# Patient Record
Sex: MALE | Race: WHITE | Employment: OTHER | URBAN - METROPOLITAN AREA
[De-identification: names, ages, dates, MRNs, and addresses within clinical notes are randomized per-mention and may not be internally consistent; named-entity substitution may affect disease eponyms.]

---

## 2018-03-19 ENCOUNTER — HOSPITAL ENCOUNTER (OUTPATIENT)
Dept: WOUND CARE | Age: 83
Discharge: HOME | End: 2018-03-19
Payer: MEDICARE

## 2018-03-19 VITALS
DIASTOLIC BLOOD PRESSURE: 78 MMHG | WEIGHT: 174 LBS | HEART RATE: 76 BPM | RESPIRATION RATE: 18 BRPM | SYSTOLIC BLOOD PRESSURE: 146 MMHG | HEIGHT: 66 IN | BODY MASS INDEX: 27.97 KG/M2 | TEMPERATURE: 97.7 F

## 2018-03-19 DIAGNOSIS — L89.151 DECUBITUS ULCER OF SACRAL REGION, STAGE 1: ICD-10-CM

## 2018-03-19 DIAGNOSIS — R53.1 RIGHT SIDED WEAKNESS: ICD-10-CM

## 2018-03-19 PROCEDURE — 99204 OFFICE O/P NEW MOD 45 MIN: CPT

## 2018-03-19 ASSESSMENT — PAIN DESCRIPTION - ORIENTATION: ORIENTATION: RIGHT;LEFT

## 2018-03-19 ASSESSMENT — PAIN DESCRIPTION - ONSET: ONSET: ON-GOING

## 2018-03-19 ASSESSMENT — PAIN DESCRIPTION - LOCATION: LOCATION: BUTTOCKS

## 2018-03-19 ASSESSMENT — PAIN SCALES - GENERAL: PAINLEVEL_OUTOF10: 3

## 2018-03-19 ASSESSMENT — PAIN DESCRIPTION - DESCRIPTORS: DESCRIPTORS: DULL;SORE

## 2018-03-19 ASSESSMENT — PAIN DESCRIPTION - PAIN TYPE: TYPE: CHRONIC PAIN

## 2018-03-19 ASSESSMENT — PAIN DESCRIPTION - FREQUENCY: FREQUENCY: INTERMITTENT

## 2018-03-19 NOTE — CONSULTS
Chief Complaint: Patient seen for evaluation of sacral decubitus ulcer      HPI:  This patient unfortunately sustained a stroke, with right-sided weakness, currently wearing a brace over the right leg, some flexion contracture right upper extremities, most the time spent time in their wheelchair, developed sacral coccygeal decubitus ulcers with areas of skin breakdown, came to the wound care center for further evaluation and on examination by the nurses, the ulcers were found be healed, though patient has some area of skin irritation and rash at that site    Patient can walk short distances only around the house , denies any symptoms of rest pain    Patient does give history of diabetes      Patient denies any new focal lateralizing neurological symptoms like loss of speech, vision or loss of function of extremity, but has significant weakness of the right arm and right leg due to old stroke        Allergies   Allergen Reactions    Sulfa Antibiotics      Affects renal function and blood pressure    Sulfa Antibiotics        Current Outpatient Prescriptions   Medication Sig Dispense Refill    metFORMIN (GLUCOPHAGE) 500 MG tablet Take 500 mg by mouth daily (with breakfast)      furosemide (LASIX) 20 MG tablet Take 20 mg by mouth daily      melatonin 3 MG TABS tablet Take 3 mg by mouth nightly as needed      dimenhyDRINATE (DRAMAMINE) 50 MG tablet Take 25 mg by mouth daily as needed      fluticasone (FLONASE) 50 MCG/ACT nasal spray 2 sprays by Nasal route daily 2 sprays in each nostril daily 1 Bottle 3    GLIMEPIRIDE PO Take 4 mg by mouth daily       OXYBUTYNIN CHLORIDE ER PO Take by mouth      glucose (GLUTOSE) 40 % GEL Take 15 g by mouth as needed. 45 g 1    sodium chloride (AYR SALINE NASAL DROPS) 0.65 % (SOLN) SOLN nasal spray 1 spray by Each Nare route as needed. 1 Bottle 0    aspirin 81 MG tablet Take 81 mg by mouth daily.  metoprolol (LOPRESSOR) 50 MG tablet Take 50 mg by mouth 2 times daily.

## 2018-05-22 ENCOUNTER — HOSPITAL ENCOUNTER (EMERGENCY)
Age: 83
Discharge: HOME | End: 2018-05-22
Attending: EMERGENCY MEDICINE
Payer: MEDICARE

## 2018-05-22 VITALS
BODY MASS INDEX: 27.97 KG/M2 | WEIGHT: 174 LBS | DIASTOLIC BLOOD PRESSURE: 56 MMHG | HEART RATE: 68 BPM | OXYGEN SATURATION: 97 % | SYSTOLIC BLOOD PRESSURE: 102 MMHG | HEIGHT: 66 IN | RESPIRATION RATE: 16 BRPM | TEMPERATURE: 98.2 F

## 2018-05-22 DIAGNOSIS — E16.2 HYPOGLYCEMIA: Primary | ICD-10-CM

## 2018-05-22 DIAGNOSIS — N30.00 ACUTE CYSTITIS WITHOUT HEMATURIA: ICD-10-CM

## 2018-05-22 LAB
ANION GAP SERPL CALCULATED.3IONS-SCNC: 15 MMOL/L (ref 7–16)
BACTERIA: ABNORMAL /HPF
BILIRUBIN URINE: NEGATIVE
BLOOD, URINE: ABNORMAL
BUN BLDV-MCNC: 37 MG/DL (ref 8–23)
CALCIUM SERPL-MCNC: 8.4 MG/DL (ref 8.6–10.2)
CHLORIDE BLD-SCNC: 100 MMOL/L (ref 98–107)
CLARITY: ABNORMAL
CO2: 22 MMOL/L (ref 22–29)
COLOR: YELLOW
CREAT SERPL-MCNC: 3 MG/DL (ref 0.7–1.2)
GFR AFRICAN AMERICAN: 24
GFR NON-AFRICAN AMERICAN: 20 ML/MIN/1.73
GLUCOSE BLD-MCNC: 86 MG/DL (ref 74–109)
GLUCOSE URINE: NEGATIVE MG/DL
KETONES, URINE: NEGATIVE MG/DL
LEUKOCYTE ESTERASE, URINE: ABNORMAL
METER GLUCOSE: 88 MG/DL (ref 70–110)
METER GLUCOSE: 97 MG/DL (ref 70–110)
NITRITE, URINE: NEGATIVE
PH UA: 8.5 (ref 5–9)
POTASSIUM SERPL-SCNC: 3.9 MMOL/L (ref 3.5–5)
PROTEIN UA: 100 MG/DL
RBC UA: ABNORMAL /HPF (ref 0–2)
SODIUM BLD-SCNC: 137 MMOL/L (ref 132–146)
SPECIFIC GRAVITY UA: 1.02 (ref 1–1.03)
UROBILINOGEN, URINE: 1 E.U./DL
WBC UA: >20 /HPF (ref 0–5)

## 2018-05-22 PROCEDURE — 80048 BASIC METABOLIC PNL TOTAL CA: CPT

## 2018-05-22 PROCEDURE — 82962 GLUCOSE BLOOD TEST: CPT

## 2018-05-22 PROCEDURE — 87088 URINE BACTERIA CULTURE: CPT

## 2018-05-22 PROCEDURE — 87186 SC STD MICRODIL/AGAR DIL: CPT

## 2018-05-22 PROCEDURE — 6370000000 HC RX 637 (ALT 250 FOR IP): Performed by: EMERGENCY MEDICINE

## 2018-05-22 PROCEDURE — 96374 THER/PROPH/DIAG INJ IV PUSH: CPT

## 2018-05-22 PROCEDURE — 2580000003 HC RX 258: Performed by: EMERGENCY MEDICINE

## 2018-05-22 PROCEDURE — 81001 URINALYSIS AUTO W/SCOPE: CPT

## 2018-05-22 PROCEDURE — 99284 EMERGENCY DEPT VISIT MOD MDM: CPT

## 2018-05-22 RX ORDER — OXYBUTYNIN CHLORIDE 5 MG/1
5 TABLET ORAL EVERY EVENING
COMMUNITY
End: 2020-12-08

## 2018-05-22 RX ORDER — METFORMIN HYDROCHLORIDE 500 MG/1
500 TABLET, EXTENDED RELEASE ORAL
Status: ON HOLD | COMMUNITY
End: 2019-01-12

## 2018-05-22 RX ORDER — AMOXICILLIN AND CLAVULANATE POTASSIUM 500; 125 MG/1; MG/1
1 TABLET, FILM COATED ORAL 2 TIMES DAILY
Qty: 14 TABLET | Refills: 0 | Status: SHIPPED | OUTPATIENT
Start: 2018-05-22 | End: 2018-05-29

## 2018-05-22 RX ORDER — GLIMEPIRIDE 4 MG/1
4 TABLET ORAL
Status: ON HOLD | COMMUNITY
End: 2019-01-12

## 2018-05-22 RX ORDER — DEXTROSE MONOHYDRATE 25 G/50ML
12.5 INJECTION, SOLUTION INTRAVENOUS ONCE
Status: COMPLETED | OUTPATIENT
Start: 2018-05-22 | End: 2018-05-22

## 2018-05-22 RX ORDER — AMOXICILLIN AND CLAVULANATE POTASSIUM 500; 125 MG/1; MG/1
1 TABLET, FILM COATED ORAL ONCE
Status: COMPLETED | OUTPATIENT
Start: 2018-05-22 | End: 2018-05-22

## 2018-05-22 RX ORDER — PANTOPRAZOLE SODIUM 40 MG/1
40 TABLET, DELAYED RELEASE ORAL
Status: ON HOLD | COMMUNITY
End: 2019-01-12 | Stop reason: HOSPADM

## 2018-05-22 RX ADMIN — AMOXICILLIN AND CLAVULANATE POTASSIUM 1 TABLET: 500; 125 TABLET, FILM COATED ORAL at 14:00

## 2018-05-22 RX ADMIN — DEXTROSE MONOHYDRATE 12.5 G: 25 INJECTION, SOLUTION INTRAVENOUS at 14:38

## 2018-05-22 ASSESSMENT — ENCOUNTER SYMPTOMS
EYE PAIN: 0
COUGH: 0
ABDOMINAL PAIN: 0
NAUSEA: 0
SINUS PRESSURE: 0
SORE THROAT: 0
VOMITING: 0
SHORTNESS OF BREATH: 0
EYE REDNESS: 0
WHEEZING: 0
COLOR CHANGE: 0
DIARRHEA: 0
RHINORRHEA: 0

## 2018-05-23 ENCOUNTER — HOSPITAL ENCOUNTER (EMERGENCY)
Age: 83
Discharge: HOME | End: 2018-05-23
Attending: EMERGENCY MEDICINE
Payer: MEDICARE

## 2018-05-23 VITALS
WEIGHT: 174 LBS | HEART RATE: 68 BPM | SYSTOLIC BLOOD PRESSURE: 118 MMHG | BODY MASS INDEX: 27.97 KG/M2 | RESPIRATION RATE: 16 BRPM | DIASTOLIC BLOOD PRESSURE: 60 MMHG | OXYGEN SATURATION: 97 % | HEIGHT: 66 IN | TEMPERATURE: 98 F

## 2018-05-23 DIAGNOSIS — N18.9 CHRONIC KIDNEY DISEASE, UNSPECIFIED CKD STAGE: ICD-10-CM

## 2018-05-23 DIAGNOSIS — E16.2 HYPOGLYCEMIA: Primary | ICD-10-CM

## 2018-05-23 LAB
CHP ED QC CHECK: NORMAL
CHP ED QC CHECK: YES
GLUCOSE BLD-MCNC: 105 MG/DL
GLUCOSE BLD-MCNC: 167 MG/DL
METER GLUCOSE: 105 MG/DL (ref 70–110)
METER GLUCOSE: 167 MG/DL (ref 70–110)

## 2018-05-23 PROCEDURE — 99285 EMERGENCY DEPT VISIT HI MDM: CPT

## 2018-05-23 PROCEDURE — 2580000003 HC RX 258: Performed by: EMERGENCY MEDICINE

## 2018-05-23 PROCEDURE — 82962 GLUCOSE BLOOD TEST: CPT

## 2018-05-23 RX ORDER — 0.9 % SODIUM CHLORIDE 0.9 %
1000 INTRAVENOUS SOLUTION INTRAVENOUS ONCE
Status: COMPLETED | OUTPATIENT
Start: 2018-05-23 | End: 2018-05-23

## 2018-05-23 RX ADMIN — SODIUM CHLORIDE 1000 ML: 9 INJECTION, SOLUTION INTRAVENOUS at 06:36

## 2018-05-23 ASSESSMENT — ENCOUNTER SYMPTOMS
WHEEZING: 0
EYE REDNESS: 0
EYE PAIN: 0
NAUSEA: 0
COUGH: 0
RHINORRHEA: 0
ABDOMINAL PAIN: 0
VOMITING: 0
DIARRHEA: 0
COLOR CHANGE: 0
SORE THROAT: 0
SHORTNESS OF BREATH: 0
SINUS PRESSURE: 0

## 2018-05-24 LAB
ORGANISM: ABNORMAL
URINE CULTURE, ROUTINE: ABNORMAL
URINE CULTURE, ROUTINE: ABNORMAL

## 2019-01-12 ENCOUNTER — HOSPITAL ENCOUNTER (OUTPATIENT)
Age: 84
Setting detail: OBSERVATION
Discharge: HOME | End: 2019-01-12
Attending: EMERGENCY MEDICINE | Admitting: INTERNAL MEDICINE
Payer: MEDICARE

## 2019-01-12 ENCOUNTER — APPOINTMENT (OUTPATIENT)
Dept: GENERAL RADIOLOGY | Age: 84
End: 2019-01-12
Payer: MEDICARE

## 2019-01-12 VITALS
OXYGEN SATURATION: 95 % | SYSTOLIC BLOOD PRESSURE: 165 MMHG | HEIGHT: 65 IN | WEIGHT: 170 LBS | HEART RATE: 69 BPM | RESPIRATION RATE: 18 BRPM | DIASTOLIC BLOOD PRESSURE: 76 MMHG | BODY MASS INDEX: 28.32 KG/M2 | TEMPERATURE: 97.4 F

## 2019-01-12 DIAGNOSIS — I48.91 NEW ONSET ATRIAL FIBRILLATION (HCC): ICD-10-CM

## 2019-01-12 DIAGNOSIS — N18.30 STAGE 3 CHRONIC KIDNEY DISEASE (HCC): ICD-10-CM

## 2019-01-12 DIAGNOSIS — E16.2 HYPOGLYCEMIA: Primary | ICD-10-CM

## 2019-01-12 DIAGNOSIS — I50.9 CONGESTIVE HEART FAILURE, UNSPECIFIED HF CHRONICITY, UNSPECIFIED HEART FAILURE TYPE (HCC): ICD-10-CM

## 2019-01-12 LAB
ALBUMIN SERPL-MCNC: 3.3 G/DL (ref 3.5–5.2)
ALP BLD-CCNC: 119 U/L (ref 40–129)
ALT SERPL-CCNC: 8 U/L (ref 0–40)
ANION GAP SERPL CALCULATED.3IONS-SCNC: 9 MMOL/L (ref 7–16)
APTT: 24.9 SEC (ref 24.5–35.1)
AST SERPL-CCNC: 16 U/L (ref 0–39)
BASOPHILS ABSOLUTE: 0.02 E9/L (ref 0–0.2)
BASOPHILS RELATIVE PERCENT: 0.3 % (ref 0–2)
BILIRUB SERPL-MCNC: 0.4 MG/DL (ref 0–1.2)
BUN BLDV-MCNC: 21 MG/DL (ref 8–23)
CALCIUM SERPL-MCNC: 8.2 MG/DL (ref 8.6–10.2)
CHLORIDE BLD-SCNC: 109 MMOL/L (ref 98–107)
CHP ED QC CHECK: YES
CO2: 22 MMOL/L (ref 22–29)
CREAT SERPL-MCNC: 2.1 MG/DL (ref 0.7–1.2)
EKG ATRIAL RATE: 78 BPM
EKG Q-T INTERVAL: 374 MS
EKG QRS DURATION: 74 MS
EKG QTC CALCULATION (BAZETT): 403 MS
EKG R AXIS: 12 DEGREES
EKG T AXIS: -17 DEGREES
EKG VENTRICULAR RATE: 70 BPM
EOSINOPHILS ABSOLUTE: 0.31 E9/L (ref 0.05–0.5)
EOSINOPHILS RELATIVE PERCENT: 5.1 % (ref 0–6)
GFR AFRICAN AMERICAN: 36
GFR NON-AFRICAN AMERICAN: 30 ML/MIN/1.73
GLUCOSE BLD-MCNC: 65 MG/DL (ref 74–99)
GLUCOSE BLD-MCNC: 80 MG/DL
HCT VFR BLD CALC: 30.3 % (ref 37–54)
HEMOGLOBIN: 9.5 G/DL (ref 12.5–16.5)
IMMATURE GRANULOCYTES #: 0.01 E9/L
IMMATURE GRANULOCYTES %: 0.2 % (ref 0–5)
INR BLD: 1.8
LACTIC ACID: 1 MMOL/L (ref 0.5–2.2)
LYMPHOCYTES ABSOLUTE: 1.28 E9/L (ref 1.5–4)
LYMPHOCYTES RELATIVE PERCENT: 21.1 % (ref 20–42)
MCH RBC QN AUTO: 29.9 PG (ref 26–35)
MCHC RBC AUTO-ENTMCNC: 31.4 % (ref 32–34.5)
MCV RBC AUTO: 95.3 FL (ref 80–99.9)
METER GLUCOSE: 263 MG/DL (ref 74–99)
METER GLUCOSE: 76 MG/DL (ref 74–99)
METER GLUCOSE: 80 MG/DL (ref 74–99)
METER GLUCOSE: 96 MG/DL (ref 74–99)
MONOCYTES ABSOLUTE: 0.7 E9/L (ref 0.1–0.95)
MONOCYTES RELATIVE PERCENT: 11.5 % (ref 2–12)
NEUTROPHILS ABSOLUTE: 3.75 E9/L (ref 1.8–7.3)
NEUTROPHILS RELATIVE PERCENT: 61.8 % (ref 43–80)
PDW BLD-RTO: 15.4 FL (ref 11.5–15)
PLATELET # BLD: 155 E9/L (ref 130–450)
PMV BLD AUTO: 11 FL (ref 7–12)
POTASSIUM SERPL-SCNC: 5.1 MMOL/L (ref 3.5–5)
PRO-BNP: 4596 PG/ML (ref 0–450)
PROTHROMBIN TIME: 20.1 SEC (ref 9.3–12.4)
RBC # BLD: 3.18 E12/L (ref 3.8–5.8)
SODIUM BLD-SCNC: 140 MMOL/L (ref 132–146)
TOTAL PROTEIN: 6 G/DL (ref 6.4–8.3)
TROPONIN: 0.01 NG/ML (ref 0–0.03)
WBC # BLD: 6.1 E9/L (ref 4.5–11.5)

## 2019-01-12 PROCEDURE — 71045 X-RAY EXAM CHEST 1 VIEW: CPT

## 2019-01-12 PROCEDURE — 93005 ELECTROCARDIOGRAM TRACING: CPT | Performed by: EMERGENCY MEDICINE

## 2019-01-12 PROCEDURE — 80053 COMPREHEN METABOLIC PANEL: CPT

## 2019-01-12 PROCEDURE — G0378 HOSPITAL OBSERVATION PER HR: HCPCS

## 2019-01-12 PROCEDURE — 87040 BLOOD CULTURE FOR BACTERIA: CPT

## 2019-01-12 PROCEDURE — 83605 ASSAY OF LACTIC ACID: CPT

## 2019-01-12 PROCEDURE — 99285 EMERGENCY DEPT VISIT HI MDM: CPT

## 2019-01-12 PROCEDURE — 94664 DEMO&/EVAL PT USE INHALER: CPT

## 2019-01-12 PROCEDURE — 96374 THER/PROPH/DIAG INJ IV PUSH: CPT

## 2019-01-12 PROCEDURE — 85610 PROTHROMBIN TIME: CPT

## 2019-01-12 PROCEDURE — 6370000000 HC RX 637 (ALT 250 FOR IP): Performed by: INTERNAL MEDICINE

## 2019-01-12 PROCEDURE — 6370000000 HC RX 637 (ALT 250 FOR IP): Performed by: EMERGENCY MEDICINE

## 2019-01-12 PROCEDURE — 85730 THROMBOPLASTIN TIME PARTIAL: CPT

## 2019-01-12 PROCEDURE — 83880 ASSAY OF NATRIURETIC PEPTIDE: CPT

## 2019-01-12 PROCEDURE — 96375 TX/PRO/DX INJ NEW DRUG ADDON: CPT

## 2019-01-12 PROCEDURE — 84484 ASSAY OF TROPONIN QUANT: CPT

## 2019-01-12 PROCEDURE — 6360000002 HC RX W HCPCS: Performed by: EMERGENCY MEDICINE

## 2019-01-12 PROCEDURE — 94640 AIRWAY INHALATION TREATMENT: CPT

## 2019-01-12 PROCEDURE — 2580000003 HC RX 258: Performed by: INTERNAL MEDICINE

## 2019-01-12 PROCEDURE — 85025 COMPLETE CBC W/AUTO DIFF WBC: CPT

## 2019-01-12 PROCEDURE — 82962 GLUCOSE BLOOD TEST: CPT

## 2019-01-12 RX ORDER — METHYLPREDNISOLONE SODIUM SUCCINATE 125 MG/2ML
125 INJECTION, POWDER, LYOPHILIZED, FOR SOLUTION INTRAMUSCULAR; INTRAVENOUS ONCE
Status: COMPLETED | OUTPATIENT
Start: 2019-01-12 | End: 2019-01-12

## 2019-01-12 RX ORDER — PRAVASTATIN SODIUM 20 MG
20 TABLET ORAL
Status: DISCONTINUED | OUTPATIENT
Start: 2019-01-12 | End: 2019-01-12 | Stop reason: HOSPADM

## 2019-01-12 RX ORDER — ACETAMINOPHEN 325 MG/1
650 TABLET ORAL EVERY 4 HOURS PRN
Status: DISCONTINUED | OUTPATIENT
Start: 2019-01-12 | End: 2019-01-12 | Stop reason: HOSPADM

## 2019-01-12 RX ORDER — GLIMEPIRIDE 4 MG/1
2 TABLET ORAL
Qty: 30 TABLET | Refills: 3 | Status: SHIPPED | OUTPATIENT
Start: 2019-01-12 | End: 2020-08-14

## 2019-01-12 RX ORDER — DEXTROSE MONOHYDRATE 25 G/50ML
25 INJECTION, SOLUTION INTRAVENOUS PRN
Status: DISCONTINUED | OUTPATIENT
Start: 2019-01-12 | End: 2019-01-12

## 2019-01-12 RX ORDER — PANTOPRAZOLE SODIUM 40 MG/1
40 TABLET, DELAYED RELEASE ORAL
Status: DISCONTINUED | OUTPATIENT
Start: 2019-01-12 | End: 2019-01-12 | Stop reason: HOSPADM

## 2019-01-12 RX ORDER — FUROSEMIDE 20 MG/1
20 TABLET ORAL EVERY MORNING
Qty: 60 TABLET | Refills: 3 | Status: SHIPPED | OUTPATIENT
Start: 2019-01-12 | End: 2021-01-20

## 2019-01-12 RX ORDER — METOPROLOL TARTRATE 50 MG/1
50 TABLET, FILM COATED ORAL 2 TIMES DAILY
Status: DISCONTINUED | OUTPATIENT
Start: 2019-01-12 | End: 2019-01-12 | Stop reason: HOSPADM

## 2019-01-12 RX ORDER — SODIUM CHLORIDE 0.9 % (FLUSH) 0.9 %
10 SYRINGE (ML) INJECTION PRN
Status: DISCONTINUED | OUTPATIENT
Start: 2019-01-12 | End: 2019-01-12 | Stop reason: HOSPADM

## 2019-01-12 RX ORDER — DEXTROSE MONOHYDRATE 50 MG/ML
100 INJECTION, SOLUTION INTRAVENOUS PRN
Status: DISCONTINUED | OUTPATIENT
Start: 2019-01-12 | End: 2019-01-12

## 2019-01-12 RX ORDER — OXYBUTYNIN CHLORIDE 5 MG/1
5 TABLET ORAL EVERY EVENING
Status: DISCONTINUED | OUTPATIENT
Start: 2019-01-12 | End: 2019-01-12 | Stop reason: HOSPADM

## 2019-01-12 RX ORDER — SODIUM CHLORIDE 0.9 % (FLUSH) 0.9 %
10 SYRINGE (ML) INJECTION EVERY 12 HOURS SCHEDULED
Status: DISCONTINUED | OUTPATIENT
Start: 2019-01-12 | End: 2019-01-12 | Stop reason: HOSPADM

## 2019-01-12 RX ORDER — ASPIRIN 81 MG/1
81 TABLET ORAL EVERY MORNING
Status: DISCONTINUED | OUTPATIENT
Start: 2019-01-12 | End: 2019-01-12 | Stop reason: HOSPADM

## 2019-01-12 RX ORDER — ACETAMINOPHEN 325 MG/1
650 TABLET ORAL EVERY 4 HOURS PRN
Status: DISCONTINUED | OUTPATIENT
Start: 2019-01-12 | End: 2019-01-12 | Stop reason: SDUPTHER

## 2019-01-12 RX ORDER — METOPROLOL SUCCINATE 50 MG/1
50 TABLET, EXTENDED RELEASE ORAL 2 TIMES DAILY
Qty: 30 TABLET | Refills: 1 | Status: ON HOLD | OUTPATIENT
Start: 2019-01-12 | End: 2020-08-28 | Stop reason: HOSPADM

## 2019-01-12 RX ORDER — IPRATROPIUM BROMIDE AND ALBUTEROL SULFATE 2.5; .5 MG/3ML; MG/3ML
1 SOLUTION RESPIRATORY (INHALATION)
Status: COMPLETED | OUTPATIENT
Start: 2019-01-12 | End: 2019-01-12

## 2019-01-12 RX ORDER — FUROSEMIDE 10 MG/ML
40 INJECTION INTRAMUSCULAR; INTRAVENOUS ONCE
Status: COMPLETED | OUTPATIENT
Start: 2019-01-12 | End: 2019-01-12

## 2019-01-12 RX ORDER — AMLODIPINE BESYLATE 10 MG/1
10 TABLET ORAL
Status: DISCONTINUED | OUTPATIENT
Start: 2019-01-12 | End: 2019-01-12 | Stop reason: HOSPADM

## 2019-01-12 RX ORDER — NICOTINE POLACRILEX 4 MG
15 LOZENGE BUCCAL PRN
Status: DISCONTINUED | OUTPATIENT
Start: 2019-01-12 | End: 2019-01-12 | Stop reason: HOSPADM

## 2019-01-12 RX ORDER — DEXTROSE MONOHYDRATE 25 G/50ML
12.5 INJECTION, SOLUTION INTRAVENOUS PRN
Status: DISCONTINUED | OUTPATIENT
Start: 2019-01-12 | End: 2019-01-12

## 2019-01-12 RX ADMIN — Medication 10 ML: at 08:27

## 2019-01-12 RX ADMIN — METHYLPREDNISOLONE SODIUM SUCCINATE 125 MG: 125 INJECTION, POWDER, FOR SOLUTION INTRAMUSCULAR; INTRAVENOUS at 01:38

## 2019-01-12 RX ADMIN — ASPIRIN 81 MG: 81 TABLET, COATED ORAL at 08:26

## 2019-01-12 RX ADMIN — FUROSEMIDE 40 MG: 10 INJECTION, SOLUTION INTRAMUSCULAR; INTRAVENOUS at 03:15

## 2019-01-12 RX ADMIN — IPRATROPIUM BROMIDE AND ALBUTEROL SULFATE 1 AMPULE: .5; 3 SOLUTION RESPIRATORY (INHALATION) at 01:30

## 2019-01-12 RX ADMIN — IPRATROPIUM BROMIDE AND ALBUTEROL SULFATE 1 AMPULE: .5; 3 SOLUTION RESPIRATORY (INHALATION) at 01:35

## 2019-01-12 RX ADMIN — PANTOPRAZOLE SODIUM 40 MG: 40 TABLET, DELAYED RELEASE ORAL at 08:26

## 2019-01-12 RX ADMIN — METOPROLOL TARTRATE 50 MG: 50 TABLET ORAL at 08:26

## 2019-01-12 ASSESSMENT — PAIN SCALES - GENERAL
PAINLEVEL_OUTOF10: 0
PAINLEVEL_OUTOF10: 0

## 2019-01-17 ENCOUNTER — HOSPITAL ENCOUNTER (EMERGENCY)
Age: 84
Discharge: HOME | End: 2019-01-17
Attending: EMERGENCY MEDICINE
Payer: MEDICARE

## 2019-01-17 ENCOUNTER — TELEPHONE (OUTPATIENT)
Dept: OTHER | Facility: CLINIC | Age: 84
End: 2019-01-17

## 2019-01-17 ENCOUNTER — APPOINTMENT (OUTPATIENT)
Dept: GENERAL RADIOLOGY | Age: 84
End: 2019-01-17
Payer: MEDICARE

## 2019-01-17 VITALS
RESPIRATION RATE: 16 BRPM | HEIGHT: 65 IN | OXYGEN SATURATION: 96 % | HEART RATE: 57 BPM | BODY MASS INDEX: 28.32 KG/M2 | WEIGHT: 170 LBS | DIASTOLIC BLOOD PRESSURE: 70 MMHG | SYSTOLIC BLOOD PRESSURE: 150 MMHG | TEMPERATURE: 97.8 F

## 2019-01-17 DIAGNOSIS — J90 PLEURAL EFFUSION: Primary | ICD-10-CM

## 2019-01-17 LAB
ANION GAP SERPL CALCULATED.3IONS-SCNC: 9 MMOL/L (ref 7–16)
BASOPHILS ABSOLUTE: 0.02 E9/L (ref 0–0.2)
BASOPHILS RELATIVE PERCENT: 0.4 % (ref 0–2)
BLOOD CULTURE, ROUTINE: NORMAL
BUN BLDV-MCNC: 29 MG/DL (ref 8–23)
CALCIUM SERPL-MCNC: 8.2 MG/DL (ref 8.6–10.2)
CHLORIDE BLD-SCNC: 105 MMOL/L (ref 98–107)
CHP ED QC CHECK: YES
CO2: 22 MMOL/L (ref 22–29)
CREAT SERPL-MCNC: 2 MG/DL (ref 0.7–1.2)
CULTURE, BLOOD 2: NORMAL
EKG ATRIAL RATE: 56 BPM
EKG Q-T INTERVAL: 428 MS
EKG QRS DURATION: 90 MS
EKG QTC CALCULATION (BAZETT): 413 MS
EKG R AXIS: 17 DEGREES
EKG T AXIS: -42 DEGREES
EKG VENTRICULAR RATE: 56 BPM
EOSINOPHILS ABSOLUTE: 0.38 E9/L (ref 0.05–0.5)
EOSINOPHILS RELATIVE PERCENT: 6.8 % (ref 0–6)
GFR AFRICAN AMERICAN: 38
GFR NON-AFRICAN AMERICAN: 32 ML/MIN/1.73
GLUCOSE BLD-MCNC: 105 MG/DL
GLUCOSE BLD-MCNC: 46 MG/DL (ref 74–99)
HCT VFR BLD CALC: 31.7 % (ref 37–54)
HEMOGLOBIN: 9.9 G/DL (ref 12.5–16.5)
IMMATURE GRANULOCYTES #: 0.01 E9/L
IMMATURE GRANULOCYTES %: 0.2 % (ref 0–5)
LACTIC ACID: 0.6 MMOL/L (ref 0.5–2.2)
LYMPHOCYTES ABSOLUTE: 1.29 E9/L (ref 1.5–4)
LYMPHOCYTES RELATIVE PERCENT: 23.1 % (ref 20–42)
MCH RBC QN AUTO: 29.7 PG (ref 26–35)
MCHC RBC AUTO-ENTMCNC: 31.2 % (ref 32–34.5)
MCV RBC AUTO: 95.2 FL (ref 80–99.9)
METER GLUCOSE: 105 MG/DL (ref 74–99)
METER GLUCOSE: 63 MG/DL (ref 74–99)
MONOCYTES ABSOLUTE: 0.73 E9/L (ref 0.1–0.95)
MONOCYTES RELATIVE PERCENT: 13.1 % (ref 2–12)
NEUTROPHILS ABSOLUTE: 3.15 E9/L (ref 1.8–7.3)
NEUTROPHILS RELATIVE PERCENT: 56.4 % (ref 43–80)
PDW BLD-RTO: 15.1 FL (ref 11.5–15)
PLATELET # BLD: 157 E9/L (ref 130–450)
PMV BLD AUTO: 10.3 FL (ref 7–12)
POTASSIUM REFLEX MAGNESIUM: 4.6 MMOL/L (ref 3.5–5)
PRO-BNP: 5196 PG/ML (ref 0–450)
RBC # BLD: 3.33 E12/L (ref 3.8–5.8)
SODIUM BLD-SCNC: 136 MMOL/L (ref 132–146)
TROPONIN: 0.01 NG/ML (ref 0–0.03)
WBC # BLD: 5.6 E9/L (ref 4.5–11.5)

## 2019-01-17 PROCEDURE — 84484 ASSAY OF TROPONIN QUANT: CPT

## 2019-01-17 PROCEDURE — 85025 COMPLETE CBC W/AUTO DIFF WBC: CPT

## 2019-01-17 PROCEDURE — 83605 ASSAY OF LACTIC ACID: CPT

## 2019-01-17 PROCEDURE — 93005 ELECTROCARDIOGRAM TRACING: CPT

## 2019-01-17 PROCEDURE — 82962 GLUCOSE BLOOD TEST: CPT

## 2019-01-17 PROCEDURE — 71045 X-RAY EXAM CHEST 1 VIEW: CPT

## 2019-01-17 PROCEDURE — 99285 EMERGENCY DEPT VISIT HI MDM: CPT

## 2019-01-17 PROCEDURE — 80048 BASIC METABOLIC PNL TOTAL CA: CPT

## 2019-01-17 PROCEDURE — 83880 ASSAY OF NATRIURETIC PEPTIDE: CPT

## 2020-04-23 ENCOUNTER — TELEPHONE (OUTPATIENT)
Dept: PALLATIVE CARE | Age: 85
End: 2020-04-23

## 2020-08-14 ENCOUNTER — HOSPITAL ENCOUNTER (INPATIENT)
Age: 85
LOS: 14 days | Discharge: NURSING, REHAB OR LONG-TERM CARE | DRG: 674 | End: 2020-08-28
Attending: EMERGENCY MEDICINE | Admitting: INTERNAL MEDICINE
Payer: MEDICARE

## 2020-08-14 ENCOUNTER — APPOINTMENT (OUTPATIENT)
Dept: ULTRASOUND IMAGING | Age: 85
DRG: 674 | End: 2020-08-14
Payer: MEDICARE

## 2020-08-14 ENCOUNTER — APPOINTMENT (OUTPATIENT)
Dept: GENERAL RADIOLOGY | Age: 85
DRG: 674 | End: 2020-08-14
Payer: MEDICARE

## 2020-08-14 PROBLEM — N17.0 ACUTE KIDNEY INJURY (AKI) WITH ACUTE TUBULAR NECROSIS (ATN) (HCC): Status: ACTIVE | Noted: 2020-08-14

## 2020-08-14 PROBLEM — N17.9 ACUTE KIDNEY FAILURE (HCC): Status: ACTIVE | Noted: 2020-08-14

## 2020-08-14 LAB
ALBUMIN SERPL-MCNC: 3.4 G/DL (ref 3.5–5.2)
ALP BLD-CCNC: 102 U/L (ref 40–129)
ALT SERPL-CCNC: 15 U/L (ref 0–40)
ANION GAP SERPL CALCULATED.3IONS-SCNC: 9 MMOL/L (ref 7–16)
AST SERPL-CCNC: 13 U/L (ref 0–39)
BACTERIA: NORMAL /HPF
BASOPHILS ABSOLUTE: 0.03 E9/L (ref 0–0.2)
BASOPHILS RELATIVE PERCENT: 0.6 % (ref 0–2)
BILIRUB SERPL-MCNC: 0.2 MG/DL (ref 0–1.2)
BILIRUBIN URINE: NEGATIVE
BLOOD, URINE: ABNORMAL
BUN BLDV-MCNC: 56 MG/DL (ref 8–23)
CALCIUM SERPL-MCNC: 8.4 MG/DL (ref 8.6–10.2)
CHLORIDE BLD-SCNC: 114 MMOL/L (ref 98–107)
CHLORIDE URINE RANDOM: 92 MMOL/L
CK MB: 4.4 NG/ML (ref 0–7.7)
CLARITY: CLEAR
CO2: 15 MMOL/L (ref 22–29)
COLOR: YELLOW
CREAT SERPL-MCNC: 4.2 MG/DL (ref 0.7–1.2)
CREATININE URINE: 58 MG/DL (ref 40–278)
EKG ATRIAL RATE: 277 BPM
EKG Q-T INTERVAL: 408 MS
EKG QRS DURATION: 80 MS
EKG QTC CALCULATION (BAZETT): 408 MS
EKG R AXIS: 14 DEGREES
EKG T AXIS: 3 DEGREES
EKG VENTRICULAR RATE: 60 BPM
EOSINOPHILS ABSOLUTE: 0.42 E9/L (ref 0.05–0.5)
EOSINOPHILS RELATIVE PERCENT: 8.2 % (ref 0–6)
GFR AFRICAN AMERICAN: 16
GFR NON-AFRICAN AMERICAN: 13 ML/MIN/1.73
GLUCOSE BLD-MCNC: 107 MG/DL (ref 74–99)
GLUCOSE URINE: NEGATIVE MG/DL
HCT VFR BLD CALC: 29.1 % (ref 37–54)
HEMOGLOBIN: 8.9 G/DL (ref 12.5–16.5)
IMMATURE GRANULOCYTES #: 0.02 E9/L
IMMATURE GRANULOCYTES %: 0.4 % (ref 0–5)
KETONES, URINE: NEGATIVE MG/DL
LACTIC ACID: 0.7 MMOL/L (ref 0.5–2.2)
LEUKOCYTE ESTERASE, URINE: NEGATIVE
LYMPHOCYTES ABSOLUTE: 1.23 E9/L (ref 1.5–4)
LYMPHOCYTES RELATIVE PERCENT: 23.9 % (ref 20–42)
MCH RBC QN AUTO: 30.9 PG (ref 26–35)
MCHC RBC AUTO-ENTMCNC: 30.6 % (ref 32–34.5)
MCV RBC AUTO: 101 FL (ref 80–99.9)
METER GLUCOSE: 232 MG/DL (ref 74–99)
MONOCYTES ABSOLUTE: 0.57 E9/L (ref 0.1–0.95)
MONOCYTES RELATIVE PERCENT: 11.1 % (ref 2–12)
NEUTROPHILS ABSOLUTE: 2.88 E9/L (ref 1.8–7.3)
NEUTROPHILS RELATIVE PERCENT: 55.8 % (ref 43–80)
NITRITE, URINE: NEGATIVE
PDW BLD-RTO: 14.7 FL (ref 11.5–15)
PH UA: 6 (ref 5–9)
PLATELET # BLD: 147 E9/L (ref 130–450)
PMV BLD AUTO: 10.9 FL (ref 7–12)
POTASSIUM REFLEX MAGNESIUM: 5 MMOL/L (ref 3.5–5)
PRO-BNP: 5984 PG/ML (ref 0–450)
PROTEIN PROTEIN: 227 MG/DL (ref 0–12)
PROTEIN UA: 100 MG/DL
RBC # BLD: 2.88 E12/L (ref 3.8–5.8)
RBC UA: NORMAL /HPF (ref 0–2)
SODIUM BLD-SCNC: 138 MMOL/L (ref 132–146)
SODIUM URINE: 103 MMOL/L
SPECIFIC GRAVITY UA: 1.02 (ref 1–1.03)
TOTAL CK: 74 U/L (ref 20–200)
TOTAL PROTEIN: 5.9 G/DL (ref 6.4–8.3)
TROPONIN: 0.05 NG/ML (ref 0–0.03)
UROBILINOGEN, URINE: 0.2 E.U./DL
WBC # BLD: 5.2 E9/L (ref 4.5–11.5)
WBC UA: NORMAL /HPF (ref 0–5)

## 2020-08-14 PROCEDURE — 84484 ASSAY OF TROPONIN QUANT: CPT

## 2020-08-14 PROCEDURE — 87205 SMEAR GRAM STAIN: CPT

## 2020-08-14 PROCEDURE — 85025 COMPLETE CBC W/AUTO DIFF WBC: CPT

## 2020-08-14 PROCEDURE — 76770 US EXAM ABDO BACK WALL COMP: CPT

## 2020-08-14 PROCEDURE — 99282 EMERGENCY DEPT VISIT SF MDM: CPT

## 2020-08-14 PROCEDURE — 83935 ASSAY OF URINE OSMOLALITY: CPT

## 2020-08-14 PROCEDURE — 82550 ASSAY OF CK (CPK): CPT

## 2020-08-14 PROCEDURE — 93005 ELECTROCARDIOGRAM TRACING: CPT | Performed by: EMERGENCY MEDICINE

## 2020-08-14 PROCEDURE — 82962 GLUCOSE BLOOD TEST: CPT

## 2020-08-14 PROCEDURE — 71045 X-RAY EXAM CHEST 1 VIEW: CPT

## 2020-08-14 PROCEDURE — 81001 URINALYSIS AUTO W/SCOPE: CPT

## 2020-08-14 PROCEDURE — 93971 EXTREMITY STUDY: CPT

## 2020-08-14 PROCEDURE — 99283 EMERGENCY DEPT VISIT LOW MDM: CPT

## 2020-08-14 PROCEDURE — 82436 ASSAY OF URINE CHLORIDE: CPT

## 2020-08-14 PROCEDURE — 87088 URINE BACTERIA CULTURE: CPT

## 2020-08-14 PROCEDURE — 82570 ASSAY OF URINE CREATININE: CPT

## 2020-08-14 PROCEDURE — 82553 CREATINE MB FRACTION: CPT

## 2020-08-14 PROCEDURE — 6370000000 HC RX 637 (ALT 250 FOR IP): Performed by: INTERNAL MEDICINE

## 2020-08-14 PROCEDURE — 2580000003 HC RX 258: Performed by: INTERNAL MEDICINE

## 2020-08-14 PROCEDURE — 83880 ASSAY OF NATRIURETIC PEPTIDE: CPT

## 2020-08-14 PROCEDURE — 6360000002 HC RX W HCPCS: Performed by: INTERNAL MEDICINE

## 2020-08-14 PROCEDURE — 83605 ASSAY OF LACTIC ACID: CPT

## 2020-08-14 PROCEDURE — 2500000003 HC RX 250 WO HCPCS: Performed by: INTERNAL MEDICINE

## 2020-08-14 PROCEDURE — 80053 COMPREHEN METABOLIC PANEL: CPT

## 2020-08-14 PROCEDURE — 82044 UR ALBUMIN SEMIQUANTITATIVE: CPT

## 2020-08-14 PROCEDURE — 2060000000 HC ICU INTERMEDIATE R&B

## 2020-08-14 PROCEDURE — 84156 ASSAY OF PROTEIN URINE: CPT

## 2020-08-14 PROCEDURE — 84300 ASSAY OF URINE SODIUM: CPT

## 2020-08-14 PROCEDURE — 93010 ELECTROCARDIOGRAM REPORT: CPT | Performed by: INTERNAL MEDICINE

## 2020-08-14 RX ORDER — SODIUM CHLORIDE 0.9 % (FLUSH) 0.9 %
10 SYRINGE (ML) INJECTION PRN
Status: DISCONTINUED | OUTPATIENT
Start: 2020-08-14 | End: 2020-08-28 | Stop reason: HOSPADM

## 2020-08-14 RX ORDER — CARVEDILOL 12.5 MG/1
12.5 TABLET ORAL 2 TIMES DAILY
COMMUNITY
End: 2021-01-20

## 2020-08-14 RX ORDER — FAMOTIDINE 40 MG/1
40 TABLET, FILM COATED ORAL PRN
COMMUNITY

## 2020-08-14 RX ORDER — ONDANSETRON 2 MG/ML
4 INJECTION INTRAMUSCULAR; INTRAVENOUS EVERY 6 HOURS PRN
Status: DISCONTINUED | OUTPATIENT
Start: 2020-08-14 | End: 2020-08-28 | Stop reason: HOSPADM

## 2020-08-14 RX ORDER — PRAVASTATIN SODIUM 20 MG
20 TABLET ORAL
Status: DISCONTINUED | OUTPATIENT
Start: 2020-08-15 | End: 2020-08-28 | Stop reason: HOSPADM

## 2020-08-14 RX ORDER — IRBESARTAN 300 MG/1
300 TABLET ORAL NIGHTLY
Status: ON HOLD | COMMUNITY
End: 2020-08-28 | Stop reason: HOSPADM

## 2020-08-14 RX ORDER — FERROUS SULFATE 325(65) MG
325 TABLET ORAL 2 TIMES DAILY
COMMUNITY
End: 2021-07-06 | Stop reason: ALTCHOICE

## 2020-08-14 RX ORDER — PROMETHAZINE HYDROCHLORIDE 25 MG/1
12.5 TABLET ORAL EVERY 6 HOURS PRN
Status: DISCONTINUED | OUTPATIENT
Start: 2020-08-14 | End: 2020-08-28 | Stop reason: HOSPADM

## 2020-08-14 RX ORDER — LORAZEPAM 1 MG/1
1 TABLET ORAL PRN
COMMUNITY
End: 2021-01-20

## 2020-08-14 RX ORDER — SODIUM CHLORIDE 9 MG/ML
INJECTION, SOLUTION INTRAVENOUS CONTINUOUS
Status: DISCONTINUED | OUTPATIENT
Start: 2020-08-14 | End: 2020-08-14

## 2020-08-14 RX ORDER — HEPARIN SODIUM 10000 [USP'U]/ML
5000 INJECTION, SOLUTION INTRAVENOUS; SUBCUTANEOUS EVERY 8 HOURS SCHEDULED
Status: DISCONTINUED | OUTPATIENT
Start: 2020-08-14 | End: 2020-08-28 | Stop reason: HOSPADM

## 2020-08-14 RX ORDER — VIT C/E/CUPERIC/ZINC/LUTEIN 226-90-0.8
CAPSULE ORAL
COMMUNITY
End: 2021-07-06 | Stop reason: ALTCHOICE

## 2020-08-14 RX ORDER — ACETAMINOPHEN 650 MG/1
650 SUPPOSITORY RECTAL EVERY 6 HOURS PRN
Status: DISCONTINUED | OUTPATIENT
Start: 2020-08-14 | End: 2020-08-28 | Stop reason: HOSPADM

## 2020-08-14 RX ORDER — NICOTINE POLACRILEX 4 MG
15 LOZENGE BUCCAL PRN
Status: DISCONTINUED | OUTPATIENT
Start: 2020-08-14 | End: 2020-08-28 | Stop reason: HOSPADM

## 2020-08-14 RX ORDER — DEXTROSE MONOHYDRATE 25 G/50ML
12.5 INJECTION, SOLUTION INTRAVENOUS PRN
Status: DISCONTINUED | OUTPATIENT
Start: 2020-08-14 | End: 2020-08-28 | Stop reason: HOSPADM

## 2020-08-14 RX ORDER — LISINOPRIL 5 MG/1
5 TABLET ORAL DAILY
Status: ON HOLD | COMMUNITY
End: 2020-08-28 | Stop reason: HOSPADM

## 2020-08-14 RX ORDER — DEXTROSE MONOHYDRATE 50 MG/ML
100 INJECTION, SOLUTION INTRAVENOUS PRN
Status: DISCONTINUED | OUTPATIENT
Start: 2020-08-14 | End: 2020-08-28 | Stop reason: HOSPADM

## 2020-08-14 RX ORDER — METOPROLOL SUCCINATE 50 MG/1
50 TABLET, EXTENDED RELEASE ORAL 2 TIMES DAILY
Status: DISCONTINUED | OUTPATIENT
Start: 2020-08-14 | End: 2020-08-28 | Stop reason: HOSPADM

## 2020-08-14 RX ORDER — ACETAMINOPHEN 325 MG/1
650 TABLET ORAL EVERY 6 HOURS PRN
Status: DISCONTINUED | OUTPATIENT
Start: 2020-08-14 | End: 2020-08-28 | Stop reason: HOSPADM

## 2020-08-14 RX ORDER — ASPIRIN 81 MG/1
81 TABLET ORAL EVERY MORNING
Status: DISCONTINUED | OUTPATIENT
Start: 2020-08-15 | End: 2020-08-28 | Stop reason: HOSPADM

## 2020-08-14 RX ORDER — SODIUM CHLORIDE 0.9 % (FLUSH) 0.9 %
10 SYRINGE (ML) INJECTION EVERY 12 HOURS SCHEDULED
Status: DISCONTINUED | OUTPATIENT
Start: 2020-08-14 | End: 2020-08-28 | Stop reason: HOSPADM

## 2020-08-14 RX ADMIN — SODIUM BICARBONATE: 84 INJECTION, SOLUTION INTRAVENOUS at 22:47

## 2020-08-14 RX ADMIN — SODIUM CHLORIDE, PRESERVATIVE FREE 10 ML: 5 INJECTION INTRAVENOUS at 22:45

## 2020-08-14 RX ADMIN — HEPARIN SODIUM 5000 UNITS: 10000 INJECTION INTRAVENOUS; SUBCUTANEOUS at 22:39

## 2020-08-14 RX ADMIN — INSULIN LISPRO 2 UNITS: 100 INJECTION, SOLUTION INTRAVENOUS; SUBCUTANEOUS at 22:41

## 2020-08-14 RX ADMIN — METOPROLOL SUCCINATE 50 MG: 50 TABLET, EXTENDED RELEASE ORAL at 22:39

## 2020-08-14 ASSESSMENT — PAIN DESCRIPTION - DESCRIPTORS: DESCRIPTORS: ACHING

## 2020-08-14 ASSESSMENT — PAIN DESCRIPTION - ONSET: ONSET: GRADUAL

## 2020-08-14 ASSESSMENT — PAIN DESCRIPTION - PAIN TYPE: TYPE: ACUTE PAIN

## 2020-08-14 ASSESSMENT — PAIN DESCRIPTION - LOCATION: LOCATION: GROIN

## 2020-08-14 ASSESSMENT — PAIN DESCRIPTION - ORIENTATION: ORIENTATION: LEFT

## 2020-08-14 ASSESSMENT — PAIN SCALES - GENERAL: PAINLEVEL_OUTOF10: 2

## 2020-08-14 ASSESSMENT — PAIN DESCRIPTION - FREQUENCY: FREQUENCY: CONTINUOUS

## 2020-08-14 ASSESSMENT — PAIN - FUNCTIONAL ASSESSMENT: PAIN_FUNCTIONAL_ASSESSMENT: ACTIVITIES ARE NOT PREVENTED

## 2020-08-14 NOTE — ED PROVIDER NOTES
HPI:  8/14/20,   Time: 4:34 PM EDT         Abbie Bell is a 80 y.o. male presenting to the ED for abnormal labs including elevated creatinine up to 4.2 and also a hemoglobin of 8.8 and normally he is around 13, beginning labs were drawn 2 days ago ago. Patient admits to increased shortness of breath especially with exertion but no recent cough or congestion or fever or chills but is also more fatigued. Denies chest pain or palpitations and no vomiting or diarrhea no black or bloody stool    ROS:   Pertinent positives and negatives are stated within HPI, all other systems reviewed and are negative.  --------------------------------------------- PAST HISTORY ---------------------------------------------  Past Medical History:  has a past medical history of Arthritis, CAD (coronary artery disease), Chronic kidney disease, COPD (chronic obstructive pulmonary disease) ([facility] [address] Decubitus ulcer of sacral region, stage 1, Diabetes mellitus ([facility] [address] Diabetic peripheral neuropathy ([facility] [address] History of CVA (cerebrovascular accident), Hypertension, Other disorders of kidney and ureter, Pain in lower limb, PVD (peripheral vascular disease) with claudication ([address] Right sided weakness, TIA (transient ischemic attack), and Unspecified cerebral artery occlusion with cerebral infarction. Past Surgical History:  has a past surgical history that includes Abdominal hernia repair; Cataract removal; Dental surgery; Inguinal hernia repair; Cholecystectomy (11/2011); Inner ear surgery; and eye surgery. Social History:  reports that he quit smoking about 53 years ago. His smoking use included cigarettes. He has a 10.00 pack-year smoking history. He has never used smokeless tobacco. He reports that he does not drink alcohol or use drugs. Family History: family history is not on file. The patients home medications have been reviewed.     Allergies: Sulfa antibiotics and Sulfa antibiotics    -------------------------------------------------- RESULTS -------------------------------------------------  All laboratory and radiology results have been personally reviewed by myself   LABS:  Results for orders placed or performed during the hospital encounter of 08/14/20   CBC Auto Differential   Result Value Ref Range    WBC 5.2 4.5 - 11.5 E9/L    RBC 2.88 (L) 3.80 - 5.80 E12/L    Hemoglobin 8.9 (L) 12.5 - 16.5 g/dL    Hematocrit 29.1 (L) 37.0 - 54.0 %    .0 (H) 80.0 - 99.9 fL    MCH 30.9 26.0 - 35.0 pg    MCHC 30.6 (L) 32.0 - 34.5 %    RDW 14.7 11.5 - 15.0 fL    Platelets 273 035 - 116 E9/L    MPV 10.9 7.0 - 12.0 fL    Neutrophils % 55.8 43.0 - 80.0 %    Immature Granulocytes % 0.4 0.0 - 5.0 %    Lymphocytes % 23.9 20.0 - 42.0 %    Monocytes % 11.1 2.0 - 12.0 %    Eosinophils % 8.2 (H) 0.0 - 6.0 %    Basophils % 0.6 0.0 - 2.0 %    Neutrophils Absolute 2.88 1.80 - 7.30 E9/L    Immature Granulocytes # 0.02 E9/L    Lymphocytes Absolute 1.23 (L) 1.50 - 4.00 E9/L    Monocytes Absolute 0.57 0.10 - 0.95 E9/L    Eosinophils Absolute 0.42 0.05 - 0.50 E9/L    Basophils Absolute 0.03 0.00 - 0.20 E9/L   Comprehensive Metabolic Panel w/ Reflex to MG   Result Value Ref Range    Sodium 138 132 - 146 mmol/L    Potassium reflex Magnesium 5.0 3.5 - 5.0 mmol/L    Chloride 114 (H) 98 - 107 mmol/L    CO2 15 (L) 22 - 29 mmol/L    Anion Gap 9 7 - 16 mmol/L    Glucose 107 (H) 74 - 99 mg/dL    BUN 56 (H) 8 - 23 mg/dL    CREATININE 4.2 (H) 0.7 - 1.2 mg/dL    GFR Non-African American 13 >=60 mL/min/1.73    GFR African American 16     Calcium 8.4 (L) 8.6 - 10.2 mg/dL    Total Protein 5.9 (L) 6.4 - 8.3 g/dL    Alb 3.4 (L) 3.5 - 5.2 g/dL    Total Bilirubin 0.2 0.0 - 1.2 mg/dL    Alkaline Phosphatase 102 40 - 129 U/L    ALT 15 0 - 40 U/L    AST 13 0 - 39 U/L   Troponin   Result Value Ref Range    Troponin 0.05 (H) 0.00 - 0.03 ng/mL   Urinalysis, reflex to microscopic   Result Value Ref Range    Color, UA Yellow Straw/Yellow    Clarity, UA Clear Clear    Glucose, Ur Negative Negative mg/dL    Bilirubin Urine Negative Negative    Ketones, Urine Negative Negative mg/dL    Specific Gravity, UA 1.020 1.005 - 1.030    Blood, Urine TRACE-INTACT Negative    pH, UA 6.0 5.0 - 9.0    Protein,  (A) Negative mg/dL    Urobilinogen, Urine 0.2 <2.0 E.U./dL    Nitrite, Urine Negative Negative    Leukocyte Esterase, Urine Negative Negative   Lactic Acid, Plasma   Result Value Ref Range    Lactic Acid 0.7 0.5 - 2.2 mmol/L   CK   Result Value Ref Range    Total CK 74 20 - 200 U/L   CK MB   Result Value Ref Range    CK-MB 4.4 0.0 - 7.7 ng/mL   Brain Natriuretic Peptide   Result Value Ref Range    Pro-BNP 5,984 (H) 0 - 450 pg/mL   Microscopic Urinalysis   Result Value Ref Range    WBC, UA NONE 0 - 5 /HPF    RBC, UA 1-3 0 - 2 /HPF    Bacteria, UA NONE SEEN None Seen /HPF   EKG 12 Lead   Result Value Ref Range    Ventricular Rate 60 BPM    Atrial Rate 277 BPM    QRS Duration 80 ms    Q-T Interval 408 ms    QTc Calculation (Bazett) 408 ms    R Axis 14 degrees    T Axis 3 degrees       RADIOLOGY:  Interpreted by Radiologist.  US DUP LOWER EXTREMITY RIGHT SULMA   Final Result   No evidence of right lower extremity deep venous thrombosis. No   demonstrable Doppler flow signal within the right common or   superficial femoral arteries. XR CHEST PORTABLE   Final Result   Cardiomegaly   Findings compatible with atherosclerotic disease of the aorta. US RETROPERITONEAL LIMITED    (Results Pending)       ------------------------- NURSING NOTES AND VITALS REVIEWED ---------------------------   The nursing notes within the ED encounter and vital signs as below have been reviewed.    BP (!) 157/80   Pulse 59   Temp 97.1 °F (36.2 °C)   Resp 16   Ht 5' 6\" (1.676 m)   Wt 186 lb (84.4 kg)   SpO2 99%   BMI 30.02 kg/m²   Oxygen Saturation Interpretation: Normal      ---------------------------------------------------PHYSICAL EXAM--------------------------------------      Constitutional/General: Alert and oriented x3, well appearing, non toxic in NAD, patient has mild conversational dyspnea  Head: NC/AT  Eyes: PERRL, EOMI  Mouth: Oropharynx clear, handling secretions, no trismus  Neck: Supple, full ROM, no meningeal signs  Pulmonary: Lungs clear to auscultation bilaterally, no wheezes, rales, or rhonchi. Not in respiratory distress  Cardiovascular:  Regular rate and rhythm, no murmurs, gallops, or rubs. 2+ distal pulses  Abdomen: Soft, non tender, non distended,   Extremities: Moves all extremities x 4. Warm and well perfused, bilateral pretibial and pedal edema right greater than left  Skin: warm and dry without rash  Neurologic: GCS 15, no focal deficits  Psych: Normal Affect      ------------------------------ ED COURSE/MEDICAL DECISION MAKING----------------------  Medications - No data to display      Medical Decision Making:    New onset anemia but Hemoccult negative rule out renal induced anemia or bone marrow etiology    Counseling: The emergency provider has spoken with the patient and discussed todays results, in addition to providing specific details for the plan of care and counseling regarding the diagnosis and prognosis. Questions are answered at this time and they are agreeable with the plan.      --------------------------------- IMPRESSION AND DISPOSITION ---------------------------------    IMPRESSION  1. Acute kidney injury ([facility] [address] New Problem   2.  Anemia, unspecified type Stable       DISPOSITION  Disposition: Admit to telemetry to Dr. Whitley Mendoza  Patient condition is stable                  Jose Young MD  08/14/20 [id number]

## 2020-08-15 LAB
ALBUMIN SERPL-MCNC: 2.9 G/DL (ref 3.5–5.2)
ALP BLD-CCNC: 94 U/L (ref 40–129)
ALT SERPL-CCNC: 15 U/L (ref 0–40)
ANION GAP SERPL CALCULATED.3IONS-SCNC: 13 MMOL/L (ref 7–16)
ANION GAP SERPL CALCULATED.3IONS-SCNC: 9 MMOL/L (ref 7–16)
AST SERPL-CCNC: 10 U/L (ref 0–39)
BASOPHILS ABSOLUTE: 0.04 E9/L (ref 0–0.2)
BASOPHILS RELATIVE PERCENT: 0.9 % (ref 0–2)
BILIRUB SERPL-MCNC: 0.2 MG/DL (ref 0–1.2)
BUN BLDV-MCNC: 56 MG/DL (ref 8–23)
BUN BLDV-MCNC: 58 MG/DL (ref 8–23)
CALCIUM SERPL-MCNC: 8.1 MG/DL (ref 8.6–10.2)
CALCIUM SERPL-MCNC: 8.2 MG/DL (ref 8.6–10.2)
CHLORIDE BLD-SCNC: 111 MMOL/L (ref 98–107)
CHLORIDE BLD-SCNC: 114 MMOL/L (ref 98–107)
CO2: 15 MMOL/L (ref 22–29)
CO2: 17 MMOL/L (ref 22–29)
CREAT SERPL-MCNC: 4.2 MG/DL (ref 0.7–1.2)
CREAT SERPL-MCNC: 4.2 MG/DL (ref 0.7–1.2)
CREATININE URINE: 60 MG/DL (ref 40–278)
EOSINOPHILS ABSOLUTE: 0.38 E9/L (ref 0.05–0.5)
EOSINOPHILS RELATIVE PERCENT: 8.4 % (ref 0–6)
GFR AFRICAN AMERICAN: 16
GFR AFRICAN AMERICAN: 16
GFR NON-AFRICAN AMERICAN: 13 ML/MIN/1.73
GFR NON-AFRICAN AMERICAN: 13 ML/MIN/1.73
GLUCOSE BLD-MCNC: 105 MG/DL (ref 74–99)
GLUCOSE BLD-MCNC: 127 MG/DL (ref 74–99)
HCT VFR BLD CALC: 26.4 % (ref 37–54)
HEMOGLOBIN: 8.2 G/DL (ref 12.5–16.5)
IMMATURE GRANULOCYTES #: 0.01 E9/L
IMMATURE GRANULOCYTES %: 0.2 % (ref 0–5)
LYMPHOCYTES ABSOLUTE: 1.63 E9/L (ref 1.5–4)
LYMPHOCYTES RELATIVE PERCENT: 36 % (ref 20–42)
MCH RBC QN AUTO: 31.7 PG (ref 26–35)
MCHC RBC AUTO-ENTMCNC: 31.1 % (ref 32–34.5)
MCV RBC AUTO: 101.9 FL (ref 80–99.9)
METER GLUCOSE: 100 MG/DL (ref 74–99)
METER GLUCOSE: 133 MG/DL (ref 74–99)
METER GLUCOSE: 141 MG/DL (ref 74–99)
METER GLUCOSE: 87 MG/DL (ref 74–99)
MICROALBUMIN UR-MCNC: 1509.9 MG/L
MICROALBUMIN/CREAT UR-RTO: 2516.5 (ref 0–30)
MONOCYTES ABSOLUTE: 0.56 E9/L (ref 0.1–0.95)
MONOCYTES RELATIVE PERCENT: 12.4 % (ref 2–12)
NEUTROPHILS ABSOLUTE: 1.91 E9/L (ref 1.8–7.3)
NEUTROPHILS RELATIVE PERCENT: 42.1 % (ref 43–80)
OSMOLALITY URINE: 402 MOSM/KG (ref 300–900)
PDW BLD-RTO: 14.6 FL (ref 11.5–15)
PLATELET # BLD: 130 E9/L (ref 130–450)
PMV BLD AUTO: 10.5 FL (ref 7–12)
POTASSIUM REFLEX MAGNESIUM: 4.6 MMOL/L (ref 3.5–5)
POTASSIUM SERPL-SCNC: 5.1 MMOL/L (ref 3.5–5)
RBC # BLD: 2.59 E12/L (ref 3.8–5.8)
SODIUM BLD-SCNC: 139 MMOL/L (ref 132–146)
SODIUM BLD-SCNC: 140 MMOL/L (ref 132–146)
TOTAL PROTEIN: 5.3 G/DL (ref 6.4–8.3)
WBC # BLD: 4.5 E9/L (ref 4.5–11.5)

## 2020-08-15 PROCEDURE — 6370000000 HC RX 637 (ALT 250 FOR IP): Performed by: INTERNAL MEDICINE

## 2020-08-15 PROCEDURE — 36415 COLL VENOUS BLD VENIPUNCTURE: CPT

## 2020-08-15 PROCEDURE — 80053 COMPREHEN METABOLIC PANEL: CPT

## 2020-08-15 PROCEDURE — 2580000003 HC RX 258: Performed by: INTERNAL MEDICINE

## 2020-08-15 PROCEDURE — 82962 GLUCOSE BLOOD TEST: CPT

## 2020-08-15 PROCEDURE — 85025 COMPLETE CBC W/AUTO DIFF WBC: CPT

## 2020-08-15 PROCEDURE — 2060000000 HC ICU INTERMEDIATE R&B

## 2020-08-15 PROCEDURE — 6360000002 HC RX W HCPCS: Performed by: INTERNAL MEDICINE

## 2020-08-15 PROCEDURE — 80048 BASIC METABOLIC PNL TOTAL CA: CPT

## 2020-08-15 RX ORDER — GUAIFENESIN 400 MG/1
400 TABLET ORAL PRN
Status: ON HOLD | COMMUNITY
End: 2020-08-28 | Stop reason: HOSPADM

## 2020-08-15 RX ADMIN — ASPIRIN 81 MG: 81 TABLET, COATED ORAL at 08:45

## 2020-08-15 RX ADMIN — INSULIN LISPRO 1 UNITS: 100 INJECTION, SOLUTION INTRAVENOUS; SUBCUTANEOUS at 20:23

## 2020-08-15 RX ADMIN — METOPROLOL SUCCINATE 50 MG: 50 TABLET, EXTENDED RELEASE ORAL at 20:23

## 2020-08-15 RX ADMIN — SODIUM CHLORIDE, PRESERVATIVE FREE 10 ML: 5 INJECTION INTRAVENOUS at 08:45

## 2020-08-15 RX ADMIN — PRAVASTATIN SODIUM 20 MG: 20 TABLET ORAL at 11:22

## 2020-08-15 RX ADMIN — HEPARIN SODIUM 5000 UNITS: 10000 INJECTION INTRAVENOUS; SUBCUTANEOUS at 13:42

## 2020-08-15 RX ADMIN — METOPROLOL SUCCINATE 50 MG: 50 TABLET, EXTENDED RELEASE ORAL at 08:45

## 2020-08-15 RX ADMIN — HEPARIN SODIUM 5000 UNITS: 10000 INJECTION INTRAVENOUS; SUBCUTANEOUS at 20:23

## 2020-08-15 RX ADMIN — HEPARIN SODIUM 5000 UNITS: 10000 INJECTION INTRAVENOUS; SUBCUTANEOUS at 05:59

## 2020-08-15 ASSESSMENT — PAIN SCALES - GENERAL
PAINLEVEL_OUTOF10: 0

## 2020-08-15 NOTE — PROGRESS NOTES
Brent serve out to Dr. Ace Manzano. Notified of patient developing auditory wheezes, no respiratory distress. Ordered to hold IV fluids. Will continue to monitor.       Electronically signed by Araceli Polo RN on 8/15/2020 at 6:25 AM

## 2020-08-15 NOTE — H&P
CHIEF COMPLAINT:  Worsening renal fxn. HISTORY OF PRESENT ILLNESS:      The patient is a 80 y.o. male patient of Dr. Alejandro Cummins(inherited remotely from Dr. Tita Ojeda) who presents with the above. He sees Dr. Juan Aburto on an approx q 3 mo basis and recent creat of 4.2 elevated from last creat of appprox 2.0. He denies recent illness/dehydrating events/NSAIDs. Does take ARB Irbesartan. He lives at home alne with aids popping in 3 times per day to prepare meals, aid in bathing etc.      Hx significant for CVA leaving right hemiparesis(and some swallowing difficulty causing transmitted upper airway sounds). He had the misfortune of a fall after the CVA basically eliminating right uuper extremity use secondary to humerus/shoulder trauma.     Past Medical History:    Past Medical History:   Diagnosis Date    Arthritis     CAD (coronary artery disease)     Chronic kidney disease     COPD (chronic obstructive pulmonary disease) ([address]     Decubitus ulcer of sacral region, stage 1 3/19/2018    Diabetes mellitus ([address]     Diabetic peripheral neuropathy ([address] 11/9/2012    History of CVA (cerebrovascular accident) 6/9/2014    Hypertension     Other disorders of kidney and ureter     prostate infections in past    Pain in lower limb 6/9/2014    PVD (peripheral vascular disease) with claudication ([address] 6/9/2014    Right sided weakness 3/19/2018    TIA (transient ischemic attack)     possible several years ago    Unspecified cerebral artery occlusion with cerebral infarction        Past Surgical History:    Past Surgical History:   Procedure Laterality Date    ABDOMINAL HERNIA REPAIR      CATARACT REMOVAL      right    CHOLECYSTECTOMY  11/2011    DENTAL SURGERY      EYE SURGERY      INGUINAL HERNIA REPAIR      INNER EAR SURGERY      removal of sac in ear       Medications Prior to Admission:    Medications Prior to Admission: carvedilol (COREG) 12.5 MG tablet, Take 12.5 mg by mouth 2 times daily Multiple Vitamins-Minerals (PRESERVISION/LUTEIN) CAPS,   SITagliptin (JANUVIA) 100 MG tablet, Take 100 mg by mouth daily  lisinopril (PRINIVIL;ZESTRIL) 5 MG tablet, Take 5 mg by mouth daily  irbesartan (AVAPRO) 300 MG tablet, Take 300 mg by mouth nightly   famotidine (PEPCID) 40 MG tablet, Take 40 mg by mouth as needed  ferrous sulfate (IRON 325) 325 (65 Fe) MG tablet, Take 325 mg by mouth nightly  vitamin D (CHOLECALCIFEROL) 25 MCG (1000 UT) TABS tablet, Take 1,000 Units by mouth daily  furosemide (LASIX) 20 MG tablet, Take 1 tablet by mouth every morning prn  oxybutynin (DITROPAN) 5 MG tablet, Take 5 mg by mouth every evening  aspirin 81 MG tablet, Take 81 mg by mouth every morning   pravastatin (PRAVACHOL) 20 MG tablet, Take 20 mg by mouth Daily with lunch   guaiFENesin 400 MG tablet, Take 400 mg by mouth as needed for Cough  LORazepam (ATIVAN) 1 MG tablet, Take 1 mg by mouth as needed. metoprolol succinate (TOPROL XL) 50 MG extended release tablet, Take 1 tablet by mouth 2 times daily    Allergies:    Sulfa antibiotics and Sulfa antibiotics    Social History:    reports that he quit smoking about 53 years ago. His smoking use included cigarettes. He has a 10.00 pack-year smoking history. He has never used smokeless tobacco. He reports that he does not drink alcohol or use drugs. Family History:   family history is not on file. REVIEW OF SYSTEMS:  As above in the HPI, otherwise negative    PHYSICAL EXAM:    Vitals:  BP (!) 121/59   Pulse 62   Temp 97.6 °F (36.4 °C) (Oral)   Resp 18   Ht 5' 6\" (1.676 m)   Wt 186 lb 3.2 oz (84.5 kg)   SpO2 99%   BMI 30.05 kg/m²     General:  Awake, alert, oriented X 3. Well developed, well nourished, well groomed. No apparent distress. HEENT:  Normocephalic, atraumatic. Pupils equal, round, reactive to light. No scleral icterus. No conjunctival injection. Normal lips, teeth, and gums. No nasal discharge.   Neck:  Supple  Heart:  RRR, no murmurs, gallops,

## 2020-08-15 NOTE — FLOWSHEET NOTE
I visited the patient to discuss advance care planning. Patient does have advance directives and I recommended family provide copy for hospital records. Patient is of the [address] and there is nothing in regard to their beliefs and values that need to be brought to the attention of the medical staff.

## 2020-08-16 LAB
ALBUMIN SERPL-MCNC: 3.1 G/DL (ref 3.5–5.2)
ALP BLD-CCNC: 95 U/L (ref 40–129)
ALT SERPL-CCNC: 14 U/L (ref 0–40)
ANION GAP SERPL CALCULATED.3IONS-SCNC: 8 MMOL/L (ref 7–16)
AST SERPL-CCNC: 11 U/L (ref 0–39)
BILIRUB SERPL-MCNC: 0.3 MG/DL (ref 0–1.2)
BUN BLDV-MCNC: 57 MG/DL (ref 8–23)
CALCIUM SERPL-MCNC: 8 MG/DL (ref 8.6–10.2)
CHLORIDE BLD-SCNC: 116 MMOL/L (ref 98–107)
CO2: 17 MMOL/L (ref 22–29)
CREAT SERPL-MCNC: 4.1 MG/DL (ref 0.7–1.2)
EOSINOPHIL, URINE: 0 % (ref 0–1)
FERRITIN: 136 NG/ML
GFR AFRICAN AMERICAN: 17
GFR NON-AFRICAN AMERICAN: 14 ML/MIN/1.73
GLUCOSE BLD-MCNC: 93 MG/DL (ref 74–99)
IRON SATURATION: 50 % (ref 20–55)
IRON: 88 MCG/DL (ref 59–158)
METER GLUCOSE: 104 MG/DL (ref 74–99)
METER GLUCOSE: 124 MG/DL (ref 74–99)
METER GLUCOSE: 144 MG/DL (ref 74–99)
METER GLUCOSE: 180 MG/DL (ref 74–99)
PARATHYROID HORMONE INTACT: 170 PG/ML (ref 15–65)
PHOSPHORUS: 4.4 MG/DL (ref 2.5–4.5)
POTASSIUM SERPL-SCNC: 4.9 MMOL/L (ref 3.5–5)
SODIUM BLD-SCNC: 141 MMOL/L (ref 132–146)
TOTAL IRON BINDING CAPACITY: 177 MCG/DL (ref 250–450)
TOTAL PROTEIN: 5.5 G/DL (ref 6.4–8.3)
URINE CULTURE, ROUTINE: NORMAL

## 2020-08-16 PROCEDURE — 82728 ASSAY OF FERRITIN: CPT

## 2020-08-16 PROCEDURE — 2060000000 HC ICU INTERMEDIATE R&B

## 2020-08-16 PROCEDURE — 82962 GLUCOSE BLOOD TEST: CPT

## 2020-08-16 PROCEDURE — 36415 COLL VENOUS BLD VENIPUNCTURE: CPT

## 2020-08-16 PROCEDURE — 83550 IRON BINDING TEST: CPT

## 2020-08-16 PROCEDURE — 6360000002 HC RX W HCPCS: Performed by: INTERNAL MEDICINE

## 2020-08-16 PROCEDURE — 2500000003 HC RX 250 WO HCPCS: Performed by: INTERNAL MEDICINE

## 2020-08-16 PROCEDURE — 84100 ASSAY OF PHOSPHORUS: CPT

## 2020-08-16 PROCEDURE — 6370000000 HC RX 637 (ALT 250 FOR IP): Performed by: INTERNAL MEDICINE

## 2020-08-16 PROCEDURE — 2580000003 HC RX 258: Performed by: INTERNAL MEDICINE

## 2020-08-16 PROCEDURE — 80053 COMPREHEN METABOLIC PANEL: CPT

## 2020-08-16 PROCEDURE — 83540 ASSAY OF IRON: CPT

## 2020-08-16 PROCEDURE — 83970 ASSAY OF PARATHORMONE: CPT

## 2020-08-16 RX ORDER — HYDRALAZINE HYDROCHLORIDE 10 MG/1
10 TABLET, FILM COATED ORAL EVERY 8 HOURS SCHEDULED
Status: DISCONTINUED | OUTPATIENT
Start: 2020-08-16 | End: 2020-08-25

## 2020-08-16 RX ORDER — ANALGESIC BALM 1.74; 4.06 G/29G; G/29G
OINTMENT TOPICAL PRN
Status: DISCONTINUED | OUTPATIENT
Start: 2020-08-16 | End: 2020-08-28 | Stop reason: HOSPADM

## 2020-08-16 RX ADMIN — PRAVASTATIN SODIUM 20 MG: 20 TABLET ORAL at 10:42

## 2020-08-16 RX ADMIN — MENTHOL AND METHYL SALICYLATE: 7.6; 29 OINTMENT TOPICAL at 17:37

## 2020-08-16 RX ADMIN — METOPROLOL SUCCINATE 50 MG: 50 TABLET, EXTENDED RELEASE ORAL at 20:24

## 2020-08-16 RX ADMIN — HEPARIN SODIUM 5000 UNITS: 10000 INJECTION INTRAVENOUS; SUBCUTANEOUS at 06:31

## 2020-08-16 RX ADMIN — METOPROLOL SUCCINATE 50 MG: 50 TABLET, EXTENDED RELEASE ORAL at 08:18

## 2020-08-16 RX ADMIN — HEPARIN SODIUM 5000 UNITS: 10000 INJECTION INTRAVENOUS; SUBCUTANEOUS at 14:56

## 2020-08-16 RX ADMIN — ASPIRIN 81 MG: 81 TABLET, COATED ORAL at 08:18

## 2020-08-16 RX ADMIN — SODIUM BICARBONATE: 84 INJECTION, SOLUTION INTRAVENOUS at 14:56

## 2020-08-16 RX ADMIN — SODIUM BICARBONATE: 84 INJECTION, SOLUTION INTRAVENOUS at 01:15

## 2020-08-16 RX ADMIN — HEPARIN SODIUM 5000 UNITS: 10000 INJECTION INTRAVENOUS; SUBCUTANEOUS at 20:24

## 2020-08-16 RX ADMIN — INSULIN LISPRO 2 UNITS: 100 INJECTION, SOLUTION INTRAVENOUS; SUBCUTANEOUS at 10:42

## 2020-08-16 RX ADMIN — HYDRALAZINE HYDROCHLORIDE 10 MG: 10 TABLET, FILM COATED ORAL at 15:13

## 2020-08-16 RX ADMIN — INSULIN LISPRO 1 UNITS: 100 INJECTION, SOLUTION INTRAVENOUS; SUBCUTANEOUS at 20:37

## 2020-08-16 RX ADMIN — HYDRALAZINE HYDROCHLORIDE 10 MG: 10 TABLET, FILM COATED ORAL at 20:24

## 2020-08-16 ASSESSMENT — PAIN SCALES - GENERAL
PAINLEVEL_OUTOF10: 0

## 2020-08-16 NOTE — PROGRESS NOTES
Pt seen this am. I was called earlier that he was short of breath. Ordered lasix 40 mg IV X1. Sx have since resolved. He states his am breathing tx was late. Will give him 20 mg lasix IV. OK with ID for d/c. Will proceed.

## 2020-08-16 NOTE — CONSULTS
[facility] MD Amelie Banks MD Rodena Deck, MD Sanjuanita Dodge, DO, [address] [place] [address] Germania HOYT .  Consultation  Patient's Name: Lyubov Allred  9:02 PM  8/15/2020    Nephrologist: Estrellita Doyle MD    Reason for Consult:  Acute kidney injury   Requesting Physician:  Justyna Garay MD    Chief Complaint:  Abnormal blood work     History Obtained From:  Patient , records , staff     History of Present Ilness:      81 y/o M known to me from clinic . Has hx of CKDIV baseline cr around 2 . His most recent BMP showed a cr of 4.2 and hco3 of 16 . He was called by office and advised to present to hospital   When seeing him today he was having lunch on RA . He is alert oriented he has upper airway wheezez related to his trachea his chest is relatively clear . He reports no n/v/d/cp/son/pnd Marlinda Cruise     Past Medical History:   Diagnosis Date    Arthritis     CAD (coronary artery disease)     Chronic kidney disease     COPD (chronic obstructive pulmonary disease) ([address]     Decubitus ulcer of sacral region, stage 1 3/19/2018    Diabetes mellitus ([address]     Diabetic peripheral neuropathy ([address] 11/9/2012    History of CVA (cerebrovascular accident) 6/9/2014    Hypertension     Other disorders of kidney and ureter     prostate infections in past    Pain in lower limb 6/9/2014    PVD (peripheral vascular disease) with claudication ([address] 6/9/2014    Right sided weakness 3/19/2018    TIA (transient ischemic attack)     possible several years ago    Unspecified cerebral artery occlusion with cerebral infarction        Past Surgical History:   Procedure Laterality Date    ABDOMINAL HERNIA REPAIR      CATARACT REMOVAL      right    CHOLECYSTECTOMY  11/2011    DENTAL SURGERY      EYE SURGERY      INGUINAL HERNIA REPAIR      INNER EAR SURGERY      removal of sac in ear       History reviewed. No pertinent family history. reports that he quit smoking about 53 years ago. His smoking use included cigarettes. He has a 10.00 pack-year smoking history. He has never used smokeless tobacco. He reports that he does not drink alcohol or use drugs. Allergies:  Sulfa antibiotics and Sulfa antibiotics    Current Medications:    aspirin EC tablet 81 mg, QAM  metoprolol succinate (TOPROL XL) extended release tablet 50 mg, BID  pravastatin (PRAVACHOL) tablet 20 mg, Lunch  sodium chloride flush 0.9 % injection 10 mL, 2 times per day  sodium chloride flush 0.9 % injection 10 mL, PRN  acetaminophen (TYLENOL) tablet 650 mg, Q6H PRN    Or  acetaminophen (TYLENOL) suppository 650 mg, Q6H PRN  promethazine (PHENERGAN) tablet 12.5 mg, Q6H PRN    Or  ondansetron (ZOFRAN) injection 4 mg, Q6H PRN  heparin (porcine) injection 5,000 Units, 3 times per day  insulin lispro (HUMALOG) injection vial 0-12 Units, TID WC  insulin lispro (HUMALOG) injection vial 0-6 Units, Nightly  glucose (GLUTOSE) 40 % oral gel 15 g, PRN  dextrose 50 % IV solution, PRN  glucagon (rDNA) injection 1 mg, PRN  dextrose 5 % solution, PRN  sodium bicarbonate 75 mEq in sodium chloride 0.45 % 1,000 mL infusion, Continuous        Review of Systems:   Constitutional: no fevers , no chills , feels ok   Eyes: no eye pain , no itching , no drainage  Ears, nose, mouth, throat, and face: no ear ,nose pain , hearing is ok ,no nasal drainage   Respiratory: no sob ,no cough ,no wheezing . Cardiovascular: no chest pain , no palpitation ,no sob . Gastrointestinal: no nausea, vomiting , constipation , no abdominal pain . Genitourinary:no urinary retention , no burning , dysuria . No polyuria   Hematologic/lymphatic: no bleeding , no cougulation issues . Musculoskeletal:no joint pain , no swelling . Neurological: no headaches ,no weakness , no numbness . Endocrine: no thirst , no weight issues .      Physical exam:   Vital signs BP (!) 170/72   Pulse 57   Temp 97.5 °F (36.4 °C) (Oral)   Resp 16   Ht 5' 6\" (1.676 m)   Wt 186 lb 3.2 oz (84.5 kg)   SpO2 99%   BMI 30.05 kg/m²   Gen : NAD , appropriate for stated age . Head : at , nc   Neck : supple , no thyromegaly noted . Eyes : EOMI , PERRLA   CV : RRR , No M/R/G . Lungs: CTAB , no wheezing , good flow heard b/l   Abd : soft , NT , BS + , No Organomegaly appreciated . Skin : soft, dry . Neuro : CN  II-XII grossly intact , no focal neurologic deficit . Psych : cooperative .      Data:   Labs:  CBC with Differential:    Lab Results   Component Value Date    WBC 4.5 08/15/2020    RBC 2.59 08/15/2020    HGB 8.2 08/15/2020    HCT 26.4 08/15/2020     08/15/2020    .9 08/15/2020    MCH 31.7 08/15/2020    MCHC 31.1 08/15/2020    RDW 14.6 08/15/2020    SEGSPCT 46 01/04/2014    BANDSPCT 2 05/02/2016    LYMPHOPCT 36.0 08/15/2020    MONOPCT 12.4 08/15/2020    BASOPCT 0.9 08/15/2020    MONOSABS 0.56 08/15/2020    LYMPHSABS 1.63 08/15/2020    EOSABS 0.38 08/15/2020    BASOSABS 0.04 08/15/2020     CMP:    Lab Results   Component Value Date     08/15/2020    K 5.1 08/15/2020    K 4.6 08/15/2020     08/15/2020    CO2 17 08/15/2020    BUN 56 08/15/2020    CREATININE 4.2 08/15/2020    GFRAA 16 08/15/2020    LABGLOM 13 08/15/2020    GLUCOSE 127 08/15/2020    GLUCOSE 163 02/22/2012    PROT 5.3 08/15/2020    LABALBU 2.9 08/15/2020    LABALBU 3.8 02/20/2012    CALCIUM 8.2 08/15/2020    BILITOT 0.2 08/15/2020    ALKPHOS 94 08/15/2020    AST 10 08/15/2020    ALT 15 08/15/2020     Ionized Calcium:  No results found for: IONCA  Magnesium:    Lab Results   Component Value Date    MG 1.8 06/23/2014     Phosphorus:    Lab Results   Component Value Date    PHOS 4.5 11/06/2012     U/A:    Lab Results   Component Value Date    COLORU Yellow 08/14/2020    PHUR 6.0 08/14/2020    WBCUA NONE 08/14/2020    WBCUA NONE 02/20/2012    RBCUA 1-3 08/14/2020    RBCUA 1-3 05/04/2013    BACTERIA NONE SEEN 08/14/2020    CLARITYU Clear 08/14/2020    SPECGRAV 1.020 08/14/2020    LEUKOCYTESUR Negative 08/14/2020    UROBILINOGEN 0.2 08/14/2020    BILIRUBINUR Negative 08/14/2020    BILIRUBINUR NEGATIVE 02/20/2012    BLOODU TRACE-INTACT 08/14/2020    GLUCOSEU Negative 08/14/2020    GLUCOSEU 500 02/20/2012     Microalbumen/Creatinine ratio:  No components found for: RUCREAT  Iron Saturation:  No components found for: PERCENTFE  TIBC:  No results found for: TIBC  FERRITIN:  No results found for: FERRITIN     Imaging:  US RETROPERITONEAL COMPLETE   Final Result      No evidence of hydronephrosis. Bilateral heterogeneous echotexture of the renal cortices. These   findings are nonspecific but may be seen in medical renal disease,   prior injury, or conditions such as pyelonephritis. Recommend clinical   correlation. US DUP LOWER EXTREMITY RIGHT SULMA   Final Result   No evidence of right lower extremity deep venous thrombosis. No   demonstrable Doppler flow signal within the right common or   superficial femoral arteries. XR CHEST PORTABLE   Final Result   Cardiomegaly   Findings compatible with atherosclerotic disease of the aorta.                          Assessment    -Acute kidney injury due to hemodynamic element as he was on 2 RAAS blocker   -chronic kidney disesae stage IV baseline cr around 2 with around 2 g proteinuria   -Hyperkalemia due to #8,#1  -Metabolic acidosis   -anaemia of chronic disease   -Mineral bone disease     Plan:  Continue bicarbonate based fluids for another day   Hold both lisinopril and irbesartan   Check iron studies replete with iv ferrlicit while in house   Check phosphour , pth         Thank you Dr. Felicia Mcgill MD for allowing us to participate in care of [address] [place] [address]           Electronically signed by German Bonilla MD on 8/15/2020 at 9:02 PM

## 2020-08-16 NOTE — PROGRESS NOTES
Diabetes mellitus ([facility] [address]    PVD (peripheral vascular disease) with claudication (HCC)    History of CVA (cerebrovascular accident)    Pain in lower limb    Cerebral infarction ([address]    CKD 3    Diabetes mellitus-2    Acute respiratory failure (HCC)    Decubitus ulcer of sacral region, stage 1    Right sided weakness    Hypoglycemia    Acute kidney injury ([facility]) with acute tubular necrosis (ATN) (HCC)    Acute kidney failure ([facility] [address]       Plan:  Present tx. Hydralazine added re BP.             Milo Barnett  9:53 AM  8/16/2020

## 2020-08-17 LAB
ANION GAP SERPL CALCULATED.3IONS-SCNC: 12 MMOL/L (ref 7–16)
BUN BLDV-MCNC: 56 MG/DL (ref 8–23)
CALCIUM SERPL-MCNC: 7.8 MG/DL (ref 8.6–10.2)
CHLORIDE BLD-SCNC: 109 MMOL/L (ref 98–107)
CO2: 19 MMOL/L (ref 22–29)
CREAT SERPL-MCNC: 3.9 MG/DL (ref 0.7–1.2)
GFR AFRICAN AMERICAN: 18
GFR NON-AFRICAN AMERICAN: 15 ML/MIN/1.73
GLUCOSE BLD-MCNC: 108 MG/DL (ref 74–99)
METER GLUCOSE: 139 MG/DL (ref 74–99)
METER GLUCOSE: 159 MG/DL (ref 74–99)
METER GLUCOSE: 182 MG/DL (ref 74–99)
POTASSIUM SERPL-SCNC: 5 MMOL/L (ref 3.5–5)
SODIUM BLD-SCNC: 140 MMOL/L (ref 132–146)

## 2020-08-17 PROCEDURE — 80048 BASIC METABOLIC PNL TOTAL CA: CPT

## 2020-08-17 PROCEDURE — 82962 GLUCOSE BLOOD TEST: CPT

## 2020-08-17 PROCEDURE — 2500000003 HC RX 250 WO HCPCS: Performed by: INTERNAL MEDICINE

## 2020-08-17 PROCEDURE — 97530 THERAPEUTIC ACTIVITIES: CPT

## 2020-08-17 PROCEDURE — 97161 PT EVAL LOW COMPLEX 20 MIN: CPT

## 2020-08-17 PROCEDURE — 6370000000 HC RX 637 (ALT 250 FOR IP): Performed by: INTERNAL MEDICINE

## 2020-08-17 PROCEDURE — 36415 COLL VENOUS BLD VENIPUNCTURE: CPT

## 2020-08-17 PROCEDURE — 2060000000 HC ICU INTERMEDIATE R&B

## 2020-08-17 PROCEDURE — U0003 INFECTIOUS AGENT DETECTION BY NUCLEIC ACID (DNA OR RNA); SEVERE ACUTE RESPIRATORY SYNDROME CORONAVIRUS 2 (SARS-COV-2) (CORONAVIRUS DISEASE [COVID-19]), AMPLIFIED PROBE TECHNIQUE, MAKING USE OF HIGH THROUGHPUT TECHNOLOGIES AS DESCRIBED BY CMS-2020-01-R: HCPCS

## 2020-08-17 PROCEDURE — 2580000003 HC RX 258: Performed by: INTERNAL MEDICINE

## 2020-08-17 PROCEDURE — 6360000002 HC RX W HCPCS: Performed by: INTERNAL MEDICINE

## 2020-08-17 PROCEDURE — 97165 OT EVAL LOW COMPLEX 30 MIN: CPT

## 2020-08-17 RX ADMIN — HYDRALAZINE HYDROCHLORIDE 10 MG: 10 TABLET, FILM COATED ORAL at 21:40

## 2020-08-17 RX ADMIN — HEPARIN SODIUM 5000 UNITS: 10000 INJECTION INTRAVENOUS; SUBCUTANEOUS at 06:26

## 2020-08-17 RX ADMIN — HYDRALAZINE HYDROCHLORIDE 10 MG: 10 TABLET, FILM COATED ORAL at 06:26

## 2020-08-17 RX ADMIN — SODIUM BICARBONATE: 84 INJECTION, SOLUTION INTRAVENOUS at 18:35

## 2020-08-17 RX ADMIN — INSULIN LISPRO 2 UNITS: 100 INJECTION, SOLUTION INTRAVENOUS; SUBCUTANEOUS at 15:42

## 2020-08-17 RX ADMIN — METOPROLOL SUCCINATE 50 MG: 50 TABLET, EXTENDED RELEASE ORAL at 08:08

## 2020-08-17 RX ADMIN — INSULIN LISPRO 1 UNITS: 100 INJECTION, SOLUTION INTRAVENOUS; SUBCUTANEOUS at 21:39

## 2020-08-17 RX ADMIN — HYDRALAZINE HYDROCHLORIDE 10 MG: 10 TABLET, FILM COATED ORAL at 14:20

## 2020-08-17 RX ADMIN — HEPARIN SODIUM 5000 UNITS: 10000 INJECTION INTRAVENOUS; SUBCUTANEOUS at 21:39

## 2020-08-17 RX ADMIN — PRAVASTATIN SODIUM 20 MG: 20 TABLET ORAL at 10:54

## 2020-08-17 RX ADMIN — SODIUM BICARBONATE: 84 INJECTION, SOLUTION INTRAVENOUS at 06:25

## 2020-08-17 RX ADMIN — HEPARIN SODIUM 5000 UNITS: 10000 INJECTION INTRAVENOUS; SUBCUTANEOUS at 14:20

## 2020-08-17 RX ADMIN — METOPROLOL SUCCINATE 50 MG: 50 TABLET, EXTENDED RELEASE ORAL at 21:39

## 2020-08-17 RX ADMIN — ASPIRIN 81 MG: 81 TABLET, COATED ORAL at 08:09

## 2020-08-17 ASSESSMENT — PAIN SCALES - GENERAL
PAINLEVEL_OUTOF10: 0

## 2020-08-17 NOTE — PROGRESS NOTES
[facility] MD Isaac Baron, MD Jeaneth Mercer, MD Chris Slater, MD Bennett Barrera, KARAN Lux, DAISY  Progress Note    8/17/2020    SUBJECTIVE:   8/17: Denies complaint. Feeling better. (-) sob/grace/cp/palp Appetite ok. No swelling. ROS unremarkable. 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  *         DIET:    DIET CARB CONTROL;     MEDS (scheduled):    hydrALAZINE  10 mg Oral 3 times per day    aspirin  81 mg Oral QAM    metoprolol succinate  50 mg Oral BID    pravastatin  20 mg Oral Lunch    sodium chloride flush  10 mL Intravenous 2 times per day    heparin (porcine)  5,000 Units Subcutaneous 3 times per day    insulin lispro  0-12 Units Subcutaneous TID WC    insulin lispro  0-6 Units Subcutaneous Nightly       MEDS (infusions):   dextrose      sodium bicarbonate infusion 80 mL/hr at 08/17/20 5230       MEDS (prn):  analgesic ointment, sodium chloride flush, acetaminophen **OR** acetaminophen, promethazine **OR** ondansetron, glucose, dextrose, glucagon (rDNA), dextrose    PHYSICAL EXAM:     Patient Vitals for the past 24 hrs:   BP Temp Temp src Pulse Resp SpO2 Weight   08/17/20 1415 (!) 149/63 97.5 °F (36.4 °C) Oral 65 16 100 % --   08/17/20 0730 (!) 128/55 97.4 °F (36.3 °C) Oral 64 16 96 % --   08/17/20 0046 -- -- -- -- -- -- 206 lb 12.8 oz (93.8 kg)   08/16/20 2315 (!) 151/68 97.5 °F (36.4 °C) Oral 57 16 96 % --   08/16/20 2015 (!) 140/60 97.8 °F (36.6 °C) Oral 62 16 97 % --   08/16/20 1513 (!) 161/72 97 °F (36.1 °C) Temporal 68 18 94 % --   @      Intake/Output Summary (Last 24 hours) at 8/17/2020 1458  Last data

## 2020-08-17 NOTE — PROGRESS NOTES
Wound / ostomy dept consulted for this Pt admitted with ZAHRAA. History includes: CAD, CKD, COPD, DM, CVA with R hemiparesis. The Pt has a small dry wound on his L ankle. It is open to air. His buttocks is denuded. He has been using Aloe Vesta to the area. Recommend Aquaphor topically and SOS precautions. Heels are intact. He has a low air loss mattress.

## 2020-08-17 NOTE — PROGRESS NOTES
[facility] MD Asael Salazar MD Chick Akin, MD Berlinda Coulter DO, [address] MD .  Consultation  Patient's Name: Danielle Barnard  8:09 PM  8/16/2020      Sub : sitting in chair feels ok on RA euvolemic    History of Present Ilness:      81 y/o M known to me from clinic . Has hx of CKDIV baseline cr around 2 . His most recent BMP showed a cr of 4.2 and hco3 of 16 . He was called by office and advised to present to hospital   When seeing him today he was having lunch on RA . He is alert oriented he has upper airway wheezez related to his trachea his chest is relatively clear . He reports no n/v/d/cp/son/pnd Danna Miller     Past Medical History:   Diagnosis Date    Arthritis     CAD (coronary artery disease)     Chronic kidney disease     COPD (chronic obstructive pulmonary disease) ([address]     Decubitus ulcer of sacral region, stage 1 3/19/2018    Diabetes mellitus ([address]     Diabetic peripheral neuropathy ([address] 11/9/2012    History of CVA (cerebrovascular accident) 6/9/2014    Hypertension     Other disorders of kidney and ureter     prostate infections in past    Pain in lower limb 6/9/2014    PVD (peripheral vascular disease) with claudication ([address] 6/9/2014    Right sided weakness 3/19/2018    TIA (transient ischemic attack)     possible several years ago    Unspecified cerebral artery occlusion with cerebral infarction        Past Surgical History:   Procedure Laterality Date    ABDOMINAL HERNIA REPAIR      CATARACT REMOVAL      right    CHOLECYSTECTOMY  11/2011    DENTAL SURGERY      EYE SURGERY      INGUINAL HERNIA REPAIR      INNER EAR SURGERY      removal of sac in ear       History reviewed. No pertinent family history. reports that he quit smoking about 53 years ago. His smoking use included cigarettes. He has a 10.00 pack-year smoking history.  He has never used smokeless tobacco. He reports that he does not drink alcohol or use drugs. Allergies:  Sulfa antibiotics and Sulfa antibiotics    Current Medications:    hydrALAZINE (APRESOLINE) tablet 10 mg, 3 times per day  analgesic ointment ointment, PRN  aspirin EC tablet 81 mg, QAM  metoprolol succinate (TOPROL XL) extended release tablet 50 mg, BID  pravastatin (PRAVACHOL) tablet 20 mg, Lunch  sodium chloride flush 0.9 % injection 10 mL, 2 times per day  sodium chloride flush 0.9 % injection 10 mL, PRN  acetaminophen (TYLENOL) tablet 650 mg, Q6H PRN    Or  acetaminophen (TYLENOL) suppository 650 mg, Q6H PRN  promethazine (PHENERGAN) tablet 12.5 mg, Q6H PRN    Or  ondansetron (ZOFRAN) injection 4 mg, Q6H PRN  heparin (porcine) injection 5,000 Units, 3 times per day  insulin lispro (HUMALOG) injection vial 0-12 Units, TID WC  insulin lispro (HUMALOG) injection vial 0-6 Units, Nightly  glucose (GLUTOSE) 40 % oral gel 15 g, PRN  dextrose 50 % IV solution, PRN  glucagon (rDNA) injection 1 mg, PRN  dextrose 5 % solution, PRN  sodium bicarbonate 75 mEq in sodium chloride 0.45 % 1,000 mL infusion, Continuous        Review of Systems:   Constitutional: no fevers , no chills , feels ok   Eyes: no eye pain , no itching , no drainage  Ears, nose, mouth, throat, and face: no ear ,nose pain , hearing is ok ,no nasal drainage   Respiratory: no sob ,no cough ,no wheezing . Cardiovascular: no chest pain , no palpitation ,no sob . Gastrointestinal: no nausea, vomiting , constipation , no abdominal pain . Genitourinary:no urinary retention , no burning , dysuria . No polyuria   Hematologic/lymphatic: no bleeding , no cougulation issues . Musculoskeletal:no joint pain , no swelling . Neurological: no headaches ,no weakness , no numbness . Endocrine: no thirst , no weight issues .      Physical exam:   Vital signs BP (!) 161/72   Pulse 68   Temp 97 °F (36.1 °C) (Temporal)   Resp 18   Ht 5' 6\" (1.676 m)   Wt 179 lb 14.4 oz (81.6 kg)   SpO2 94%   BMI 29.04 kg/m²   Gen : NAD , appropriate for BILIRUBINUR NEGATIVE 02/20/2012    BLOODU TRACE-INTACT 08/14/2020    GLUCOSEU Negative 08/14/2020    GLUCOSEU 500 02/20/2012     Microalbumen/Creatinine ratio:  No components found for: RUCREAT  Iron Saturation:  No components found for: PERCENTFE  TIBC:    Lab Results   Component Value Date    TIBC 177 08/16/2020     FERRITIN:    Lab Results   Component Value Date    FERRITIN 136 08/16/2020        Imaging:  US RETROPERITONEAL COMPLETE   Final Result      No evidence of hydronephrosis. Bilateral heterogeneous echotexture of the renal cortices. These   findings are nonspecific but may be seen in medical renal disease,   prior injury, or conditions such as pyelonephritis. Recommend clinical   correlation. US DUP LOWER EXTREMITY RIGHT SULMA   Final Result   No evidence of right lower extremity deep venous thrombosis. No   demonstrable Doppler flow signal within the right common or   superficial femoral arteries. XR CHEST PORTABLE   Final Result   Cardiomegaly   Findings compatible with atherosclerotic disease of the aorta. Assessment    -Acute kidney injury due to hemodynamic element as he was on 2 RAAS blocker   -chronic kidney disesae stage IV baseline cr around 2 with around 2 g proteinuria   -Hyperkalemia due to #2,#3  -Metabolic acidosis   -anaemia of chronic disease : tsat 50   -Mineral bone disease : phosph 4.4 no need for binders     Plan:  Continue bicarbonate based fluids till am   Hold both lisinopril and irbesartan   I will check office records to see his creatinine trend and whether this represent progression of CKD vs ZAHRAA .                    Electronically signed by Carolyn Lawson MD on 8/16/2020 at 8:09 PM

## 2020-08-17 NOTE — CARE COORDINATION
Met with patient at bedside, introduced myself and explained my role as RN case manager. Pt stated he came to the ER \"because my kidney function was off. \"    Discussed plan of care (renal consult, labs/testing, medications, PT/OT, discharge planning, etc.). Pt verbalized understanding. Pt stated that he lives at home independently. He sated that he has aides through [address] [place] [address] that come to his home Monday- Friday (MWTh for 2 hours and Tues and Friday for 3 hours). He also stated that he has hired help 7 days a week. He stated that he has hired a very nice lady named Basil Manual who comes every day and gets him dinner and helps him get ready for bed. He noted that Basil Manual comes every morning and evening on Saturdays and Sundays. Pt stated that he uses a wheelchair at home and has a transfer chair. He stated that he can use a quad cane, but only to ambulate short distances. Discussed his therapy eval- [facility] 13/24. Discussed importance of a safe discharge plan- he verbalized understanding. He stated that he has been to [facility] and [facility] in the past. Pt undecided if he wants to transfer to [facility] upon discharge., [facility] choice list given to patient - await decision.        Informed patient that case management and social work will continue to follow to assist with the transition of care

## 2020-08-17 NOTE — PROGRESS NOTES
Occupational Therapy  OCCUPATIONAL THERAPY INITIAL EVALUATION      Date:2020  Patient Name: Lyubov Allred  MRN: [id number]  : 1931  Room: [address] [place] [address]    Referring Provider: Justyna Garay MD   Evaluating OT: Shady Gayle. Laurie Whitman - .[phone]    AM-PAC Daily Activity Raw Score:       Recommended Adaptive Equipment: Continue to assess. Diagnosis: Acute kidney injury (ZAHRAA) with acute tubular necrosis (ATN) (HCC) [N17.0], Acute kidney failure (HCC) [N17.9]     Pertinent Medical History: COPD, CKD, CAD, DM, CVA, HTN, TIA, arthritis, neuropathy      Precautions: falls, R-sided weakness, chair alarm, hearing impairment    Home Living: Patient lives alone in a one-floor home. Bathroom Setup: tub shower (with extended tub bench, handheld shower head, and grab bars available) - patient showers twice per week and sponge bathes the other days  Equipment Owned: quad cane, extended tub bench, wheelchair    Prior Level of Function (PLOF): Per patient, he needed assistance with most ADLs and IADLs, but was independent with functional transfers/mobility (with use of quad cane for short distances only - wheelchair used mostly) prior to this hospitalization. Patient noted that he has assistance from aides twice daily; one aide assists with morning ADLs (including a shower or sponge bath) and breakfast (2-3 hours) and another with evening ADLs and dinner. Patient reported that family members provide assistance with preparation of lunch and other IADLs, as needed, during the day. Pain Level: No complaints of pain verbalized/demonstrated. Cognition: Patient alert and oriented x3. WFL command follow demonstrated. Patient pleasant, cooperative, and motivated to return to his home environment and PLOF.    Memory: WFL grossly   Sequencing: WFL grossly   Problem Solving: WFL grossly   Judgement/Safety: WFL grossly    Functional Assessment:   Initial Eval Status  Date: 2020 Treatment Status  Date:  Short Term Goals  Treatment Frequency/Duration: 2-5x/week for 3-7 days PRN   Feeding SBA  Setup   Grooming Mod A  Min A  (seated)   UB Dressing Mod A  Min A   LB Dressing Max A  Mod A - with use of AE, as needed/appropriate   Bathing Max A  Mod A - with use of AE/DME, as needed/appropriate   Toileting Max A  Min A   Bed Mobility  Not assessed. Functional Transfers Sit-to-Stand: Mod A   from bedside chair  Min A   Functional Mobility Not assessed. SBA - with use of device, as needed/appropriate   Balance Sitting: Good-  (at edge of chair)  Standing: Fair-  Fair+ dynamic standing balance during completion of ADLs and other functional tasks. Activity Tolerance Fair  Patient will demonstrate Good understanding and consistent implementation of energy conservation techniques and work simplification techniques into ADL routines. Visual/  Perceptual WFL grossly     N/A   B UE Strength R shoulder: 2-/5 grossly  Distal R UE: 3+/5  L UE: 4-/5 grossly  Patient will demonstrate 4+/5 L UE strength in order to maximize independence with ADLs and functional transfers. Long-Term Goal: Patient will increase functional independence to PLOF in order to allow patient to live in least restrictive environment. ROM: Additional Information:    R UE  Limited active shoulder ROM; distal AROM WFL grossly    L UE AROM WFL    Hand Dominance: Right (prior to CVA) - however, patient uses Left hand primarily used now    Hearing: Impaired - wears hearing aids  Sensation: No complaints of numbness/tingling in B UEs. Tone: WFL  Edema: No    Comments: RN approved patient's participation in [address] [place] [address] activities. Upon arrival, patient seated in bedside chair. At end of session, patient seated in bedside chair with call light and phone within reach, nursing present, chair alarm activated, and all lines and tubes intact.  Patient would benefit from continued skilled OT to increase safety and independence with completion of ADL/IADL tasks for functional independence and quality of life. Patient education provided regardin) safe transfer techniques, 2) importance of having assistance with OOB activities to prevent falls. Patient indicated understanding. Eval Complexity: Low    Assessment of Current Deficits:   Functional Mobility [x]  ADLs [x] Strength [x]  Cognition []  Functional Transfers  [x] IADLs [x] Safety Awareness [x]  Endurance [x]  Fine Motor Coordination [] Balance [x] Vision/Perception [] Sensation []   Gross Motor Coordination [] ROM [] Delirium []                  Motor Control []    Plan of Care:   OT treatment to be provided 2-5x/week for 3-7 days to address the following, as needed, during hospitalization:  ADL Retraining [x]   Equipment Needs [x]   Neuromuscular Re-Education [x] Energy Conservation Techniques [x]  Functional Transfer Training [x] Patient and/or Family Education [x]  Functional Mobility Training [x] Environmental Modifications [x]  Cognitive Re-Training []   Compensatory Techniques for ADLs [x]  Splinting Needs []   Positioning to Improve Overall Function [x]   Therapeutic Activity [x]  Therapeutic Exercise  [x]  Visual/Perceptual: []    Delirium Prevention/Treatment  []  Other:  []    Rehab Potential: Good for established goals. Patient / Family Goal: Patient noted that he anticipates returning home. Patient and/or family were instructed on functional diagnosis, prognosis/goals, and OT plan of care. Demonstrated Good understanding. Low complexity evaluation + 0 timed treatment minutes  Time Out: 1054    Evaluation time includes thorough review of current medical information, gathering information on past medical history/social history and prior level of function, completion of standardized testing/informal observation of tasks, assessment of data, and development of POC/Goals. [employer] Means/L  License Number: [id number]

## 2020-08-17 NOTE — PROGRESS NOTES
Subjective: The patient is awake and alert. No problems overnight. Denies chest pain, angina, and dyspnea. Denies abdominal pain. Tolerating diet. No nausea or vomiting. Objective:  Improved creatinine. BP control in progress. BP (!) 151/68   Pulse 57   Temp 97.5 °F (36.4 °C) (Oral)   Resp 16   Ht 5' 6\" (1.676 m)   Wt 206 lb 12.8 oz (93.8 kg)   SpO2 96%   BMI 33.38 kg/m²     Heart:  RRR, no murmurs, gallops, or rubs.   Lungs:  CTA bilaterally, no wheeze, rales or rhonchi  Abd: bowel sounds present, nontender, nondistended, no masses  Extrem:  No clubbing, cyanosis, or edema    CBC:   Lab Results   Component Value Date    WBC 4.5 08/15/2020    RBC 2.59 08/15/2020    HGB 8.2 08/15/2020    HCT 26.4 08/15/2020    .9 08/15/2020    MCH 31.7 08/15/2020    MCHC 31.1 08/15/2020    RDW 14.6 08/15/2020     08/15/2020    MPV 10.5 08/15/2020     BMP:    Lab Results   Component Value Date     08/17/2020    K 5.0 08/17/2020    K 4.6 08/15/2020     08/17/2020    CO2 19 08/17/2020    BUN 56 08/17/2020    LABALBU 3.1 08/16/2020    LABALBU 3.8 02/20/2012    CREATININE 3.9 08/17/2020    CALCIUM 7.8 08/17/2020    GFRAA 18 08/17/2020    LABGLOM 15 08/17/2020    GLUCOSE 108 08/17/2020    GLUCOSE 163 02/22/2012        Assessment:    Patient Active Problem List   Diagnosis    Partial small bowel obstruction ([address]    IDDM-2    CKD-3    CAD    HTN    COPD    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 (arteriosclerotic heart disease)    Diabetes mellitus ([address]    Diabetes mellitus ([address]    Diabetic peripheral neuropathy ([address]    Osteoarthritis    Gait abnormality    Physical deconditioning    Lumbar spondylitis (HCC)    Anemia    Fx humer, med condyl-closed    ASHD (arteriosclerotic heart disease)    Cerebral infarction ([address]    Renal failure    TIA (transient ischemic attack)    ASHD (arteriosclerotic heart disease)    [facility]    PVD (peripheral vascular disease) with claudication ([address]    History of CVA (cerebrovascular accident)    Pain in lower limb    Cerebral infarction ([address]    CKD 3    Diabetes mellitus-2    Acute respiratory failure (HCC)    Decubitus ulcer of sacral region, stage 1    Right sided weakness    Hypoglycemia    Acute kidney injury (ZAHRAA) with acute tubular necrosis (ATN) ([facility]    Acute kidney failure ([address]       Plan:  Bicarb hydration.   Continue up, etc.          Bhanu Valencia  7:05 AM  8/17/2020

## 2020-08-17 NOTE — PROGRESS NOTES
hemicane ( no QC available)   with  Min assist   50 feet with QC /AAD  with  SBA        Stair negotiation: ascended and descended NT    4  steps with  1 rail with  Min assist    LE ROM  AAROM WFL , decreased throughout R side , L LE WFL      LE strength  R 2- to 3-/ 5   L LE WFL      AM- PAC RAW score  13/ 24            Pt is alert and Oriented x  3      Balance:  CGA/min assist once standing, but strong posterior push with stand, high fall risk . 1 LOB with short gait distance requiring min/mod assist to correct. Endurance: decreased   Bed/Chair alarm: Yes      ASSESSMENT  Pt displays functional ability as noted in the objective portion of this evaluation. Treatment/Education:     Mobility as above. Donned R AFO prior to mobility. Pt with difficulty weight shifting and advancing R LE. Pt gaited from bed to [facility], BS to chair     Pt educated on fall risk,  Hand placement with transfers , safe and proper technique with all mobility        Patient response to education:   Pt verbalized understanding Pt demonstrated skill Pt requires further education in this area   x  x       Comments:  Pt left  In chair after session, with call light in reach. Rehab potential is Good for reaching above PT goals. Pts/ family goals   1. Increase strength     Patient and or family understand(s) diagnosis, prognosis, and plan of care. - yes     PLAN  PT care will be provided in accordance with the objectives noted above. Whenever appropriate, clear delegation orders will be provided for nursing staff. Exercises and functional mobility practice will be used as well as appropriate assistive devices or modalities to obtain goals. Patient and family education will also be administered as needed. Frequency of treatments will be 2-5x/week x 7 days.     Time in:  0913  Time out:  0940       Evaluation Time includes thorough review of current medical information, gathering information on past medical history/social history and prior level of function, completion of standardized testing/informal observation of tasks, assessment of data and education on plan of care and goals.     CPT codes:  [x] Low Complexity PT evaluation 49996  [] Moderate Complexity PT evaluation 97920  [] High Complexity PT evaluation 56485  [] PT Re-evaluation 93650  [] Gait training 56132  minutes  [x] Therapeutic activities 05639 10 minutes  [] Therapeutic exercises 31532  minutes  [] Neuromuscular reeducation 45738  minutes       Yue 18 number:  PT 8721

## 2020-08-18 LAB
ANION GAP SERPL CALCULATED.3IONS-SCNC: 10 MMOL/L (ref 7–16)
BUN BLDV-MCNC: 51 MG/DL (ref 8–23)
CALCIUM SERPL-MCNC: 6.9 MG/DL (ref 8.6–10.2)
CHLORIDE BLD-SCNC: 105 MMOL/L (ref 98–107)
CO2: 24 MMOL/L (ref 22–29)
CREAT SERPL-MCNC: 3.5 MG/DL (ref 0.7–1.2)
GFR AFRICAN AMERICAN: 20
GFR NON-AFRICAN AMERICAN: 17 ML/MIN/1.73
GLUCOSE BLD-MCNC: 83 MG/DL (ref 74–99)
HCT VFR BLD CALC: 23.2 % (ref 37–54)
HEMOGLOBIN: 7.2 G/DL (ref 12.5–16.5)
MAGNESIUM: 1.5 MG/DL (ref 1.6–2.6)
MCH RBC QN AUTO: 31 PG (ref 26–35)
MCHC RBC AUTO-ENTMCNC: 31 % (ref 32–34.5)
MCV RBC AUTO: 100 FL (ref 80–99.9)
METER GLUCOSE: 149 MG/DL (ref 74–99)
METER GLUCOSE: 174 MG/DL (ref 74–99)
METER GLUCOSE: 89 MG/DL (ref 74–99)
METER GLUCOSE: 96 MG/DL (ref 74–99)
PDW BLD-RTO: 14.7 FL (ref 11.5–15)
PHOSPHORUS: 3.6 MG/DL (ref 2.5–4.5)
PLATELET # BLD: 107 E9/L (ref 130–450)
PMV BLD AUTO: 10 FL (ref 7–12)
POTASSIUM SERPL-SCNC: 4.5 MMOL/L (ref 3.5–5)
RBC # BLD: 2.32 E12/L (ref 3.8–5.8)
SARS-COV-2: NOT DETECTED
SODIUM BLD-SCNC: 139 MMOL/L (ref 132–146)
SOURCE: NORMAL
WBC # BLD: 4.5 E9/L (ref 4.5–11.5)

## 2020-08-18 PROCEDURE — 6360000002 HC RX W HCPCS: Performed by: INTERNAL MEDICINE

## 2020-08-18 PROCEDURE — 80048 BASIC METABOLIC PNL TOTAL CA: CPT

## 2020-08-18 PROCEDURE — 84100 ASSAY OF PHOSPHORUS: CPT

## 2020-08-18 PROCEDURE — 82962 GLUCOSE BLOOD TEST: CPT

## 2020-08-18 PROCEDURE — 2500000003 HC RX 250 WO HCPCS: Performed by: INTERNAL MEDICINE

## 2020-08-18 PROCEDURE — 6370000000 HC RX 637 (ALT 250 FOR IP): Performed by: INTERNAL MEDICINE

## 2020-08-18 PROCEDURE — 2060000000 HC ICU INTERMEDIATE R&B

## 2020-08-18 PROCEDURE — 36415 COLL VENOUS BLD VENIPUNCTURE: CPT

## 2020-08-18 PROCEDURE — 97530 THERAPEUTIC ACTIVITIES: CPT

## 2020-08-18 PROCEDURE — 2580000003 HC RX 258: Performed by: INTERNAL MEDICINE

## 2020-08-18 PROCEDURE — 83735 ASSAY OF MAGNESIUM: CPT

## 2020-08-18 PROCEDURE — 85027 COMPLETE CBC AUTOMATED: CPT

## 2020-08-18 RX ORDER — MAGNESIUM SULFATE IN WATER 40 MG/ML
2 INJECTION, SOLUTION INTRAVENOUS ONCE
Status: COMPLETED | OUTPATIENT
Start: 2020-08-18 | End: 2020-08-18

## 2020-08-18 RX ORDER — FERROUS SULFATE 325(65) MG
325 TABLET ORAL 2 TIMES DAILY WITH MEALS
Status: DISCONTINUED | OUTPATIENT
Start: 2020-08-18 | End: 2020-08-28 | Stop reason: HOSPADM

## 2020-08-18 RX ADMIN — INSULIN LISPRO 2 UNITS: 100 INJECTION, SOLUTION INTRAVENOUS; SUBCUTANEOUS at 15:57

## 2020-08-18 RX ADMIN — ASPIRIN 81 MG: 81 TABLET, COATED ORAL at 08:00

## 2020-08-18 RX ADMIN — FERROUS SULFATE TAB 325 MG (65 MG ELEMENTAL FE) 325 MG: 325 (65 FE) TAB at 08:03

## 2020-08-18 RX ADMIN — MAGNESIUM OXIDE TAB 400 MG (241.3 MG ELEMENTAL MG) 400 MG: 400 (241.3 MG) TAB at 10:02

## 2020-08-18 RX ADMIN — FERROUS SULFATE TAB 325 MG (65 MG ELEMENTAL FE) 325 MG: 325 (65 FE) TAB at 16:06

## 2020-08-18 RX ADMIN — HEPARIN SODIUM 5000 UNITS: 10000 INJECTION INTRAVENOUS; SUBCUTANEOUS at 06:32

## 2020-08-18 RX ADMIN — METOPROLOL SUCCINATE 50 MG: 50 TABLET, EXTENDED RELEASE ORAL at 21:01

## 2020-08-18 RX ADMIN — SODIUM CHLORIDE, PRESERVATIVE FREE 10 ML: 5 INJECTION INTRAVENOUS at 11:12

## 2020-08-18 RX ADMIN — HYDRALAZINE HYDROCHLORIDE 10 MG: 10 TABLET, FILM COATED ORAL at 13:58

## 2020-08-18 RX ADMIN — HYDRALAZINE HYDROCHLORIDE 10 MG: 10 TABLET, FILM COATED ORAL at 06:32

## 2020-08-18 RX ADMIN — SODIUM BICARBONATE: 84 INJECTION, SOLUTION INTRAVENOUS at 06:47

## 2020-08-18 RX ADMIN — MAGNESIUM SULFATE 2 G: 2 INJECTION INTRAVENOUS at 10:02

## 2020-08-18 RX ADMIN — METOPROLOL SUCCINATE 50 MG: 50 TABLET, EXTENDED RELEASE ORAL at 08:01

## 2020-08-18 RX ADMIN — HYDRALAZINE HYDROCHLORIDE 10 MG: 10 TABLET, FILM COATED ORAL at 21:01

## 2020-08-18 RX ADMIN — PRAVASTATIN SODIUM 20 MG: 20 TABLET ORAL at 11:09

## 2020-08-18 ASSESSMENT — PAIN SCALES - GENERAL
PAINLEVEL_OUTOF10: 0

## 2020-08-18 NOTE — CARE COORDINATION
Social Work/Discharge Planning:  Received notification to call patient harry Kowalski regarding snf choice. Called Dara Kowalski (ph: [phone]) and she states patient was contemplating returning home, but she informed him that he needs rehab. Dara Kowalski confirmed her first snf choice is [facility] and [address] [place] [address]. Informed Dara Kowalski that referral will be made to snf and will provide her and patient with an update. Referral made to liaison Rebceca Moffett at [facility] and facility will review patient information. Will continue to follow. Electronically signed by SCOTT Guardado on 8/18/2020 at 10:05 AM     Addendum:  Rebecca Moffett from [facility] can accept patient and no pre-cert needed. Notified patient harry Kowalski and she will be in at 1:30pm to notify patient with this worker of facility acceptance. HENS completed, electronic N-17 in Epic and Ambulette form in soft chart. Will continue to follow. Electronically signed by SCOTT Guardado on 8/18/2020 at 11:29 AM    Addendum:  Met with patient and his niece Dara Kowalski and informed patient of [facility] acceptance. Patient agreeable to discharging to [facility] at discharge. Will continue to follow.   Electronically signed by SCOTT Guardado on 8/18/2020 at 3:00 PM

## 2020-08-18 NOTE — CONSULTS
Comprehensive Nutrition Assessment    Type and Reason for Visit:  Initial, Consult    Nutrition Recommendations/Plan: Continue current diet, Add Ensure HP BID    Nutrition Assessment:  Pt w/ increased nutrient needs for wound healing of noted wound to ankle and denuded buttocks. Will add ONS and monitor. Malnutrition Assessment:  Malnutrition Status:  Insufficient data    Context:  Acute Illness     Findings of the 6 clinical characteristics of malnutrition:  Energy Intake:  No significant decrease in energy intake  Weight Loss:  No significant weight loss     Body Fat Loss:  Unable to assess     Muscle Mass Loss:  Unable to assess    Fluid Accumulation:  No significant fluid accumulation     Strength:  Not Performed    Estimated Daily Nutrient Needs:  Energy (kcal):  5827-6274; Weight Used for Energy Requirements:  Admission     Protein (g):  85-95(1.3-1.5); Weight Used for Protein Requirements:  Ideal        Fluid (ml/day):  9612-3830; Weight Used for Fluid Requirements:  Admission      Nutrition Related Findings:  pt alert, active BS, soft abd, +1-2 pitting edema, +I/Os      Wounds:  Open Wounds(wound to ankle LOIS, denuded buttocks per wound RN)       Current Nutrition Therapies:    DIET CARB CONTROL; Anthropometric Measures:  · Height: 5' 6\" (167.6 cm)  · Current Body Weight: 186 lb (84.4 kg)   · Admission Body Weight: 208 lb (94.3 kg)(8/18 bed scale, wt elevated since admit w/ +fluids)    · Usual Body Weight: (UTO no EMR wt hx on file)     · Ideal Body Weight: 142 lbs; % Ideal Body Weight 131 %   · BMI: 30  · BMI Categories: Obese Class 1 (BMI 30.0-34. 9)       Nutrition Diagnosis:   · Increased nutrient needs related to increase demand for energy/nutrients as evidenced by wounds    Nutrition Interventions:   Food and/or Nutrient Delivery:  Continue Current Diet, Start Oral Nutrition Supplement(Ensure HP BID)  Nutrition Education/Counseling:  No recommendation at this time   Coordination of Nutrition Care:  Continued Inpatient Monitoring    Goals:  pt to consume >75% meals/ONS       Nutrition Monitoring and Evaluation:   Food/Nutrient Intake Outcomes:  Food and Nutrient Intake, Supplement Intake  Physical Signs/Symptoms Outcomes:  Biochemical Data, Fluid Status or Edema, Nutrition Focused Physical Findings, Skin, Weight     Discharge Planning:     Too soon to determine     Electronically signed by Sonny Marie MS, RD, LD on 8/18/20 at 10:48 AM EDT    Contact: [phone]

## 2020-08-18 NOTE — PROGRESS NOTES
[facility] MD Elva Mead, MD Valentine Asif, MD Zena Leal, MD Zaire Lucia, CNP   Anu Lux, DAISY  Progress Note    8/18/2020    SUBJECTIVE:   8/17: Denies complaint. Feeling better. (-) sob/grace/cp/palp Appetite ok. No swelling. ROS unremarkable. 8/18: Resting comfortably, reading the news on his iPad. No dyspnea at rest on room air. Peripheral swelling about the same as yesterday. No chest pain or palpitations. 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 *         DIET:    DIET CARB CONTROL;   Dietary Nutrition Supplements: Low Calorie High Protein Supplement     MEDS (scheduled):    ferrous sulfate  325 mg Oral BID     [START ON 8/19/2020] epoetin mayra-epbx  4,000 Units Subcutaneous Once per day on Mon Wed Fri    magnesium oxide  400 mg Oral Daily    hydrALAZINE  10 mg Oral 3 times per day    aspirin  81 mg Oral QAM    metoprolol succinate  50 mg Oral BID    pravastatin  20 mg Oral Lunch    sodium chloride flush  10 mL Intravenous 2 times per day    heparin (porcine)  5,000 Units Subcutaneous 3 times per day    insulin lispro  0-12 Units Subcutaneous TID     insulin lispro  0-6 Units Subcutaneous Nightly       MEDS (infusions):   dextrose      sodium bicarbonate infusion 80 mL/hr at 08/18/20 0647       MEDS (prn):  analgesic ointment, sodium chloride flush, acetaminophen **OR** acetaminophen, promethazine **OR** ondansetron, glucose, dextrose, glucagon (rDNA), dextrose    PHYSICAL EXAM:     Patient Vitals for the past 24 hrs:   BP Temp Temp src Pulse Resp SpO2 Height Weight   08/18/20 1042 -- -- -- -- -- -- 5' 6\" (1.676 m) --   08/18/20 0800 -- -- -- 72 -- -- -- --   08/18/20 0750 (!) 137/59 97.7 °F (36.5 °C) Oral 57 15 -- -- --   08/18/20 0632 135/71 -- -- -- -- -- -- 208 lb 4.8 oz (94.5 kg)   08/18/20 0030 130/68 98 °F (36.7 °C) Oral 62 16 98 % -- --   08/17/20 2130 (!) 128/58 98.1 °F (36.7 °C) Oral 60 16 99 % -- --   08/17/20 1415 (!) 149/63 97.5 °F (36.4 °C) Oral 65 16 100 % -- --   @      Intake/Output Summary (Last 24 hours) at 8/18/2020 1307  Last data filed at 8/18/2020 1124  Gross per 24 hour   Intake 1065 ml   Output 600 ml   Net 465 ml         Wt Readings from Last 3 Encounters:   08/18/20 208 lb 4.8 oz (94.5 kg)   01/17/19 170 lb (77.1 kg)   01/12/19 170 lb (77.1 kg)       Constitutional:  in no acute distress  HEENT: NC/AT, EOMI, sclera and conjunctiva are clear and anicteric, mucus membranes moist  Neck: Trachea midline, no JVD  Cardiovascular: S1, S2 regular rhythm, no murmur,or rub  Respiratory: Coarse breath sounds, mild basilar crackles which improved somewhat with deep inspiration, no wheeze  Gastrointestinal:  Soft, nontender, nondistended, NABS  Ext: Scant distal lower extremity edema, feet warm  Skin: dry, no rash  Neuro: awake, alert, interactive      DATA:    Recent Labs     08/18/20  0455   WBC 4.5   HGB 7.2*   HCT 23.2*   .0*   *     Recent Labs     08/16/20  0404 08/17/20  0344 08/18/20  0455    140 139   K 4.9 5.0 4.5   * 109* 105   CO2 17* 19* 24   MG  --   --  1.5*   PHOS 4.4  --  3.6   BUN 57* 56* 51*   CREATININE 4.1* 3.9* 3.5*   ALT 14  --   --    AST 11  --   --    BILITOT 0.3  --   --    ALKPHOS 95  --   --        No results found for: LABPROT    ASSESSMENT / RECOMMENDATIONS:       1-Acute kidney injury, hemodynamically-mediated, possibly attributable to 2 RAAS blocker     2-chronic kidney disesae stage IV baseline cr around 2 with around 2 g proteinuria     3-Hyperkalemia due to #1,#2.   Resolved    4-Metabolic acidosis, non-anion gap, secondary acute kidney injury/CKD    5-anemia of chronic disease (CKD) : tsat 48     6-Mineral bone disease : phosph 4.4 no need for binders      Slight improvement in BUN and cr  Bicarbonate improved  clinically improving      --> Continue IV fluid   --> Supplement magnesium  --> Continue to hold both lisinopril and irbesartan   --> Follow labs, UO      Electronically signed by Doris Woody MD on 8/18/2020

## 2020-08-18 NOTE — PROGRESS NOTES
needed/appropriate   Bathing Max A   Mod A - with use of AE/DME, as needed/appropriate   Toileting Max A   Min A   Bed Mobility  Not assessed. Pt sitting in the chair.       Functional Transfers Sit-to-Stand: Mod A   from bedside chair Mod A sit to stand. Cues for safety technique  Min A   Functional Mobility Not assessed. Mod A using lawrence cane. Limited safety awareness.   SBA - with use of device, as needed/appropriate   Balance Sitting: Good-  (at edge of chair)  Standing: Fair- Min  A static stand balance during ADL.   Fair+ dynamic standing balance during completion of ADLs and other functional tasks. Activity Tolerance Fair  poor+  Patient will demonstrate Good understanding and consistent implementation of energy conservation techniques and work simplification techniques into ADL routines. B UE Strength R shoulder: 2-/5 grossly  Distal R UE: 3+/5  L UE: 4-/5 grossly   Patient will demonstrate 4+/5 L UE strength in order to maximize independence with ADLs and functional transfers. Comments:  Limited tolerance for activity. Cues for safety awareness and technique throughout session. Pt remained in chair at end of the session with alarm activated. Education/treatment:  ADL retraining with facilitation of movement to increase self care. Therapeutic activity to address balance, strength, and endurance for ADL and transfers. Pt education of transfer safety, cane safety, and daily orientation. · Pt has made limited  progress towards set goals.    · Continue with current plan of care        Treatment Charges: Mins Units    ADL/Home Mgt 10054 5     Thera Activities 49964 10 1    Ther Ex 42320      Manual Therapy 02347      Neuro Re-ed 97024      Orthotic manage/training  40096      Non Billable Time      Total Timed Treatment 15 200 [facility] LOIS/L 23680

## 2020-08-18 NOTE — PROGRESS NOTES
TIA (transient ischemic attack)    ASHD (arteriosclerotic heart disease)    Diabetes mellitus ([facility]    PVD (peripheral vascular disease) with claudication (HCC)    History of CVA (cerebrovascular accident)    Pain in lower limb    Cerebral infarction ([facility]    CKD 3    Diabetes mellitus-2    Acute respiratory failure (HCC)    Decubitus ulcer of sacral region, stage 1    Right sided weakness    Hypoglycemia    Acute kidney injury (ZAHRAA) with acute tubular necrosis (ATN) ([facility])    Acute kidney failure ([facility]       Plan:  As above. ROBERT anisha.           Pia Leventhal  7:05 AM  8/18/2020

## 2020-08-18 NOTE — PLAN OF CARE
Problem: Pain:  Goal: Pain level will decrease  Description: Pain level will decrease  Outcome: Met This Shift  Goal: Control of acute pain  Description: Control of acute pain  Outcome: Met This Shift  Goal: Control of chronic pain  Description: Control of chronic pain  Outcome: Met This Shift     Problem: Falls - Risk of:  Goal: Will remain free from falls  Description: Will remain free from falls  Outcome: Met This Shift  Goal: Absence of physical injury  Description: Absence of physical injury  Outcome: Met This Shift     Problem: Skin Integrity:  Goal: Will show no infection signs and symptoms  Description: Will show no infection signs and symptoms  Outcome: Met This Shift  Goal: Absence of new skin breakdown  Description: Absence of new skin breakdown  Outcome: Met This Shift     Problem: Serum Glucose Level - Abnormal:  Goal: Ability to maintain appropriate glucose levels will improve  Description: Ability to maintain appropriate glucose levels will improve  Outcome: Met This Shift     Problem: Sensory Perception - Impaired:  Goal: Ability to maintain a stable neurologic state will improve  Description: Ability to maintain a stable neurologic state will improve  Outcome: Met This Shift     Problem: ABCDS Injury Assessment  Goal: Absence of physical injury  Outcome: Met This Shift     Problem: Increased nutrient needs (NI-5.1)  Goal: Food and/or Nutrient Delivery  Description: Individualized approach for food/nutrient provision.   8/18/2020 1047 by Tyesha Price, MS, RD, LD  Outcome: Met This Shift

## 2020-08-18 NOTE — PROGRESS NOTES
Physical Therapy  Facility/Department: [address] INTERMEDIATE 1  Daily Treatment Note  NAME: Ilan Gonzalez  : 1931  MRN: [id number]    Date of Service: 2020  Patient Diagnosis(es): The primary encounter diagnosis was Acute kidney injury [facility]). A diagnosis of Anemia, unspecified type was also pertinent to this visit. has a past medical history of Arthritis, CAD (coronary artery disease), Chronic kidney disease, COPD (chronic obstructive pulmonary disease) ([address] Decubitus ulcer of sacral region, stage 1, Diabetes mellitus ([address] Diabetic peripheral neuropathy ([address] History of CVA (cerebrovascular accident), Hypertension, Other disorders of kidney and ureter, Pain in lower limb, PVD (peripheral vascular disease) with claudication ([facility] Right sided weakness, TIA (transient ischemic attack), and Unspecified cerebral artery occlusion with cerebral infarction. has a past surgical history that includes Abdominal hernia repair; Cataract removal; Dental surgery; Inguinal hernia repair; Cholecystectomy (2011); Inner ear surgery; and eye surgery. Evaluating Therapist: Kelley Gray, PT      Referring Provider:  Dr. Lotus Parmar #: [id number]   DIAGNOSIS:  ZAHRAA   Additional Pertinent History: R hemiparesis due to CVA   PRECAUTIONS:  Falls      Social:  Pt lives alone  in a  1  floor plan 2+1  steps to enter. Stair glide to basement. Has HHA daily   Prior to admission pt walked with  QC  For short distances on;y. W/c for longer. Pt reports able to self propel        Initial Evaluation  Date: 2020 Treatment  20    Short Term/ Long Term   Goals   Was pt agreeable to Eval/treatment? Yes  yes      Does pt have pain?  denies No c/o pain      Bed Mobility  Rolling:  SBA to L   Supine to sit:  Min assist   Sit to supine: NT   Scooting:  Min assist in sit    NT SBA    Transfers Sit to stand:   Mod assist   Stand to sit: min assist   Stand pivot: mod assist   sit <> stand mod assist  SBA    Ambulation 6 feet x 2  with  hemicane ( no QC available)   with  Min assist   10 feet x2 with hemicane and R AFO mod assist 50 feet with QC /AAD  with  SBA          Stair negotiation: ascended and descended NT    NT  4  steps with  1 rail with  Min assist    LE ROM  AAROM WFL , decreased throughout R side , L LE WFL        LE strength  R 2- to 3-/ 5   L LE WFL        AM- PAC RAW score  13/ 24 13/24           Patient education  Pt was educated on transfer technique    Patient response to education:   Pt verbalized understanding Pt demonstrated skill Pt requires further education in this area   yes With assist yes     Additional Comments: Pt unsteady with ambulation. Difficulty with turns. Short of breath with mobility    Pt was left in chair with call light left by patient. Chair/bed alarm: yes      Pt is making  progress toward established Physical Therapy goals. Continue with physical therapy current plan of care.     Madelaine PT [id number]      CPT codes:  [] Low Complexity PT evaluation 63604  [] Moderate Complexity PT evaluation 66384  [] High Complexity PT evaluation 39261  [] PT Re-evaluation 19028  [] Gait training 30707  minutes  [] Manual therapy 12806    minutes  [x] Therapeutic activities 37926    minutes  [] Therapeutic exercises 88867     minutes  [] Neuromuscular reeducation 00216     minutes

## 2020-08-18 NOTE — DISCHARGE INSTR - COC
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    ASHD (arteriosclerotic heart disease) I25.10    Diabetes mellitus ([facility] E11.9    Diabetes mellitus ([facility] E11.9    Diabetic peripheral neuropathy (HCC) E11.42    Osteoarthritis M19.90    Gait abnormality R26.9    Physical deconditioning R53.81    Lumbar spondylitis (HCC) M46.96    Anemia D64.9    Fx humer, med condyl-closed S42.463A    ASHD (arteriosclerotic heart disease) I25.10    Cerebral infarction (HCC) I63.9    Renal failure N19    TIA (transient ischemic attack) G45.9    ASHD (arteriosclerotic heart disease) I25.10    Diabetes mellitus (HCC) E11.9    PVD (peripheral vascular disease) with claudication ([facility]) I73.9    History of CVA (cerebrovascular accident) Z86.73    Pain in lower limb M79.606    Cerebral infarction (HCC) I63.9    CKD 3 N18.3    Diabetes mellitus-2 E11.9    Acute respiratory failure (HCC) J96.00    Decubitus ulcer of sacral region, stage 1 L89.151    Right sided weakness R53.1    Hypoglycemia E16.2    Acute kidney injury (ZAHRAA) with acute tubular necrosis (ATN) (HCC) N17.0    Acute kidney failure (HCC) N17.9       Isolation/Infection:   Isolation          No Isolation        Patient Infection Status     Infection Onset Added Last Indicated Last Indicated By Review Planned Expiration Resolved Resolved By    COVID-19 Rule Out 08/17/20 08/17/20 08/17/20 Covid-19 Ambulatory (Ordered) 08/24/20 08/31/20            Nurse Assessment:  Last Vital Signs: BP (!) 137/59   Pulse 72   Temp 97.7 °F (36.5 °C) (Oral)   Resp 15   Ht 5' 6\" (1.676 m)   Wt 208 lb 4.8 oz (94.5 kg)   SpO2 98%   BMI 33.62 kg/m²     Last documented pain score (0-10 scale): Pain Level: 0  Last Weight:   Wt Readings from Last 1 Encounters:   08/18/20 208 lb 4.8 oz (94.5 kg)     Mental Status:  oriented and alert    IV Access:      Nursing Mobility/ADLs:  Walking   Assisted  Transfer Assisted  Bathing  Assisted  Dressing  Assisted  Toileting  Assisted  Feeding  Independent   Med Admin  Assisted  Med Delivery   whole    Wound Care Documentation and Therapy:  Wound 01/12/19 Coccyx Mid non-blanchable redness (Active)   Number of days: 584       Wound 08/17/20 Leg Left (Active)   Wound Length (cm) 0.2 cm 08/17/20 1155   Wound Width (cm) 0.2 cm 08/17/20 1155   Wound Surface Area (cm^2) 0.04 cm^2 08/17/20 1155   Wound Assessment Dry;Brown 08/17/20 1155   Number of days: 0        Elimination:  Continence:   · Bowel: Yes  · Bladder: Yes  Urinary Catheter: None   Colostomy/Ileostomy/Ileal Conduit: No       Date of Last BM: 8/27/2020    Intake/Output Summary (Last 24 hours) at 8/18/2020 1006  Last data filed at 8/18/2020 0554  Gross per 24 hour   Intake 1175 ml   Output 350 ml   Net 825 ml     I/O last 3 completed shifts: In: 1295 [P.O.:600; I.V.:695]  Out: 450 [Urine:450]    Safety Concerns: At Risk for Falls    Impairments/Disabilities:      None    Nutrition Therapy:  Current Nutrition Therapy:   - Oral Diet:  Renal    Routes of Feeding: Oral  Liquids: Thin Liquids  Daily Fluid Restriction: no  Last Modified Barium Swallow with Video (Video Swallowing Test): not done    Treatments at the Time of Hospital Discharge:   Respiratory Treatments:   Oxygen Therapy:  None  Ventilator:    - No ventilator support    Rehab Therapies: Physical Therapy and Occupational Therapy  Weight Bearing Status/Restrictions: No weight bearing restirctions  Other Medical Equipment (for information only, NOT a DME order):     Other Treatments:     Patient's personal belongings (please select all that are sent with patient):      RN SIGNATURE:  Electronically signed by Randal Spear RN on 8/28/2020 at 1:06 PM      CASE MANAGEMENT/SOCIAL WORK SECTION    Inpatient Status Date: 8/14/2020    Readmission Risk Assessment Score:  Readmission Risk              Risk of Unplanned Readmission:        23           Discharging to Facility/ Agency   · Name: [facility]  · Address: [facility]. 7th floor, [facility]  · Phone: [phone]  · Fax:     Dialysis Facility (if applicable)   · Name:  · Address:  · Dialysis Schedule:  · Phone:  · Fax:    / signature: Electronically signed by SCOTT Jacobson on 8/18/20 at 10:07 AM EDT    PHYSICIAN SECTION    Prognosis: Good    Condition at Discharge: Stable    Rehab Potential (if transferring to Rehab): Good    Recommended Labs or Other Treatments After Discharge: BMP Q3rd Day      Physician Certification: I certify the above information and transfer of Herson Henley  is necessary for the continuing treatment of the diagnosis listed and that he requires LTAC for less 30 days.      Update Admission H&P: No change in H&P    PHYSICIAN SIGNATURE:  Electronically signed by Christen Solis MD on 8/20/20 at 12:19 PM EDT

## 2020-08-19 LAB
ANION GAP SERPL CALCULATED.3IONS-SCNC: 11 MMOL/L (ref 7–16)
BUN BLDV-MCNC: 56 MG/DL (ref 8–23)
CALCIUM SERPL-MCNC: 7.6 MG/DL (ref 8.6–10.2)
CHLORIDE BLD-SCNC: 106 MMOL/L (ref 98–107)
CHLORIDE URINE RANDOM: 30 MMOL/L
CO2: 22 MMOL/L (ref 22–29)
CREAT SERPL-MCNC: 3.6 MG/DL (ref 0.7–1.2)
CREATININE URINE: 122 MG/DL (ref 40–278)
GFR AFRICAN AMERICAN: 19
GFR NON-AFRICAN AMERICAN: 16 ML/MIN/1.73
GLUCOSE BLD-MCNC: 119 MG/DL (ref 74–99)
HCT VFR BLD CALC: 26.4 % (ref 37–54)
HEMOGLOBIN: 8.2 G/DL (ref 12.5–16.5)
MAGNESIUM: 1.8 MG/DL (ref 1.6–2.6)
MCH RBC QN AUTO: 31.2 PG (ref 26–35)
MCHC RBC AUTO-ENTMCNC: 31.1 % (ref 32–34.5)
MCV RBC AUTO: 100.4 FL (ref 80–99.9)
METER GLUCOSE: 108 MG/DL (ref 74–99)
METER GLUCOSE: 179 MG/DL (ref 74–99)
METER GLUCOSE: 179 MG/DL (ref 74–99)
PDW BLD-RTO: 14.7 FL (ref 11.5–15)
PHOSPHORUS: 3.9 MG/DL (ref 2.5–4.5)
PLATELET # BLD: 134 E9/L (ref 130–450)
PMV BLD AUTO: 11 FL (ref 7–12)
POTASSIUM SERPL-SCNC: 5 MMOL/L (ref 3.5–5)
RBC # BLD: 2.63 E12/L (ref 3.8–5.8)
SODIUM BLD-SCNC: 139 MMOL/L (ref 132–146)
SODIUM URINE: 53 MMOL/L
WBC # BLD: 4.7 E9/L (ref 4.5–11.5)

## 2020-08-19 PROCEDURE — 6360000002 HC RX W HCPCS: Performed by: INTERNAL MEDICINE

## 2020-08-19 PROCEDURE — 6370000000 HC RX 637 (ALT 250 FOR IP): Performed by: INTERNAL MEDICINE

## 2020-08-19 PROCEDURE — 2580000003 HC RX 258: Performed by: INTERNAL MEDICINE

## 2020-08-19 PROCEDURE — 80048 BASIC METABOLIC PNL TOTAL CA: CPT

## 2020-08-19 PROCEDURE — 82962 GLUCOSE BLOOD TEST: CPT

## 2020-08-19 PROCEDURE — 97530 THERAPEUTIC ACTIVITIES: CPT

## 2020-08-19 PROCEDURE — 83735 ASSAY OF MAGNESIUM: CPT

## 2020-08-19 PROCEDURE — 97535 SELF CARE MNGMENT TRAINING: CPT

## 2020-08-19 PROCEDURE — 36415 COLL VENOUS BLD VENIPUNCTURE: CPT

## 2020-08-19 PROCEDURE — 84100 ASSAY OF PHOSPHORUS: CPT

## 2020-08-19 PROCEDURE — 2500000003 HC RX 250 WO HCPCS: Performed by: INTERNAL MEDICINE

## 2020-08-19 PROCEDURE — 2060000000 HC ICU INTERMEDIATE R&B

## 2020-08-19 PROCEDURE — 84300 ASSAY OF URINE SODIUM: CPT

## 2020-08-19 PROCEDURE — 82570 ASSAY OF URINE CREATININE: CPT

## 2020-08-19 PROCEDURE — 85027 COMPLETE CBC AUTOMATED: CPT

## 2020-08-19 PROCEDURE — 82436 ASSAY OF URINE CHLORIDE: CPT

## 2020-08-19 RX ADMIN — PRAVASTATIN SODIUM 20 MG: 20 TABLET ORAL at 11:15

## 2020-08-19 RX ADMIN — INSULIN LISPRO 2 UNITS: 100 INJECTION, SOLUTION INTRAVENOUS; SUBCUTANEOUS at 16:19

## 2020-08-19 RX ADMIN — METOPROLOL SUCCINATE 50 MG: 50 TABLET, EXTENDED RELEASE ORAL at 21:09

## 2020-08-19 RX ADMIN — HEPARIN SODIUM 5000 UNITS: 10000 INJECTION INTRAVENOUS; SUBCUTANEOUS at 13:55

## 2020-08-19 RX ADMIN — EPOETIN ALFA-EPBX 4000 UNITS: 4000 INJECTION, SOLUTION INTRAVENOUS; SUBCUTANEOUS at 08:53

## 2020-08-19 RX ADMIN — FERROUS SULFATE TAB 325 MG (65 MG ELEMENTAL FE) 325 MG: 325 (65 FE) TAB at 08:52

## 2020-08-19 RX ADMIN — HEPARIN SODIUM 5000 UNITS: 10000 INJECTION INTRAVENOUS; SUBCUTANEOUS at 06:11

## 2020-08-19 RX ADMIN — MAGNESIUM OXIDE TAB 400 MG (241.3 MG ELEMENTAL MG) 400 MG: 400 (241.3 MG) TAB at 08:52

## 2020-08-19 RX ADMIN — SODIUM BICARBONATE: 84 INJECTION, SOLUTION INTRAVENOUS at 13:30

## 2020-08-19 RX ADMIN — FERROUS SULFATE TAB 325 MG (65 MG ELEMENTAL FE) 325 MG: 325 (65 FE) TAB at 16:19

## 2020-08-19 RX ADMIN — METOPROLOL SUCCINATE 50 MG: 50 TABLET, EXTENDED RELEASE ORAL at 08:52

## 2020-08-19 RX ADMIN — HYDRALAZINE HYDROCHLORIDE 10 MG: 10 TABLET, FILM COATED ORAL at 06:11

## 2020-08-19 RX ADMIN — HEPARIN SODIUM 5000 UNITS: 10000 INJECTION INTRAVENOUS; SUBCUTANEOUS at 21:09

## 2020-08-19 RX ADMIN — HYDRALAZINE HYDROCHLORIDE 10 MG: 10 TABLET, FILM COATED ORAL at 21:09

## 2020-08-19 RX ADMIN — SODIUM CHLORIDE, PRESERVATIVE FREE 10 ML: 5 INJECTION INTRAVENOUS at 22:35

## 2020-08-19 RX ADMIN — INSULIN LISPRO 1 UNITS: 100 INJECTION, SOLUTION INTRAVENOUS; SUBCUTANEOUS at 21:09

## 2020-08-19 RX ADMIN — HYDRALAZINE HYDROCHLORIDE 10 MG: 10 TABLET, FILM COATED ORAL at 13:55

## 2020-08-19 RX ADMIN — ASPIRIN 81 MG: 81 TABLET, COATED ORAL at 08:52

## 2020-08-19 ASSESSMENT — PAIN SCALES - GENERAL
PAINLEVEL_OUTOF10: 0

## 2020-08-19 NOTE — PROGRESS NOTES
AE/DME, as needed/appropriate   Toileting Max A Max A  Min A   Bed Mobility  Not assessed.       Functional Transfers Sit-to-Stand: Mod A   from bedside chair STS: Mod A   From arm chair Min A   Functional Mobility Not assessed. Mod A using lawrence cane in room SBA - with use of device, as needed/appropriate   Balance Sitting: Good-  (at edge of chair)  Standing: Fair- Sitting: Sup  Standing: Min- Mod A  Fair+ dynamic standing balance during completion of ADLs and other functional tasks. Activity Tolerance Fair  Poor+ Patient will demonstrate Good understanding and consistent implementation of energy conservation techniques and work simplification techniques into ADL routines.             B UE Strength R shoulder: 2-/5 grossly  Distal R UE: 3+/5  L UE: 4-/5 grossly Functional use of L UE during tasks  Patient will demonstrate 4+/5 L UE strength in order to maximize independence with ADLs and functional transfers.          Comments: Upon arrival pt was supine sitting in chair. At end of session pt was transferred back to chair with B LE elevated, alarm on, all lines and tubes intact and call light within reach. Treatment: Initially upon standing pt displayed posterior lean requiring cues to correct posture. Seated rest break required during functional mobility. Education: Safe transfer training and techniques to improve posture to maximize independence with ADLs. · Pt has made good progress towards set goals.    · Continue with current plan of care      Treatment Charges: Mins Units   Ther Ex  36551     Manual Therapy 64130     Thera Activities 70539 10 1   ADL/Home Mgt 07095 13 1   Neuro Re-ed 09890     Group Therapy      Orthotic manage/training  59502     Non-Billable Time     Total Timed Treatment 23 2       3057 V[address] QUEZADA/L [id number]

## 2020-08-19 NOTE — CARE COORDINATION
08/19/20 1421   IMM Letter   IMM Letter given to Patient/Family/Significant other/Guardian/POA/by: Rosalinda Dillon RN   IMM Letter date given: 08/19/20   IMM Letter time given: 3510   In anticipation of discharge, copy of original Important message from Medicare delivered to patient and reviewed. Ellen Hedrick.  Mushtaq, MSN, RN  [facility] Case Management  [phone]

## 2020-08-19 NOTE — CARE COORDINATION
Social Work/Discharge Planning:  Patient accepted at [facility] and no pre-cert needed. Cobrin Naqvi from [facility] facility can accept patient today if ready. Notified charge nurse. COVID negative 8/17. Will continue to follow.   Electronically signed by SCOTT Bustillos on 8/19/2020 at 11:41 AM

## 2020-08-19 NOTE — PROGRESS NOTES
Subjective: The patient is awake and alert. No problems overnight. Denies chest pain, angina, and dyspnea. Denies abdominal pain. Tolerating diet. No nausea or vomiting. Objective:  Creat at 3.6   BP controlled. Rate controlled. DM control satisfactory. BP (!) 154/71   Pulse 67   Temp 97.7 °F (36.5 °C) (Oral)   Resp 16   Ht 5' 6\" (1.676 m)   Wt 209 lb 1.6 oz (94.8 kg)   SpO2 99%   BMI 33.75 kg/m²     Heart:  iRRR, no murmurs, gallops, or rubs.   Lungs:  CTA bilaterally, no wheeze, rales or rhonchi  Abd: bowel sounds present, nontender, nondistended, no masses  Extrem:  No clubbing, cyanosis, or edema    CBC:   Lab Results   Component Value Date    WBC 4.7 08/19/2020    RBC 2.63 08/19/2020    HGB 8.2 08/19/2020    HCT 26.4 08/19/2020    .4 08/19/2020    MCH 31.2 08/19/2020    MCHC 31.1 08/19/2020    RDW 14.7 08/19/2020     08/19/2020    MPV 11.0 08/19/2020     BMP:    Lab Results   Component Value Date     08/19/2020    K 5.0 08/19/2020    K 4.6 08/15/2020     08/19/2020    CO2 22 08/19/2020    BUN 56 08/19/2020    LABALBU 3.1 08/16/2020    LABALBU 3.8 02/20/2012    CREATININE 3.6 08/19/2020    CALCIUM 7.6 08/19/2020    GFRAA 19 08/19/2020    LABGLOM 16 08/19/2020    GLUCOSE 119 08/19/2020    GLUCOSE 163 02/22/2012        Assessment:    Patient Active Problem List   Diagnosis    Partial small bowel obstruction ([address]    IDDM-2    CKD-3    CAD    HTN    COPD    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 (arteriosclerotic heart disease)    Diabetes mellitus ([address]    Diabetes mellitus ([address]    [facility] peripheral neuropathy [facility]    Osteoarthritis    Gait abnormality    Physical deconditioning    Lumbar spondylitis (HCC)    Anemia    Fx humer, med condyl-closed    ASHD (arteriosclerotic heart disease)    Cerebral infarction ([facility]    Renal failure    TIA (transient ischemic attack)    ASHD (arteriosclerotic heart disease)    Diabetes mellitus ([facility]    PVD (peripheral vascular disease) with claudication (HCC)    History of CVA (cerebrovascular accident)    Pain in lower limb    [facility]    CKD 3    Diabetes mellitus-2    Acute respiratory failure (HCC)    Decubitus ulcer of sacral region, stage 1    Right sided weakness    Hypoglycemia    Acute kidney injury (ZAHRAA) with acute tubular necrosis (ATN) (HCC)    Acute kidney failure ([facility]       Plan:  Present tx.   Hydrate/bicarb          Diogo Lane  7:06 AM  8/19/2020

## 2020-08-19 NOTE — PROGRESS NOTES
[facility] MD Cristal Canchola MD Renee Oka, MD York Cress, MD Chauncy Flatten, KARAN Lux, DAISY  Progress Note    8/19/2020    SUBJECTIVE:   8/17: Denies complaint. Feeling better. (-) sob/grace/cp/palp Appetite ok. No swelling. ROS unremarkable. 8/18: Resting comfortably, reading the news on his iPad. No dyspnea at rest on room air. Peripheral swelling about the same as yesterday. No chest pain or palpitations. 8/19:  Seen this afternoon. Pedal and distal LE swelling. Ongoing fatigure, about the same. 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 *         DIET:    DIET CARB CONTROL;   Dietary Nutrition Supplements: Low Calorie High Protein Supplement     MEDS (scheduled):    ferrous sulfate  325 mg Oral BID WC    epoetin mayra-epbx  4,000 Units Subcutaneous Once per day on Mon Wed Fri    magnesium oxide  400 mg Oral Daily    hydrALAZINE  10 mg Oral 3 times per day    aspirin  81 mg Oral QAM    metoprolol succinate  50 mg Oral BID    pravastatin  20 mg Oral Lunch    sodium chloride flush  10 mL Intravenous 2 times per day    heparin (porcine)  5,000 Units Subcutaneous 3 times per day    insulin lispro  0-12 Units Subcutaneous TID WC    insulin lispro  0-6 Units Subcutaneous Nightly       MEDS (infusions):   dextrose         MEDS (prn):  analgesic ointment, sodium chloride flush, acetaminophen **OR** acetaminophen, promethazine **OR** ondansetron, glucose, dextrose, glucagon (rDNA), dextrose    PHYSICAL EXAM:     Patient Vitals for the past 24 hrs:   BP Temp Temp src Pulse Resp SpO2 Weight   08/19/20 1435 (!) 150/65 97.7 °F (36.5 °C) -- 68 18 -- --   08/19/20 1345 135/62 97.6 °F (36.4 °C) -- 67 18 -- --   08/19/20 0845 139/66 98.5 °F (36.9 °C) Oral 66 16 96 % --   08/19/20 0611 (!) 154/71 -- -- -- -- -- --   08/19/20 0545 -- -- -- -- -- -- 209 lb 1.6 oz (94.8 kg)   08/19/20 0030 132/64 97.7 °F (36.5 °C) Oral 67 16 99 % --   08/18/20 2100 138/61 97.6 °F (36.4 °C) Oral 80 16 97 % --   @      Intake/Output Summary (Last 24 hours) at 8/19/2020 1747  Last data filed at 8/19/2020 1617  Gross per 24 hour   Intake 1200 ml   Output 550 ml   Net 650 ml         Wt Readings from Last 3 Encounters:   08/19/20 209 lb 1.6 oz (94.8 kg)   01/17/19 170 lb (77.1 kg)   01/12/19 170 lb (77.1 kg)       Constitutional:  in no acute distress  HEENT: NC/AT, EOMI, sclera and conjunctiva are clear and anicteric, mucus membranes moist  Neck: Trachea midline,  Cardiovascular: S1, S2 regular rhythm, no murmur,or rub  Respiratory: Coarse breath sounds, mild basilar crackles which improved somewhat with deep inspiration, no wheeze  Gastrointestinal:  Soft, nontender, nondistended, NABS  Ext: 1+ distal lower extremity edema -- worse, feet warm  Skin: dry, no rash  Neuro: awake, alert, interactive      DATA:    Recent Labs     08/18/20  0455 08/19/20  0440   WBC 4.5 4.7   HGB 7.2* 8.2*   HCT 23.2* 26.4*   .0* 100.4*   * 134     Recent Labs     08/17/20  0344 08/18/20  0455 08/19/20  0440    139 139   K 5.0 4.5 5.0   * 105 106   CO2 19* 24 22   MG  --  1.5* 1.8   PHOS  --  3.6 3.9   BUN 56* 51* 56*   CREATININE 3.9* 3.5* 3.6*       No results found for: LABPROT    ASSESSMENT / RECOMMENDATIONS:       1-Acute kidney injury, hemodynamically-mediated, possibly attributable to 2 RAAS blocker     2-chronic kidney disesae stage IV baseline cr around 2 with around 2 g proteinuria     3-Hyperkalemia due to #1,#2.   Resolved    4-Metabolic acidosis, non-anion gap, secondary acute kidney injury/CKD    5-anemia of chronic disease (CKD) : tsat 50     6-Mineral bone disease : phosph 4.4 no need for binders        Slight improvement in BUN and cr, stagnated today  Pedal and dependent edema  clinically improving      --> stop IV fluid   --> consider resuming diuretic tx, but would not do so today  --> Continue to hold both lisinopril and irbesartan   --> Follow labs, UO      Electronically signed by Catarina Bradley MD on 8/19/2020

## 2020-08-19 NOTE — PROGRESS NOTES
Physical Therapy  Facility/Department: [address] INTERMEDIATE 1  Daily Treatment Note  NAME: Calli Huber  : 1931  MRN: [id number]    Date of Service: 2020  Patient Diagnosis(es): The primary encounter diagnosis was Acute kidney injury [facility]). A diagnosis of Anemia, unspecified type was also pertinent to this visit. has a past medical history of Arthritis, CAD (coronary artery disease), Chronic kidney disease, COPD (chronic obstructive pulmonary disease) ([address] Decubitus ulcer of sacral region, stage 1, Diabetes mellitus ([address] Diabetic peripheral neuropathy ([address] History of CVA (cerebrovascular accident), Hypertension, Other disorders of kidney and ureter, Pain in lower limb, PVD (peripheral vascular disease) with claudication ([facility] Right sided weakness, TIA (transient ischemic attack), and Unspecified cerebral artery occlusion with cerebral infarction. has a past surgical history that includes Abdominal hernia repair; Cataract removal; Dental surgery; Inguinal hernia repair; Cholecystectomy (2011); Inner ear surgery; and eye surgery. Evaluating Therapist: Yariel Mixon, PT      Referring Provider:  Dr. Fuad Quinones     Room #: 433   DIAGNOSIS:  ZAHRAA   Additional Pertinent History: R hemiparesis due to CVA   [address]     Social:  Pt lives alone  in a  1  floor plan 2+1  steps to enter. Stair glide to basement. Has HHA daily   Prior to admission pt walked with  QC  For short distances on;y. W/c for longer. Pt reports able to self propel        Initial Evaluation  Date: 2020 Treatment  20    Short Term/ Long Term   Goals   Was pt agreeable to Eval/treatment?  Yes  yes      Does pt have pain?  denies No c/o pain      Bed Mobility  Rolling:  SBA to L   Supine to sit:  Min assist   Sit to supine: NT   Scooting:  Min assist in sit    NT SBA    Transfers Sit to stand:  Mod assist   Stand to sit: min assist   Stand pivot: mod assist   sit <> stand mod assist  SBA    Ambulation     6 feet x 2  with  hemicane ( no QC available)   with  Min assist   10 feet x2 with hemicane and R AFO min to mod assist 50 feet with QC /AAD  with  SBA          Stair negotiation: ascended and descended NT    NT  4  steps with  1 rail with  Min assist    LE ROM  AAROM WFL , decreased throughout R side , L LE WFL        LE strength  R 2- to 3-/ 5   L LE WFL        AM- PAC RAW score  13/ 24 13/24          Patient education  Pt was educated on only wearing AFO during ambulation due to R LE edema    Patient response to education:   Pt verbalized understanding Pt demonstrated skill Pt requires further education in this area   yes         Additional Comment/ treatment: functional mobility as above. Sit <> stand transfer x3 with posterior lean upon standing. Unsteady with ambulation with cues for LE sequence. Increase difficulty with turns. Pt fatigues quickly with mobility. AFO removed at end of session due to R LE edema. Pt was left in chair with call light left by patient. Chair/bed alarm: yes    Time in: 800  Time out: 823    Total treatment time 23 minutes    Pt is making good progress toward established Physical Therapy goals. Continue with physical therapy current plan of care.     Madelaine PT [id number]      CPT codes:  [] Low Complexity PT evaluation 64001  [] Moderate Complexity PT evaluation 49361  [] High Complexity PT evaluation 84157  [] PT Re-evaluation 44173  [] Gait training 44852  minutes  [] Manual therapy 58762    minutes  [x] Therapeutic activities 09349  23  minutes  [] Therapeutic exercises 10963     minutes  [] Neuromuscular reeducation 55870     minutes

## 2020-08-19 NOTE — PATIENT CARE CONFERENCE
[facility] Quality Flow/Interdisciplinary Rounds Progress Note        Quality Flow Rounds held on August 19, 2020    Disciplines Attending:  Bedside Nurse,  and Nursing Unit Leadership    Anay Joe was admitted on 8/14/2020  3:50 PM    Anticipated Discharge Date:  Expected Discharge Date: 08/16/20(estimate)    Disposition:    Kristian Score:  Kristian Scale Score: 14    Readmission Score:         Discussed patient goal for the day, patient clinical progression, and barriers to discharge.   The following Goal(s) of the Day/Commitment(s) have been identified:  Continue to monitor labs, check discharge plan with Renal      Claudetta Rouge  August 19, 2020

## 2020-08-20 LAB
ANION GAP SERPL CALCULATED.3IONS-SCNC: 10 MMOL/L (ref 7–16)
ANION GAP SERPL CALCULATED.3IONS-SCNC: 11 MMOL/L (ref 7–16)
BUN BLDV-MCNC: 61 MG/DL (ref 8–23)
BUN BLDV-MCNC: 62 MG/DL (ref 8–23)
CALCIUM SERPL-MCNC: 7.8 MG/DL (ref 8.6–10.2)
CALCIUM SERPL-MCNC: 7.9 MG/DL (ref 8.6–10.2)
CHLORIDE BLD-SCNC: 106 MMOL/L (ref 98–107)
CHLORIDE BLD-SCNC: 107 MMOL/L (ref 98–107)
CO2: 20 MMOL/L (ref 22–29)
CO2: 23 MMOL/L (ref 22–29)
CREAT SERPL-MCNC: 3.6 MG/DL (ref 0.7–1.2)
CREAT SERPL-MCNC: 3.7 MG/DL (ref 0.7–1.2)
GFR AFRICAN AMERICAN: 19
GFR AFRICAN AMERICAN: 19
GFR NON-AFRICAN AMERICAN: 16 ML/MIN/1.73
GFR NON-AFRICAN AMERICAN: 16 ML/MIN/1.73
GLUCOSE BLD-MCNC: 122 MG/DL (ref 74–99)
GLUCOSE BLD-MCNC: 123 MG/DL (ref 74–99)
HEMOCCULT STL QL: NEGATIVE
HEMOCCULT STL QL: NORMAL
HEMOCCULT STL QL: NORMAL
MAGNESIUM: 1.8 MG/DL (ref 1.6–2.6)
MAGNESIUM: 1.9 MG/DL (ref 1.6–2.6)
METER GLUCOSE: 113 MG/DL (ref 74–99)
METER GLUCOSE: 115 MG/DL (ref 74–99)
METER GLUCOSE: 148 MG/DL (ref 74–99)
METER GLUCOSE: 182 MG/DL (ref 74–99)
POTASSIUM SERPL-SCNC: 4.9 MMOL/L (ref 3.5–5)
POTASSIUM SERPL-SCNC: 6.6 MMOL/L (ref 3.5–5)
SODIUM BLD-SCNC: 138 MMOL/L (ref 132–146)
SODIUM BLD-SCNC: 139 MMOL/L (ref 132–146)

## 2020-08-20 PROCEDURE — 82962 GLUCOSE BLOOD TEST: CPT

## 2020-08-20 PROCEDURE — 6370000000 HC RX 637 (ALT 250 FOR IP): Performed by: INTERNAL MEDICINE

## 2020-08-20 PROCEDURE — 6360000002 HC RX W HCPCS: Performed by: INTERNAL MEDICINE

## 2020-08-20 PROCEDURE — 2060000000 HC ICU INTERMEDIATE R&B

## 2020-08-20 PROCEDURE — 83735 ASSAY OF MAGNESIUM: CPT

## 2020-08-20 PROCEDURE — 97530 THERAPEUTIC ACTIVITIES: CPT

## 2020-08-20 PROCEDURE — 2580000003 HC RX 258: Performed by: INTERNAL MEDICINE

## 2020-08-20 PROCEDURE — 36415 COLL VENOUS BLD VENIPUNCTURE: CPT

## 2020-08-20 PROCEDURE — 80048 BASIC METABOLIC PNL TOTAL CA: CPT

## 2020-08-20 RX ORDER — FUROSEMIDE 10 MG/ML
40 INJECTION INTRAMUSCULAR; INTRAVENOUS ONCE
Status: COMPLETED | OUTPATIENT
Start: 2020-08-20 | End: 2020-08-20

## 2020-08-20 RX ADMIN — MAGNESIUM OXIDE TAB 400 MG (241.3 MG ELEMENTAL MG) 400 MG: 400 (241.3 MG) TAB at 08:28

## 2020-08-20 RX ADMIN — HEPARIN SODIUM 5000 UNITS: 10000 INJECTION INTRAVENOUS; SUBCUTANEOUS at 06:21

## 2020-08-20 RX ADMIN — SODIUM CHLORIDE, PRESERVATIVE FREE 10 ML: 5 INJECTION INTRAVENOUS at 08:28

## 2020-08-20 RX ADMIN — INSULIN LISPRO 1 UNITS: 100 INJECTION, SOLUTION INTRAVENOUS; SUBCUTANEOUS at 19:57

## 2020-08-20 RX ADMIN — ASPIRIN 81 MG: 81 TABLET, COATED ORAL at 08:28

## 2020-08-20 RX ADMIN — HYDRALAZINE HYDROCHLORIDE 10 MG: 10 TABLET, FILM COATED ORAL at 16:11

## 2020-08-20 RX ADMIN — INSULIN LISPRO 2 UNITS: 100 INJECTION, SOLUTION INTRAVENOUS; SUBCUTANEOUS at 10:47

## 2020-08-20 RX ADMIN — HYDRALAZINE HYDROCHLORIDE 10 MG: 10 TABLET, FILM COATED ORAL at 06:21

## 2020-08-20 RX ADMIN — FERROUS SULFATE TAB 325 MG (65 MG ELEMENTAL FE) 325 MG: 325 (65 FE) TAB at 08:28

## 2020-08-20 RX ADMIN — METOPROLOL SUCCINATE 50 MG: 50 TABLET, EXTENDED RELEASE ORAL at 19:57

## 2020-08-20 RX ADMIN — PRAVASTATIN SODIUM 20 MG: 20 TABLET ORAL at 10:51

## 2020-08-20 RX ADMIN — SODIUM CHLORIDE, PRESERVATIVE FREE 10 ML: 5 INJECTION INTRAVENOUS at 19:57

## 2020-08-20 RX ADMIN — FERROUS SULFATE TAB 325 MG (65 MG ELEMENTAL FE) 325 MG: 325 (65 FE) TAB at 16:11

## 2020-08-20 RX ADMIN — TORSEMIDE 50 MG: 20 TABLET ORAL at 19:57

## 2020-08-20 RX ADMIN — HYDRALAZINE HYDROCHLORIDE 10 MG: 10 TABLET, FILM COATED ORAL at 22:59

## 2020-08-20 RX ADMIN — HEPARIN SODIUM 5000 UNITS: 10000 INJECTION INTRAVENOUS; SUBCUTANEOUS at 22:59

## 2020-08-20 RX ADMIN — FUROSEMIDE 40 MG: 10 INJECTION, SOLUTION INTRAMUSCULAR; INTRAVENOUS at 16:11

## 2020-08-20 RX ADMIN — METOPROLOL SUCCINATE 50 MG: 50 TABLET, EXTENDED RELEASE ORAL at 08:28

## 2020-08-20 RX ADMIN — HEPARIN SODIUM 5000 UNITS: 10000 INJECTION INTRAVENOUS; SUBCUTANEOUS at 16:11

## 2020-08-20 ASSESSMENT — PAIN SCALES - GENERAL
PAINLEVEL_OUTOF10: 0

## 2020-08-20 NOTE — CARE COORDINATION
Chart reviewed and discussed with nursing. Negative COVID-19 08/17/2020  2:16 PM. Awaiting renal plan. Discharge plan remains for patient to transition to [facility] once medically stable. No precert needed.

## 2020-08-20 NOTE — PROGRESS NOTES
[facility] MD Kathleen Cavazos, MD Bernarda Kurtz MD Lanell Bran, CNP   Anu Lux, DAISY  Progress Note    8/20/2020    SUBJECTIVE:   8/17: Denies complaint. Feeling better. (-) sob/grace/cp/palp Appetite ok. No swelling. ROS unremarkable. 8/18: Resting comfortably, reading the news on his iPad. No dyspnea at rest on room air. Peripheral swelling about the same as yesterday. No chest pain or palpitations. 8/19:  Seen this afternoon. Pedal and distal LE swelling. Ongoing fatigure, about the same. (-) sob at rest  8/20: Swelling worse today. Good appetite. No dyspnea at rest on room air. 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 *         DIET:    DIET CARB CONTROL;   Dietary Nutrition Supplements: Low Calorie High Protein Supplement     MEDS (scheduled):    furosemide  40 mg Intravenous Once    torsemide  50 mg Oral Once    [START ON 8/21/2020] torsemide  50 mg Oral Daily    ferrous sulfate  325 mg Oral BID     epoetin mayra-epbx  4,000 Units Subcutaneous Once per day on Mon Wed Fri    magnesium oxide  400 mg Oral Daily    hydrALAZINE  10 mg Oral 3 times per day    aspirin  81 mg Oral QAM    metoprolol succinate  50 mg Oral BID    pravastatin  20 mg Oral Lunch    sodium chloride flush  10 mL Intravenous 2 times per day    heparin (porcine)  5,000 Units Subcutaneous 3 times per day    insulin lispro  0-12 Units Subcutaneous TID     insulin lispro  0-6 Units Subcutaneous Nightly       MEDS (infusions):   dextrose         MEDS (prn):  analgesic ointment, sodium chloride flush, acetaminophen **OR** acetaminophen, promethazine **OR** ondansetron, glucose, dextrose, glucagon (rDNA), dextrose    PHYSICAL EXAM:     Patient Vitals for the past 24 hrs:   BP Temp Temp src Pulse Resp SpO2 Weight   08/20/20 0945 139/71 98.9 °F (37.2 °C) Oral 71 18 94 % --   08/20/20 0615 (!) 147/69 -- -- 60 16 -- --   08/20/20 0138 -- -- -- -- -- -- 225 lb (102.1 kg)   08/20/20 0008 (!) 147/66 98.6 °F (37 °C) Oral 65 18 93 % --   08/19/20 2107 (!) 158/70 97.4 °F (36.3 °C) Oral 68 22 93 % --   08/19/20 1435 (!) 150/65 97.7 °F (36.5 °C) -- 68 18 -- --   08/19/20 1345 135/62 97.6 °F (36.4 °C) -- 67 18 -- --   @      Intake/Output Summary (Last 24 hours) at 8/20/2020 1330  Last data filed at 8/20/2020 1244  Gross per 24 hour   Intake 360 ml   Output 500 ml   Net -140 ml         Wt Readings from Last 3 Encounters:   08/20/20 225 lb (102.1 kg)   01/17/19 170 lb (77.1 kg)   01/12/19 170 lb (77.1 kg)       Constitutional:  in no acute distress  HEENT: NC/AT, EOMI, sclera and conjunctiva are clear and anicteric, mucus membranes moist  Neck: Trachea midline,  Cardiovascular: S1, S2 regular rhythm, no murmur,or rub  Respiratory: Coarse breath sounds,  basilar crackles somewhat worse than yesterday,  no wheeze  Gastrointestinal:  Soft, nontender, nondistended, NABS  Ext: 2+ distal lower extremity edema -- worse, feet warm  Skin: dry, no rash  Neuro: awake, alert, interactive      DATA:    Recent Labs     08/18/20  0455 08/19/20  0440   WBC 4.5 4.7   HGB 7.2* 8.2*   HCT 23.2* 26.4*   .0* 100.4*   * 134     Recent Labs     08/18/20  0455 08/19/20  0440 08/20/20  0355 08/20/20  0505    139 138 139   K 4.5 5.0 6.6* 4.9    106 107 106   CO2 24 22 20* 23   MG 1.5* 1.8 1.9 1.8   PHOS 3.6 3.9  --   --    BUN 51* 56* 62* 61*   CREATININE 3.5* 3.6* 3.6* 3.7*       No results found for: LABPROT    ASSESSMENT / RECOMMENDATIONS:       1-Acute kidney injury, hemodynamically-mediated, possibly attributable to 2 RAAS blocker 2-chronic kidney disesae stage IV baseline cr around 2 with around 2 g proteinuria     3-Hyperkalemia due to #1,#2.   Resolved    4-Metabolic acidosis, non-anion gap, secondary acute kidney injury/CKD    5-anemia of chronic disease (CKD) : tsat 50     6-Mineral bone disease : phosph 4.4 no need for binders        Azotemia plateau  Pedal and dependent edema worsening  clinically improving despite worsening edema      --> Lasix 40 mg IV x1  --> Torsemide 50 mg p.o. x1 at 8 PM this evening  --> Start torsemide 50 mg daily tomorrow morning  --> Keep feet up when not using them  --> FERMIN hose once edema improved  --> Continue to hold both lisinopril and irbesartan   --> Follow labs, UO      Electronically signed by Kristine Morgan MD on 8/20/2020

## 2020-08-20 NOTE — PROGRESS NOTES
Occupational Therapy  OT BEDSIDE TREATMENT NOTE      Date:2020  Patient Name: Miko Hoyos  MRN: [id number]  : 1931  Room: [address] [place] [address]     Referring Rosalio Villalpando MD   Evaluating OT: Dameon Doe. [place], OTR/L - [id number]     AM-PAC Daily Activity Raw Score:       Recommended Adaptive Equipment: Continue to assess.      Diagnosis: Acute kidney injury (ZAHRAA) with acute tubular necrosis (ATN) (HCC) [N17.0], Acute kidney failure (HCC) [N17.9]      Pertinent Medical History: COPD, CKD, CAD, DM, CVA, HTN, TIA, arthritis, neuropathy      Precautions: falls, R-sided weakness, R AFO     Home Living: Patient lives alone in a one-floor home. Bathroom Setup: tub shower (with extended tub bench, handheld shower head, and grab bars available) - patient showers twice per week and sponge bathes the other days  Equipment Owned: quad cane, extended tub bench, wheelchair     Prior Level of Function (PLOF): Per patient, he needed assistance with most ADLs and IADLs, but was independent with functional transfers/mobility (with use of quad cane for short distances only - wheelchair used mostly) prior to this hospitalization. Patient noted that he has assistance from aides twice daily; one aide assists with morning ADLs (including a shower or sponge bath) and breakfast (2-3 hours) and another with evening ADLs and dinner.  Patient reported that family members provide assistance with preparation of lunch and other IADLs, as needed, during the day.     Pain Level: No c/o pain  Cognition: A&O: 4/4 with ability to follow multi- step commands.     Functional Assessment:    Initial Eval Status  Date: 2020 Treatment Status 20    Short Term Goals  Treatment Frequency/Duration: 2-5x/week for 3-7 days PRN   Feeding SBA   Setup   Grooming Mod A   Min A  (seated)   UB Dressing Mod A Mod A to don gown as a robe.   Min A   LB Dressing Max A  Max A to don R AFO and R shoe Mod A - with use of AE, as needed/appropriate   Bathing Max A   Mod A - with use of AE/DME, as needed/appropriate   Toileting Max A Max A  Standing during hygiene  Min A   Bed Mobility  Not assessed.        Functional Transfers Sit-to-Stand: Mod A   from bedside chair STS: Mod A progressing to Min A  From arm chair 2xs Min A   Functional Mobility Not assessed. Mod A using lawrence cane in room SBA - with use of device, as needed/appropriate   Balance Sitting: Good-  (at edge of chair)  Standing: Fair- Sitting: Independent   Standing: Min- Mod A  Fair+ dynamic standing balance during completion of ADLs and other functional tasks. Activity Tolerance Fair  Poor+ Patient will demonstrate Good understanding and consistent implementation of energy conservation techniques and work simplification techniques into ADL routines.             B UE Strength R shoulder: 2-/5 grossly  Distal R UE: 3+/5  L UE: 4-/5 grossly Good use of L UE during tasks Patient will demonstrate 4+/5 L UE strength in order to maximize independence with ADLs and functional transfers. Comments: Upon arrival pt was sitting in arm chair. At end of session pt was transferred to chair with alarm on, all lines and tubes intact and call light within reach. Treatment: Assistance provided initially for R UE placement on lawrence cane. Strong posterior lean exhibited requiring assistance to weight shift to correct posture. STS transfers improved with repetition. Education: Safety training, techniques to correct posture while standing, hand placement on lawrence cane all to maximize independence with ADLs    · Pt has made good progress towards set goals.    · Continue with current plan of care      Treatment Charges: Mins Units   Ther Ex  [id number]     Manual Therapy 36413     Thera Activities 91982 10 1   ADL/Home Mgt [id number] 6 0   Neuro Re-ed 25434     Group Therapy      Orthotic manage/training  02829     Non-Billable Time     Total Timed Treatment 16 1       Eliana QUEZADA/BLAINE [id number]

## 2020-08-20 NOTE — PLAN OF CARE
Problem: Falls - Risk of:  Goal: Will remain free from falls  Description: Will remain free from falls  Outcome: Met This Shift     Problem: Skin Integrity:  Goal: Will show no infection signs and symptoms  Description: Will show no infection signs and symptoms  Outcome: Met This Shift     Problem: Sensory Perception - Impaired:  Goal: Ability to maintain a stable neurologic state will improve  Description: Ability to maintain a stable neurologic state will improve  Outcome: Met This Shift

## 2020-08-20 NOTE — PROGRESS NOTES
Physical Therapy  Facility/Department: [address] INTERMEDIATE 1  Treatment Note  NAME: Taylor Pickering  : 1931  MRN: [id number]    Date of Service: 2020  Patient Diagnosis(es): The primary encounter diagnosis was Acute kidney injury [facility]). A diagnosis of Anemia, unspecified type was also pertinent to this visit. has a past medical history of Arthritis, CAD (coronary artery disease), Chronic kidney disease, COPD (chronic obstructive pulmonary disease) ([address] Decubitus ulcer of sacral region, stage 1, Diabetes mellitus ([address] Diabetic peripheral neuropathy ([address] History of CVA (cerebrovascular accident), Hypertension, Other disorders of kidney and ureter, Pain in lower limb, PVD (peripheral vascular disease) with claudication ([facility] Right sided weakness, TIA (transient ischemic attack), and Unspecified cerebral artery occlusion with cerebral infarction. has a past surgical history that includes Abdominal hernia repair; Cataract removal; Dental surgery; Inguinal hernia repair; Cholecystectomy (2011); Inner ear surgery; and eye surgery. Evaluating Therapist: Petra Maravilla, PT      Referring Provider:  Dr. Marjan Contreras     Room #: 433   DIAGNOSIS:  ZAHRAA   Additional Pertinent History: R hemiparesis due to CVA   [address]     Social:  Pt lives alone  in a  1  floor plan 2+1  steps to enter. Stair glide to basement. Has HHA daily   Prior to admission pt walked with  QC  For short distances on;y. W/c for longer. Pt reports able to self propel        Initial Evaluation  Date: 2020 Treatment  20    Short Term/ Long Term   Goals   Was pt agreeable to Eval/treatment?  Yes  yes      Does pt have pain?  denies No c/o pain      Bed Mobility  Rolling:  SBA to L   Supine to sit:  Min assist   Sit to supine: NT   Scooting:  Min assist in sit    NT SBA    Transfers Sit to stand:  Mod assist   Stand to sit: min assist   Stand pivot: mod assist   sit <> stand mod assist  SBA    Ambulation     6 feet

## 2020-08-20 NOTE — PROGRESS NOTES
Subjective: The patient is awake and alert. No problems overnight. Denies chest pain, angina, and dyspnea. Denies abdominal pain. Tolerating diet. No nausea or vomiting. Objective:  BP satisfactory. Creat has \"leveled out\" at approx 3.6. IV fluids off.  +4846    BP (!) 147/69   Pulse 60   Temp 98.6 °F (37 °C) (Oral)   Resp 16   Ht 5' 6\" (1.676 m)   Wt 225 lb (102.1 kg)   SpO2 93%   BMI 36.32 kg/m²     Heart:  RRR, no murmurs, gallops, or rubs.   Lungs:  CTA bilaterally, no wheeze, rales or rhonchi  Abd: bowel sounds present, nontender, nondistended, no masses  Extrem:  No clubbing, cyanosis, minimal edema    CBC:   Lab Results   Component Value Date    WBC 4.7 08/19/2020    RBC 2.63 08/19/2020    HGB 8.2 08/19/2020    HCT 26.4 08/19/2020    .4 08/19/2020    MCH 31.2 08/19/2020    MCHC 31.1 08/19/2020    RDW 14.7 08/19/2020     08/19/2020    MPV 11.0 08/19/2020     BMP:    Lab Results   Component Value Date     08/20/2020    K 4.9 08/20/2020    K 4.6 08/15/2020     08/20/2020    CO2 23 08/20/2020    BUN 61 08/20/2020    LABALBU 3.1 08/16/2020    LABALBU 3.8 02/20/2012    CREATININE 3.7 08/20/2020    CALCIUM 7.8 08/20/2020    GFRAA 19 08/20/2020    LABGLOM 16 08/20/2020    GLUCOSE 122 08/20/2020    GLUCOSE 163 02/22/2012        Assessment:    Patient Active Problem List   Diagnosis    Partial small bowel obstruction ([address]    IDDM-2    CKD-3    CAD    HTN    COPD    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 (arteriosclerotic heart disease)    Diabetes mellitus ([address]    [facility] mellitus [facility]    Diabetic peripheral neuropathy ([facility]    Osteoarthritis    Gait abnormality    Physical deconditioning    Lumbar spondylitis (HCC)    Anemia    Fx humer, med condyl-closed    ASHD (arteriosclerotic heart disease)    Cerebral infarction ([facility]    Renal failure    TIA (transient ischemic attack)    ASHD (arteriosclerotic heart disease)    Diabetes mellitus ([facility]    PVD

## 2020-08-21 LAB
ANION GAP SERPL CALCULATED.3IONS-SCNC: 12 MMOL/L (ref 7–16)
BUN BLDV-MCNC: 68 MG/DL (ref 8–23)
CALCIUM SERPL-MCNC: 8.2 MG/DL (ref 8.6–10.2)
CHLORIDE BLD-SCNC: 106 MMOL/L (ref 98–107)
CO2: 22 MMOL/L (ref 22–29)
CREAT SERPL-MCNC: 3.9 MG/DL (ref 0.7–1.2)
GFR AFRICAN AMERICAN: 18
GFR NON-AFRICAN AMERICAN: 15 ML/MIN/1.73
GLUCOSE BLD-MCNC: 116 MG/DL (ref 74–99)
MAGNESIUM: 1.9 MG/DL (ref 1.6–2.6)
METER GLUCOSE: 126 MG/DL (ref 74–99)
METER GLUCOSE: 133 MG/DL (ref 74–99)
METER GLUCOSE: 134 MG/DL (ref 74–99)
METER GLUCOSE: 161 MG/DL (ref 74–99)
PHOSPHORUS: 4.2 MG/DL (ref 2.5–4.5)
POTASSIUM SERPL-SCNC: 5.1 MMOL/L (ref 3.5–5)
SODIUM BLD-SCNC: 140 MMOL/L (ref 132–146)

## 2020-08-21 PROCEDURE — 6360000002 HC RX W HCPCS: Performed by: INTERNAL MEDICINE

## 2020-08-21 PROCEDURE — 6370000000 HC RX 637 (ALT 250 FOR IP): Performed by: INTERNAL MEDICINE

## 2020-08-21 PROCEDURE — 82962 GLUCOSE BLOOD TEST: CPT

## 2020-08-21 PROCEDURE — 83735 ASSAY OF MAGNESIUM: CPT

## 2020-08-21 PROCEDURE — 84100 ASSAY OF PHOSPHORUS: CPT

## 2020-08-21 PROCEDURE — 97110 THERAPEUTIC EXERCISES: CPT

## 2020-08-21 PROCEDURE — 2060000000 HC ICU INTERMEDIATE R&B

## 2020-08-21 PROCEDURE — 36415 COLL VENOUS BLD VENIPUNCTURE: CPT

## 2020-08-21 PROCEDURE — 80048 BASIC METABOLIC PNL TOTAL CA: CPT

## 2020-08-21 PROCEDURE — 2580000003 HC RX 258: Performed by: INTERNAL MEDICINE

## 2020-08-21 RX ADMIN — SODIUM CHLORIDE, PRESERVATIVE FREE 10 ML: 5 INJECTION INTRAVENOUS at 09:15

## 2020-08-21 RX ADMIN — SODIUM CHLORIDE, PRESERVATIVE FREE 10 ML: 5 INJECTION INTRAVENOUS at 21:25

## 2020-08-21 RX ADMIN — METOPROLOL SUCCINATE 50 MG: 50 TABLET, EXTENDED RELEASE ORAL at 21:24

## 2020-08-21 RX ADMIN — PRAVASTATIN SODIUM 20 MG: 20 TABLET ORAL at 12:03

## 2020-08-21 RX ADMIN — FERROUS SULFATE TAB 325 MG (65 MG ELEMENTAL FE) 325 MG: 325 (65 FE) TAB at 17:43

## 2020-08-21 RX ADMIN — MAGNESIUM OXIDE TAB 400 MG (241.3 MG ELEMENTAL MG) 400 MG: 400 (241.3 MG) TAB at 09:15

## 2020-08-21 RX ADMIN — HEPARIN SODIUM 5000 UNITS: 10000 INJECTION INTRAVENOUS; SUBCUTANEOUS at 06:09

## 2020-08-21 RX ADMIN — ASPIRIN 81 MG: 81 TABLET, COATED ORAL at 09:15

## 2020-08-21 RX ADMIN — HEPARIN SODIUM 5000 UNITS: 10000 INJECTION INTRAVENOUS; SUBCUTANEOUS at 21:24

## 2020-08-21 RX ADMIN — INSULIN LISPRO 1 UNITS: 100 INJECTION, SOLUTION INTRAVENOUS; SUBCUTANEOUS at 21:25

## 2020-08-21 RX ADMIN — FERROUS SULFATE TAB 325 MG (65 MG ELEMENTAL FE) 325 MG: 325 (65 FE) TAB at 09:15

## 2020-08-21 RX ADMIN — METOPROLOL SUCCINATE 50 MG: 50 TABLET, EXTENDED RELEASE ORAL at 09:15

## 2020-08-21 RX ADMIN — TORSEMIDE 50 MG: 20 TABLET ORAL at 17:43

## 2020-08-21 RX ADMIN — EPOETIN ALFA-EPBX 4000 UNITS: 4000 INJECTION, SOLUTION INTRAVENOUS; SUBCUTANEOUS at 09:15

## 2020-08-21 RX ADMIN — TORSEMIDE 50 MG: 20 TABLET ORAL at 06:09

## 2020-08-21 RX ADMIN — HYDRALAZINE HYDROCHLORIDE 10 MG: 10 TABLET, FILM COATED ORAL at 14:14

## 2020-08-21 RX ADMIN — HYDRALAZINE HYDROCHLORIDE 10 MG: 10 TABLET, FILM COATED ORAL at 06:09

## 2020-08-21 RX ADMIN — HEPARIN SODIUM 5000 UNITS: 10000 INJECTION INTRAVENOUS; SUBCUTANEOUS at 14:14

## 2020-08-21 RX ADMIN — HYDRALAZINE HYDROCHLORIDE 10 MG: 10 TABLET, FILM COATED ORAL at 21:24

## 2020-08-21 ASSESSMENT — PAIN SCALES - GENERAL
PAINLEVEL_OUTOF10: 0
PAINLEVEL_OUTOF10: 0

## 2020-08-21 NOTE — PROGRESS NOTES
[facility] MD Adrianne Canchola MD Renee Oka, MD York Cress, MD Chauncy Flatten, KARAN Lux, DAISY  Progress Note    8/21/2020    SUBJECTIVE:   8/17: Denies complaint. Feeling better. (-) sob/grace/cp/palp Appetite ok. No swelling. ROS unremarkable. 8/18: Resting comfortably, reading the news on his iPad. No dyspnea at rest on room air. Peripheral swelling about the same as yesterday. No chest pain or palpitations. 8/19:  Seen this afternoon. Pedal and distal LE swelling. Ongoing fatigure, about the same. (-) sob at rest  8/20: Swelling worse today. Good appetite. No dyspnea at rest on room air.  8/21: Swelling a little bit better. Wheezing is worse today. 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 *         DIET:    DIET CARB CONTROL;   Dietary Nutrition Supplements: Low Calorie High Protein Supplement     MEDS (scheduled):    torsemide  50 mg Oral Daily    ferrous sulfate  325 mg Oral BID WC    epoetin mayra-epbx  4,000 Units Subcutaneous Once per day on Mon Wed Fri    magnesium oxide  400 mg Oral Daily    hydrALAZINE  10 mg Oral 3 times per day    aspirin  81 mg Oral QAM    metoprolol succinate  50 mg Oral BID    pravastatin  20 mg Oral Lunch    sodium chloride flush  10 mL Intravenous 2 times per day    heparin (porcine)  5,000 Units Subcutaneous 3 times per day    insulin lispro  0-12 Units Subcutaneous TID WC    insulin lispro  0-6 Units Subcutaneous Nightly       MEDS (infusions):   dextrose         MEDS (prn):  analgesic ointment, sodium chloride flush, acetaminophen **OR** acetaminophen, promethazine **OR** ondansetron, glucose, dextrose, glucagon (rDNA), dextrose    PHYSICAL EXAM:     Patient Vitals for the past 24 hrs:   BP Temp Temp src Pulse Resp SpO2 Weight   08/21/20 0824 (!) 140/67 98 °F (36.7 °C) Oral 67 18 95 % --   08/21/20 0600 (!) 150/66 -- -- 65 -- -- --   08/21/20 0008 -- -- -- -- -- -- 222 lb 11.2 oz (101 kg)   08/20/20 2259 (!) 143/78 97.7 °F (36.5 °C) Oral 65 18 95 % --   08/20/20 1945 (!) 163/73 97.8 °F (36.6 °C) Oral 65 18 94 % --   @      Intake/Output Summary (Last 24 hours) at 8/21/2020 1246  Last data filed at 8/21/2020 0955  Gross per 24 hour   Intake 120 ml   Output 1550 ml   Net -1430 ml         Wt Readings from Last 3 Encounters:   08/21/20 222 lb 11.2 oz (101 kg)   01/17/19 170 lb (77.1 kg)   01/12/19 170 lb (77.1 kg)       Constitutional:  in no acute distress  HEENT: NC/AT, EOMI, sclera and conjunctiva are clear and anicteric, mucus membranes moist  Neck: Trachea midline,  Cardiovascular: S1, S2 regular rhythm, no murmur,or rub  Respiratory: Coarse breath sounds,  basilar crackles somewhat worse than yesterday,  no wheeze  Gastrointestinal:  Soft, nontender, nondistended, NABS  Ext: 2+ distal lower extremity edema --little better than yesterday, feet warm  Skin: dry, no rash  Neuro: awake, alert, interactive      DATA:    Recent Labs     08/19/20  0440   WBC 4.7   HGB 8.2*   HCT 26.4*   .4*        Recent Labs     08/19/20  0440 08/20/20  0355 08/20/20  0505 08/21/20  0315    138 139 140   K 5.0 6.6* 4.9 5.1*    107 106 106   CO2 22 20* 23 22   MG 1.8 1.9 1.8 1.9   PHOS 3.9  --   --  4.2   BUN 56* 62* 61* 68*   CREATININE 3.6* 3.6* 3.7* 3.9*       No results found for: LABPROT    ASSESSMENT / RECOMMENDATIONS:       1-Acute kidney injury, hemodynamically-mediated, possibly attributable to 2 RAAS blocker     2-chronic kidney disesae stage IV baseline cr around 2 with around 2 g proteinuria     3-Hyperkalemia due to #1,#2.   Resolved    4-Metabolic acidosis, non-anion gap, secondary acute kidney injury/CKD    5-anemia of chronic disease (CKD) : tsat 50     6-Mineral bone disease : phosph 4.4 no need for binders        Azotemia plateaud, though bumped a little since yesterday after resuming diuretic therapy  Pedal and dependent edema was worsening, though seems little better today  Persistent crackles, and now wheezing      --> Continue torsemide 50 mg daily  in themorning  --> We will give an additional dose of torsemide this evening  --> Keep feet up when not using them  --> FERMIN hose once edema improved  --> Continue to hold both lisinopril and irbesartan   --> Follow labs, UO  --> With increase in creatinine since yesterday, would be prudent to keep another day      Electronically signed by Risa Rachel MD on 8/21/2020

## 2020-08-21 NOTE — CARE COORDINATION
Social Work/Discharge Planning:  Called liaison Yaw Silva from [facility] facility can accept patient today or over the weekend, if ready for discharge. Patient does not need a pre-cert. Will continue to follow.   Electronically signed by SCOTT Hernandez on 8/21/2020 at 8:58 AM

## 2020-08-21 NOTE — PATIENT CARE CONFERENCE
[facility] Quality Flow/Interdisciplinary Rounds Progress Note        Quality Flow Rounds held on August 21, 2020    Disciplines Attending:  Bedside Nurse, ,  and Nursing Unit Leadership    Cheryl Sharma was admitted on 8/14/2020  3:50 PM    Anticipated Discharge Date:  Expected Discharge Date: 08/20/20    Disposition:    Kristian Score:  Kristian Scale Score: 15    Readmission Score:         Discussed patient goal for the day, patient clinical progression, and barriers to discharge. The following Goal(s) of the Day/Commitment(s) have been identified:  Discharge planning to [facility] and cont to monitor.        Nasim Tapia  August 21, 2020

## 2020-08-21 NOTE — PROGRESS NOTES
Subjective: The patient is awake and alert. No problems overnight. Denies chest pain, angina, and dyspnea. Denies abdominal pain. Tolerating diet. No nausea or vomiting. Objective:  Creat increased  (diuresed yesterday)    BP (!) 150/66   Pulse 65   Temp 97.7 °F (36.5 °C) (Oral)   Resp 18   Ht 5' 6\" (1.676 m)   Wt 222 lb 11.2 oz (101 kg)   SpO2 95%   BMI 35.94 kg/m²     Heart:  iRRR, no murmurs, gallops, or rubs.   Lungs:  CTA bilaterally, no wheeze, rales or rhonchi  Abd: bowel sounds present, nontender, nondistended, no masses  Extrem:  No clubbing, cyanosis, 2/4 pedal edema    CBC:   Lab Results   Component Value Date    WBC 4.7 08/19/2020    RBC 2.63 08/19/2020    HGB 8.2 08/19/2020    HCT 26.4 08/19/2020    .4 08/19/2020    MCH 31.2 08/19/2020    MCHC 31.1 08/19/2020    RDW 14.7 08/19/2020     08/19/2020    MPV 11.0 08/19/2020     CMP:    Lab Results   Component Value Date     08/21/2020    K 5.1 08/21/2020    K 4.6 08/15/2020     08/21/2020    CO2 22 08/21/2020    BUN 68 08/21/2020    CREATININE 3.9 08/21/2020    GFRAA 18 08/21/2020    LABGLOM 15 08/21/2020    GLUCOSE 116 08/21/2020    GLUCOSE 163 02/22/2012    PROT 5.5 08/16/2020    LABALBU 3.1 08/16/2020    LABALBU 3.8 02/20/2012    CALCIUM 8.2 08/21/2020    BILITOT 0.3 08/16/2020    ALKPHOS 95 08/16/2020    AST 11 08/16/2020    ALT 14 08/16/2020     BMP:    Lab Results   Component Value Date     08/21/2020    K 5.1 08/21/2020    K 4.6 08/15/2020     08/21/2020    CO2 22 08/21/2020    BUN 68 08/21/2020    LABALBU 3.1 08/16/2020    LABALBU 3.8 02/20/2012    CREATININE 3.9 08/21/2020    CALCIUM 8.2 08/21/2020    GFRAA 18 08/21/2020    LABGLOM 15 08/21/2020    GLUCOSE 116 08/21/2020    GLUCOSE 163 02/22/2012        Assessment:    Patient Active Problem List   Diagnosis    [facility] obstruction [facility]    IDDM-2    CKD-3    CAD    HTN    COPD    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

## 2020-08-21 NOTE — PROGRESS NOTES
Occupational Therapy  OT BEDSIDE TREATMENT NOTE      Date:2020  Patient Name: Annie Sanchez  MRN: [id number]  : 1931  Room: [address] [place] [address]     Referring Dmitri Summers MD   Evaluating OT: Shine Maciel, OTR/L - [id number]     AM-PAC Daily Activity Raw Score:       Recommended Adaptive Equipment: Continue to assess.      Diagnosis: Acute kidney injury (ZAHRAA) with acute tubular necrosis (ATN) (HCC) [N17.0], Acute kidney failure (HCC) [N17.9]      Pertinent Medical History: COPD, CKD, CAD, DM, CVA, HTN, TIA, arthritis, neuropathy      Precautions: falls, R-sided weakness, R AFO     Home Living: Patient lives alone in a one-floor home. Bathroom Setup: tub shower (with extended tub bench, handheld shower head, and grab bars available) - patient showers twice per week and sponge bathes the other days  Equipment Owned: quad cane, extended tub bench, wheelchair     Prior Level of Function (PLOF): Per patient, he needed assistance with most ADLs and IADLs, but was independent with functional transfers/mobility (with use of quad cane for short distances only - wheelchair used mostly) prior to this hospitalization. Patient noted that he has assistance from aides twice daily; one aide assists with morning ADLs (including a shower or sponge bath) and breakfast (2-3 hours) and another with evening ADLs and dinner.  Patient reported that family members provide assistance with preparation of lunch and other IADLs, as needed, during the day.     Pain Level: No c/o pain  Cognition: A&O: 4/4 with ability to follow     Functional Assessment:    Initial Eval Status  Date: 2020 Treatment Status 20    Short Term Goals  Treatment Frequency/Duration: 2-5x/week for 3-7 days PRN   Feeding SBA   Setup   Grooming Mod A   Min A  (seated)   UB Dressing Mod A  Min A   LB Dressing Max A Max A to doff AFO and doff/ don R shoe Mod A - with use of AE, as needed/appropriate   Bathing Max A  Mod A - with use of AE/DME, as needed/appropriate   Toileting Max A  Min A   Bed Mobility  Not assessed.        Functional Transfers Sit-to-Stand: Mod A   from bedside chair STS: Mod Ax2  From chair  Min A   Functional Mobility Not assessed. NT SBA - with use of device, as needed/appropriate   Balance Sitting: Good-  (at edge of chair)  Standing: Fair- Sitting: Independent   Standing: Mod- Max A  Fair+ dynamic standing balance during completion of ADLs and other functional tasks. Activity Tolerance Fair  Poor+ Patient will demonstrate Good understanding and consistent implementation of energy conservation techniques and work simplification techniques into ADL routines.             B UE Strength R shoulder: 2-/5 grossly  Distal R UE: 3+/5  L UE: 4-/5 grossly Instructed pt on AAROM exercises Patient will demonstrate 4+/5 L UE strength in order to maximize independence with ADLs and functional transfers. Comments: Upon arrival pt was sitting in arm chair. At end of session pt was transferred back to chair with alarm on, all lines and tubes intact and call light within reach. Treatment: Instructed pt on 1x8 reps of AAROM B UE exercises consisting of shoulder flexion, shoulder horizontal abduction, scapular retraction and elbow flexion/ extension requiring mod vc and demos for correct for Exercises were performed to improve strength during ADLs. Pt also performed dynamic standing balance activities using lawrence cane. Pt may have required increased level of assistance due to fatigue level. Pt demoed narrow DAVIDA while standing requiring vc to widen stance. Education: Safe transfer training and techniques to improve standing balance to maximize independence with ADLs. · Pt has made good progress towards set goals.    · Continue with current plan of care      Treatment Charges: Mins Units   Ther Ex  [id number]   Manual Therapy Isaak Larsen [phone]   ADL/Home Mgt [id number]     Neuro Re-ed [id number]     Group Therapy Orthotic manage/training  [id number]     Non-Billable Time     Total Timed Treatment 17 1       [place] QUEZADA/L [id number]

## 2020-08-22 LAB
ANION GAP SERPL CALCULATED.3IONS-SCNC: 13 MMOL/L (ref 7–16)
BUN BLDV-MCNC: 77 MG/DL (ref 8–23)
CALCIUM SERPL-MCNC: 8.5 MG/DL (ref 8.6–10.2)
CHLORIDE BLD-SCNC: 101 MMOL/L (ref 98–107)
CHLORIDE URINE RANDOM: 105 MMOL/L
CO2: 23 MMOL/L (ref 22–29)
CREAT SERPL-MCNC: 4.2 MG/DL (ref 0.7–1.2)
CREATININE URINE: 25 MG/DL (ref 40–278)
GFR AFRICAN AMERICAN: 16
GFR NON-AFRICAN AMERICAN: 13 ML/MIN/1.73
GLUCOSE BLD-MCNC: 101 MG/DL (ref 74–99)
HCT VFR BLD CALC: 28.2 % (ref 37–54)
HEMOGLOBIN: 8.7 G/DL (ref 12.5–16.5)
MAGNESIUM: 1.9 MG/DL (ref 1.6–2.6)
MCH RBC QN AUTO: 31.2 PG (ref 26–35)
MCHC RBC AUTO-ENTMCNC: 30.9 % (ref 32–34.5)
MCV RBC AUTO: 101.1 FL (ref 80–99.9)
METER GLUCOSE: 116 MG/DL (ref 74–99)
METER GLUCOSE: 125 MG/DL (ref 74–99)
METER GLUCOSE: 130 MG/DL (ref 74–99)
METER GLUCOSE: 177 MG/DL (ref 74–99)
PDW BLD-RTO: 14.3 FL (ref 11.5–15)
PLATELET # BLD: 149 E9/L (ref 130–450)
PMV BLD AUTO: 11.1 FL (ref 7–12)
POTASSIUM SERPL-SCNC: 4.9 MMOL/L (ref 3.5–5)
PROTEIN PROTEIN: 189 MG/DL (ref 0–12)
PROTEIN/CREAT RATIO: 7.6
PROTEIN/CREAT RATIO: 7.6 (ref 0–0.2)
RBC # BLD: 2.79 E12/L (ref 3.8–5.8)
SODIUM BLD-SCNC: 137 MMOL/L (ref 132–146)
SODIUM URINE: 114 MMOL/L
WBC # BLD: 5.4 E9/L (ref 4.5–11.5)

## 2020-08-22 PROCEDURE — 80048 BASIC METABOLIC PNL TOTAL CA: CPT

## 2020-08-22 PROCEDURE — 6370000000 HC RX 637 (ALT 250 FOR IP): Performed by: INTERNAL MEDICINE

## 2020-08-22 PROCEDURE — 82570 ASSAY OF URINE CREATININE: CPT

## 2020-08-22 PROCEDURE — 82962 GLUCOSE BLOOD TEST: CPT

## 2020-08-22 PROCEDURE — 6360000002 HC RX W HCPCS: Performed by: INTERNAL MEDICINE

## 2020-08-22 PROCEDURE — 84156 ASSAY OF PROTEIN URINE: CPT

## 2020-08-22 PROCEDURE — 83735 ASSAY OF MAGNESIUM: CPT

## 2020-08-22 PROCEDURE — 87205 SMEAR GRAM STAIN: CPT

## 2020-08-22 PROCEDURE — 85027 COMPLETE CBC AUTOMATED: CPT

## 2020-08-22 PROCEDURE — 36415 COLL VENOUS BLD VENIPUNCTURE: CPT

## 2020-08-22 PROCEDURE — 82436 ASSAY OF URINE CHLORIDE: CPT

## 2020-08-22 PROCEDURE — 2580000003 HC RX 258: Performed by: INTERNAL MEDICINE

## 2020-08-22 PROCEDURE — 2060000000 HC ICU INTERMEDIATE R&B

## 2020-08-22 PROCEDURE — 84300 ASSAY OF URINE SODIUM: CPT

## 2020-08-22 RX ADMIN — MAGNESIUM OXIDE TAB 400 MG (241.3 MG ELEMENTAL MG) 400 MG: 400 (241.3 MG) TAB at 08:41

## 2020-08-22 RX ADMIN — SODIUM CHLORIDE, PRESERVATIVE FREE 10 ML: 5 INJECTION INTRAVENOUS at 08:42

## 2020-08-22 RX ADMIN — INSULIN LISPRO 2 UNITS: 100 INJECTION, SOLUTION INTRAVENOUS; SUBCUTANEOUS at 16:11

## 2020-08-22 RX ADMIN — SODIUM CHLORIDE, PRESERVATIVE FREE 10 ML: 5 INJECTION INTRAVENOUS at 20:25

## 2020-08-22 RX ADMIN — HEPARIN SODIUM 5000 UNITS: 10000 INJECTION INTRAVENOUS; SUBCUTANEOUS at 06:35

## 2020-08-22 RX ADMIN — METOPROLOL SUCCINATE 50 MG: 50 TABLET, EXTENDED RELEASE ORAL at 08:41

## 2020-08-22 RX ADMIN — HEPARIN SODIUM 5000 UNITS: 10000 INJECTION INTRAVENOUS; SUBCUTANEOUS at 14:29

## 2020-08-22 RX ADMIN — HEPARIN SODIUM 5000 UNITS: 10000 INJECTION INTRAVENOUS; SUBCUTANEOUS at 20:24

## 2020-08-22 RX ADMIN — ASPIRIN 81 MG: 81 TABLET, COATED ORAL at 08:41

## 2020-08-22 RX ADMIN — HYDRALAZINE HYDROCHLORIDE 10 MG: 10 TABLET, FILM COATED ORAL at 06:35

## 2020-08-22 RX ADMIN — HYDRALAZINE HYDROCHLORIDE 10 MG: 10 TABLET, FILM COATED ORAL at 14:28

## 2020-08-22 RX ADMIN — HYDRALAZINE HYDROCHLORIDE 10 MG: 10 TABLET, FILM COATED ORAL at 20:24

## 2020-08-22 RX ADMIN — PRAVASTATIN SODIUM 20 MG: 20 TABLET ORAL at 11:40

## 2020-08-22 RX ADMIN — METOPROLOL SUCCINATE 50 MG: 50 TABLET, EXTENDED RELEASE ORAL at 20:24

## 2020-08-22 RX ADMIN — FERROUS SULFATE TAB 325 MG (65 MG ELEMENTAL FE) 325 MG: 325 (65 FE) TAB at 08:41

## 2020-08-22 RX ADMIN — TORSEMIDE 50 MG: 20 TABLET ORAL at 06:34

## 2020-08-22 RX ADMIN — FERROUS SULFATE TAB 325 MG (65 MG ELEMENTAL FE) 325 MG: 325 (65 FE) TAB at 16:11

## 2020-08-22 ASSESSMENT — PAIN SCALES - GENERAL
PAINLEVEL_OUTOF10: 0

## 2020-08-22 NOTE — PLAN OF CARE
Problem: Pain:  Goal: Pain level will decrease  Description: Pain level will decrease  Outcome: Met This Shift     Problem: Pain:  Goal: Control of acute pain  Description: Control of acute pain  Outcome: Met This Shift     Problem: Pain:  Goal: Control of chronic pain  Description: Control of chronic pain  Outcome: Met This Shift     Problem: Falls - Risk of:  Goal: Will remain free from falls  Description: Will remain free from falls  8/22/2020 0129 by Amira Stewart RN  Outcome: Met This Shift     Problem: Falls - Risk of:  Goal: Absence of physical injury  Description: Absence of physical injury  8/22/2020 0129 by Amira Stewart RN  Outcome: Met This Shift     Problem: Skin Integrity:  Goal: Will show no infection signs and symptoms  Description: Will show no infection signs and symptoms  8/22/2020 0129 by Amira Stewart RN  Outcome: Met This Shift     Problem: Skin Integrity:  Goal: Absence of new skin breakdown  Description: Absence of new skin breakdown  8/22/2020 0129 by Amira Stewart RN  Outcome: Met This Shift     Problem: Serum Glucose Level - Abnormal:  Goal: Ability to maintain appropriate glucose levels will improve  Description: Ability to maintain appropriate glucose levels will improve  Outcome: Met This Shift     Problem: Sensory Perception - Impaired:  Goal: Ability to maintain a stable neurologic state will improve  Description: Ability to maintain a stable neurologic state will improve  Outcome: Met This Shift     Problem: ABCDS Injury Assessment  Goal: Absence of physical injury  Outcome: Met This Shift

## 2020-08-22 NOTE — PROGRESS NOTES
[facility] MD Yarely Polo MD Newell Seton, MD Beatrice Scala, MD Cherylle Cheese, CNP   Anu Lux, DAISY  Progress Note    8/22/2020    SUBJECTIVE:   8/17: Denies complaint. Feeling better. (-) sob/grace/cp/palp Appetite ok. No swelling. ROS unremarkable. 8/18: Resting comfortably, reading the news on his iPad. No dyspnea at rest on room air. Peripheral swelling about the same as yesterday. No chest pain or palpitations. 8/19:  Seen this afternoon. Pedal and distal LE swelling. Ongoing fatigure, about the same. (-) sob at rest  8/20: Swelling worse today. Good appetite. No dyspnea at rest on room air.  8/21: Swelling a little bit better. Wheezing is worse today. Did get up and ambulate in his room with assistance but for the most part remains chair-bound   8/22: No new complaint. Still essentially bedbound. Swelling a little bit better than yesterday. Audible wheezing. 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 *         DIET:    DIET CARB CONTROL;   Dietary Nutrition Supplements: Low Calorie High Protein Supplement     MEDS (scheduled):    ferrous sulfate  325 mg Oral BID WC    epoetin mayra-epbx  4,000 Units Subcutaneous Once per day on Mon Wed Fri    magnesium oxide  400 mg Oral Daily    hydrALAZINE  10 mg Oral 3 times per day    aspirin  81 mg Oral QAM    metoprolol succinate  50 mg Oral BID    pravastatin  20 mg Oral Lunch    sodium chloride flush  10 mL Intravenous 2 times per day    heparin (porcine)  5,000 Units Subcutaneous 3 times per day    insulin lispro  0-12 Units Subcutaneous TID WC    insulin lispro  0-6 Units Subcutaneous Nightly       MEDS (infusions):   dextrose         MEDS (prn):  analgesic ointment, sodium chloride flush, acetaminophen **OR** acetaminophen, promethazine **OR** ondansetron, glucose, dextrose, glucagon (rDNA), dextrose    PHYSICAL EXAM:     Patient Vitals for the past 24 hrs:   BP Temp Temp src Pulse Resp SpO2 Weight   08/22/20 1558 -- -- -- -- -- 97 % --   08/22/20 1550 (!) 144/65 98.5 °F (36.9 °C) Oral 67 18 -- --   08/22/20 1428 (!) 162/84 -- -- -- -- -- --   08/22/20 0830 (!) 139/55 97.4 °F (36.3 °C) Oral 69 18 95 % --   08/22/20 0630 (!) 145/69 98 °F (36.7 °C) Oral 66 17 96 % --   08/22/20 0100 (!) 142/71 97.8 °F (36.6 °C) Oral 65 18 95 % 219 lb 3.2 oz (99.4 kg)   08/21/20 2115 (!) 168/73 98.4 °F (36.9 °C) Oral 64 18 94 % --   @      Intake/Output Summary (Last 24 hours) at 8/22/2020 1926  Last data filed at 8/22/2020 1749  Gross per 24 hour   Intake 540 ml   Output 1710 ml   Net -1170 ml         Wt Readings from Last 3 Encounters:   08/22/20 219 lb 3.2 oz (99.4 kg)   01/17/19 170 lb (77.1 kg)   01/12/19 170 lb (77.1 kg)       Constitutional:  in no acute distress  HEENT: NC/AT, EOMI, sclera and conjunctiva are clear and anicteric, mucus membranes moist  Neck: Trachea midline,  Cardiovascular: S1, S2 regular rhythm, no murmur,or rub  Respiratory: Coarse breath sounds,  basilar crackles somewhat worse than yesterday, bilateral upper airway wheezes, none in lower fields.   Gastrointestinal:  Soft, nontender, nondistended, NABS  Ext: 2+ distal lower extremity edema --a little better than yesterday, feet warm  Skin: dry, no rash  Neuro: awake, alert, interactive      DATA:    Recent Labs     08/22/20  0303   WBC 5.4   HGB 8.7*   HCT 28.2*   .1*        Recent Labs     08/20/20  0505 08/21/20  0315 08/22/20  0303    140 137   K 4.9 5.1* 4.9    106 101   CO2 23 22 23   MG 1.8 1.9 1.9   PHOS  --  4.2  --    BUN 61* 68* 77*   CREATININE 3.7* 3. 9* 4.2*       No results found for: LABPROT    ASSESSMENT / RECOMMENDATIONS:       1-Acute kidney injury, hemodynamically-mediated, possibly attributable to 2 RAAS blocker. 2-chronic kidney disesae stage IV baseline cr around 2 with around 2 g proteinuria     3-Hyperkalemia due to #1,#2. Resolved    4-Metabolic acidosis, non-anion gap, secondary acute kidney injury/CKD    5-anemia of chronic disease (CKD) : tsat 50     6-Mineral bone disease : phosph 4.4 no need for binders        -creatinine improved initially with IV fluid resuscitation, though has since climbed back up to a level at which she presented after he started diuretic therapy.     -I suspect the injury he sustained because acute tubular necrosis, and he may be seeing a new baseline Cr for the time being.  -Edema little bit better  -Persistent crackles, and now wheezing      --> Stop diuretic therapy  --> Keep feet up when not using them  --> FERMIN hose once edema improved  --> Continue to hold both lisinopril and irbesartan   --> Follow labs, UO  --> With another increase in creatinine since yesterday, would be prudent to keep another day.   --> Send urine studies      Electronically signed by Tonia Raines MD on 8/22/2020

## 2020-08-22 NOTE — PLAN OF CARE
Problem: Pain:  Goal: Pain level will decrease  Description: Pain level will decrease  8/22/2020 1010 by Hi Wallace RN  Outcome: Met This Shift

## 2020-08-22 NOTE — PROGRESS NOTES
Internal Medicine  Progress Note  Adrian Yancey MD     Subjective:     Patient is alert. Patient reports OK. Tolerating diet well no other c/o. Scheduled Meds:   torsemide  50 mg Oral Daily    ferrous sulfate  325 mg Oral BID WC    epoetin mayra-epbx  4,000 Units Subcutaneous Once per day on Mon Wed Fri    magnesium oxide  400 mg Oral Daily    hydrALAZINE  10 mg Oral 3 times per day    aspirin  81 mg Oral QAM    metoprolol succinate  50 mg Oral BID    pravastatin  20 mg Oral Lunch    sodium chloride flush  10 mL Intravenous 2 times per day    heparin (porcine)  5,000 Units Subcutaneous 3 times per day    insulin lispro  0-12 Units Subcutaneous TID     insulin lispro  0-6 Units Subcutaneous Nightly     Continuous Infusions:   dextrose       PRN Meds:analgesic ointment, sodium chloride flush, acetaminophen **OR** acetaminophen, promethazine **OR** ondansetron, glucose, dextrose, glucagon (rDNA), dextrose    Objective:      Physical Exam:  Vitals:   BP (!) 145/69   Pulse 66   Temp 98 °F (36.7 °C) (Oral)   Resp 17   Ht 5' 6\" (1.676 m)   Wt 219 lb 3.2 oz (99.4 kg)   SpO2 96%   BMI 35.38 kg/m²   I/O's    Intake/Output Summary (Last 24 hours) at 8/22/2020 0720  Last data filed at 8/22/2020 0435  Gross per 24 hour   Intake 420 ml   Output 1750 ml   Net -1330 ml     Exam:  General appearance: alert, appears stated age and cooperative  Head: Normocephalic, without obvious abnormality, atraumatic  Eyes: conjunctivae/corneas clear. PERRL, EOM's intact. Fundi benign. Throat: lips, mucosa, and tongue normal; teeth and gums normal  Neck: no adenopathy, no carotid bruit, no JVD, supple, symmetrical, trachea midline and thyroid not enlarged, symmetric, no tenderness/mass/nodules  Back: symmetric, no curvature. ROM normal. No CVA tenderness.   Lungs: clear to auscultation bilaterally  Chest wall: no tenderness  Heart: regular rate and rhythm  Abdomen: soft, non-tender; bowel sounds normal; no masses,  no CBC    Collection Time: 08/22/20  3:03 AM   Result Value Ref Range    WBC 5.4 4.5 - 11.5 E9/L    RBC 2.79 (L) 3.80 - 5.80 E12/L    Hemoglobin 8.7 (L) 12.5 - 16.5 g/dL    Hematocrit 28.2 (L) 37.0 - 54.0 %    .1 (H) 80.0 - 99.9 fL    MCH 31.2 26.0 - 35.0 pg    MCHC 30.9 (L) 32.0 - 34.5 %    RDW 14.3 11.5 - 15.0 fL    Platelets 915 299 - 887 E9/L    MPV 11.1 7.0 - 12.0 fL   POCT Glucose    Collection Time: 08/22/20  6:31 AM   Result Value Ref Range    Meter Glucose 116 (H) 74 - 99 mg/dL     Assessment:     Active Problems: 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 *      Plan:   Creat up even a little more. ..   Gui Rae  8/22/2020  7:20 AM

## 2020-08-23 LAB
ANION GAP SERPL CALCULATED.3IONS-SCNC: 9 MMOL/L (ref 7–16)
BUN BLDV-MCNC: 81 MG/DL (ref 8–23)
CALCIUM SERPL-MCNC: 8.4 MG/DL (ref 8.6–10.2)
CHLORIDE BLD-SCNC: 105 MMOL/L (ref 98–107)
CO2: 25 MMOL/L (ref 22–29)
CREAT SERPL-MCNC: 4.4 MG/DL (ref 0.7–1.2)
EOSINOPHIL, URINE: 0 % (ref 0–1)
GFR AFRICAN AMERICAN: 15
GFR NON-AFRICAN AMERICAN: 13 ML/MIN/1.73
GLUCOSE BLD-MCNC: 126 MG/DL (ref 74–99)
METER GLUCOSE: 133 MG/DL (ref 74–99)
METER GLUCOSE: 201 MG/DL (ref 74–99)
METER GLUCOSE: 202 MG/DL (ref 74–99)
METER GLUCOSE: 95 MG/DL (ref 74–99)
POTASSIUM SERPL-SCNC: 5.1 MMOL/L (ref 3.5–5)
SODIUM BLD-SCNC: 139 MMOL/L (ref 132–146)

## 2020-08-23 PROCEDURE — 80048 BASIC METABOLIC PNL TOTAL CA: CPT

## 2020-08-23 PROCEDURE — 2580000003 HC RX 258: Performed by: INTERNAL MEDICINE

## 2020-08-23 PROCEDURE — 82962 GLUCOSE BLOOD TEST: CPT

## 2020-08-23 PROCEDURE — 36415 COLL VENOUS BLD VENIPUNCTURE: CPT

## 2020-08-23 PROCEDURE — 6370000000 HC RX 637 (ALT 250 FOR IP): Performed by: INTERNAL MEDICINE

## 2020-08-23 PROCEDURE — 6360000002 HC RX W HCPCS: Performed by: INTERNAL MEDICINE

## 2020-08-23 PROCEDURE — 2060000000 HC ICU INTERMEDIATE R&B

## 2020-08-23 RX ORDER — SODIUM CHLORIDE 9 MG/ML
INJECTION, SOLUTION INTRAVENOUS CONTINUOUS
Status: DISCONTINUED | OUTPATIENT
Start: 2020-08-23 | End: 2020-08-27

## 2020-08-23 RX ADMIN — METOPROLOL SUCCINATE 50 MG: 50 TABLET, EXTENDED RELEASE ORAL at 08:39

## 2020-08-23 RX ADMIN — MAGNESIUM OXIDE TAB 400 MG (241.3 MG ELEMENTAL MG) 400 MG: 400 (241.3 MG) TAB at 08:39

## 2020-08-23 RX ADMIN — HYDRALAZINE HYDROCHLORIDE 10 MG: 10 TABLET, FILM COATED ORAL at 20:18

## 2020-08-23 RX ADMIN — SODIUM CHLORIDE: 9 INJECTION, SOLUTION INTRAVENOUS at 16:41

## 2020-08-23 RX ADMIN — ASPIRIN 81 MG: 81 TABLET, COATED ORAL at 08:39

## 2020-08-23 RX ADMIN — INSULIN LISPRO 2 UNITS: 100 INJECTION, SOLUTION INTRAVENOUS; SUBCUTANEOUS at 21:30

## 2020-08-23 RX ADMIN — HEPARIN SODIUM 5000 UNITS: 10000 INJECTION INTRAVENOUS; SUBCUTANEOUS at 06:04

## 2020-08-23 RX ADMIN — HEPARIN SODIUM 5000 UNITS: 10000 INJECTION INTRAVENOUS; SUBCUTANEOUS at 13:43

## 2020-08-23 RX ADMIN — INSULIN LISPRO 4 UNITS: 100 INJECTION, SOLUTION INTRAVENOUS; SUBCUTANEOUS at 11:06

## 2020-08-23 RX ADMIN — METOPROLOL SUCCINATE 50 MG: 50 TABLET, EXTENDED RELEASE ORAL at 20:18

## 2020-08-23 RX ADMIN — FERROUS SULFATE TAB 325 MG (65 MG ELEMENTAL FE) 325 MG: 325 (65 FE) TAB at 16:02

## 2020-08-23 RX ADMIN — HYDRALAZINE HYDROCHLORIDE 10 MG: 10 TABLET, FILM COATED ORAL at 13:43

## 2020-08-23 RX ADMIN — SODIUM CHLORIDE, PRESERVATIVE FREE 10 ML: 5 INJECTION INTRAVENOUS at 08:39

## 2020-08-23 RX ADMIN — HEPARIN SODIUM 5000 UNITS: 10000 INJECTION INTRAVENOUS; SUBCUTANEOUS at 20:18

## 2020-08-23 RX ADMIN — HYDRALAZINE HYDROCHLORIDE 10 MG: 10 TABLET, FILM COATED ORAL at 06:04

## 2020-08-23 RX ADMIN — FERROUS SULFATE TAB 325 MG (65 MG ELEMENTAL FE) 325 MG: 325 (65 FE) TAB at 08:39

## 2020-08-23 RX ADMIN — PRAVASTATIN SODIUM 20 MG: 20 TABLET ORAL at 11:07

## 2020-08-23 ASSESSMENT — PAIN SCALES - GENERAL
PAINLEVEL_OUTOF10: 0

## 2020-08-23 NOTE — PROGRESS NOTES
[facility] MD Sania Winston MD Genaro Meager, MD Brynn Chapman, MD Marily Ouch, KARAN Lux, DAISY  Progress Note    8/23/2020    SUBJECTIVE:   8/17: Denies complaint. Feeling better. (-) sob/grace/cp/palp Appetite ok. No swelling. ROS unremarkable. 8/18: Resting comfortably, reading the news on his iPad. No dyspnea at rest on room air. Peripheral swelling about the same as yesterday. No chest pain or palpitations. 8/19:  Seen this afternoon. Pedal and distal LE swelling. Ongoing fatigure, about the same. (-) sob at rest  8/20: Swelling worse today. Good appetite. No dyspnea at rest on room air.  8/21: Swelling a little bit better. Wheezing is worse today. Did get up and ambulate in his room with assistance but for the most part remains chair-bound   8/22: No new complaint. Still essentially bedbound. Swelling a little bit better than yesterday. Audible wheezing. Good appetite good intake  8/23: Status quo, though remains in good spirits. Denies dyspnea at rest.  Wheezing a little better today. Appetite remains good. Ongoing fatigue, generalized weakness. Swelling a little better than yesterday. 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 *         DIET:    DIET CARB CONTROL;   Dietary Nutrition Supplements: Low Calorie High Protein Supplement     MEDS (scheduled):    ferrous sulfate  325 mg Oral BID WC    epoetin mayra-epbx  4,000 Units Subcutaneous Once per day on Mon Wed Fri    magnesium oxide  400 mg Oral Daily    hydrALAZINE  10 mg Oral 3 times per day    aspirin  81 mg Oral QAM    metoprolol succinate  50 Labs     08/21/20  0315 08/22/20  0303 08/23/20  0420    137 139   K 5.1* 4.9 5.1*    101 105   CO2 22 23 25   MG 1.9 1.9  --    PHOS 4.2  --   --    BUN 68* 77* 81*   CREATININE 3.9* 4.2* 4.4*       Lab Results   Component Value Date    LABPROT 7.6 (H) 08/22/2020    LABPROT 7.6 08/22/2020       ASSESSMENT / RECOMMENDATIONS:       1-Acute kidney injury, hemodynamically-mediated, possibly attributable to 2 RAAS blocker. 2-chronic kidney disesae stage IV baseline cr around 2 with around 2 g proteinuria     3-Hyperkalemia due to #1,#2. Resolved    4-Metabolic acidosis, non-anion gap, secondary acute kidney injury/CKD    5-anemia of chronic disease (CKD) : tsat 50     6-Mineral bone disease : phosph 4.4 no need for binders        -creatinine improved initially with IV fluid resuscitation, though has since climbed back up to a level at which he presented after he started diuretic therapy.     -I suspect the injury he sustained was acute tubular necrosis, and he may be seeing a new baseline Cr for the time being.  -Edema little bit better  -Persistent crackles, and now wheezing      --> NS at conservative rate  --> Continue to hold diuretic therapy  --> Keep feet up when not using them  --> FERMIN hose once edema improved  --> Continue to hold both lisinopril and irbesartan   --> Follow labs, UO    Long discussion with Mr. Darya Remy and his niece re: if renal dysfunction progresses, may be looking at renal failure, and the need for him to make a decision between initiating dialysis, and dying. He has a sister who undergoes hemodialysis 3 days/week. Discussed dialysis therapy itself, answer questions. It may be that current renal function will improve over time, but at this time no way to know that with certainty. He will consider what we discussed today, and I will revisit the issue with him tomorrow morning.       Electronically signed by Adolph Hron MD on 8/23/2020

## 2020-08-23 NOTE — PROGRESS NOTES
Internal Medicine  Progress Note  Star Martinez MD     Subjective:     Patient is asleep. Tolerating diet well no other c/o. Scheduled Meds:   ferrous sulfate  325 mg Oral BID WC    epoetin mayra-epbx  4,000 Units Subcutaneous Once per day on Mon Wed Fri    magnesium oxide  400 mg Oral Daily    hydrALAZINE  10 mg Oral 3 times per day    aspirin  81 mg Oral QAM    metoprolol succinate  50 mg Oral BID    pravastatin  20 mg Oral Lunch    sodium chloride flush  10 mL Intravenous 2 times per day    heparin (porcine)  5,000 Units Subcutaneous 3 times per day    insulin lispro  0-12 Units Subcutaneous TID     insulin lispro  0-6 Units Subcutaneous Nightly     Continuous Infusions:   dextrose       PRN Meds:analgesic ointment, sodium chloride flush, acetaminophen **OR** acetaminophen, promethazine **OR** ondansetron, glucose, dextrose, glucagon (rDNA), dextrose    Objective:      Physical Exam:  Vitals:   BP (!) 153/74   Pulse 65   Temp 97.9 °F (36.6 °C) (Oral)   Resp 16   Ht 5' 6\" (1.676 m)   Wt 213 lb (96.6 kg)   SpO2 95%   BMI 34.38 kg/m²   I/O's    Intake/Output Summary (Last 24 hours) at 8/23/2020 0598  Last data filed at 8/23/2020 0210  Gross per 24 hour   Intake 590 ml   Output 1160 ml   Net -570 ml     Exam:  General appearance: appears stated age and no distress  Head: Normocephalic, without obvious abnormality, atraumatic  Eyes: conjunctivae/corneas clear. PERRL, EOM's intact. Fundi benign.   Throat: lips, mucosa, and tongue normal; teeth and gums normal  Neck: no adenopathy, no carotid bruit, no JVD and supple, symmetrical, trachea midline  Back: negative  Lungs: clear to auscultation bilaterally  Chest wall: no tenderness  Heart: regular rate and rhythm  Abdomen: soft, non-tender; bowel sounds normal; no masses,  no organomegaly  Extremities: L sided hemiplegia  Pulses: 2+ and symmetric  Skin: Skin color, texture, turgor normal. No rashes or lesions  Lymph nodes: Cervical, supraclavicular, and axillary nodes normal.  Neurologic: Grossly normal      Imaging     Chest  Xray:  No results found for this or any previous visit. Results for orders placed during the hospital encounter of 20   XR CHEST PORTABLE    Narrative Patient MRN: [id number]  : 1931  Age:  80 years  Gender: Male  Order Date: 2020 4:50 PM  Exam: XR CHEST PORTABLE  Number of Images: 1 view  Indication:   Shortness of breath   Shortness of breath  Comparison: 2019    Findings: The heart is enlarged. The lung fields demonstrate no significant pulmonary vascular  congestion or edema. The aorta is tortuous ectatic and calcified. .      Impression Cardiomegaly  Findings compatible with atherosclerotic disease of the aorta.                  Additional Imaging:  npne    Lab Review   Recent Results (from the past 24 hour(s))   POCT Glucose    Collection Time: 20  6:31 AM   Result Value Ref Range    Meter Glucose 116 (H) 74 - 99 mg/dL   POCT Glucose    Collection Time: 20 10:40 AM   Result Value Ref Range    Meter Glucose 125 (H) 74 - 99 mg/dL   POCT Glucose    Collection Time: 20  3:56 PM   Result Value Ref Range    Meter Glucose 177 (H) 74 - 99 mg/dL   POCT Glucose    Collection Time: 20  8:24 PM   Result Value Ref Range    Meter Glucose 130 (H) 74 - 99 mg/dL   Protein / creatinine ratio, urine    Collection Time: 20  8:45 PM   Result Value Ref Range    Protein, Ur 189 (H) 0 - 12 mg/dL    Creatinine, Ur 25 (L) 40 - 278 mg/dL    Protein/Creat Ratio 7.6 (H) 0.0 - 0.2    Protein/Creat Ratio 7.6    Sodium, urine, random    Collection Time: 20  8:45 PM   Result Value Ref Range    Sodium, Ur 114 Not Established mmol/L   Chloride, Random Urine    Collection Time: 20  8:45 PM   Result Value Ref Range    Chloride 105 Not Established mmol/L   Basic metabolic panel    Collection Time: 20  4:20 AM   Result Value Ref Range    Sodium 139 132 - 146 mmol/L    Potassium 5.1

## 2020-08-24 LAB
ALBUMIN SERPL-MCNC: 2.8 G/DL (ref 3.5–5.2)
ALP BLD-CCNC: 99 U/L (ref 40–129)
ALT SERPL-CCNC: 25 U/L (ref 0–40)
ANION GAP SERPL CALCULATED.3IONS-SCNC: 12 MMOL/L (ref 7–16)
AST SERPL-CCNC: 19 U/L (ref 0–39)
BILIRUB SERPL-MCNC: 0.4 MG/DL (ref 0–1.2)
BUN BLDV-MCNC: 81 MG/DL (ref 8–23)
CALCIUM SERPL-MCNC: 8.2 MG/DL (ref 8.6–10.2)
CHLORIDE BLD-SCNC: 100 MMOL/L (ref 98–107)
CO2: 24 MMOL/L (ref 22–29)
CREAT SERPL-MCNC: 4.3 MG/DL (ref 0.7–1.2)
FERRITIN: 98 NG/ML
GFR AFRICAN AMERICAN: 16
GFR NON-AFRICAN AMERICAN: 13 ML/MIN/1.73
GLUCOSE BLD-MCNC: 105 MG/DL (ref 74–99)
HCT VFR BLD CALC: 27.1 % (ref 37–54)
HEMOGLOBIN: 8.5 G/DL (ref 12.5–16.5)
IRON SATURATION: 29 % (ref 20–55)
IRON: 52 MCG/DL (ref 59–158)
MAGNESIUM: 1.8 MG/DL (ref 1.6–2.6)
MCH RBC QN AUTO: 31.5 PG (ref 26–35)
MCHC RBC AUTO-ENTMCNC: 31.4 % (ref 32–34.5)
MCV RBC AUTO: 100.4 FL (ref 80–99.9)
METER GLUCOSE: 116 MG/DL (ref 74–99)
METER GLUCOSE: 150 MG/DL (ref 74–99)
METER GLUCOSE: 152 MG/DL (ref 74–99)
METER GLUCOSE: 176 MG/DL (ref 74–99)
PDW BLD-RTO: 14.4 FL (ref 11.5–15)
PHOSPHORUS: 4.3 MG/DL (ref 2.5–4.5)
PLATELET # BLD: 151 E9/L (ref 130–450)
PMV BLD AUTO: 10.3 FL (ref 7–12)
POTASSIUM SERPL-SCNC: 5.2 MMOL/L (ref 3.5–5)
RBC # BLD: 2.7 E12/L (ref 3.8–5.8)
SODIUM BLD-SCNC: 136 MMOL/L (ref 132–146)
TOTAL IRON BINDING CAPACITY: 177 MCG/DL (ref 250–450)
TOTAL PROTEIN: 5.5 G/DL (ref 6.4–8.3)
WBC # BLD: 4.5 E9/L (ref 4.5–11.5)

## 2020-08-24 PROCEDURE — 6370000000 HC RX 637 (ALT 250 FOR IP): Performed by: INTERNAL MEDICINE

## 2020-08-24 PROCEDURE — 85027 COMPLETE CBC AUTOMATED: CPT

## 2020-08-24 PROCEDURE — 97535 SELF CARE MNGMENT TRAINING: CPT

## 2020-08-24 PROCEDURE — 97530 THERAPEUTIC ACTIVITIES: CPT

## 2020-08-24 PROCEDURE — 80053 COMPREHEN METABOLIC PANEL: CPT

## 2020-08-24 PROCEDURE — 2060000000 HC ICU INTERMEDIATE R&B

## 2020-08-24 PROCEDURE — U0003 INFECTIOUS AGENT DETECTION BY NUCLEIC ACID (DNA OR RNA); SEVERE ACUTE RESPIRATORY SYNDROME CORONAVIRUS 2 (SARS-COV-2) (CORONAVIRUS DISEASE [COVID-19]), AMPLIFIED PROBE TECHNIQUE, MAKING USE OF HIGH THROUGHPUT TECHNOLOGIES AS DESCRIBED BY CMS-2020-01-R: HCPCS

## 2020-08-24 PROCEDURE — 2580000003 HC RX 258: Performed by: INTERNAL MEDICINE

## 2020-08-24 PROCEDURE — 36415 COLL VENOUS BLD VENIPUNCTURE: CPT

## 2020-08-24 PROCEDURE — 6360000002 HC RX W HCPCS: Performed by: INTERNAL MEDICINE

## 2020-08-24 PROCEDURE — 82962 GLUCOSE BLOOD TEST: CPT

## 2020-08-24 PROCEDURE — 83540 ASSAY OF IRON: CPT

## 2020-08-24 PROCEDURE — 82728 ASSAY OF FERRITIN: CPT

## 2020-08-24 PROCEDURE — 83550 IRON BINDING TEST: CPT

## 2020-08-24 PROCEDURE — 83735 ASSAY OF MAGNESIUM: CPT

## 2020-08-24 PROCEDURE — 84100 ASSAY OF PHOSPHORUS: CPT

## 2020-08-24 RX ORDER — POLYVINYL ALCOHOL 14 MG/ML
1 SOLUTION/ DROPS OPHTHALMIC PRN
Status: DISCONTINUED | OUTPATIENT
Start: 2020-08-24 | End: 2020-08-25 | Stop reason: RX

## 2020-08-24 RX ADMIN — HEPARIN SODIUM 5000 UNITS: 10000 INJECTION INTRAVENOUS; SUBCUTANEOUS at 14:37

## 2020-08-24 RX ADMIN — MAGNESIUM OXIDE TAB 400 MG (241.3 MG ELEMENTAL MG) 400 MG: 400 (241.3 MG) TAB at 08:58

## 2020-08-24 RX ADMIN — FERROUS SULFATE TAB 325 MG (65 MG ELEMENTAL FE) 325 MG: 325 (65 FE) TAB at 08:58

## 2020-08-24 RX ADMIN — PRAVASTATIN SODIUM 20 MG: 20 TABLET ORAL at 11:19

## 2020-08-24 RX ADMIN — INSULIN LISPRO 1 UNITS: 100 INJECTION, SOLUTION INTRAVENOUS; SUBCUTANEOUS at 21:00

## 2020-08-24 RX ADMIN — HEPARIN SODIUM 5000 UNITS: 10000 INJECTION INTRAVENOUS; SUBCUTANEOUS at 05:53

## 2020-08-24 RX ADMIN — INSULIN LISPRO 2 UNITS: 100 INJECTION, SOLUTION INTRAVENOUS; SUBCUTANEOUS at 11:19

## 2020-08-24 RX ADMIN — SODIUM CHLORIDE: 9 INJECTION, SOLUTION INTRAVENOUS at 12:36

## 2020-08-24 RX ADMIN — HYDRALAZINE HYDROCHLORIDE 10 MG: 10 TABLET, FILM COATED ORAL at 14:37

## 2020-08-24 RX ADMIN — INSULIN LISPRO 2 UNITS: 100 INJECTION, SOLUTION INTRAVENOUS; SUBCUTANEOUS at 17:09

## 2020-08-24 RX ADMIN — HYDRALAZINE HYDROCHLORIDE 10 MG: 10 TABLET, FILM COATED ORAL at 21:00

## 2020-08-24 RX ADMIN — HYDRALAZINE HYDROCHLORIDE 10 MG: 10 TABLET, FILM COATED ORAL at 05:53

## 2020-08-24 RX ADMIN — METOPROLOL SUCCINATE 50 MG: 50 TABLET, EXTENDED RELEASE ORAL at 08:58

## 2020-08-24 RX ADMIN — METOPROLOL SUCCINATE 50 MG: 50 TABLET, EXTENDED RELEASE ORAL at 21:00

## 2020-08-24 RX ADMIN — MENTHOL AND METHYL SALICYLATE: 7.6; 29 OINTMENT TOPICAL at 04:29

## 2020-08-24 RX ADMIN — FERROUS SULFATE TAB 325 MG (65 MG ELEMENTAL FE) 325 MG: 325 (65 FE) TAB at 17:09

## 2020-08-24 RX ADMIN — ASPIRIN 81 MG: 81 TABLET, COATED ORAL at 08:58

## 2020-08-24 RX ADMIN — EPOETIN ALFA-EPBX 4000 UNITS: 4000 INJECTION, SOLUTION INTRAVENOUS; SUBCUTANEOUS at 08:58

## 2020-08-24 RX ADMIN — HEPARIN SODIUM 5000 UNITS: 10000 INJECTION INTRAVENOUS; SUBCUTANEOUS at 21:00

## 2020-08-24 ASSESSMENT — PAIN SCALES - GENERAL
PAINLEVEL_OUTOF10: 0
PAINLEVEL_OUTOF10: 0

## 2020-08-24 NOTE — PROGRESS NOTES
[facility] MD Adebayo Woods, MD Christiano Ordonez, MD Mike Mujica, CNP   Anu Lux, DAISY  Progress Note    8/24/2020    SUBJECTIVE:   8/17: Denies complaint. Feeling better. (-) sob/grace/cp/palp Appetite ok. No swelling. ROS unremarkable. 8/18: Resting comfortably, reading the news on his iPad. No dyspnea at rest on room air. Peripheral swelling about the same as yesterday. No chest pain or palpitations. 8/19:  Seen this afternoon. Pedal and distal LE swelling. Ongoing fatigure, about the same. (-) sob at rest  8/20: Swelling worse today. Good appetite. No dyspnea at rest on room air.  8/21: Swelling a little bit better. Wheezing is worse today. Did get up and ambulate in his room with assistance but for the most part remains chair-bound   8/22: No new complaint. Still essentially bedbound. Swelling a little bit better than yesterday. Audible wheezing. Good appetite good intake  8/23: Status quo, though remains in good spirits. Denies dyspnea at rest.  Wheezing a little better today. Appetite remains good. Ongoing fatigue, generalized weakness. Swelling a little better than yesterday. 8/24: Feeling better. Appetite good. Not drinking as much Ensure. Swelling a little better. Otherwise status quo. No new complaint. 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 *         DIET:    DIET CARB CONTROL;   Dietary Nutrition Supplements: Low Calorie High Protein Supplement     MEDS (scheduled):    ferrous sulfate  325 mg Oral BID WC    epoetin mayra-epbx  4,000 Units Subcutaneous Once per day on Mon Wed Fri    magnesium oxide  400 mg Oral Daily    hydrALAZINE  10 mg Oral 3 times per day    aspirin  81 mg Oral QAM    metoprolol succinate  50 mg Oral BID    pravastatin  20 mg Oral Lunch    sodium chloride flush  10 mL Intravenous 2 times per day    heparin (porcine)  5,000 Units Subcutaneous 3 times per day    insulin lispro  0-12 Units Subcutaneous TID WC    insulin lispro  0-6 Units Subcutaneous Nightly       MEDS (infusions):   sodium chloride 65 mL/hr at 08/24/20 1236    dextrose         MEDS (prn):  polyvinyl alcohol, analgesic ointment, sodium chloride flush, acetaminophen **OR** acetaminophen, promethazine **OR** ondansetron, glucose, dextrose, glucagon (rDNA), dextrose    PHYSICAL EXAM:     Patient Vitals for the past 24 hrs:   BP Temp Temp src Pulse Resp SpO2 Weight   08/24/20 1537 (!) 160/81 97.5 °F (36.4 °C) Oral 64 18 -- --   08/24/20 0855 (!) 140/66 97.9 °F (36.6 °C) Oral 69 20 96 % --   08/24/20 0103 -- -- -- -- -- -- 210 lb 11.2 oz (95.6 kg)   08/23/20 2315 (!) 152/69 97.6 °F (36.4 °C) Oral 62 16 95 % --   08/23/20 2015 (!) 170/77 97.7 °F (36.5 °C) Oral 67 16 98 % --   @      Intake/Output Summary (Last 24 hours) at 8/24/2020 1551  Last data filed at 8/24/2020 1421  Gross per 24 hour   Intake 1361 ml   Output 1050 ml   Net 311 ml         Wt Readings from Last 3 Encounters:   08/24/20 210 lb 11.2 oz (95.6 kg)   01/17/19 170 lb (77.1 kg)   01/12/19 170 lb (77.1 kg)       Constitutional:  in no acute distress  HEENT: NC/AT, EOMI, sclera and conjunctiva are clear and anicteric, mucus membranes moist  Neck: Trachea midline,  Cardiovascular: S1, S2 regular rhythm, no murmur,or rub  Respiratory: Coarse breath sounds,  basilar crackles somewhat worse than yesterday, bilateral upper airway wheezes, none in lower fields.   Gastrointestinal:  Soft, nontender, nondistended, NABS  Ext: 1-2+ distal lower extremity edema --a little better than yesterday, feet warm  Skin: dry, no rash  Neuro: awake, alert, interactive      DATA:    Recent Labs     08/22/20  0303 08/24/20  0430   WBC 5.4 4.5   HGB 8.7* 8.5*   HCT 28.2* 27.1*   .1* 100.4*    151     Recent Labs     08/22/20  0303 08/23/20  0420 08/24/20  0430    139 136   K 4.9 5.1* 5.2*    105 100   CO2 23 25 24   MG 1.9  --  1.8   PHOS  --   --  4.3   BUN 77* 81* 81*   CREATININE 4.2* 4.4* 4.3*   ALT  --   --  25   AST  --   --  19   BILITOT  --   --  0.4   ALKPHOS  --   --  99       Lab Results   Component Value Date    LABPROT 7.6 (H) 08/22/2020    LABPROT 7.6 08/22/2020       ASSESSMENT / RECOMMENDATIONS:       1-Acute kidney injury, hemodynamically-mediated, possibly attributable to 2 RAAS blocker. 2-chronic kidney disesae stage IV baseline cr around 2 with around 2 g proteinuria     3-Hyperkalemia due to #1,#2. Resolved    4-Metabolic acidosis, non-anion gap, secondary acute kidney injury/CKD    5-anemia of chronic disease (CKD) : tsat 50     6-Mineral bone disease : phosph 4.4 no need for binders        -creatinine improved initially with IV fluid resuscitation, though has since climbed back up to a level at which he presented after he started diuretic therapy.     -I suspect the injury he sustained was acute tubular necrosis, and he may be seeing a new baseline Cr for the time being.  -Edema little bit better  -Persistent crackles, and now wheezing      --> Continue NS at conservative rate  --> Continue to hold diuretic therapy  --> Keep feet up when not using them  --> FERMIN hose once edema improved  --> Continue to hold both lisinopril and irbesartan   --> Follow labs, UO    Long discussion with Mr. Harjeet Mott and his niece yesterday re: if renal dysfunction progresses, may be looking at renal failure, and the need for him to make a decision between initiating dialysis, and dying. He has a sister who undergoes hemodialysis 3 days/week. Discussed dialysis therapy itself, answer questions.   It may be that current renal function will improve over time, but at this time no way to know that with certainty.   No need to start dialysis at this point, though we will see how things go in the next day or so      Electronically signed by Nathaniel Turner MD on 8/24/2020

## 2020-08-24 NOTE — PROGRESS NOTES
Occupational Therapy  OT BEDSIDE TREATMENT NOTE      Date:2020  Patient Name: Herson Henley  MRN: [id number]  : 1931  Room: [address] [place] [address]     Referring Provider: Bronwyn Yee MD   Evaluating OT: Figueroa Carlos. Zeferinomarlonia, OTR/L - [id number]     AM-PAC Daily Activity Raw Score: 13      Recommended Adaptive Equipment: Continue to assess.      Diagnosis: Acute kidney injury (ZAHRAA) with acute tubular necrosis (ATN) (HCC) [N17.0], Acute kidney failure (HCC) [N17.9]      Pertinent Medical History: COPD, CKD, CAD, DM, CVA, HTN, TIA, arthritis, neuropathy      Precautions: falls, R-sided weakness     Home Living: Patient lives alone in a one-floor home. Bathroom Setup: tub shower (with extended tub bench, handheld shower head, and grab bars available) - patient showers twice per week and sponge bathes the other days  Equipment Owned: quad cane, extended tub bench, wheelchair     Prior Level of Function (PLOF): Per patient, he needed assistance with most ADLs and IADLs, but was independent with functional transfers/mobility (with use of quad cane for short distances only - wheelchair used mostly) prior to this hospitalization. Patient noted that he has assistance from aides twice daily; one aide assists with morning ADLs (including a shower or sponge bath) and breakfast (2-3 hours) and another with evening ADLs and dinner. Patient reported that family members provide assistance with preparation of lunch and other IADLs, as needed, during the day.     Pain Level: pt did not complain of pain   Cognition: Patient alert and grossly oriented.               Judgement/Safety: limited      Functional Assessment:    Initial Eval Status  Date: 2020 Treatment Status    Short Term Goals  Treatment Frequency/Duration: 2-5x/week for 3-7 days PRN   Feeding SBA   Setup   Grooming Mod A   Min A  (seated)   UB Dressing Mod A Min A to don gown as a robe.   Min A   LB Dressing Max A  max A to don AFO and shoe Mod A - with use of AE, as needed/appropriate   Bathing Max A   Mod A - with use of AE/DME, as needed/appropriate   Toileting Max A  max A  Toilet hygiene after sitting on BSC. Min A   Bed Mobility  Not assessed.        Functional Transfers Sit-to-Stand: Mod A   from bedside chair Mod A sit to stand. Cues for safety technique  Min A   Functional Mobility Not assessed. Min A using lawrence cane.    SBA - with use of device, as needed/appropriate   Balance Sitting: Good-  (at edge of chair)  Standing: Fair- Min  A static stand balance during ADL.   Fair+ dynamic standing balance during completion of ADLs and other functional tasks. Activity Tolerance Fair Fair - Patient will demonstrate Good understanding and consistent implementation of energy conservation techniques and work simplification techniques into ADL routines. B UE Strength R shoulder: 2-/5 grossly  Distal R UE: 3+/5  L UE: 4-/5 grossly   Patient will demonstrate 4+/5 L UE strength in order to maximize independence with ADLs and functional transfers. Comments:  Pt pleasant and cooperative. Pt remained in chair at end of the session with alarm activated. Education/treatment:  ADL retraining with facilitation of movement to increase self care. Therapeutic activity to address balance, strength, and endurance for ADL and transfers. Pt education of transfer safety, cane safety, and daily orientation. · Pt has made limited  progress towards set goals.    · Continue with current plan of care        Treatment Charges: Mins Units    ADL/Home Mgt [id number]    Thera Activities [id number]     Ther Ex 13945      Manual Therapy 63040      Neuro Re-ed 34444      Orthotic manage/training  28005      Non Billable Time      Total Timed Treatment 17 200 [facility]/ [id number]

## 2020-08-24 NOTE — PATIENT CARE CONFERENCE
[facility] Quality Flow/Interdisciplinary Rounds Progress Note        Quality Flow Rounds held on August 24, 2020    Disciplines Attending:  Bedside Nurse, ,  and Nursing Unit Leadership  Cheryl Sharma was admitted on 8/14/2020  3:50 PM    Anticipated Discharge Date:  Expected Discharge Date: 08/20/20    Disposition:    Kristian Score:  Kristian Scale Score: 15    Readmission Score:         Discussed patient goal for the day, patient clinical progression, and barriers to discharge.   The following Goal(s) of the Day/Commitment(s) have been identified:  Continue to monitor labs,       Lala Found  August 24, 2020

## 2020-08-24 NOTE — PROGRESS NOTES
Physical Therapy  Facility/Department: [address] INTERMEDIATE 1  Daily Treatment Note  NAME: Ilan Gonzalez  : 1931  MRN: [id number]    Date of Service: 2020    Patient Diagnosis(es): The primary encounter diagnosis was Acute kidney injury [facility]). A diagnosis of Anemia, unspecified type was also pertinent to this visit. has a past medical history of Arthritis, CAD (coronary artery disease), Chronic kidney disease, COPD (chronic obstructive pulmonary disease) ([address] Decubitus ulcer of sacral region, stage 1, Diabetes mellitus ([address] Diabetic peripheral neuropathy ([address] History of CVA (cerebrovascular accident), Hypertension, Other disorders of kidney and ureter, Pain in lower limb, PVD (peripheral vascular disease) with claudication ([facility] Right sided weakness, TIA (transient ischemic attack), and Unspecified cerebral artery occlusion with cerebral infarction. has a past surgical history that includes Abdominal hernia repair; Cataract removal; Dental surgery; Inguinal hernia repair; Cholecystectomy (2011); Inner ear surgery; and eye surgery. Evaluating Therapist: Kelley Gray, PT      Referring Provider:  Dr. Verónica Hook     Room #: 433   DIAGNOSIS:  ZAHRAA   Additional Pertinent History: R hemiparesis due to CVA   [address]     Social:  Pt lives alone  in a  1  floor plan 2+1  steps to enter. Stair glide to basement. Has HHA daily   Prior to admission pt walked with  QC  For short distances on;y. W/c for longer. Pt reports able to self propel        Initial Evaluation  Date: 2020 Treatment  20    Short Term/ Long Term   Goals   Was pt agreeable to Eval/treatment?  Yes  yes      Does pt have pain?  denies No c/o pain      Bed Mobility  Rolling:  SBA to L   Supine to sit:  Min assist   Sit to supine: NT   Scooting:  Min assist in sit    NT SBA    Transfers Sit to stand:  Mod assist   Stand to sit: min assist   Stand pivot: mod assist   sit <> stand mod assist  SBA    Ambulation

## 2020-08-24 NOTE — CARE COORDINATION
Social Work/Discharge Planning:  Met with patient and confirmed plan remains to go to [facility] at discharge. Patient does not require a pre-cert. Called liaison Karla Lazar from [facility] and confirmed patient will need another COVID test.  Patient COVID negative was on 8/17. Notified RN and send out COVID to be completed. Will continue to follow.   Electronically signed by SCOTT Molina on 8/24/2020 at 10:12 AM

## 2020-08-24 NOTE — PROGRESS NOTES
Internal Medicine  Progress Note  Sandra Chan MD     Subjective:     Patient is alert. Patient reports OK. Tolerating diet well no other c/o. Scheduled Meds:   ferrous sulfate  325 mg Oral BID WC    epoetin mayra-epbx  4,000 Units Subcutaneous Once per day on Mon Wed Fri    magnesium oxide  400 mg Oral Daily    hydrALAZINE  10 mg Oral 3 times per day    aspirin  81 mg Oral QAM    metoprolol succinate  50 mg Oral BID    pravastatin  20 mg Oral Lunch    sodium chloride flush  10 mL Intravenous 2 times per day    heparin (porcine)  5,000 Units Subcutaneous 3 times per day    insulin lispro  0-12 Units Subcutaneous TID     insulin lispro  0-6 Units Subcutaneous Nightly     Continuous Infusions:   sodium chloride 65 mL/hr at 08/23/20 1641    dextrose       PRN Meds:analgesic ointment, sodium chloride flush, acetaminophen **OR** acetaminophen, promethazine **OR** ondansetron, glucose, dextrose, glucagon (rDNA), dextrose    Objective:      Physical Exam:  Vitals:   BP (!) 152/69   Pulse 62   Temp 97.6 °F (36.4 °C) (Oral)   Resp 16   Ht 5' 6\" (1.676 m)   Wt 210 lb 11.2 oz (95.6 kg)   SpO2 95%   BMI 34.01 kg/m²   I/O's    Intake/Output Summary (Last 24 hours) at 8/24/2020 0617  Last data filed at 8/24/2020 0550  Gross per 24 hour   Intake 941 ml   Output 1050 ml   Net -109 ml     Exam:  General appearance: alert, appears stated age and cooperative  Head: Normocephalic, without obvious abnormality, atraumatic  Eyes: conjunctivae/corneas clear. PERRL, EOM's intact. Fundi benign.   Throat: lips, mucosa, and tongue normal; teeth and gums normal  Neck: no adenopathy, no carotid bruit, no JVD and supple, symmetrical, trachea midline  Back: negative  Lungs: clear to auscultation bilaterally  Chest wall: no tenderness  Heart: regular rate and rhythm, S1, S2 normal, no murmur, click, rub or gallop  Abdomen: soft, non-tender; bowel sounds normal; no masses,  no organomegaly  Extremities: extremities normal, atraumatic, no cyanosis or edema  Pulses: 2+ and symmetric  Skin: Skin color, texture, turgor normal. No rashes or lesions  Lymph nodes: Cervical, supraclavicular, and axillary nodes normal.  Neurologic: L sided hemiplegia      Imaging     Chest  Xray:  No results found for this or any previous visit. Results for orders placed during the hospital encounter of 20   XR CHEST PORTABLE    Narrative Patient MRN: [id number]  : 1931  Age:  80 years  Gender: Male  Order Date: 2020 4:50 PM  Exam: XR CHEST PORTABLE  Number of Images: 1 view  Indication:   Shortness of breath   Shortness of breath  Comparison: 2019    Findings: The heart is enlarged. The lung fields demonstrate no significant pulmonary vascular  congestion or edema. The aorta is tortuous ectatic and calcified. .      Impression Cardiomegaly  Findings compatible with atherosclerotic disease of the aorta.                  Additional Imaging:  none    Lab Review   Recent Results (from the past 24 hour(s))   POCT Glucose    Collection Time: 20 11:05 AM   Result Value Ref Range    Meter Glucose 202 (H) 74 - 99 mg/dL   POCT Glucose    Collection Time: 20  4:01 PM   Result Value Ref Range    Meter Glucose 95 74 - 99 mg/dL   POCT Glucose    Collection Time: 20  8:15 PM   Result Value Ref Range    Meter Glucose 201 (H) 74 - 99 mg/dL   Comprehensive Metabolic Panel    Collection Time: 20  4:30 AM   Result Value Ref Range    Sodium 136 132 - 146 mmol/L    Potassium 5.2 (H) 3.5 - 5.0 mmol/L    Chloride 100 98 - 107 mmol/L    CO2 24 22 - 29 mmol/L    Anion Gap 12 7 - 16 mmol/L    Glucose 105 (H) 74 - 99 mg/dL    BUN 81 (H) 8 - 23 mg/dL    CREATININE 4.3 (H) 0.7 - 1.2 mg/dL    GFR Non-African American 13 >=60 mL/min/1.73    GFR African American 16     Calcium 8.2 (L) 8.6 - 10.2 mg/dL    Total Protein 5.5 (L) 6.4 - 8.3 g/dL    Alb 2.8 (L) 3.5 - 5.2 g/dL    Total Bilirubin 0.4 0.0 - 1.2 mg/dL    Alkaline Phosphatase 99 40 - 129 U/L    ALT 25 0 - 40 U/L    AST 19 0 - 39 U/L   Phosphorus    Collection Time: 08/24/20  4:30 AM   Result Value Ref Range    Phosphorus 4.3 2.5 - 4.5 mg/dL   Magnesium    Collection Time: 08/24/20  4:30 AM   Result Value Ref Range    Magnesium 1.8 1.6 - 2.6 mg/dL   CBC    Collection Time: 08/24/20  4:30 AM   Result Value Ref Range    WBC 4.5 4.5 - 11.5 E9/L    RBC 2.70 (L) 3.80 - 5.80 E12/L    Hemoglobin 8.5 (L) 12.5 - 16.5 g/dL    Hematocrit 27.1 (L) 37.0 - 54.0 %    .4 (H) 80.0 - 99.9 fL    MCH 31.5 26.0 - 35.0 pg    MCHC 31.4 (L) 32.0 - 34.5 %    RDW 14.4 11.5 - 15.0 fL    Platelets 475 537 - 868 E9/L    MPV 10.3 7.0 - 12.0 fL   POCT Glucose    Collection Time: 08/24/20  5:55 AM   Result Value Ref Range    Meter Glucose 116 (H) 74 - 99 mg/dL     Assessment:     Active Problems: 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  *      Plan:   Looks pretty good for creatinine of 4.3  That said will relay his decision to Dr Rock Harjeet Hedrick  8/24/2020  6:17 AM

## 2020-08-25 LAB
ANION GAP SERPL CALCULATED.3IONS-SCNC: 10 MMOL/L (ref 7–16)
ANION GAP SERPL CALCULATED.3IONS-SCNC: 8 MMOL/L (ref 7–16)
ANION GAP SERPL CALCULATED.3IONS-SCNC: 9 MMOL/L (ref 7–16)
BUN BLDV-MCNC: 71 MG/DL (ref 8–23)
BUN BLDV-MCNC: 75 MG/DL (ref 8–23)
BUN BLDV-MCNC: 76 MG/DL (ref 8–23)
CALCIUM SERPL-MCNC: 8.2 MG/DL (ref 8.6–10.2)
CALCIUM SERPL-MCNC: 8.4 MG/DL (ref 8.6–10.2)
CALCIUM SERPL-MCNC: 8.6 MG/DL (ref 8.6–10.2)
CHLORIDE BLD-SCNC: 105 MMOL/L (ref 98–107)
CHLORIDE BLD-SCNC: 105 MMOL/L (ref 98–107)
CHLORIDE BLD-SCNC: 106 MMOL/L (ref 98–107)
CO2: 22 MMOL/L (ref 22–29)
CO2: 22 MMOL/L (ref 22–29)
CO2: 24 MMOL/L (ref 22–29)
CREAT SERPL-MCNC: 4.1 MG/DL (ref 0.7–1.2)
GFR AFRICAN AMERICAN: 17
GFR NON-AFRICAN AMERICAN: 14 ML/MIN/1.73
GLUCOSE BLD-MCNC: 109 MG/DL (ref 74–99)
GLUCOSE BLD-MCNC: 141 MG/DL (ref 74–99)
GLUCOSE BLD-MCNC: 167 MG/DL (ref 74–99)
METER GLUCOSE: 109 MG/DL (ref 74–99)
METER GLUCOSE: 123 MG/DL (ref 74–99)
METER GLUCOSE: 125 MG/DL (ref 74–99)
METER GLUCOSE: 125 MG/DL (ref 74–99)
METER GLUCOSE: 177 MG/DL (ref 74–99)
POTASSIUM SERPL-SCNC: 4.8 MMOL/L (ref 3.5–5)
POTASSIUM SERPL-SCNC: 5.5 MMOL/L (ref 3.5–5)
POTASSIUM SERPL-SCNC: 5.8 MMOL/L (ref 3.5–5)
SARS-COV-2: NOT DETECTED
SODIUM BLD-SCNC: 135 MMOL/L (ref 132–146)
SODIUM BLD-SCNC: 137 MMOL/L (ref 132–146)
SODIUM BLD-SCNC: 139 MMOL/L (ref 132–146)
SOURCE: NORMAL

## 2020-08-25 PROCEDURE — 82962 GLUCOSE BLOOD TEST: CPT

## 2020-08-25 PROCEDURE — 2060000000 HC ICU INTERMEDIATE R&B

## 2020-08-25 PROCEDURE — 2580000003 HC RX 258: Performed by: INTERNAL MEDICINE

## 2020-08-25 PROCEDURE — 80048 BASIC METABOLIC PNL TOTAL CA: CPT

## 2020-08-25 PROCEDURE — 36415 COLL VENOUS BLD VENIPUNCTURE: CPT

## 2020-08-25 PROCEDURE — 6370000000 HC RX 637 (ALT 250 FOR IP): Performed by: INTERNAL MEDICINE

## 2020-08-25 PROCEDURE — 6360000002 HC RX W HCPCS: Performed by: INTERNAL MEDICINE

## 2020-08-25 PROCEDURE — 2500000003 HC RX 250 WO HCPCS: Performed by: INTERNAL MEDICINE

## 2020-08-25 RX ORDER — HYDRALAZINE HYDROCHLORIDE 25 MG/1
25 TABLET, FILM COATED ORAL EVERY 8 HOURS SCHEDULED
Status: DISCONTINUED | OUTPATIENT
Start: 2020-08-25 | End: 2020-08-28 | Stop reason: HOSPADM

## 2020-08-25 RX ORDER — SODIUM POLYSTYRENE SULFONATE 15 G/60ML
15 SUSPENSION ORAL; RECTAL ONCE
Status: COMPLETED | OUTPATIENT
Start: 2020-08-25 | End: 2020-08-25

## 2020-08-25 RX ORDER — DEXTROSE MONOHYDRATE 25 G/50ML
25 INJECTION, SOLUTION INTRAVENOUS ONCE
Status: COMPLETED | OUTPATIENT
Start: 2020-08-25 | End: 2020-08-25

## 2020-08-25 RX ADMIN — SODIUM BICARBONATE 50 MEQ: 84 INJECTION INTRAVENOUS at 17:53

## 2020-08-25 RX ADMIN — HEPARIN SODIUM 5000 UNITS: 10000 INJECTION INTRAVENOUS; SUBCUTANEOUS at 06:17

## 2020-08-25 RX ADMIN — SODIUM POLYSTYRENE SULFONATE 15 G: 15 SUSPENSION ORAL; RECTAL at 17:53

## 2020-08-25 RX ADMIN — HYDRALAZINE HYDROCHLORIDE 10 MG: 10 TABLET, FILM COATED ORAL at 06:17

## 2020-08-25 RX ADMIN — PATIROMER 8.4 G: 8.4 POWDER, FOR SUSPENSION ORAL at 13:34

## 2020-08-25 RX ADMIN — FERROUS SULFATE TAB 325 MG (65 MG ELEMENTAL FE) 325 MG: 325 (65 FE) TAB at 17:25

## 2020-08-25 RX ADMIN — METOPROLOL SUCCINATE 50 MG: 50 TABLET, EXTENDED RELEASE ORAL at 21:32

## 2020-08-25 RX ADMIN — SODIUM CHLORIDE, PRESERVATIVE FREE 10 ML: 5 INJECTION INTRAVENOUS at 21:33

## 2020-08-25 RX ADMIN — ASPIRIN 81 MG: 81 TABLET, COATED ORAL at 09:37

## 2020-08-25 RX ADMIN — METOPROLOL SUCCINATE 50 MG: 50 TABLET, EXTENDED RELEASE ORAL at 09:37

## 2020-08-25 RX ADMIN — SODIUM CHLORIDE: 9 INJECTION, SOLUTION INTRAVENOUS at 09:37

## 2020-08-25 RX ADMIN — CALCIUM GLUCONATE 1 G: 98 INJECTION, SOLUTION INTRAVENOUS at 17:53

## 2020-08-25 RX ADMIN — HYDRALAZINE HYDROCHLORIDE 25 MG: 25 TABLET, FILM COATED ORAL at 14:12

## 2020-08-25 RX ADMIN — MAGNESIUM OXIDE TAB 400 MG (241.3 MG ELEMENTAL MG) 400 MG: 400 (241.3 MG) TAB at 09:37

## 2020-08-25 RX ADMIN — INSULIN HUMAN 6 UNITS: 100 INJECTION, SOLUTION PARENTERAL at 17:53

## 2020-08-25 RX ADMIN — HEPARIN SODIUM 5000 UNITS: 10000 INJECTION INTRAVENOUS; SUBCUTANEOUS at 14:12

## 2020-08-25 RX ADMIN — HEPARIN SODIUM 5000 UNITS: 10000 INJECTION INTRAVENOUS; SUBCUTANEOUS at 21:32

## 2020-08-25 RX ADMIN — FERROUS SULFATE TAB 325 MG (65 MG ELEMENTAL FE) 325 MG: 325 (65 FE) TAB at 09:37

## 2020-08-25 RX ADMIN — HYDRALAZINE HYDROCHLORIDE 25 MG: 25 TABLET, FILM COATED ORAL at 21:32

## 2020-08-25 RX ADMIN — PRAVASTATIN SODIUM 20 MG: 20 TABLET ORAL at 12:01

## 2020-08-25 RX ADMIN — DEXTROSE MONOHYDRATE 25 G: 25 INJECTION, SOLUTION INTRAVENOUS at 17:52

## 2020-08-25 ASSESSMENT — PAIN SCALES - GENERAL: PAINLEVEL_OUTOF10: 0

## 2020-08-25 NOTE — PROGRESS NOTES
Internal Medicine  Progress Note  Dimitris Ludwig MD     Subjective:     Patient is alert. Patient reports OK \"I am filling up with fluid\". Tolerating diet well no other c/o. Scheduled Meds:   ferrous sulfate  325 mg Oral BID     epoetin mayra-epbx  4,000 Units Subcutaneous Once per day on Mon Wed Fri    magnesium oxide  400 mg Oral Daily    hydrALAZINE  10 mg Oral 3 times per day    aspirin  81 mg Oral QAM    metoprolol succinate  50 mg Oral BID    pravastatin  20 mg Oral Lunch    sodium chloride flush  10 mL Intravenous 2 times per day    heparin (porcine)  5,000 Units Subcutaneous 3 times per day    insulin lispro  0-12 Units Subcutaneous TID     insulin lispro  0-6 Units Subcutaneous Nightly     Continuous Infusions:   sodium chloride 65 mL/hr at 08/24/20 1236    dextrose       PRN Meds:polyvinyl alcohol, analgesic ointment, sodium chloride flush, acetaminophen **OR** acetaminophen, promethazine **OR** ondansetron, glucose, dextrose, glucagon (rDNA), dextrose    Objective:      Physical Exam:  Vitals:   BP (!) 155/78   Pulse 70   Temp 98 °F (36.7 °C) (Axillary)   Resp 20   Ht 5' 6\" (1.676 m)   Wt 215 lb 11.2 oz (97.8 kg)   SpO2 96%   BMI 34.81 kg/m²   I/O's    Intake/Output Summary (Last 24 hours) at 8/25/2020 8012  Last data filed at 8/25/2020 2035  Gross per 24 hour   Intake 2353 ml   Output 550 ml   Net 1803 ml     Exam:  General appearance: alert, appears stated age and cooperative  Head: Normocephalic, without obvious abnormality, atraumatic  Eyes: conjunctivae/corneas clear. PERRL, EOM's intact. Fundi benign.   Throat: lips, mucosa, and tongue normal; teeth and gums normal  Neck: no adenopathy, no carotid bruit, no JVD, supple, symmetrical, trachea midline and thyroid not enlarged, symmetric, no tenderness/mass/nodules  Back: negative  Lungs: diminished breath sounds bilaterally  Chest wall: no tenderness  Heart: regular rate and rhythm  Abdomen: soft, non-tender; bowel sounds problems.  *      Plan:   Creat not back - need long term decision on renal rx by pt  Jose Marin  8/25/2020  6:34 AM

## 2020-08-25 NOTE — PATIENT CARE CONFERENCE
P Quality Flow/Interdisciplinary Rounds Progress Note        Quality Flow Rounds held on August 25, 2020    Disciplines Attending:  Bedside Nurse, ,  and Nursing Unit Leadership    Sarajane Bence was admitted on 8/14/2020  3:50 PM    Anticipated Discharge Date:  Expected Discharge Date: 08/20/20    Disposition:    Kristian Score:  Kristian Scale Score: 15    Readmission Score:         Discussed patient goal for the day, patient clinical progression, and barriers to discharge.   The following Goal(s) of the Day/Commitment(s) have been identified:    Discharge planning      Art Schaeffer  August 25, 2020

## 2020-08-25 NOTE — PROGRESS NOTES
8/25/2020  1:29 PM      Comprehensive Nutrition Assessment    Type and Reason for Visit:  Reassess    Nutrition Recommendations/Plan: Recommend add Low K+ to current diet    Nutrition Assessment:  Pt and his brother plan to discuss options re:CKD/poss HD (?) with nephrologist. Pt is eating fairly well on current diet. Note hyperkalemia-recommend add K+ restriction to current CHO controlled diet. Will continue to monitor for pt's status changes    Malnutrition Assessment:  Malnutrition Status:  Insufficient data    Context:  Acute Illness     Findings of the 6 clinical characteristics of malnutrition:  Energy Intake:  No significant decrease in energy intake  Weight Loss:  No significant weight loss     Body Fat Loss:  Unable to assess     Muscle Mass Loss:  Unable to assess    Fluid Accumulation:  No significant fluid accumulation     Strength:  Not Performed    Estimated Daily Nutrient Needs:  Energy (kcal):  ; Weight Used for Energy Requirements:  Admission     Protein (g):  80-90 (1.2-1.4 g/kg - while not on HD); Weight Used for Protein Requirements:  Ideal        Fluid (ml/day):  Per Renal; Weight Used for Fluid Requirements:  Admission      Nutrition Related Findings:  A&Ox4, +2-+3 edema, good appetite, I/O +3L      Wounds:  Open Wounds(wound to ankle LOIS, denuded buttocks per wound RN)       Current Nutrition Therapies:    DIET CARB CONTROL;   Dietary Nutrition Supplements: Low Calorie High Protein Supplement    Anthropometric Measures:  · Height: 5' 6\" (167.6 cm)  · Current Body Weight: 215 lb (97.5 kg)(8/25)   · Admission Body Weight: 186 lb (84.4 kg)(8/14)    · Usual Body Weight: (UTO no EMR wt hx on file)     · Ideal Body Weight: 142 lbs; % Ideal Body Weight 131 %   · BMI: 34.7  · BMI Categories: Obese Class 1 (BMI 30.0-34.9)(30 at admit wt)       Nutrition Diagnosis:   · Increased nutrient needs related to increase demand for energy/nutrients as evidenced by wounds      Nutrition Interventions: Food and/or Nutrient Delivery:  Continue Current Diet, Continue Oral Nutrition Supplement  Nutrition Education/Counseling:  No recommendation at this time   Coordination of Nutrition Care:  Continued Inpatient Monitoring    Goals:  Pt to consume >75% at meals and ONS       Nutrition Monitoring and Evaluation:   Behavioral-Environmental Outcomes:      Food/Nutrient Intake Outcomes:  Food and Nutrient Intake, Supplement Intake  Physical Signs/Symptoms Outcomes:  Biochemical Data, Fluid Status or Edema, Nutrition Focused Physical Findings, Skin, Weight     Discharge Planning:    No discharge needs at this time(Discharge planning to [facility] will be moderated/provided by facility)     Electronically signed by Akilah Scruggs RD, CNSC, LD on 8/25/20 at 1:29 PM EDT    Contact: [phone]

## 2020-08-25 NOTE — CARE COORDINATION
CASE MANAGEMENT. ... Chart reviewed. Met with patient at the bedside. He is sitting up in chair on room air eating lunch. Mr Clarisse Espinosa is frustrated that he continues to \"fill up with fluid\". We discussed his thoughts on HD. States that he and his brother are meeting with Dr Soraida Shah today discuss his options. He remains agreeable to [address] at discharge. COVID send out from yesterday is pending. Will cont to follow along with ss.

## 2020-08-25 NOTE — PLAN OF CARE
Problem: Pain:  Goal: Pain level will decrease  Description: Pain level will decrease  Outcome: Met This Shift     Problem: Falls - Risk of:  Goal: Will remain free from falls  Description: Will remain free from falls  Outcome: Met This Shift     Problem: Falls - Risk of:  Goal: Absence of physical injury  Description: Absence of physical injury  Outcome: Met This Shift     Problem: Skin Integrity:  Goal: Will show no infection signs and symptoms  Description: Will show no infection signs and symptoms  Outcome: Met This Shift     Problem: Skin Integrity:  Goal: Absence of new skin breakdown  Description: Absence of new skin breakdown  Outcome: Met This Shift

## 2020-08-25 NOTE — PROGRESS NOTES
with Meds     MEDS (scheduled):    hydrALAZINE  25 mg Oral 3 times per day    patiromer sorbitex calcium  8.4 g Oral Daily    ceFAZolin  1 g Intravenous See Admin Instructions    ferrous sulfate  325 mg Oral BID     epoetin mayra-epbx  4,000 Units Subcutaneous Once per day on Mon Wed Fri    magnesium oxide  400 mg Oral Daily    aspirin  81 mg Oral QAM    metoprolol succinate  50 mg Oral BID    pravastatin  20 mg Oral Lunch    sodium chloride flush  10 mL Intravenous 2 times per day    heparin (porcine)  5,000 Units Subcutaneous 3 times per day    insulin lispro  0-12 Units Subcutaneous TID     insulin lispro  0-6 Units Subcutaneous Nightly       MEDS (infusions):   sodium chloride 65 mL/hr at 08/25/20 0937    dextrose         MEDS (prn):  analgesic ointment, sodium chloride flush, acetaminophen **OR** acetaminophen, promethazine **OR** ondansetron, glucose, dextrose, glucagon (rDNA), dextrose    PHYSICAL EXAM:     Patient Vitals for the past 24 hrs:   BP Temp Temp src Pulse Resp SpO2 Weight   08/25/20 1357 (!) 155/72 97.8 °F (36.6 °C) Oral 62 20 -- --   08/25/20 0745 (!) 157/69 97.6 °F (36.4 °C) Oral 67 20 95 % --   08/25/20 0617 (!) 155/78 -- -- 70 -- -- --   08/25/20 0014 -- -- -- -- -- -- 215 lb 11.2 oz (97.8 kg)   08/25/20 0010 (!) 141/60 98 °F (36.7 °C) Axillary 68 20 -- --   08/24/20 2100 (!) 150/73 -- -- 64 -- 96 % --   @      Intake/Output Summary (Last 24 hours) at 8/25/2020 1848  Last data filed at 8/25/2020 1757  Gross per 24 hour   Intake 2113 ml   Output 800 ml   Net 1313 ml         Wt Readings from Last 3 Encounters:   08/25/20 215 lb 11.2 oz (97.8 kg)   01/17/19 170 lb (77.1 kg)   01/12/19 170 lb (77.1 kg)       Constitutional:  in no acute distress  HEENT: NC/AT, EOMI, sclera and conjunctiva are clear and anicteric, mucus membranes moist  Neck: Trachea midline,  Cardiovascular: S1, S2 regular rhythm, no murmur,or rub  Respiratory: Coarse breath sounds,  basilar crackles somewhat better than yesterday, bilateral upper airway wheezes --improved, none in lower fields. Gastrointestinal:  Soft, nontender, nondistended, NABS  Ext: 1-2+ distal lower extremity edema --a little better than yesterday, feet warm  Skin: dry, no rash  Neuro: awake, alert, interactive      DATA:    Recent Labs     08/24/20  0430   WBC 4.5   HGB 8.5*   HCT 27.1*   .4*        Recent Labs     08/24/20  0430 08/25/20  1005 08/25/20  1634    137 135   K 5.2* 5.5* 5.8*    106 105   CO2 24 22 22   MG 1.8  --   --    PHOS 4.3  --   --    BUN 81* 76* 75*   CREATININE 4.3* 4.1* 4.1*   ALT 25  --   --    AST 19  --   --    BILITOT 0.4  --   --    ALKPHOS 99  --   --        Lab Results   Component Value Date    LABPROT 7.6 (H) 08/22/2020    LABPROT 7.6 08/22/2020       ASSESSMENT / RECOMMENDATIONS:       1-Acute kidney injury, hemodynamically-mediated, possibly attributable to 2 RAAS blocker. 2-chronic kidney disesae stage IV baseline cr around 2 with around 2 g proteinuria     3-Hyperkalemia due to #1,#2. Resolved    4-Metabolic acidosis, non-anion gap, secondary acute kidney injury/CKD    5-anemia of chronic disease (CKD) : tsat 50     6-Mineral bone disease : phosph 4.4 no need for binders        -creatinine improved initially with IV fluid resuscitation, though has since climbed back up to a level at which he presented after he started diuretic therapy.     -I suspect the injury he sustained was acute tubular necrosis, and he may be seeing a new baseline Cr for the time being.  -Edema little bit better  -Azotemia stable, now hyperkalemic      --> Continue NS at conservative rate  --> Continue to hold diuretic therapy  --> Keep feet up when not using them  --> FERMIN hose once edema improved  --> Continue to hold both lisinopril and irbesartan   --> Follow labs, UO    Discussed at length again today with Mr. Johnny Ramos and this time his brother. Mr. Johnny Ramos wants to give dialysis a try.   His sister undergoes dialysis, and apparently has been tolerating it well recently. Recommend at least a several week trial, if still necessary, and then decide whether he wishes to proceed with dialysis indefinitely.     --> Consult vascular surgery for tunneled dialysis catheter (discussed with Dr. Alanna Alvares)  --> IHD to follow      Electronically signed by Michael Canseco MD on 8/25/2020

## 2020-08-26 LAB
ANION GAP SERPL CALCULATED.3IONS-SCNC: 9 MMOL/L (ref 7–16)
BUN BLDV-MCNC: 74 MG/DL (ref 8–23)
CALCIUM SERPL-MCNC: 8.1 MG/DL (ref 8.6–10.2)
CHLORIDE BLD-SCNC: 105 MMOL/L (ref 98–107)
CO2: 23 MMOL/L (ref 22–29)
CREAT SERPL-MCNC: 3.9 MG/DL (ref 0.7–1.2)
GFR AFRICAN AMERICAN: 18
GFR NON-AFRICAN AMERICAN: 15 ML/MIN/1.73
GLUCOSE BLD-MCNC: 100 MG/DL (ref 74–99)
METER GLUCOSE: 114 MG/DL (ref 74–99)
METER GLUCOSE: 115 MG/DL (ref 74–99)
METER GLUCOSE: 120 MG/DL (ref 74–99)
METER GLUCOSE: 146 MG/DL (ref 74–99)
POTASSIUM SERPL-SCNC: 5.2 MMOL/L (ref 3.5–5)
SODIUM BLD-SCNC: 137 MMOL/L (ref 132–146)

## 2020-08-26 PROCEDURE — 97530 THERAPEUTIC ACTIVITIES: CPT

## 2020-08-26 PROCEDURE — 36415 COLL VENOUS BLD VENIPUNCTURE: CPT

## 2020-08-26 PROCEDURE — 6370000000 HC RX 637 (ALT 250 FOR IP): Performed by: INTERNAL MEDICINE

## 2020-08-26 PROCEDURE — 82962 GLUCOSE BLOOD TEST: CPT

## 2020-08-26 PROCEDURE — 97110 THERAPEUTIC EXERCISES: CPT

## 2020-08-26 PROCEDURE — 2060000000 HC ICU INTERMEDIATE R&B

## 2020-08-26 PROCEDURE — 2580000003 HC RX 258: Performed by: INTERNAL MEDICINE

## 2020-08-26 PROCEDURE — 6360000002 HC RX W HCPCS: Performed by: INTERNAL MEDICINE

## 2020-08-26 PROCEDURE — 80048 BASIC METABOLIC PNL TOTAL CA: CPT

## 2020-08-26 PROCEDURE — 97535 SELF CARE MNGMENT TRAINING: CPT

## 2020-08-26 RX ADMIN — FERROUS SULFATE TAB 325 MG (65 MG ELEMENTAL FE) 325 MG: 325 (65 FE) TAB at 15:55

## 2020-08-26 RX ADMIN — HYDRALAZINE HYDROCHLORIDE 25 MG: 25 TABLET, FILM COATED ORAL at 05:30

## 2020-08-26 RX ADMIN — METOPROLOL SUCCINATE 50 MG: 50 TABLET, EXTENDED RELEASE ORAL at 08:52

## 2020-08-26 RX ADMIN — INSULIN LISPRO 2 UNITS: 100 INJECTION, SOLUTION INTRAVENOUS; SUBCUTANEOUS at 15:56

## 2020-08-26 RX ADMIN — HYDRALAZINE HYDROCHLORIDE 25 MG: 25 TABLET, FILM COATED ORAL at 21:33

## 2020-08-26 RX ADMIN — HEPARIN SODIUM 5000 UNITS: 10000 INJECTION INTRAVENOUS; SUBCUTANEOUS at 05:30

## 2020-08-26 RX ADMIN — ASPIRIN 81 MG: 81 TABLET, COATED ORAL at 08:52

## 2020-08-26 RX ADMIN — HYDRALAZINE HYDROCHLORIDE 25 MG: 25 TABLET, FILM COATED ORAL at 14:25

## 2020-08-26 RX ADMIN — PRAVASTATIN SODIUM 20 MG: 20 TABLET ORAL at 11:27

## 2020-08-26 RX ADMIN — EPOETIN ALFA-EPBX 4000 UNITS: 4000 INJECTION, SOLUTION INTRAVENOUS; SUBCUTANEOUS at 08:53

## 2020-08-26 RX ADMIN — SODIUM CHLORIDE: 9 INJECTION, SOLUTION INTRAVENOUS at 08:51

## 2020-08-26 RX ADMIN — MAGNESIUM OXIDE TAB 400 MG (241.3 MG ELEMENTAL MG) 400 MG: 400 (241.3 MG) TAB at 08:52

## 2020-08-26 RX ADMIN — METOPROLOL SUCCINATE 50 MG: 50 TABLET, EXTENDED RELEASE ORAL at 20:53

## 2020-08-26 RX ADMIN — FERROUS SULFATE TAB 325 MG (65 MG ELEMENTAL FE) 325 MG: 325 (65 FE) TAB at 08:52

## 2020-08-26 RX ADMIN — HEPARIN SODIUM 5000 UNITS: 10000 INJECTION INTRAVENOUS; SUBCUTANEOUS at 14:26

## 2020-08-26 RX ADMIN — PATIROMER 8.4 G: 8.4 POWDER, FOR SUSPENSION ORAL at 08:52

## 2020-08-26 ASSESSMENT — PAIN SCALES - GENERAL
PAINLEVEL_OUTOF10: 0

## 2020-08-26 NOTE — PROGRESS NOTES
Physical Therapy  Facility/Department: [address] INTERMEDIATE 1  Daily Treatment Note  NAME: Miko Hoyos  : 1931  MRN: [id number]    Date of Service: 2020    Patient Diagnosis(es): The primary encounter diagnosis was Acute kidney injury [facility]). A diagnosis of Anemia, unspecified type was also pertinent to this visit. has a past medical history of Arthritis, CAD (coronary artery disease), Chronic kidney disease, COPD (chronic obstructive pulmonary disease) ([address] Decubitus ulcer of sacral region, stage 1, Diabetes mellitus ([address] Diabetic peripheral neuropathy ([address] History of CVA (cerebrovascular accident), Hypertension, Other disorders of kidney and ureter, Pain in lower limb, PVD (peripheral vascular disease) with claudication ([facility] Right sided weakness, TIA (transient ischemic attack), and Unspecified cerebral artery occlusion with cerebral infarction. has a past surgical history that includes Abdominal hernia repair; Cataract removal; Dental surgery; Inguinal hernia repair; Cholecystectomy (2011); Inner ear surgery; and eye surgery. Evaluating Therapist: Hesham Mcconnell, PT      Referring Provider:  Dr. Alyssa Mann     Room #: 433   DIAGNOSIS:  ZAHRAA   Additional Pertinent History: R hemiparesis due to CVA   [address]     Social:  Pt lives alone  in a  1  floor plan 2+1  steps to enter. Stair glide to basement. Has HHA daily   Prior to admission pt walked with  QC  For short distances on;y. W/c for longer. Pt reports able to self propel        Initial Evaluation  Date: 2020 Treatment  20    Short Term/ Long Term   Goals   Was pt agreeable to Eval/treatment?  Yes  yes      Does pt have pain?  denies No c/o pain      Bed Mobility  Rolling:  SBA to L   Supine to sit:  Min assist   Sit to supine: NT   Scooting:  Min assist in sit    NT SBA    Transfers Sit to stand:  Mod assist   Stand to sit: min assist   Stand pivot: mod assist   sit <> stand mod assist  Pivot transfer Mod A without A/D  SBA    Ambulation     6 feet x 2  with  hemicane ( no QC available)   with  Min assist  20 feet x 1 using hemicane L UE with Mod A for balance 50 feet with QC /AAD  with  SBA          Stair negotiation: ascended and descended NT    NT  4  steps with  1 rail with  Min assist    LE ROM  AAROM WFL , decreased throughout R side , L LE WFL        LE strength  R 2- to 3-/ 5   L LE WFL        AM- PAC RAW score  13/ 24 13/24            Pt is alert and able to follow instruction  Balance: poor dynamic using [employer] for support    Pt performed therapeutic exercise of the following: seated B ankle pumps AROM L LE and AAROM R LE; B heel slide motions , LAQ's AAROM R LE, AROM L LE, B hip ABd/ADd with minimal manual resistance x 20    Patient education  Pt was educated on exercise, transfers and gait    Patient response to education:   Pt verbalized understanding Pt demonstrated skill Pt requires further education in this area   yes With instruction for gait yes     ASSESSMENT:   Comments: Nurse ok with rx. Pt found in a bedside chair, exercise performed, R AFO then donned. Gait performed, araceli slow, inconsistent and laboring, Pt unsteady throughout, required constant hands on support for balance and safety. Pt fatigued after activity. R AFO removed after rx  Treatment: Pt practiced and was instructed in the following treatment: exercise promoting circulation and strengthening, gait promoting hemcane placement    Pt was left in a bedside chair as found with call light in reach, LEs elevated    Chair/bed alarm: chair alarm active    Time in 1025   Time out 1058   Total Treatment Time 33 minutes   CPT codes:     Therapeutic activities 65337 15 minutes   Therapeutic exercises [id number] 18 minutes       Pt is making fair progress toward established Physical Therapy goals as per functional mobility performed and exercise participation. Continue with physical therapy current plan of care.     [employer] PTA   License

## 2020-08-26 NOTE — PLAN OF CARE
Problem: Pain:  Goal: Pain level will decrease  Description: Pain level will decrease  Outcome: Met This Shift     Problem: Falls - Risk of:  Goal: Will remain free from falls  Description: Will remain free from falls  Outcome: Met This Shift     Problem: Falls - Risk of:  Goal: Absence of physical injury  Description: Absence of physical injury  Outcome: Met This Shift     Problem: Skin Integrity:  Goal: Will show no infection signs and symptoms  Description: Will show no infection signs and symptoms  Outcome: Met This Shift

## 2020-08-26 NOTE — PROGRESS NOTES
wall: no tenderness  Heart: regular rate and rhythm, S1, S2 normal, no murmur, click, rub or gallop  Abdomen: soft, non-tender; bowel sounds normal; no masses,  no organomegaly  Extremities: extremities normal, atraumatic, no cyanosis or edema  Pulses: 2+ and symmetric  Skin: Skin color, texture, turgor normal. No rashes or lesions  Lymph nodes: Cervical, supraclavicular, and axillary nodes normal.  Neurologic: L hemiplegia      Imaging     Chest  Xray:  No results found for this or any previous visit. Results for orders placed during the hospital encounter of 20   XR CHEST PORTABLE    Narrative Patient MRN: [id number]  : 1931  Age:  80 years  Gender: Male  Order Date: 2020 4:50 PM  Exam: XR CHEST PORTABLE  Number of Images: 1 view  Indication:   Shortness of breath   Shortness of breath  Comparison: 2019    Findings: The heart is enlarged. The lung fields demonstrate no significant pulmonary vascular  congestion or edema. The aorta is tortuous ectatic and calcified. .      Impression Cardiomegaly  Findings compatible with atherosclerotic disease of the aorta.                  Additional Imaging:  none    Lab Review   Recent Results (from the past 24 hour(s))   Basic metabolic panel    Collection Time: 20 10:05 AM   Result Value Ref Range    Sodium 137 132 - 146 mmol/L    Potassium 5.5 (H) 3.5 - 5.0 mmol/L    Chloride 106 98 - 107 mmol/L    CO2 22 22 - 29 mmol/L    Anion Gap 9 7 - 16 mmol/L    Glucose 141 (H) 74 - 99 mg/dL    BUN 76 (H) 8 - 23 mg/dL    CREATININE 4.1 (H) 0.7 - 1.2 mg/dL    GFR Non-African American 14 >=60 mL/min/1.73    GFR African American 17     Calcium 8.2 (L) 8.6 - 10.2 mg/dL   POCT Glucose    Collection Time: 20 10:59 AM   Result Value Ref Range    Meter Glucose 125 (H) 74 - 99 mg/dL   POCT Glucose    Collection Time: 20  3:54 PM   Result Value Ref Range    Meter Glucose 125 (H) 74 - 99 mg/dL   Basic Metabolic Panel    Collection Time: 20  4:34 PM   Result Value Ref Range    Sodium 135 132 - 146 mmol/L    Potassium 5.8 (H) 3.5 - 5.0 mmol/L    Chloride 105 98 - 107 mmol/L    CO2 22 22 - 29 mmol/L    Anion Gap 8 7 - 16 mmol/L    Glucose 167 (H) 74 - 99 mg/dL    BUN 75 (H) 8 - 23 mg/dL    CREATININE 4.1 (H) 0.7 - 1.2 mg/dL    GFR Non-African American 14 >=60 mL/min/1.73    GFR African American 17     Calcium 8.6 8.6 - 10.2 mg/dL   POCT Glucose    Collection Time: 08/25/20  7:03 PM   Result Value Ref Range    Meter Glucose 177 (H) 74 - 99 mg/dL   Basic metabolic panel    Collection Time: 08/25/20  7:37 PM   Result Value Ref Range    Sodium 139 132 - 146 mmol/L    Potassium 4.8 3.5 - 5.0 mmol/L    Chloride 105 98 - 107 mmol/L    CO2 24 22 - 29 mmol/L    Anion Gap 10 7 - 16 mmol/L    Glucose 109 (H) 74 - 99 mg/dL    BUN 71 (H) 8 - 23 mg/dL    CREATININE 4.1 (H) 0.7 - 1.2 mg/dL    GFR Non-African American 14 >=60 mL/min/1.73    GFR African American 17     Calcium 8.4 (L) 8.6 - 10.2 mg/dL   POCT Glucose    Collection Time: 08/25/20 10:03 PM   Result Value Ref Range    Meter Glucose 123 (H) 74 - 99 mg/dL   Basic metabolic panel    Collection Time: 08/26/20  3:34 AM   Result Value Ref Range    Sodium 137 132 - 146 mmol/L    Potassium 5.2 (H) 3.5 - 5.0 mmol/L    Chloride 105 98 - 107 mmol/L    CO2 23 22 - 29 mmol/L    Anion Gap 9 7 - 16 mmol/L    Glucose 100 (H) 74 - 99 mg/dL    BUN 74 (H) 8 - 23 mg/dL    CREATININE 3.9 (H) 0.7 - 1.2 mg/dL    GFR Non-African American 15 >=60 mL/min/1.73    GFR African American 18     Calcium 8.1 (L) 8.6 - 10.2 mg/dL   POCT Glucose    Collection Time: 08/26/20  5:29 AM   Result Value Ref Range    Meter Glucose 114 (H) 74 - 99 mg/dL     Assessment:     Active Problems:     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

## 2020-08-26 NOTE — CARE COORDINATION
Social Work/Discharge Planning:  Called velasquez Palomo at [facility] and confirmed facility still has a bed available for patient. Met with patient and his brother Sameera Perry in regards to discharge planning. Patient states he will have surgery tomorrow morning for tunneled catheter. Discussed option of LTAC with new dialysis and he prefers [facility]. Patient requested for this worker to update his niece [employer]. Called patient niece [employer] (ph: [phone]) and provided her with update regarding conversation with patient. [employer] states it is to be determined how long patient will require dialysis. [employer] is agreeable to [facility] to [facility]. Referral made to velasquez Jenkins at [facility] and he will review patient information. COVID negative 8/24. Will continue to follow.   Electronically signed by SCOTT Ybarra on 8/26/2020 at 3:55 PM

## 2020-08-26 NOTE — PROGRESS NOTES
Occupational Therapy  OT BEDSIDE TREATMENT NOTE      Date:2020  Patient Name: Iraida Murillo  MRN: [id number]  : 1931  Room: [address] [place] [address]     Referring Provider: Pedro Pablo Dexter MD   Evaluating OT: Edis Thomason. Janell, OTR/L - [id number]     AM-PAC Daily Activity Raw Score: 14      Recommended Adaptive Equipment: Continue to assess.      Diagnosis: Acute kidney injury (ZAHRAA) with acute tubular necrosis (ATN) (HCC) [N17.0], Acute kidney failure (HCC) [N17.9]      Pertinent Medical History: COPD, CKD, CAD, DM, CVA, HTN, TIA, arthritis, neuropathy      Precautions: falls, R-sided weakness     Home Living: Patient lives alone in a one-floor home. Bathroom Setup: tub shower (with extended tub bench, handheld shower head, and grab bars available) - patient showers twice per week and sponge bathes the other days  Equipment Owned: quad cane, extended tub bench, wheelchair     Prior Level of Function (PLOF): Per patient, he needed assistance with most ADLs and IADLs, but was independent with functional transfers/mobility (with use of quad cane for short distances only - wheelchair used mostly) prior to this hospitalization. Patient noted that he has assistance from aides twice daily; one aide assists with morning ADLs (including a shower or sponge bath) and breakfast (2-3 hours) and another with evening ADLs and dinner. Patient reported that family members provide assistance with preparation of lunch and other IADLs, as needed, during the day.     Pain Level: pt did not complain of pain   Cognition: Patient alert and grossly oriented.               Judgement/Safety: limited      Functional Assessment:    Initial Eval Status  Date: 2020 Treatment Status    Short Term Goals  Treatment Frequency/Duration: 2-5x/week for 3-7 days PRN   Feeding SBA   Setup   Grooming Mod A   Min A  (seated)   UB Dressing Mod A Min A to don gown as a robe.   Min A   LB Dressing Max A  max A to don  shoes Mod A - with use of AE, as needed/appropriate   Bathing Max A   Mod A - with use of AE/DME, as needed/appropriate   Toileting Max A  max A  Toilet hygiene after sitting on BSC. Min A   Bed Mobility  Not assessed. Min  A supine to sit using rail for support.       Functional Transfers Sit-to-Stand: Mod A   from bedside chair Mod A sit to stand from bed and BSC.   Min A   Functional Mobility Not assessed. Mod A pivot bed>BSC   SBA - with use of device, as needed/appropriate   Balance Sitting: Good-  (at edge of chair)  Standing: Fair- Min  A static stand balance during ADL.   Fair+ dynamic standing balance during completion of ADLs and other functional tasks. Activity Tolerance Fair Fair - Patient will demonstrate Good understanding and consistent implementation of energy conservation techniques and work simplification techniques into ADL routines. B UE Strength R shoulder: 2-/5 grossly  Distal R UE: 3+/5  L UE: 4-/5 grossly   Patient will demonstrate 4+/5 L UE strength in order to maximize independence with ADLs and functional transfers. Comments:  Pt pleasant and cooperative. Pt remained in chair at end of the session with alarm activated. Education/treatment:  ADL retraining with facilitation of movement to increase self care. Therapeutic activity to address balance, strength, and endurance for ADL and transfers. Pt education of transfer safety, cane safety, and daily orientation. · Pt has made limited  progress towards set goals.    · Continue with current plan of care        Treatment Charges: Mins Units    ADL/Home Mgt 74856 17 1    Thera Activities 07202 11 1    Ther Ex 47129      Manual Therapy 69955      Neuro Re-ed 78662      Orthotic manage/training  85064      Non Billable Time      Total Timed Treatment 28  [address] [facility]/ [address]

## 2020-08-26 NOTE — PROGRESS NOTES
Wound / ostomy follow-up for skin assessment. Buttocks is now pink. No open areas. Pt likes to sit up in chair. He is able to stand periodically with assistance. Zinc barrier applied to buttocks. Pt is on a low air loss mattress.

## 2020-08-27 ENCOUNTER — ANESTHESIA (OUTPATIENT)
Dept: OPERATING ROOM | Age: 85
DRG: 674 | End: 2020-08-27
Payer: MEDICARE

## 2020-08-27 ENCOUNTER — APPOINTMENT (OUTPATIENT)
Dept: GENERAL RADIOLOGY | Age: 85
DRG: 674 | End: 2020-08-27
Payer: MEDICARE

## 2020-08-27 ENCOUNTER — ANESTHESIA EVENT (OUTPATIENT)
Dept: OPERATING ROOM | Age: 85
DRG: 674 | End: 2020-08-27
Payer: MEDICARE

## 2020-08-27 VITALS — DIASTOLIC BLOOD PRESSURE: 73 MMHG | OXYGEN SATURATION: 99 % | SYSTOLIC BLOOD PRESSURE: 145 MMHG

## 2020-08-27 LAB
ANION GAP SERPL CALCULATED.3IONS-SCNC: 9 MMOL/L (ref 7–16)
ANION GAP SERPL CALCULATED.3IONS-SCNC: 9 MMOL/L (ref 7–16)
BUN BLDV-MCNC: 71 MG/DL (ref 8–23)
BUN BLDV-MCNC: 71 MG/DL (ref 8–23)
CALCIUM SERPL-MCNC: 8.3 MG/DL (ref 8.6–10.2)
CALCIUM SERPL-MCNC: 8.5 MG/DL (ref 8.6–10.2)
CHLORIDE BLD-SCNC: 108 MMOL/L (ref 98–107)
CHLORIDE BLD-SCNC: 108 MMOL/L (ref 98–107)
CO2: 21 MMOL/L (ref 22–29)
CO2: 22 MMOL/L (ref 22–29)
CREAT SERPL-MCNC: 3.8 MG/DL (ref 0.7–1.2)
CREAT SERPL-MCNC: 3.9 MG/DL (ref 0.7–1.2)
GFR AFRICAN AMERICAN: 18
GFR AFRICAN AMERICAN: 18
GFR NON-AFRICAN AMERICAN: 15 ML/MIN/1.73
GFR NON-AFRICAN AMERICAN: 15 ML/MIN/1.73
GLUCOSE BLD-MCNC: 103 MG/DL (ref 74–99)
GLUCOSE BLD-MCNC: 105 MG/DL (ref 74–99)
MAGNESIUM: 1.7 MG/DL (ref 1.6–2.6)
METER GLUCOSE: 103 MG/DL (ref 74–99)
METER GLUCOSE: 114 MG/DL (ref 74–99)
METER GLUCOSE: 129 MG/DL (ref 74–99)
METER GLUCOSE: 172 MG/DL (ref 74–99)
PHOSPHORUS: 4.3 MG/DL (ref 2.5–4.5)
POTASSIUM SERPL-SCNC: 4.9 MMOL/L (ref 3.5–5)
POTASSIUM SERPL-SCNC: 5.5 MMOL/L (ref 3.5–5)
SODIUM BLD-SCNC: 138 MMOL/L (ref 132–146)
SODIUM BLD-SCNC: 139 MMOL/L (ref 132–146)

## 2020-08-27 PROCEDURE — 2060000000 HC ICU INTERMEDIATE R&B

## 2020-08-27 PROCEDURE — 2580000003 HC RX 258: Performed by: INTERNAL MEDICINE

## 2020-08-27 PROCEDURE — 6370000000 HC RX 637 (ALT 250 FOR IP): Performed by: SURGERY

## 2020-08-27 PROCEDURE — 6360000002 HC RX W HCPCS: Performed by: SURGERY

## 2020-08-27 PROCEDURE — 83735 ASSAY OF MAGNESIUM: CPT

## 2020-08-27 PROCEDURE — 6370000000 HC RX 637 (ALT 250 FOR IP): Performed by: INTERNAL MEDICINE

## 2020-08-27 PROCEDURE — 86706 HEP B SURFACE ANTIBODY: CPT

## 2020-08-27 PROCEDURE — 5A1D70Z PERFORMANCE OF URINARY FILTRATION, INTERMITTENT, LESS THAN 6 HOURS PER DAY: ICD-10-PCS | Performed by: INTERNAL MEDICINE

## 2020-08-27 PROCEDURE — 3600000003 HC SURGERY LEVEL 3 BASE: Performed by: SURGERY

## 2020-08-27 PROCEDURE — 7100000010 HC PHASE II RECOVERY - FIRST 15 MIN: Performed by: SURGERY

## 2020-08-27 PROCEDURE — 90935 HEMODIALYSIS ONE EVALUATION: CPT | Performed by: INTERNAL MEDICINE

## 2020-08-27 PROCEDURE — 2500000003 HC RX 250 WO HCPCS: Performed by: SURGERY

## 2020-08-27 PROCEDURE — 36415 COLL VENOUS BLD VENIPUNCTURE: CPT

## 2020-08-27 PROCEDURE — 82962 GLUCOSE BLOOD TEST: CPT

## 2020-08-27 PROCEDURE — 2709999900 HC NON-CHARGEABLE SUPPLY: Performed by: SURGERY

## 2020-08-27 PROCEDURE — 99223 1ST HOSP IP/OBS HIGH 75: CPT | Performed by: SURGERY

## 2020-08-27 PROCEDURE — 2500000003 HC RX 250 WO HCPCS: Performed by: INTERNAL MEDICINE

## 2020-08-27 PROCEDURE — 71045 X-RAY EXAM CHEST 1 VIEW: CPT

## 2020-08-27 PROCEDURE — 84100 ASSAY OF PHOSPHORUS: CPT

## 2020-08-27 PROCEDURE — 2580000003 HC RX 258: Performed by: NURSE ANESTHETIST, CERTIFIED REGISTERED

## 2020-08-27 PROCEDURE — 2700000000 HC OXYGEN THERAPY PER DAY

## 2020-08-27 PROCEDURE — 6360000002 HC RX W HCPCS: Performed by: INTERNAL MEDICINE

## 2020-08-27 PROCEDURE — 80048 BASIC METABOLIC PNL TOTAL CA: CPT

## 2020-08-27 PROCEDURE — 3600000013 HC SURGERY LEVEL 3 ADDTL 15MIN: Performed by: SURGERY

## 2020-08-27 PROCEDURE — 3700000000 HC ANESTHESIA ATTENDED CARE: Performed by: SURGERY

## 2020-08-27 PROCEDURE — 3700000001 HC ADD 15 MINUTES (ANESTHESIA): Performed by: SURGERY

## 2020-08-27 PROCEDURE — 36558 INSERT TUNNELED CV CATH: CPT | Performed by: SURGERY

## 2020-08-27 PROCEDURE — C1881 DIALYSIS ACCESS SYSTEM: HCPCS | Performed by: SURGERY

## 2020-08-27 PROCEDURE — 6360000002 HC RX W HCPCS: Performed by: NURSE ANESTHETIST, CERTIFIED REGISTERED

## 2020-08-27 PROCEDURE — 90935 HEMODIALYSIS ONE EVALUATION: CPT

## 2020-08-27 PROCEDURE — 80074 ACUTE HEPATITIS PANEL: CPT

## 2020-08-27 PROCEDURE — 3209999900 FLUORO FOR SURGICAL PROCEDURES

## 2020-08-27 PROCEDURE — 0JH63XZ INSERTION OF TUNNELED VASCULAR ACCESS DEVICE INTO CHEST SUBCUTANEOUS TISSUE AND FASCIA, PERCUTANEOUS APPROACH: ICD-10-PCS | Performed by: SURGERY

## 2020-08-27 PROCEDURE — 77001 FLUOROGUIDE FOR VEIN DEVICE: CPT | Performed by: SURGERY

## 2020-08-27 PROCEDURE — 7100000011 HC PHASE II RECOVERY - ADDTL 15 MIN: Performed by: SURGERY

## 2020-08-27 PROCEDURE — 2580000003 HC RX 258: Performed by: SURGERY

## 2020-08-27 PROCEDURE — 02HV33Z INSERTION OF INFUSION DEVICE INTO SUPERIOR VENA CAVA, PERCUTANEOUS APPROACH: ICD-10-PCS | Performed by: SURGERY

## 2020-08-27 RX ORDER — HEPARIN SODIUM 1000 [USP'U]/ML
INJECTION, SOLUTION INTRAVENOUS; SUBCUTANEOUS PRN
Status: DISCONTINUED | OUTPATIENT
Start: 2020-08-27 | End: 2020-08-27 | Stop reason: ALTCHOICE

## 2020-08-27 RX ORDER — HEPARIN SODIUM (PORCINE) LOCK FLUSH IV SOLN 100 UNIT/ML 100 UNIT/ML
SOLUTION INTRAVENOUS PRN
Status: DISCONTINUED | OUTPATIENT
Start: 2020-08-27 | End: 2020-08-27 | Stop reason: ALTCHOICE

## 2020-08-27 RX ORDER — SODIUM CHLORIDE 9 MG/ML
INJECTION, SOLUTION INTRAVENOUS CONTINUOUS PRN
Status: DISCONTINUED | OUTPATIENT
Start: 2020-08-27 | End: 2020-08-27 | Stop reason: SDUPTHER

## 2020-08-27 RX ORDER — DEXTROSE MONOHYDRATE 25 G/50ML
25 INJECTION, SOLUTION INTRAVENOUS ONCE
Status: COMPLETED | OUTPATIENT
Start: 2020-08-27 | End: 2020-08-27

## 2020-08-27 RX ORDER — CEFAZOLIN SODIUM 1 G/3ML
INJECTION, POWDER, FOR SOLUTION INTRAMUSCULAR; INTRAVENOUS PRN
Status: DISCONTINUED | OUTPATIENT
Start: 2020-08-27 | End: 2020-08-27 | Stop reason: SDUPTHER

## 2020-08-27 RX ORDER — PROPOFOL 10 MG/ML
INJECTION, EMULSION INTRAVENOUS CONTINUOUS PRN
Status: DISCONTINUED | OUTPATIENT
Start: 2020-08-27 | End: 2020-08-27 | Stop reason: SDUPTHER

## 2020-08-27 RX ORDER — CALCIUM GLUCONATE 94 MG/ML
1 INJECTION, SOLUTION INTRAVENOUS ONCE
Status: COMPLETED | OUTPATIENT
Start: 2020-08-27 | End: 2020-08-27

## 2020-08-27 RX ADMIN — HYDRALAZINE HYDROCHLORIDE 25 MG: 25 TABLET, FILM COATED ORAL at 05:50

## 2020-08-27 RX ADMIN — SODIUM CHLORIDE, PRESERVATIVE FREE 10 ML: 5 INJECTION INTRAVENOUS at 20:52

## 2020-08-27 RX ADMIN — DEXTROSE MONOHYDRATE 25 G: 25 INJECTION, SOLUTION INTRAVENOUS at 05:50

## 2020-08-27 RX ADMIN — PROPOFOL 100 MCG/KG/MIN: 10 INJECTION, EMULSION INTRAVENOUS at 09:12

## 2020-08-27 RX ADMIN — SODIUM CHLORIDE: 9 INJECTION, SOLUTION INTRAVENOUS at 02:26

## 2020-08-27 RX ADMIN — HYDRALAZINE HYDROCHLORIDE 25 MG: 25 TABLET, FILM COATED ORAL at 21:48

## 2020-08-27 RX ADMIN — PATIROMER 8.4 G: 8.4 POWDER, FOR SUSPENSION ORAL at 16:15

## 2020-08-27 RX ADMIN — CALCIUM GLUCONATE 1 G: 98 INJECTION, SOLUTION INTRAVENOUS at 06:10

## 2020-08-27 RX ADMIN — INSULIN HUMAN 6 UNITS: 100 INJECTION, SOLUTION PARENTERAL at 05:50

## 2020-08-27 RX ADMIN — SODIUM CHLORIDE: 9 INJECTION, SOLUTION INTRAVENOUS at 09:16

## 2020-08-27 RX ADMIN — HYDRALAZINE HYDROCHLORIDE 25 MG: 25 TABLET, FILM COATED ORAL at 16:14

## 2020-08-27 RX ADMIN — SODIUM BICARBONATE 50 MEQ: 84 INJECTION, SOLUTION INTRAVENOUS at 06:10

## 2020-08-27 RX ADMIN — FERROUS SULFATE TAB 325 MG (65 MG ELEMENTAL FE) 325 MG: 325 (65 FE) TAB at 16:14

## 2020-08-27 RX ADMIN — HEPARIN SODIUM 5000 UNITS: 10000 INJECTION INTRAVENOUS; SUBCUTANEOUS at 21:48

## 2020-08-27 RX ADMIN — CEFAZOLIN 2000 MG: 1 INJECTION, POWDER, FOR SOLUTION INTRAMUSCULAR; INTRAVENOUS at 08:59

## 2020-08-27 RX ADMIN — MENTHOL AND METHYL SALICYLATE: 7.6; 29 OINTMENT TOPICAL at 16:15

## 2020-08-27 RX ADMIN — INSULIN LISPRO 1 UNITS: 100 INJECTION, SOLUTION INTRAVENOUS; SUBCUTANEOUS at 20:46

## 2020-08-27 RX ADMIN — METOPROLOL SUCCINATE 50 MG: 50 TABLET, EXTENDED RELEASE ORAL at 20:47

## 2020-08-27 ASSESSMENT — PULMONARY FUNCTION TESTS
PIF_VALUE: 0
PIF_VALUE: 2
PIF_VALUE: 0
PIF_VALUE: 1
PIF_VALUE: 0
PIF_VALUE: 1

## 2020-08-27 ASSESSMENT — PAIN SCALES - GENERAL
PAINLEVEL_OUTOF10: 0

## 2020-08-27 NOTE — CARE COORDINATION
Social Work/Discharge Planning:  Patient off unit for tunneled dialysis catheter. Gregory from [facility] can accept patient and no pre-cert needed. Notified patient harry Bowman Cortes (ph: [phone]) and charge nurse. Will continue to follow.   Electronically signed by SCOTT Oneill on 8/27/2020 at 9:26 AM

## 2020-08-27 NOTE — CONSULTS
Vascular Surgery Inpatient Consultation Note      Reason for Consultation: Hemodialysis access. HISTORY OF PRESENT ILLNESS:                The patient is a 80 y.o. male who is admitted to the hospital for treatment of chronic renal failure. He had outpatient labs that showed an elevated creatinine and was instructed to go to the hospital for admission and initiation of dialysis. He is extremely hard of hearing. He denies previous dialysis. Vascular surgery is consulted for evaluation and treatment. IMPRESSION: Chronic renal failure now stage V.    RECOMMENDATIONS: Insertion tunneled dialysis catheter. I reviewed the procedure with the patient. I discussed the risks, benefits, and alternatives of the procedure. The patient understands and consents. All questions were answered. This patient is very frail and it is unlikely that he will do well with hemodialysis long-term. An AV fistula would never mature in him and it is unlikely that he would be able to support an AV graft. I have no plans at this point to ever insert peripheral access. This patient will be catheter dependent.       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    ASHD (arteriosclerotic heart disease) I25.10    Diabetes mellitus ([address] E11.9    Diabetes mellitus ([address] E11.9    Diabetic peripheral neuropathy ([facility]) E11.42    Osteoarthritis M19.90    Gait abnormality R26.9    Physical deconditioning R53.81    Lumbar spondylitis ([facility]) M46.96    Anemia D64.9    Fx humer, med condyl-closed S42.463A    ASHD (arteriosclerotic heart disease) I25.10    Cerebral infarction ([facility]) I63.9    Renal failure N19    TIA (transient ischemic attack) G45.9    ASHD (arteriosclerotic heart disease) I25.10    Diabetes mellitus ([facility]) E11.9    PVD (peripheral vascular disease) with claudication ([address] I73.9    History of CVA (cerebrovascular accident) Z86.73    Pain in lower limb M79.606    Cerebral infarction ([address] I63.9    CKD 3 N18.3    Diabetes mellitus-2 E11.9    Acute respiratory failure (HCC) J96.00    Decubitus ulcer of sacral region, stage 1 L89.151    Right sided weakness R53.1    Hypoglycemia E16.2    Acute kidney injury (ZAHRAA) with acute tubular necrosis (ATN) ([facility]) N17.0    Acute kidney failure (HCC) N17.9       Past Medical History:   Diagnosis Date    Arthritis     CAD (coronary artery disease)     Chronic kidney disease     COPD (chronic obstructive pulmonary disease) ([facility])     Decubitus ulcer of sacral region, stage 1 3/19/2018    Diabetes mellitus ([address]     Diabetic peripheral neuropathy [facility] 11/9/2012    History of CVA (cerebrovascular accident) 6/9/2014    Hypertension     Other disorders of kidney and ureter     prostate infections in past    Pain in lower limb 6/9/2014    PVD (peripheral vascular disease) with claudication ([address] 6/9/2014    Right sided weakness 3/19/2018    TIA (transient ischemic attack)     possible several years ago    Unspecified cerebral artery occlusion with cerebral infarction         Past Surgical History:   Procedure Laterality Date    ABDOMINAL HERNIA REPAIR      CATARACT REMOVAL      right    CHOLECYSTECTOMY  11/2011    DENTAL SURGERY      EYE SURGERY      INGUINAL HERNIA REPAIR      INNER EAR SURGERY      removal of sac in ear       Current Medications:    sodium chloride 40 mL/hr at 08/27/20 0226    dextrose        analgesic ointment, sodium chloride flush, acetaminophen **OR** acetaminophen, promethazine **OR** ondansetron, glucose, dextrose, glucagon (rDNA), dextrose    hydrALAZINE  25 mg Oral 3 times per day    patiromer sorbitex calcium  8.4 g Oral Daily    ceFAZolin  1 g Intravenous See Admin Instructions    ferrous sulfate  325 mg Oral BID WC    epoetin mayra-epbx  4,000 Units Subcutaneous Once per day on     magnesium oxide  400 mg Oral Daily    aspirin  81 mg Oral QAM    metoprolol succinate  50 mg Oral BID    pravastatin  20 mg Oral Lunch    sodium chloride flush  10 mL Intravenous 2 times per day    heparin (porcine)  5,000 Units Subcutaneous 3 times per day    insulin lispro  0-12 Units Subcutaneous TID WC    insulin lispro  0-6 Units Subcutaneous Nightly        Allergies:  Sulfa antibiotics and Sulfa antibiotics    Social History     Socioeconomic History    Marital status: Single     Spouse name: Not on file    Number of children: Not on file    Years of education: Not on file    Highest education level: Not on file   Occupational History    Not on file   Social Needs    Financial resource strain: Not on file    Food insecurity     Worry: Not on file     Inability: Not on file    Transportation needs     Medical: Not on file     Non-medical: Not on file   Tobacco Use    Smoking status: Former Smoker     Packs/day: 0.50     Years: 20.00     Pack years: 10.00     Types: Cigarettes     Last attempt to quit: 1967     Years since quittin.6    Smokeless tobacco: Never Used   Substance and Sexual Activity    Alcohol use: No     Alcohol/week: 1.0 standard drinks     Types: 1 Glasses of wine per week     Comment: occas    Drug use: No    Sexual activity: Never   Lifestyle    Physical activity     Days per week: Not on file     Minutes per session: Not on file    Stress: Not on file   Relationships    Social connections     Talks on phone: Not on file     Gets together: Not on file     Attends Zoroastrianism service: Not on file     Active member of club or organization: Not on file     Attends meetings of clubs or organizations: Not on file     Relationship status: Not on file    Intimate partner violence     Fear of current or ex partner: Not on file     Emotionally abused: Not on file     Physically abused: Not on file     Forced sexual activity: Not on file   Other Topics Concern    Not on file   Social History Narrative    ** Merged History Encounter **             History reviewed. No pertinent family history.     REVIEW OF SYSTEMS:      Eyes:      Blurred vision:  No []/Yes [x]               Diplopia:   No [x]/Yes []               Vision loss:       No [x]/Yes []   Ears, nose, throat:             Hearing loss:    No []/Yes [x]      Vertigo:   No [x]/Yes []                       Swallowing problem:  No [x]/Yes []               Nose bleeds:   No [x]/Yes []      Voice hoarseness:  No [x]/Yes []  Respiratory:             Cough:   No [x]/Yes []      Pleuritic chest pain:  No [x]/Yes []                        Dyspnea:   No [x]/Yes []      Wheezing:   No []/Yes [x]  Cardiovascular:             Angina:   No [x]/Yes []      Palpitations:   No [x]/Yes []          Claudication:    No [x]/Yes []      Leg swelling:   No [x]/Yes []  Gastrointestinal:             Nausea or vomiting:  No [x]/Yes []               Abdominal pain:  No [x]/Yes []                     Intestinal bleeding: No [x]/Yes []  Musculoskeletal:             Leg pain:   No [x]/Yes []      Back pain:   No [x]/Yes []                    Weakness:   No []/Yes [x]  Neurologic:             Numbness:   No [x]/Yes []      Paralysis:   No [x]/Yes []                       Headaches:   No [x]/Yes []  Hematologic, lymphatic:   Anemia:   No [x]/Yes []              Bleeding or bruising:  No [x]/Yes []              Fevers or chills: No [x]/Yes []  Endocrine:             Temp intolerance:   No [x]/Yes []                       Polydipsia, polyuria:  No [x]/Yes []  Skin:              Rash:    No [x]/Yes []      Ulcers:   No [x]/Yes []              Abnorm pigment: No [x]/Yes []  :              Frequency/urgency:  No [x]/Yes []      Hematuria:    No [x]/Yes []                      Incontinence:    No [x]/Yes []    PHYSICAL EXAM:  Vitals:    08/27/20 0822   BP: (!) 161/73   Pulse: 71   Resp: 20   Temp:    SpO2:      General Appearance: alert and oriented to person, place and time, in no acute distress, frail  Neurologic: no cranial nerve deficit, speech normal  Head: normocephalic and atraumatic  Eyes: extraocular eye movements intact, conjunctivae normal  ENT: external ear and ear canal normal bilaterally, nose without deformity  Pulmonary/Chest: normal air movement, no respiratory distress, wheezing  Cardiovascular: normal rate, regular rhythm  Abdomen: non-distended, no masses  Musculoskeletal: no joint deformity or tenderness  Extremities: no leg edema bilaterally  Skin: warm and dry, no rash or erythema    PULSE EXAM      Right      Left   Brachial     Radial     Femoral     Popliteal     Dorsalis Pedis     Posterior Tibial     (3=normal, 2=diminished, 1=barely palpable, 4=widened)      LABS:    Lab Results   Component Value Date    WBC 4.5 08/24/2020    HGB 8.5 (L) 08/24/2020    HCT 27.1 (L) 08/24/2020     08/24/2020    PROTIME 20.1 (H) 01/12/2019    INR 1.8 01/12/2019    K 5.5 (H) 08/27/2020    BUN 71 (H) 08/27/2020    CREATININE 3.8 (H) 08/27/2020       RADIOLOGY:

## 2020-08-27 NOTE — ANESTHESIA PRE PROCEDURE
Department of Anesthesiology  Preprocedure Note       Name:  Tennille Rolon   Age:  80 y.o.  :  1931                                          MRN:  [id number]         Date:  2020      Surgeon: Mark Lowe):  Ira Troy MD    Procedure: Procedure(s):  TESIO CATHETER INSERTION    Medications prior to admission:   Prior to Admission medications    Medication Sig Start Date End Date Taking? Authorizing Provider   carvedilol (COREG) 12.5 MG tablet Take 12.5 mg by mouth 2 times daily    Yes Historical Provider, MD   Multiple Vitamins-Minerals (PRESERVISION/LUTEIN) CAPS    Yes Historical Provider, MD   SITagliptin (JANUVIA) 100 MG tablet Take 100 mg by mouth daily   Yes Historical Provider, MD   lisinopril (PRINIVIL;ZESTRIL) 5 MG tablet Take 5 mg by mouth daily   Yes Historical Provider, MD   irbesartan (AVAPRO) 300 MG tablet Take 300 mg by mouth nightly    Yes Historical Provider, MD   famotidine (PEPCID) 40 MG tablet Take 40 mg by mouth as needed   Yes Historical Provider, MD   ferrous sulfate (IRON 325) 325 (65 Fe) MG tablet Take 325 mg by mouth nightly   Yes Historical Provider, MD   vitamin D (CHOLECALCIFEROL) 25 MCG (1000 UT) TABS tablet Take 1,000 Units by mouth daily   Yes Historical Provider, MD   furosemide (LASIX) 20 MG tablet Take 1 tablet by mouth every morning prn 19  Yes Gui Rae MD   oxybutynin (DITROPAN) 5 MG tablet Take 5 mg by mouth every evening   Yes Historical Provider, MD   aspirin 81 MG tablet Take 81 mg by mouth every morning    Yes Historical Provider, MD   pravastatin (PRAVACHOL) 20 MG tablet Take 20 mg by mouth Daily with lunch    Yes Historical Provider, MD   guaiFENesin 400 MG tablet Take 400 mg by mouth as needed for Cough    Historical Provider, MD   LORazepam (ATIVAN) 1 MG tablet Take 1 mg by mouth as needed.     Historical Provider, MD   metoprolol succinate (TOPROL XL) 50 MG extended release tablet Take 1 tablet by mouth 2 times daily 19   Gui Rae 216 lb 6 oz (98.1 kg)     Height:                                                  BP Readings from Last 3 Encounters:   08/27/20 (!) 161/73   01/17/19 (!) 150/70   01/12/19 (!) 165/76       NPO Status: Time of last liquid consumption: 1600                        Time of last solid consumption: 1600                        Date of last liquid consumption: 08/26/20                        Date of last solid food consumption: 08/26/20    BMI:   Wt Readings from Last 3 Encounters:   08/27/20 216 lb 6 oz (98.1 kg)   01/17/19 170 lb (77.1 kg)   01/12/19 170 lb (77.1 kg)     Body mass index is 34.92 kg/m². CBC:   Lab Results   Component Value Date    WBC 4.5 08/24/2020    RBC 2.70 08/24/2020    HGB 8.5 08/24/2020    HCT 27.1 08/24/2020    .4 08/24/2020    RDW 14.4 08/24/2020     08/24/2020       CMP:   Lab Results   Component Value Date     08/27/2020    K 5.5 08/27/2020    K 4.6 08/15/2020     08/27/2020    CO2 21 08/27/2020    BUN 71 08/27/2020    CREATININE 3.8 08/27/2020    GFRAA 18 08/27/2020    LABGLOM 15 08/27/2020    GLUCOSE 105 08/27/2020    GLUCOSE 163 02/22/2012    PROT 5.5 08/24/2020    CALCIUM 8.3 08/27/2020    BILITOT 0.4 08/24/2020    ALKPHOS 99 08/24/2020    AST 19 08/24/2020    ALT 25 08/24/2020       POC Tests: No results for input(s): POCGLU, POCNA, POCK, POCCL, POCBUN, POCHEMO, POCHCT in the last 72 hours.     Coags:   Lab Results   Component Value Date    PROTIME 20.1 01/12/2019    PROTIME 15.1 01/29/2012    INR 1.8 01/12/2019    APTT 24.9 01/12/2019       HCG (If Applicable): No results found for: PREGTESTUR, PREGSERUM, HCG, HCGQUANT     ABGs: No results found for: PHART, PO2ART, REY0WOZ, HMZ2ZJB, BEART, V0ASZPAI     Type & Screen (If Applicable):  No results found for: LABABO, LABRH    Drug/Infectious Status (If Applicable):  No results found for: HIV, HEPCAB    COVID-19 Screening (If Applicable):   Lab Results   Component Value Date    COVID19 Not Detected 08/24/2020

## 2020-08-27 NOTE — ANESTHESIA POSTPROCEDURE EVALUATION
Department of Anesthesiology  Postprocedure Note    Patient: Cleone Mcburney  MRN: [id number]  YOB: 1931  Date of evaluation: 8/27/2020  Time:  12:00 PM     Procedure Summary     Date:  08/27/20 Room / Location:  [facility] [address] / [address] [place] [address]    Anesthesia Start:  1728 Anesthesia Stop:  0456    Procedure:  TESIO CATHETER INSERTION (Right Neck) Diagnosis:  (CHRONIC RENAL FAILURE)    Surgeon:  Renny Blanchard MD Responsible Provider:  Elina Varma DO    Anesthesia Type:  MAC ASA Status:  4          Anesthesia Type: MAC    Joel Phase I: Joel Score: 10    Joel Phase II: Joel Score: 10    Last vitals: Reviewed and per EMR flowsheets.        Anesthesia Post Evaluation    Patient location during evaluation: PACU  Patient participation: complete - patient participated  Level of consciousness: awake and alert  Airway patency: patent  Nausea & Vomiting: no nausea and no vomiting  Complications: no  Cardiovascular status: hemodynamically stable  Respiratory status: acceptable  Hydration status: euvolemic

## 2020-08-27 NOTE — PROGRESS NOTES
[facility] MD Elva Mead, MD Valentine Asif, MD Zena Leal, MD Zaire Lucia, CNP   Anu Lux, DAISY  Progress Note    8/26/2020    SUBJECTIVE:   8/17: Denies complaint. Feeling better. (-) sob/grace/cp/palp Appetite ok. No swelling. ROS unremarkable. 8/18: Resting comfortably, reading the news on his iPad. No dyspnea at rest on room air. Peripheral swelling about the same as yesterday. No chest pain or palpitations. 8/19:  Seen this afternoon. Pedal and distal LE swelling. Ongoing fatigure, about the same. (-) sob at rest  8/20: Swelling worse today. Good appetite. No dyspnea at rest on room air.  8/21: Swelling a little bit better. Wheezing is worse today. Did get up and ambulate in his room with assistance but for the most part remains chair-bound   8/22: No new complaint. Still essentially bedbound. Swelling a little bit better than yesterday. Audible wheezing. Good appetite good intake  8/23: Status quo, though remains in good spirits. Denies dyspnea at rest.  Wheezing a little better today. Appetite remains good. Ongoing fatigue, generalized weakness. Swelling a little better than yesterday. 8/24: Feeling better. Appetite good. Not drinking as much Ensure. Swelling a little better. Otherwise status quo. No new complaint. 8/25: GRACE. Appetite okay. Swelling about the same. ROS otherwise unremarkable. 8/26: \"Feeling with fluid. \"  Swelling little worse than yesterday. Mildly dyspneic. No cough, no wheeze. No chest pain. 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  *         DIET:    DIET CARB moist  Neck: Trachea midline,  Cardiovascular: S1, S2 regular rhythm, no murmur,or rub  Respiratory: Coarse breath sounds,  basilar crackles somewhat better than yesterday, bilateral upper airway wheezes --improved, none in lower fields. Gastrointestinal:  Soft, nontender, nondistended, NABS  Ext: 1-2+ distal lower extremity edema --a little worse than yesterday, feet warm  Skin: dry, no rash  Neuro: awake, alert, interactive      DATA:    Recent Labs     08/24/20  0430   WBC 4.5   HGB 8.5*   HCT 27.1*   .4*        Recent Labs     08/24/20  0430  08/25/20  1634 08/25/20  1937 08/26/20  0334      < > 135 139 137   K 5.2*   < > 5.8* 4.8 5.2*      < > 105 105 105   CO2 24   < > 22 24 23   MG 1.8  --   --   --   --    PHOS 4.3  --   --   --   --    BUN 81*   < > 75* 71* 74*   CREATININE 4.3*   < > 4.1* 4.1* 3.9*   ALT 25  --   --   --   --    AST 19  --   --   --   --    BILITOT 0.4  --   --   --   --    ALKPHOS 99  --   --   --   --     < > = values in this interval not displayed. Lab Results   Component Value Date    LABPROT 7.6 (H) 08/22/2020    LABPROT 7.6 08/22/2020       ASSESSMENT / RECOMMENDATIONS:       1-Acute kidney injury, hemodynamically-mediated, possibly attributable to 2 RAAS blocker. 2-chronic kidney disesae stage IV baseline cr around 2 with around 2 g proteinuria     3-Hyperkalemia due to #1,#2.   Resolved    4-Metabolic acidosis, non-anion gap, secondary acute kidney injury/CKD    5-anemia of chronic disease (CKD) : tsat 50     6-Mineral bone disease : phosph 4.4 no need for binders        -creatinine improved initially with IV fluid resuscitation, though has since climbed back up to a level at which he presented after he started diuretic therapy.     -I suspect the injury he sustained was acute tubular necrosis, and he may be seeing a new baseline Cr for the time being.  -Edema little bit better  -Azotemia stable, now hyperkalemic      --> Decrease IV normal saline rate as he is n.p.o. overnight  --> Continue to hold diuretic therapy  --> Keep feet up when not using them  --> FERMIN hose once edema improved  --> Continue to hold both lisinopril and irbesartan   --> Follow labs, UO    Discussed at length 8/25 with Mr. Nery Valle and his brother. Mr. Nery Valle wants to give dialysis a try. His sister undergoes dialysis, and apparently has been tolerating it well recently. Recommend at least a several week trial, if still necessary, and then decide whether he wishes to proceed with dialysis indefinitely.     --> tunneled dialysis catheter tomorrow morning  --> IHD to follow  --> Follow labs, BP, UO      Electronically signed by Mayra Doyle MD on 8/26/2020

## 2020-08-27 NOTE — PROGRESS NOTES
[facility] MD Sammi Villanueva, MD Dana Shay, MD Nhung Griffiths, CNP   Anu Lux, ANP  Progress Note    8/27/2020    SUBJECTIVE:   8/17: Denies complaint. Feeling better. (-) sob/grace/cp/palp Appetite ok. No swelling. ROS unremarkable. 8/18: Resting comfortably, reading the news on his iPad. No dyspnea at rest on room air. Peripheral swelling about the same as yesterday. No chest pain or palpitations. 8/19:  Seen this afternoon. Pedal and distal LE swelling. Ongoing fatigure, about the same. (-) sob at rest  8/20: Swelling worse today. Good appetite. No dyspnea at rest on room air.  8/21: Swelling a little bit better. Wheezing is worse today. Did get up and ambulate in his room with assistance but for the most part remains chair-bound   8/22: No new complaint. Still essentially bedbound. Swelling a little bit better than yesterday. Audible wheezing. Good appetite good intake  8/23: Status quo, though remains in good spirits. Denies dyspnea at rest.  Wheezing a little better today. Appetite remains good. Ongoing fatigue, generalized weakness. Swelling a little better than yesterday. 8/24: Feeling better. Appetite good. Not drinking as much Ensure. Swelling a little better. Otherwise status quo. No new complaint. 8/25: GRACE. Appetite okay. Swelling about the same. ROS otherwise unremarkable. 8/26: \"Feeling with fluid. \"  Swelling little worse than yesterday. Mildly dyspneic. No cough, no wheeze. No chest pain. 8/27: Status post tunneled dialysis catheter placement this morning without complication. Well-tolerated. Still mildly dyspneic. Swelling persists. Ate lunch, reasonable appetite. 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  *         DIET:    Dietary Nutrition Supplements: Low Calorie High Protein Supplement  DIET RENAL;     MEDS (scheduled):    hydrALAZINE  25 mg Oral 3 times per day    patiromer sorbitex calcium  8.4 g Oral Daily    ceFAZolin  1 g Intravenous See Admin Instructions    ferrous sulfate  325 mg Oral BID WC    epoetin mayra-epbx  4,000 Units Subcutaneous Once per day on Mon Wed Fri    magnesium oxide  400 mg Oral Daily    aspirin  81 mg Oral QAM    metoprolol succinate  50 mg Oral BID    pravastatin  20 mg Oral Lunch    sodium chloride flush  10 mL Intravenous 2 times per day    heparin (porcine)  5,000 Units Subcutaneous 3 times per day    insulin lispro  0-12 Units Subcutaneous TID WC    insulin lispro  0-6 Units Subcutaneous Nightly       MEDS (infusions):   sodium chloride 40 mL/hr at 08/27/20 0226    dextrose         MEDS (prn):  analgesic ointment, sodium chloride flush, acetaminophen **OR** acetaminophen, promethazine **OR** ondansetron, glucose, dextrose, glucagon (rDNA), dextrose    PHYSICAL EXAM:     Patient Vitals for the past 24 hrs:   BP Temp Temp src Pulse Resp SpO2 Weight   08/27/20 1049 (!) 170/90 97.5 °F (36.4 °C) Axillary 72 22 96 % --   08/27/20 1020 (!) 174/80 97.8 °F (36.6 °C) Temporal 70 18 -- --   08/27/20 1005 (!) 179/82 -- -- 71 20 100 % --   08/27/20 0954 (!) 169/84 -- -- 70 22 100 % --   08/27/20 0822 (!) 161/73 -- -- 71 20 -- --   08/27/20 0547 (!) 150/74 -- -- 63 -- -- --   08/27/20 0036 -- -- -- -- -- -- 216 lb 6 oz (98.1 kg)   08/27/20 0017 (!) 163/70 97.7 °F (36.5 °C) Oral 61 20 95 % --   08/26/20 1926 (!) 167/80 97.9 °F (36.6 °C) Oral 65 16 94 % --   08/26/20 1415 (!) 158/60 97.8 °F (36.6 °C) Oral 72 20 -- --   @      Intake/Output Summary (Last 24 hours) at 8/27/2020 1316  Last data filed at 8/27/2020 1148  Gross per 24 hour   Intake 2937 ml   Output 630 ml   Net 2307 ml         Wt Readings from Last 3 Encounters:   08/27/20 216 lb 6 oz (98.1 kg)   01/17/19 170 lb (77.1 kg)   01/12/19 170 lb (77.1 kg)       Constitutional:  in no acute distress  HEENT: NC/AT, EOMI, sclera and conjunctiva are clear and anicteric, mucus membranes moist  Neck: Trachea midline,  Cardiovascular: S1, S2 regular rhythm, no murmur,or rub  Respiratory: Coarse breath sounds,  basilar crackles somewhat better than yesterday, bilateral upper airway wheezes --improved, none in lower fields. Gastrointestinal:  Soft, nontender, nondistended, NABS  Ext: 1-2+ distal lower extremity edema --a little worse than yesterday, feet warm  Skin: dry, no rash  Neuro: awake, alert, interactive      DATA:    No results for input(s): WBC, HGB, HCT, MCV, PLT in the last 72 hours. Recent Labs     08/26/20  0334 08/27/20  0333 08/27/20  0812    138 139   K 5.2* 5.5* 4.9    108* 108*   CO2 23 21* 22   MG  --  1.7  --    PHOS  --  4.3  --    BUN 74* 71* 71*   CREATININE 3.9* 3.8* 3.9*       Lab Results   Component Value Date    LABPROT 7.6 (H) 08/22/2020    LABPROT 7.6 08/22/2020       ASSESSMENT / RECOMMENDATIONS:       1-Acute kidney injury, hemodynamically-mediated, possibly attributable to 2 RAAS blocker. 2-chronic kidney disesae stage IV baseline cr around 2 with around 2 g proteinuria     3-Hyperkalemia due to #1,#2.   Resolved    4-Metabolic acidosis, non-anion gap, secondary acute kidney injury/CKD    5-anemia of chronic disease (CKD) : tsat 50     6-Mineral bone disease : phosph 4.4 no need for binders        -creatinine improved initially with IV fluid resuscitation, though has since climbed back up to a level at which he presented after he started diuretic therapy.     -I suspect the injury he sustained was acute tubular necrosis, and he may be seeing a new baseline Cr for the time being.  -Edema little bit worse  -Azotemia stable, now

## 2020-08-27 NOTE — OP NOTE
Yolanda Roca  9/20/1931      DATE OF PROCEDURE: 8/27/2020     SURGEON: Jonathan Chau M.D.     ASSISTANT:      PREOPERATIVE DIAGNOSIS: Acute on chronic renal failure. POSTOPERATIVE DIAGNOSIS: Same    OPERATION: Insertion of right internal jugular vein tunneled hemodialysis catheter (68826), fluoroscopy (07803-08)     ANESTHESIA:      ESTIMATED BLOOD LOSS: Minimal.     COMPLICATIONS: None    DESCRIPTION OF PROCEDURE: The patient was identified and the procedure was confirmed. The neck and chest were prepped and draped in the usual sterile fashion. Next, 1% lidocaine mixed with 0.25% Marcaine was used for local anesthesia. The right internal jugular vein was percutaneously entered and a guidewire was advanced into the superior vena cava under fluoroscopic guidance. A small incision was made around the wire. The catheter was pulled through a subcutaneous tunnel from an inferior chest stab incision to the neck incision. The dilators were passed over the wire followed by the introducer. The catheter was passed through the introducer and the tip was positioned in the superior vena cava right atrial junction under fluoroscopic guidance. Both lumens were noted to withdrawal and flush blood easily and were flushed with heparinized saline solution. They catheter was secured to the skin with nylon sutures and the neck incision was approximated with an interrupted Vicryl suture. Sterile dressings were applied to the incisions in the operating room. Needle, sponge, and instrument counts were reported as correct times two. The patient tolerated the procedure and was transferred to the recovery area in satisfactory condition.

## 2020-08-27 NOTE — PROGRESS NOTES
Internal Medicine  Progress Note  Mena Barnes MD     Subjective:     Patient is alert. Patient does ask if dialysis will improve his quality of life. Tolerating diet well no other c/o. Scheduled Meds:   hydrALAZINE  25 mg Oral 3 times per day    patiromer sorbitex calcium  8.4 g Oral Daily    ceFAZolin  1 g Intravenous See Admin Instructions    ferrous sulfate  325 mg Oral BID WC    epoetin mayra-epbx  4,000 Units Subcutaneous Once per day on Mon Wed Fri    magnesium oxide  400 mg Oral Daily    aspirin  81 mg Oral QAM    metoprolol succinate  50 mg Oral BID    pravastatin  20 mg Oral Lunch    sodium chloride flush  10 mL Intravenous 2 times per day    heparin (porcine)  5,000 Units Subcutaneous 3 times per day    insulin lispro  0-12 Units Subcutaneous TID WC    insulin lispro  0-6 Units Subcutaneous Nightly     Continuous Infusions:   sodium chloride 40 mL/hr at 08/27/20 0226    dextrose       PRN Meds:analgesic ointment, sodium chloride flush, acetaminophen **OR** acetaminophen, promethazine **OR** ondansetron, glucose, dextrose, glucagon (rDNA), dextrose    Objective:      Physical Exam:  Vitals:   BP (!) 150/74   Pulse 63   Temp 97.7 °F (36.5 °C) (Oral)   Resp 20   Ht 5' 6\" (1.676 m)   Wt 216 lb 6 oz (98.1 kg)   SpO2 95%   BMI 34.92 kg/m²   I/O's    Intake/Output Summary (Last 24 hours) at 8/27/2020 5948  Last data filed at 8/27/2020 0116  Gross per 24 hour   Intake 2587 ml   Output 625 ml   Net 1962 ml     Exam:  General appearance: alert, appears stated age and cooperative  Head: Normocephalic, without obvious abnormality, atraumatic  Eyes: conjunctivae/corneas clear. PERRL, EOM's intact. Fundi benign. Throat: lips, mucosa, and tongue normal; teeth and gums normal  Neck: no adenopathy, no carotid bruit, no JVD, supple, symmetrical, trachea midline and thyroid not enlarged, symmetric, no tenderness/mass/nodules  Back: symmetric, no curvature.  ROM normal. No CVA tenderness. Lungs: clear to auscultation bilaterally  Chest wall: no tenderness  Heart: regular rate and rhythm, S1, S2 normal, no murmur, click, rub or gallop  Abdomen: soft, non-tender; bowel sounds normal; no masses,  no organomegaly  Extremities: extremities normal, atraumatic, no cyanosis or edema  Pulses: 2+ and symmetric  Skin: Skin color, texture, turgor normal. No rashes or lesions  Lymph nodes: Cervical, supraclavicular, and axillary nodes normal.  Neurologic: Grossly normal      Imaging     Chest  Xray:  No results found for this or any previous visit. Results for orders placed during the hospital encounter of 20   XR CHEST PORTABLE    Narrative Patient MRN: [id number]  : 1931  Age:  80 years  Gender: Male  Order Date: 2020 4:50 PM  Exam: XR CHEST PORTABLE  Number of Images: 1 view  Indication:   Shortness of breath   Shortness of breath  Comparison: 2019    Findings: The heart is enlarged. The lung fields demonstrate no significant pulmonary vascular  congestion or edema. The aorta is tortuous ectatic and calcified. .      Impression Cardiomegaly  Findings compatible with atherosclerotic disease of the aorta.                  Additional Imaging:  none    Lab Review   Recent Results (from the past 24 hour(s))   POCT Glucose    Collection Time: 20 11:07 AM   Result Value Ref Range    Meter Glucose 120 (H) 74 - 99 mg/dL   POCT Glucose    Collection Time: 20  3:52 PM   Result Value Ref Range    Meter Glucose 146 (H) 74 - 99 mg/dL   POCT Glucose    Collection Time: 20  8:52 PM   Result Value Ref Range    Meter Glucose 115 (H) 74 - 99 mg/dL   Basic metabolic panel    Collection Time: 20  3:33 AM   Result Value Ref Range    Sodium 138 132 - 146 mmol/L    Potassium 5.5 (H) 3.5 - 5.0 mmol/L    Chloride 108 (H) 98 - 107 mmol/L    CO2 21 (L) 22 - 29 mmol/L    Anion Gap 9 7 - 16 mmol/L    Glucose 105 (H) 74 - 99 mg/dL    BUN 71 (H) 8 - 23 mg/dL    CREATININE 3.8 (H)

## 2020-08-27 NOTE — PLAN OF CARE
Problem: Pain:  Goal: Pain level will decrease  Description: Pain level will decrease  8/27/2020 0300 by Ezio Tolbert RN  Outcome: Met This Shift  8/26/2020 1520 by Daryl Harvey RN  Outcome: Met This Shift  Goal: Control of acute pain  Description: Control of acute pain  Outcome: Met This Shift  Goal: Control of chronic pain  Description: Control of chronic pain  Outcome: Met This Shift     Problem: Falls - Risk of:  Goal: Will remain free from falls  Description: Will remain free from falls  8/27/2020 0300 by Ezio Tolbert RN  Outcome: Met This Shift  8/26/2020 1520 by Daryl Harvey RN  Outcome: Met This Shift  Goal: Absence of physical injury  Description: Absence of physical injury  8/27/2020 0300 by Ezio Tolbert RN  Outcome: Met This Shift  8/26/2020 1520 by Daryl Harvey RN  Outcome: Met This Shift     Problem: Skin Integrity:  Goal: Absence of new skin breakdown  Description: Absence of new skin breakdown  Outcome: Met This Shift     Problem: Serum Glucose Level - Abnormal:  Goal: Ability to maintain appropriate glucose levels will improve  Description: Ability to maintain appropriate glucose levels will improve  Outcome: Met This Shift     Problem: Sensory Perception - Impaired:  Goal: Ability to maintain a stable neurologic state will improve  Description: Ability to maintain a stable neurologic state will improve  Outcome: Met This Shift

## 2020-08-27 NOTE — PROGRESS NOTES
Nursing Transfer Note    Data:  Summary of patients progress: general anesthesia recovery    Reason for transfer: PACU discharge criteria met, transferred to next level of care. Action:  Explained reason for transfer to Patient/Family  Report given to: rn, using RN Handoff Navigator.   Mode of transportation: Cart    Response:  RN Recommendations:continuation of care and pain control

## 2020-08-27 NOTE — FLOWSHEET NOTE
08/27/20 1515   Vital Signs   BP (!) 154/83   Temp 97.4 °F (36.3 °C)   Pulse 76   Resp 18   Weight 192 lb 14.4 oz (87.5 kg)   Weight Method Bed scale   Percent Weight Change -1.13   Pain Assessment   Pain Assessment 0-10   Post-Hemodialysis Assessment   Post-Treatment Procedures Blood returned;Catheter capped, clamped with Citrate x 2 ports   Machine Disinfection Process Acid/Vinegar Clean;Machine Absence of Bleach Machine;Bleach; Exterior Machine Disinfection   Rinseback Volume (ml) 300 ml   Total Liters Processed (l/min) 29.4 l/min   Duration of Treatment (minutes) 120 minutes   Hemodialysis Output (ml) 1300 ml   NET Removed (ml) 1000 ml   Bilateral Breath Sounds Diminished   Edema Generalized;Right upper extremity; Left upper extremity;Right lower extremity   Edema Generalized +1;Non-pitting   RUE Edema +1;Non-pitting   LUE Edema +1   RLE Edema +1;Non-pitting   LLE Edema +1;Non-pitting

## 2020-08-28 VITALS
HEART RATE: 75 BPM | DIASTOLIC BLOOD PRESSURE: 58 MMHG | WEIGHT: 190.04 LBS | BODY MASS INDEX: 30.54 KG/M2 | RESPIRATION RATE: 18 BRPM | OXYGEN SATURATION: 97 % | SYSTOLIC BLOOD PRESSURE: 134 MMHG | TEMPERATURE: 97.9 F | HEIGHT: 66 IN

## 2020-08-28 LAB
ANION GAP SERPL CALCULATED.3IONS-SCNC: 9 MMOL/L (ref 7–16)
BUN BLDV-MCNC: 45 MG/DL (ref 8–23)
CALCIUM SERPL-MCNC: 8.4 MG/DL (ref 8.6–10.2)
CHLORIDE BLD-SCNC: 108 MMOL/L (ref 98–107)
CO2: 24 MMOL/L (ref 22–29)
CREAT SERPL-MCNC: 3.1 MG/DL (ref 0.7–1.2)
GFR AFRICAN AMERICAN: 23
GFR NON-AFRICAN AMERICAN: 19 ML/MIN/1.73
GLUCOSE BLD-MCNC: 116 MG/DL (ref 74–99)
HAV IGM SER IA-ACNC: NORMAL
HBV SURFACE AB TITR SER: REACTIVE {TITER}
HCT VFR BLD CALC: 27.5 % (ref 37–54)
HEMOGLOBIN: 8.4 G/DL (ref 12.5–16.5)
HEPATITIS B CORE IGM ANTIBODY: NORMAL
HEPATITIS B SURFACE ANTIGEN INTERPRETATION: NORMAL
HEPATITIS C ANTIBODY INTERPRETATION: NORMAL
MAGNESIUM: 1.8 MG/DL (ref 1.6–2.6)
MCH RBC QN AUTO: 31.2 PG (ref 26–35)
MCHC RBC AUTO-ENTMCNC: 30.5 % (ref 32–34.5)
MCV RBC AUTO: 102.2 FL (ref 80–99.9)
METER GLUCOSE: 109 MG/DL (ref 74–99)
METER GLUCOSE: 128 MG/DL (ref 74–99)
PDW BLD-RTO: 14.7 FL (ref 11.5–15)
PHOSPHORUS: 3.6 MG/DL (ref 2.5–4.5)
PLATELET # BLD: 158 E9/L (ref 130–450)
PMV BLD AUTO: 10.5 FL (ref 7–12)
POTASSIUM SERPL-SCNC: 4.6 MMOL/L (ref 3.5–5)
RBC # BLD: 2.69 E12/L (ref 3.8–5.8)
SODIUM BLD-SCNC: 141 MMOL/L (ref 132–146)
WBC # BLD: 4.5 E9/L (ref 4.5–11.5)

## 2020-08-28 PROCEDURE — 6370000000 HC RX 637 (ALT 250 FOR IP): Performed by: SURGERY

## 2020-08-28 PROCEDURE — 36415 COLL VENOUS BLD VENIPUNCTURE: CPT

## 2020-08-28 PROCEDURE — 2580000003 HC RX 258: Performed by: SURGERY

## 2020-08-28 PROCEDURE — 85027 COMPLETE CBC AUTOMATED: CPT

## 2020-08-28 PROCEDURE — 6360000002 HC RX W HCPCS: Performed by: SURGERY

## 2020-08-28 PROCEDURE — 83735 ASSAY OF MAGNESIUM: CPT

## 2020-08-28 PROCEDURE — 82962 GLUCOSE BLOOD TEST: CPT

## 2020-08-28 PROCEDURE — 80048 BASIC METABOLIC PNL TOTAL CA: CPT

## 2020-08-28 PROCEDURE — 84100 ASSAY OF PHOSPHORUS: CPT

## 2020-08-28 PROCEDURE — 90935 HEMODIALYSIS ONE EVALUATION: CPT | Performed by: INTERNAL MEDICINE

## 2020-08-28 RX ORDER — HEPARIN SODIUM 1000 [USP'U]/ML
4300 INJECTION, SOLUTION INTRAVENOUS; SUBCUTANEOUS ONCE
Status: DISCONTINUED | OUTPATIENT
Start: 2020-08-28 | End: 2020-08-28 | Stop reason: HOSPADM

## 2020-08-28 RX ORDER — ANALGESIC BALM 1.74; 4.06 G/29G; G/29G
1 OINTMENT TOPICAL PRN
Qty: 100 G | Refills: 0 | Status: SHIPPED | OUTPATIENT
Start: 2020-08-28 | End: 2021-01-20

## 2020-08-28 RX ORDER — HYDRALAZINE HYDROCHLORIDE 25 MG/1
25 TABLET, FILM COATED ORAL EVERY 8 HOURS SCHEDULED
Qty: 90 TABLET | Refills: 3 | Status: SHIPPED | OUTPATIENT
Start: 2020-08-28 | End: 2021-04-05 | Stop reason: ALTCHOICE

## 2020-08-28 RX ORDER — ONDANSETRON 2 MG/ML
4 INJECTION INTRAMUSCULAR; INTRAVENOUS EVERY 6 HOURS PRN
Qty: 84 ML | Refills: 0 | Status: SHIPPED | OUTPATIENT
Start: 2020-08-28 | End: 2020-12-08 | Stop reason: ALTCHOICE

## 2020-08-28 RX ADMIN — EPOETIN ALFA-EPBX 4000 UNITS: 4000 INJECTION, SOLUTION INTRAVENOUS; SUBCUTANEOUS at 10:39

## 2020-08-28 RX ADMIN — SODIUM CHLORIDE, PRESERVATIVE FREE 10 ML: 5 INJECTION INTRAVENOUS at 10:38

## 2020-08-28 RX ADMIN — HYDRALAZINE HYDROCHLORIDE 25 MG: 25 TABLET, FILM COATED ORAL at 05:57

## 2020-08-28 RX ADMIN — ACETAMINOPHEN 650 MG: 325 TABLET ORAL at 10:38

## 2020-08-28 RX ADMIN — METOPROLOL SUCCINATE 50 MG: 50 TABLET, EXTENDED RELEASE ORAL at 10:38

## 2020-08-28 RX ADMIN — FERROUS SULFATE TAB 325 MG (65 MG ELEMENTAL FE) 325 MG: 325 (65 FE) TAB at 10:38

## 2020-08-28 RX ADMIN — HYDRALAZINE HYDROCHLORIDE 25 MG: 25 TABLET, FILM COATED ORAL at 13:22

## 2020-08-28 RX ADMIN — ASPIRIN 81 MG: 81 TABLET, COATED ORAL at 10:38

## 2020-08-28 RX ADMIN — HEPARIN SODIUM 5000 UNITS: 10000 INJECTION INTRAVENOUS; SUBCUTANEOUS at 13:22

## 2020-08-28 RX ADMIN — PRAVASTATIN SODIUM 20 MG: 20 TABLET ORAL at 10:39

## 2020-08-28 RX ADMIN — HEPARIN SODIUM 5000 UNITS: 10000 INJECTION INTRAVENOUS; SUBCUTANEOUS at 05:57

## 2020-08-28 RX ADMIN — MAGNESIUM OXIDE TAB 400 MG (241.3 MG ELEMENTAL MG) 400 MG: 400 (241.3 MG) TAB at 10:38

## 2020-08-28 ASSESSMENT — PAIN SCALES - GENERAL
PAINLEVEL_OUTOF10: 0
PAINLEVEL_OUTOF10: 3
PAINLEVEL_OUTOF10: 0

## 2020-08-28 NOTE — CARE COORDINATION
CASE MANAGEMENT. .. Chart reviewed. Patient received 2nd HD this am. Spoke with Dr Laurie Nichole. He is agreeable to [facility] today. Erin Bundy with [facility] can accept patient in room 729. Ok to accept at 2pm. Nursing aware of the above and will obtain discharge order. Mr Dorna Goldmann on board with plans. I updated his niece, Mai Lara on the above per his request. [phone]. Will cont to follow.

## 2020-08-28 NOTE — FLOWSHEET NOTE
08/28/20 1018   Vital Signs   BP (!) 164/80   Temp 97.9 °F (36.6 °C)   Pulse 75   Resp 16   Weight 190 lb 0.6 oz (86.2 kg)   Weight Method Bed scale   Percent Weight Change -1.15   Pain Assessment   Pain Assessment 0-10   Pain Level 0   Patient's Stated Pain Goal No pain   Response to Pain Intervention Patient Satisfied   Post-Hemodialysis Assessment   Post-Treatment Procedures Blood returned;Catheter capped, clamped and heparinized x 2 ports   Machine Disinfection Process Exterior Machine Disinfection   Rinseback Volume (ml) 300 ml   Total Liters Processed (l/min) 40.1 l/min   Dialyzer Clearance Lightly streaked   Duration of Treatment (minutes) 150 minutes   Hemodialysis Intake (ml) 300 ml   Hemodialysis Output (ml) 1300 ml   NET Removed (ml) 1000 ml   Tolerated Treatment Good   Patient Response to Treatment dialysis completed, pt blood returned, cath care given, both ports of cath flushed c 10 cc ns, heparin instilled to fill volume, caps applied, report called to DANNY Link   Bilateral Breath Sounds Diminished   RUE Edema +2;Non-pitting   RLE Edema +3;Pitting   LLE Edema +2;Pitting   Physician Notified?  No

## 2020-08-28 NOTE — PROGRESS NOTES
[facility] MD Nathaniel Ellington, MD Pastor Sharmin Mcclure, MD Simon Webb, KARAN Lux, DAISY  Progress Note    8/28/2020    SUBJECTIVE:   8/17: Denies complaint. Feeling better. (-) sob/grace/cp/palp Appetite ok. No swelling. ROS unremarkable. 8/18: Resting comfortably, reading the news on his iPad. No dyspnea at rest on room air. Peripheral swelling about the same as yesterday. No chest pain or palpitations. 8/19:  Seen this afternoon. Pedal and distal LE swelling. Ongoing fatigure, about the same. (-) sob at rest  8/20: Swelling worse today. Good appetite. No dyspnea at rest on room air.  8/21: Swelling a little bit better. Wheezing is worse today. Did get up and ambulate in his room with assistance but for the most part remains chair-bound   8/22: No new complaint. Still essentially bedbound. Swelling a little bit better than yesterday. Audible wheezing. Good appetite good intake  8/23: Status quo, though remains in good spirits. Denies dyspnea at rest.  Wheezing a little better today. Appetite remains good. Ongoing fatigue, generalized weakness. Swelling a little better than yesterday. 8/24: Feeling better. Appetite good. Not drinking as much Ensure. Swelling a little better. Otherwise status quo. No new complaint. 8/25: GRACE. Appetite okay. Swelling about the same. ROS otherwise unremarkable. 8/26: \"Feeling with fluid. \"  Swelling little worse than yesterday. Mildly dyspneic. No cough, no wheeze. No chest pain. 8/27: Status post tunneled dialysis catheter placement this morning without complication. Well-tolerated. Still mildly dyspneic. Swelling persists. Ate lunch, reasonable appetite. Denies other new complaint   8/28: Seen in dialysis. Tolerating therapy. BP stable. BFR as prescribed. Neck at the site of the [facility] is a little sore. Swelling improved. No dyspnea at rest on nasal cannula.     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  *         DIET:    Dietary Nutrition Supplements: Low Calorie High Protein Supplement  DIET RENAL;     MEDS (scheduled):    heparin (porcine)  4,300 Units Intracatheter Once    hydrALAZINE  25 mg Oral 3 times per day    ceFAZolin  1 g Intravenous See Admin Instructions    ferrous sulfate  325 mg Oral BID WC    epoetin mayra-epbx  4,000 Units Subcutaneous Once per day on Mon Wed Fri    magnesium oxide  400 mg Oral Daily    aspirin  81 mg Oral QAM    metoprolol succinate  50 mg Oral BID    pravastatin  20 mg Oral Lunch    sodium chloride flush  10 mL Intravenous 2 times per day    heparin (porcine)  5,000 Units Subcutaneous 3 times per day    insulin lispro  0-12 Units Subcutaneous TID WC    insulin lispro  0-6 Units Subcutaneous Nightly       MEDS (infusions):   dextrose         MEDS (prn):  analgesic ointment, sodium chloride flush, acetaminophen **OR** acetaminophen, promethazine **OR** ondansetron, glucose, dextrose, glucagon (rDNA), dextrose    PHYSICAL EXAM:     Patient Vitals for the past 24 hrs:   BP Temp Temp src Pulse Resp SpO2 Weight   08/28/20 1037 (!) 161/82 97.9 °F (36.6 °C) Oral 79 18 97 % --   08/28/20 1018 (!) 164/80 97.9 °F (36.6 °C) -- 75 16 -- 190 lb 0.6 oz (86.2 kg)   08/28/20 1000 (!) 149/79 -- -- 72 -- -- --   08/28/20 0945 125/71 -- -- 75 -- -- --   08/28/20 0933 (!) 154/88 -- -- 74 -- -- --   08/28/20 0916 (!) 146/89 -- -- 74 -- -- --   08/28/20 0900 (!) 143/76 -- -- 71 -- -- --   08/28/20 0845 127/70 -- -- 72 -- -- --   08/28/20 0834 137/67 -- -- 71 -- -- --   08/28/20 0815 112/67 -- -- 62 -- -- --   08/28/20 0800 122/70 -- -- 69 -- -- --   08/28/20 0747 122/64 -- -- 73 -- -- --   08/28/20 0730 118/67 97.9 °F (36.6 °C) -- 76 16 -- 192 lb 3.9 oz (87.2 kg)   08/28/20 0554 124/60 -- -- -- -- -- --   08/28/20 0142 -- -- -- -- -- -- 193 lb (87.5 kg)   08/28/20 0043 (!) 143/75 98.4 °F (36.9 °C) Oral 71 16 96 % --   08/27/20 2001 (!) 141/68 97.7 °F (36.5 °C) Oral 72 20 95 % --   08/27/20 1607 (!) 148/76 97.7 °F (36.5 °C) Oral 75 18 95 % --   08/27/20 1515 (!) 154/83 97.4 °F (36.3 °C) -- 76 18 -- 192 lb 14.4 oz (87.5 kg)   08/27/20 1500 (!) 150/89 -- -- 74 -- -- --   08/27/20 1430 (!) 150/85 -- -- 73 -- -- --   08/27/20 1400 (!) 152/80 -- -- 66 -- -- --   08/27/20 1330 (!) 155/84 -- -- 64 -- -- --   08/27/20 1315 (!) 149/85 -- -- 69 -- -- --   08/27/20 1310 (!) 163/92 96.7 °F (35.9 °C) -- 74 20 -- 195 lb 1.7 oz (88.5 kg)   @      Intake/Output Summary (Last 24 hours) at 8/28/2020 1101  Last data filed at 8/28/2020 1018  Gross per 24 hour   Intake 1020 ml   Output 2800 ml   Net -1780 ml         Wt Readings from Last 3 Encounters:   08/28/20 190 lb 0.6 oz (86.2 kg)   01/17/19 170 lb (77.1 kg)   01/12/19 170 lb (77.1 kg)       Constitutional:  in no acute distress  HEENT: NC/AT, EOMI, sclera and conjunctiva are clear and anicteric, mucus membranes moist  Neck: Trachea midline,  Cardiovascular: S1, S2 regular rhythm, no murmur,or rub  Respiratory: Coarse breath sounds,  basilar crackles somewhat better than yesterday, bilateral upper airway wheezes --improved, none in lower fields.   Gastrointestinal:  Soft, nontender, nondistended, NABS  Ext: 1-2+ distal lower extremity edema --a little worse than yesterday, feet warm  Skin: dry, no rash  Neuro: awake, alert, interactive      DATA:    Recent Labs     08/28/20  0451   WBC 4.5   HGB 8.4*   HCT 27.5*   .2*        Recent Labs     08/27/20  0333 08/27/20  0812 08/28/20  0451    139 141   K 5.5* 4.9 4.6   * 108* 108*   CO2 21* 22 24   MG 1.7  --  1.8   PHOS 4.3  --  3.6   BUN 71* 71* 45*   CREATININE 3.8* 3.9* 3.1*       Lab Results   Component Value Date    LABPROT 7.6 (H) 08/22/2020    LABPROT 7.6 08/22/2020       ASSESSMENT / RECOMMENDATIONS:       1-Acute kidney injury, hemodynamically-mediated, possibly attributable to 2 RAAS blocker. 2-chronic kidney disesae stage IV baseline cr around 2 with around 2 g proteinuria     3-Hyperkalemia due to #1,#2. Resolved    4-Metabolic acidosis, non-anion gap, secondary acute kidney injury/CKD    5-anemia of chronic disease (CKD) : tsat 50     6-Mineral bone disease : phosph 4.4 no need for binders        -creatinine improved initially with IV fluid resuscitation, though has since climbed back up to a level at which he presented after he started diuretic therapy.     -I suspect the injury he sustained was acute tubular necrosis, and he may be seeing a new baseline Cr for the time being. Discussed at length 8/25 with Mr. Ban Milian and his brother. Mr. Ban Milian wants to give dialysis a try. His sister undergoes dialysis, and apparently has been tolerating it well recently. Recommend at least a several week trial, if still necessary, and then decide whether he wishes to proceed with dialysis indefinitely.     --> Resume diuretic therapy  --> Start low-dose ACE  --> Keep feet up when not using them  --> FERMIN hose once edema improved  --> Third consecutive daily dialysis treatment tomorrow --increase to 3 hours, increase BFR, plan 2 L UF  --> Set up outpatient hemodialysis  --> Follow labs, UO, continue to look for signs of renal recovery      Electronically signed by Nathaniel Turner MD on 8/28/2020

## 2020-08-28 NOTE — PROGRESS NOTES
Patient going to select. All scripts profiled. Called the floor and verified the patient was going to select.

## 2020-08-28 NOTE — PROGRESS NOTES
no tenderness  Heart: regular rate and rhythm, S1, S2 normal, no murmur, click, rub or gallop  Abdomen: soft, non-tender; bowel sounds normal; no masses,  no organomegaly  Extremities: extremities normal, atraumatic, no cyanosis or edema  Pulses: 2+ and symmetric  Skin: Skin color, texture, turgor normal. No rashes or lesions  Lymph nodes: Cervical, supraclavicular, and axillary nodes normal.  Neurologic: hemiplegia      Imaging     Chest  Xray:  No results found for this or any previous visit. Results for orders placed during the hospital encounter of 20   XR CHEST PORTABLE    Narrative Patient MRN: [id number]  : 1931  Age:  80 years  Gender: Male  Order Date: 2020 10:14 AM  Exam: XR CHEST PORTABLE  Number of Images: 1 view  Indication:   post op catheter insertion   post op catheter insertion  Comparison: 2020    Findings:  Since prior exam there is been placement of a right-sided. Hemodialysis catheter with the tip terminating within the cavoatrial  junction. There is no evidence of pneumothorax. The heart is enlarged. Small bilateral pleural effusions with associated atelectasis. The aorta is calcified, tortuous and ectatic. Impression Interval placement of right-sided hemodialysis catheter without  evidence of pneumothorax. Stable cardiomegaly. Small bilateral pleural effusions are noted. This study was dictated by Kenyatta Babin PA-C and Gladys sIaacs MD  reviewed and concurred with the findings.        Additional Imaging:  none    Lab Review   Recent Results (from the past 24 hour(s))   POCT Glucose    Collection Time: 20  8:06 AM   Result Value Ref Range    Meter Glucose 103 (H) 74 - 99 mg/dL   Basic metabolic panel    Collection Time: 20  8:12 AM   Result Value Ref Range    Sodium 139 132 - 146 mmol/L    Potassium 4.9 3.5 - 5.0 mmol/L    Chloride 108 (H) 98 - 107 mmol/L    CO2 22 22 - 29 mmol/L    Anion Gap 9 7 - 16 mmol/L    Glucose 103 (H) 74 - 99 mg/dL tubular necrosis (ATN) (HCC)    Acute kidney failure ([facility]  Resolved Problems:    * No resolved hospital problems.  *      Plan:   Dialysis initiation per Renal  Peola Dec  8/28/2020  6:30 AM

## 2020-08-28 NOTE — PATIENT CARE CONFERENCE
[facility] Quality Flow/Interdisciplinary Rounds Progress Note        Quality Flow Rounds held on August 28, 2020    Disciplines Attending:  Bedside Nurse, ,  and Nursing Unit Leadership    Franklin Henry was admitted on 8/14/2020  3:50 PM    Anticipated Discharge Date:  Expected Discharge Date: 08/20/20    Disposition:    Kristian Score:  Kristian Scale Score: 15    Readmission Score:         Discussed patient goal for the day, patient clinical progression, and barriers to discharge.   The following Goal(s) of the Day/Commitment(s) have been identified:  Hd today, continue to monitor labs      Gaurav Jimenez  August 28, 2020

## 2020-08-28 NOTE — PLAN OF CARE
Problem: Pain:  Goal: Pain level will decrease  Description: Pain level will decrease  Outcome: Met This Shift  Goal: Control of acute pain  Description: Control of acute pain  Outcome: Met This Shift  Goal: Control of chronic pain  Description: Control of chronic pain  Outcome: Met This Shift     Problem: Falls - Risk of:  Goal: Will remain free from falls  Description: Will remain free from falls  Outcome: Met This Shift  Goal: Absence of physical injury  Description: Absence of physical injury  Outcome: Met This Shift     Problem: Skin Integrity:  Goal: Will show no infection signs and symptoms  Description: Will show no infection signs and symptoms  Outcome: Met This Shift  Goal: Absence of new skin breakdown  Description: Absence of new skin breakdown  Outcome: Met This Shift     Problem: Serum Glucose Level - Abnormal:  Goal: Ability to maintain appropriate glucose levels will improve  Description: Ability to maintain appropriate glucose levels will improve  Outcome: Met This Shift     Problem: Sensory Perception - Impaired:  Goal: Ability to maintain a stable neurologic state will improve  Description: Ability to maintain a stable neurologic state will improve  Outcome: Met This Shift

## 2020-08-28 NOTE — PROGRESS NOTES
Occupational Therapy  Patient treatment attempted this AM.  Patient at dialysis and will attempt at a later time. Onetha Summer LOIS/L [id number]  Returned in the PM.  Pt back in the room. Pleasantly declined therapy due to fatigue from dialysis. Requesting to rest in bed at this time.   Onetha Summer LOIS/L [id number]

## 2020-10-04 NOTE — DISCHARGE SUMMARY
Patient ID:  Linda Monk  [id number]  80 y.o.  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 (arteriosclerotic heart disease)    Diabetes mellitus ([address]    Diabetes mellitus ([address]    Diabetic peripheral neuropathy (HCC)    Osteoarthritis    Gait abnormality    Physical deconditioning    Lumbar spondylitis (HCC)    Anemia    Fx humer, med condyl-closed    ASHD (arteriosclerotic heart disease)    Cerebral infarction ([address]    Renal failure    TIA (transient ischemic attack)    ASHD (arteriosclerotic heart disease)    Diabetes mellitus ([address]    PVD (peripheral vascular disease) with claudication (HCC)    History of CVA (cerebrovascular accident)    Pain in lower limb    Cerebral infarction ([address]    CKD 3    Diabetes mellitus-2    Acute respiratory failure (HCC)    Decubitus ulcer of sacral region, stage 1    Right sided weakness    Hypoglycemia    Acute kidney injury (ZAHRAA) with acute tubular necrosis (ATN) (HCC)    Acute kidney failure (HCC)       Consults: cardiology, nephrology and vascular surgery    Procedures:   Vascular access    Hospital Course:  Pt admitted with declining renal function as well as fluid overload.  HD was started and was discharge dto LTAC    Discharge Exam:  See progress note from today    Disposition: long term care facility    Condition at Discharge:  fair    Patient Instructions:   Discharge Medication List as of 8/28/2020  1:46 PM      START taking these medications    Details   ondansetron (ZOFRAN) 4 MG/2ML injection Infuse 2 mLs intravenously every 6 hours as needed for Nausea or Vomiting, Disp-84 mL,R-0Normal      hydrALAZINE (APRESOLINE) 25 MG tablet Take 1 tablet by mouth every 8 hours, Disp-90 tablet,R-3Normal      Menthol-Methyl Salicylate (ANALGESIC OINTMENT) OINT ointment Apply 1 each topically as needed (pain), Disp-100 g,R-0Normal         CONTINUE these medications which have NOT CHANGED    Details   carvedilol (COREG) 12.5 MG tablet Take 12.5 mg by mouth 2 times daily Historical Med      Multiple Vitamins-Minerals (PRESERVISION/LUTEIN) CAPS Historical Med      SITagliptin (JANUVIA) 100 MG tablet Take 100 mg by mouth dailyHistorical Med      famotidine (PEPCID) 40 MG tablet Take 40 mg by mouth as neededHistorical Med      LORazepam (ATIVAN) 1 MG tablet Take 1 mg by mouth as needed. Historical Med      ferrous sulfate (IRON 325) 325 (65 Fe) MG tablet Take 325 mg by mouth nightlyHistorical Med      vitamin D (CHOLECALCIFEROL) 25 MCG (1000 UT) TABS tablet Take 1,000 Units by mouth dailyHistorical Med      furosemide (LASIX) 20 MG tablet Take 1 tablet by mouth every morning prn, Disp-60 tablet, R-3Normal      oxybutynin (DITROPAN) 5 MG tablet Take 5 mg by mouth every eveningHistorical Med      aspirin 81 MG tablet Take 81 mg by mouth every morning Historical Med      pravastatin (PRAVACHOL) 20 MG tablet Take 20 mg by mouth Daily with lunch Historical Med         STOP taking these medications       guaiFENesin 400 MG tablet Comments:   Reason for Stopping:         lisinopril (PRINIVIL;ZESTRIL) 5 MG tablet Comments:   Reason for Stopping:         irbesartan (AVAPRO) 300 MG tablet Comments:   Reason for Stopping:         glimepiride (AMARYL) 4 MG tablet Comments:   Reason for Stopping:         metoprolol succinate (TOPROL XL) 50 MG extended release tablet Comments:   Reason for Stopping:         amlodipine (NORVASC) 10 MG tablet Comments:   Reason for Stopping:             Activity: activity as tolerated  Diet: renal diet    Follow-up with

## 2020-11-29 ENCOUNTER — APPOINTMENT (OUTPATIENT)
Dept: GENERAL RADIOLOGY | Age: 85
End: 2020-11-29
Payer: MEDICARE

## 2020-11-29 ENCOUNTER — HOSPITAL ENCOUNTER (EMERGENCY)
Age: 85
Discharge: HOME | End: 2020-11-29
Attending: EMERGENCY MEDICINE
Payer: MEDICARE

## 2020-11-29 VITALS
HEIGHT: 66 IN | HEART RATE: 82 BPM | OXYGEN SATURATION: 98 % | DIASTOLIC BLOOD PRESSURE: 90 MMHG | TEMPERATURE: 98 F | WEIGHT: 190 LBS | BODY MASS INDEX: 30.53 KG/M2 | RESPIRATION RATE: 18 BRPM | SYSTOLIC BLOOD PRESSURE: 143 MMHG

## 2020-11-29 LAB
ALBUMIN SERPL-MCNC: 3.8 G/DL (ref 3.5–5.2)
ALP BLD-CCNC: 124 U/L (ref 40–129)
ALT SERPL-CCNC: 28 U/L (ref 0–40)
ANION GAP SERPL CALCULATED.3IONS-SCNC: 13 MMOL/L (ref 7–16)
APTT: <20 SEC (ref 24.5–35.1)
AST SERPL-CCNC: 23 U/L (ref 0–39)
BASOPHILS ABSOLUTE: 0.05 E9/L (ref 0–0.2)
BASOPHILS RELATIVE PERCENT: 0.8 % (ref 0–2)
BILIRUB SERPL-MCNC: 0.3 MG/DL (ref 0–1.2)
BUN BLDV-MCNC: 42 MG/DL (ref 8–23)
CALCIUM SERPL-MCNC: 9.1 MG/DL (ref 8.6–10.2)
CHLORIDE BLD-SCNC: 96 MMOL/L (ref 98–107)
CO2: 25 MMOL/L (ref 22–29)
CREAT SERPL-MCNC: 4 MG/DL (ref 0.7–1.2)
EOSINOPHILS ABSOLUTE: 0.31 E9/L (ref 0.05–0.5)
EOSINOPHILS RELATIVE PERCENT: 4.8 % (ref 0–6)
GFR AFRICAN AMERICAN: 17
GFR NON-AFRICAN AMERICAN: 14 ML/MIN/1.73
GLUCOSE BLD-MCNC: 102 MG/DL (ref 74–99)
HCT VFR BLD CALC: 34.1 % (ref 37–54)
HEMOGLOBIN: 11 G/DL (ref 12.5–16.5)
IMMATURE GRANULOCYTES #: 0.01 E9/L
IMMATURE GRANULOCYTES %: 0.2 % (ref 0–5)
INR BLD: 1.4
LYMPHOCYTES ABSOLUTE: 1.36 E9/L (ref 1.5–4)
LYMPHOCYTES RELATIVE PERCENT: 21.1 % (ref 20–42)
MCH RBC QN AUTO: 30.3 PG (ref 26–35)
MCHC RBC AUTO-ENTMCNC: 32.3 % (ref 32–34.5)
MCV RBC AUTO: 93.9 FL (ref 80–99.9)
MONOCYTES ABSOLUTE: 0.89 E9/L (ref 0.1–0.95)
MONOCYTES RELATIVE PERCENT: 13.8 % (ref 2–12)
NEUTROPHILS ABSOLUTE: 3.84 E9/L (ref 1.8–7.3)
NEUTROPHILS RELATIVE PERCENT: 59.3 % (ref 43–80)
PDW BLD-RTO: 15.4 FL (ref 11.5–15)
PLATELET # BLD: 163 E9/L (ref 130–450)
PMV BLD AUTO: 11.2 FL (ref 7–12)
POTASSIUM REFLEX MAGNESIUM: 4.3 MMOL/L (ref 3.5–5)
PRO-BNP: 7689 PG/ML (ref 0–450)
PROTHROMBIN TIME: 15.4 SEC (ref 9.3–12.4)
RBC # BLD: 3.63 E12/L (ref 3.8–5.8)
SODIUM BLD-SCNC: 134 MMOL/L (ref 132–146)
TOTAL PROTEIN: 6.3 G/DL (ref 6.4–8.3)
TROPONIN: 0.04 NG/ML (ref 0–0.03)
TROPONIN: 0.05 NG/ML (ref 0–0.03)
WBC # BLD: 6.5 E9/L (ref 4.5–11.5)

## 2020-11-29 PROCEDURE — 99284 EMERGENCY DEPT VISIT MOD MDM: CPT

## 2020-11-29 PROCEDURE — 93005 ELECTROCARDIOGRAM TRACING: CPT | Performed by: EMERGENCY MEDICINE

## 2020-11-29 PROCEDURE — 71045 X-RAY EXAM CHEST 1 VIEW: CPT

## 2020-11-29 PROCEDURE — 85610 PROTHROMBIN TIME: CPT

## 2020-11-29 PROCEDURE — 85025 COMPLETE CBC W/AUTO DIFF WBC: CPT

## 2020-11-29 PROCEDURE — 80053 COMPREHEN METABOLIC PANEL: CPT

## 2020-11-29 PROCEDURE — 85730 THROMBOPLASTIN TIME PARTIAL: CPT

## 2020-11-29 PROCEDURE — 84484 ASSAY OF TROPONIN QUANT: CPT

## 2020-11-29 PROCEDURE — 83880 ASSAY OF NATRIURETIC PEPTIDE: CPT

## 2020-11-29 PROCEDURE — 6370000000 HC RX 637 (ALT 250 FOR IP): Performed by: EMERGENCY MEDICINE

## 2020-11-29 RX ORDER — OMEPRAZOLE 20 MG/1
20 CAPSULE, DELAYED RELEASE ORAL
Qty: 30 CAPSULE | Refills: 3 | Status: SHIPPED | OUTPATIENT
Start: 2020-11-29 | End: 2021-01-20

## 2020-11-29 RX ORDER — ASPIRIN 81 MG/1
324 TABLET, CHEWABLE ORAL ONCE
Status: DISCONTINUED | OUTPATIENT
Start: 2020-11-29 | End: 2020-11-29 | Stop reason: HOSPADM

## 2020-11-29 RX ADMIN — NITROGLYCERIN 0.5 INCH: 20 OINTMENT TOPICAL at 15:29

## 2020-11-29 RX ADMIN — LIDOCAINE HYDROCHLORIDE: 20 SOLUTION ORAL; TOPICAL at 15:28

## 2020-11-30 LAB
EKG ATRIAL RATE: 98 BPM
EKG Q-T INTERVAL: 354 MS
EKG QRS DURATION: 78 MS
EKG QTC CALCULATION (BAZETT): 472 MS
EKG R AXIS: -2 DEGREES
EKG T AXIS: -8 DEGREES
EKG VENTRICULAR RATE: 107 BPM

## 2020-11-30 PROCEDURE — 93010 ELECTROCARDIOGRAM REPORT: CPT | Performed by: INTERNAL MEDICINE

## 2020-11-30 NOTE — ED NOTES
Spoke to pt niece and updated her on his care notified her he is discharged.   She reported someone would be here to pick him up     Lisa Frederick, DANNY  11/29/20 [id number]

## 2020-12-07 ENCOUNTER — TELEPHONE (OUTPATIENT)
Dept: VASCULAR SURGERY | Age: 85
End: 2020-12-07

## 2020-12-08 ENCOUNTER — OFFICE VISIT (OUTPATIENT)
Dept: VASCULAR SURGERY | Age: 85
End: 2020-12-08
Payer: MEDICARE

## 2020-12-08 PROCEDURE — G8417 CALC BMI ABV UP PARAM F/U: HCPCS | Performed by: NURSE PRACTITIONER

## 2020-12-08 PROCEDURE — 1123F ACP DISCUSS/DSCN MKR DOCD: CPT | Performed by: NURSE PRACTITIONER

## 2020-12-08 PROCEDURE — G8484 FLU IMMUNIZE NO ADMIN: HCPCS | Performed by: NURSE PRACTITIONER

## 2020-12-08 PROCEDURE — 4040F PNEUMOC VAC/ADMIN/RCVD: CPT | Performed by: NURSE PRACTITIONER

## 2020-12-08 PROCEDURE — G8427 DOCREV CUR MEDS BY ELIG CLIN: HCPCS | Performed by: NURSE PRACTITIONER

## 2020-12-08 PROCEDURE — 99213 OFFICE O/P EST LOW 20 MIN: CPT | Performed by: NURSE PRACTITIONER

## 2020-12-08 PROCEDURE — 1036F TOBACCO NON-USER: CPT | Performed by: NURSE PRACTITIONER

## 2020-12-08 NOTE — PROGRESS NOTES
Vascular Surgery Outpatient Progress Note      Chief Complaint   Patient presents with    Post-Op Check     s/p insertion HD catheter, needs AVF, patient is left handed, dialysis days M-W-F. HISTORY OF PRESENT ILLNESS:                The patient is a 80 y.o. male who returns for follow-up evaluation of dialysis access. The patient is currently dialyzing via right sided tunneled catheter. The patient is left handed due to a stroke he had years ago affecting his right arm. He denies history of right sided pacemaker or related device. He had vein mapping done prior to today's visit. Past Medical History:        Diagnosis Date    Arthritis     CAD (coronary artery disease)     Chronic kidney disease     COPD (chronic obstructive pulmonary disease) ([address]     Decubitus ulcer of sacral region, stage 1 3/19/2018    Diabetes mellitus ([address]     Diabetic peripheral neuropathy ([address] 11/9/2012    History of CVA (cerebrovascular accident) 6/9/2014    Hypertension     Other disorders of kidney and ureter     prostate infections in past    Pain in lower limb 6/9/2014    PVD (peripheral vascular disease) with claudication ([address] 6/9/2014    Right sided weakness 3/19/2018    TIA (transient ischemic attack)     possible several years ago    Unspecified cerebral artery occlusion with cerebral infarction      Past Surgical History:        Procedure Laterality Date    ABDOMINAL HERNIA REPAIR      CATARACT REMOVAL      right    CHOLECYSTECTOMY  11/2011    DENTAL SURGERY      EYE SURGERY      [facility] DIALYSIS CATHETER Right 8/27/2020    TESIO CATHETER INSERTION performed by Rocael Abel MD at [facility]      removal of sac in ear     Current Medications:   Prior to Admission medications    Medication Sig Start Date End Date Taking?  Authorizing Provider   omeprazole (PRILOSEC) 20 MG delayed release capsule Take 1 capsule by mouth daily (with breakfast) 11/29/20 Alcohol use: No     Alcohol/week: 1.0 standard drinks     Types: 1 Glasses of wine per week     Comment: occas    Drug use: No    Sexual activity: Never   Lifestyle    Physical activity     Days per week: Not on file     Minutes per session: Not on file    Stress: Not on file   Relationships    Social connections     Talks on phone: Not on file     Gets together: Not on file     Attends [employer] service: Not on file     Active member of club or organization: Not on file     Attends meetings of clubs or organizations: Not on file     Relationship status: Not on file    Intimate partner violence     Fear of current or ex partner: Not on file     Emotionally abused: Not on file     Physically abused: Not on file     Forced sexual activity: Not on file   Other Topics Concern    Not on file   Social History Narrative    ** Merged History Encounter **             History reviewed. No pertinent family history.     REVIEW OF SYSTEMS (New symptoms):    Eyes:      Blurred vision:  No [x]/Yes []               Diplopia:   No [x]/Yes []               Vision loss:       No [x]/Yes []   Ears, nose, throat:             Hearing loss:    No [x]/Yes []      Vertigo:   No [x]/Yes []                       Swallowing problem:  No [x]/Yes []               Nose bleeds:   No [x]/Yes []      Voice hoarseness:  No [x]/Yes []  Respiratory:             Cough:   No [x]/Yes []      Pleuritic chest pain:  No [x]/Yes []                        Dyspnea:   No [x]/Yes []      Wheezing:   No [x]/Yes []  Cardiovascular:             Angina:   No [x]/Yes []      Palpitations:   No [x]/Yes []          Claudication:    No [x]/Yes []      Leg swelling:   No [x]/Yes []  Gastrointestinal:             Nausea or vomiting:  No [x]/Yes []               Abdominal pain:  No [x]/Yes []                     Intestinal bleeding: No [x]/Yes []  Musculoskeletal:             Leg pain:   No [x]/Yes []      Back pain:   No [x]/Yes []                    Weakness:   No [x]/Yes []  Neurologic:             Numbness:   No [x]/Yes []      Paralysis:   No [x]/Yes []                       Headaches:   No [x]/Yes []  Hematologic, lymphatic:   Anemia:   No [x]/Yes []              Bleeding or bruising:  No [x]/Yes []              Fevers or chills: No [x]/Yes []  Endocrine:             Temp intolerance:   No [x]/Yes []                       Polydipsia, polyuria:  No [x]/Yes []  Skin:              Rash:    No [x]/Yes []      Ulcers:   No [x]/Yes []              Abnorm pigment: No [x]/Yes []  :              Frequency/urgency:  No [x]/Yes []      Hematuria:    No [x]/Yes []                      Incontinence:    No [x]/Yes []    PHYSICAL EXAM:  There were no vitals filed for this visit. General Appearance: alert and oriented to person, place and time, in no acute distress, well developed and well- nourished  Neurologic: no cranial nerve deficit, speech normal  Head: normocephalic and atraumatic  Eyes: extraocular eye movements intact, conjunctivae normal  ENT: external ear and ear canal normal bilaterally, nose without deformity, no carotid bruits  Pulmonary/Chest: normal air movement, no respiratory distress  Cardiovascular: normal rate, regular rhythm, no murmur  Abdomen: non-distended, no masses  Musculoskeletal: no joint deformity or tenderness  Extremities: no leg edema bilaterally  Skin: warm and dry, no rash or erythema    PULSE EXAM      Right      Left   Brachial 2    Radial 1    Femoral     Popliteal     Dorsalis Pedis     Posterior Tibial     (3=normal, 2=diminished, 1=barely palpable, 4=widened)    RADIOLOGY: [facility] today     Problem List Items Addressed This Visit     None      Visit Diagnoses     Encounter regarding vascular access for dialysis for ESRD ([facility] [address]    -  Primary          I reviewed with the patient that based on vein mapping and physical exam, the best option for dialysis access creation is for right arm AVG.  This would be either a forearm loop graft vs upper arm straight graft. The patient has limited range of motion of his right arm, which may make positioning difficult for forearm loop AVG. The final decision will be made intraop. The procedure including risks, benefits, and alternative options were discussed. The patient understands and wishes to proceed. Pt seen and plan reviewed with Dr. Kurtis Plunkett. Adriel Sheets, CNP      No follow-ups on file.

## 2021-01-15 ENCOUNTER — HOSPITAL ENCOUNTER (OUTPATIENT)
Age: 86
Discharge: HOME | End: 2021-01-17
Payer: MEDICARE

## 2021-01-15 PROCEDURE — U0003 INFECTIOUS AGENT DETECTION BY NUCLEIC ACID (DNA OR RNA); SEVERE ACUTE RESPIRATORY SYNDROME CORONAVIRUS 2 (SARS-COV-2) (CORONAVIRUS DISEASE [COVID-19]), AMPLIFIED PROBE TECHNIQUE, MAKING USE OF HIGH THROUGHPUT TECHNOLOGIES AS DESCRIBED BY CMS-2020-01-R: HCPCS

## 2021-01-16 DIAGNOSIS — Z01.818 PREOP TESTING: ICD-10-CM

## 2021-01-17 LAB
SARS-COV-2: NOT DETECTED
SOURCE: NORMAL

## 2021-01-20 RX ORDER — BUMETANIDE 1 MG/1
2 TABLET ORAL DAILY
Status: ON HOLD | COMMUNITY
End: 2021-07-12 | Stop reason: HOSPADM

## 2021-01-20 RX ORDER — ATORVASTATIN CALCIUM 10 MG/1
10 TABLET, FILM COATED ORAL NIGHTLY
COMMUNITY
End: 2021-07-06 | Stop reason: ALTCHOICE

## 2021-01-20 RX ORDER — ZINC GLUCONATE 50 MG
50 TABLET ORAL DAILY
COMMUNITY

## 2021-01-20 RX ORDER — ASCORBIC ACID 500 MG
1000 TABLET ORAL DAILY
COMMUNITY

## 2021-01-20 RX ORDER — MAGNESIUM OXIDE 400 MG/1
400 TABLET ORAL DAILY
COMMUNITY
End: 2021-07-06 | Stop reason: ALTCHOICE

## 2021-01-20 RX ORDER — INSULIN LISPRO 200 [IU]/ML
INJECTION, SOLUTION SUBCUTANEOUS 4 TIMES DAILY PRN
Status: ON HOLD | COMMUNITY
End: 2021-12-04 | Stop reason: HOSPADM

## 2021-01-20 RX ORDER — LISINOPRIL 2.5 MG/1
2.5 TABLET ORAL DAILY
Status: ON HOLD | COMMUNITY
End: 2021-07-12 | Stop reason: HOSPADM

## 2021-01-20 NOTE — PROGRESS NOTES
Have you been tested for COVID  Yes           Have you been told you were positive for COVID No  Have you had any known exposure to someone that is positive for COVID No  Do you have a cough                   No              Do you have shortness of breath No                 Do you have a sore throat            No                Are you having chills                    No                Are you having muscle aches. No                    Please come to the hospital wearing a mask and have your significant other wear a mask as well. Both of you should check your temperature before leaving to come here,  if it is 100 or higher please call [phone] for instruction.

## 2021-01-20 NOTE — PROGRESS NOTES
Have you been tested for COVID  Yes           Have you been told you were positive for COVID Yes 12/30/2020  Have you had any known exposure to someone that is positive for COVID No  Do you have a cough                   No              Do you have shortness of breath No                 Do you have a sore throat            No                Are you having chills                    No                Are you having muscle aches. No                    Please come to the hospital wearing a mask and have your significant other wear a mask as well. Both of you should check your temperature before leaving to come here,  if it is 100 or higher please call [phone] for instruction.

## 2021-01-20 NOTE — PROGRESS NOTES
[facility] PRE-ADMISSION TESTING INSTRUCTIONS    The Preadmission Testing patient is instructed accordingly using the following criteria (check applicable):    ARRIVAL INSTRUCTIONS:  [x] Parking the day of Surgery is located in the Main Entrance lot. Upon entering the door, make an immediate right to the surgery reception desk    [x] Bring photo ID and insurance card    [] Bring in a copy of Living will or Durable Power of  papers. [x] Please be sure to arrange for responsible adult to provide transportation to and from the hospital    [x] Please arrange for responsible adult to be with you for the 24 hour period post procedure due to having anesthesia      GENERAL INSTRUCTIONS:    [x] Nothing by mouth after midnight, including gum, candy, mints or water    [x] You may brush your teeth, but do not swallow any water    [x] Take medications as instructed with 1-2 oz of water    [x] Stop herbal supplements and vitamins 5 days prior to procedure    [x] Follow preop dosing of blood thinners per physician instructions    [x] Take 1/2 dose of evening insulin, but no insulin after midnight    [] No oral diabetic medications after midnight    [x] If diabetic and have low blood sugar or feel symptomatic, take 1-2oz apple juice only    [] Bring inhalers day of surgery    [] Bring C-PAP/ Bi-Pap day of surgery    [] Bring urine specimen day of surgery    [x] Shower or bath with soap, lather and rinse well, AM of Surgery, no lotion, powders or creams to surgical site    [] Follow bowel prep as instructed per surgeon    [x] No tobacco products within 24 hours of surgery     [x] No alcohol or illegal drug use within 24 hours of surgery.     [x] Jewelry, body piercing's, eyeglasses, contact lenses and dentures are not permitted into surgery (bring cases)      [x] Please do not wear any nail polish, make up or hair products on the day of surgery    [x] You can expect a call the business day prior to procedure to notify you if your arrival time changes    [x] If you receive a survey after surgery we would greatly appreciate your comments    [] Parent/guardian of a minor must accompany their child and remain on the premises  the entire time they are under our care     [] Pediatric patients may bring favorite toy, blanket or comfort item with them    [] A caregiver or family member must remain with the patient during their stay if they are mentally handicapped, have dementia, disoriented or unable to use a call light or would be a safety concern if left unattended    [x] Please notify surgeon if you develop any illness between now and time of surgery (cold, cough, sore throat, fever, nausea, vomiting) or any signs of infections  including skin, wounds, and dental.    [x]  The Outpatient Pharmacy is available to fill your prescription here on your day of surgery, ask your preop nurse for details    [] Other instructions    EDUCATIONAL MATERIALS PROVIDED:    [] PAT Preoperative Education Packet/Booklet     [] Medication List    [] Transfusion bracelet applied with instructions    [] Shower with soap, lather and rinse well, and use CHG wipes provided the evening before surgery as instructed    [] Incentive spirometer with instructions

## 2021-01-21 ENCOUNTER — ANESTHESIA EVENT (OUTPATIENT)
Dept: OPERATING ROOM | Age: 86
End: 2021-01-21
Payer: MEDICARE

## 2021-01-21 ENCOUNTER — ANESTHESIA (OUTPATIENT)
Dept: OPERATING ROOM | Age: 86
End: 2021-01-21
Payer: MEDICARE

## 2021-01-21 ENCOUNTER — HOSPITAL ENCOUNTER (OUTPATIENT)
Age: 86
Setting detail: OUTPATIENT SURGERY
Discharge: HOME | End: 2021-01-21
Attending: SURGERY | Admitting: SURGERY
Payer: MEDICARE

## 2021-01-21 VITALS
HEIGHT: 68 IN | SYSTOLIC BLOOD PRESSURE: 137 MMHG | OXYGEN SATURATION: 95 % | TEMPERATURE: 97.5 F | RESPIRATION RATE: 16 BRPM | HEART RATE: 89 BPM | DIASTOLIC BLOOD PRESSURE: 69 MMHG | BODY MASS INDEX: 28.04 KG/M2 | WEIGHT: 185 LBS

## 2021-01-21 VITALS — SYSTOLIC BLOOD PRESSURE: 117 MMHG | OXYGEN SATURATION: 96 % | DIASTOLIC BLOOD PRESSURE: 59 MMHG

## 2021-01-21 DIAGNOSIS — Z01.818 PREOP TESTING: Primary | ICD-10-CM

## 2021-01-21 DIAGNOSIS — N18.6 ENCOUNTER REGARDING VASCULAR ACCESS FOR DIALYSIS FOR END-STAGE RENAL DISEASE (HCC): Chronic | ICD-10-CM

## 2021-01-21 DIAGNOSIS — Z99.2 ENCOUNTER REGARDING VASCULAR ACCESS FOR DIALYSIS FOR END-STAGE RENAL DISEASE (HCC): Chronic | ICD-10-CM

## 2021-01-21 LAB
ANION GAP SERPL CALCULATED.3IONS-SCNC: 6 MMOL/L (ref 7–16)
BUN BLDV-MCNC: 15 MG/DL (ref 8–23)
CALCIUM SERPL-MCNC: 9 MG/DL (ref 8.6–10.2)
CHLORIDE BLD-SCNC: 97 MMOL/L (ref 98–107)
CO2: 31 MMOL/L (ref 22–29)
CREAT SERPL-MCNC: 2.5 MG/DL (ref 0.7–1.2)
GFR AFRICAN AMERICAN: 30
GFR NON-AFRICAN AMERICAN: 24 ML/MIN/1.73
GLUCOSE BLD-MCNC: 131 MG/DL (ref 74–99)
HCT VFR BLD CALC: 32 % (ref 37–54)
HEMOGLOBIN: 10 G/DL (ref 12.5–16.5)
MCH RBC QN AUTO: 31.4 PG (ref 26–35)
MCHC RBC AUTO-ENTMCNC: 31.3 % (ref 32–34.5)
MCV RBC AUTO: 100.6 FL (ref 80–99.9)
METER GLUCOSE: 115 MG/DL (ref 74–99)
METER GLUCOSE: 134 MG/DL (ref 74–99)
PDW BLD-RTO: 14.8 FL (ref 11.5–15)
PLATELET # BLD: 200 E9/L (ref 130–450)
PMV BLD AUTO: 10.1 FL (ref 7–12)
POTASSIUM REFLEX MAGNESIUM: 3.8 MMOL/L (ref 3.5–5)
RBC # BLD: 3.18 E12/L (ref 3.8–5.8)
SODIUM BLD-SCNC: 134 MMOL/L (ref 132–146)
WBC # BLD: 7.2 E9/L (ref 4.5–11.5)

## 2021-01-21 PROCEDURE — C1768 GRAFT, VASCULAR: HCPCS | Performed by: SURGERY

## 2021-01-21 PROCEDURE — 36415 COLL VENOUS BLD VENIPUNCTURE: CPT

## 2021-01-21 PROCEDURE — 7100000000 HC PACU RECOVERY - FIRST 15 MIN: Performed by: SURGERY

## 2021-01-21 PROCEDURE — 6360000002 HC RX W HCPCS: Performed by: ANESTHESIOLOGY

## 2021-01-21 PROCEDURE — 7100000010 HC PHASE II RECOVERY - FIRST 15 MIN: Performed by: SURGERY

## 2021-01-21 PROCEDURE — 80048 BASIC METABOLIC PNL TOTAL CA: CPT

## 2021-01-21 PROCEDURE — 3700000001 HC ADD 15 MINUTES (ANESTHESIA): Performed by: SURGERY

## 2021-01-21 PROCEDURE — 6360000002 HC RX W HCPCS

## 2021-01-21 PROCEDURE — 82962 GLUCOSE BLOOD TEST: CPT

## 2021-01-21 PROCEDURE — 3600000012 HC SURGERY LEVEL 2 ADDTL 15MIN: Performed by: SURGERY

## 2021-01-21 PROCEDURE — 2500000003 HC RX 250 WO HCPCS: Performed by: SURGERY

## 2021-01-21 PROCEDURE — 6360000002 HC RX W HCPCS: Performed by: SURGERY

## 2021-01-21 PROCEDURE — 3600000002 HC SURGERY LEVEL 2 BASE: Performed by: SURGERY

## 2021-01-21 PROCEDURE — 36830 ARTERY-VEIN NONAUTOGRAFT: CPT | Performed by: SURGERY

## 2021-01-21 PROCEDURE — 2709999900 HC NON-CHARGEABLE SUPPLY: Performed by: SURGERY

## 2021-01-21 PROCEDURE — 7100000011 HC PHASE II RECOVERY - ADDTL 15 MIN: Performed by: SURGERY

## 2021-01-21 PROCEDURE — 3700000000 HC ANESTHESIA ATTENDED CARE: Performed by: SURGERY

## 2021-01-21 PROCEDURE — 7100000001 HC PACU RECOVERY - ADDTL 15 MIN: Performed by: SURGERY

## 2021-01-21 PROCEDURE — 85027 COMPLETE CBC AUTOMATED: CPT

## 2021-01-21 PROCEDURE — 2580000003 HC RX 258: Performed by: SURGERY

## 2021-01-21 DEVICE — GRAFT VASC L40CM DIA6MM BOV CAR ART CLLGN FOR FUNC HAD ACCS: Type: IMPLANTABLE DEVICE | Site: ARM | Status: FUNCTIONAL

## 2021-01-21 RX ORDER — SODIUM CHLORIDE 0.9 % (FLUSH) 0.9 %
10 SYRINGE (ML) INJECTION EVERY 12 HOURS SCHEDULED
Status: DISCONTINUED | OUTPATIENT
Start: 2021-01-21 | End: 2021-01-21 | Stop reason: HOSPADM

## 2021-01-21 RX ORDER — PROPOFOL 10 MG/ML
INJECTION, EMULSION INTRAVENOUS CONTINUOUS PRN
Status: DISCONTINUED | OUTPATIENT
Start: 2021-01-21 | End: 2021-01-21 | Stop reason: SDUPTHER

## 2021-01-21 RX ORDER — MEPERIDINE HYDROCHLORIDE 25 MG/ML
12.5 INJECTION INTRAMUSCULAR; INTRAVENOUS; SUBCUTANEOUS EVERY 5 MIN PRN
Status: DISCONTINUED | OUTPATIENT
Start: 2021-01-21 | End: 2021-01-21 | Stop reason: HOSPADM

## 2021-01-21 RX ORDER — ROPIVACAINE HYDROCHLORIDE 5 MG/ML
INJECTION, SOLUTION EPIDURAL; INFILTRATION; PERINEURAL
Status: COMPLETED | OUTPATIENT
Start: 2021-01-21 | End: 2021-01-21

## 2021-01-21 RX ORDER — HYDROCODONE BITARTRATE AND ACETAMINOPHEN 5; 325 MG/1; MG/1
2 TABLET ORAL EVERY 4 HOURS PRN
Status: DISCONTINUED | OUTPATIENT
Start: 2021-01-21 | End: 2021-01-21 | Stop reason: HOSPADM

## 2021-01-21 RX ORDER — HYDROCODONE BITARTRATE AND ACETAMINOPHEN 5; 325 MG/1; MG/1
1 TABLET ORAL EVERY 6 HOURS PRN
Qty: 10 TABLET | Refills: 0 | Status: SHIPPED | OUTPATIENT
Start: 2021-01-21 | End: 2021-01-24

## 2021-01-21 RX ORDER — ACETAMINOPHEN 325 MG/1
650 TABLET ORAL EVERY 4 HOURS PRN
Status: DISCONTINUED | OUTPATIENT
Start: 2021-01-21 | End: 2021-01-21 | Stop reason: HOSPADM

## 2021-01-21 RX ORDER — PROMETHAZINE HYDROCHLORIDE 25 MG/ML
6.25 INJECTION, SOLUTION INTRAMUSCULAR; INTRAVENOUS
Status: DISCONTINUED | OUTPATIENT
Start: 2021-01-21 | End: 2021-01-21 | Stop reason: HOSPADM

## 2021-01-21 RX ORDER — FENTANYL CITRATE 50 UG/ML
50 INJECTION, SOLUTION INTRAMUSCULAR; INTRAVENOUS PRN
Status: DISCONTINUED | OUTPATIENT
Start: 2021-01-21 | End: 2021-01-21 | Stop reason: HOSPADM

## 2021-01-21 RX ORDER — DIPHENHYDRAMINE HYDROCHLORIDE 50 MG/ML
12.5 INJECTION INTRAMUSCULAR; INTRAVENOUS
Status: DISCONTINUED | OUTPATIENT
Start: 2021-01-21 | End: 2021-01-21 | Stop reason: HOSPADM

## 2021-01-21 RX ORDER — SODIUM CHLORIDE 0.9 % (FLUSH) 0.9 %
10 SYRINGE (ML) INJECTION PRN
Status: DISCONTINUED | OUTPATIENT
Start: 2021-01-21 | End: 2021-01-21 | Stop reason: HOSPADM

## 2021-01-21 RX ORDER — SODIUM CHLORIDE 9 MG/ML
INJECTION, SOLUTION INTRAVENOUS CONTINUOUS
Status: DISCONTINUED | OUTPATIENT
Start: 2021-01-21 | End: 2021-01-21 | Stop reason: HOSPADM

## 2021-01-21 RX ORDER — FENTANYL CITRATE 50 UG/ML
50 INJECTION, SOLUTION INTRAMUSCULAR; INTRAVENOUS EVERY 5 MIN PRN
Status: DISCONTINUED | OUTPATIENT
Start: 2021-01-21 | End: 2021-01-21 | Stop reason: HOSPADM

## 2021-01-21 RX ORDER — FENTANYL CITRATE 50 UG/ML
25 INJECTION, SOLUTION INTRAMUSCULAR; INTRAVENOUS EVERY 5 MIN PRN
Status: DISCONTINUED | OUTPATIENT
Start: 2021-01-21 | End: 2021-01-21 | Stop reason: HOSPADM

## 2021-01-21 RX ORDER — HEPARIN SODIUM 1000 [USP'U]/ML
INJECTION, SOLUTION INTRAVENOUS; SUBCUTANEOUS PRN
Status: DISCONTINUED | OUTPATIENT
Start: 2021-01-21 | End: 2021-01-21 | Stop reason: SDUPTHER

## 2021-01-21 RX ORDER — ONDANSETRON 2 MG/ML
4 INJECTION INTRAMUSCULAR; INTRAVENOUS EVERY 8 HOURS PRN
Status: DISCONTINUED | OUTPATIENT
Start: 2021-01-21 | End: 2021-01-21 | Stop reason: HOSPADM

## 2021-01-21 RX ORDER — OXYCODONE HYDROCHLORIDE AND ACETAMINOPHEN 5; 325 MG/1; MG/1
1 TABLET ORAL
Status: DISCONTINUED | OUTPATIENT
Start: 2021-01-21 | End: 2021-01-21 | Stop reason: HOSPADM

## 2021-01-21 RX ORDER — ROPIVACAINE HYDROCHLORIDE 5 MG/ML
30 INJECTION, SOLUTION EPIDURAL; INFILTRATION; PERINEURAL ONCE
Status: COMPLETED | OUTPATIENT
Start: 2021-01-21 | End: 2021-01-21

## 2021-01-21 RX ORDER — HYDROCODONE BITARTRATE AND ACETAMINOPHEN 5; 325 MG/1; MG/1
1 TABLET ORAL EVERY 4 HOURS PRN
Status: DISCONTINUED | OUTPATIENT
Start: 2021-01-21 | End: 2021-01-21 | Stop reason: HOSPADM

## 2021-01-21 RX ORDER — MIDAZOLAM HYDROCHLORIDE 2 MG/2ML
1 INJECTION, SOLUTION INTRAMUSCULAR; INTRAVENOUS PRN
Status: DISCONTINUED | OUTPATIENT
Start: 2021-01-21 | End: 2021-01-21 | Stop reason: HOSPADM

## 2021-01-21 RX ADMIN — PROPOFOL 50 MCG/KG/MIN: 10 INJECTION, EMULSION INTRAVENOUS at 09:05

## 2021-01-21 RX ADMIN — FENTANYL CITRATE 25 MCG: 50 INJECTION, SOLUTION INTRAMUSCULAR; INTRAVENOUS at 08:28

## 2021-01-21 RX ADMIN — MIDAZOLAM HYDROCHLORIDE 0.5 MG: 1 INJECTION, SOLUTION INTRAMUSCULAR; INTRAVENOUS at 08:28

## 2021-01-21 RX ADMIN — ROPIVACAINE HYDROCHLORIDE 29 ML: 5 INJECTION, SOLUTION EPIDURAL; INFILTRATION; PERINEURAL at 08:37

## 2021-01-21 RX ADMIN — SODIUM CHLORIDE: 9 INJECTION, SOLUTION INTRAVENOUS at 08:59

## 2021-01-21 RX ADMIN — ROPIVACAINE HYDROCHLORIDE 30 ML: 5 INJECTION, SOLUTION EPIDURAL; INFILTRATION; PERINEURAL at 08:37

## 2021-01-21 RX ADMIN — Medication 2 G: at 09:07

## 2021-01-21 RX ADMIN — HEPARIN SODIUM 5000 UNITS: 1000 INJECTION, SOLUTION INTRAVENOUS; SUBCUTANEOUS at 09:43

## 2021-01-21 ASSESSMENT — PULMONARY FUNCTION TESTS
PIF_VALUE: 1
PIF_VALUE: 0
PIF_VALUE: 1
PIF_VALUE: 0
PIF_VALUE: 1
PIF_VALUE: 0
PIF_VALUE: 1

## 2021-01-21 ASSESSMENT — PAIN DESCRIPTION - ORIENTATION: ORIENTATION: RIGHT

## 2021-01-21 ASSESSMENT — PAIN DESCRIPTION - LOCATION: LOCATION: ARM

## 2021-01-21 NOTE — ANESTHESIA PRE PROCEDURE
Department of Anesthesiology  Preprocedure Note       Name:  Cecile Morley   Age:  80 y.o.  :  1931                                          MRN:  [id number]         Date:  2021      Surgeon: Wilfredo Astorga):  [place] MD    Procedure: Procedure(s):  INSERTION AV GRAFT RIGHT ARM        Medications prior to admission:   Prior to Admission medications    Medication Sig Start Date End Date Taking?  Authorizing Provider   bumetanide (BUMEX) 1 MG tablet Take 2 mg by mouth daily   Yes Historical Provider, MD   lisinopril (PRINIVIL;ZESTRIL) 2.5 MG tablet Take 2.5 mg by mouth daily   Yes Historical Provider, MD   magnesium oxide (MAG-OX) 400 MG tablet Take 400 mg by mouth daily   Yes Historical Provider, MD   ascorbic acid (VITAMIN C) 500 MG tablet Take 1,000 mg by mouth daily   Yes Historical Provider, MD   zinc gluconate 50 MG tablet Take 50 mg by mouth daily   Yes Historical Provider, MD   Multiple Vitamins-Minerals (PRESERVISION AREDS 2 PO) Take by mouth   Yes Historical Provider, MD   atorvastatin (LIPITOR) 10 MG tablet Take 10 mg by mouth nightly   Yes Historical Provider, MD   insulin lispro (HUMALOG KWIKPEN) 200 UNIT/ML SOPN pen Inject into the skin 3 times daily (with meals) SLIDING SCALE   Yes Historical Provider, MD   hydrALAZINE (APRESOLINE) 25 MG tablet Take 1 tablet by mouth every 8 hours 20  Yes Margaret Mayers MD   Multiple Vitamins-Minerals (PRESERVISION/LUTEIN) CAPS    Yes Historical Provider, MD   famotidine (PEPCID) 40 MG tablet Take 40 mg by mouth as needed   Yes Historical Provider, MD   ferrous sulfate (IRON 325) 325 (65 Fe) MG tablet Take 325 mg by mouth nightly   Yes Historical Provider, MD   vitamin D (CHOLECALCIFEROL) 25 MCG (1000 UT) TABS tablet Take 1,000 Units by mouth daily   Yes Historical Provider, MD   aspirin 81 MG tablet Take 81 mg by mouth every morning    Yes Historical Provider, MD       Current medications:    Current Facility-Administered Medications   Medication Dose Route Frequency Provider Last Rate Last Admin    0.9 % sodium chloride infusion   Intravenous Continuous Eliu Weller MD        sodium chloride flush 0.9 % injection 10 mL  10 mL Intravenous 2 times per day Eliu Weller MD        sodium chloride flush 0.9 % injection 10 mL  10 mL Intravenous PRN Eliu Weller MD        ceFAZolin (ANCEF) 2000 mg in sterile water 20 mL IV syringe  2 g Intravenous On Call to [address] [place] [address] [address], MD        midazolam PF (VERSED) injection 1 mg  1 mg Intravenous PRN Ro Mclean MD        fentaNYL (SUBLIMAZE) injection 50 mcg  50 mcg Intravenous PRN Josefina Yeager MD        ropivacaine (NAROPIN) 0.5% injection 30 mL  30 mL Infiltration Once Josefina Yeager MD           Allergies:     Allergies   Allergen Reactions    Sulfa Antibiotics      Affects renal function and blood pressure    Sulfa Antibiotics        Problem List:    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    Diabetes mellitus ([address] E11.9    Diabetes mellitus ([address] E11.9    Diabetic peripheral neuropathy ([facility]) E11.42    Osteoarthritis M19.90    Gait abnormality R26.9    Physical deconditioning R53.81    Lumbar spondylitis ([facility]) M46.96    Anemia D64.9    Fx humer, med condyl-closed S42.463A    Cerebral infarction ([address] I63.9    Renal failure N19    TIA (transient ischemic attack) G45.9    ASHD (arteriosclerotic heart disease) I25.10    Diabetes mellitus ([facility]) E11.9    PVD (peripheral vascular disease) with claudication ([facility]) I73.9    History of CVA (cerebrovascular accident) Z86.73    Pain in lower limb M79.606    Cerebral infarction ([facility]) I63.9    CKD 3 N18.30    Diabetes mellitus-2 E11.9    Acute respiratory failure ([facility]) J96.00    Decubitus ulcer of sacral region, stage 1 L89.151    Right sided weakness R53.1    Hypoglycemia E16.2    Acute kidney injury ([facility]) with acute tubular necrosis (ATN) (HCC) N17.0    Acute kidney failure (HCC) N17.9       Past Medical History:        Diagnosis Date    Arthritis     CAD (coronary artery disease)     CHF (congestive heart failure) (HCC)     Chronic kidney disease     COVID-19     Diabetes mellitus [facility]     Diabetic peripheral neuropathy ([facility] 2012    History of CVA (cerebrovascular accident) 2014    Hypertension     Other disorders of kidney and ureter     prostate infections in past    PVD (peripheral vascular disease) with claudication ([facility] 2014    Right sided weakness 3/19/2018    TIA (transient ischemic attack)     possible several years ago    Unspecified cerebral artery occlusion with cerebral infarction     [facility] RIGHT LEG AFFECTED       Past Surgical History:        Procedure Laterality Date    ABDOMINAL HERNIA REPAIR      CATARACT REMOVAL      right    CHOLECYSTECTOMY  2011    DENTAL SURGERY      EYE SURGERY      [facility] DIALYSIS CATHETER Right 2020    TESIO CATHETER INSERTION performed by Eliu Weller MD at [facility]      removal of sac in ear       Social History:    Social History     Tobacco Use    Smoking status: Former Smoker     Packs/day: 0.50     Years: 20.00     Pack years: 10.00     Types: Cigarettes     Quit date: 1967     Years since quittin.0    Smokeless tobacco: Never Used   Substance Use Topics    Alcohol use: Not Currently     Alcohol/week: 1.0 standard drinks     Types: 1 Glasses of wine per week                                Counseling given: Not Answered      Vital Signs (Current):   Vitals:    21 1005   Weight: 185 lb (83.9 kg)   Height: 5' 8\" (1.727 m)                                              BP Readings from Last 3 Encounters:   20 (!) 143/90   20 (!) 149/86   20 (!) 134/58       NPO Status: Time of last liquid consumption:  Time of last solid consumption: 1730                        Date of last liquid consumption: 01/20/21                        Date of last solid food consumption: 01/20/21    BMI:   Wt Readings from Last 3 Encounters:   01/20/21 185 lb (83.9 kg)   11/29/20 190 lb (86.2 kg)   08/28/20 190 lb 0.6 oz (86.2 kg)     Body mass index is 28.13 kg/m². CBC:   Lab Results   Component Value Date    WBC 6.5 11/29/2020    RBC 3.63 11/29/2020    HGB 11.0 11/29/2020    HCT 34.1 11/29/2020    MCV 93.9 11/29/2020    RDW 15.4 11/29/2020     11/29/2020       CMP:   Lab Results   Component Value Date     11/29/2020    K 4.3 11/29/2020    CL 96 11/29/2020    CO2 25 11/29/2020    BUN 42 11/29/2020    CREATININE 4.0 11/29/2020    GFRAA 17 11/29/2020    LABGLOM 14 11/29/2020    GLUCOSE 102 11/29/2020    GLUCOSE 163 02/22/2012    PROT 6.3 11/29/2020    CALCIUM 9.1 11/29/2020    BILITOT 0.3 11/29/2020    ALKPHOS 124 11/29/2020    AST 23 11/29/2020    ALT 28 11/29/2020       POC Tests: No results for input(s): POCGLU, POCNA, POCK, POCCL, POCBUN, POCHEMO, POCHCT in the last 72 hours.     Coags:   Lab Results   Component Value Date    PROTIME 15.4 11/29/2020    PROTIME 15.1 01/29/2012    INR 1.4 11/29/2020    APTT <20.0 11/29/2020       HCG (If Applicable): No results found for: PREGTESTUR, PREGSERUM, HCG, HCGQUANT     ABGs: No results found for: PHART, PO2ART, WNL0WBB, MKW5VLK, BEART, O0MEYGZY     Type & Screen (If Applicable):  No results found for: LABABO, LABRH    Drug/Infectious Status (If Applicable):  No results found for: HIV, HEPCAB    COVID-19 Screening (If Applicable):   Lab Results   Component Value Date    COVID19 Not Detected 01/15/2021         Anesthesia Evaluation  Patient summary reviewed no history of anesthetic complications:   Airway: Mallampati: III  TM distance: <3 FB     Mouth opening: > = 3 FB Dental:    (+) upper dentures and lower dentures      Pulmonary: breath sounds clear to auscultation  (+) COPD:                            ROS comment: Former smoker    Cardiovascular:  Exercise tolerance: Uses wheelchair for ambulation  (+) hypertension:, CAD:, CHF:, hyperlipidemia        Rhythm: regular  Rate: normal                    Neuro/Psych:   (+) CVA (right hemiparesis; Oct 2012): residual symptoms, neuromuscular disease:, TIA, depression/anxiety             GI/Hepatic/Renal:   (+) GERD:, renal disease (HD on M/W/F for the past 3-4 months): ESRD and dialysis,           Endo/Other:    (+) DiabetesType II DM, , blood dyscrasia: anemia, arthritis:., .                  ROS comment: Bilateral hearing aids    Prior right shoulder injury (neck flexed anteriorly and laterally the right) Abdominal:           Vascular:           ROS comment: S/p Tesio in Aug 2020. Anesthesia Plan      regional and MAC     ASA 4     (Plan is for brachial plexus block with IV sedation as requested by surgeon. Backup GA if needed. Patient and brother understand and agree to plan.)  Induction: intravenous. Anesthetic plan and risks discussed with patient and sibling. Plan discussed with CRNA.           Sudarshan Jackson MD   1/21/2021

## 2021-01-21 NOTE — OP NOTE
Operative Note      Patient: Aisha Holden  YOB: 1931  MRN: [id number]    Date of Procedure: 1/21/2021    Pre-Op Diagnosis: ESRD on dialysis    Post-Op Diagnosis: Same       Procedure(s):  INSERTION AV GRAFT RIGHT ARM    ++PNB++    Surgeon(s):  Roxy Bamberger, MD    Assistant:   First Assistant: Richmond Henriquez    Anesthesia: Regional    Estimated Blood Loss (mL): Minimal    Complications: None    Specimens:   * No specimens in log *    Implants:  Implant Name Type Inv. Item Serial No.  Lot No. LRB No. Used Action   GRAFT VASC L40CM DIA6MM BOV CAR ART CLLGN FOR FUNC HAD ACCS  GRAFT VASC L40CM DIA6MM BOV CAR ART CLLGN FOR FUNC HAD ACCS  [employer] [id number] Right 1 Implanted         Drains: * No LDAs found *    Findings:     Detailed Description of Procedure: The patient was identified and the procedure was confirmed. The right arm was prepped and draped in the usual sterile fashion. Lidocaine 1% mixed with 0.25% Marcaine was used for local anesthesia. A transverse skin incision was made in the proximal forearm and carried down through the subcutaneous tissue. The brachial artery and cephalic - median cubital vein confluence were identified and freed from the surrounding tissues. A second skin incision was made distally near the wrist and continued through the subcutaneous tissue. The bovine carotid graft was then marked for orientation and pulled through a subcutaneous tunnel using the Yasmin-Wick tunneler forming a loop. The orientation of the graft was that the arterial portion of the loop was medial.  The patient was heparinized, and the artery was clamped proximally and distally, and the longitudinal arteriotomy measuring 5 mm was made. The graft was cut to the appropriate length and spatulated, and anastomosed to the side of the artery using a running 6-0 Prolene suture. After completing the anastomosis, the clamps were released.   The vein was clamped proximally and distally, and the longitudinal venotomy measuring 10 mm was made. The graft was cut to the appropriate length and spatulated, and anastomosed to the side of the vein using a running 6-0 Prolene suture. Prior to completing the anastomosis, the graft and vessels were flushed. The anastamoses was completed and the clamps were released. Good flow was appreciated through the fistula, and a radial Doppler signal was appreciated at the wrist. Hemostasis was obtained, and the incisions were irrigated with saline solution. The incisions were approximated with multiple layers of Vicryl suture, and skin adhesive was applied to the incisions in the operating room. Needle, sponge and instrument counts were reported as correct x2. The patient tolerated the procedure and was transferred to the recovery area in satisfactory condition.          Electronically signed by Valentino Machado MD on 1/21/2021 at 10:45 AM

## 2021-01-21 NOTE — ANESTHESIA PROCEDURE NOTES
Peripheral Block    Patient location during procedure: pre-op  Start time: 1/21/2021 8:34 AM  End time: 1/21/2021 8:37 AM  Staffing  Performed: anesthesiologist   Anesthesiologist: Jackie Villeda MD  Preanesthetic Checklist  Completed: patient identified, IV checked, site marked, risks and benefits discussed, surgical consent, monitors and equipment checked, pre-op evaluation, timeout performed, anesthesia consent given, oxygen available and patient being monitored  Peripheral Block  Patient position: supine  Prep: ChloraPrep  Patient monitoring: cardiac monitor, continuous pulse ox, frequent blood pressure checks and IV access  Block type: Brachial plexus  Laterality: right  Injection technique: single-shot  Guidance: ultrasound guided  Local infiltration: ropivacaine  Infiltration strength: 0.5 %  Dose: 1 mL  Supraclavicular  Provider prep: sterile gloves and mask  Local infiltration: ropivacaine  Needle  Needle type: combined needle/nerve stimulator   Needle gauge: 21 G  Needle length: 5 cm  Needle localization: ultrasound guidance  Assessment  Injection assessment: negative aspiration for heme, no paresthesia on injection and local visualized surrounding nerve on ultrasound  Paresthesia pain: none  Slow fractionated injection: yes  Hemodynamics: stable  Additional Notes  Despite prior right shoulder injury and right neck flexion chronically, ultrasound was used to visualize brachial plexus clearly. Total of 0.5 mg IV midazolam and 25mcg IV fentanyl given for nerve block. No complications. Patient tolerated procedure well.   Medications Administered  Ropivacaine (NAROPIN) injection 0.5%, 29 mL  Reason for block: primary anesthetic and at surgeon's request

## 2021-01-21 NOTE — PROGRESS NOTES
Right supraclavicular nerve block completed per Dr Julian Hanson with good toleration. Brother called to bedside. Call light within reach.

## 2021-01-21 NOTE — H&P
Vascular Surgery History & Physical Exam      Chief Complaint: CRF    HISTORY OF PRESENT ILLNESS:                The patient is a 80 y.o. male who presents to the hospital for elective creation of a arteriovenous fistula. The patient denies any problems since the last office visit. IMPRESSION:   Active Hospital Problems    Diagnosis    Encounter regarding vascular access for dialysis for end-stage renal disease ([facility] [N18.6, Z99.2]       PLAN:  Insertion of a right arm arteriovenous graft. I reviewed the procedure with the patient. I discussed the risks, benefits, and alternatives of the procedure. The patient understands and consents. All questions were answered.     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    Diabetes mellitus ([facility] E11.9    Diabetes mellitus ([facility] E11.9    Diabetic peripheral neuropathy ([facility]) E11.42    Osteoarthritis M19.90    Gait abnormality R26.9    Physical deconditioning R53.81    Lumbar spondylitis ([facility]) M46.96    Anemia D64.9    Fx humer, med condyl-closed S42.463A    Cerebral infarction ([facility] I63.9    Renal failure N19    TIA (transient ischemic attack) G45.9    ASHD (arteriosclerotic heart disease) I25.10    Diabetes mellitus ([facility]) E11.9    PVD (peripheral vascular disease) with claudication ([facility]) I73.9    History of CVA (cerebrovascular accident) Z86.73    Pain in lower limb M79.606    Cerebral infarction (HCC) I63.9    CKD 3 N18.30    Diabetes mellitus-2 E11.9    Acute respiratory failure ([facility]) J96.00    Decubitus ulcer of sacral region, stage 1 L89.151    Right sided weakness R53.1    Hypoglycemia E16.2    Acute kidney injury (ZAHRAA) with acute tubular necrosis (ATN) ([facility]) N17.0    Acute kidney failure (HCC) N17.9    Encounter regarding vascular access for dialysis for end-stage renal disease ([facility] N18.6, Z99.2       Past Medical History:   Diagnosis Date    Arthritis     CAD (coronary artery disease)     CHF (congestive heart failure) (HCC)     Chronic kidney disease     COVID-19     Diabetes mellitus ([facility]     Diabetic peripheral neuropathy ([facility] 11/9/2012    History of CVA (cerebrovascular accident) 6/9/2014    Hypertension     Other disorders of kidney and ureter     prostate infections in past    PVD (peripheral vascular disease) with claudication ([facility] 6/9/2014    Right sided weakness 3/19/2018    TIA (transient ischemic attack)     possible several years ago    Unspecified cerebral artery occlusion with cerebral infarction 2013    [facility] RIGHT LEG AFFECTED        Past Surgical History:   Procedure Laterality Date    ABDOMINAL HERNIA REPAIR      CATARACT REMOVAL      right    CHOLECYSTECTOMY  11/2011    DENTAL SURGERY      EYE SURGERY      [facility]. DIALYSIS CATHETER Right 8/27/2020    TESIO CATHETER INSERTION performed by Neptali Arrington MD at [facility]      removal of sac in ear       Current Medications:     Current Facility-Administered Medications:     0.9 % sodium chloride infusion, , Intravenous, Continuous, Neptali Arrington MD    sodium chloride flush 0.9 % injection 10 mL, 10 mL, Intravenous, 2 times per day, Neptali Arrington MD    sodium chloride flush 0.9 % injection 10 mL, 10 mL, Intravenous, PRN, Neptali Arrington MD    ceFAZolin (ANCEF) 2000 mg in sterile water 20 mL IV syringe, 2 g, Intravenous, On Call to OR, Neptali Arrington MD    midazolam PF (VERSED) injection 1 mg, 1 mg, Intravenous, PRN, Anna Hoyos MD    fentaNYL (SUBLIMAZE) injection 50 mcg, 50 mcg, Intravenous, PRN, Anna Hoyos MD    ropivacaine (NAROPIN) 0.5% injection 30 mL, 30 mL, Infiltration, Once, Anna Hoyos MD    meperidine (DEMEROL) injection 12.5 mg, 12.5 mg, Intravenous, Q5 Min PRN, Anna Hoyos MD    fentaNYL (SUBLIMAZE) injection 25 mcg, 25 mcg, Intravenous, Q5 Min PRN, Ro Jaimes MD    fentaNYL (SUBLIMAZE) injection 50 mcg, 50 mcg, Intravenous, Q5 Min PRN, Ro Jaimes MD    HYDROmorphone (DILAUDID) injection 0.25 mg, 0.25 mg, Intravenous, Q5 Min PRN, Ro Jaimes MD    HYDROmorphone (DILAUDID) injection 0.5 mg, 0.5 mg, Intravenous, Q5 Min PRN, Ro Jaimes MD    oxyCODONE-acetaminophen (PERCOCET) 5-325 MG per tablet 1 tablet, 1 tablet, Oral, Once PRN, Ro Jaimes MD    promethazine (PHENERGAN) injection 6.25 mg, 6.25 mg, Intravenous, Q15 Min PRN, Anthony Rodriguez MD    diphenhydrAMINE (BENADRYL) injection 12.5 mg, 12.5 mg, Intravenous, Once PRN, Anthony Rodriguez MD    Allergies:  Sulfa antibiotics and Sulfa antibiotics    Social History     Socioeconomic History    Marital status: Single     Spouse name: Not on file    Number of children: Not on file    Years of education: Not on file    Highest education level: Not on file   Occupational History    Not on file   Social Needs    Financial resource strain: Not on file    Food insecurity     Worry: Not on file     Inability: Not on file    Transportation needs     Medical: Not on file     Non-medical: Not on file   Tobacco Use    Smoking status: Former Smoker     Packs/day: 0.50     Years: 20.00     Pack years: 10.00     Types: Cigarettes     Quit date: 5     Years since quittin.0    Smokeless tobacco: Never Used   Substance and Sexual Activity    Alcohol use: Not Currently     Alcohol/week: 1.0 standard drinks     Types: 1 Glasses of wine per week    Drug use: No    Sexual activity: Not on file   Lifestyle    Physical activity     Days per week: Not on file     Minutes per session: Not on file    Stress: Not on file   Relationships    Social connections     Talks on phone: Not on file     Gets together: Not on file     Attends Taoist service: Not on file     Active member of club or organization: Not on file     Attends meetings of clubs or organizations: Not on file     Relationship status: Not on file    Intimate partner violence     Fear of current or ex partner: Not on file     Emotionally abused: Not on file     Physically abused: Not on file     Forced sexual activity: Not on file   Other Topics Concern    Not on file   Social History Narrative    ** Merged History Encounter **             History reviewed. No pertinent family history. REVIEW OF SYSTEMS:  The chart was reviewed.     PHYSICAL EXAM:    Vitals:    01/21/21 0740   BP: 131/60   Pulse: 87   Resp: 18   Temp: 98 °F (36.7 °C)   SpO2: 97%     CONSTITUTIONAL:  awake, alert, cooperative, no apparent distress  LUNGS:  no increased work of breathing, good air exchange and clear to auscultation  CARDIOVASCULAR:  regular rate and rhythm and radial pulses 2+ right  ABDOMEN:  soft, non-distended and non-tender    LABS:    Lab Results   Component Value Date    WBC 7.2 01/21/2021    HGB 10.0 (L) 01/21/2021    HCT 32.0 (L) 01/21/2021     01/21/2021    PROTIME 15.4 (H) 11/29/2020    INR 1.4 11/29/2020    APTT <20.0 (L) 11/29/2020    K 3.8 01/21/2021    BUN 15 01/21/2021    CREATININE 2.5 (H) 01/21/2021       RADIOLOGY:

## 2021-01-21 NOTE — PROGRESS NOTES
Attached to [address] O2 applied at 2L per N/C. Timeout performed for right supraclavicular nerve block with Dr Surendra De León.

## 2021-01-22 NOTE — ANESTHESIA POSTPROCEDURE EVALUATION
Department of Anesthesiology  Postprocedure Note    Patient: Sandra Johnson  MRN: [id number]  YOB: 1931  Date of evaluation: 1/21/2021  Time:  8:07 PM     Procedure Summary     Date: 01/21/21 Room / Location: [place] [address] / [address] [place] [address]    Anesthesia Start: 3680 Anesthesia Stop: 1101    Procedure: INSERTION AV GRAFT RIGHT ARM (Right ) Diagnosis: (CHRONIC RENAL FAILURE)    Surgeons: Jose Martines MD Responsible Provider: Minerva Erickson MD    Anesthesia Type: regional, MAC ASA Status: 4          Anesthesia Type: regional, MAC    Joel Phase I: Joel Score: 10    Joel Phase II: Joel Score: 10    Last vitals: Reviewed and per EMR flowsheets.        Anesthesia Post Evaluation    Patient location during evaluation: PACU  Patient participation: complete - patient participated  Level of consciousness: awake  Airway patency: patent  Nausea & Vomiting: no vomiting and no nausea  Complications: no  Cardiovascular status: hemodynamically stable  Respiratory status: acceptable  Hydration status: stable

## 2021-01-23 ENCOUNTER — TELEPHONE (OUTPATIENT)
Dept: VASCULAR SURGERY | Age: 86
End: 2021-01-23

## 2021-01-25 ENCOUNTER — TELEPHONE (OUTPATIENT)
Dept: VASCULAR SURGERY | Age: 86
End: 2021-01-25

## 2021-01-25 ENCOUNTER — HOSPITAL ENCOUNTER (EMERGENCY)
Age: 86
Discharge: HOME | End: 2021-01-25
Attending: EMERGENCY MEDICINE
Payer: MEDICARE

## 2021-01-25 VITALS
HEIGHT: 66 IN | BODY MASS INDEX: 29.73 KG/M2 | HEART RATE: 88 BPM | RESPIRATION RATE: 16 BRPM | SYSTOLIC BLOOD PRESSURE: 128 MMHG | TEMPERATURE: 97.3 F | WEIGHT: 185 LBS | DIASTOLIC BLOOD PRESSURE: 79 MMHG | OXYGEN SATURATION: 97 %

## 2021-01-25 DIAGNOSIS — R60.9 POSTOPERATIVE EDEMA: Primary | ICD-10-CM

## 2021-01-25 DIAGNOSIS — N39.0 URINARY TRACT INFECTION WITHOUT HEMATURIA, SITE UNSPECIFIED: ICD-10-CM

## 2021-01-25 LAB
ALBUMIN SERPL-MCNC: 3.5 G/DL (ref 3.5–5.2)
ALP BLD-CCNC: 136 U/L (ref 40–129)
ALT SERPL-CCNC: 12 U/L (ref 0–40)
ANION GAP SERPL CALCULATED.3IONS-SCNC: 11 MMOL/L (ref 7–16)
AST SERPL-CCNC: 28 U/L (ref 0–39)
BACTERIA: ABNORMAL /HPF
BASOPHILS ABSOLUTE: 0.03 E9/L (ref 0–0.2)
BASOPHILS RELATIVE PERCENT: 0.5 % (ref 0–2)
BILIRUB SERPL-MCNC: 0.4 MG/DL (ref 0–1.2)
BILIRUBIN URINE: ABNORMAL
BLOOD, URINE: NEGATIVE
BUN BLDV-MCNC: 39 MG/DL (ref 8–23)
CALCIUM SERPL-MCNC: 9.2 MG/DL (ref 8.6–10.2)
CHLORIDE BLD-SCNC: 98 MMOL/L (ref 98–107)
CLARITY: CLEAR
CO2: 27 MMOL/L (ref 22–29)
COLOR: ABNORMAL
CREAT SERPL-MCNC: 4.3 MG/DL (ref 0.7–1.2)
EOSINOPHILS ABSOLUTE: 0.26 E9/L (ref 0.05–0.5)
EOSINOPHILS RELATIVE PERCENT: 4.3 % (ref 0–6)
GFR AFRICAN AMERICAN: 16
GFR NON-AFRICAN AMERICAN: 13 ML/MIN/1.73
GLUCOSE BLD-MCNC: 181 MG/DL (ref 74–99)
GLUCOSE URINE: NEGATIVE MG/DL
HCT VFR BLD CALC: 33.9 % (ref 37–54)
HEMOGLOBIN: 10.7 G/DL (ref 12.5–16.5)
IMMATURE GRANULOCYTES #: 0.03 E9/L
IMMATURE GRANULOCYTES %: 0.5 % (ref 0–5)
KETONES, URINE: NEGATIVE MG/DL
LEUKOCYTE ESTERASE, URINE: ABNORMAL
LYMPHOCYTES ABSOLUTE: 1.19 E9/L (ref 1.5–4)
LYMPHOCYTES RELATIVE PERCENT: 19.6 % (ref 20–42)
MCH RBC QN AUTO: 31.3 PG (ref 26–35)
MCHC RBC AUTO-ENTMCNC: 31.6 % (ref 32–34.5)
MCV RBC AUTO: 99.1 FL (ref 80–99.9)
MONOCYTES ABSOLUTE: 0.7 E9/L (ref 0.1–0.95)
MONOCYTES RELATIVE PERCENT: 11.5 % (ref 2–12)
NEUTROPHILS ABSOLUTE: 3.86 E9/L (ref 1.8–7.3)
NEUTROPHILS RELATIVE PERCENT: 63.6 % (ref 43–80)
NITRITE, URINE: NEGATIVE
PDW BLD-RTO: 14.6 FL (ref 11.5–15)
PH UA: 7.5 (ref 5–9)
PLATELET # BLD: 223 E9/L (ref 130–450)
PMV BLD AUTO: 10.4 FL (ref 7–12)
POTASSIUM SERPL-SCNC: 4.3 MMOL/L (ref 3.5–5)
PROTEIN UA: 30 MG/DL
RBC # BLD: 3.42 E12/L (ref 3.8–5.8)
RBC UA: ABNORMAL /HPF (ref 0–2)
SODIUM BLD-SCNC: 136 MMOL/L (ref 132–146)
SPECIFIC GRAVITY UA: >=1.03 (ref 1–1.03)
TOTAL PROTEIN: 6.4 G/DL (ref 6.4–8.3)
UROBILINOGEN, URINE: >=8 E.U./DL
WBC # BLD: 6.1 E9/L (ref 4.5–11.5)
WBC UA: ABNORMAL /HPF (ref 0–5)

## 2021-01-25 PROCEDURE — 99283 EMERGENCY DEPT VISIT LOW MDM: CPT

## 2021-01-25 PROCEDURE — 6370000000 HC RX 637 (ALT 250 FOR IP): Performed by: EMERGENCY MEDICINE

## 2021-01-25 PROCEDURE — 81001 URINALYSIS AUTO W/SCOPE: CPT

## 2021-01-25 PROCEDURE — 85025 COMPLETE CBC W/AUTO DIFF WBC: CPT

## 2021-01-25 PROCEDURE — 80053 COMPREHEN METABOLIC PANEL: CPT

## 2021-01-25 RX ORDER — CEFDINIR 300 MG/1
300 CAPSULE ORAL DAILY
Qty: 10 CAPSULE | Refills: 0 | Status: SHIPPED | OUTPATIENT
Start: 2021-01-25 | End: 2021-02-04

## 2021-01-25 RX ORDER — CEFDINIR 300 MG/1
300 CAPSULE ORAL ONCE
Status: COMPLETED | OUTPATIENT
Start: 2021-01-25 | End: 2021-01-25

## 2021-01-25 RX ADMIN — CEFDINIR 300 MG: 300 CAPSULE ORAL at 14:45

## 2021-01-25 ASSESSMENT — ENCOUNTER SYMPTOMS
ABDOMINAL PAIN: 0
DIARRHEA: 0
VOMITING: 0
CHEST TIGHTNESS: 0
WHEEZING: 0
SHORTNESS OF BREATH: 0
NAUSEA: 0

## 2021-01-25 NOTE — ED PROVIDER NOTES
(Right, 8/27/2020); eye surgery; and shunt revision (Right, 1/21/2021). Social History:  reports that he quit smoking about 54 years ago. His smoking use included cigarettes. He has a 10.00 pack-year smoking history. He has never used smokeless tobacco. He reports previous alcohol use of about 1.0 standard drinks of alcohol per week. He reports that he does not use drugs. Family History: family history is not on file. The patients home medications have been reviewed. Allergies: Sulfa antibiotics and Sulfa antibiotics        ---------------------------------------------------PHYSICAL EXAM--------------------------------------    Physical Exam  Constitutional:       Appearance: Normal appearance. HENT:      Head: Normocephalic and atraumatic. Mouth/Throat:      Mouth: Mucous membranes are moist.   Eyes:      Extraocular Movements: Extraocular movements intact. Pupils: Pupils are equal, round, and reactive to light. Cardiovascular:      Rate and Rhythm: Normal rate and regular rhythm. Pulses: Normal pulses. Pulmonary:      Effort: Pulmonary effort is normal.      Breath sounds: Normal breath sounds. No wheezing. Abdominal:      General: Abdomen is flat. Bowel sounds are normal.      Palpations: Abdomen is soft. Musculoskeletal:         General: Swelling and tenderness present. Comments: Moderate swelling, 1+ pitting edema about the right elbow  Ecchymosis proximal forearm  Radial and ulnar pulses 2+, normal cap refill  Right arm fixed flexion (chronic), compartments soft and compressible   Skin:     General: Skin is warm and dry. Capillary Refill: Capillary refill takes less than 2 seconds. Neurological:      General: No focal deficit present. Mental Status: He is alert and oriented to person, place, and time.           -------------------------------------------------- RESULTS -------------------------------------------------  I have personally reviewed all laboratory and imaging results for this patient. Results are listed below.      LABS:  Results for orders placed or performed during the hospital encounter of 01/25/21   Urinalysis   Result Value Ref Range    Color, UA ORANGE (A) Straw/Yellow    Clarity, UA Clear Clear    Glucose, Ur Negative Negative mg/dL    Bilirubin Urine MODERATE (A) Negative    Ketones, Urine Negative Negative mg/dL    Specific Gravity, UA >=1.030 1.005 - 1.030    Blood, Urine Negative Negative    pH, UA 7.5 5.0 - 9.0    Protein, UA 30 (A) Negative mg/dL    Urobilinogen, Urine >=8.0 (A) <2.0 E.U./dL    Nitrite, Urine Negative Negative    Leukocyte Esterase, Urine LARGE (A) Negative   CBC auto differential   Result Value Ref Range    WBC 6.1 4.5 - 11.5 E9/L    RBC 3.42 (L) 3.80 - 5.80 E12/L    Hemoglobin 10.7 (L) 12.5 - 16.5 g/dL    Hematocrit 33.9 (L) 37.0 - 54.0 %    MCV 99.1 80.0 - 99.9 fL    MCH 31.3 26.0 - 35.0 pg    MCHC 31.6 (L) 32.0 - 34.5 %    RDW 14.6 11.5 - 15.0 fL    Platelets 566 407 - 340 E9/L    MPV 10.4 7.0 - 12.0 fL    Neutrophils % 63.6 43.0 - 80.0 %    Immature Granulocytes % 0.5 0.0 - 5.0 %    Lymphocytes % 19.6 (L) 20.0 - 42.0 %    Monocytes % 11.5 2.0 - 12.0 %    Eosinophils % 4.3 0.0 - 6.0 %    Basophils % 0.5 0.0 - 2.0 %    Neutrophils Absolute 3.86 1.80 - 7.30 E9/L    Immature Granulocytes # 0.03 E9/L    Lymphocytes Absolute 1.19 (L) 1.50 - 4.00 E9/L    Monocytes Absolute 0.70 0.10 - 0.95 E9/L    Eosinophils Absolute 0.26 0.05 - 0.50 E9/L    Basophils Absolute 0.03 0.00 - 0.20 E9/L   Comprehensive metabolic panel   Result Value Ref Range    Sodium 136 132 - 146 mmol/L    Potassium 4.3 3.5 - 5.0 mmol/L    Chloride 98 98 - 107 mmol/L    CO2 27 22 - 29 mmol/L    Anion Gap 11 7 - 16 mmol/L    Glucose 181 (H) 74 - 99 mg/dL    BUN 39 (H) 8 - 23 mg/dL    CREATININE 4.3 (H) 0.7 - 1.2 mg/dL    GFR Non-African American 13 >=60 mL/min/1.73    GFR African American 16     Calcium 9.2 8.6 - 10.2 mg/dL    Total Protein 6.4 6.4 - 8.3 g/dL    Alb 3.5 3.5 - 5.2 g/dL    Total Bilirubin 0.4 0.0 - 1.2 mg/dL    Alkaline Phosphatase 136 (H) 40 - 129 U/L    ALT 12 0 - 40 U/L    AST 28 0 - 39 U/L   Microscopic Urinalysis   Result Value Ref Range    WBC, UA 10-20 (A) 0 - 5 /HPF    RBC, UA NONE 0 - 2 /HPF    Bacteria, UA NONE SEEN None Seen /HPF       RADIOLOGY:  Interpreted by Radiologist.  No orders to display         ------------------------- NURSING NOTES AND VITALS REVIEWED ---------------------------   The nursing notes within the ED encounter and vital signs as below have been reviewed by myself. /79   Pulse 88   Temp 97.3 °F (36.3 °C)   Resp 16   Ht 5' 6\" (1.676 m)   Wt 185 lb (83.9 kg)   SpO2 97%   BMI 29.86 kg/m²   Oxygen Saturation Interpretation: Normal    The patients available past medical records and past encounters were reviewed. ------------------------------ ED COURSE/MEDICAL DECISION MAKING----------------------  Medications   cefdinir (OMNICEF) capsule 300 mg (300 mg Oral Given 1/25/21 1445)         ED COURSE:       Medical Decision Making: Will consult vascular surgery, basic lab work drawn to rule out infection. Patient has palpable pulses (radial and ulnar) and his sensation is unchanged from baseline. GEN surg consulted related patient, recommend compression dressing to decrease swelling and discharge from surgical standpoint. This patient's ED course included: a personal history and physicial examination and re-evaluation prior to disposition    This patient has remained hemodynamically stable during their ED course. Re-Evaluations:             Re-evaluation. Patients symptoms are improving    Re-examination  1/25/21   11:51 AM EST          Vital Signs:   Vitals:    01/25/21 1130 01/25/21 1448   BP: 139/82 128/79   Pulse: 99 88   Resp: 20 16   Temp: 97.3 °F (36.3 °C)    SpO2: 93% 97%   Weight: 185 lb (83.9 kg)    Height: 5' 6\" (1.676 m)      Card/Pulm:  Rhythm: normal rate.   Heart Sounds: no murmurs, gallops, or rubs. clear to auscultation, no wheezes or rales and unlabored breathing. Capillary Refill: normal.  Radial Pulse:  present 2+. Skin:  Dry. Counseling: The emergency provider has spoken with the patient and family member patient and brother and discussed todays results, in addition to providing specific details for the plan of care and counseling regarding the diagnosis and prognosis. Questions are answered at this time and they are agreeable with the plan.       --------------------------------- IMPRESSION AND DISPOSITION ---------------------------------    IMPRESSION  1. Postoperative edema    2. Urinary tract infection without hematuria, site unspecified        DISPOSITION  Disposition: Discharge to home  Patient condition is good    NOTE: This report was transcribed using voice recognition software.  Every effort was made to ensure accuracy; however, inadvertent computerized transcription errors may be present       Mary Lopez DO  Resident  01/25/21 2119

## 2021-01-25 NOTE — H&P
Vascular Surgery History & Physical      HPI :    Vangie Richards is a 80 y.o. male with PMHx of CVA with RUE paresthesias and ESRD on dialysis with right forearm AV loop graft placement 1/21 at [facility] who presents for evaluation of swelling in his RUE. He states that he had a Tubigrip dressing on his right arm up until yesterday. When he took it off, he noticed some bruising and increased swelling which prompted his visit. He denies any arm pain with active or passive movement, drainage from his incisions, fever, or chills. He has not had any problems other than the swelling.       ROS : Negative if blank [], Positive if [x]  General Vascular   [] Fevers [] Claudication (Blocks)   [] Chills [] Rest Pain   [] Weight Loss [] Tissue Loss   [] Chest Pain [] Clotting Disorder   [] SOB at rest [] Leg Swelling   [] SOB with exertion [] DVT/PE      [] Nausea    [] Vomitting [] Stroke/TIA   [] Abdominal Pain [] Focal weakness   [] Melena [] Slurred Speech   [] Hematochezia [] Vision Changes   [] Hematuria    [] Dysuria [] Hx of Central Catheters   [] Wears Glasses/Contacts  [x] Dialysis   [] Blindness     [] Hand Dominant   [] Difficulty swallowing        Past Medical History:   Diagnosis Date    Arthritis     CAD (coronary artery disease)     [facility]est[facility]) [facility]     Chronic kidney disease     COVID-19     Diabetes mellitus ([facility]     Diabetic peripheral neuropathy ([facility] 11/9/2012    History of CVA (cerebrovascular accident) 6/9/2014    Hypertension     Other disorders of kidney and ureter     prostate infections in past    PVD (peripheral vascular disease) with claudication ([facility] 6/9/2014    Right sided weakness 3/19/2018    TIA (transient ischemic attack)     possible several years ago    Unspecified cerebral artery occlusion with cerebral infarction 2013    [facility] RIGHT LEG AFFECTED        Past Surgical History:   Procedure Laterality Date    ABDOMINAL HERNIA REPAIR      CATARACT REMOVAL Other Topics Concern    Not on file   Social History Narrative    ** Merged History Encounter **             History reviewed. No pertinent family history. PHYSICAL EXAM:    /82   Pulse 99   Temp 97.3 °F (36.3 °C)   Resp 20   Ht 5' 6\" (1.676 m)   Wt 185 lb (83.9 kg)   SpO2 93%   BMI 29.86 kg/m²   CONSTITUTIONAL:  awake, alert, cooperative, no apparent distress, and appears stated age  NEURO:  Normal  EYES:  lids and lashes normal, sclera clear and conjunctiva normal  ENT:  normocepalic, without obvious abnormality, external ears without lesions  NECK:  supple, symmetrical, trachea midline,no jugular venous distension  LUNGS:  no increased work of breathing on room air  CARDIOVASCULAR:  RR and normotensive  ABDOMEN:  soft, non-distended, non-tender, Aorta is not palpable    EXTREMITIES:    R UE Swelling present from forearm to middle of upper extremity Incisions present from previous surgery, c/d/i       4/5 Strength    L UE Swelling absent Incisions absent       5/5 Strength    R LE Edema absent     Incisions absent    Varicose veins absent    Wounds absent    normalcaprefill   5/5 Strength   Neuropathy is absent    L LE Edema absent     Incisions absent    Varicose veins absent    Wounds absent    normalcaprefill   5/5 Strength   Neuropathy is absent    R brachial 2 L brachial 2   R radial 2 L radial 2   R femoral  L femoral    R popliteal  L popliteal    R posterior tibial  L posterior tibial    R dorsalis pedis  L dorsalis pedis        LABS:    Lab Results   Component Value Date    WBC 7.2 01/21/2021    HGB 10.0 (L) 01/21/2021    HCT 32.0 (L) 01/21/2021     01/21/2021    PROTIME 15.4 (H) 11/29/2020    INR 1.4 11/29/2020    K 3.8 01/21/2021    BUN 15 01/21/2021    CREATININE 2.5 (H) 01/21/2021       RADIOLOGY:  No results found.       Assesment/Plan  80 y.o. male with RUE swelling s/p R loop AVG creation 1/21    - Swelling likely secondary to recent surgery and graft placement  - Pain is controlled and compartments are soft  - Apply Tubigrip onto right upper extremity to help decrease swelling  - Pt has dialysis scheduled for today and will likely help  - Okay to DC home from vascular POV  - F/U with Dr. Hyde Peers on February 9th as scheduled  - Discussed with Dr. Birdia Buerger, MD  1/25/21  12:37 PM EST

## 2021-01-25 NOTE — PROGRESS NOTES
Patient has postoperative swelling. He has some tenderness. Some ecchymosis. There is no signs of infection. He has a nice thrill in the access. And 1+ radial pulse. Motor and sensation are intact. He has chronic arthritis and some areas of the formation of his hands this is longstanding this is not new. This is corroborated by his family.   At this point from our standpoint he can be discharged and follow-up with Dr. Lance Kirkland as scheduled

## 2021-01-25 NOTE — TELEPHONE ENCOUNTER
Pt's niece called to report increased (R) arm swelling, above and below the elbow, s/p insertion (R) AVG on 1/21/21; instructed by PCP to report to [facility] ([facility] ER.

## 2021-01-26 NOTE — TELEPHONE ENCOUNTER
1/23/21    Pt called    He is having significant swelling of forearm where loop graft was placed. His pain is well controlled. He is not having drainage. He is not having hand opain. He is also having ecchymosis that has developed. I explained these are not unusual changes. Recommended elevation of extremity. I also explained if he developed any significant changes to contact us, increased swelling, pain, drainage. Will plan on keeping planned fu apptmt with Alexanderatore unless something changes    All ?  Answered    Shahla Barrera

## 2021-01-27 ENCOUNTER — TELEPHONE (OUTPATIENT)
Dept: VASCULAR SURGERY | Age: 86
End: 2021-01-27

## 2021-01-27 DIAGNOSIS — G89.18 ACUTE POSTOPERATIVE PAIN OF EXTREMITY: Primary | ICD-10-CM

## 2021-01-27 RX ORDER — HYDROCODONE BITARTRATE AND ACETAMINOPHEN 5; 325 MG/1; MG/1
1 TABLET ORAL EVERY 6 HOURS PRN
Qty: 28 TABLET | Refills: 0 | Status: SHIPPED | OUTPATIENT
Start: 2021-01-27 | End: 2021-02-03

## 2021-01-27 NOTE — TELEPHONE ENCOUNTER
Pt's niece, Genesis Maravilla, called stating continued arm pain and swelling s/p (R) AVG insertion 1/21/21. Genesis Maravilla states that as his previous stroke affected his right side, it is difficult for pt to keep arm elevated, even on pillows. She will call with fever or chills otherwise, will keep scheduled post-op visit with Dr. Elgin Mcghee.

## 2021-02-08 ENCOUNTER — TELEPHONE (OUTPATIENT)
Dept: VASCULAR SURGERY | Age: 86
End: 2021-02-08

## 2021-02-09 ENCOUNTER — OFFICE VISIT (OUTPATIENT)
Dept: VASCULAR SURGERY | Age: 86
End: 2021-02-09

## 2021-02-09 DIAGNOSIS — G89.18 POST-OP PAIN: Primary | ICD-10-CM

## 2021-02-09 DIAGNOSIS — Z99.2 ENCOUNTER REGARDING VASCULAR ACCESS FOR DIALYSIS FOR ESRD (HCC): ICD-10-CM

## 2021-02-09 DIAGNOSIS — N18.6 ENCOUNTER REGARDING VASCULAR ACCESS FOR DIALYSIS FOR ESRD (HCC): ICD-10-CM

## 2021-02-09 PROCEDURE — 99024 POSTOP FOLLOW-UP VISIT: CPT | Performed by: PHYSICIAN ASSISTANT

## 2021-02-09 RX ORDER — HYDROCODONE BITARTRATE AND ACETAMINOPHEN 5; 325 MG/1; MG/1
1 TABLET ORAL EVERY 6 HOURS PRN
Qty: 12 TABLET | Refills: 0 | Status: SHIPPED | OUTPATIENT
Start: 2021-02-09 | End: 2021-02-12

## 2021-03-01 ENCOUNTER — TELEPHONE (OUTPATIENT)
Dept: VASCULAR SURGERY | Age: 86
End: 2021-03-01

## 2021-03-02 ENCOUNTER — TELEPHONE (OUTPATIENT)
Dept: VASCULAR SURGERY | Age: 86
End: 2021-03-02

## 2021-03-02 ENCOUNTER — OFFICE VISIT (OUTPATIENT)
Dept: VASCULAR SURGERY | Age: 86
End: 2021-03-02

## 2021-03-02 DIAGNOSIS — N18.6 ENCOUNTER REGARDING VASCULAR ACCESS FOR DIALYSIS FOR END-STAGE RENAL DISEASE (HCC): Primary | ICD-10-CM

## 2021-03-02 DIAGNOSIS — Z99.2 ENCOUNTER REGARDING VASCULAR ACCESS FOR DIALYSIS FOR END-STAGE RENAL DISEASE (HCC): Primary | ICD-10-CM

## 2021-03-02 PROCEDURE — 99024 POSTOP FOLLOW-UP VISIT: CPT | Performed by: NURSE PRACTITIONER

## 2021-03-02 RX ORDER — METOPROLOL SUCCINATE 50 MG/1
50 TABLET, EXTENDED RELEASE ORAL EVERY EVENING
COMMUNITY
Start: 2020-11-23 | End: 2021-04-05 | Stop reason: ALTCHOICE

## 2021-03-02 NOTE — PROGRESS NOTES
3/2/2021    Higinio Roca  9/20/1931    Chief Complaint   Patient presents with    Post-Op Check     s/p insertion (R) AVG       Patient returns for post operative evaluation status post creation of a right forearm loop AVG. The patient still complains of right arm swelling, but it is slowly improving. He has a hard time elevating his right arm due to weakness. His right arm pain is much improved. Procedure Laterality Date    ABDOMINAL HERNIA REPAIR      CATARACT REMOVAL      right    CHOLECYSTECTOMY  11/2011    DENTAL SURGERY      EYE SURGERY      [facility] DIALYSIS CATHETER Right 8/27/2020    TESIO CATHETER INSERTION performed by Tez Rodrigues MD at [facility]      removal of sac in ear    SHUNT REVISION Right 1/21/2021    INSERTION AV GRAFT RIGHT ARM performed by Tez Rodrigues MD at [facility] OR       Physical Exam:  The incision(s) are healing without evidence of infection. Heart rhythm is regular. Swelling present from in forearm over AVG. Upper arm is soft and no swelling of the right hand. AVG with + thrill. Right      Left   Brachial 2    Radial 2    Femoral     Popliteal     Dorsalis Pedis     Posterior Tibial     (3=normal, 2=diminished, 1=barely palpable, 4=widened)    Assessment:  Post-operative AV access. Problem List Items Addressed This Visit     Encounter regarding vascular access for dialysis for end-stage renal disease ([address] - Primary (Chronic)        I reviewed with the patient that his right arm AVG is patent and the swelling has resolved enough to the point where he can use his access for dialysis starting next week. We will notify his dialysis facility and his tunneled catheter will be removed once his arm access is functioning well. I reviewed with the patient that normal activities can be resumed as tolerated. Pt seen and plan reviewed with Dr. Diogo Mabry.      Ritu Joe, CNP

## 2021-03-29 ENCOUNTER — TELEPHONE (OUTPATIENT)
Dept: VASCULAR SURGERY | Age: 86
End: 2021-03-29

## 2021-03-29 NOTE — PROGRESS NOTES
Patient agreed to COVID test on 4/2/21 at the  [facility]  located at  [address] [place] [address]. between the hours of 6 am- 2:30 pm, instructed to bring ID. Patient instructed to self isolate until day of surgery.

## 2021-03-29 NOTE — TELEPHONE ENCOUNTER
Spoke with patient's niece, she is unable to get transportation for this week for patient. Rescheduled surgery to Thursday, 4-8-21.

## 2021-04-02 ENCOUNTER — HOSPITAL ENCOUNTER (OUTPATIENT)
Age: 86
Discharge: HOME | End: 2021-04-04
Payer: MEDICARE

## 2021-04-02 DIAGNOSIS — U07.1 COVID-19: ICD-10-CM

## 2021-04-02 PROCEDURE — U0005 INFEC AGEN DETEC AMPLI PROBE: HCPCS

## 2021-04-02 PROCEDURE — U0003 INFECTIOUS AGENT DETECTION BY NUCLEIC ACID (DNA OR RNA); SEVERE ACUTE RESPIRATORY SYNDROME CORONAVIRUS 2 (SARS-COV-2) (CORONAVIRUS DISEASE [COVID-19]), AMPLIFIED PROBE TECHNIQUE, MAKING USE OF HIGH THROUGHPUT TECHNOLOGIES AS DESCRIBED BY CMS-2020-01-R: HCPCS

## 2021-04-03 LAB
SARS-COV-2: NOT DETECTED
SOURCE: NORMAL

## 2021-04-05 RX ORDER — METOPROLOL SUCCINATE 25 MG/1
25 TABLET, EXTENDED RELEASE ORAL DAILY
Status: ON HOLD | COMMUNITY
End: 2021-07-12 | Stop reason: HOSPADM

## 2021-04-05 RX ORDER — HYDRALAZINE HYDROCHLORIDE 25 MG/1
25 TABLET, FILM COATED ORAL 2 TIMES DAILY
Status: ON HOLD | COMMUNITY
End: 2021-07-12 | Stop reason: HOSPADM

## 2021-04-05 NOTE — PROGRESS NOTES
Roger 36 PRE-ADMISSION TESTING GENERAL INSTRUCTIONS- [facility]-phone number:[phone]    GENERAL INSTRUCTIONS  [x] Antibacterial Soap shower Night before and/or AM of Surgery  [] Triston wipe instruction sheet and wipes given. [x] Nothing by mouth after midnight, including gum, candy, mints, or water. [x] You may brush your teeth, gargle, but do NOT swallow water. []Hibiclens shower  the night before and the morning of surgery. Do not use             Hibiclens on your face or head. [x]No smoking, chewing tobacco, illegal drugs, or alcohol within 24 hours of your surgery. [x] Jewelry, valuables or body piercing's should not be brought to the hospital. All body and/or tongue piercing's must be removed prior to arriving to hospital.  ALL hair pins must be removed. [x] Do not wear makeup, lotions, powders, deodorant. Nail polish as directed by the nurse. [x] Arrange transportation with a responsible adult  to and from the hospital. If you do not have a responsible adult  to transport you, you will need to make arrangements with a medical transportation company (i.e. [employer]. A Uber/taxi/bus is not appropriate unless you are accompanied by a responsible adult ). Arrange for someone to be with you for the remainder of the day and for 24 hours after your procedure due to having had anesthesia. Who will be your  for transportation?_______brother _________   Who will be staying with you for 24 hrs after your procedure?_________brother_________  [x] Bring insurance card and photo ID.  [] Transfusion Bracelet: Please bring with you to hospital, day of surgery  [] Bring urine specimen day of surgery. Any small container is acceptable. [] Use inhalers the morning of surgery and bring with you to hospital.  [] Bring copy of living will or healthcare power of  papers to be placed in your electronic record.   [] CPAP/BI-PAP: Please bring your machine if you are to spend the night in the hospital.     PARKING INSTRUCTIONS:   [x] Arrival Time:___0730__________  · [x] Parking lot '\"I\"  is located on [facility] (the corner of [facility] and [facility]). To enter, press the button and the gate will lift. A free token will be provided to exit the lot. One car per patient is allowed to park in this lot. All other cars are to park on [address] [place] [address] either in the parking garage or the handicap lot. [] To reach the [facility] lobby from [address] [place] [address], upon entering the hospital, take elevator B to the 3rd floor. EDUCATION INSTRUCTIONS:      [] Knee or hip replacement booklet & exercise pamphlets given. [] Daniele 77 placed in chart. [] Pre-admission Testing educational folder given  [] Incentive Spirometry,coughing & deep breathing exercises reviewed. []Medication information sheet(s)   []Fluoroscopy-Xray used in surgery reviewed with patient. Educational pamphlet placed in chart. []Pain: Post-op pain is normal and to be expected. You will be asked to rate your pain from 0-10(a zero is not acceptable-education is needed). Your post-op pain goal is:  [] Ask your nurse for your pain medication. [] Joint camp offered. [] Joint replacement booklets given. [] Other:___________________________    MEDICATION INSTRUCTIONS:   [x]Bring a complete list of your medications, please write the last time you took the medicine, give this list to the nurse. [x] Take the following medications the morning of surgery with 1-2 ounces of water:   [x] Stop herbal supplements and vitamins 5 days before your surgery. [x] DO NOT take any diabetic medicine the morning of surgery. Follow instructions for insulin the day before surgery. [x] If you are diabetic and your blood sugar is low or you feel symptomatic, you may drink 1-2 ounces of apple juice or take a glucose tablet.   The morning of your procedure, you may call the pre-op area if you have concerns about your blood sugar [phone]. [x] Follow physician instructions regarding any blood thinners you may be taking. WHAT TO EXPECT:  [x] The day of surgery you will be greeted and checked in by the [employer].  In addition, you will be registered in the [facility] by a Patient Access Representative. Please bring your photo ID and insurance card. A nurse will greet you in accordance to the time you are needed in the pre-op area to prepare you for surgery. Please do not be discouraged if you are not greeted in the order you arrive as there are many variables that are involved in patient preparation. Your patience is greatly appreciated as you wait for your nurse. Please bring in items such as: books, magazines, newspapers, electronics, or any other items  to occupy your time in the waiting area. [x]  Delays may occur with surgery and staff will make a sincere effort to keep you informed of delays. If any delays occur with your procedure, we apologize ahead of time for your inconvenience as we recognize the value of your time.

## 2021-04-07 ENCOUNTER — ANESTHESIA EVENT (OUTPATIENT)
Dept: OPERATING ROOM | Age: 86
End: 2021-04-07
Payer: MEDICARE

## 2021-04-08 ENCOUNTER — ANESTHESIA (OUTPATIENT)
Dept: OPERATING ROOM | Age: 86
End: 2021-04-08
Payer: MEDICARE

## 2021-04-08 ENCOUNTER — HOSPITAL ENCOUNTER (OUTPATIENT)
Age: 86
Setting detail: OUTPATIENT SURGERY
Discharge: HOME | End: 2021-04-08
Attending: SURGERY | Admitting: SURGERY
Payer: MEDICARE

## 2021-04-08 VITALS
SYSTOLIC BLOOD PRESSURE: 140 MMHG | BODY MASS INDEX: 27.28 KG/M2 | TEMPERATURE: 97 F | DIASTOLIC BLOOD PRESSURE: 67 MMHG | HEIGHT: 68 IN | OXYGEN SATURATION: 98 % | RESPIRATION RATE: 14 BRPM | WEIGHT: 180 LBS | HEART RATE: 62 BPM

## 2021-04-08 VITALS — SYSTOLIC BLOOD PRESSURE: 137 MMHG | DIASTOLIC BLOOD PRESSURE: 65 MMHG | OXYGEN SATURATION: 100 %

## 2021-04-08 DIAGNOSIS — Z99.2 ENCOUNTER REGARDING VASCULAR ACCESS FOR DIALYSIS FOR END-STAGE RENAL DISEASE (HCC): Chronic | ICD-10-CM

## 2021-04-08 DIAGNOSIS — N18.6 ENCOUNTER REGARDING VASCULAR ACCESS FOR DIALYSIS FOR END-STAGE RENAL DISEASE (HCC): Chronic | ICD-10-CM

## 2021-04-08 DIAGNOSIS — Z01.818 PRE-OP TESTING: ICD-10-CM

## 2021-04-08 DIAGNOSIS — Z99.2 ESRD (END STAGE RENAL DISEASE) ON DIALYSIS (HCC): Chronic | ICD-10-CM

## 2021-04-08 DIAGNOSIS — U07.1 COVID-19: Primary | ICD-10-CM

## 2021-04-08 DIAGNOSIS — N18.6 ESRD (END STAGE RENAL DISEASE) ON DIALYSIS (HCC): Chronic | ICD-10-CM

## 2021-04-08 LAB
ANION GAP SERPL CALCULATED.3IONS-SCNC: 15 MMOL/L (ref 7–16)
BUN BLDV-MCNC: 21 MG/DL (ref 8–23)
CALCIUM SERPL-MCNC: 8.8 MG/DL (ref 8.6–10.2)
CHLORIDE BLD-SCNC: 97 MMOL/L (ref 98–107)
CO2: 28 MMOL/L (ref 22–29)
CREAT SERPL-MCNC: 3 MG/DL (ref 0.7–1.2)
GFR AFRICAN AMERICAN: 24
GFR NON-AFRICAN AMERICAN: 20 ML/MIN/1.73
GLUCOSE BLD-MCNC: 135 MG/DL (ref 74–99)
HCT VFR BLD CALC: 34 % (ref 37–54)
HEMOGLOBIN: 10.9 G/DL (ref 12.5–16.5)
MCH RBC QN AUTO: 32 PG (ref 26–35)
MCHC RBC AUTO-ENTMCNC: 32.1 % (ref 32–34.5)
MCV RBC AUTO: 99.7 FL (ref 80–99.9)
METER GLUCOSE: 144 MG/DL (ref 74–99)
PDW BLD-RTO: 13.2 FL (ref 11.5–15)
PLATELET # BLD: 146 E9/L (ref 130–450)
PMV BLD AUTO: 11.3 FL (ref 7–12)
POTASSIUM REFLEX MAGNESIUM: 4.6 MMOL/L (ref 3.5–5)
RBC # BLD: 3.41 E12/L (ref 3.8–5.8)
SODIUM BLD-SCNC: 140 MMOL/L (ref 132–146)
WBC # BLD: 4.1 E9/L (ref 4.5–11.5)

## 2021-04-08 PROCEDURE — 3600000012 HC SURGERY LEVEL 2 ADDTL 15MIN: Performed by: SURGERY

## 2021-04-08 PROCEDURE — 80048 BASIC METABOLIC PNL TOTAL CA: CPT

## 2021-04-08 PROCEDURE — 2580000003 HC RX 258: Performed by: SURGERY

## 2021-04-08 PROCEDURE — 36831 OPEN THROMBECT AV FISTULA: CPT | Performed by: SURGERY

## 2021-04-08 PROCEDURE — 7100000011 HC PHASE II RECOVERY - ADDTL 15 MIN: Performed by: SURGERY

## 2021-04-08 PROCEDURE — C1757 CATH, THROMBECTOMY/EMBOLECT: HCPCS | Performed by: SURGERY

## 2021-04-08 PROCEDURE — 6360000002 HC RX W HCPCS: Performed by: ANESTHESIOLOGY

## 2021-04-08 PROCEDURE — 64415 NJX AA&/STRD BRCH PLXS IMG: CPT | Performed by: ANESTHESIOLOGY

## 2021-04-08 PROCEDURE — 3700000000 HC ANESTHESIA ATTENDED CARE: Performed by: SURGERY

## 2021-04-08 PROCEDURE — 85027 COMPLETE CBC AUTOMATED: CPT

## 2021-04-08 PROCEDURE — 3600000002 HC SURGERY LEVEL 2 BASE: Performed by: SURGERY

## 2021-04-08 PROCEDURE — 94664 DEMO&/EVAL PT USE INHALER: CPT

## 2021-04-08 PROCEDURE — 3700000001 HC ADD 15 MINUTES (ANESTHESIA): Performed by: SURGERY

## 2021-04-08 PROCEDURE — 6360000002 HC RX W HCPCS: Performed by: NURSE ANESTHETIST, CERTIFIED REGISTERED

## 2021-04-08 PROCEDURE — 36415 COLL VENOUS BLD VENIPUNCTURE: CPT

## 2021-04-08 PROCEDURE — 7100000010 HC PHASE II RECOVERY - FIRST 15 MIN: Performed by: SURGERY

## 2021-04-08 PROCEDURE — 6360000002 HC RX W HCPCS: Performed by: SURGERY

## 2021-04-08 PROCEDURE — 6370000000 HC RX 637 (ALT 250 FOR IP): Performed by: ANESTHESIOLOGY

## 2021-04-08 PROCEDURE — 2500000003 HC RX 250 WO HCPCS: Performed by: SURGERY

## 2021-04-08 PROCEDURE — 2709999900 HC NON-CHARGEABLE SUPPLY: Performed by: SURGERY

## 2021-04-08 PROCEDURE — 82962 GLUCOSE BLOOD TEST: CPT

## 2021-04-08 RX ORDER — FENTANYL CITRATE 50 UG/ML
100 INJECTION, SOLUTION INTRAMUSCULAR; INTRAVENOUS ONCE
Status: COMPLETED | OUTPATIENT
Start: 2021-04-08 | End: 2021-04-08

## 2021-04-08 RX ORDER — LABETALOL HYDROCHLORIDE 5 MG/ML
5 INJECTION, SOLUTION INTRAVENOUS EVERY 10 MIN PRN
Status: DISCONTINUED | OUTPATIENT
Start: 2021-04-08 | End: 2021-04-08 | Stop reason: HOSPADM

## 2021-04-08 RX ORDER — PROMETHAZINE HYDROCHLORIDE 25 MG/ML
25 INJECTION, SOLUTION INTRAMUSCULAR; INTRAVENOUS PRN
Status: DISCONTINUED | OUTPATIENT
Start: 2021-04-08 | End: 2021-04-08 | Stop reason: HOSPADM

## 2021-04-08 RX ORDER — SODIUM CHLORIDE 0.9 % (FLUSH) 0.9 %
10 SYRINGE (ML) INJECTION PRN
Status: DISCONTINUED | OUTPATIENT
Start: 2021-04-08 | End: 2021-04-08 | Stop reason: HOSPADM

## 2021-04-08 RX ORDER — ROPIVACAINE HYDROCHLORIDE 5 MG/ML
30 INJECTION, SOLUTION EPIDURAL; INFILTRATION; PERINEURAL
Status: COMPLETED | OUTPATIENT
Start: 2021-04-08 | End: 2021-04-08

## 2021-04-08 RX ORDER — MEPERIDINE HYDROCHLORIDE 25 MG/ML
12.5 INJECTION INTRAMUSCULAR; INTRAVENOUS; SUBCUTANEOUS EVERY 5 MIN PRN
Status: DISCONTINUED | OUTPATIENT
Start: 2021-04-08 | End: 2021-04-08 | Stop reason: HOSPADM

## 2021-04-08 RX ORDER — SODIUM CHLORIDE 0.9 % (FLUSH) 0.9 %
10 SYRINGE (ML) INJECTION EVERY 12 HOURS SCHEDULED
Status: DISCONTINUED | OUTPATIENT
Start: 2021-04-08 | End: 2021-04-08 | Stop reason: HOSPADM

## 2021-04-08 RX ORDER — MIDAZOLAM HYDROCHLORIDE 2 MG/2ML
1 INJECTION, SOLUTION INTRAMUSCULAR; INTRAVENOUS EVERY 5 MIN PRN
Status: DISCONTINUED | OUTPATIENT
Start: 2021-04-08 | End: 2021-04-08 | Stop reason: HOSPADM

## 2021-04-08 RX ORDER — OXYCODONE HYDROCHLORIDE AND ACETAMINOPHEN 5; 325 MG/1; MG/1
1 TABLET ORAL EVERY 6 HOURS PRN
Qty: 15 TABLET | Refills: 0 | Status: SHIPPED | OUTPATIENT
Start: 2021-04-08 | End: 2021-04-15

## 2021-04-08 RX ORDER — SODIUM CHLORIDE 9 MG/ML
INJECTION, SOLUTION INTRAVENOUS CONTINUOUS
Status: DISCONTINUED | OUTPATIENT
Start: 2021-04-08 | End: 2021-04-08 | Stop reason: HOSPADM

## 2021-04-08 RX ORDER — ROPIVACAINE HYDROCHLORIDE 5 MG/ML
INJECTION, SOLUTION EPIDURAL; INFILTRATION; PERINEURAL
Status: COMPLETED | OUTPATIENT
Start: 2021-04-08 | End: 2021-04-08

## 2021-04-08 RX ORDER — PROPOFOL 10 MG/ML
INJECTION, EMULSION INTRAVENOUS CONTINUOUS PRN
Status: DISCONTINUED | OUTPATIENT
Start: 2021-04-08 | End: 2021-04-08 | Stop reason: SDUPTHER

## 2021-04-08 RX ORDER — IPRATROPIUM BROMIDE AND ALBUTEROL SULFATE 2.5; .5 MG/3ML; MG/3ML
1 SOLUTION RESPIRATORY (INHALATION)
Status: DISCONTINUED | OUTPATIENT
Start: 2021-04-08 | End: 2021-04-08 | Stop reason: HOSPADM

## 2021-04-08 RX ORDER — HEPARIN SODIUM 1000 [USP'U]/ML
INJECTION, SOLUTION INTRAVENOUS; SUBCUTANEOUS PRN
Status: DISCONTINUED | OUTPATIENT
Start: 2021-04-08 | End: 2021-04-08 | Stop reason: SDUPTHER

## 2021-04-08 RX ADMIN — ROPIVACAINE HYDROCHLORIDE 30 ML: 5 INJECTION EPIDURAL; INFILTRATION; PERINEURAL at 09:04

## 2021-04-08 RX ADMIN — FENTANYL CITRATE 50 MCG: 50 INJECTION, SOLUTION INTRAMUSCULAR; INTRAVENOUS at 08:51

## 2021-04-08 RX ADMIN — Medication 2000 MG: at 09:30

## 2021-04-08 RX ADMIN — HEPARIN SODIUM 5000 UNITS: 1000 INJECTION INTRAVENOUS; SUBCUTANEOUS at 09:51

## 2021-04-08 RX ADMIN — ROPIVACAINE HYDROCHLORIDE 30 ML: 5 INJECTION, SOLUTION EPIDURAL; INFILTRATION; PERINEURAL at 09:02

## 2021-04-08 RX ADMIN — SODIUM CHLORIDE: 9 INJECTION, SOLUTION INTRAVENOUS at 09:22

## 2021-04-08 RX ADMIN — PROPOFOL 40 MCG/KG/MIN: 10 INJECTION, EMULSION INTRAVENOUS at 09:29

## 2021-04-08 RX ADMIN — IPRATROPIUM BROMIDE AND ALBUTEROL SULFATE 1 AMPULE: .5; 3 SOLUTION RESPIRATORY (INHALATION) at 11:54

## 2021-04-08 RX ADMIN — MIDAZOLAM 1 MG: 1 INJECTION INTRAMUSCULAR; INTRAVENOUS at 08:50

## 2021-04-08 ASSESSMENT — PAIN SCALES - GENERAL
PAINLEVEL_OUTOF10: 0

## 2021-04-08 ASSESSMENT — PULMONARY FUNCTION TESTS
PIF_VALUE: 0
PIF_VALUE: 1

## 2021-04-08 ASSESSMENT — LIFESTYLE VARIABLES: SMOKING_STATUS: 0

## 2021-04-08 NOTE — ANESTHESIA PROCEDURE NOTES
Peripheral Block    Patient location during procedure: pre-op  Start time: 4/8/2021 8:48 AM  End time: 4/8/2021 9:04 AM  Staffing  Performed: anesthesiologist   Anesthesiologist: Merari Freire MD  Preanesthetic Checklist  Completed: patient identified, IV checked, site marked, risks and benefits discussed, surgical consent, monitors and equipment checked, pre-op evaluation, timeout performed, anesthesia consent given, oxygen available and patient being monitored  Peripheral Block  Patient position: sitting  Prep: ChloraPrep  Patient monitoring: cardiac monitor, continuous pulse ox, frequent blood pressure checks and IV access  Block type: Brachial plexus  Laterality: right  Injection technique: single-shot  Guidance: ultrasound guided  Local infiltration: lidocaine  Infiltration strength: 1 %  Dose: 3 mL  Interscalene  Provider prep: mask and sterile gloves  Local infiltration: lidocaine  Needle  Needle gauge: 21 G  Needle length: 5 cm  Needle localization: ultrasound guidance  Assessment  Injection assessment: negative aspiration for heme, no paresthesia on injection and local visualized surrounding nerve on ultrasound  Paresthesia pain: none  Slow fractionated injection: yes  Hemodynamics: stable  Additional Notes  U/S 49045.  (1) Under ultrasound guidance, a  gauge needle was inserted and placed in close proximity to the nerve.  (2) Ultrasound was also used to visualize the spread of the anesthetic in close proximity to the nerve being blocked. (3) The nerve appeared anatomically normal, and (4 there were no apparent abnormal pathological findings on the image that were readily visible and related to the nerve being blocked. (5) A permanent ultrasound image was saved in the patient's record.             Medications Administered  Ropivacaine (NAROPIN) injection 0.5%, 30 mL  Reason for block: post-op pain management and at surgeon's request

## 2021-04-08 NOTE — H&P
Vascular Surgery History & Physical Exam      Chief Complaint: CRF    HISTORY OF PRESENT ILLNESS:                The patient is a 80 y.o. male who presents to the hospital for elective creation of a arteriovenous fistula. The patient denies any problems since the last office visit. IMPRESSION:   Active Hospital Problems    Diagnosis    ESRD (end stage renal disease) on dialysis ([facility] [N18.6, Z99.2]    [facility] regarding vascular [facility] [N18.6, Z99.2]       PLAN:  Thrombectomy, possible revision of a right arteriovenous graft. I reviewed the procedure with the patient. I discussed the risks, benefits, and alternatives of the procedure. The patient understands and consents. All questions were answered.     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    Diabetes mellitus ([facility] E11.9    Diabetes mellitus ([facility] E11.9    Diabetic peripheral neuropathy (HCC) E11.42    Osteoarthritis M19.90    Gait abnormality R26.9    Physical deconditioning R53.81    Lumbar spondylitis ([facility]) M46.96    Anemia D64.9    Fx humer, med condyl-closed S42.463A    Cerebral infarction ([facility] I63.9    Renal failure N19    TIA (transient ischemic attack) G45.9    ASHD (arteriosclerotic heart disease) I25.10    Diabetes mellitus ([facility]) E11.9    PVD (peripheral vascular disease) with claudication ([facility]) I73.9    History of CVA (cerebrovascular accident) Z86.73    Pain in lower limb M79.606    Cerebral infarction (HCC) I63.9    CKD 3 N18.30    Diabetes mellitus-2 E11.9    Acute respiratory failure ([facility]) J96.00    Decubitus ulcer of sacral region, stage 1 L89.151    Right sided weakness R53.1    Hypoglycemia E16.2    Acute kidney injury (ZAHRAA) with acute tubular necrosis (ATN) ([facility]) N17.0    Acute kidney failure (HCC) N17.9    Relationship status: Not on file    Intimate partner violence     Fear of current or ex partner: Not on file     Emotionally abused: Not on file     Physically abused: Not on file     Forced sexual activity: Not on file   Other Topics Concern    Not on file   Social History Narrative    ** Merged History Encounter **             History reviewed. No pertinent family history. REVIEW OF SYSTEMS:  The chart was reviewed.     PHYSICAL EXAM:    Vitals:    04/08/21 0845   BP: 128/65   Pulse: 74   Resp: 19   Temp:    SpO2: 96%     CONSTITUTIONAL:  awake, alert, cooperative, no apparent distress  LUNGS:  no increased work of breathing, good air exchange and clear to auscultation  CARDIOVASCULAR:  regular rate and rhythm and   ABDOMEN:  Non-distended    LABS:    Lab Results   Component Value Date    WBC 4.1 (L) 04/08/2021    HGB 10.9 (L) 04/08/2021    HCT 34.0 (L) 04/08/2021     04/08/2021    PROTIME 15.4 (H) 11/29/2020    INR 1.4 11/29/2020    APTT <20.0 (L) 11/29/2020    K 4.6 04/08/2021    BUN 21 04/08/2021    CREATININE 3.0 (H) 04/08/2021       RADIOLOGY:

## 2021-04-08 NOTE — PROGRESS NOTES
Patient has a moist persistent  cough that is producing mucus. Dr. Rupal Willard was called regarding cough. Order for Duoneb was placed.

## 2021-04-12 NOTE — ANESTHESIA POSTPROCEDURE EVALUATION
Department of Anesthesiology  Postprocedure Note    Patient: Bebeto Nesbitt  MRN: [id number]  YOB: 1931  Date of evaluation: 4/12/2021  Time:  7:22 AM     Procedure Summary     Date: 04/08/21 Room / Location: [facility] 03 / [facility]    Anesthesia Start: 8896 Anesthesia Stop: 1041    Procedure: THROMBECTOMY RIGHT ARM AV GRAFT (Right Arm Lower) Diagnosis: (CHRONIC RENAL FAILURE)    Surgeons: Janina Fleming MD Responsible Provider: Yohan Gilbert MD    Anesthesia Type: regional, MAC ASA Status: 4          Anesthesia Type: regional, MAC    Joel Phase I: Joel Score: 10    Joel Phase II: Joel Score: 10    Last vitals: Reviewed and per EMR flowsheets.        Anesthesia Post Evaluation    Patient location during evaluation: PACU  Patient participation: complete - patient participated  Level of consciousness: awake  Airway patency: patent  Nausea & Vomiting: no nausea and no vomiting  Complications: no  Cardiovascular status: hemodynamically stable  Respiratory status: acceptable  Hydration status: stable

## 2021-05-14 ENCOUNTER — TELEPHONE (OUTPATIENT)
Dept: VASCULAR SURGERY | Age: 86
End: 2021-05-14

## 2021-05-14 NOTE — TELEPHONE ENCOUNTER
Patient's (L) arm is swollen and has blisters, dialysis not using the arm, please advise [phone].

## 2021-05-15 ENCOUNTER — HOSPITAL ENCOUNTER (OUTPATIENT)
Dept: ULTRASOUND IMAGING | Age: 86
Discharge: HOME | End: 2021-05-17
Payer: MEDICARE

## 2021-05-15 ENCOUNTER — HOSPITAL ENCOUNTER (EMERGENCY)
Age: 86
Discharge: HOME | End: 2021-05-15
Attending: EMERGENCY MEDICINE
Payer: MEDICARE

## 2021-05-15 VITALS
BODY MASS INDEX: 28.45 KG/M2 | HEIGHT: 66 IN | DIASTOLIC BLOOD PRESSURE: 61 MMHG | TEMPERATURE: 96.9 F | OXYGEN SATURATION: 96 % | SYSTOLIC BLOOD PRESSURE: 129 MMHG | WEIGHT: 177 LBS | HEART RATE: 77 BPM | RESPIRATION RATE: 17 BRPM

## 2021-05-15 DIAGNOSIS — M79.89 SWELLING OF RIGHT UPPER EXTREMITY: ICD-10-CM

## 2021-05-15 DIAGNOSIS — N18.6 END STAGE RENAL DISEASE (HCC): ICD-10-CM

## 2021-05-15 DIAGNOSIS — R60.0 ARM EDEMA: Primary | ICD-10-CM

## 2021-05-15 DIAGNOSIS — I82.621 DEEP VEIN THROMBOSIS (DVT) OF OTHER VEIN OF RIGHT UPPER EXTREMITY, UNSPECIFIED CHRONICITY (HCC): ICD-10-CM

## 2021-05-15 LAB
ANION GAP SERPL CALCULATED.3IONS-SCNC: 12 MMOL/L (ref 7–16)
BASOPHILS ABSOLUTE: 0.04 E9/L (ref 0–0.2)
BASOPHILS RELATIVE PERCENT: 0.9 % (ref 0–2)
BUN BLDV-MCNC: 25 MG/DL (ref 6–23)
CALCIUM SERPL-MCNC: 9.1 MG/DL (ref 8.6–10.2)
CHLORIDE BLD-SCNC: 98 MMOL/L (ref 98–107)
CO2: 27 MMOL/L (ref 22–29)
CREAT SERPL-MCNC: 3.4 MG/DL (ref 0.7–1.2)
EOSINOPHILS ABSOLUTE: 0.33 E9/L (ref 0.05–0.5)
EOSINOPHILS RELATIVE PERCENT: 7.4 % (ref 0–6)
GFR AFRICAN AMERICAN: 21
GFR NON-AFRICAN AMERICAN: 17 ML/MIN/1.73
GLUCOSE BLD-MCNC: 173 MG/DL (ref 74–99)
HCT VFR BLD CALC: 32.7 % (ref 37–54)
HEMOGLOBIN: 10.2 G/DL (ref 12.5–16.5)
IMMATURE GRANULOCYTES #: 0.02 E9/L
IMMATURE GRANULOCYTES %: 0.5 % (ref 0–5)
INR BLD: 1.5
LYMPHOCYTES ABSOLUTE: 1.28 E9/L (ref 1.5–4)
LYMPHOCYTES RELATIVE PERCENT: 28.9 % (ref 20–42)
MCH RBC QN AUTO: 30.8 PG (ref 26–35)
MCHC RBC AUTO-ENTMCNC: 31.2 % (ref 32–34.5)
MCV RBC AUTO: 98.8 FL (ref 80–99.9)
MONOCYTES ABSOLUTE: 0.79 E9/L (ref 0.1–0.95)
MONOCYTES RELATIVE PERCENT: 17.8 % (ref 2–12)
NEUTROPHILS ABSOLUTE: 1.97 E9/L (ref 1.8–7.3)
NEUTROPHILS RELATIVE PERCENT: 44.5 % (ref 43–80)
PDW BLD-RTO: 13.7 FL (ref 11.5–15)
PLATELET # BLD: 183 E9/L (ref 130–450)
PMV BLD AUTO: 9.9 FL (ref 7–12)
POTASSIUM REFLEX MAGNESIUM: 4.5 MMOL/L (ref 3.5–5)
PROTHROMBIN TIME: 16.6 SEC (ref 9.3–12.4)
RBC # BLD: 3.31 E12/L (ref 3.8–5.8)
SODIUM BLD-SCNC: 137 MMOL/L (ref 132–146)
WBC # BLD: 4.4 E9/L (ref 4.5–11.5)

## 2021-05-15 PROCEDURE — 85025 COMPLETE CBC W/AUTO DIFF WBC: CPT

## 2021-05-15 PROCEDURE — 80048 BASIC METABOLIC PNL TOTAL CA: CPT

## 2021-05-15 PROCEDURE — 99283 EMERGENCY DEPT VISIT LOW MDM: CPT

## 2021-05-15 PROCEDURE — 93971 EXTREMITY STUDY: CPT

## 2021-05-15 PROCEDURE — 85610 PROTHROMBIN TIME: CPT

## 2021-05-15 PROCEDURE — 93971 EXTREMITY STUDY: CPT | Performed by: RADIOLOGY

## 2021-05-15 ASSESSMENT — ENCOUNTER SYMPTOMS
BACK PAIN: 0
NAUSEA: 0
SHORTNESS OF BREATH: 0
EYE DISCHARGE: 0
SORE THROAT: 0
VOMITING: 0
SINUS PRESSURE: 0
ABDOMINAL PAIN: 0
DIARRHEA: 0
COUGH: 0
EYE REDNESS: 0
EYE PAIN: 0
WHEEZING: 0

## 2021-05-15 ASSESSMENT — PAIN DESCRIPTION - LOCATION: LOCATION: ARM

## 2021-05-15 ASSESSMENT — PAIN DESCRIPTION - DESCRIPTORS: DESCRIPTORS: DISCOMFORT;PRESSURE

## 2021-05-15 NOTE — ED PROVIDER NOTES
Chief Complaint   Patient presents with    Vascular Access Problem     fistula placed few months ago, per pt son fistula has blood clots, sent in to see vascular       Patient is a 80-year-old male with a history of end-stage renal disease on dialysis presents today with right upper extremity edema. Patient states symptoms started several weeks ago. He did have a outpatient ultrasound which showed he has a blood clot in the cephalic and basilic vein in the right arm which is where his fistula is. This fistula was placed last month with Dr. Jasen Silva. States the last 2 dialysis treatments they have not been able to use his fistula, had to use the temporary dialysis line in his chest.  Attends dialysis every Monday, Wednesday, Friday. Denies chest pain or shortness of breath. Denies a headedness or dizziness. Patient has long-lasting motor and sensory deficit of the right upper extremity from previous CVA. The history is provided by the patient. No  was used. Review of Systems   Constitutional: Negative for chills and fever. HENT: Negative for ear pain, sinus pressure and sore throat. Eyes: Negative for pain, discharge and redness. Respiratory: Negative for cough, shortness of breath and wheezing. Cardiovascular: Negative for chest pain, palpitations and leg swelling. Gastrointestinal: Negative for abdominal pain, diarrhea, nausea and vomiting. Genitourinary: Negative for dysuria and frequency. Musculoskeletal: Negative for arthralgias and back pain. Skin: Negative for rash and wound. R arm swelling   Neurological: Negative for weakness and headaches. Hematological: Negative for adenopathy. All other systems reviewed and are negative. Physical Exam  Vitals and nursing note reviewed. Constitutional:       General: He is not in acute distress. Appearance: Normal appearance. He is well-developed. He is not ill-appearing.    HENT:      Head: Diagnosis:  1. Arm edema    2. Deep vein thrombosis (DVT) of other vein of right upper extremity, unspecified chronicity (HCC)        Disposition:  Patient's disposition: Discharge to home  Patient's condition is stable.               Vishal Amado DO  Resident  05/15/21 [id number]

## 2021-05-28 ENCOUNTER — TELEPHONE (OUTPATIENT)
Dept: VASCULAR SURGERY | Age: 86
End: 2021-05-28

## 2021-06-01 ENCOUNTER — TELEPHONE (OUTPATIENT)
Dept: VASCULAR SURGERY | Age: 86
End: 2021-06-01

## 2021-06-01 ENCOUNTER — OFFICE VISIT (OUTPATIENT)
Dept: VASCULAR SURGERY | Age: 86
End: 2021-06-01

## 2021-06-01 VITALS — HEIGHT: 66 IN | WEIGHT: 177 LBS | BODY MASS INDEX: 28.45 KG/M2

## 2021-06-01 DIAGNOSIS — Z99.2 ENCOUNTER REGARDING VASCULAR ACCESS FOR DIALYSIS FOR ESRD (HCC): Primary | ICD-10-CM

## 2021-06-01 DIAGNOSIS — N18.6 ENCOUNTER REGARDING VASCULAR ACCESS FOR DIALYSIS FOR ESRD (HCC): Primary | ICD-10-CM

## 2021-06-01 PROCEDURE — 99024 POSTOP FOLLOW-UP VISIT: CPT | Performed by: NURSE PRACTITIONER

## 2021-06-01 NOTE — PROGRESS NOTES
6/1/2021    Baldomero Roca  9/20/1931    Chief Complaint   Patient presents with    Follow-up     check rt. arm fistula       Patient returns for post operative evaluation status post thrombectomy of a right forearm loop AVG. He states that his right arm swelling is improved. The patient denies any unexpected problems since the procedure. He is still using his tunneled catheter for dialysis. He dada any right hand pain. Procedure Laterality Date    ABDOMINAL HERNIA REPAIR      CATARACT REMOVAL      right    CHOLECYSTECTOMY  11/2011    DENTAL SURGERY      EYE SURGERY      [facility]. DIALYSIS CATHETER Right 8/27/2020    TESIO CATHETER INSERTION performed by Neptali Champion MD at [facility]      removal of sac in ear    SHUNT REVISION Right 1/21/2021    INSERTION AV GRAFT RIGHT ARM performed by Neptali Champion MD at [address] [place] [address] Right 4/8/2021    THROMBECTOMY RIGHT ARM AV GRAFT performed by Neptali Champion MD at [facility]       Physical Exam:  The incision(s) are healing without evidence of infection. AVG with + thrill, + bruit. Mild swelling remains in right forearm. Heart rhythm is regular. Right      Left   Brachial     Radial 2    Femoral     Popliteal     Dorsalis Pedis     Posterior Tibial     (3=normal, 2=diminished, 1=barely palpable, 4=widened)    Assessment:  Post-operative AV access. Problem List Items Addressed This Visit     None      Visit Diagnoses     Encounter regarding vascular access for dialysis for ESRD ([facility]    -  Primary        I reviewed with the patient and his brother that the patient's AVG is patent and can be used for dialysis. The facility will be notified. The tunneled catheter will be removed once his arm access is functioning well for dialysis. If there are problems with the access or it thromboses, I again reviewed that the plan will be to use the tunneled catheter for long term access.  I reviewed with the patient that normal activities can be resumed as tolerated. Pt seen and plan reviewed with Dr. Amanda Rodgers.      Yolanda Sexton, APRN - CNP

## 2021-07-06 ENCOUNTER — HOSPITAL ENCOUNTER (OUTPATIENT)
Dept: WOUND CARE | Age: 86
Discharge: HOME | End: 2021-07-06
Payer: MEDICARE

## 2021-07-06 VITALS
TEMPERATURE: 97.6 F | HEART RATE: 80 BPM | SYSTOLIC BLOOD PRESSURE: 112 MMHG | RESPIRATION RATE: 20 BRPM | DIASTOLIC BLOOD PRESSURE: 50 MMHG

## 2021-07-06 DIAGNOSIS — L97.512 DIABETIC ULCER OF TOE OF RIGHT FOOT ASSOCIATED WITH TYPE 2 DIABETES MELLITUS, WITH FAT LAYER EXPOSED (HCC): Primary | ICD-10-CM

## 2021-07-06 DIAGNOSIS — E11.621 DIABETIC ULCER OF TOE OF RIGHT FOOT ASSOCIATED WITH TYPE 2 DIABETES MELLITUS, WITH FAT LAYER EXPOSED (HCC): Primary | ICD-10-CM

## 2021-07-06 DIAGNOSIS — L89.151 DECUBITUS ULCER OF SACRAL REGION, STAGE 1: ICD-10-CM

## 2021-07-06 PROCEDURE — 99213 OFFICE O/P EST LOW 20 MIN: CPT

## 2021-07-06 PROCEDURE — 99202 OFFICE O/P NEW SF 15 MIN: CPT | Performed by: SURGERY

## 2021-07-06 PROCEDURE — 11042 DBRDMT SUBQ TIS 1ST 20SQCM/<: CPT | Performed by: SURGERY

## 2021-07-06 PROCEDURE — 11042 DBRDMT SUBQ TIS 1ST 20SQCM/<: CPT

## 2021-07-06 RX ORDER — GINSENG 100 MG
CAPSULE ORAL ONCE
Status: CANCELLED | OUTPATIENT
Start: 2021-07-06 | End: 2021-07-06

## 2021-07-06 RX ORDER — CLOBETASOL PROPIONATE 0.5 MG/G
OINTMENT TOPICAL ONCE
Status: CANCELLED | OUTPATIENT
Start: 2021-07-06 | End: 2021-07-06

## 2021-07-06 RX ORDER — GENTAMICIN SULFATE 1 MG/G
OINTMENT TOPICAL ONCE
Status: CANCELLED | OUTPATIENT
Start: 2021-07-06 | End: 2021-07-06

## 2021-07-06 RX ORDER — LIDOCAINE HYDROCHLORIDE 20 MG/ML
JELLY TOPICAL ONCE
Status: CANCELLED | OUTPATIENT
Start: 2021-07-06 | End: 2021-07-06

## 2021-07-06 RX ORDER — BACITRACIN ZINC AND POLYMYXIN B SULFATE 500; 1000 [USP'U]/G; [USP'U]/G
OINTMENT TOPICAL ONCE
Status: CANCELLED | OUTPATIENT
Start: 2021-07-06 | End: 2021-07-06

## 2021-07-06 RX ORDER — LIDOCAINE 50 MG/G
OINTMENT TOPICAL ONCE
Status: CANCELLED | OUTPATIENT
Start: 2021-07-06 | End: 2021-07-06

## 2021-07-06 RX ORDER — LIDOCAINE 40 MG/G
CREAM TOPICAL ONCE
Status: CANCELLED | OUTPATIENT
Start: 2021-07-06 | End: 2021-07-06

## 2021-07-06 RX ORDER — BACITRACIN, NEOMYCIN, POLYMYXIN B 400; 3.5; 5 [USP'U]/G; MG/G; [USP'U]/G
OINTMENT TOPICAL ONCE
Status: CANCELLED | OUTPATIENT
Start: 2021-07-06 | End: 2021-07-06

## 2021-07-06 RX ORDER — BETAMETHASONE DIPROPIONATE 0.05 %
OINTMENT (GRAM) TOPICAL ONCE
Status: CANCELLED | OUTPATIENT
Start: 2021-07-06 | End: 2021-07-06

## 2021-07-06 RX ORDER — LIDOCAINE HYDROCHLORIDE 40 MG/ML
SOLUTION TOPICAL ONCE
Status: DISCONTINUED | OUTPATIENT
Start: 2021-07-06 | End: 2021-07-07 | Stop reason: HOSPADM

## 2021-07-06 RX ORDER — LIDOCAINE HYDROCHLORIDE 40 MG/ML
SOLUTION TOPICAL ONCE
Status: CANCELLED | OUTPATIENT
Start: 2021-07-06 | End: 2021-07-06

## 2021-07-06 ASSESSMENT — PAIN SCALES - GENERAL: PAINLEVEL_OUTOF10: 0

## 2021-07-06 NOTE — PROGRESS NOTES
Wound [facility]aling Center /Hyperbarics   History and Physical/Consultation  General Surgery/Medicine    Referring Physician : DO Angelic Reyes Fljose Abelino  MEDICAL RECORD NUMBER:  [id number]  AGE: 80 y.o. GENDER: male  : 1931  EPISODE DATE:  2021  Subjective:     Chief Complaint   Patient presents with    Wound Check     right foot and buttocks         HISTORY of PRESENT ILLNESS HPI     Gloria Rucker is a 80 y.o. male who presents today for wound/ulcer evaluation. History of Wound Context:  The patient has had a wound of the bilateral buttock and right second toe which was first noted approximately in 2021. This has been treated with home care. On their initial visit to the wound healing center, 21 ,  the patient has noted that the wound has not been improving. The patient has not had similar previous wounds in the past.      Pt is not on abx at time of initial visit.       Wound/Ulcer Pain Timing/Severity: intermittent  Quality of pain: dull  Severity:  5 / 10   Modifying Factors: Pain is relieved/improved with rest  Associated Signs/Symptoms: pain    Ulcer Identification:  Ulcer Type: diabetic and pressure  Contributing Factors: diabetes and chronic pressure    Diabetic/Pressure/Non Pressure Ulcers only:  Ulcer: Diabetic ulcer, fat layer exposed    If patient has diabetic lower extremity wounds  Watts Classification of diabetic lower extremity wounds:    Grade Description   []  0 No open wound   []  1 Superficial ulcer involving the full skin thickness   []  2 Deep ulcer involves ligament, tendon, joint capsule, or fascia  No bone involvement or abscess presence   []  3 Deep Ulcer with abcess formation and/or osteomyelitis   []  4 Localized gangrene   []  5 Extensive gangrene of the foot     Wound: N/A        PAST MEDICAL HISTORY      Diagnosis Date    Arthritis     CAD (coronary artery disease)     CHF (congestive heart failure) (HCC)     Chronic kidney disease     COVID-19  [facility]     Diabetes mellitus ([address]     Diabetic peripheral neuropathy ([facility] 2012    Hemodialysis patient ([facility]     History of CVA (cerebrovascular accident) 2014    Hypertension     Other disorders of kidney and ureter     prostate infections in past    PVD (peripheral vascular disease) with claudication ([address] 2014    Right sided weakness 3/19/2018    TIA (transient ischemic attack)     possible several years ago    Unspecified cerebral artery occlusion with cerebral infarction     [facility] RIGHT LEG AFFECTED     Past Surgical History:   Procedure Laterality Date    ABDOMINAL HERNIA REPAIR      CATARACT REMOVAL      right    CHOLECYSTECTOMY  2011    DENTAL SURGERY      EYE SURGERY      [facility] Right 2020    TESIO CATHETER INSERTION performed by [facility]y Perry MD at [facility]      removal of sac in ear    SHUNT REVISION Right 2021    INSERTION AV GRAFT RIGHT ARM performed by Cassy Perry MD at [address] [place] [address] Right 2021    THROMBECTOMY RIGHT ARM AV GRAFT performed by Cassy Perry MD at [address] [place] [address]     History reviewed. No pertinent family history.   Social History     Tobacco Use    Smoking status: Former Smoker     Packs/day: 0.50     Years: 20.00     Pack years: 10.00     Types: Cigarettes     Quit date: 5     Years since quittin.6    Smokeless tobacco: Never Used   Vaping Use    Vaping Use: Never used   Substance Use Topics    Alcohol use: Not Currently     Alcohol/week: 1.0 standard drinks     Types: 1 Glasses of wine per week    Drug use: No     Allergies   Allergen Reactions    Sulfa Antibiotics      Affects renal function and blood pressure    Sulfa Antibiotics      Current Outpatient Medications on File Prior to Encounter   Medication Sig Dispense Refill    ascorbic acid (VITAMIN C) 500 MG tablet Take 1,000 mg by mouth daily  zinc gluconate 50 MG tablet Take 50 mg by mouth daily      Multiple Vitamins-Minerals (PRESERVISION AREDS 2 PO) Take 1 tablet by mouth daily       insulin lispro (HUMALOG KWIKPEN) 200 UNIT/ML SOPN pen Inject into the skin 4 times daily as needed Before meals and nightly      famotidine (PEPCID) 40 MG tablet Take 40 mg by mouth as needed      vitamin D (CHOLECALCIFEROL) 25 MCG (1000 UT) TABS tablet Take 1,000 Units by mouth daily      aspirin 81 MG tablet Take 81 mg by mouth every morning        No current facility-administered medications on file prior to encounter.        REVIEW OF SYSTEMS   ROS : All others Negative if blank [], Positive if [x]  General Urinary   [] Fevers [] Hematuria   [] Chills [] Dysuria   [] Weight Loss Neurolgoic   Skin [] Stroke/TIA   [] Tissue Loss [] Focal weakness   Eyes [] Slurred Speech   [] Wears Glasses/Contacts ENT   [] Vision Changes [] Difficulty swallowing   Respiratory Endocrine    [] Shortness of breath [] Increased Thirst   Cardiovascular Gastrointestinal   [] Chest Pain [] Abdominal Pain   [] Shortness of breath with exertion [] Melena    [] Hematochezia     Objective:    BP (!) 112/50   Pulse 80   Temp 97.6 °F (36.4 °C) (Temporal)   Resp 20   Wt Readings from Last 3 Encounters:   07/12/21 179 lb 10.8 oz (81.5 kg)   06/01/21 177 lb (80.3 kg)   05/15/21 177 lb (80.3 kg)       PHYSICAL EXAM  CONSTITUTIONAL:   Awake, alert, cooperative   PSYCHIATRIC :  Oriented to time, place and person  process  ENT:  External ears and nose without lesions   NECK: Supple, symmetrical  LUNGS:  No increased work of breathing  CARDIOVASCULAR:  Regular rate  ABDOMEN:  soft  SKIN:   Wounds as shown  Stage 1 sacral decubitus   EXTREMITIES:   No edema  Assessment:     Problem List Items Addressed This Visit     * (Principal) Decubitus ulcer of sacral region, stage 1    Diabetic ulcer of toe of right foot associated with type 2 diabetes mellitus, with fat layer exposed ([facility] - Primary Pre Debridement Measurements:  Are located in the [place]  Documentation Flow Sheet  Post Debridement Measurements:  Wound/Ulcer Descriptions are Pre Debridement except measurements:     Wound 01/12/19 Coccyx Mid non-blanchable redness (Active)   Number of days: 914       Wound 07/06/21 Toe (Comment  which one) Right;Dorsal Wound # 1 (Active)   Dressing Status Clean;Dry; Intact 07/11/21 1945   Wound Cleansed Cleansed with saline 07/11/21 0805   Dressing/Treatment Other (comment) 07/11/21 1945   Wound Length (cm) 0.5 cm 07/12/21 1637   Wound Width (cm) 0.5 cm 07/12/21 1637   Wound Depth (cm) 0.1 cm 07/06/21 1507   Wound Surface Area (cm^2) 0.25 cm^2 07/12/21 1637   Change in Wound Size % (l*w) 65.28 07/12/21 1637   Wound Volume (cm^3) 0.072 cm^3 07/06/21 1507   Post-Procedure Length (cm) 0.9 cm 07/06/21 1518   Post-Procedure Width (cm) 1 cm 07/06/21 1518   Post-Procedure Depth (cm) 0.2 cm 07/06/21 1518   Post-Procedure Surface Area (cm^2) 0.9 cm^2 07/06/21 1518   Post-Procedure Volume (cm^3) 0.18 cm^3 07/06/21 1518   Wound Assessment Dry 07/12/21 1637   Drainage Amount None 07/12/21 1637   Drainage Description Serosanguinous 07/06/21 1507   Odor None 07/06/21 1507   Sharyn-wound Assessment Dry/flaky 07/12/21 1637   Number of days: 7       Wound 07/10/21 Sacrum (Active)   Wound Image   07/12/21 1637   Wound Etiology Deep tissue/Injury 07/12/21 1637   Dressing/Treatment Other (comment) 07/12/21 0800   Wound Length (cm) 6 cm 07/12/21 1637   Wound Width (cm) 8 cm 07/12/21 1637   Wound Surface Area (cm^2) 48 cm^2 07/12/21 1637   Change in Wound Size % (l*w) -100 07/12/21 1637   Wound Assessment Purple/maroon 07/12/21 1637   Drainage Amount None 07/12/21 1637   Odor None 07/12/21 1637   Sharyn-wound Assessment Dry/flaky 07/12/21 1637   Number of days: 3     Incision 04/08/21 Radial Proximal;Right (Active)   Number of days: 96       Procedure Note  Indications:  Based on my examination of this patient's wound(s)/ulcer(s) today, debridement is required to promote healing and evaluate the wound base. Performed by: Darryl Beard MD    Consent obtained:  Yes    Time out taken:  Yes    Pain Control: Anesthetic  Anesthetic: 4% Lidocaine Liquid Topical     Debridement:Excisional Debridement    Using curette the wound(s)/ulcer(s) was/were sharply debrided down through and including the removal of subcutaneous tissue. Devitalized Tissue Debrided:  biofilm, slough, callus and clots to stimulate bleeding to promote healing, post debridement good bleeding base and wound edges noted    Wound/Ulcer #: 1    Percent of Wound/Ulcer Debrided: 100%    Total Surface Area Debrided:  0.9 sq cm     Estimated Blood Loss:  Minimal  Hemostasis Achieved:  by pressure    Procedural Pain:  5  / 10   Post Procedural Pain:  1 / 10     Response to treatment:  Well tolerated by patient. A culture was not done. Plan:     Treatment Note please see attached Discharge Instructions    Written patient dismissal instructions given to patient and signed by patient or POA. Discharge Instructions       Visit Discharge/Physician Orders    Discharge condition: Stable    Assessment of pain at discharge: minimal    Anesthetic used: 4% topical lidocaine    Discharge to: Home    Left via:Private automobile    Accompanied by: son    ECF/HHA: [facility]    Dressing Orders: cleanse right second toe wound with normal saline, apply santyl nickel thickness and cover with saline moist gauze then dry dressing, change daily. Treatment Orders: Eat foods high in protein and vitamin c. Use wedge while up in chair. Limit time in chair to 30 min 3 times per day for meals. Turn every two hours while in bed. [facility] followup visit ______1 week Dr Teetee Belcher_______________________  (Please note your next appointment above and if you are unable to keep, kindly give a 24 hour notice.  Thank you.)    Physician signature:__________________________      If you experience any of the following, please call the [address] during business hours:    * Increase in Pain  * Temperature over 101  * Increase in drainage from your wound  * Drainage with a foul odor  * Bleeding  * Increase in swelling  * Need for compression bandage changes due to slippage, breakthrough drainage. If you need medical attention outside of the business hours of the [address] please contact your PCP or go to the nearest emergency room.         Electronically signed by Norah Bland MD on 7/14/2021 at 9:23 AM

## 2021-07-10 ENCOUNTER — HOSPITAL ENCOUNTER (OUTPATIENT)
Age: 86
Setting detail: OBSERVATION
Discharge: HOME | End: 2021-07-12
Attending: EMERGENCY MEDICINE | Admitting: INTERNAL MEDICINE
Payer: MEDICARE

## 2021-07-10 ENCOUNTER — APPOINTMENT (OUTPATIENT)
Dept: GENERAL RADIOLOGY | Age: 86
End: 2021-07-10
Payer: MEDICARE

## 2021-07-10 DIAGNOSIS — R53.1 GENERAL WEAKNESS: ICD-10-CM

## 2021-07-10 DIAGNOSIS — R26.2 UNABLE TO AMBULATE: ICD-10-CM

## 2021-07-10 DIAGNOSIS — N39.0 URINARY TRACT INFECTION WITHOUT HEMATURIA, SITE UNSPECIFIED: Primary | ICD-10-CM

## 2021-07-10 LAB
ALBUMIN SERPL-MCNC: 3.7 G/DL (ref 3.5–5.2)
ALP BLD-CCNC: 103 U/L (ref 40–129)
ALT SERPL-CCNC: 14 U/L (ref 0–40)
ANION GAP SERPL CALCULATED.3IONS-SCNC: 13 MMOL/L (ref 7–16)
AST SERPL-CCNC: 15 U/L (ref 0–39)
BACTERIA: ABNORMAL /HPF
BASOPHILS ABSOLUTE: 0.02 E9/L (ref 0–0.2)
BASOPHILS RELATIVE PERCENT: 0.2 % (ref 0–2)
BILIRUB SERPL-MCNC: 0.3 MG/DL (ref 0–1.2)
BILIRUBIN URINE: NEGATIVE
BLOOD, URINE: ABNORMAL
BUN BLDV-MCNC: 35 MG/DL (ref 6–23)
CALCIUM SERPL-MCNC: 9.2 MG/DL (ref 8.6–10.2)
CHLORIDE BLD-SCNC: 94 MMOL/L (ref 98–107)
CHP ED QC CHECK: YES
CLARITY: ABNORMAL
CO2: 28 MMOL/L (ref 22–29)
COLOR: YELLOW
CREAT SERPL-MCNC: 4.1 MG/DL (ref 0.7–1.2)
EOSINOPHILS ABSOLUTE: 0.24 E9/L (ref 0.05–0.5)
EOSINOPHILS RELATIVE PERCENT: 2.9 % (ref 0–6)
GFR AFRICAN AMERICAN: 17
GFR NON-AFRICAN AMERICAN: 14 ML/MIN/1.73
GLUCOSE BLD-MCNC: 133 MG/DL
GLUCOSE BLD-MCNC: 98 MG/DL (ref 74–99)
GLUCOSE URINE: NEGATIVE MG/DL
HCT VFR BLD CALC: 30.9 % (ref 37–54)
HEMOGLOBIN: 9.7 G/DL (ref 12.5–16.5)
IMMATURE GRANULOCYTES #: 0.02 E9/L
IMMATURE GRANULOCYTES %: 0.2 % (ref 0–5)
KETONES, URINE: NEGATIVE MG/DL
LACTIC ACID: 2.1 MMOL/L (ref 0.5–2.2)
LEUKOCYTE ESTERASE, URINE: ABNORMAL
LYMPHOCYTES ABSOLUTE: 1.37 E9/L (ref 1.5–4)
LYMPHOCYTES RELATIVE PERCENT: 16.8 % (ref 20–42)
MCH RBC QN AUTO: 31.6 PG (ref 26–35)
MCHC RBC AUTO-ENTMCNC: 31.4 % (ref 32–34.5)
MCV RBC AUTO: 100.7 FL (ref 80–99.9)
METER GLUCOSE: 133 MG/DL (ref 74–99)
METER GLUCOSE: 228 MG/DL (ref 74–99)
MONOCYTES ABSOLUTE: 0.92 E9/L (ref 0.1–0.95)
MONOCYTES RELATIVE PERCENT: 11.3 % (ref 2–12)
NEUTROPHILS ABSOLUTE: 5.58 E9/L (ref 1.8–7.3)
NEUTROPHILS RELATIVE PERCENT: 68.6 % (ref 43–80)
NITRITE, URINE: NEGATIVE
PDW BLD-RTO: 14.3 FL (ref 11.5–15)
PH UA: 6 (ref 5–9)
PLATELET # BLD: 235 E9/L (ref 130–450)
PMV BLD AUTO: 9.8 FL (ref 7–12)
POTASSIUM REFLEX MAGNESIUM: 4 MMOL/L (ref 3.5–5)
PROTEIN UA: 100 MG/DL
RBC # BLD: 3.07 E12/L (ref 3.8–5.8)
RBC UA: >20 /HPF (ref 0–2)
SARS-COV-2, NAAT: NOT DETECTED
SODIUM BLD-SCNC: 135 MMOL/L (ref 132–146)
SPECIFIC GRAVITY UA: 1.02 (ref 1–1.03)
TOTAL PROTEIN: 6.9 G/DL (ref 6.4–8.3)
TROPONIN, HIGH SENSITIVITY: 83 NG/L (ref 0–11)
TROPONIN, HIGH SENSITIVITY: 88 NG/L (ref 0–11)
UROBILINOGEN, URINE: 0.2 E.U./DL
WBC # BLD: 8.2 E9/L (ref 4.5–11.5)
WBC UA: ABNORMAL /HPF (ref 0–5)

## 2021-07-10 PROCEDURE — 96361 HYDRATE IV INFUSION ADD-ON: CPT

## 2021-07-10 PROCEDURE — 51702 INSERT TEMP BLADDER CATH: CPT

## 2021-07-10 PROCEDURE — 87077 CULTURE AEROBIC IDENTIFY: CPT

## 2021-07-10 PROCEDURE — 93005 ELECTROCARDIOGRAM TRACING: CPT | Performed by: STUDENT IN AN ORGANIZED HEALTH CARE EDUCATION/TRAINING PROGRAM

## 2021-07-10 PROCEDURE — 85025 COMPLETE CBC W/AUTO DIFF WBC: CPT

## 2021-07-10 PROCEDURE — 2580000003 HC RX 258: Performed by: EMERGENCY MEDICINE

## 2021-07-10 PROCEDURE — 71045 X-RAY EXAM CHEST 1 VIEW: CPT

## 2021-07-10 PROCEDURE — 6370000000 HC RX 637 (ALT 250 FOR IP): Performed by: INTERNAL MEDICINE

## 2021-07-10 PROCEDURE — 87635 SARS-COV-2 COVID-19 AMP PRB: CPT

## 2021-07-10 PROCEDURE — 82962 GLUCOSE BLOOD TEST: CPT

## 2021-07-10 PROCEDURE — 84484 ASSAY OF TROPONIN QUANT: CPT

## 2021-07-10 PROCEDURE — 87088 URINE BACTERIA CULTURE: CPT

## 2021-07-10 PROCEDURE — G0378 HOSPITAL OBSERVATION PER HR: HCPCS

## 2021-07-10 PROCEDURE — 6360000002 HC RX W HCPCS: Performed by: EMERGENCY MEDICINE

## 2021-07-10 PROCEDURE — 83605 ASSAY OF LACTIC ACID: CPT

## 2021-07-10 PROCEDURE — 96360 HYDRATION IV INFUSION INIT: CPT

## 2021-07-10 PROCEDURE — 96374 THER/PROPH/DIAG INJ IV PUSH: CPT

## 2021-07-10 PROCEDURE — 81001 URINALYSIS AUTO W/SCOPE: CPT

## 2021-07-10 PROCEDURE — 99283 EMERGENCY DEPT VISIT LOW MDM: CPT

## 2021-07-10 PROCEDURE — 80053 COMPREHEN METABOLIC PANEL: CPT

## 2021-07-10 PROCEDURE — 2580000003 HC RX 258: Performed by: STUDENT IN AN ORGANIZED HEALTH CARE EDUCATION/TRAINING PROGRAM

## 2021-07-10 PROCEDURE — 87186 SC STD MICRODIL/AGAR DIL: CPT

## 2021-07-10 RX ORDER — ATORVASTATIN CALCIUM 10 MG/1
10 TABLET, FILM COATED ORAL NIGHTLY
Status: DISCONTINUED | OUTPATIENT
Start: 2021-07-10 | End: 2021-07-12 | Stop reason: HOSPADM

## 2021-07-10 RX ORDER — ATORVASTATIN CALCIUM 10 MG/1
10 TABLET, FILM COATED ORAL NIGHTLY
COMMUNITY

## 2021-07-10 RX ORDER — ZINC SULFATE 50(220)MG
50 CAPSULE ORAL DAILY
Status: DISCONTINUED | OUTPATIENT
Start: 2021-07-11 | End: 2021-07-12 | Stop reason: HOSPADM

## 2021-07-10 RX ORDER — 0.9 % SODIUM CHLORIDE 0.9 %
500 INTRAVENOUS SOLUTION INTRAVENOUS ONCE
Status: COMPLETED | OUTPATIENT
Start: 2021-07-10 | End: 2021-07-10

## 2021-07-10 RX ORDER — LISINOPRIL 5 MG/1
2.5 TABLET ORAL DAILY
Status: DISCONTINUED | OUTPATIENT
Start: 2021-07-11 | End: 2021-07-12

## 2021-07-10 RX ORDER — VITAMIN B COMPLEX
1000 TABLET ORAL DAILY
Status: DISCONTINUED | OUTPATIENT
Start: 2021-07-11 | End: 2021-07-12 | Stop reason: HOSPADM

## 2021-07-10 RX ORDER — DEXTROSE MONOHYDRATE 25 G/50ML
12.5 INJECTION, SOLUTION INTRAVENOUS PRN
Status: DISCONTINUED | OUTPATIENT
Start: 2021-07-10 | End: 2021-07-12 | Stop reason: HOSPADM

## 2021-07-10 RX ORDER — ACETAMINOPHEN 325 MG/1
650 TABLET ORAL EVERY 4 HOURS PRN
Status: DISCONTINUED | OUTPATIENT
Start: 2021-07-10 | End: 2021-07-12 | Stop reason: HOSPADM

## 2021-07-10 RX ORDER — HYDRALAZINE HYDROCHLORIDE 25 MG/1
25 TABLET, FILM COATED ORAL 2 TIMES DAILY
Status: DISCONTINUED | OUTPATIENT
Start: 2021-07-10 | End: 2021-07-12

## 2021-07-10 RX ORDER — GABAPENTIN 100 MG/1
100 CAPSULE ORAL NIGHTLY
Status: DISCONTINUED | OUTPATIENT
Start: 2021-07-10 | End: 2021-07-12 | Stop reason: HOSPADM

## 2021-07-10 RX ORDER — ASCORBIC ACID 500 MG
1000 TABLET ORAL DAILY
Status: DISCONTINUED | OUTPATIENT
Start: 2021-07-11 | End: 2021-07-12 | Stop reason: HOSPADM

## 2021-07-10 RX ORDER — FAMOTIDINE 20 MG/1
40 TABLET, FILM COATED ORAL DAILY
Status: DISCONTINUED | OUTPATIENT
Start: 2021-07-11 | End: 2021-07-11

## 2021-07-10 RX ORDER — VITS A,C,E/LUTEIN/MINERALS 300MCG-200
2 TABLET ORAL DAILY
Status: DISCONTINUED | OUTPATIENT
Start: 2021-07-11 | End: 2021-07-12 | Stop reason: HOSPADM

## 2021-07-10 RX ORDER — ASPIRIN 81 MG/1
81 TABLET ORAL EVERY MORNING
Status: DISCONTINUED | OUTPATIENT
Start: 2021-07-11 | End: 2021-07-12 | Stop reason: HOSPADM

## 2021-07-10 RX ORDER — GABAPENTIN 100 MG/1
100 CAPSULE ORAL NIGHTLY
COMMUNITY

## 2021-07-10 RX ORDER — BUMETANIDE 1 MG/1
2 TABLET ORAL DAILY
Status: DISCONTINUED | OUTPATIENT
Start: 2021-07-11 | End: 2021-07-12

## 2021-07-10 RX ORDER — NICOTINE POLACRILEX 4 MG
15 LOZENGE BUCCAL PRN
Status: DISCONTINUED | OUTPATIENT
Start: 2021-07-10 | End: 2021-07-12 | Stop reason: HOSPADM

## 2021-07-10 RX ORDER — DEXTROSE MONOHYDRATE 50 MG/ML
100 INJECTION, SOLUTION INTRAVENOUS PRN
Status: DISCONTINUED | OUTPATIENT
Start: 2021-07-10 | End: 2021-07-12 | Stop reason: HOSPADM

## 2021-07-10 RX ORDER — METOPROLOL SUCCINATE 25 MG/1
25 TABLET, EXTENDED RELEASE ORAL DAILY
Status: DISCONTINUED | OUTPATIENT
Start: 2021-07-11 | End: 2021-07-12

## 2021-07-10 RX ADMIN — ATORVASTATIN CALCIUM 10 MG: 10 TABLET, FILM COATED ORAL at 21:39

## 2021-07-10 RX ADMIN — WATER 2000 MG: 1 INJECTION INTRAMUSCULAR; INTRAVENOUS; SUBCUTANEOUS at 18:56

## 2021-07-10 RX ADMIN — SODIUM CHLORIDE 500 ML: 9 INJECTION, SOLUTION INTRAVENOUS at 16:11

## 2021-07-10 RX ADMIN — GABAPENTIN 100 MG: 100 CAPSULE ORAL at 21:39

## 2021-07-10 RX ADMIN — INSULIN LISPRO 1 UNITS: 100 INJECTION, SOLUTION INTRAVENOUS; SUBCUTANEOUS at 21:39

## 2021-07-10 RX ADMIN — HYDRALAZINE HYDROCHLORIDE 25 MG: 25 TABLET, FILM COATED ORAL at 21:39

## 2021-07-10 RX ADMIN — ACETAMINOPHEN 650 MG: 325 TABLET ORAL at 23:43

## 2021-07-10 ASSESSMENT — PAIN SCALES - GENERAL
PAINLEVEL_OUTOF10: 5
PAINLEVEL_OUTOF10: 9

## 2021-07-10 NOTE — ED NOTES
[facility] faxed to floor, fax received. Nurse that is assigned to patient on the floor asked for med surg order to be verified before patient is transported. Per resident Dr. Ranulfo Tran, patient is to go to med surg.              Donovan Herzog RN  07/10/21 1922

## 2021-07-11 LAB
ANION GAP SERPL CALCULATED.3IONS-SCNC: 13 MMOL/L (ref 7–16)
BUN BLDV-MCNC: 45 MG/DL (ref 6–23)
CALCIUM SERPL-MCNC: 8.8 MG/DL (ref 8.6–10.2)
CHLORIDE BLD-SCNC: 98 MMOL/L (ref 98–107)
CO2: 26 MMOL/L (ref 22–29)
CREAT SERPL-MCNC: 5.1 MG/DL (ref 0.7–1.2)
EKG ATRIAL RATE: 104 BPM
EKG Q-T INTERVAL: 382 MS
EKG QRS DURATION: 94 MS
EKG QTC CALCULATION (BAZETT): 495 MS
EKG R AXIS: -16 DEGREES
EKG T AXIS: 99 DEGREES
EKG VENTRICULAR RATE: 101 BPM
GFR AFRICAN AMERICAN: 13
GFR NON-AFRICAN AMERICAN: 11 ML/MIN/1.73
GLUCOSE BLD-MCNC: 120 MG/DL (ref 74–99)
METER GLUCOSE: 123 MG/DL (ref 74–99)
METER GLUCOSE: 157 MG/DL (ref 74–99)
METER GLUCOSE: 166 MG/DL (ref 74–99)
METER GLUCOSE: 212 MG/DL (ref 74–99)
POTASSIUM REFLEX MAGNESIUM: 4.7 MMOL/L (ref 3.5–5)
SODIUM BLD-SCNC: 137 MMOL/L (ref 132–146)

## 2021-07-11 PROCEDURE — 36415 COLL VENOUS BLD VENIPUNCTURE: CPT

## 2021-07-11 PROCEDURE — 6360000002 HC RX W HCPCS: Performed by: INTERNAL MEDICINE

## 2021-07-11 PROCEDURE — 80048 BASIC METABOLIC PNL TOTAL CA: CPT

## 2021-07-11 PROCEDURE — 96376 TX/PRO/DX INJ SAME DRUG ADON: CPT

## 2021-07-11 PROCEDURE — 6370000000 HC RX 637 (ALT 250 FOR IP): Performed by: INTERNAL MEDICINE

## 2021-07-11 PROCEDURE — 82962 GLUCOSE BLOOD TEST: CPT

## 2021-07-11 PROCEDURE — G0378 HOSPITAL OBSERVATION PER HR: HCPCS

## 2021-07-11 PROCEDURE — 2580000003 HC RX 258: Performed by: INTERNAL MEDICINE

## 2021-07-11 PROCEDURE — 93010 ELECTROCARDIOGRAM REPORT: CPT | Performed by: INTERNAL MEDICINE

## 2021-07-11 RX ORDER — FAMOTIDINE 20 MG/1
10 TABLET, FILM COATED ORAL DAILY
Status: DISCONTINUED | OUTPATIENT
Start: 2021-07-11 | End: 2021-07-12 | Stop reason: HOSPADM

## 2021-07-11 RX ORDER — ANALGESIC BALM 1.74; 4.06 G/29G; G/29G
OINTMENT TOPICAL PRN
Status: DISCONTINUED | OUTPATIENT
Start: 2021-07-11 | End: 2021-07-12 | Stop reason: HOSPADM

## 2021-07-11 RX ADMIN — INSULIN LISPRO 1 UNITS: 100 INJECTION, SOLUTION INTRAVENOUS; SUBCUTANEOUS at 16:03

## 2021-07-11 RX ADMIN — COLLAGENASE SANTYL: 250 OINTMENT TOPICAL at 08:23

## 2021-07-11 RX ADMIN — GABAPENTIN 100 MG: 100 CAPSULE ORAL at 20:36

## 2021-07-11 RX ADMIN — Medication 200 MG: at 08:21

## 2021-07-11 RX ADMIN — MENTHOL AND METHYL SALICYLATE: 7.6; 29 OINTMENT TOPICAL at 01:58

## 2021-07-11 RX ADMIN — CEFTRIAXONE 1000 MG: 1 INJECTION, POWDER, FOR SOLUTION INTRAMUSCULAR; INTRAVENOUS at 18:12

## 2021-07-11 RX ADMIN — FAMOTIDINE 10 MG: 20 TABLET, FILM COATED ORAL at 10:05

## 2021-07-11 RX ADMIN — ASPIRIN 81 MG: 81 TABLET, COATED ORAL at 08:21

## 2021-07-11 RX ADMIN — ACETAMINOPHEN 650 MG: 325 TABLET ORAL at 10:16

## 2021-07-11 RX ADMIN — ATORVASTATIN CALCIUM 10 MG: 10 TABLET, FILM COATED ORAL at 20:36

## 2021-07-11 RX ADMIN — OXYCODONE HYDROCHLORIDE AND ACETAMINOPHEN 1000 MG: 500 TABLET ORAL at 08:21

## 2021-07-11 RX ADMIN — ZINC SULFATE 220 MG (50 MG) CAPSULE 50 MG: CAPSULE at 08:21

## 2021-07-11 RX ADMIN — Medication 1000 UNITS: at 08:20

## 2021-07-11 RX ADMIN — Medication 2 TABLET: at 08:20

## 2021-07-11 RX ADMIN — INSULIN LISPRO 1 UNITS: 100 INJECTION, SOLUTION INTRAVENOUS; SUBCUTANEOUS at 20:35

## 2021-07-11 RX ADMIN — INSULIN LISPRO 1 UNITS: 100 INJECTION, SOLUTION INTRAVENOUS; SUBCUTANEOUS at 11:22

## 2021-07-11 ASSESSMENT — PAIN DESCRIPTION - LOCATION: LOCATION: LEG

## 2021-07-11 ASSESSMENT — PAIN SCALES - GENERAL
PAINLEVEL_OUTOF10: 0
PAINLEVEL_OUTOF10: 3

## 2021-07-11 ASSESSMENT — PAIN DESCRIPTION - PAIN TYPE: TYPE: ACUTE PAIN;CHRONIC PAIN

## 2021-07-11 ASSESSMENT — PAIN DESCRIPTION - DESCRIPTORS: DESCRIPTORS: ACHING;DISCOMFORT;SORE

## 2021-07-11 ASSESSMENT — PAIN DESCRIPTION - ONSET: ONSET: ON-GOING

## 2021-07-11 ASSESSMENT — PAIN DESCRIPTION - FREQUENCY: FREQUENCY: INTERMITTENT

## 2021-07-11 ASSESSMENT — PAIN - FUNCTIONAL ASSESSMENT: PAIN_FUNCTIONAL_ASSESSMENT: PREVENTS OR INTERFERES SOME ACTIVE ACTIVITIES AND ADLS

## 2021-07-11 ASSESSMENT — PAIN DESCRIPTION - PROGRESSION: CLINICAL_PROGRESSION: NOT CHANGED

## 2021-07-11 ASSESSMENT — PAIN DESCRIPTION - ORIENTATION: ORIENTATION: RIGHT;LOWER

## 2021-07-11 NOTE — PROGRESS NOTES
Patient complaining of leg aches. Requesting \"muscle rub. \" Call placed to Dr. Alejandro Lovelace, covering for Dr. Lisa Paul, for icy hot. See new orders.      Electronically signed by Gricelda Mims RN on 7/11/2021 at 1:20 AM

## 2021-07-11 NOTE — PROGRESS NOTES
Message sent via [employer] to Wound care nurses regarding consult.      Electronically signed by Andrea Jade RN on 7/10/2021 at 11:39 PM

## 2021-07-11 NOTE — PROGRESS NOTES
Carmen Chincihlla ,    Your patient is on a medication that requires a renal dose adjustment. Renal Function Assessment:    Date Body Weight IBW Adj. Body Weight Scr CrCl Dialysis status   07/11/21 82.6 KG   4.1 12 N/A       Pharmacy has renally dose-adjusted the following medication(s):    Date Medication Original Dosing Regimen New Dosing Regimen   07/11/21 Pepcid 40 mg daily 10 mg daily           These changes were made per protocol according to the [facility] Renal Function-Based Dose Adjustments Policy    *Please note this dose may need readjusted if your patient's renal function significantly improves. Please contact pharmacy will any questions regarding these changes.     Thank you,  Donny Triplett, [facility], [address] 7/11/2021 7:29 AM

## 2021-07-11 NOTE — CONSULTS
[facility] Clark Chaney, MD Tonia De Souza, MD Bakari Lynch, MD Francine Sandy, CNP   Eladio Liu, DAISY  Consultation  Patient's Name: Keenan Gupta  1:25 PM  7/11/2021    Nephrologist: Jeff Butler MD    Reason for Consult: ESRD on hemodialysis  Requesting Physician:  Candida Mckeon DO    Chief Complaint: UTI    History Obtained From: Patient, chart    History of Present Ilness: This is 80years old  male who presented emergency room with history of cloudy foul-smelling urine output, which has been recurrent over the last 1 month with different infections states keeps coming back. Past medical history is positive for ESRD on hemodialysis on Monday, Wednesday and Friday at [place] [address], history of arthritis, coronary disease, congestive heart failure, COVID-19, CVA, diabetes mellitus, hypertension, and progressive disease. Patient did not miss any dialysis treatment, he is being admitted for treatment of UTI and continuation of supportive care and dialysis.     Past Medical History:   Diagnosis Date    Arthritis     CAD (coronary artery disease)     CHF (congestive heart failure) (HCC)     Chronic kidney disease     COVID-19     CVA (cerebral vascular accident) ([address]     Diabetes mellitus ([address]     Diabetic peripheral neuropathy ([address] 11/9/2012    Hemodialysis patient ([address]     History of CVA (cerebrovascular accident) 6/9/2014    Hypertension     Other disorders of kidney and ureter     prostate infections in past    PVD (peripheral vascular disease) with claudication ([address] 6/9/2014    Right sided weakness 3/19/2018    TIA (transient ischemic attack)     possible several years ago    Unspecified cerebral artery occlusion with cerebral infarction 2013    [facility] RIGHT LEG AFFECTED       Past Surgical History:   Procedure Laterality Date    ABDOMINAL HERNIA REPAIR      CATARACT REMOVAL      right    CHOLECYSTECTOMY  11/2011    DENTAL SURGERY      EYE SURGERY      [facility] DIALYSIS CATHETER Right 8/27/2020    TESIO CATHETER INSERTION performed by Yissel Bucio MD at [facility]      removal of sac in ear    SHUNT REVISION Right 1/21/2021    INSERTION AV GRAFT RIGHT ARM performed by Yissel Bucio MD at [address] [place] [address] Right 4/8/2021    THROMBECTOMY RIGHT ARM AV GRAFT performed by Yissel Bucio MD at [address]       History reviewed. No pertinent family history. reports that he quit smoking about 54 years ago. His smoking use included cigarettes. He has a 10.00 pack-year smoking history. He has never used smokeless tobacco. He reports previous alcohol use of about 1.0 standard drinks of alcohol per week. He reports that he does not use drugs.     Allergies:  Sulfa antibiotics and Sulfa antibiotics    Current Medications:    analgesic ointment ointment, PRN  famotidine (PEPCID) tablet 10 mg, Daily  ascorbic acid (VITAMIN C) tablet 1,000 mg, Daily  aspirin EC tablet 81 mg, QAM  atorvastatin (LIPITOR) tablet 10 mg, Nightly  [Held by provider] bumetanide (BUMEX) tablet 2 mg, Daily  collagenase ointment, Daily  gabapentin (NEURONTIN) capsule 100 mg, Nightly  hydrALAZINE (APRESOLINE) tablet 25 mg, BID  lisinopril (PRINIVIL;ZESTRIL) tablet 2.5 mg, Daily  magnesium oxide (MAG-OX) tablet 200 mg, Daily  metoprolol succinate (TOPROL XL) extended release tablet 25 mg, Daily  antioxidant multivitamin (OCUVITE) tablet, Daily  vitamin D (CHOLECALCIFEROL) tablet 1,000 Units, Daily  zinc sulfate (ZINCATE) capsule 50 mg, Daily  cefTRIAXone (ROCEPHIN) 1,000 mg in sterile water 10 mL IV syringe, Q24H  insulin lispro (HUMALOG) injection vial 0-6 Units, 4x Daily AC & HS  insulin lispro (HUMALOG) injection vial 0-3 Units, Nightly  glucose (GLUTOSE) 40 % oral gel 15 g, PRN  dextrose 50 % IV solution, PRN  glucagon (rDNA) injection 1 mg, PRN  dextrose 5 % solution, PRN  acetaminophen (TYLENOL) tablet 650 mg, Q4H PRN        Review of Systems:       Physical exam:   General appearance, Sleepy but arousable  Head and ENT: Normocephalic/atraumatic/supple neck/no JVD  Lungs: Clear to auscultation  Heart regular rate and rhythm  Abdomen soft no tenderness  Extremities: Positive bruit and thrill over right forearm AV dialysis access.   Neurologic: Physiologic  Data:   Labs:  CBC with Differential:    Lab Results   Component Value Date    WBC 8.2 07/10/2021    RBC 3.07 07/10/2021    HGB 9.7 07/10/2021    HCT 30.9 07/10/2021     07/10/2021    .7 07/10/2021    MCH 31.6 07/10/2021    MCHC 31.4 07/10/2021    RDW 14.3 07/10/2021    SEGSPCT 46 01/04/2014    BANDSPCT 2 05/02/2016    LYMPHOPCT 16.8 07/10/2021    MONOPCT 11.3 07/10/2021    BASOPCT 0.2 07/10/2021    MONOSABS 0.92 07/10/2021    LYMPHSABS 1.37 07/10/2021    EOSABS 0.24 07/10/2021    BASOSABS 0.02 07/10/2021     CMP:    Lab Results   Component Value Date     07/11/2021    K 4.7 07/11/2021    CL 98 07/11/2021    CO2 26 07/11/2021    BUN 45 07/11/2021    CREATININE 5.1 07/11/2021    GFRAA 13 07/11/2021    LABGLOM 11 07/11/2021    GLUCOSE 120 07/11/2021    GLUCOSE 163 02/22/2012    PROT 6.9 07/10/2021    LABALBU 3.7 07/10/2021    LABALBU 3.8 02/20/2012    CALCIUM 8.8 07/11/2021    BILITOT 0.3 07/10/2021    ALKPHOS 103 07/10/2021    AST 15 07/10/2021    ALT 14 07/10/2021     Ionized Calcium:  No results found for: IONCA  Magnesium:    Lab Results   Component Value Date    MG 2.0 09/14/2020     Phosphorus:    Lab Results   Component Value Date    PHOS 4.6 09/14/2020     U/A:    Lab Results   Component Value Date    COLORU Yellow 07/10/2021    PHUR 6.0 07/10/2021    WBCUA PACKED 07/10/2021    WBCUA NONE 02/20/2012    RBCUA >20 07/10/2021    RBCUA 1-3 05/04/2013    BACTERIA MANY 07/10/2021    CLARITYU CLOUDY 07/10/2021    SPECGRAV 1.020 07/10/2021    LEUKOCYTESUR LARGE 07/10/2021    UROBILINOGEN 0.2 07/10/2021 BILIRUBINUR Negative 07/10/2021    BILIRUBINUR NEGATIVE 02/20/2012    BLOODU LARGE 07/10/2021    GLUCOSEU Negative 07/10/2021    GLUCOSEU 500 02/20/2012     Microalbumen/Creatinine ratio:  No components found for: RUCREAT  Iron Saturation:  No components found for: PERCENTFE  TIBC:    Lab Results   Component Value Date    TIBC 177 08/24/2020     FERRITIN:    Lab Results   Component Value Date    FERRITIN 98 08/24/2020        Imaging:  XR CHEST PORTABLE   Final Result   No acute process. Assessment  1. ESRD on hemodialysis with Monday/Wednesday/Friday schedule       We will dialyze patient tomorrow. 2.  Admitted for UTI currently on treatment  3. Multimedical problems including hypertension, diabetes mellitus, peripheral vascular disease, coronary disease, history of CHF   4. Anemia due to CKD  5.  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 Metabolic bone disease due to CKD  7. Generalized weakness, continue physical therapy  Plan  1. Continue supportive care  2. Hemodialysis scheduled for tomorrow. Thank you for the opportunity to participate in the care of your pleasant patient. We look forward to following along with you.         Electronically signed by Bruna Choi MD on 7/11/2021 at 1:25 PM

## 2021-07-12 VITALS
TEMPERATURE: 96.4 F | BODY MASS INDEX: 29.94 KG/M2 | SYSTOLIC BLOOD PRESSURE: 117 MMHG | DIASTOLIC BLOOD PRESSURE: 58 MMHG | RESPIRATION RATE: 18 BRPM | HEIGHT: 65 IN | WEIGHT: 179.68 LBS | HEART RATE: 102 BPM | OXYGEN SATURATION: 95 %

## 2021-07-12 LAB
ANION GAP SERPL CALCULATED.3IONS-SCNC: 15 MMOL/L (ref 7–16)
BUN BLDV-MCNC: 63 MG/DL (ref 6–23)
CALCIUM SERPL-MCNC: 8.7 MG/DL (ref 8.6–10.2)
CHLORIDE BLD-SCNC: 97 MMOL/L (ref 98–107)
CO2: 24 MMOL/L (ref 22–29)
CREAT SERPL-MCNC: 6.4 MG/DL (ref 0.7–1.2)
GFR AFRICAN AMERICAN: 10
GFR NON-AFRICAN AMERICAN: 8 ML/MIN/1.73
GLUCOSE BLD-MCNC: 102 MG/DL (ref 74–99)
HCT VFR BLD CALC: 28 % (ref 37–54)
HEMOGLOBIN: 8.6 G/DL (ref 12.5–16.5)
MCH RBC QN AUTO: 30.9 PG (ref 26–35)
MCHC RBC AUTO-ENTMCNC: 30.7 % (ref 32–34.5)
MCV RBC AUTO: 100.7 FL (ref 80–99.9)
METER GLUCOSE: 111 MG/DL (ref 74–99)
METER GLUCOSE: 144 MG/DL (ref 74–99)
METER GLUCOSE: 271 MG/DL (ref 74–99)
ORGANISM: ABNORMAL
PDW BLD-RTO: 14.4 FL (ref 11.5–15)
PLATELET # BLD: 198 E9/L (ref 130–450)
PMV BLD AUTO: 10.8 FL (ref 7–12)
POTASSIUM REFLEX MAGNESIUM: 4.7 MMOL/L (ref 3.5–5)
RBC # BLD: 2.78 E12/L (ref 3.8–5.8)
SODIUM BLD-SCNC: 136 MMOL/L (ref 132–146)
URINE CULTURE, ROUTINE: ABNORMAL
WBC # BLD: 4.5 E9/L (ref 4.5–11.5)

## 2021-07-12 PROCEDURE — G0378 HOSPITAL OBSERVATION PER HR: HCPCS

## 2021-07-12 PROCEDURE — 82962 GLUCOSE BLOOD TEST: CPT

## 2021-07-12 PROCEDURE — 2580000003 HC RX 258: Performed by: INTERNAL MEDICINE

## 2021-07-12 PROCEDURE — 96361 HYDRATE IV INFUSION ADD-ON: CPT

## 2021-07-12 PROCEDURE — 85027 COMPLETE CBC AUTOMATED: CPT

## 2021-07-12 PROCEDURE — 6370000000 HC RX 637 (ALT 250 FOR IP): Performed by: SPECIALIST

## 2021-07-12 PROCEDURE — 80048 BASIC METABOLIC PNL TOTAL CA: CPT

## 2021-07-12 PROCEDURE — 97165 OT EVAL LOW COMPLEX 30 MIN: CPT

## 2021-07-12 PROCEDURE — 97161 PT EVAL LOW COMPLEX 20 MIN: CPT

## 2021-07-12 PROCEDURE — 90935 HEMODIALYSIS ONE EVALUATION: CPT

## 2021-07-12 PROCEDURE — 6370000000 HC RX 637 (ALT 250 FOR IP): Performed by: INTERNAL MEDICINE

## 2021-07-12 PROCEDURE — 36415 COLL VENOUS BLD VENIPUNCTURE: CPT

## 2021-07-12 RX ORDER — 0.9 % SODIUM CHLORIDE 0.9 %
250 INTRAVENOUS SOLUTION INTRAVENOUS ONCE
Status: COMPLETED | OUTPATIENT
Start: 2021-07-12 | End: 2021-07-12

## 2021-07-12 RX ORDER — METOPROLOL SUCCINATE 25 MG/1
25 TABLET, EXTENDED RELEASE ORAL DAILY
Status: DISCONTINUED | OUTPATIENT
Start: 2021-07-12 | End: 2021-07-12 | Stop reason: HOSPADM

## 2021-07-12 RX ORDER — LEVOFLOXACIN 500 MG/1
500 TABLET, FILM COATED ORAL EVERY OTHER DAY
Status: DISCONTINUED | OUTPATIENT
Start: 2021-07-12 | End: 2021-07-12 | Stop reason: HOSPADM

## 2021-07-12 RX ORDER — METOPROLOL SUCCINATE 25 MG/1
25 TABLET, EXTENDED RELEASE ORAL DAILY
Qty: 30 TABLET | Refills: 3 | Status: SHIPPED | OUTPATIENT
Start: 2021-07-13

## 2021-07-12 RX ADMIN — Medication 1000 UNITS: at 08:32

## 2021-07-12 RX ADMIN — COLLAGENASE SANTYL: 250 OINTMENT TOPICAL at 08:31

## 2021-07-12 RX ADMIN — OXYCODONE HYDROCHLORIDE AND ACETAMINOPHEN 1000 MG: 500 TABLET ORAL at 08:31

## 2021-07-12 RX ADMIN — Medication 200 MG: at 08:31

## 2021-07-12 RX ADMIN — INSULIN LISPRO 3 UNITS: 100 INJECTION, SOLUTION INTRAVENOUS; SUBCUTANEOUS at 16:31

## 2021-07-12 RX ADMIN — ZINC SULFATE 220 MG (50 MG) CAPSULE 50 MG: CAPSULE at 08:31

## 2021-07-12 RX ADMIN — LEVOFLOXACIN 500 MG: 500 TABLET, FILM COATED ORAL at 13:38

## 2021-07-12 RX ADMIN — FAMOTIDINE 10 MG: 20 TABLET, FILM COATED ORAL at 08:31

## 2021-07-12 RX ADMIN — Medication 2 TABLET: at 08:32

## 2021-07-12 RX ADMIN — INSULIN LISPRO 1 UNITS: 100 INJECTION, SOLUTION INTRAVENOUS; SUBCUTANEOUS at 07:10

## 2021-07-12 RX ADMIN — SODIUM CHLORIDE 250 ML: 9 INJECTION, SOLUTION INTRAVENOUS at 06:32

## 2021-07-12 RX ADMIN — ASPIRIN 81 MG: 81 TABLET, COATED ORAL at 08:31

## 2021-07-12 ASSESSMENT — ENCOUNTER SYMPTOMS
SINUS PRESSURE: 0
NAUSEA: 0
BACK PAIN: 0
ABDOMINAL PAIN: 0
COUGH: 0
DIARRHEA: 0
SORE THROAT: 0
EYE PAIN: 0
SHORTNESS OF BREATH: 0
VOMITING: 0
WHEEZING: 0
EYE REDNESS: 0
EYE DISCHARGE: 0

## 2021-07-12 ASSESSMENT — PAIN SCALES - GENERAL: PAINLEVEL_OUTOF10: 0

## 2021-07-12 NOTE — PROGRESS NOTES
Notified Dr. Brett Olsen that patient is accepted at [facility]. Also made her aware that  had started the patient on levaquin and if he tolerated it he could be dc'd. Dr. Brett Olsen to place DC orders and  Nazanin Gonzalez will arrange transport.       Electronically signed by Danya Schlatter, RN on 7/12/2021 at 3:13 PM

## 2021-07-12 NOTE — PROGRESS NOTES
Comprehensive Nutrition Assessment    Type and Reason for Visit:  Initial, Positive Nutrition Screen, Wound    Nutrition Recommendations/Plan: Continue current diet and ONS to help with wound healing and meeting nutritional needs. Nutrition Assessment:  Pt adm with generalized weakness and UTI. pt also has multiple wounds. PMHx of diabetes with nephropathy and retinopathy. He has hypertension, COPD, osteoarthritis,chronic anemia, and a history of CVA. Pt at risk d/t wounds. Will start ONS to help with wound healing. Malnutrition Assessment:  Malnutrition Status:  No malnutrition    Context:  Chronic Illness     Findings of the 6 clinical characteristics of malnutrition:  Energy Intake:  No significant decrease in energy intake  Weight Loss:  1 - Mild weight loss (specify amount and time period) (6% over 10 months)     Body Fat Loss:  No significant body fat loss     Muscle Mass Loss:  No significant muscle mass loss    Fluid Accumulation:  No significant fluid accumulation     Strength:  Not Performed    Estimated Daily Nutrient Needs:  Energy (kcal):   ([id number]); Weight Used for Energy Requirements:  Current     Protein (g):  80-95g (1.3-1.5g/kg IBW); Weight Used for Protein Requirements:  Ideal        Fluid (ml/day):  ; Method Used for Fluid Requirements:  1 ml/kcal      Nutrition Related Findings:  A&Ox4, BS active, Abd WDL, -I/Os, Edema +1 nonpitting RLE      Wounds:  Multiple, Diabetic Ulcer, Wound Consult Pending       Current Nutrition Therapies:    ADULT DIET; Regular    Anthropometric Measures:  · Height: 5' 5\" (165.1 cm)  · Current Body Weight: 179 lb (81.2 kg) (7/12)   · Admission Body Weight: 182 lb (82.6 kg) (7/11 bedscale)    · Usual Body Weight: 190 lb (86.2 kg) (8/14/20 bedscale)     · Ideal Body Weight: 136 lbs; % Ideal Body Weight 131.6 %   · BMI: 29.8      · BMI Categories: Overweight (BMI 25.0-29. 9)       Nutrition Diagnosis:   · Increased nutrient needs related to

## 2021-07-12 NOTE — FLOWSHEET NOTE
07/12/21 1255   Vital Signs   /76   Temp 96.3 °F (35.7 °C)   Pulse 77   Resp 18   Weight 179 lb 10.8 oz (81.5 kg)   Percent Weight Change 0.38   Post-Hemodialysis Assessment   Post-Treatment Procedures Blood returned; Access bleeding time < 10 minutes   Machine Disinfection Process Exterior Machine Disinfection   Rinseback Volume (ml) 300 ml   Total Liters Processed (l/min) 64.2 l/min   Dialyzer Clearance Lightly streaked   Duration of Treatment (minutes) 210 minutes   Heparin amount administered during treatment (units) 0 units   Hemodialysis Intake (ml) 600 ml   Hemodialysis Output (ml) 300 ml   NET Removed (ml) -300 ml   Tolerated Treatment Fair   Patient Response to Treatment see note

## 2021-07-12 NOTE — PROGRESS NOTES
Physical Therapy    Facility/Department: [facility] MED SURG  Initial Assessment    NAME: Bev Puente  : 1931  MRN: [id number]    Date of Service: 2021      Attending Provider:  Joanne Masnfield DO    Evaluating PT:  Cely Crain, P.T. Room #:  0508/0508-A  Diagnosis:  UTI (urinary tract infection) [N39.0]  Pertinent PMHx/PSHx:  CVA with R hemiparesis, COVID 19  Precautions:  Falls, weakness and increased tone RUE and RLE, bed/chair alarm    SUBJECTIVE:    Pt lives with his niece and her daughter in a 1 story home with 3 stairs and 1 rail to enter and he requires assist.  Pt states he hasn't walked in over a year and uses a WC for mobility. He requires assist with transfers. OBJECTIVE:   Initial Evaluation  Date: 21 Treatment Short Term/ Long Term   Goals   Was pt agreeable to Eval/treatment? yes     Does pt have pain? No c/o pain     Bed Mobility  Rolling: MAX A  Supine to sit: MAX A  Sit to supine: MAX A  Scooting: MAX A  MIN A   Transfers Sit to stand: MOD A  Stand to sit: MOD A  Stand pivot: MOD A with no AD  MIN A   Ambulation   3 feet with no AD MOD A  3 feet with no AD MIN A   Stair negotiation: ascended and descended NA  3 steps with 1 rail MIN A   AM-PAC 6 Clicks 66/72       LLE ROM is WFL and RLE has extensor tone that limits ROM into flexion  LLE strength is grossly 4-/5 to 4/5 and RLE NA due to increased tone. Sensation:  Pt denies numbness and tingling to extremities  Balance: sitting is MIN A to SBA once he got positioned correctly sitting on EOB and used BUE for support.  Standing with no AD is MOD A  Endurance: fair-    Patient education  Pt educated on bed mobility and transfers    Patient response to education:   Pt verbalized understanding Pt demonstrated skill Pt requires further education in this area   yes yes yes     ASSESSMENT:    Conditions Requiring Skilled Therapeutic Intervention:    [x]Decreased strength     [x]Decreased ROM  [x]Decreased functional mobility  [x]Decreased balance   [x]Decreased endurance   [x]Decreased posture  []Decreased sensation  [x]Decreased coordination   []Decreased vision  []Decreased safety awareness   []Increased pain       Comments:  Pt was found near foot of his bed and agreeable to getting OOB to a chair to eat his breakfast.  Pt was able to assist with his sit<>stand and was able walk with MOD A 3 feet to chair with short steps. Pt was left sitting up in chair on waffle cushion with call light left by patient. Chair/bed alarm: chair alarm was activated. Pt's/ family goals   1. To go home. Patient and or family understand(s) diagnosis, prognosis, and plan of care. PHYSICAL THERAPY PLAN OF CARE:    PT POC is established based on physician order and patient diagnosis     Referring provider/PT Order:  PT eval and treat  Diagnosis:  UTI (urinary tract infection) [N39.0]  Specific instructions for next treatment:  Work on strengthening functional mobility    Current Treatment Recommendations:     [x] Strengthening to improve independence with functional mobility   [x] ROM to improve ROM and decrease spasm and pain which will help promote independence with functional mobility   [x] Balance Training to improve static/dynamic balance and to reduce fall risk  [x] Endurance Training to improve activity tolerance during functional mobility   [x] Transfer Training to improve safety and independence with all functional transfers   [x] Gait Training to improve gait mechanics, endurance and assess need for appropriate assistive device  [] Stair Training in preparation for safe discharge home and/or into the community   [] Positioning to prevent skin breakdown and contractures  [] Safety and Education Training   [x] Patient/Caregiver Education   [] HEP  [] Other     PT long term treatment goals are located in above grid    Frequency of treatments: 2-5x/week x 1-2 weeks.     Time in  08:10  Time out  08:30    Evaluation Time includes thorough review of current medical information, gathering information on past medical history/social history and prior level of function, completion of standardized testing/informal observation of tasks, assessment of data and education on plan of care and goals. CPT codes:  [x] Low Complexity PT evaluation 72699  [] Moderate Complexity PT evaluation 85273  [] High Complexity PT evaluation 87773  [] PT Re-evaluation 93082  [] Gait training 52042 ** minutes  [] Manual therapy 77285 ** minutes  [] Therapeutic activities 39517 ** minutes  [] Therapeutic exercises 50061 ** minutes  [] Neuromuscular reeducation 52507 ** minutes     Rojelio [employer]Tape [employer]., P.T.   License Number: [id number]

## 2021-07-12 NOTE — DISCHARGE INSTR - DIET

## 2021-07-12 NOTE — DISCHARGE SUMMARY
Discharge Summary     Patient ID:  Denilson Barrera  [id number]  13 y.o. 9/20/1931 male  Marilee Angelica Ugalde DO        Admit date: 7/10/2021    Discharge date and time:  7/12/2021  4:00 PM      Activity level: Up with assistance  Diet:Renal  Disposition:[facility]  Condition on Discharge:Stable  DME: None    Admit Diagnoses:   Patient Active Problem List   Diagnosis    Partial small bowel obstruction ([address]    IDDM-2    CKD-3    HTN    COPD    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    Diabetes mellitus ([address]    Diabetes mellitus ([address]    Diabetic peripheral neuropathy ([address]    Osteoarthritis    Gait abnormality    Physical deconditioning    Lumbar spondylitis (HCC)    Anemia    Fx humer, med condyl-closed    Cerebral infarction ([address]    Renal failure    TIA (transient ischemic attack)    ASHD (arteriosclerotic heart disease)    Diabetes mellitus ([address]    PVD (peripheral vascular disease) with claudication (HCC)    History of CVA (cerebrovascular accident)    Pain in lower limb    Cerebral infarction ([address]    CKD 3    Diabetes mellitus-2    Acute respiratory failure (HCC)    Decubitus ulcer of sacral region, stage 1    Right sided weakness    Hypoglycemia    Acute kidney injury (ZAHRAA) with acute tubular necrosis (ATN) ([facility]    Acute kidney failure ([address]    Encounter regarding vascular access for dialysis for end-stage renal disease ([address]    ESRD (end stage renal disease) on dialysis ([address]    Diabetic ulcer of toe of right foot associated with type 2 diabetes mellitus, with fat layer exposed ([address]    UTI (urinary tract infection)       Discharge Diagnoses: Active Problems:    UTI (urinary tract infection)  Resolved Problems:    * No resolved hospital problems. *  1. Complicated UTI   2. ESRD On HD   3. Diabetes Mellitus Type II   4. HTN   5. PVD   6.  Stage 1 decubitus ulceration on bilateral buttocks- present on admission      Consults:  IP CONSULT TO PRIMARY CARE PROVIDER  IP CONSULT TO SOCIAL 120* 102*   CALCIUM 9.2  --   --  8.8 8.7    < > = values in this interval not displayed. Recent Labs     07/10/21  1417 07/12/21  0920   WBC 8.2 4.5   RBC 3.07* 2.78*   HGB 9.7* 8.6*   HCT 30.9* 28.0*   .7* 100.7*   MCH 31.6 30.9   MCHC 31.4* 30.7*   RDW 14.3 14.4    198   MPV 9.8 10.8       Recent Labs     07/12/21  1324   GLUMET 111*       Imaging:  XR CHEST PORTABLE    Result Date: 7/10/2021  EXAMINATION: ONE XRAY VIEW OF THE CHEST 7/10/2021 2:14 pm COMPARISON: None. HISTORY: ORDERING SYSTEM PROVIDED HISTORY: fatigue TECHNOLOGIST PROVIDED HISTORY: Reason for exam:->fatigue FINDINGS: The lungs are without acute focal process. There is no effusion or pneumothorax. The cardiomediastinal silhouette is without acute process. The osseous structures are without acute process. No acute process. Patient Instructions:   Current Discharge Medication List      START taking these medications    Details   white petrolatum OINT ointment Apply 1 applicator topically 2 times daily  Qty: 90 g, Refills: 0         CONTINUE these medications which have CHANGED    Details   metoprolol succinate (TOPROL XL) 25 MG extended release tablet Take 1 tablet by mouth daily  Qty: 30 tablet, Refills: 3         CONTINUE these medications which have NOT CHANGED    Details   Magnesium Oxide (MAG-OXIDE PO) Take 40 mg by mouth       atorvastatin (LIPITOR) 10 MG tablet Take 10 mg by mouth nightly      gabapentin (NEURONTIN) 100 MG capsule Take 100 mg by mouth nightly.       ascorbic acid (VITAMIN C) 500 MG tablet Take 1,000 mg by mouth daily      zinc gluconate 50 MG tablet Take 50 mg by mouth daily      Multiple Vitamins-Minerals (PRESERVISION AREDS 2 PO) Take 1 tablet by mouth daily       insulin lispro (HUMALOG KWIKPEN) 200 UNIT/ML SOPN pen Inject into the skin 4 times daily as needed Before meals and nightly      famotidine (PEPCID) 40 MG tablet Take 40 mg by mouth as needed      vitamin D (CHOLECALCIFEROL) 25 MCG (1000 UT) TABS tablet Take 1,000 Units by mouth daily      aspirin 81 MG tablet Take 81 mg by mouth every morning       collagenase (SANTYL) 250 UNIT/GM ointment Apply topically daily.   Qty: 30 g, Refills: 2         STOP taking these medications       D-MANNOSE PO Comments:   Reason for Stopping:         hydrALAZINE (APRESOLINE) 25 MG tablet Comments:   Reason for Stopping:         bumetanide (BUMEX) 1 MG tablet Comments:   Reason for Stopping:         lisinopril (PRINIVIL;ZESTRIL) 2.5 MG tablet Comments:   Reason for Stopping:                 Note that more than 30 minutes was spent in preparing discharge papers, discussing discharge with patient, medication review, etc.      Signed:    Jason Pope DO    Electronically signed by Jason Ppoe DO on 7/12/2021 at 4:00 PM

## 2021-07-12 NOTE — CONSULTS
[address] [place] [address] [facility]  Consultation Note     Admit Date: 7/10/2021  1:36 PM    Reason for Consult:   Complicated UTI    Attending Physician:  Neil Ugalde DO    HISTORY OF PRESENT ILLNESS:             The history is obtained from extensive review of available past medical records. The patient is a 80 y.o. male who is known to the ID service. Not he presents to the ED on 7/11/2021 complaining of burning on urination and frequency. He denies having any fevers. He was treated with Ceftriaxone. He feels somewhat better. Urine culture is growing Klebsiella pneumoniae (resistant to Cefazolin). Past Medical History:        Diagnosis Date    Arthritis     CAD (coronary artery disease)     CHF (congestive heart failure) (HCC)     Chronic kidney disease     COVID-19     CVA (cerebral vascular accident) ([address]     Diabetes mellitus ([address]     Diabetic peripheral neuropathy ([address] 11/9/2012    Hemodialysis patient ([address]     History of CVA (cerebrovascular accident) 6/9/2014    Hypertension     Other disorders of kidney and ureter     prostate infections in past    PVD (peripheral vascular disease) with claudication ([address] 6/9/2014    Right sided weakness 3/19/2018    TIA (transient ischemic attack)     possible several years ago    Unspecified cerebral artery occlusion with cerebral infarction 2013    [facility] RIGHT LEG AFFECTED     November 2012. Admitted to [facility] with right-sided weakness and slurred speech. 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 Seen by Dr. Austin Cesar for a low-grade fever. Observed off antibiotics.     Past Surgical History:        Procedure Laterality Date    ABDOMINAL HERNIA REPAIR      CATARACT REMOVAL      right    CHOLECYSTECTOMY  11/2011    DENTAL SURGERY      EYE SURGERY      [facility] DIALYSIS CATHETER Right 8/27/2020    TESIO CATHETER INSERTION performed by Josselin Bauer MD at [address] [place] [address]  INNER EAR SURGERY      removal of sac in ear    SHUNT REVISION Right 2021    INSERTION AV GRAFT RIGHT ARM performed by José Miguel Rodriguez MD at [address] [place] [address] Right 2021    THROMBECTOMY RIGHT ARM AV GRAFT performed by José Miguel Rodriguez MD at [facility] OR     Current Medications:   Scheduled Meds:   metoprolol succinate  25 mg Oral Daily    famotidine  10 mg Oral Daily    insulin lispro  0-6 Units Subcutaneous TID WC    ascorbic acid  1,000 mg Oral Daily    aspirin  81 mg Oral QAM    atorvastatin  10 mg Oral Nightly    collagenase   Topical Daily    gabapentin  100 mg Oral Nightly    magnesium oxide  200 mg Oral Daily    ocuvite-lutein  2 tablet Oral Daily    Vitamin D  1,000 Units Oral Daily    zinc sulfate  50 mg Oral Daily    cefTRIAXone (ROCEPHIN) IV  1,000 mg Intravenous Q24H    insulin lispro  0-3 Units Subcutaneous Nightly     Continuous Infusions:   dextrose       PRN Meds:analgesic ointment, glucose, dextrose, glucagon (rDNA), dextrose, acetaminophen    Allergies:  Sulfa antibiotics and Sulfa antibiotics    Social History:   Social History     Socioeconomic History    Marital status: Single     Spouse name: None    Number of children: None    Years of education: None    Highest education level: None   Occupational History    None   Tobacco Use    Smoking status: Former Smoker     Packs/day: 0.50     Years: 20.00     Pack years: 10.00     Types: Cigarettes     Quit date: 5     Years since quittin.5    Smokeless tobacco: Never Used   Vaping Use    Vaping Use: Never used   Substance and Sexual Activity    Alcohol use: Not Currently     Alcohol/week: 1.0 standard drinks     Types: 1 Glasses of wine per week    Drug use: No    Sexual activity: None   Other Topics Concern    None   Social History Narrative    ** Merged History Encounter **          Social Determinants of Health     Financial Resource Strain:     Difficulty of Paying Living Expenses:    Food Insecurity:     Worried About Running Out of Food in the Last Year:     [address] in the Last Year:    Transportation Needs:     Lack of Transportation (Medical):  Lack of Transportation (Non-Medical):    Physical Activity:     Days of Exercise per Week:     Minutes of Exercise per Session:    Stress:     Feeling of Stress :    Social Connections:     Frequency of Communication with Friends and Family:     Frequency of Social Gatherings with Friends and Family:     Attends Orthodox Services:     Active Member of Clubs or Organizations:     Attends Club or Organization Meetings:     Marital Status:    Intimate Partner Violence:     Fear of Current or Ex-Partner:     Emotionally Abused:     Physically Abused:     Sexually Abused:       Pets: No  Travel: No  The patient lives at home with his wife. He is retired    Family History:   History reviewed. No pertinent family history. . Otherwise non-pertinent to the chief complaint. REVIEW OF SYSTEMS:    Constitutional: Negative for fevers, chills, diaphoresis  Neurologic: Negative   Psychiatric: Negative  Rheumatologic: Negative   Endocrine: Negative  Hematologic: Negative  Immunologic: Negative  ENT: Negative  Respiratory: Negative   Cardiovascular: Negative  GI: Negative  : As in the HPI  Musculoskeletal: Negative  Skin: No rashes. PHYSICAL EXAM:    Vitals:   /74   Pulse 87   Temp 98.3 °F (36.8 °C)   Resp 16   Ht 5' 5\" (1.651 m)   Wt 179 lb (81.2 kg)   SpO2 94%   BMI 29.79 kg/m²   Constitutional: The patient is awake, alert, and oriented. Seen in dialysis. No distress. Somewhat contracted. Skin: Warm and dry. No rashes were noted. HEENT: Eyes show round, and reactive pupils. No jaundice. Moist mucous membranes, no ulcerations, no thrush. Neck: Supple to movements. No lymphadenopathy. Chest: No use of accessory muscles to breathe. Symmetrical expansion. Auscultation reveals no wheezing, crackles, or rhonchi. Cardiovascular: S1 and S2 are rhythmic and regular. No murmurs appreciated. Abdomen: Positive bowel sounds to auscultation. Benign to palpation. No masses felt. No hepatosplenomegaly. Extremities: No clubbing, no cyanosis, no edema. Forearm AV fistula. Lines: peripheral      CBC+dif:  Recent Labs     07/10/21  1417 07/10/21  1417 07/12/21  0920   WBC 8.2  --  4.5   HGB 9.7*   < > 8.6*   HCT 30.9*   < > 28.0*   .7*   < > 100.7*      < > 198   NEUTROABS 5.58  --   --     < > = values in this interval not displayed.      Lab Results   Component Value Date    CRP 0.1 09/07/2020      No results found for: CRPHS  Lab Results   Component Value Date    SEDRATE 31 (H) 09/07/2020    SEDRATE 41 (H) 06/11/2014     Lab Results   Component Value Date    ALT 14 07/10/2021    AST 15 07/10/2021    ALKPHOS 103 07/10/2021    BILITOT 0.3 07/10/2021     Lab Results   Component Value Date     07/12/2021    K 4.7 07/12/2021    CL 97 07/12/2021    CO2 24 07/12/2021    BUN 63 07/12/2021    CREATININE 6.4 07/12/2021    GFRAA 10 07/12/2021    LABGLOM 8 07/12/2021    GLUCOSE 102 07/12/2021    GLUCOSE 163 02/22/2012    PROT 6.9 07/10/2021    LABALBU 3.7 07/10/2021    LABALBU 3.8 02/20/2012    CALCIUM 8.7 07/12/2021    BILITOT 0.3 07/10/2021    ALKPHOS 103 07/10/2021    AST 15 07/10/2021    ALT 14 07/10/2021       Lab Results   Component Value Date    PROTIME 16.6 05/15/2021    PROTIME 15.1 01/29/2012    INR 1.5 05/15/2021       Lab Results   Component Value Date    TSH 1.940 09/17/2014       Lab Results   Component Value Date    COLORU Yellow 07/10/2021    PHUR 6.0 07/10/2021    WBCUA PACKED 07/10/2021    WBCUA NONE 02/20/2012    RBCUA >20 07/10/2021    RBCUA 1-3 05/04/2013    BACTERIA MANY 07/10/2021    CLARITYU CLOUDY 07/10/2021    SPECGRAV 1.020 07/10/2021    LEUKOCYTESUR LARGE 07/10/2021    UROBILINOGEN 0.2 07/10/2021    BILIRUBINUR Negative 07/10/2021    BILIRUBINUR NEGATIVE 02/20/2012    BLOODU LARGE 07/10/2021 GLUCOSEU Negative 07/10/2021    GLUCOSEU 500 02/20/2012       No results found for: HCO3, BE, O2SAT, PH, THGB, PCO2, PO2, TCO2  Radiology:  Noted    Microbiology:  Pending  No results for input(s): BC in the last 72 hours. Recent Labs     07/10/21  1704   ORG Klebsiella pneumoniae ssp pneumoniae*     No results for input(s): Aundria Ruiz in the last 72 hours. No results for input(s): STREPNEUMAGU in the last 72 hours. No results for input(s): LP1UAG in the last 72 hours. No results for input(s): ASO in the last 72 hours. No results for input(s): CULTRESP in the last 72 hours. No results for input(s): PROCAL in the last 72 hours. Assessment:  · Complicated UTI with Klebsiella, improving  · ESRD, on HD    Plan:    · Stop Ceftriaxone  · Start oral Levofloxacin for a minimum of 7 days  · Check cultures, baseline ESR, CRP  · If the patient tolerates the oral antibiotic you can be discharged from ID standpoint    Thank you for having us see this patient in consultation. I will be discussing this case with the treating physicians.     Jordi Grimes MD  1:25 PM  7/12/2021

## 2021-07-12 NOTE — PROGRESS NOTES
Progress Note  Date:2021       Room:0508/0508-A  Patient Name:Rudi Roca     YOB: 1931     Age:89 y.o. Patient seen for follow up of complicated uti and generalized weakness. Patient this am is tired. He denies any fevers, chills, cough, shortness of breath, chest pains, nausea, vomiting or diarrhea. Per nursing no acute events overnight. He is scheduled to have dialysis today. Subjective    Subjective:  Symptoms:  No shortness of breath, cough, chest pain, headache, chest pressure or diarrhea. Diet:  No nausea or vomiting. Activity level: Impaired due to weakness. Review of Systems   Respiratory: Negative for cough and shortness of breath. Cardiovascular: Negative for chest pain. Gastrointestinal: Negative for diarrhea, nausea and vomiting. Objective         Vitals Last 24 Hours:  TEMPERATURE:  Temp  Av.3 °F (36.8 °C)  Min: 98.3 °F (36.8 °C)  Max: 98.3 °F (36.8 °C)  RESPIRATIONS RANGE: Resp  Av  Min: 16  Max: 16  PULSE OXIMETRY RANGE: SpO2  Av %  Min: 93 %  Max: 97 %  PULSE RANGE: Pulse  Av  Min: 88  Max: 94  BLOOD PRESSURE RANGE: Systolic (09NKG), YKJ:68 , Min:85 , NTU:56   ; Diastolic (94VGS), TKR:02, Min:50, Max:51    I/O (24Hr): Intake/Output Summary (Last 24 hours) at 2021 0600  Last data filed at 2021 1816  Gross per 24 hour   Intake    Output 500 ml   Net -500 ml     Objective:  General Appearance:  Comfortable, well-appearing and in no acute distress. Vital signs: (most recent): Blood pressure (!) 94/51, pulse 94, temperature 98.3 °F (36.8 °C), temperature source Oral, resp. rate 16, height 5' 5\" (1.651 m), weight 182 lb (82.6 kg), SpO2 93 %. Lungs:  Normal effort and normal respiratory rate. Breath sounds clear to auscultation. No rales or rhonchi. Heart: Normal rate. Regular rhythm. S1 normal and S2 normal.  No friction rub. Abdomen: Abdomen is soft and non-distended.   Bowel sounds are normal.   There is no

## 2021-07-12 NOTE — FLOWSHEET NOTE
Inpatient Wound Care    Admit Date: 7/10/2021  1:36 PM    Reason for consult:  Sacrum, R 2nd toe    Significant history:  Admitted with UTI. History includes:ESRD on HD, HTN, COPD, OA and CVA. Wound history:  POA    Findings:       07/12/21 1637   Wound 07/06/21 Toe (Comment  which one) Right;Dorsal Wound # 1   Date First Assessed/Time First Assessed: 07/06/21 1510   Primary Wound Type: Diabetic Ulcer  Location: Toe (Comment  which one)  Wound Location Orientation: Right;Dorsal  Wound Description (Comments): Wound # 1   Wound Length (cm) 0.5 cm   Wound Width (cm) 0.5 cm   Wound Surface Area (cm^2) 0.25 cm^2   Change in Wound Size % (l*w) 65.28   Wound Assessment Dry   Drainage Amount None   Sharyn-wound Assessment Dry/flaky   Wound 07/10/21 Sacrum   Date First Assessed/Time First Assessed: 07/10/21 2213   Present on Hospital Admission: Yes  Location: Sacrum   Wound Image    Wound Etiology Deep tissue/Injury   Wound Length (cm) 6 cm   Wound Width (cm) 8 cm   Wound Surface Area (cm^2) 48 cm^2   Change in Wound Size % (l*w) -100   Wound Assessment Purple/maroon   Drainage Amount None   Odor None   Sharyn-wound Assessment Dry/flaky       Impression:  DTI sacrum, Dry eschar 2nd toe R foot. Interventions in place:  Pt states he sleeps in a chair. Sacrum indicative of pressure and shear. Plan: Aquaphor, SOS precautions, low air loss bed. Keep R 2nd toe clean and dry. **Informed Consent**    The patient has given verbal consent to have photos taken of  Sacrum  and inserted into their chart as part of their permanent medical record for purposes of documentation, treatment management and/or medical review. All Images taken on 7/12/21 of patient name: Marco Hopkins were transmitted and stored on secured [employer] located within [facility] by a registered [employer] Mobile Application Device.      Zaira Guthrie RN 7/12/2021 4:44 PM

## 2021-07-12 NOTE — PROGRESS NOTES
OCCUPATIONAL THERAPY INITIAL EVALUATION    [address] [facility]  [address] [facility] [address] [facility]         KOUA:                                                  Patient Name: Miriam Juan    MRN: [id number]    : 1931    Room: A      Evaluating OT: Harvey Morrison, OTR/L [id number]      Referring Jennifer Cortes MD    Specific Provider Orders/Date: OT eval and treat 7-      Diagnosis: UTI     Surgery:   Past Surgical History:   Procedure Laterality Date    ABDOMINAL HERNIA REPAIR      CATARACT REMOVAL      right    CHOLECYSTECTOMY  2011    DENTAL SURGERY      EYE SURGERY      [facility] [place]. DIALYSIS CATHETER Right 2020    TESIO CATHETER INSERTION performed by Ronny Trejo MD at [facility] EAR SURGERY      removal of sac in ear    SHUNT REVISION Right 2021    INSERTION AV GRAFT RIGHT ARM performed by Ronny Trejo MD at [address] [place] [address] Right 2021    THROMBECTOMY RIGHT ARM AV GRAFT performed by Ronny Trejo MD at [address] [place] [address]        Pertinent Medical History: CVA with r hemiparesis 2012, CHF, CAD, HTN, DM, Covid- 23      Past Medical History:   Diagnosis Date    Arthritis     CAD (coronary artery disease)     CHF (congestive heart failure) ([address]     Chronic kidney disease     COVID-19     CVA (cerebral vascular accident) ([address]     Diabetes mellitus ([address]     Diabetic peripheral neuropathy ([address] 2012    Hemodialysis patient ([address]     History of CVA (cerebrovascular accident) 2014    Hypertension     Other disorders of kidney and ureter     prostate infections in past    PVD (peripheral vascular disease) with claudication ([address] 2014    Right sided weakness 3/19/2018    TIA (transient ischemic attack)     possible several years ago    Unspecified cerebral artery occlusion with cerebral infarction     [facility] RIGHT LEG AFFECTED         Past Surgical History:   Procedure Laterality Date    ABDOMINAL HERNIA REPAIR      CATARACT REMOVAL      right    CHOLECYSTECTOMY  11/2011    DENTAL SURGERY      EYE SURGERY      [facility] [place]. DIALYSIS CATHETER Right 8/27/2020    TESIO CATHETER INSERTION performed by Josselin Bauer MD at [facility]      removal of sac in ear    SHUNT REVISION Right 1/21/2021    INSERTION AV GRAFT RIGHT ARM performed by Josselin Bauer MD at [address] [place] [address] Right 4/8/2021    THROMBECTOMY RIGHT ARM AV GRAFT performed by Josselin Bauer MD at [facility]      Precautions:  Fall Risk, R lawrence,      Assessment of current deficits    [] Functional mobility  [x]ADLs  [x] Strength               [x]Cognition    [x] Functional transfers   [x] IADLs         [x] Safety Awareness   [x]Endurance    [x] Fine Coordination              [x] Balance      [] Vision/perception   [x]Sensation     []Gross Motor Coordination  [] ROM  [] Delirium                   [] Motor Control     OT PLAN OF CARE   OT POC based on physician orders, patient diagnosis and results of clinical assessment    Frequency/Duration 2-5 days/wk for 2 weeks PRN   Specific OT Treatment Interventions to include:   * Instruction/training on adapted ADL techniques and AE recommendations to increase functional independence within precautions       * Training on energy conservation strategies, correct breathing pattern and techniques to improve independence/tolerance for self-care routine  * Functional transfer/mobility training/DME recommendations for increased independence, safety, and fall prevention  * Patient/Family education to increase follow through with safety techniques and functional independence  * Recommendation of environmental modifications for increased safety with functional transfers/mobility and ADLs  * Cognitive retraining/development of therapeutic activities to improve problem solving, judgement, memory, and attention for increased safety/participation in ADL/IADL tasks  * Therapeutic exercise to improve motor endurance, ROM, and functional strength for ADLs/functional transfers  * Therapeutic activities to facilitate/challenge dynamic balance, stand tolerance for increased safety and independence with ADLs  * Therapeutic activities to facilitate gross/fine motor skills for increased independence with ADLs    Recommended Adaptive Equipment:  TBD     Home Living: Pt lives with his neice in a 1 story home with 3 steps to enter and 1  rails   Bathroom setup: tub/ shower with tub bench    Equipment owned: w/c, quod cane, tub bench    Prior Level of Function: Pt has aides that come into the home 5 days a week to assist with dressing and bathing. Pt has private care takers who assist on the weekends. He assists with UB bath/ dress, but is dependent for LB , Assist with IADLs; ambulated a few steps with quod cane to get into the bathroom, but reports to primarily use w/c in home. Driving: no  Occupation: not working     Pain Level: 0/10;  Cognition: A&O: 3/3; Follows 1-2 step directions   Memory:  fair   Sequencing:  fair   Problem solving:  fair    Judgement/safety:  fair     Functional Assessment:  AM-PAC Daily Activity Raw Score: 12/24   Initial Eval Status  Date: 7/12/21 Treatment Status  Date: STGs = LTGs  Time frame: 10-14 days   Feeding Minimal Assist      Grooming Minimal Assist chair level  Contact Guard Assist    UB Dressing Moderate Assist   Minimal Assist    LB Dressing Dependent       Bathing Maximal Assist  Moderate Assist    Toileting Maximal Assist   Catheter  Moderate Assist    Bed Mobility  Supine to sit: NT   Sit to supine: Max Assist   Supine to sit: Moderate Assist   Sit to supine: Moderate Assist    Functional Transfers Moderate Assist sit to stand from chair.   Block R foot   Minimal Assist    Functional Mobility Moderate Assist   SPS with R foot blocked   Minimal Assist    Balance Sitting:     Static: Fair     Dynamic:Fair-  Standing: Mod A      Activity Tolerance Fair- for light activity   Fair+ endurance t participate in ADL's    Visual/  Perceptual Glasses: yes                Hand Dominance R   AROM (PROM) Strength Additional Info:    RUE  PROM 0-<45* shld flexion, PROM grossly WFL distal  Active flexion in fingers 1/5 2/5grip    Poor- FMC/dexterity noted during ADL tasks       LUE WFL 4-/5 good  and wfl FMC/dexterity noted during ADL tasks       Hearing: [place]  Sensation:  Impaired RUE  Tone: Increased RUE   Edema: none noted    Comments: Upon arrival patient sitting in bedside chair . At end of session, patient supine in bed to transport to dialysis with all lines and tubes intact. Overall patient demonstrated  decreased independence and safety during completion of ADL/functional transfer/mobility tasks. Pt would benefit from continued skilled OT to increase safety and independence with completion of ADL/IADL tasks for functional independence and quality of life. T    Rehab Potential: Good  for established goals     Patient / Family Goal: to get stronger       Patient and/or family were instructed on functional diagnosis, prognosis/goals and OT plan of care. Demonstrated good understanding. Eval Complexity: Low    Time In: 8:45  Time Out: 8:58  Total Treatment Time:     Min Units   OT Eval Low 73012  13  1   OT Eval Medium 18038      OT Eval High 05171      OT Re-Eval [id number]       Therapeutic Ex [phone]       Therapeutic Activities 93686       ADL/Self Care 48804       Orthotic Management 21559       Manual 89271     Neuro Re-Ed 12303       Non-Billable Time          Evaluation Time additionally includes thorough review of current medical information, gathering information on past medical history/social history and prior level of function, interpretation of standardized testing/informal observation of tasks, assessment of data and development of plan of care and goals.           Massimo Peterson OTR/L [id number]

## 2021-07-12 NOTE — CARE COORDINATION
Social Work discharge planning   Per pt/family request, Delfina called Fariba with [facility] snf and referral made. Await to see IF [facility] can accept. Medicare waiver still in place for 3 day stay. PT [facility] today is 11 and OT is pending. IF accepted, pt will need N17 signed by  and Faye will do Pasarr. Electronically signed by Ellen Wells on 7/12/2021 at 9:15 AM     Addendum-    Per Fariba with [facility], pt is accepted and they can take pt today IF ready. Notified charge RN [place]. Pasarr done. Ambulance forms in folder. Delfina spoke to harry Miner, an she advised pt had Covid in January 2021 so she said they did not have pt vaccinated yet. .   Electronically signed by Ellen Wells on 7/12/2021 at 9:18 AM       Patient is appropriate for SNF level of care and has been accepted. Using the authority under Section [phone] (f) of the Act, CMS is waiving the requirement for a 3-day prior hospitalization for coverage of a SNF stay, which provides temporary emergency coverage of SNF services without a qualifying hospital stay, for those people who experience dislocations, or are otherwise affected by COVID-19.

## 2021-07-12 NOTE — DISCHARGE INSTR - COC
Continuity of Care Form    Patient Name: Petty Mane   :  1931  MRN:  [id number]    Admit date:  7/10/2021  Discharge date:      Code Status Order: Prior   Advance Directives:   [place] Directive Type of Healthcare Directive Copy in [address] Agent's Name Healthcare Agent's Phone Number    07/10/21 1954  Yes, patient has an advance directive for healthcare treatment  Durable power of  for health care;Living will  No, copy requested from family  Healthcare power of   [facility]  [phone] [place] - [phone]            Admitting Physician:  Marielle Hand DO  PCP: Marielle Hand DO    Discharging Nurse: [facility] Unit/Room#: 6397/1318-T  Discharging Unit Phone Number: [phone]    Emergency Contact:   Extended Emergency Contact Information  Primary Emergency Contact: [address] Phone: [phone]  Mobile Phone: [phone]  Relation: Brother/Sister  Secondary Emergency Contact: Jesenia Marques  [place] Phone: [phone]  Mobile Phone: [phone]  Relation: Niece/Nephew   needed?  No    Past Surgical History:  Past Surgical History:   Procedure Laterality Date    ABDOMINAL HERNIA REPAIR      CATARACT REMOVAL      right    CHOLECYSTECTOMY  2011    DENTAL SURGERY      EYE SURGERY      HC DIALYSIS CATHETER Right 2020    TESIO CATHETER INSERTION performed by Dinesh Ansari MD at [address]      removal of sac in ear    SHUNT REVISION Right 2021    INSERTION AV GRAFT RIGHT ARM performed by Dinesh Ansari MD at [address] Right 2021    THROMBECTOMY RIGHT ARM AV GRAFT performed by Dinesh Ansari MD at [address]       Immunization History:   Immunization History   Administered Date(s) Administered    Td, unspecified formulation 09/28/2011       Active Problems:  Patient Active Problem List   Diagnosis Code    Partial small bowel obstruction (HCC) K56.600    IDDM-2 E11.9    CKD-3 N18.30    HTN I10    COPD J44.9    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 E11.9    Diabetes mellitus (HCC) E11.9    Diabetic peripheral neuropathy (HCC) E11.42    Osteoarthritis M19.90    Gait abnormality R26.9    Physical deconditioning R53.81    Lumbar spondylitis ([facility]) M46.96    Anemia D64.9    Fx humer, med condyl-closed S42.463A    Cerebral infarction ([facility]) I63.9    Renal failure N19    TIA (transient ischemic attack) G45.9    ASHD (arteriosclerotic heart disease) I25.10    Diabetes mellitus ([facility]) E11.9    PVD (peripheral vascular disease) with claudication ([facility]) I73.9    History of CVA (cerebrovascular accident) Z86.73    Pain in lower limb M79.606    Cerebral infarction ([facility] I63.9    CKD 3 N18.30    Diabetes mellitus-2 E11.9    Acute respiratory failure ([facility]) J96.00    Decubitus ulcer of sacral region, stage 1 L89.151    Right sided weakness R53.1    Hypoglycemia E16.2    Acute kidney injury (ZAHRAA) with acute tubular necrosis (ATN) ([facility]) N17.0    Acute kidney failure ([facility]) N17.9    Encounter regarding vascular access for dialysis for end-stage renal disease ([facility] N18.6, Z99.2    ESRD (end stage renal disease) on dialysis ([facility] N18.6, Z99.2    Diabetic ulcer of toe of right foot associated with type 2 diabetes mellitus, with fat layer exposed ([facility] E11.621, L97.512    UTI (urinary tract infection) N39.0       Isolation/Infection:   Isolation            No Isolation          Patient Infection Status       Infection Onset Added Last Indicated Last Indicated By Review Planned Expiration Resolved Resolved By    None active    Resolved    COVID-19 Rule Out 07/10/21 07/10/21 07/10/21 COVID-19, Rapid (Ordered)   07/10/21 Rule-Out Test Resulted    COVID-19 Rule Out 04/02/21 04/02/21 04/02/21 Covid-19 Ambulatory (Ordered)   04/03/21 Rule-Out Test Resulted    COVID-19 Rule Out 01/15/21 01/16/21 01/16/21 Covid-19 Ambulatory (Ordered)   01/17/21 Rule-Out Test Resulted    COVID-19 Rule Out 09/11/20 09/11/20 09/11/20 Covid-19 Ambulatory (Ordered)   09/12/20 Rule-Out Test Resulted    COVID-19 Rule Out 08/24/20 08/24/20 08/24/20 Covid-19 Ambulatory (Ordered)   08/25/20 Rule-Out Test Resulted    COVID-19 Rule Out 08/17/20 08/17/20 08/17/20 Covid-19 Ambulatory (Ordered)   08/18/20 Rule-Out Test Resulted            Nurse Assessment:  Last Vital Signs: BP (!) 97/51   Pulse 82   Temp 98.2 °F (36.8 °C) (Oral)   Resp 16   Ht 5' 5\" (1.651 m)   Wt 179 lb (81.2 kg)   SpO2 94%   BMI 29.79 kg/m²     Last documented pain score (0-10 scale): Pain Level: 0  Last Weight:   Wt Readings from Last 1 Encounters:   07/12/21 179 lb (81.2 kg)     Mental Status:  oriented    IV Access:  - Dialysis Catheter  - site  right, insertion date: ***    Nursing Mobility/ADLs:  Walking   Assisted  Transfer  Assisted  Bathing  Assisted  Dressing  Assisted  Toileting  Assisted  Feeding  Independent  Med Admin  Assisted  Med Delivery   whole    Wound Care Documentation and Therapy:  Wound 01/12/19 Coccyx Mid non-blanchable redness (Active)   Number of days: 912       Wound 07/06/21 Toe (Comment  which one) Right;Dorsal Wound # 1 (Active)   Dressing Status Clean;Dry; Intact 07/11/21 1945   Wound Cleansed Cleansed with saline 07/11/21 0805   Dressing/Treatment Other (comment) 07/11/21 1945   Wound Length (cm) 0.5 cm 07/10/21 1945   Wound Width (cm) 0.6 cm 07/10/21 1945   Wound Surface Area (cm^2) 0.3 cm^2 07/10/21 1945   Change in Wound Size % (l*w) 58.33 07/10/21 1945   Wound Assessment Dry 07/11/21 0805   Drainage Amount None 07/11/21 0805   Sharyn-wound Assessment Intact 07/11/21 0805   Number of days: 5       Wound 07/10/21 Buttocks Purple, non blanchable area.  No open areas (Active)   Dressing/Treatment Other (comment) 07/11/21 1945   Wound Length (cm) 6 cm 07/10/21 1945   Wound Width (cm) 4 cm 07/10/21 1945   Wound Surface Area (cm^2) 24 cm^2 07/10/21 1945   Wound Assessment Purple/maroon;Non-blanchable erythema 07/11/21 1945   Number of days: 1        Elimination:  Continence: Bowel: Yes  Bladder: Yes  Urinary Catheter: Insertion Date: 7/10    Colostomy/Ileostomy/Ileal Conduit: No       Date of Last BM: ***    Intake/Output Summary (Last 24 hours) at 7/12/2021 0920  Last data filed at 7/12/2021 0858  Gross per 24 hour   Intake 180 ml   Output 350 ml   Net -170 ml     I/O last 3 completed shifts:  In: -   Out: 350 [Urine:350]    Safety Concerns: At Risk for Falls    Impairments/Disabilities:      None    Nutrition Therapy:  Current Nutrition Therapy:   - Oral Diet:  General    Routes of Feeding: Oral  Liquids: No Restrictions  Daily Fluid Restriction: no  Last Modified Barium Swallow with Video (Video Swallowing Test): not done    Treatments at the Time of Hospital Discharge:   Respiratory Treatments: ***  Oxygen Therapy:  is not on home oxygen therapy.   Ventilator:    - No ventilator support    Rehab Therapies: Physical Therapy and Occupational Therapy  Weight Bearing Status/Restrictions: No weight bearing restirctions  Other Medical Equipment (for information only, NOT a DME order):  {EQUIPMENT:683064541}  Other Treatments: ***    Patient's personal belongings (please select all that are sent with patient):  Glasses, Hearing Aides bilateral    RN SIGNATURE:  Electronically signed by Medardo Luke RN on 7/12/21 at 4:58 PM EDT    CASE MANAGEMENT/SOCIAL WORK SECTION    Inpatient Status Date: ***    Readmission Risk Assessment Score:  Readmission Risk              Risk of Unplanned Readmission:  0           Discharging to Facility/ Agency   Name: [facility]  Address:   [address] [place] [address]         Phone: [phone]       Fax: [phone]        Dialysis Facility (if applicable)   Name: Kranthi Navarro   Address: [address]  Dialysis Schedule: [facility]  Phone: [phone]  Fax: [phone]    / signature: Electronically signed by Meaghan Sanchez on 7/12/21 at 9:20 AM EDT    PHYSICIAN SECTION    Prognosis: {Prognosis:9762049598}    Condition at Discharge: Stable    Rehab Potential (if transferring to Rehab): {Prognosis:9602090974}    Recommended Labs or Other Treatments After Discharge: ***    Physician Certification: I certify the above information and transfer of Keenan Gupta  is necessary for the continuing treatment of the diagnosis listed and that he requires [facility] for less 30 days.      Update Admission H&P: {CHP DME Changes in PLQPI:976221899}    PHYSICIAN SIGNATURE:  Britt Ugalde DO

## 2021-07-12 NOTE — H&P
[address] [address]                              HISTORY AND PHYSICAL    PATIENT NAME: Tika Zarco                     :        1931  MED REC NO:   [id number]                            ROOM:       2877  ACCOUNT NO:   [de-identified]                           ADMIT DATE: 07/10/2021  PROVIDER:     Gabriele Meredith MD    DATE OF ADMISSION:  07/10/2021    CHIEF COMPLAINT:  Weakness. HISTORY OF PRESENT ILLNESS:  This is an 80-year-old gentleman who  presented from home with a complaint of increasing generalized weakness  and risk of fall. He has multiple medical problems including diabetes  and end-stage renal disease. He also presented with symptoms of urinary  tract infection and evidence of urinary tract infection on urinalysis. He denies any fever or chills. He denies any injury with any falls. His family is unable to care for him at home any more given his plethora  of medical needs. PAST MEDICAL HISTORY:  Significant for end-stage renal disease and he is  on hemodialysis three times a week. He has diabetes with nephropathy,  neuropathy, and retinopathy. 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 He has atherosclerotic heart disease,  peripheral vascular disease, decubitus ulcers on his buttock and right  foot, and chronic diastolic congestive heart failure. PAST SURGICAL HISTORY:  Significant for hernia repair and  cholecystectomy. SOCIAL HISTORY:  He is a nonsmoker, having quit back in the 1960s. He  does not drink any significant amounts of alcohol. FAMILY MEDICAL HISTORY:  Noncontributory.     MEDICATIONS:  Include atorvastatin 10 mg daily, gabapentin 100 mg at  bedtime, hydralazine 25 mg b.i.d., metoprolol succinate 25 mg daily,  bumetanide 1 mg daily, lisinopril 2.5 mg daily, Humalog which he takes  for mealtime coverage, Pepcid 40 mg daily, vitamin D daily, and aspirin  81 mg daily. REVIEW OF SYSTEMS:  SKIN:  Has evidence of decubiti on his buttock and right foot. He has  been receiving outpatient care with wound care for these. CARDIOVASCULAR:  No history of angina or MI. He does have a history of  chronic diastolic congestive heart failure. No ongoing anginal  symptoms. No cardiac arrhythmias. RESPIRATORY:  No shortness of breath, cough, or wheezing. No history of  asthma or emphysema. GI:  No nausea, vomiting, diarrhea, constipation, melena, or  hematochezia. :  No dysuria, hematuria, frequency, or problems with recurrent UTIs,  though he does presently appear to have a UTI. MUSCULOSKELETAL:  He does have a history of diffuse osteoarthritis. Denies any reports of vascular claudication. NEUROLOGIC:  He has a history of a CVA back in 2012 that has left him  with residual right-sided hemiparesis. No reports of headache,  seizures, or paresthesias. PHYSICAL EXAMINATION:  GENERAL:  This is an elderly gentleman, lying in bed comfortably, in no  acute distress. HEENT:  Head:  Normocephalic and atraumatic. Eyes:  PERRLA. Extraocular movements are intact without nystagmus. Throat:  Oral and  buccal mucosa are moist without oral ulcer, exudate, or lesion. NECK:  No goiter, bruit, or lymphadenopathy. CHEST:  Symmetrical excursion with inspiration. BACK:  No CVA or spine tenderness. HEART:  Has a regular rate and rhythm with a 2/6 systolic murmur. No  JVD. LUNGS:  Clear to auscultation and percussion bilaterally with no  wheezes, rubs, rales, or use of accessory respiratory muscles. ABDOMEN:  Soft and nontender. Positive bowel sounds in all four  quadrants. No hepatosplenomegaly or other masses appreciated. EXTREMITIES:  No clubbing or cyanosis. He has trace lower leg swelling.   NEUROLOGIC:  He has 4/5 proximal leg weakness that is a little bit worse  in the right than the left. His gait was not assessed at this time. ASSESSMENT AND PLAN:  1. Generalized weakness, which is ____ on the base of chronic  right-sided weakness due to past CVA. Physical therapy and occupational  evaluations have been obtained and it is likely that the patient will  benefit from subacute rehab.  2.  Urinary tract infection. He does not appear to have any associated  sepsis with this. He has been started on antibiotics. 3.  Diabetes. We will continue his mealtime coverage for sugars. 4.  End-stage renal disease. He is on hemodialysis three times a week  and we will continue with that. 5.  Hypertension. This is well controlled. 6.  Atherosclerotic heart disease. No angina or any evidence of overt  CHF. 7.  Chronic anemia. This is presently stable and we will continue  observation.         Kilo Phillips MD    D: 07/11/2021 12:29:30       T: 07/11/2021 12:32:53     [email or web address]  Job#: [id number]     Doc#: [id number]    CC:  Dondra Schirmer, DO

## 2021-07-13 ENCOUNTER — HOSPITAL ENCOUNTER (OUTPATIENT)
Dept: WOUND CARE | Age: 86
Discharge: HOME | End: 2021-07-13
Payer: MEDICARE

## 2021-07-13 NOTE — PROGRESS NOTES
peripheral neuropathy ([facility] 11/9/2012    Hemodialysis patient ([facility]     History of CVA (cerebrovascular accident) 6/9/2014    Hypertension     Other disorders of kidney and ureter     prostate infections in past    PVD (peripheral vascular disease) with claudication ([facility] [facility] 6/9/2014    Right sided weakness 3/19/2018    TIA (transient ischemic attack)     possible several years ago    Unspecified cerebral artery occlusion with cerebral infarction 2013    WHEELCHAIR RIGHT LEG AFFECTED       MEDS (scheduled):      MEDS (infusions):      MEDS (prn): DIET:    No diet orders on file      PHYSICAL EXAM:      Patient Vitals for the past 24 hrs:   BP Temp Temp src Pulse Resp SpO2 Height Weight   07/12/21 1325 (!) 117/58 96.4 °F (35.8 °C) Axillary 102 18 95 %     07/12/21 1255 120/76 96.3 °F (35.7 °C)  77 18   179 lb 10.8 oz (81.5 kg)   07/12/21 1200 (!) 122/57   84       07/12/21 1130 136/74   87       07/12/21 1115       5' 5\" (1.651 m)    07/12/21 1100 (!) 141/75   80       07/12/21 1030 126/71   81       07/12/21 1000 120/78   89       07/12/21 0930 (!) 119/48   74       07/12/21 0915 121/72 98.3 °F (36.8 °C)  83 16      07/12/21 0719 (!) 97/51 98.2 °F (36.8 °C) Oral 82 16 94 %     07/12/21 0600        179 lb (81.2 kg)          Intake/Output Summary (Last 24 hours) at 7/12/2021 2033  Last data filed at 7/12/2021 1255  Gross per 24 hour   Intake 780 ml   Output 500 ml   Net 280 ml       Wt Readings from Last 3 Encounters:   07/12/21 179 lb 10.8 oz (81.5 kg)   06/01/21 177 lb (80.3 kg)   05/15/21 177 lb (80.3 kg)       General:  in no acute distress  HEENT: NC/AT, EOMI, sclera and conjunctiva are clear and anicteric.   Mucous membranes moist.  Cardiovascular: regular rate and rhythm, no murmurs, gallops, or rubs  Respiratory:  Clear, no rales, rhochi, or wheezes  Gastrointestinal: soft, nontender, nondistended, NABS  Ext: no cyanosis, clubbing, or edema bilateral lower extremities  Neuro: awake, alert, oriented x3. Moves all 4 extremities. Cranial nerves II through XII grossly intact. Skin: dry, no rash      DATA:      Recent Labs     07/10/21  1417 07/12/21  0920   WBC 8.2 4.5   HGB 9.7* 8.6*   HCT 30.9* 28.0*   .7* 100.7*    198     Recent Labs     07/10/21  1417 07/11/21  0843 07/12/21  0920    137 136   K 4.0 4.7 4.7   CL 94* 98 97*   CO2 28 26 24   BUN 35* 45* 63*   CREATININE 4.1* 5.1* 6.4*       Iron studies:  Lab Results   Component Value Date    FERRITIN 98 08/24/2020     Bone disease:  Lab Results   Component Value Date     (H) 09/02/2020    MG 2.0 09/14/2020    PHOS 4.6 (H) 09/14/2020         ASSESSMENT / RECOMMENDATIONS:     1. ESRD:    cont diffusive IHD support for solute and volume clearance as per outpatient orders and dry weight on Monday Wednesdays and Fridays  2. Anemia:   Cont. epo and iv ferrlecit per orders  3. Secondary Hyperparathyroidism:   Cont vitamin d analog  4.  Hypertension:   Cont current tx    Okay for discharge from renal standpoint  We will see him on dialysis in the next 2 weeks    Juan Villa MD, MD  7/12/2021

## 2021-07-13 NOTE — ED PROVIDER NOTES
shortness of breath and wheezing. Cardiovascular: Negative for chest pain. Gastrointestinal: Negative for abdominal pain, diarrhea, nausea and vomiting. Genitourinary: Positive for dysuria and frequency. Musculoskeletal: Positive for gait problem. Negative for arthralgias and back pain. Frequent falls   Skin: Negative for rash and wound. Neurological: Positive for weakness. Negative for headaches. Hematological: Negative for adenopathy. All other systems reviewed and are negative. Physical Exam  Vitals and nursing note reviewed. Constitutional:       Appearance: He is well-developed. HENT:      Head: Normocephalic and atraumatic. Eyes:      Conjunctiva/sclera: Conjunctivae normal.   Cardiovascular:      Rate and Rhythm: Normal rate and regular rhythm. Heart sounds: Normal heart sounds. No murmur heard. Pulmonary:      Effort: Pulmonary effort is normal. No respiratory distress. Breath sounds: Normal breath sounds. No wheezing or rales. Abdominal:      General: Bowel sounds are normal.      Palpations: Abdomen is soft. Tenderness: There is no abdominal tenderness. There is no guarding or rebound. Musculoskeletal:         General: No tenderness or deformity. Cervical back: Normal range of motion and neck supple. Skin:     General: Skin is warm and dry. Neurological:      Mental Status: He is alert and oriented to person, place, and time. Cranial Nerves: No cranial nerve deficit. Motor: Weakness (Globally diminished) present. Coordination: Coordination normal.          Procedures   EKG #1:  Interpreted by emergency department physician unless otherwise noted. Time:  1508    Rate: 101  Rhythm: Atrial fibrillation. Interpretation: EKG obtained demonstrate normal sinus rhythm, rate 101, normal axis, , no acute ST segment changes. Comparison: stable as compared to patient's most recent EKG.       MDM  Number of Diagnoses or Management Options  General weakness  Unable to ambulate  Urinary tract infection without hematuria, site unspecified  Diagnosis management comments: Patient is an 80-year-old male past medical history of PVD, CVA, diabetes, CAD, CHF and ESRD. Patient was noted to complain of UTI and frequent falls. Vital signs stable presentation. On physical exam heart regular rate and rhythm, lungs clear to auscultation bilaterally, abdomen soft nontender no rigidity rebound or guarding. EKG obtained demonstrated normal sinus rhythm no acute ischemic changes. Laboratory work obtained CBC demonstrated hemoglobin 9.7, hematocrit 30.9, WBC 0.2, CMP demonstrate chronic elevated creatinine of 4.1, BUN 35, potassium 4. Urinalysis obtained and was indicative of infection with packed WBCs and many bacteria. Troponin initially 88 improved to 83. COVID-19 test was obtained was negative. Patient given 1 L normal saline as well as 2 g of Rocephin. Findings consistent with UTI and frequent falls with need for placement. Decision made to admit patient. Case discussed with hospitalist agreed to meet patient. Plan of care discussed with patient including admission, all questions were answered, patient was in agreement with plan of care and admitted to the hospital in stable condition.        Amount and/or Complexity of Data Reviewed  Clinical lab tests: ordered and reviewed  Tests in the radiology section of CPT®: ordered and reviewed  Decide to obtain previous medical records or to obtain history from someone other than the patient: yes               --------------------------------------------- PAST HISTORY ---------------------------------------------  Past Medical History:  has a past medical history of Arthritis, CAD (coronary artery disease), CHF (congestive heart failure) ([facility] [address] Chronic kidney disease, COVID-19, [facility]cere[facility] accident) [facility] Diabetes mellitus ([facility] Diabetic peripheral neuropathy ([facility] Hemodialysis patient [facility]), History of CVA (cerebrovascular accident), Hypertension, Other disorders of kidney and ureter, PVD (peripheral vascular disease) with claudication ([address] Right sided weakness, TIA (transient ischemic attack), and Unspecified cerebral artery occlusion with cerebral infarction. Past Surgical History:  has a past surgical history that includes Abdominal hernia repair; Cataract removal; Dental surgery; Inguinal hernia repair; Cholecystectomy (11/2011); Inner ear surgery; hc dialysis catheter (Right, 8/27/2020); eye surgery; shunt revision (Right, 1/21/2021); and vascular surgery (Right, 4/8/2021). Social History:  reports that he quit smoking about 54 years ago. His smoking use included cigarettes. He has a 10.00 pack-year smoking history. He has never used smokeless tobacco. He reports previous alcohol use of about 1.0 standard drinks of alcohol per week. He reports that he does not use drugs. Family History: family history is not on file. The patients home medications have been reviewed.     Allergies: Sulfa antibiotics and Sulfa antibiotics    -------------------------------------------------- RESULTS -------------------------------------------------    LABS:  Results for orders placed or performed during the hospital encounter of 07/10/21   COVID-19, Rapid    Specimen: Nasopharyngeal Swab   Result Value Ref Range    SARS-CoV-2, NAAT Not Detected Not Detected   Culture, Urine    Specimen: Urine, clean catch   Result Value Ref Range    Organism Klebsiella pneumoniae ssp pneumoniae (A)     Urine Culture, Routine >100,000 CFU/ml        Susceptibility    Klebsiella pneumoniae ssp pneumoniae - BACTERIAL SUSCEPTIBILITY PANEL BY JORDY     ampicillin >=^32 Resistant mcg/mL     ceFAZolin <=^4 Sensitive mcg/mL     cefepime <=^0.12 Sensitive mcg/mL     cefTRIAXone <=^0.25 Sensitive mcg/mL     Confirmatory Extended Spectrum Beta-Lactamase Neg Negative mcg/mL     ertapenem <=^0.12 Sensitive mcg/mL     gentamicin <=^1 Sensitive mcg/mL     levofloxacin <=^0.12 Sensitive mcg/mL     nitrofurantoin ^64 Intermediate mcg/mL     piperacillin-tazobactam <=^4 Sensitive mcg/mL     trimethoprim-sulfamethoxazole <=^20 Sensitive mcg/mL   CBC Auto Differential   Result Value Ref Range    WBC 8.2 4.5 - 11.5 E9/L    RBC 3.07 (L) 3.80 - 5.80 E12/L    Hemoglobin 9.7 (L) 12.5 - 16.5 g/dL    Hematocrit 30.9 (L) 37.0 - 54.0 %    .7 (H) 80.0 - 99.9 fL    MCH 31.6 26.0 - 35.0 pg    MCHC 31.4 (L) 32.0 - 34.5 %    RDW 14.3 11.5 - 15.0 fL    Platelets 635 301 - 600 E9/L    MPV 9.8 7.0 - 12.0 fL    Neutrophils % 68.6 43.0 - 80.0 %    Immature Granulocytes % 0.2 0.0 - 5.0 %    Lymphocytes % 16.8 (L) 20.0 - 42.0 %    Monocytes % 11.3 2.0 - 12.0 %    Eosinophils % 2.9 0.0 - 6.0 %    Basophils % 0.2 0.0 - 2.0 %    Neutrophils Absolute 5.58 1.80 - 7.30 E9/L    Immature Granulocytes # 0.02 E9/L    Lymphocytes Absolute 1.37 (L) 1.50 - 4.00 E9/L    Monocytes Absolute 0.92 0.10 - 0.95 E9/L    Eosinophils Absolute 0.24 0.05 - 0.50 E9/L    Basophils Absolute 0.02 0.00 - 0.20 E9/L   Comprehensive Metabolic Panel w/ Reflex to MG   Result Value Ref Range    Sodium 135 132 - 146 mmol/L    Potassium reflex Magnesium 4.0 3.5 - 5.0 mmol/L    Chloride 94 (L) 98 - 107 mmol/L    CO2 28 22 - 29 mmol/L    Anion Gap 13 7 - 16 mmol/L    Glucose 98 74 - 99 mg/dL    BUN 35 (H) 6 - 23 mg/dL    CREATININE 4.1 (H) 0.7 - 1.2 mg/dL    GFR Non-African American 14 >=60 mL/min/1.73    GFR African American 17     Calcium 9.2 8.6 - 10.2 mg/dL    Total Protein 6.9 6.4 - 8.3 g/dL    Albumin 3.7 3.5 - 5.2 g/dL    Total Bilirubin 0.3 0.0 - 1.2 mg/dL    Alkaline Phosphatase 103 40 - 129 U/L    ALT 14 0 - 40 U/L    AST 15 0 - 39 U/L   Troponin   Result Value Ref Range    Troponin, High Sensitivity 88 (H) 0 - 11 ng/L   Urinalysis, reflex to microscopic   Result Value Ref Range    Color, UA Yellow Straw/Yellow    Clarity, UA CLOUDY (A) Clear    Glucose, Ur Negative Negative mg/dL Bilirubin Urine Negative Negative    Ketones, Urine Negative Negative mg/dL    Specific Gravity, UA 1.020 1.005 - 1.030    Blood, Urine LARGE (A) Negative    pH, UA 6.0 5.0 - 9.0    Protein,  (A) Negative mg/dL    Urobilinogen, Urine 0.2 <2.0 E.U./dL    Nitrite, Urine Negative Negative    Leukocyte Esterase, Urine LARGE (A) Negative   Lactic Acid, Plasma   Result Value Ref Range    Lactic Acid 2.1 0.5 - 2.2 mmol/L   Troponin   Result Value Ref Range    Troponin, High Sensitivity 83 (H) 0 - 11 ng/L   Microscopic Urinalysis   Result Value Ref Range    WBC, UA PACKED (A) 0 - 5 /HPF    RBC, UA >20 0 - 2 /HPF    Bacteria, UA MANY (A) None Seen /HPF   Basic Metabolic Panel w/ Reflex to MG   Result Value Ref Range    Sodium 137 132 - 146 mmol/L    Potassium reflex Magnesium 4.7 3.5 - 5.0 mmol/L    Chloride 98 98 - 107 mmol/L    CO2 26 22 - 29 mmol/L    Anion Gap 13 7 - 16 mmol/L    Glucose 120 (H) 74 - 99 mg/dL    BUN 45 (H) 6 - 23 mg/dL    CREATININE 5.1 (H) 0.7 - 1.2 mg/dL    GFR Non-African American 11 >=60 mL/min/1.73    GFR African American 13     Calcium 8.8 8.6 - 10.2 mg/dL   Basic Metabolic Panel w/ Reflex to MG   Result Value Ref Range    Sodium 136 132 - 146 mmol/L    Potassium reflex Magnesium 4.7 3.5 - 5.0 mmol/L    Chloride 97 (L) 98 - 107 mmol/L    CO2 24 22 - 29 mmol/L    Anion Gap 15 7 - 16 mmol/L    Glucose 102 (H) 74 - 99 mg/dL    BUN 63 (H) 6 - 23 mg/dL    CREATININE 6.4 (H) 0.7 - 1.2 mg/dL    GFR Non-African American 8 >=60 mL/min/1.73    GFR African American 10     Calcium 8.7 8.6 - 10.2 mg/dL   CBC   Result Value Ref Range    WBC 4.5 4.5 - 11.5 E9/L    RBC 2.78 (L) 3.80 - 5.80 E12/L    Hemoglobin 8.6 (L) 12.5 - 16.5 g/dL    Hematocrit 28.0 (L) 37.0 - 54.0 %    .7 (H) 80.0 - 99.9 fL    MCH 30.9 26.0 - 35.0 pg    MCHC 30.7 (L) 32.0 - 34.5 %    RDW 14.4 11.5 - 15.0 fL    Platelets 791 591 - 830 E9/L    MPV 10.8 7.0 - 12.0 fL   POCT glucose   Result Value Ref Range    Glucose 133 mg/dL    QC with Dr. Dean Spencer. Discussed case. They will admit the patient. This patient's ED course included: a personal history and physicial examination and re-evaluation prior to disposition    This patient has remained hemodynamically stable during their ED course. Diagnosis:  1. Urinary tract infection without hematuria, site unspecified    2. General weakness    3. Unable to ambulate        Disposition:  Patient's disposition: Admit to med/surg floor  Patient's condition is stable. Patient was seen and evaluated by myself and my attending. Assessment and Plan discussed with attending provider, please see attestation for final plan of care.      DO Henrique Pat Pulse, DO  Resident  07/12/21 9362

## 2021-08-06 ENCOUNTER — TELEPHONE (OUTPATIENT)
Dept: VASCULAR SURGERY | Age: 86
End: 2021-08-06

## 2021-08-06 LAB — POTASSIUM SERPL-SCNC: 5.3 MMOL/L (ref 3.5–5)

## 2021-08-06 NOTE — TELEPHONE ENCOUNTER
Elvia from [facility] called; pt's AVG is clotted. Scheduled thrombectomy at [facility] (1-RH) 8/9. Elvia to notify the pt and instruct him to report at 11:30 a.m, NPO after midnight the night before except heart and/or BP in a.m with sips of water.

## 2021-08-09 ENCOUNTER — ANESTHESIA EVENT (OUTPATIENT)
Dept: OPERATING ROOM | Age: 86
DRG: 314 | End: 2021-08-09
Payer: MEDICARE

## 2021-08-09 ENCOUNTER — APPOINTMENT (OUTPATIENT)
Dept: GENERAL RADIOLOGY | Age: 86
DRG: 314 | End: 2021-08-09
Attending: SURGERY
Payer: MEDICARE

## 2021-08-09 ENCOUNTER — ANESTHESIA (OUTPATIENT)
Dept: OPERATING ROOM | Age: 86
DRG: 314 | End: 2021-08-09
Payer: MEDICARE

## 2021-08-09 ENCOUNTER — HOSPITAL ENCOUNTER (INPATIENT)
Age: 86
LOS: 1 days | Discharge: NURSING, REHAB OR LONG-TERM CARE | DRG: 314 | End: 2021-08-11
Attending: SURGERY | Admitting: SURGERY
Payer: MEDICARE

## 2021-08-09 VITALS — DIASTOLIC BLOOD PRESSURE: 64 MMHG | SYSTOLIC BLOOD PRESSURE: 103 MMHG | OXYGEN SATURATION: 98 %

## 2021-08-09 PROBLEM — T82.868A AV GRAFT THROMBOSIS, INITIAL ENCOUNTER (HCC): Status: ACTIVE | Noted: 2021-08-09

## 2021-08-09 LAB
B.E.: 3.1 MMOL/L (ref -3–0)
CARDIOPULMONARY BYPASS: NO
DEVICE: ABNORMAL
HCO3 ARTERIAL: 29.9 MMOL/L (ref 22–26)
HCT (EST): 30 % (ref 37–54)
HGB, (EST): 10 G/DL (ref 12.5–15.5)
METER GLUCOSE: 152 MG/DL (ref 74–99)
METER GLUCOSE: 155 MG/DL (ref 74–99)
METER GLUCOSE: 173 MG/DL (ref 74–99)
METER GLUCOSE: 181 MG/DL (ref 74–99)
O2 SATURATION: 31.7 % (ref 92–98.5)
OPERATOR ID: ABNORMAL
PCO2 ARTERIAL: 56.5 MMHG (ref 35–45)
PH BLOOD GAS: 7.33 (ref 7.35–7.45)
PO2 ARTERIAL: 21.8 MMHG (ref 60–80)
SARS-COV-2, NAAT: NOT DETECTED
SOURCE, BLOOD GAS: ABNORMAL

## 2021-08-09 PROCEDURE — 82803 BLOOD GASES ANY COMBINATION: CPT

## 2021-08-09 PROCEDURE — 71045 X-RAY EXAM CHEST 1 VIEW: CPT

## 2021-08-09 PROCEDURE — 2580000003 HC RX 258: Performed by: SURGERY

## 2021-08-09 PROCEDURE — 87635 SARS-COV-2 COVID-19 AMP PRB: CPT

## 2021-08-09 PROCEDURE — 36415 COLL VENOUS BLD VENIPUNCTURE: CPT

## 2021-08-09 PROCEDURE — 7100000000 HC PACU RECOVERY - FIRST 15 MIN: Performed by: SURGERY

## 2021-08-09 PROCEDURE — 36556 INSERT NON-TUNNEL CV CATH: CPT | Performed by: SURGERY

## 2021-08-09 PROCEDURE — 2580000003 HC RX 258: Performed by: NURSE PRACTITIONER

## 2021-08-09 PROCEDURE — 7100000001 HC PACU RECOVERY - ADDTL 15 MIN: Performed by: SURGERY

## 2021-08-09 PROCEDURE — 3600000002 HC SURGERY LEVEL 2 BASE: Performed by: SURGERY

## 2021-08-09 PROCEDURE — 6370000000 HC RX 637 (ALT 250 FOR IP): Performed by: INTERNAL MEDICINE

## 2021-08-09 PROCEDURE — 2580000003 HC RX 258

## 2021-08-09 PROCEDURE — 6370000000 HC RX 637 (ALT 250 FOR IP)

## 2021-08-09 PROCEDURE — 2709999900 HC NON-CHARGEABLE SUPPLY: Performed by: SURGERY

## 2021-08-09 PROCEDURE — 2500000003 HC RX 250 WO HCPCS

## 2021-08-09 PROCEDURE — 3700000001 HC ADD 15 MINUTES (ANESTHESIA): Performed by: SURGERY

## 2021-08-09 PROCEDURE — 85025 COMPLETE CBC W/AUTO DIFF WBC: CPT

## 2021-08-09 PROCEDURE — 2500000003 HC RX 250 WO HCPCS: Performed by: SURGERY

## 2021-08-09 PROCEDURE — 83036 HEMOGLOBIN GLYCOSYLATED A1C: CPT

## 2021-08-09 PROCEDURE — 6360000002 HC RX W HCPCS: Performed by: NURSE PRACTITIONER

## 2021-08-09 PROCEDURE — 6360000002 HC RX W HCPCS

## 2021-08-09 PROCEDURE — 82962 GLUCOSE BLOOD TEST: CPT

## 2021-08-09 PROCEDURE — 80053 COMPREHEN METABOLIC PANEL: CPT

## 2021-08-09 PROCEDURE — C1894 INTRO/SHEATH, NON-LASER: HCPCS | Performed by: SURGERY

## 2021-08-09 PROCEDURE — G0378 HOSPITAL OBSERVATION PER HR: HCPCS

## 2021-08-09 PROCEDURE — 02HV33Z INSERTION OF INFUSION DEVICE INTO SUPERIOR VENA CAVA, PERCUTANEOUS APPROACH: ICD-10-PCS | Performed by: SURGERY

## 2021-08-09 PROCEDURE — 3700000000 HC ANESTHESIA ATTENDED CARE: Performed by: SURGERY

## 2021-08-09 PROCEDURE — 6360000002 HC RX W HCPCS: Performed by: SURGERY

## 2021-08-09 PROCEDURE — 3600000012 HC SURGERY LEVEL 2 ADDTL 15MIN: Performed by: SURGERY

## 2021-08-09 RX ORDER — METOPROLOL SUCCINATE 25 MG/1
25 TABLET, EXTENDED RELEASE ORAL DAILY
Status: DISCONTINUED | OUTPATIENT
Start: 2021-08-09 | End: 2021-08-11 | Stop reason: HOSPADM

## 2021-08-09 RX ORDER — HEPARIN SODIUM (PORCINE) LOCK FLUSH IV SOLN 100 UNIT/ML 100 UNIT/ML
SOLUTION INTRAVENOUS PRN
Status: DISCONTINUED | OUTPATIENT
Start: 2021-08-09 | End: 2021-08-09 | Stop reason: ALTCHOICE

## 2021-08-09 RX ORDER — NICOTINE POLACRILEX 4 MG
15 LOZENGE BUCCAL PRN
Status: DISCONTINUED | OUTPATIENT
Start: 2021-08-09 | End: 2021-08-11 | Stop reason: HOSPADM

## 2021-08-09 RX ORDER — PROPOFOL 10 MG/ML
INJECTION, EMULSION INTRAVENOUS CONTINUOUS PRN
Status: DISCONTINUED | OUTPATIENT
Start: 2021-08-09 | End: 2021-08-09 | Stop reason: SDUPTHER

## 2021-08-09 RX ORDER — GABAPENTIN 100 MG/1
100 CAPSULE ORAL NIGHTLY
Status: DISCONTINUED | OUTPATIENT
Start: 2021-08-09 | End: 2021-08-11 | Stop reason: HOSPADM

## 2021-08-09 RX ORDER — SODIUM CHLORIDE 0.9 % (FLUSH) 0.9 %
5-40 SYRINGE (ML) INJECTION PRN
Status: DISCONTINUED | OUTPATIENT
Start: 2021-08-09 | End: 2021-08-09 | Stop reason: HOSPADM

## 2021-08-09 RX ORDER — ONDANSETRON 2 MG/ML
4 INJECTION INTRAMUSCULAR; INTRAVENOUS EVERY 6 HOURS PRN
Status: DISCONTINUED | OUTPATIENT
Start: 2021-08-09 | End: 2021-08-11 | Stop reason: HOSPADM

## 2021-08-09 RX ORDER — HYDROCODONE BITARTRATE AND ACETAMINOPHEN 5; 325 MG/1; MG/1
1 TABLET ORAL PRN
Status: DISCONTINUED | OUTPATIENT
Start: 2021-08-09 | End: 2021-08-09 | Stop reason: HOSPADM

## 2021-08-09 RX ORDER — LISINOPRIL 2.5 MG/1
2.5 TABLET ORAL DAILY
Status: ON HOLD | COMMUNITY
End: 2022-02-16 | Stop reason: HOSPADM

## 2021-08-09 RX ORDER — POLYETHYLENE GLYCOL 3350 17 G/17G
17 POWDER, FOR SOLUTION ORAL DAILY PRN
Status: DISCONTINUED | OUTPATIENT
Start: 2021-08-09 | End: 2021-08-11 | Stop reason: HOSPADM

## 2021-08-09 RX ORDER — ACETAMINOPHEN 325 MG/1
650 TABLET ORAL EVERY 6 HOURS PRN
Status: DISCONTINUED | OUTPATIENT
Start: 2021-08-09 | End: 2021-08-11 | Stop reason: HOSPADM

## 2021-08-09 RX ORDER — SODIUM CHLORIDE 9 MG/ML
25 INJECTION, SOLUTION INTRAVENOUS PRN
Status: DISCONTINUED | OUTPATIENT
Start: 2021-08-09 | End: 2021-08-11 | Stop reason: HOSPADM

## 2021-08-09 RX ORDER — ATORVASTATIN CALCIUM 10 MG/1
10 TABLET, FILM COATED ORAL NIGHTLY
Status: DISCONTINUED | OUTPATIENT
Start: 2021-08-09 | End: 2021-08-11 | Stop reason: HOSPADM

## 2021-08-09 RX ORDER — ACETAMINOPHEN 650 MG/1
650 SUPPOSITORY RECTAL EVERY 6 HOURS PRN
Status: DISCONTINUED | OUTPATIENT
Start: 2021-08-09 | End: 2021-08-11 | Stop reason: HOSPADM

## 2021-08-09 RX ORDER — DEXTROSE MONOHYDRATE 25 G/50ML
12.5 INJECTION, SOLUTION INTRAVENOUS PRN
Status: DISCONTINUED | OUTPATIENT
Start: 2021-08-09 | End: 2021-08-11 | Stop reason: HOSPADM

## 2021-08-09 RX ORDER — SODIUM CHLORIDE 9 MG/ML
25 INJECTION, SOLUTION INTRAVENOUS PRN
Status: DISCONTINUED | OUTPATIENT
Start: 2021-08-09 | End: 2021-08-09 | Stop reason: HOSPADM

## 2021-08-09 RX ORDER — SODIUM CHLORIDE 0.9 % (FLUSH) 0.9 %
5-40 SYRINGE (ML) INJECTION EVERY 12 HOURS SCHEDULED
Status: DISCONTINUED | OUTPATIENT
Start: 2021-08-09 | End: 2021-08-09 | Stop reason: HOSPADM

## 2021-08-09 RX ORDER — SODIUM CHLORIDE 9 MG/ML
INJECTION, SOLUTION INTRAVENOUS CONTINUOUS
Status: DISCONTINUED | OUTPATIENT
Start: 2021-08-09 | End: 2021-08-09

## 2021-08-09 RX ORDER — FENTANYL CITRATE 50 UG/ML
INJECTION, SOLUTION INTRAMUSCULAR; INTRAVENOUS PRN
Status: DISCONTINUED | OUTPATIENT
Start: 2021-08-09 | End: 2021-08-09 | Stop reason: SDUPTHER

## 2021-08-09 RX ORDER — DEXTROSE MONOHYDRATE 50 MG/ML
100 INJECTION, SOLUTION INTRAVENOUS PRN
Status: DISCONTINUED | OUTPATIENT
Start: 2021-08-09 | End: 2021-08-11 | Stop reason: HOSPADM

## 2021-08-09 RX ORDER — ASPIRIN 81 MG/1
81 TABLET, CHEWABLE ORAL EVERY MORNING
Status: DISCONTINUED | OUTPATIENT
Start: 2021-08-10 | End: 2021-08-11 | Stop reason: HOSPADM

## 2021-08-09 RX ORDER — HYDROCODONE BITARTRATE AND ACETAMINOPHEN 5; 325 MG/1; MG/1
2 TABLET ORAL PRN
Status: DISCONTINUED | OUTPATIENT
Start: 2021-08-09 | End: 2021-08-09 | Stop reason: HOSPADM

## 2021-08-09 RX ORDER — ONDANSETRON 4 MG/1
4 TABLET, ORALLY DISINTEGRATING ORAL EVERY 8 HOURS PRN
Status: DISCONTINUED | OUTPATIENT
Start: 2021-08-09 | End: 2021-08-11 | Stop reason: HOSPADM

## 2021-08-09 RX ORDER — SODIUM CHLORIDE 9 MG/ML
INJECTION, SOLUTION INTRAVENOUS CONTINUOUS PRN
Status: DISCONTINUED | OUTPATIENT
Start: 2021-08-09 | End: 2021-08-09 | Stop reason: SDUPTHER

## 2021-08-09 RX ORDER — PHENYLEPHRINE HCL IN 0.9% NACL 1 MG/10 ML
SYRINGE (ML) INTRAVENOUS PRN
Status: DISCONTINUED | OUTPATIENT
Start: 2021-08-09 | End: 2021-08-09 | Stop reason: SDUPTHER

## 2021-08-09 RX ORDER — ALBUTEROL SULFATE 90 UG/1
AEROSOL, METERED RESPIRATORY (INHALATION) PRN
Status: DISCONTINUED | OUTPATIENT
Start: 2021-08-09 | End: 2021-08-09 | Stop reason: SDUPTHER

## 2021-08-09 RX ORDER — SODIUM CHLORIDE 0.9 % (FLUSH) 0.9 %
5-40 SYRINGE (ML) INJECTION PRN
Status: DISCONTINUED | OUTPATIENT
Start: 2021-08-09 | End: 2021-08-11 | Stop reason: HOSPADM

## 2021-08-09 RX ORDER — SODIUM CHLORIDE 0.9 % (FLUSH) 0.9 %
5-40 SYRINGE (ML) INJECTION EVERY 12 HOURS SCHEDULED
Status: DISCONTINUED | OUTPATIENT
Start: 2021-08-09 | End: 2021-08-11 | Stop reason: HOSPADM

## 2021-08-09 RX ADMIN — FENTANYL CITRATE 25 MCG: 50 INJECTION, SOLUTION INTRAMUSCULAR; INTRAVENOUS at 14:11

## 2021-08-09 RX ADMIN — PROPOFOL 25 MCG/KG/MIN: 10 INJECTION, EMULSION INTRAVENOUS at 13:22

## 2021-08-09 RX ADMIN — Medication 100 MCG: at 14:02

## 2021-08-09 RX ADMIN — Medication 100 MCG: at 14:26

## 2021-08-09 RX ADMIN — Medication 100 MCG: at 13:56

## 2021-08-09 RX ADMIN — FENTANYL CITRATE 25 MCG: 50 INJECTION, SOLUTION INTRAMUSCULAR; INTRAVENOUS at 13:47

## 2021-08-09 RX ADMIN — GABAPENTIN 100 MG: 100 CAPSULE ORAL at 20:19

## 2021-08-09 RX ADMIN — INSULIN LISPRO 1 UNITS: 100 INJECTION, SOLUTION INTRAVENOUS; SUBCUTANEOUS at 20:20

## 2021-08-09 RX ADMIN — Medication 2000 MG: at 13:27

## 2021-08-09 RX ADMIN — ATORVASTATIN CALCIUM 10 MG: 10 TABLET, FILM COATED ORAL at 20:19

## 2021-08-09 RX ADMIN — METOPROLOL SUCCINATE 25 MG: 25 TABLET, EXTENDED RELEASE ORAL at 18:43

## 2021-08-09 RX ADMIN — ALBUTEROL SULFATE 4 PUFF: 90 AEROSOL, METERED RESPIRATORY (INHALATION) at 13:20

## 2021-08-09 RX ADMIN — SODIUM CHLORIDE: 9 INJECTION, SOLUTION INTRAVENOUS at 18:43

## 2021-08-09 RX ADMIN — INSULIN LISPRO 1 UNITS: 100 INJECTION, SOLUTION INTRAVENOUS; SUBCUTANEOUS at 17:25

## 2021-08-09 RX ADMIN — SODIUM CHLORIDE: 9 INJECTION, SOLUTION INTRAVENOUS at 13:13

## 2021-08-09 RX ADMIN — Medication 100 MCG: at 14:11

## 2021-08-09 ASSESSMENT — PULMONARY FUNCTION TESTS
PIF_VALUE: 0
PIF_VALUE: 1
PIF_VALUE: 1
PIF_VALUE: 0
PIF_VALUE: 1
PIF_VALUE: 0
PIF_VALUE: 1
PIF_VALUE: 0
PIF_VALUE: 0
PIF_VALUE: 1
PIF_VALUE: 0
PIF_VALUE: 1
PIF_VALUE: 1
PIF_VALUE: 0
PIF_VALUE: 1
PIF_VALUE: 0
PIF_VALUE: 1
PIF_VALUE: 0
PIF_VALUE: 0
PIF_VALUE: 1
PIF_VALUE: 1
PIF_VALUE: 0
PIF_VALUE: 1
PIF_VALUE: 1
PIF_VALUE: 0
PIF_VALUE: 1
PIF_VALUE: 0
PIF_VALUE: 1
PIF_VALUE: 1
PIF_VALUE: 0
PIF_VALUE: 1
PIF_VALUE: 0
PIF_VALUE: 1

## 2021-08-09 ASSESSMENT — LIFESTYLE VARIABLES: SMOKING_STATUS: 0

## 2021-08-09 ASSESSMENT — PAIN SCALES - GENERAL
PAINLEVEL_OUTOF10: 0
PAINLEVEL_OUTOF10: 0

## 2021-08-09 ASSESSMENT — PAIN - FUNCTIONAL ASSESSMENT: PAIN_FUNCTIONAL_ASSESSMENT: 0-10

## 2021-08-09 NOTE — LETTER
[place] Department Medicaid  CERTIFICATION OF NECESSITY  FOR NON-EMERGENCY TRANSPORTATION   BY GROUND AMBULANCE      Individual Information   1. Name: Markie Roca 2. [place] Medicaid Billing Number:    3. Address: [address] [place] [address], [address] [address]                             [place]     Transportation Provider Information   4. Provider Name:    5. [place] Medicaid Provider Number:  National Provider Identifier (NPI):      Certification  7. Criteria:  During transport, this individual requires:  [x] Medical treatment or continuous     supervision by an EMT. [] The administration or regulation of oxygen by another person. [] Supervised protective restraint. 8. Period Beginning Date:   8/10/2021     9. Length  [x] Not more than 10 day(s)  [] One Year     Additional Information Relevant to Certification   10. Comments or Explanations, If Necessary or Appropriate   CVA with rt hemiplegia  Nonambulatory, unable to progress feet; ESRD     Certifying Practitioner Information   11. Name of Practitioner: Montrell Scott MD   12. [place] Medicaid Provider Number, If Applicable:  [address] Provider Identifier (NPI):      Signature Information   14. Date of Signature: 8/10/2021   15. Name of Person Signing: Lakhwinder Okeefe RN CM     16. Signature and Professional Designation: Lakhwinder Okeefe RN CM       [place] [id number]  Rev. 2015;a  [address] Encounter Date/Time: 2021 [facility] Account: [de-identified]    MRN: [id number]    Patient: Denise Ribera    Contact Serial #: [id number]      ENCOUNTER          Patient Class: Observation Private Enc?   No Unit  BD: [facility] [id number]   Hospital Service: Med/Surg   Encounter DX: AV graft thrombosis, ini*   ADM Provider: Ricka Libman, MD   Procedure: SD OFFICE/OUTPT VISIT,SD*   ATT Provider: Ricka Libman, MD   REF Provider:        Admission DX: AV graft thrombosis, initial encounter [facility]) and DX codes: T82.868A      PATIENT                 Name: Denise Ribera : 1931 (89 yrs)   Address: [place] [address] Sex: Male   [place] city: [facility] [address]         Marital Status: Single   Employer: RETIRED         Jehovah's witness: Bahai   Primary Care Provider: DO Karl Gaviria Phone: [phone]   EMERGENCY CONTACT   Contact Name Legal Guardian? Relationship to Patient Home Phone Work Phone   1. Evert Roca  2. Annetta N[place]    No Brother/Sister  Niece/Nephew [phone]              GUARANTOR            Guarantor: To Roca     : 1931   Address: [address] Sex: Male     [place] [id number]     Relation to Patient: Self       Home Phone: [phone]   Guarantor ID: [id number]       Work Phone:     Guarantor Employer: RETIRED         Status: RETIRED      COVERAGE  PRIMARY INSURANCE   Payor: MEDICARE Plan: MEDICARE PART A AND B   Payor Address: [place] [address],  [place] [address]       Group Number:   Insurance Type: INDEMNITY   Subscriber Name: Mendoza Stauffer : 1931   Subscriber ID: [id number] Pat. Rel. to Sub: Self   SECONDARY INSURANCE   Payor: [employer] Plan: [address] 61*   Payor Address:  [facility] [id number], [place], [place] [address]          Group Number: [id number] Insurance Type: INDEMNITY   Subscriber Name: Mendoza Stauffer : 1931   Subscriber ID: [id number] Pat.  Rel. to Sub: SELF

## 2021-08-09 NOTE — ANESTHESIA PRE PROCEDURE
Department of Anesthesiology  Preprocedure Note       Name:  Antony Roberts   Age:  80 y.o.  :  1931                                          MRN:  [id number]         Date:  2021      Surgeon: Nikole Velasquez):  Delphine Barnhart MD    Procedure: THROMBECTOMY WITH POSSIBLE REVISION AV GRAFT -- RIGHT ARM (Right )     Medications prior to admission:   Prior to Admission medications    Medication Sig Start Date End Date Taking? Authorizing Provider   metoprolol succinate (TOPROL XL) 25 MG extended release tablet Take 1 tablet by mouth daily 21   Britt Ugalde DO   white petrolatum OINT ointment Apply 1 applicator topically 2 times daily 21   Britt Ugalde DO   Magnesium Oxide (MAG-OXIDE PO) Take 40 mg by mouth     Historical Provider, MD   atorvastatin (LIPITOR) 10 MG tablet Take 10 mg by mouth nightly    Historical Provider, MD   gabapentin (NEURONTIN) 100 MG capsule Take 100 mg by mouth nightly.     Historical Provider, MD   ascorbic acid (VITAMIN C) 500 MG tablet Take 1,000 mg by mouth daily    Historical Provider, MD   zinc gluconate 50 MG tablet Take 50 mg by mouth daily    Historical Provider, MD   Multiple Vitamins-Minerals (PRESERVISION AREDS 2 PO) Take 1 tablet by mouth daily     Historical Provider, MD   insulin lispro (HUMALOG KWIKPEN) 200 UNIT/ML SOPN pen Inject into the skin 4 times daily as needed Before meals and nightly    Historical Provider, MD   famotidine (PEPCID) 40 MG tablet Take 40 mg by mouth as needed    Historical Provider, MD   vitamin D (CHOLECALCIFEROL) 25 MCG (1000 UT) TABS tablet Take 1,000 Units by mouth daily    Historical Provider, MD   aspirin 81 MG tablet Take 81 mg by mouth every morning     Historical Provider, MD       Current medications:    Current Facility-Administered Medications   Medication Dose Route Frequency Provider Last Rate Last Admin    0.9 % sodium chloride infusion   Intravenous Continuous Monika Henderson, APRN - CNP        sodium chloride flush 0.9 % injection 5-40 mL  5-40 mL Intravenous 2 times per day Tellis Sermon, APRN - CNP        sodium chloride flush 0.9 % injection 5-40 mL  5-40 mL Intravenous PRN Tellis Sermon, APRN - CNP        0.9 % sodium chloride infusion  25 mL Intravenous PRN Tellis Sermon, APRN - CNP        ceFAZolin (ANCEF) 2000 mg in sterile water 20 mL IV syringe  2,000 mg Intravenous On Call to [address] [facility], JESUS - CNP        HYDROcodone-acetaminophen (NORCO) 5-325 MG per tablet 1 tablet  1 tablet Oral PRN Manjit Bergman MD        Or    HYDROcodone-acetaminophen (NORCO) 5-325 MG per tablet 2 tablet  2 tablet Oral PRN Manjit Bergman MD           Allergies:     Allergies   Allergen Reactions    Sulfa Antibiotics      Affects renal function and blood pressure    Sulfa Antibiotics        Problem List:    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    Diabetes mellitus ([facility] E11.9    Diabetes mellitus ([facility] E11.9    Diabetic peripheral neuropathy (HCC) E11.42    Osteoarthritis M19.90    Gait abnormality R26.9    Physical deconditioning R53.81    Lumbar spondylitis ([facility]) M46.96    Anemia D64.9    Fx humer, med condyl-closed S42.463A    Cerebral infarction (HCC) I63.9    Renal failure N19    TIA (transient ischemic attack) G45.9    ASHD (arteriosclerotic heart disease) I25.10    Diabetes mellitus (HCC) E11.9    PVD (peripheral vascular disease) with claudication ([facility]) I73.9    History of CVA (cerebrovascular accident) Z86.73    Pain in lower limb M79.606    Cerebral infarction (HCC) I63.9    CKD 3 N18.30    Diabetes mellitus-2 E11.9    Acute respiratory failure ([facility]) J96.00    Decubitus ulcer of sacral region, stage 1 L89.151    Right sided weakness R53.1    Hypoglycemia E16.2    Acute kidney injury (ZAHRAA) with acute tubular necrosis (ATN) ([facility]) N17.0  Acute kidney failure ([address] N17.9    Encounter regarding vascular access for dialysis for end-stage renal disease ([address] N18.6, Z99.2    ESRD (end stage renal disease) on dialysis ([facility]) N18.6, Z99.2    Diabetic ulcer of toe of right foot associated with type 2 diabetes mellitus, with fat layer exposed ([address] E11.621, L97.512    UTI (urinary tract infection) N39.0       Past Medical History:        Diagnosis Date    Arthritis     CAD (coronary artery disease)     CHF (congestive heart failure) ([facility])     Chronic kidney disease     COVID-19     CVA (cerebral vascular accident) ([address]     Diabetes mellitus ([address]     Diabetic peripheral neuropathy ([address] 11/9/2012    Hemodialysis patient ([address]     History of CVA (cerebrovascular accident) 6/9/2014    Hypertension     Other disorders of kidney and ureter     prostate infections in past    PVD (peripheral vascular disease) with claudication ([address] 6/9/2014    Right sided weakness 3/19/2018    TIA (transient ischemic attack)     possible several years ago    Unspecified cerebral artery occlusion with cerebral infarction 2013    [facility] RIGHT LEG AFFECTED       Past Surgical History:        Procedure Laterality Date    ABDOMINAL HERNIA REPAIR      CATARACT REMOVAL      right    CHOLECYSTECTOMY  11/2011    DENTAL SURGERY      EYE SURGERY      [facility] DIALYSIS CATHETER Right 8/27/2020    TESIO CATHETER INSERTION performed [facility] D[facility] MD at [facility]      removal of sac in ear    SHUNT REVISION Right 1/21/2021    INSERTION AV GRAFT RIGHT ARM performed by Darian Rosa MD at [address] [place] [address] Right 4/8/2021    THROMBECTOMY RIGHT ARM AV GRAFT performed by Darian Rosa MD at [facility]       Social History:    Social History     Tobacco Use    Smoking status: Former Smoker     Packs/day: 0.50     Years: 20.00     Pack years: 10.00     Types: Cigarettes     Quit date: 1967     Years Type & Screen (If Applicable):  No results found for: LABABO, LABRH    Drug/Infectious Status (If Applicable):  No results found for: HIV, HEPCAB    COVID-19 Screening (If Applicable):   Lab Results   Component Value Date    COVID19 Not Detected 07/10/2021    COVID19 Not Detected 04/02/2021         Anesthesia Evaluation  Patient summary reviewed no history of anesthetic complications:   Airway: Mallampati: III  TM distance: <3 FB     Mouth opening: > = 3 FB Dental:    (+) upper dentures and lower dentures      Pulmonary: breath sounds clear to auscultation  (+) COPD:      (-) not a current smoker                          ROS comment: Former smoker    Cardiovascular:  Exercise tolerance: Uses wheelchair for ambulation  (+) hypertension:, CAD:, CHF:, hyperlipidemia        Rhythm: regular  Rate: normal                    Neuro/Psych:   (+) CVA (right hemiparesis; Oct 2012): residual symptoms, neuromuscular disease:, TIA, depression/anxiety             GI/Hepatic/Renal:   (+) GERD:, renal disease (HD on M/W/F for the past 3-4 months): ESRD and dialysis,           Endo/Other:    (+) DiabetesType II DM, , blood dyscrasia: anemia, arthritis:., .                  ROS comment: Bilateral hearing aids    Prior right shoulder injury (neck flexed anteriorly and laterally the right) Abdominal:             Vascular:           ROS comment: S/p Tesio in Aug 2020. Other Findings: Neck flexed to the right (baseline per patient)              Anesthesia Plan      MAC     ASA 4     (Backup GA if needed.)  Induction: intravenous. Anesthetic plan and risks discussed with patient. Plan discussed with CRNA.           Miko Garcia MD   8/9/2021

## 2021-08-09 NOTE — PROGRESS NOTES
Admit to pacu s/p tessio placement, awake and alert, vss/ L groin tessio in place. bandaids noted to L chest s/p attempts to place.  Continue to monitor

## 2021-08-09 NOTE — PROGRESS NOTES
I reviewed with the patient's niece Onelia Alvarez that I reviewed my previous notes and had discussed with the patient's brother that I would not attempt to reintervene on the graft. I was not successful in placing a tunneled dialysis catheter so a temporary femoral catheter was inserted. We will have the patient admitted so that he can receive dialysis here and then plan for a tunneled catheter later in the week. This patient continues to decline and I reviewed with her that they should have family meeting to discuss future treatment plans and consider palliative medicine evaluation. She understands and agrees.

## 2021-08-09 NOTE — H&P
Vascular Surgery History & Physical Exam      Chief Complaint: CRF    HISTORY OF PRESENT ILLNESS:                The patient is a 80 y.o. male who presents to the hospital for elective creation of a arteriovenous fistula. The patient denies any problems since the last office visit. IMPRESSION:   Active Hospital Problems    Diagnosis     ESRD (end stage renal disease) on dialysis ([facility] [N18.6, Z99.2]     Encounter regarding vascular access for dialysis for end-stage renal disease ([facility] [N18.6, Z99.2]        PLAN:  Thrombectomy of a right arm arteriovenous graft. Possible insertion of a tunneled dialysis catheter. I reviewed the procedure with the patient. I discussed the risks, benefits, and alternatives of the procedure. He nay need a tunneled catheter if I can not open the graft. The patient understands and consents. All questions were answered.     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    Diabetes mellitus ([facility] E11.9    Diabetes mellitus ([facility] E11.9    Diabetic peripheral neuropathy ([facility]) E11.42    Osteoarthritis M19.90    Gait abnormality R26.9    Physical deconditioning R53.81    Lumbar spondylitis ([facility]) M46.96    Anemia D64.9    Fx humer, med condyl-closed S42.463A    Cerebral infarction ([facility] I63.9    Renal failure N19    TIA (transient ischemic attack) G45.9    ASHD (arteriosclerotic heart disease) I25.10    Diabetes mellitus ([facility]) E11.9    PVD (peripheral vascular disease) with claudication ([facility]) I73.9    History of CVA (cerebrovascular accident) Z86.73    Pain in lower limb M79.606    Cerebral infarction ([facility]) I63.9    CKD 3 N18.30    Diabetes mellitus-2 E11.9    Acute respiratory failure ([facility]) J96.00    Decubitus ulcer of sacral region, stage 1 L89.151    Right sided weakness R53.1    Hypoglycemia E16.2    Acute kidney injury (ZAHRAA) with acute tubular necrosis (ATN) ([facility]) N17.0    Acute kidney failure ([address] N17.9    Encounter regarding vascular access for dialysis for end-stage renal disease ([address] N18.6, Z99.2    ESRD (end stage renal disease) on dialysis ([facility]) N18.6, Z99.2    Diabetic ulcer of toe of right foot associated with type 2 diabetes mellitus, with fat layer exposed ([address] E11.621, L97.512    UTI (urinary tract infection) N39.0       Past Medical History:   Diagnosis Date    Arthritis     CAD (coronary artery disease)     CHF [facility]congestive heart failure) ([facility])     Chronic kidney disease     COVID-19     CVA (cerebral vascular accident) ([address]     Diabetes mellitus ([address]     Diabetic peripheral neuropathy ([address] 11/9/2012    Hemodialysis patient ([address]     History of CVA (cerebrovascular accident) 6/9/2014    Hypertension     Other disorders of kidney and ureter     prostate infections in past    PVD (peripheral vascular disease) with claudication ([address] 6/9/2014    Right sided weakness 3/19/2018    TIA (transient ischemic attack)     possible several years ago    Unspecified cerebral artery occlusion with cerebral infarction 2013    [facility] RIGHT LEG AFFECTED        Past Surgical History:   Procedure Laterality Date    ABDOMINAL HERNIA REPAIR      CATARACT REMOVAL      right    CHOLECYSTECTOMY  11/2011    DENTAL SURGERY      EYE SURGERY      [facility] DIALYSIS CATHETER Right 8/27/2020    TESIO CATHETER INSERTION performed by Mary Morales MD at [facility]      removal of sac in ear    SHUNT REVISION Right 1/21/2021    INSERTION AV GRAFT RIGHT ARM performed by Mary Morales MD at [address] [place] [address] Right 4/8/2021    THROMBECTOMY RIGHT ARM AV GRAFT performed by [facility]ales MD [facility] OR       Current Medications:     Current Facility-Administered Medications:     0.9 % sodium chloride infusion, , Intravenous, Continuous, Eryn ZHENG JESUS Kennedy CNP    sodium chloride flush 0.9 % injection 5-40 mL, 5-40 mL, Intravenous, 2 times per day, JESUS Solorio CNP    sodium chloride flush 0.9 % injection 5-40 mL, 5-40 mL, Intravenous, PRN, JESUS Solorio CNP    0.9 % sodium chloride infusion, 25 mL, Intravenous, PRN, JESUS Solorio CNP    ceFAZolin (ANCEF) 2000 mg in sterile water 20 mL IV syringe, 2,000 mg, Intravenous, On Call to OR, JEUSS Solorio CNP    HYDROcodone-acetaminophen ([address]) 5-325 MG per tablet 1 tablet, 1 tablet, Oral, PRN **OR** HYDROcodone-acetaminophen (NORCO) 5-325 MG per tablet 2 tablet, 2 tablet, Oral, PRN, Gianluca Ortiz MD    Allergies:  Sulfa antibiotics and Sulfa antibiotics    Social History     Socioeconomic History    Marital status: Single     Spouse name: Not on file    Number of children: Not on file    Years of education: Not on file    Highest education level: Not on file   Occupational History    Not on file   Tobacco Use    Smoking status: Former Smoker     Packs/day: 0.50     Years: 20.00     Pack years: 10.00     Types: Cigarettes     Quit date: 5     Years since quittin.5    Smokeless tobacco: Never Used   Vaping Use    Vaping Use: Never used   Substance and Sexual Activity    Alcohol use: Not Currently     Alcohol/week: 1.0 standard drinks     Types: 1 Glasses of wine per week    Drug use: No    Sexual activity: Not on file   Other Topics Concern    Not on file   Social History Narrative    ** Merged History Encounter **          Social Determinants of Health     Financial Resource Strain:     Difficulty of Paying Living Expenses:    Food Insecurity:     Worried About Running Out of Food in the Last Year:     Ran Out of Food in the Last Year:    Transportation Needs:     Lack of Transportation (Medical):      Lack of Transportation (Non-Medical):    Physical Activity:     Days of Exercise per Week:     Minutes of Exercise per Session:    Stress:  Feeling of Stress :    Social Connections:     Frequency of Communication with Friends and Family:     Frequency of Social Gatherings with Friends and Family:     Attends [employer] Services:     Active Member of Clubs or Organizations:     Attends Club or Organization Meetings:     Marital Status:    Intimate Partner Violence:     Fear of Current or Ex-Partner:     Emotionally Abused:     Physically Abused:     Sexually Abused:         No family history on file. REVIEW OF SYSTEMS:  The chart was reviewed.     PHYSICAL EXAM:    Vitals:    08/09/21 1219   BP: (!) 166/83   Pulse: 80   Resp: 16   Temp: 97.1 °F (36.2 °C)   SpO2: 99%     CONSTITUTIONAL:  awake, alert, cooperative, no apparent distress  LUNGS:  no increased work of breathing, good air exchange and clear to auscultation  CARDIOVASCULAR:  regular rate and rhythm  ABDOMEN:  non-distended  Right arm AV graft without thrill    LABS:    Lab Results   Component Value Date    WBC 4.5 07/12/2021    HGB 8.6 (L) 07/12/2021    HCT 28.0 (L) 07/12/2021     07/12/2021    PROTIME 16.6 (H) 05/15/2021    INR 1.5 05/15/2021    APTT <20.0 (L) 11/29/2020    K 5.3 (H) 08/06/2021    BUN 63 (H) 07/12/2021    CREATININE 6.4 (H) 07/12/2021       RADIOLOGY:

## 2021-08-09 NOTE — PROGRESS NOTES
Patient's dentures, hearing aids and glasses were placed in labeled contains and placed in patient's chart. All will follow the patient back to same day surgery.

## 2021-08-10 ENCOUNTER — ANESTHESIA EVENT (OUTPATIENT)
Dept: OPERATING ROOM | Age: 86
DRG: 314 | End: 2021-08-10
Payer: MEDICARE

## 2021-08-10 LAB
ALBUMIN SERPL-MCNC: 3.4 G/DL (ref 3.5–5.2)
ALP BLD-CCNC: 89 U/L (ref 40–129)
ALT SERPL-CCNC: 9 U/L (ref 0–40)
ANION GAP SERPL CALCULATED.3IONS-SCNC: 13 MMOL/L (ref 7–16)
ANION GAP SERPL CALCULATED.3IONS-SCNC: 13 MMOL/L (ref 7–16)
AST SERPL-CCNC: 10 U/L (ref 0–39)
BASOPHILS ABSOLUTE: 0.02 E9/L (ref 0–0.2)
BASOPHILS ABSOLUTE: 0.02 E9/L (ref 0–0.2)
BASOPHILS RELATIVE PERCENT: 0.4 % (ref 0–2)
BASOPHILS RELATIVE PERCENT: 0.5 % (ref 0–2)
BILIRUB SERPL-MCNC: 0.3 MG/DL (ref 0–1.2)
BUN BLDV-MCNC: 60 MG/DL (ref 6–23)
BUN BLDV-MCNC: 62 MG/DL (ref 6–23)
CALCIUM SERPL-MCNC: 7.8 MG/DL (ref 8.6–10.2)
CALCIUM SERPL-MCNC: 8.2 MG/DL (ref 8.6–10.2)
CHLORIDE BLD-SCNC: 100 MMOL/L (ref 98–107)
CHLORIDE BLD-SCNC: 99 MMOL/L (ref 98–107)
CO2: 27 MMOL/L (ref 22–29)
CO2: 27 MMOL/L (ref 22–29)
CREAT SERPL-MCNC: 6.3 MG/DL (ref 0.7–1.2)
CREAT SERPL-MCNC: 6.6 MG/DL (ref 0.7–1.2)
EOSINOPHILS ABSOLUTE: 0.24 E9/L (ref 0.05–0.5)
EOSINOPHILS ABSOLUTE: 0.31 E9/L (ref 0.05–0.5)
EOSINOPHILS RELATIVE PERCENT: 5.6 % (ref 0–6)
EOSINOPHILS RELATIVE PERCENT: 6.6 % (ref 0–6)
GFR AFRICAN AMERICAN: 10
GFR AFRICAN AMERICAN: 10
GFR NON-AFRICAN AMERICAN: 8 ML/MIN/1.73
GFR NON-AFRICAN AMERICAN: 8 ML/MIN/1.73
GLUCOSE BLD-MCNC: 119 MG/DL (ref 74–99)
GLUCOSE BLD-MCNC: 147 MG/DL (ref 74–99)
HBA1C MFR BLD: 6.4 % (ref 4–5.6)
HCT VFR BLD CALC: 27.7 % (ref 37–54)
HCT VFR BLD CALC: 28.9 % (ref 37–54)
HEMOGLOBIN: 8.6 G/DL (ref 12.5–16.5)
HEMOGLOBIN: 8.8 G/DL (ref 12.5–16.5)
IMMATURE GRANULOCYTES #: 0.01 E9/L
IMMATURE GRANULOCYTES #: 0.02 E9/L
IMMATURE GRANULOCYTES %: 0.2 % (ref 0–5)
IMMATURE GRANULOCYTES %: 0.4 % (ref 0–5)
LYMPHOCYTES ABSOLUTE: 1.11 E9/L (ref 1.5–4)
LYMPHOCYTES ABSOLUTE: 1.13 E9/L (ref 1.5–4)
LYMPHOCYTES RELATIVE PERCENT: 24 % (ref 20–42)
LYMPHOCYTES RELATIVE PERCENT: 26.1 % (ref 20–42)
MCH RBC QN AUTO: 30.3 PG (ref 26–35)
MCH RBC QN AUTO: 30.6 PG (ref 26–35)
MCHC RBC AUTO-ENTMCNC: 30.4 % (ref 32–34.5)
MCHC RBC AUTO-ENTMCNC: 31 % (ref 32–34.5)
MCV RBC AUTO: 98.6 FL (ref 80–99.9)
MCV RBC AUTO: 99.7 FL (ref 80–99.9)
METER GLUCOSE: 140 MG/DL (ref 74–99)
METER GLUCOSE: 150 MG/DL (ref 74–99)
METER GLUCOSE: 160 MG/DL (ref 74–99)
MONOCYTES ABSOLUTE: 0.5 E9/L (ref 0.1–0.95)
MONOCYTES ABSOLUTE: 0.57 E9/L (ref 0.1–0.95)
MONOCYTES RELATIVE PERCENT: 11.7 % (ref 2–12)
MONOCYTES RELATIVE PERCENT: 12.1 % (ref 2–12)
NEUTROPHILS ABSOLUTE: 2.38 E9/L (ref 1.8–7.3)
NEUTROPHILS ABSOLUTE: 2.66 E9/L (ref 1.8–7.3)
NEUTROPHILS RELATIVE PERCENT: 55.9 % (ref 43–80)
NEUTROPHILS RELATIVE PERCENT: 56.5 % (ref 43–80)
PDW BLD-RTO: 13.6 FL (ref 11.5–15)
PDW BLD-RTO: 13.8 FL (ref 11.5–15)
PLATELET # BLD: 173 E9/L (ref 130–450)
PLATELET # BLD: 176 E9/L (ref 130–450)
PMV BLD AUTO: 10 FL (ref 7–12)
PMV BLD AUTO: 10 FL (ref 7–12)
POTASSIUM REFLEX MAGNESIUM: 4.4 MMOL/L (ref 3.5–5)
POTASSIUM REFLEX MAGNESIUM: 4.8 MMOL/L (ref 3.5–5)
RBC # BLD: 2.81 E12/L (ref 3.8–5.8)
RBC # BLD: 2.9 E12/L (ref 3.8–5.8)
SODIUM BLD-SCNC: 139 MMOL/L (ref 132–146)
SODIUM BLD-SCNC: 140 MMOL/L (ref 132–146)
TOTAL PROTEIN: 5.9 G/DL (ref 6.4–8.3)
WBC # BLD: 4.3 E9/L (ref 4.5–11.5)
WBC # BLD: 4.7 E9/L (ref 4.5–11.5)

## 2021-08-10 PROCEDURE — 2580000003 HC RX 258: Performed by: INTERNAL MEDICINE

## 2021-08-10 PROCEDURE — 1200000000 HC SEMI PRIVATE

## 2021-08-10 PROCEDURE — 80048 BASIC METABOLIC PNL TOTAL CA: CPT

## 2021-08-10 PROCEDURE — 36415 COLL VENOUS BLD VENIPUNCTURE: CPT

## 2021-08-10 PROCEDURE — 5A1D70Z PERFORMANCE OF URINARY FILTRATION, INTERMITTENT, LESS THAN 6 HOURS PER DAY: ICD-10-PCS | Performed by: INTERNAL MEDICINE

## 2021-08-10 PROCEDURE — 90935 HEMODIALYSIS ONE EVALUATION: CPT

## 2021-08-10 PROCEDURE — 6370000000 HC RX 637 (ALT 250 FOR IP): Performed by: INTERNAL MEDICINE

## 2021-08-10 PROCEDURE — 85025 COMPLETE CBC W/AUTO DIFF WBC: CPT

## 2021-08-10 PROCEDURE — 97162 PT EVAL MOD COMPLEX 30 MIN: CPT | Performed by: PHYSICAL THERAPIST

## 2021-08-10 PROCEDURE — 82962 GLUCOSE BLOOD TEST: CPT

## 2021-08-10 RX ADMIN — INSULIN LISPRO 1 UNITS: 100 INJECTION, SOLUTION INTRAVENOUS; SUBCUTANEOUS at 12:19

## 2021-08-10 RX ADMIN — METOPROLOL SUCCINATE 25 MG: 25 TABLET, EXTENDED RELEASE ORAL at 08:52

## 2021-08-10 RX ADMIN — SODIUM CHLORIDE, PRESERVATIVE FREE 10 ML: 5 INJECTION INTRAVENOUS at 21:44

## 2021-08-10 RX ADMIN — INSULIN LISPRO 1 UNITS: 100 INJECTION, SOLUTION INTRAVENOUS; SUBCUTANEOUS at 08:51

## 2021-08-10 RX ADMIN — ATORVASTATIN CALCIUM 10 MG: 10 TABLET, FILM COATED ORAL at 21:33

## 2021-08-10 RX ADMIN — SODIUM CHLORIDE, PRESERVATIVE FREE 10 ML: 5 INJECTION INTRAVENOUS at 08:52

## 2021-08-10 RX ADMIN — INSULIN LISPRO 1 UNITS: 100 INJECTION, SOLUTION INTRAVENOUS; SUBCUTANEOUS at 21:34

## 2021-08-10 RX ADMIN — ASPIRIN 81 MG: 81 TABLET, CHEWABLE ORAL at 08:52

## 2021-08-10 RX ADMIN — GABAPENTIN 100 MG: 100 CAPSULE ORAL at 21:33

## 2021-08-10 ASSESSMENT — PAIN SCALES - GENERAL
PAINLEVEL_OUTOF10: 0

## 2021-08-10 NOTE — PROGRESS NOTES
Vascular Surgery Progress Note    CC: Thrombosed R UE AVG    HISTORY:  The patient is awake, alert, and oriented. He is frustrated that his graft was not opened up and that he was admitted with a temporary catheter. IMPRESSION:  POD # 1 s/p left femoral temporary dialysis catheter insertion    PLAN:  I reviewed with the patient that as previously discussed with him and his brother as outpatient, repeated thrombectomies of the AVG were not advised. The long term plan for repeated thrombosis of the AVG was for insertion of tunneled dialysis catheter for long term use. The line was unable to be placed yesterday and it was felt that the safest thing for the patient was to insert a temporary line so that he can receive dialysis as he has not had a treatment in several days and then insert tunneled dialysis catheter. Plan for tunneled dialysis catheter insertion tomorrow 8/11. The procedure including risks, benefits, and alternative options were discussed. The patient understands and agrees to proceed. NPO at midnight.      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    Diabetes mellitus ([address] E11.9    Diabetes mellitus ([address] E11.9    Diabetic peripheral neuropathy ([address] E11.42    Osteoarthritis M19.90    Gait abnormality R26.9    Physical deconditioning R53.81    Lumbar spondylitis ([facility]) M46.96    Anemia D64.9    Fx humer, med condyl-closed S42.463A    Cerebral infarction ([address] I63.9    Renal failure N19    TIA (transient ischemic attack) G45.9    ASHD (arteriosclerotic heart disease) I25.10    Diabetes mellitus (HCC) E11.9    PVD (peripheral vascular disease) with claudication ([facility]) I73.9    History of CVA (cerebrovascular accident) Z86.73    Pain in lower limb M79.606    Cerebral infarction (HCC) I63.9    CKD 3 N18.30    Diabetes mellitus-2 E11.9    Acute respiratory failure ([facility]) J96.00    Decubitus ulcer of sacral region, stage 1 L89.151    Right sided weakness R53.1    Hypoglycemia E16.2    Acute kidney injury (ZAHRAA) with acute tubular necrosis (ATN) ([facility]) N17.0    Acute kidney failure ([facility]) N17.9    Encounter regarding vascular access for dialysis for end-stage renal disease ([facility]) N18.6, Z99.2    ESRD (end stage renal disease) [facility] N18.6, Z99.2    Diabetic ulcer of toe of right foot associated with type 2 diabetes mellitus, with fat layer exposed ([facility] E11.621, L97.512    UTI (urinary tract infection) N39.0    AV graft thrombosis, initial encounter ([facility]) G84.662I       Current Medications:    dextrose      sodium chloride        glucose, dextrose, glucagon (rDNA), dextrose, sodium chloride flush, sodium chloride, ondansetron **OR** ondansetron, polyethylene glycol, acetaminophen **OR** acetaminophen    [START ON 8/11/2021] ceFAZolin  2,000 mg Intravenous On Call to OR    aspirin  81 mg Oral QAM    atorvastatin  10 mg Oral Nightly    gabapentin  100 mg Oral Nightly    metoprolol succinate  25 mg Oral Daily    sodium chloride flush  5-40 mL Intravenous 2 times per day    insulin lispro  0-6 Units Subcutaneous TID WC    insulin lispro  0-3 Units Subcutaneous Nightly          PHYSICAL EXAM:    Vitals:    08/10/21 0741   BP: 110/67   Pulse: 80   Resp: 18   Temp: 98 °F (36.7 °C)   SpO2: 94%     CONSTITUTIONAL:  awake, alert, cooperative, no apparent distress  CHEST: left neck, chest dressings in place. No hematoma.  Minimal strikethrough  LUNGS:  no increased work of breathing, good air exchange   CARDIOVASCULAR:  regular rate and rhythm  ABDOMEN:  soft, non-distended and non-tender  L LE: femoral line in place, no hematoma or ecchymosis     LABS:    Lab Results   Component Value Date    WBC 4.3 (L) 08/10/2021    HGB 8.8 (L) 08/10/2021    HCT 28.9 (L) 08/10/2021     08/10/2021    PROTIME 16.6 (H) 05/15/2021    INR 1.5 05/15/2021    APTT <20.0 (L) 11/29/2020    K 4.8 08/10/2021    BUN 62 (H) 08/10/2021    CREATININE 6.6 (H) 08/10/2021       RADIOLOGY:

## 2021-08-10 NOTE — PROGRESS NOTES
Physical Therapy    Physical Therapy Initial Assessment     Name: Juan Villavicencio  : 1931  MRN: [id number]      Date of Service: 8/10/2021    Evaluating PT:  Solo Paiz, PT, DPT  [id number]      Room #:  9591/6807-B  Diagnosis:  AV graft thrombosis, initial encounter [facility]) [C21.998P]  PMHx/PSHx:  Arthritis, HTN, TIA, CAD, CKD, peripheral neuropathy, diabetes, R hemiparesis, CVA, CHF, HD patient, abdominal hernia repair, cataract removal, AV shunt revision  Procedure/Surgery:  Attempted placement of tessio - unsuccessful  Precautions:  Falls, R hemiplegia  Equipment Needs:  TBD    SUBJECTIVE:    Pt lives with his niece and daughter in a single story house with 3 stairs to enter and B rails. Pt reports he is able to ascend/descend stairs with assistance at recent baseline Bed is on first floor and bath is on first floor. He sleeps in a recliner at baseline. Pt uses w/c for primary mobility and requires assistance of family with intermittent use of quad cane for pivot transfers. OBJECTIVE:   Initial Evaluation  Date: 8/10/21 Treatment Short Term/ Long Term   Goals   AM-PAC 6 Clicks 47/96     Was pt agreeable to Eval/treatment? yes     Does pt have pain? 4/10 pain in buttocks     Bed Mobility  Rolling: NT  Supine to sit: MaxA  Sit to supine: NT  Scooting:  Mod A to EOB  Rolling: Supervision   Supine to sit: Andres  Sit to supine: Andres  Scooting: Supervision    Transfers Sit to stand: MaxA  Stand to sit: MaxA  Stand pivot: MaxA with no AD  Sit to stand: Andres  Stand to sit: Andres  Stand pivot: Andres with AAD vs no AD   Ambulation    NT, unable to progress feet  TBD   Stair negotiation: ascended and descended  NT  TBD   ROM BUE:  R UE limited due to contractures at shoulder and elbow  BLE:  WNL     Strength L UE 4/5, R UE 3-/5 within available ROM  L LE:  4+/5, R LE: 3+/5      Balance Sitting EOB:  SBA  Dynamic Standing:  MaxA with no AD  Sitting EOB:  Independent   Dynamic Standing:  Andres with AAD vs no AD     Pt is A & O x 3  Sensation:  Pt reports absent light touch to R side (chronic)  Edema:  unremarkable    Patient education  Pt educated on role of therapy, safety with mobility, transfer technique    Patient response to education:   Pt verbalized understanding Pt demonstrated skill Pt requires further education in this area   yes With assist yes     ASSESSMENT:    Conditions Requiring Skilled Therapeutic Intervention:    [x]Decreased strength     []Decreased ROM  [x]Decreased functional mobility  [x]Decreased balance   [x]Decreased endurance   [x]Decreased posture  [x]Decreased sensation  [x]Decreased coordination   []Decreased vision  [x]Decreased safety awareness   [x]Increased pain       Comments:  Pt resting semi-supine upon arrival, agreeable to PT eval. Pt completed bed mobility with HOB remaining elevated as he sleeps in a recliner at baseline. Total assist required for B LE and trunk negotiation with pt attempting to use L UE to assist. Pt denies c/o dizziness with postural changes. Pt requires manual assist for lift/lower to initiate sit<>stand with strong R posterio-lateral lean noted. Pt attempted stand pivot transfer with L quad cane. Pt requested to attempt transfer without use of AD. Pt completed pivot with L UE support on therapist. He was unable to progress either LE during transition. Pt seated in chair with all needs in reach. He is functioning below baseline and will benefit from continued skilled PT services to improve independence with all bed mobility and transfers, initiate gait as appropriate, and improved overall safety.     Treatment:  Patient practiced and was instructed in the following treatment:     Bed mobility: cues for sequencing and technique, manual assist for B LE and trunk negotiation   Transfer training: cues for hand placement, manual assist for blocking of L LE and lift/lower during sit<>stand transfer, manual assist for standing balance during pivot transfer and progression of hips towards chair    Pt's/ family goals   1. To return home with family assist    Prognosis is good for reaching above PT goals. Patient and or family understand(s) diagnosis, prognosis, and plan of care. yes    PHYSICAL THERAPY PLAN OF CARE:    PT POC is established based on physician order and patient diagnosis     Referring provider/PT Order:    08/09/21 1830  PT eval and treat Start: 08/09/21 1830, End: 08/09/21 1830, ONE TIME, Standing Count: 1 Occurrences, R      Carle Shara Schaumann, MD     Diagnosis:  AV graft thrombosis, initial encounter [facility]) [S81.910L]  Specific instructions for next treatment:  Progress mobility as tolerated, improve independence with transfers    Current Treatment Recommendations:     [x] Strengthening to improve independence with functional mobility   [] ROM to improve independence with functional mobility   [x] Balance Training to improve static/dynamic balance and to reduce fall risk  [x] Endurance Training to improve activity tolerance during functional mobility   [x] Transfer Training to improve safety and independence with all functional transfers   [x] Gait Training to improve gait mechanics, endurance and asses need for appropriate assistive device  [] Stair Training in preparation for safe discharge home and/or into the community   [x] Positioning to prevent skin breakdown and contractures  [x] Safety and Education Training   [x] Patient/Caregiver Education   [] HEP  [] Other     PT long term treatment goals are located in above grid    Frequency of treatments: 2-5x/week x 1-2 weeks. Time in  0835  Time out  0852    Total Treatment Time  0 minutes     Evaluation Time includes thorough review of current medical information, gathering information on past medical history/social history and prior level of function, completion of standardized testing/informal observation of tasks, assessment of data and education on plan of care and goals.     CPT codes:  [] Low Complexity PT evaluation 67697  [x] Moderate Complexity PT evaluation 50487  [] High Complexity PT evaluation 78346  [] PT Re-evaluation 88436  [] Gait training 17061 0 minutes  [] Manual therapy 02597 0 minutes  [] Therapeutic activities 45116 10 minutes  [] Therapeutic exercises 51848 0 minutes  [] Neuromuscular reeducation 34263 0 minutes     Yoly Anderson, PT, DPT  [id number]

## 2021-08-10 NOTE — CARE COORDINATION
Patient with ESRD is here under observation for thrombosed AVG, Tesio attempt 8/9 placed L fem templine instead. Plan is for tunneled dialysis catheter insertion tomorrow 8/11 by vascular surgery. Patient is from [address] [place] [address] and per Fariba from [address] [place] [address], patient is there short term skilled but able to return when medically ready. Patient attends hemodialysis on Monday, Wednesday and Friday at [facility] phone# [phone]. I called [facility] to let them know that patient is in the hospital. I attempted to meet with pateint in room to explain role and discuss transition of care but patient was out of room. I therefore called and spoke to the patient's niece Kimo Dillard who confirmed the plan is for her Uncle to return to [facility]. Ambulance form in envelope in soft chart.   John Schofield RN CM

## 2021-08-10 NOTE — H&P
[facility]  History and Physical      CHIEF COMPLAINT:       Patient of Regis Pinedawin  presents with:  Encounter regarding vascular access for dialysis for end-stage renal disease ([address]    History of Present Illness:   Patient states that for several days his right AV graft has been nonfunctional.  He was brought to the hospital yesterday by vascular surgery for this reason. They have placed a temporary dialysis catheter in the groin. He was admitted for further management of malfunctioning dialysis access. He denies shortness of breath. He does not have any other complaints or symptoms. REVIEW OF SYSTEMS:  Pertinent negatives are above in HPI. 10 point ROS otherwise negative.       Past Medical History:   Diagnosis Date    Arthritis     CAD (coronary artery disease)     CHF (congestive heart failure) (HCC)     Chronic kidney disease     COVID-19     CVA (cerebral vascular accident) ([address]     Diabetes mellitus ([address]     Diabetic peripheral neuropathy ([address] 11/9/2012    Hemodialysis patient ([address]     History of CVA (cerebrovascular accident) 6/9/2014    Hypertension     Other disorders of kidney and ureter     prostate infections in past    PVD (peripheral vascular disease) with claudication ([address] 6/9/2014    Right sided weakness 3/19/2018    TIA (transient ischemic attack)     possible several years ago    Unspecified cerebral artery occlusion with cerebral infarction 2013    [facility] RIGHT LEG AFFECTED         Past Surgical History:   Procedure Laterality Date    ABDOMINAL HERNIA REPAIR      CATARACT REMOVAL      right    CHOLECYSTECTOMY  11/2011    DENTAL SURGERY      EYE SURGERY      [facility]. DIALYSIS CATHETER Right 8/27/2020    TESIO CATHETER INSERTION performed by Noni Resendiz MD at [facility]      removal of sac in ear    SHUNT REVISION Right 1/21/2021    INSERTION AV GRAFT RIGHT ARM performed by Zuleyka Counter Marcelino Singh MD at [address] [place] [address] Right 4/8/2021    THROMBECTOMY RIGHT ARM AV GRAFT performed by Delphine Barnhart MD at [address] [place] [address] Left 8/9/2021    INSERTION OF TEMPORARY HEMODIALYSIS CATHETER performed by Delphine Barnhart MD at [address] [place] [address]       Medications Prior to Admission:    Medications Prior to Admission: lisinopril (PRINIVIL;ZESTRIL) 2.5 MG tablet, Take 2.5 mg by mouth daily  metoprolol succinate (TOPROL XL) 25 MG extended release tablet, Take 1 tablet by mouth daily  Magnesium Oxide (MAG-OXIDE PO), Take 40 mg by mouth   atorvastatin (LIPITOR) 10 MG tablet, Take 10 mg by mouth nightly  gabapentin (NEURONTIN) 100 MG capsule, Take 100 mg by mouth nightly. ascorbic acid (VITAMIN C) 500 MG tablet, Take 1,000 mg by mouth daily  zinc gluconate 50 MG tablet, Take 50 mg by mouth daily  Multiple Vitamins-Minerals (PRESERVISION AREDS 2 PO), Take 1 tablet by mouth daily   insulin lispro (HUMALOG KWIKPEN) 200 UNIT/ML SOPN pen, Inject into the skin 4 times daily as needed Before meals and nightly  famotidine (PEPCID) 40 MG tablet, Take 40 mg by mouth as needed  vitamin D (CHOLECALCIFEROL) 25 MCG (1000 UT) TABS tablet, Take 1,000 Units by mouth daily  aspirin 81 MG tablet, Take 81 mg by mouth every morning   white petrolatum OINT ointment, Apply 1 applicator topically 2 times daily    Note that the patient's home medications were reviewed and the above list is accurate to the best of my knowledge at the time of the exam.    Allergies:    Sulfa antibiotics and Sulfa antibiotics    Social History:    reports that he quit smoking about 54 years ago. His smoking use included cigarettes. He has a 10.00 pack-year smoking history. He has never used smokeless tobacco. He reports previous alcohol use of about 1.0 standard drinks of alcohol per week. He reports that he does not use drugs.     Family History:   No fhx ESRD      PHYSICAL EXAM:    Vitals:  /67   Pulse 80   Temp 98 °F (36.7 °C) (Oral)   Resp 18   Ht 5' 5\" (1.651 m)   Wt 179 lb (81.2 kg)   SpO2 94%   BMI 29.79 kg/m²       General appearance: NAD, conversant  Eyes: Sclerae anicteric, PERRLA  HEENT: AT/NC, MMM  Neck: FROM, supple, no thyromegaly  Lymph: No cervical / supraclavicular lymphadenopathy  Lungs: Clear to auscultation, WOB normal  CV: RRR, no MRGs, no lower extremity edema  Abdomen: Soft, non-tender; no masses or HSM, +BS  Extremities: FROM without synovitis. No clubbing or cyanosis of the hands. Skin: no rash, induration, lesions, or ulcers  Psych: Calm and cooperative. Normal judgement and insight. Normal mood and affect. Neuro: Alert and interactive, face symmetric, speech fluent. LABS:  All labs reviewed. Of note:  CBC with Differential:    Lab Results   Component Value Date    WBC 4.3 08/10/2021    RBC 2.90 08/10/2021    HGB 8.8 08/10/2021    HCT 28.9 08/10/2021     08/10/2021    MCV 99.7 08/10/2021    MCH 30.3 08/10/2021    MCHC 30.4 08/10/2021    RDW 13.8 08/10/2021    SEGSPCT 46 01/04/2014    BANDSPCT 2 05/02/2016    LYMPHOPCT 26.1 08/10/2021    MONOPCT 11.7 08/10/2021    BASOPCT 0.5 08/10/2021    MONOSABS 0.50 08/10/2021    LYMPHSABS 1.11 08/10/2021    EOSABS 0.24 08/10/2021    BASOSABS 0.02 08/10/2021     CMP:    Lab Results   Component Value Date     08/10/2021    K 4.8 08/10/2021    CL 99 08/10/2021    CO2 27 08/10/2021    BUN 62 08/10/2021    CREATININE 6.6 08/10/2021    GFRAA 10 08/10/2021    LABGLOM 8 08/10/2021    GLUCOSE 119 08/10/2021    GLUCOSE 163 02/22/2012    PROT 5.9 08/09/2021    LABALBU 3.4 08/09/2021    LABALBU 3.8 02/20/2012    CALCIUM 7.8 08/10/2021    BILITOT 0.3 08/09/2021    ALKPHOS 89 08/09/2021    AST 10 08/09/2021    ALT 9 08/09/2021         ASSESSMENT/PLAN:  Principal Problem:    Encounter regarding vascular access for dialysis for end-stage renal disease ([facility]  Active Problems:     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

## 2021-08-10 NOTE — CONSULTS
[facility] MD Thais Moon MD Kennis Chandler, MD Lani Holiday, MD Piper Perez, DAISY Soni  Consultation  Patient's Name: Aida Staley  3:18 PM  8/10/2021    Nephrologist: Thais Mendoza MD    Reason for Consult: ESRD management  Requesting Physician:  Harepr Fritz DO    Chief Complaint: Nonfunctioning right upper extremity AV graft    History Obtained From: Patient, chart    History of Present Ilness:    Mr. Micah Wyman is a pleasant 80-year-old gentleman known to me from the outpatient practice where I follow him for ESRD management. He undergoes chronic intermittent hemodialysis 3 times per week for right upper extremity AV graft at the [facility]. Recent treatments have been without complications except for those associated with poorly functioning AV graft which has become nonfunctioning over the past several days with an absent thrill and bruit. Attempt at thrombectomy was unsuccessful, temporary dialysis line placed per Dr. Sol Pineda. A tunneled dialysis catheter will be placed in short order. At the time of my initial evaluation this afternoon he was undergoing dialysis. No complaint. Treatment uncomplicated. BFR near 2 prescribed. Blood pressure stable. Treatment well-tolerated. Mr. Micah Wyman is getting along quite nicely on dialysis. His main complaint as regards to dialysis is that his time at the dialysis center \"is boring. \"  He spends his time there typically either reading or playing games on his iPad. Given his advanced age and multiple comorbidities we have discussed on a number of times in the past several months discontinuing dialysis if the quality of his life dropped below that which he could tolerate.   His answer has been consistent, simply that he does not wish to die and feels that he has adapted adequately to his life on dialysis 3 days/week such that he does not feel that he is suffering unduly. He has discussed on a number of occasions with other members of his family, including his niece, with whom I spoke earlier today, and he has made them aware that he wishes to continue with dialysis as long as he is getting along so well. Past Medical History:   Diagnosis Date    Arthritis     CAD (coronary artery disease)     CHF (congestive heart failure) (HCC)     Chronic kidney disease     COVID-19     CVA (cerebral vascular accident) ([address]     Diabetes mellitus ([address]     Diabetic peripheral neuropathy ([address] 11/9/2012    Hemodialysis patient ([address]     History of CVA (cerebrovascular accident) 6/9/2014    Hypertension     Other disorders of kidney and ureter     prostate infections in past    PVD (peripheral vascular disease) with claudication ([address] 6/9/2014    Right sided weakness 3/19/2018    TIA (transient ischemic attack)     possible several years ago    Unspecified cerebral artery occlusion with cerebral infarction 2013    [facility] RIGHT LEG AFFECTED       Past Surgical History:   Procedure Laterality Date    ABDOMINAL HERNIA REPAIR      CATARACT REMOVAL      right    CHOLECYSTECTOMY  11/2011    DENTAL SURGERY      EYE SURGERY      [facility]. DIALYSIS CATHETER Right 8/27/2020    TESIO CATHETER INSERTION performed by Olga Yeung MD at [facility]      removal of sac in ear    SHUNT REVISION Right 1/21/2021    INSERTION AV GRAFT RIGHT ARM performed by Olga Yeung MD at [address] [place] [address] Right 4/8/2021    THROMBECTOMY RIGHT ARM AV GRAFT performed by Olga Yeung MD at [place] [address] VASCULAR SURGERY Left 8/9/2021    INSERTION OF TEMPORARY HEMODIALYSIS CATHETER performed by Olga Yeung MD at [address] [place] [address]       No family history on file. reports that he quit smoking about 54 years ago. His smoking use included cigarettes. He has a 10.00 pack-year smoking history.  He has never used smokeless tobacco. He reports previous alcohol use of about 1.0 standard drinks of alcohol per week. He reports that he does not use drugs. Allergies:  Sulfa antibiotics and Sulfa antibiotics    Current Medications:    [START ON 8/11/2021] ceFAZolin (ANCEF) 2000 mg in sterile water 20 mL IV syringe, On Call to OR  aspirin chewable tablet 81 mg, QAM  atorvastatin (LIPITOR) tablet 10 mg, Nightly  gabapentin (NEURONTIN) capsule 100 mg, Nightly  metoprolol succinate (TOPROL XL) extended release tablet 25 mg, Daily  glucose (GLUTOSE) 40 % oral gel 15 g, PRN  dextrose 50 % IV solution, PRN  glucagon (rDNA) injection 1 mg, PRN  dextrose 5 % solution, PRN  sodium chloride flush 0.9 % injection 5-40 mL, 2 times per day  sodium chloride flush 0.9 % injection 5-40 mL, PRN  0.9 % sodium chloride infusion, PRN  ondansetron (ZOFRAN-ODT) disintegrating tablet 4 mg, Q8H PRN   Or  ondansetron (ZOFRAN) injection 4 mg, Q6H PRN  polyethylene glycol (GLYCOLAX) packet 17 g, Daily PRN  acetaminophen (TYLENOL) tablet 650 mg, Q6H PRN   Or  acetaminophen (TYLENOL) suppository 650 mg, Q6H PRN  insulin lispro (HUMALOG) injection vial 0-6 Units, TID WC  insulin lispro (HUMALOG) injection vial 0-3 Units, Nightly        Review of Systems:   A comprehensive review of systems was negative.     Physical exam:   BP (!) 111/49   Pulse 82   Temp 97.7 °F (36.5 °C)   Resp 18   Ht 5' 5\" (1.651 m)   Wt 181 lb 14.1 oz (82.5 kg)   SpO2 94%   BMI 30.27 kg/m²   Pleasant age-appropriate elderly white gentleman in no apparent distress NC/AT EOMI sclera conjunctive are clear and anicteric mucous membranes are moist  Neck soft supple trachea midline no bruit no JVD  CTA bilateral  Regular rhythm normal S1 and S2  Abdomen soft nontender nondistended normal active bowel sound  Distal extremities are without edema  No thrill or bruit in right upper extremity AV graft  Temporary dialysis catheter in left femoral vein  No rash or lesion on visible portions of integument besides ecchymoses on his forearms  Moves all 4 extremities  Cranial nerves II through XII grossly intact  Awake, alert, interactive and appropriate. Note: His sense of humor remains intact as we joked about how we might make his time on dialysis more exciting.     Data:   Labs:  CBC with Differential:    Lab Results   Component Value Date    WBC 4.3 08/10/2021    RBC 2.90 08/10/2021    HGB 8.8 08/10/2021    HCT 28.9 08/10/2021     08/10/2021    MCV 99.7 08/10/2021    MCH 30.3 08/10/2021    MCHC 30.4 08/10/2021    RDW 13.8 08/10/2021    SEGSPCT 46 01/04/2014    BANDSPCT 2 05/02/2016    LYMPHOPCT 26.1 08/10/2021    MONOPCT 11.7 08/10/2021    BASOPCT 0.5 08/10/2021    MONOSABS 0.50 08/10/2021    LYMPHSABS 1.11 08/10/2021    EOSABS 0.24 08/10/2021    BASOSABS 0.02 08/10/2021     CMP:    Lab Results   Component Value Date     08/10/2021    K 4.8 08/10/2021    CL 99 08/10/2021    CO2 27 08/10/2021    BUN 62 08/10/2021    CREATININE 6.6 08/10/2021    GFRAA 10 08/10/2021    LABGLOM 8 08/10/2021    GLUCOSE 119 08/10/2021    GLUCOSE 163 02/22/2012    PROT 5.9 08/09/2021    LABALBU 3.4 08/09/2021    LABALBU 3.8 02/20/2012    CALCIUM 7.8 08/10/2021    BILITOT 0.3 08/09/2021    ALKPHOS 89 08/09/2021    AST 10 08/09/2021    ALT 9 08/09/2021     Ionized Calcium:  No results found for: IONCA  Magnesium:    Lab Results   Component Value Date    MG 2.0 09/14/2020     Phosphorus:    Lab Results   Component Value Date    PHOS 4.6 09/14/2020     U/A:    Lab Results   Component Value Date    COLORU Yellow 07/10/2021    PHUR 6.0 07/10/2021    WBCUA PACKED 07/10/2021    WBCUA NONE 02/20/2012    RBCUA >20 07/10/2021    RBCUA 1-3 05/04/2013    BACTERIA MANY 07/10/2021    CLARITYU CLOUDY 07/10/2021    SPECGRAV 1.020 07/10/2021    LEUKOCYTESUR LARGE 07/10/2021    UROBILINOGEN 0.2 07/10/2021    BILIRUBINUR Negative 07/10/2021    BILIRUBINUR NEGATIVE 02/20/2012    BLOODU LARGE 07/10/2021    GLUCOSEU Negative 07/10/2021    GLUCOSEU 500 02/20/2012     Microalbumen/Creatinine ratio:  No components found for: RUCREAT  Iron Saturation:  No components found for: PERCENTFE  TIBC:    Lab Results   Component Value Date    TIBC 177 08/24/2020     FERRITIN:    Lab Results   Component Value Date    FERRITIN 98 08/24/2020        Imaging:  XR CHEST PORTABLE   Final Result   Technically limited portable exam.  Allowing for this, interval worsening   with apparent right upper lobe infiltrate or edema. In addition, left   basilar atelectasis or infiltrate suggested. Recommend continued follow-up. Assessment  1. ESRD  2. Anemia due to ESRD  3. 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 Hypertension  5. Nonfunctioning right upper extremity AV graft. Now status post temporary dialysis catheter. Awaiting placement of tunneled dialysis catheter. Recommendations  1. Continue IHD support for solute and volume clearance 3 times per week currently through temporary dialysis catheter  2. Await placement of tunneled dialysis catheter  3. Continue SHANDRA (Retacrit while here)  4. Continue other aspects of renal management  5. Follow labs, BP      Comment  Mr. Roca is getting along quite nicely on dialysis. His main complaint as regards to dialysis is that his time at the dialysis center \"is boring. \"  He spends his time there typically either reading or playing games on his iPad. Given his advanced age and multiple comorbidities we have discussed on a number of times in the past several months discontinuing dialysis if the quality of his life dropped below that which he could tolerate. His answer has been consistent, simply that he does not wish to die and feels that he has adapted adequately to his life on dialysis 3 days/week such that he does not feel that he is suffering unduly.   He has discussed on a number of occasions with other members of his family, including his niece, with whom I spoke earlier today, and he has made them aware that he wishes to continue with dialysis as long as he is getting along so well. Thank you for the opportunity to participate in the care of your pleasant patient. We look forward to following along with you.         Electronically signed by Giovany Nogueira MD on 8/10/2021 at 3:18 PM

## 2021-08-10 NOTE — ANESTHESIA POSTPROCEDURE EVALUATION
Department of Anesthesiology  Postprocedure Note    Patient: Michael Murguia  MRN: [id number]  YOB: 1931  Date of evaluation: 8/10/2021  Time:  6:27 AM     Procedure Summary     Date: 08/09/21 Room / Location: [address] [place] [address] / [facility]    Anesthesia Start: 1029 Anesthesia Stop: 3095    Procedure: INSERTION OF TEMPORARY HEMODIALYSIS CATHETER (Left Groin) Diagnosis: (CHRONIC RENAL FAILURE)    Surgeons: Doug Cordoba MD Responsible Provider: Steve Zarate MD    Anesthesia Type: MAC ASA Status: 4          Anesthesia Type: MAC    Joel Phase I: Joel Score: 9    Joel Phase II:      Last vitals: Reviewed and per EMR flowsheets. Anesthesia Post Evaluation    Comments: Department of Anesthesiology  Post-Anesthesia Note    Name:  Michael Murguia                                         Age:  80 y.o.   MRN:  [id number]     Last Vitals:  BP (!) 124/91   Pulse 82   Temp 97.7 °F (36.5 °C) (Oral)   Resp 18   Ht 5' 5\" (1.651 m)   Wt 179 lb (81.2 kg)   SpO2 96%   BMI 29.79 kg/m²   Patient Vitals in the past 4 hrs:    Level of Consciousness:  Awake    Respiratory:  Stable    Oxygen Saturation:  Stable    Cardiovascular:  Stable    Hydration:  Adequate    PONV:  Stable    Post-op Pain:  Adequate analgesia    Post-op Assessment:  No apparent anesthetic complications    Additional Follow-Up / Treatment / Comment:  None    Steve Zarate MD  August 10, 2021   6:27 AM

## 2021-08-10 NOTE — PROGRESS NOTES
VASCULAR SURGERY  DAILY PROGRESS NOTE    Date:8/10/2021       IZJF:[id number]  Patient Name:Rudi Roca     YOB: 1931     Age:89 y.o. Chief Complaint: Thrombosed AVG    Subjective:  No complaints overnight. Denies pain at surgical site    Objective:  BP (!) 124/91   Pulse 82   Temp 97.7 °F (36.5 °C) (Oral)   Resp 18   Ht 5' 5\" (1.651 m)   Wt 179 lb (81.2 kg)   SpO2 96%   BMI 29.79 kg/m²   Temp (24hrs), Av.2 °F (36.2 °C), Min:97 °F (36.1 °C), Max:97.7 °F (36.5 °C)      I/O (24Hr):  I/O last 3 completed shifts: In: 5 [P.O.:220; I.V.:200]  Out: 1 [Blood:1]     GENERAL:  No acute distress. Alert and interactive. LUNGS/CHEST:  No cough. Nonlabored breathing on room air. No overt wheezing. Left neck/chest bandages without strikethrough and very minimal swelling  CARDIOVASC:  Normal rate, no cyanosis. ABDOMEN:  Soft, non-distended, non-tender.   EXTREMITIES:  No edema, L fem templine c/d/i w/o hematoma    Assessment:  80 y.o. male with ESRD and thrombosed AVG, Tesio attempt  placed L fem templine instead    Plan:  - appreciate medicine's aid in respiratory optimization prior to re-attempting Tesio placement if patient/family wishes, would need patient to be able to lay flat for surgery to increase chance of success  - consider palliative care     Electronically signed by Cherie Juarez MD on 8/10/2021 at 5:35 AM

## 2021-08-10 NOTE — FLOWSHEET NOTE
08/10/21 1729   Vital Signs   /60   Temp 97.8 °F (36.6 °C)   Pulse 85   Weight 179 lb 3.7 oz (81.3 kg)   Weight Method Bed scale   Percent Weight Change -1.45   Post-Hemodialysis Assessment   Post-Treatment Procedures Blood returned;Catheter capped, clamped and heparinized x 2 ports   Machine Disinfection Process Acid/Vinegar Clean;Heat Disinfect; Exterior Machine Disinfection   Rinseback Volume (ml) 300 ml   Total Liters Processed (l/min) 54.5 l/min   Dialyzer Clearance Lightly streaked   Duration of Treatment (minutes) 210 minutes   Heparin amount administered during treatment (units) 0 units   Hemodialysis Intake (ml) 300 ml   Hemodialysis Output (ml) 1500 ml   NET Removed (ml) 1200 ml   Tolerated Treatment Good   Patient Response to Treatment tolerated well, blood returned, cath care per policy/procedure, lines flushed, heparin instilled, ports capped

## 2021-08-11 ENCOUNTER — APPOINTMENT (OUTPATIENT)
Dept: GENERAL RADIOLOGY | Age: 86
DRG: 314 | End: 2021-08-11
Attending: SURGERY
Payer: MEDICARE

## 2021-08-11 ENCOUNTER — ANESTHESIA (OUTPATIENT)
Dept: OPERATING ROOM | Age: 86
DRG: 314 | End: 2021-08-11
Payer: MEDICARE

## 2021-08-11 VITALS
HEIGHT: 65 IN | DIASTOLIC BLOOD PRESSURE: 77 MMHG | RESPIRATION RATE: 14 BRPM | SYSTOLIC BLOOD PRESSURE: 133 MMHG | BODY MASS INDEX: 30.27 KG/M2 | OXYGEN SATURATION: 94 % | WEIGHT: 181.66 LBS | HEART RATE: 79 BPM | TEMPERATURE: 97.8 F

## 2021-08-11 VITALS — DIASTOLIC BLOOD PRESSURE: 73 MMHG | OXYGEN SATURATION: 100 % | SYSTOLIC BLOOD PRESSURE: 115 MMHG

## 2021-08-11 PROBLEM — N39.0 UTI (URINARY TRACT INFECTION): Status: RESOLVED | Noted: 2021-07-10 | Resolved: 2021-08-11

## 2021-08-11 LAB
ANION GAP SERPL CALCULATED.3IONS-SCNC: 8 MMOL/L (ref 7–16)
BUN BLDV-MCNC: 21 MG/DL (ref 6–23)
CALCIUM SERPL-MCNC: 8.6 MG/DL (ref 8.6–10.2)
CHLORIDE BLD-SCNC: 106 MMOL/L (ref 98–107)
CO2: 32 MMOL/L (ref 22–29)
CREAT SERPL-MCNC: 2.6 MG/DL (ref 0.7–1.2)
GFR AFRICAN AMERICAN: 28
GFR NON-AFRICAN AMERICAN: 23 ML/MIN/1.73
GLUCOSE BLD-MCNC: 105 MG/DL (ref 74–99)
MAGNESIUM: 1.9 MG/DL (ref 1.6–2.6)
METER GLUCOSE: 101 MG/DL (ref 74–99)
METER GLUCOSE: 111 MG/DL (ref 74–99)
PHOSPHORUS: 2.7 MG/DL (ref 2.5–4.5)
POTASSIUM SERPL-SCNC: 3.7 MMOL/L (ref 3.5–5)
REASON FOR REJECTION: NORMAL
REJECTED TEST: NORMAL
SODIUM BLD-SCNC: 146 MMOL/L (ref 132–146)

## 2021-08-11 PROCEDURE — 3700000000 HC ANESTHESIA ATTENDED CARE: Performed by: SURGERY

## 2021-08-11 PROCEDURE — 6360000002 HC RX W HCPCS

## 2021-08-11 PROCEDURE — 6360000002 HC RX W HCPCS: Performed by: NURSE PRACTITIONER

## 2021-08-11 PROCEDURE — 7100000001 HC PACU RECOVERY - ADDTL 15 MIN: Performed by: SURGERY

## 2021-08-11 PROCEDURE — 84100 ASSAY OF PHOSPHORUS: CPT

## 2021-08-11 PROCEDURE — 77001 FLUOROGUIDE FOR VEIN DEVICE: CPT | Performed by: SURGERY

## 2021-08-11 PROCEDURE — 2580000003 HC RX 258

## 2021-08-11 PROCEDURE — 94664 DEMO&/EVAL PT USE INHALER: CPT

## 2021-08-11 PROCEDURE — 3600000003 HC SURGERY LEVEL 3 BASE: Performed by: SURGERY

## 2021-08-11 PROCEDURE — C1894 INTRO/SHEATH, NON-LASER: HCPCS | Performed by: SURGERY

## 2021-08-11 PROCEDURE — 02HV33Z INSERTION OF INFUSION DEVICE INTO SUPERIOR VENA CAVA, PERCUTANEOUS APPROACH: ICD-10-PCS | Performed by: SURGERY

## 2021-08-11 PROCEDURE — 36558 INSERT TUNNELED CV CATH: CPT | Performed by: SURGERY

## 2021-08-11 PROCEDURE — 71045 X-RAY EXAM CHEST 1 VIEW: CPT

## 2021-08-11 PROCEDURE — 2580000003 HC RX 258: Performed by: SURGERY

## 2021-08-11 PROCEDURE — 80048 BASIC METABOLIC PNL TOTAL CA: CPT

## 2021-08-11 PROCEDURE — 82962 GLUCOSE BLOOD TEST: CPT

## 2021-08-11 PROCEDURE — 6360000002 HC RX W HCPCS: Performed by: SURGERY

## 2021-08-11 PROCEDURE — 7100000000 HC PACU RECOVERY - FIRST 15 MIN: Performed by: SURGERY

## 2021-08-11 PROCEDURE — 6370000000 HC RX 637 (ALT 250 FOR IP): Performed by: SURGERY

## 2021-08-11 PROCEDURE — C1881 DIALYSIS ACCESS SYSTEM: HCPCS | Performed by: SURGERY

## 2021-08-11 PROCEDURE — 3700000001 HC ADD 15 MINUTES (ANESTHESIA): Performed by: SURGERY

## 2021-08-11 PROCEDURE — 3209999900 FLUORO FOR SURGICAL PROCEDURES

## 2021-08-11 PROCEDURE — 6370000000 HC RX 637 (ALT 250 FOR IP): Performed by: ANESTHESIOLOGY

## 2021-08-11 PROCEDURE — 2500000003 HC RX 250 WO HCPCS: Performed by: SURGERY

## 2021-08-11 PROCEDURE — C1750 CATH, HEMODIALYSIS,LONG-TERM: HCPCS | Performed by: SURGERY

## 2021-08-11 PROCEDURE — 83735 ASSAY OF MAGNESIUM: CPT

## 2021-08-11 PROCEDURE — 36415 COLL VENOUS BLD VENIPUNCTURE: CPT

## 2021-08-11 PROCEDURE — 2709999900 HC NON-CHARGEABLE SUPPLY: Performed by: SURGERY

## 2021-08-11 PROCEDURE — 2500000003 HC RX 250 WO HCPCS: Performed by: NURSE PRACTITIONER

## 2021-08-11 PROCEDURE — 3600000013 HC SURGERY LEVEL 3 ADDTL 15MIN: Performed by: SURGERY

## 2021-08-11 PROCEDURE — 90935 HEMODIALYSIS ONE EVALUATION: CPT | Performed by: INTERNAL MEDICINE

## 2021-08-11 DEVICE — 15.5F X 28CM PRE-CURVED15.5F X 2 TITAN HD CATHETERTITAN HD CATHET FULL SET W/SIDEHOLESFULL SET W/S (CUFF 23CM FROM TIP)(CUFF 23CM F
Type: IMPLANTABLE DEVICE | Site: CHEST | Status: FUNCTIONAL
Brand: MEDCOMP HEMO-FLOW DOUBLE LUMEN CATHETERMEDCOMP HEMO-FLOW DOUBLE LUMEN CATHETER

## 2021-08-11 RX ORDER — IPRATROPIUM BROMIDE AND ALBUTEROL SULFATE 2.5; .5 MG/3ML; MG/3ML
1 SOLUTION RESPIRATORY (INHALATION) ONCE
Status: COMPLETED | OUTPATIENT
Start: 2021-08-11 | End: 2021-08-11

## 2021-08-11 RX ORDER — HEPARIN SODIUM (PORCINE) LOCK FLUSH IV SOLN 100 UNIT/ML 100 UNIT/ML
SOLUTION INTRAVENOUS PRN
Status: DISCONTINUED | OUTPATIENT
Start: 2021-08-11 | End: 2021-08-11 | Stop reason: ALTCHOICE

## 2021-08-11 RX ORDER — SODIUM CHLORIDE 9 MG/ML
INJECTION, SOLUTION INTRAVENOUS CONTINUOUS PRN
Status: DISCONTINUED | OUTPATIENT
Start: 2021-08-11 | End: 2021-08-11 | Stop reason: SDUPTHER

## 2021-08-11 RX ORDER — PROPOFOL 10 MG/ML
INJECTION, EMULSION INTRAVENOUS PRN
Status: DISCONTINUED | OUTPATIENT
Start: 2021-08-11 | End: 2021-08-11 | Stop reason: SDUPTHER

## 2021-08-11 RX ADMIN — EPOETIN ALFA-EPBX 2400 UNITS: 3000 INJECTION, SOLUTION INTRAVENOUS; SUBCUTANEOUS at 13:52

## 2021-08-11 RX ADMIN — PROPOFOL 20 MG: 10 INJECTION, EMULSION INTRAVENOUS at 11:15

## 2021-08-11 RX ADMIN — PROPOFOL 20 MG: 10 INJECTION, EMULSION INTRAVENOUS at 11:07

## 2021-08-11 RX ADMIN — PROPOFOL 30 MG: 10 INJECTION, EMULSION INTRAVENOUS at 10:59

## 2021-08-11 RX ADMIN — SODIUM CHLORIDE, PRESERVATIVE FREE 10 ML: 5 INJECTION INTRAVENOUS at 13:52

## 2021-08-11 RX ADMIN — METOPROLOL SUCCINATE 25 MG: 25 TABLET, EXTENDED RELEASE ORAL at 13:51

## 2021-08-11 RX ADMIN — PROPOFOL 30 MG: 10 INJECTION, EMULSION INTRAVENOUS at 11:18

## 2021-08-11 RX ADMIN — SODIUM CHLORIDE: 9 INJECTION, SOLUTION INTRAVENOUS at 10:49

## 2021-08-11 RX ADMIN — ASPIRIN 81 MG: 81 TABLET, CHEWABLE ORAL at 13:52

## 2021-08-11 RX ADMIN — CEFAZOLIN 2000 MG: 10 INJECTION, POWDER, FOR SOLUTION INTRAVENOUS at 10:59

## 2021-08-11 RX ADMIN — IPRATROPIUM BROMIDE AND ALBUTEROL SULFATE 1 AMPULE: .5; 3 SOLUTION RESPIRATORY (INHALATION) at 10:35

## 2021-08-11 ASSESSMENT — PULMONARY FUNCTION TESTS
PIF_VALUE: 1
PIF_VALUE: 0
PIF_VALUE: 1
PIF_VALUE: 0
PIF_VALUE: 1
PIF_VALUE: 0
PIF_VALUE: 1
PIF_VALUE: 0
PIF_VALUE: 1
PIF_VALUE: 0
PIF_VALUE: 1
PIF_VALUE: 0
PIF_VALUE: 1
PIF_VALUE: 1
PIF_VALUE: 0
PIF_VALUE: 1
PIF_VALUE: 1

## 2021-08-11 ASSESSMENT — PAIN SCALES - GENERAL
PAINLEVEL_OUTOF10: 0

## 2021-08-11 NOTE — PROGRESS NOTES
Patient in line room for surgery; patient's  IPAD, glasses and 2 hearing aids taken back up to floor by Cassandra Severance (8we) 84

## 2021-08-11 NOTE — CONSULTS
his life dropped below that which he could tolerate. His answer has been consistent, simply that he does not wish to die and feels that he has adapted adequately to his life on dialysis 3 days/week such that he does not feel that he is suffering unduly. He has discussed on a number of occasions with other members of his family, including his niece, with whom I spoke earlier today, and he has made them aware that he wishes to continue with dialysis as long as he is getting along so well.       Past Medical History:   Diagnosis Date    Arthritis     CAD (coronary artery disease)     CHF (congestive heart failure) (HCC)     Chronic kidney disease     COVID-19     CVA (cerebral vascular accident) ([address]     Diabetes mellitus ([address]     Diabetic peripheral neuropathy ([address] 11/9/2012    Hemodialysis patient ([address]     History of CVA (cerebrovascular accident) 6/9/2014    Hypertension     Other disorders of kidney and ureter     prostate infections in past    PVD (peripheral vascular disease) with claudication ([address] 6/9/2014    Right sided weakness 3/19/2018    TIA (transient ischemic attack)     possible several years ago    Unspecified cerebral artery occlusion with cerebral infarction 2013    [facility] RIGHT LEG AFFECTED       Past Surgical History:   Procedure Laterality Date    ABDOMINAL HERNIA REPAIR      CATARACT REMOVAL      right    CHOLECYSTECTOMY  11/2011    DENTAL SURGERY      EYE SURGERY      [facility]. DIALYSIS CATHETER Right 8/27/2020    TESIO CATHETER INSERTION performed by Truman [facility] MD at [facility]      removal of sac in ear    SHUNT REVISION Right 1/21/2021    INSERTION AV GRAFT RIGHT ARM performed by Truman Daniels MD at [facility] [address] Right 4/8/2021    THROMBECTOMY RIGHT ARM AV GRAFT performed by Truman Daniels MD at [address] Left 8/9/2021    INSERTION OF TEMPORARY HEMODIALYSIS CATHETER performed by Ronny Trejo MD at [address] [place] [address]       No family history on file. reports that he quit smoking about 54 years ago. His smoking use included cigarettes. He has a 10.00 pack-year smoking history. He has never used smokeless tobacco. He reports previous alcohol use of about 1.0 standard drinks of alcohol per week. He reports that he does not use drugs. Allergies:  Sulfa antibiotics and Sulfa antibiotics    Current Medications:    epoetin mayra-epbx (RETACRIT) injection 2,400 Units, Once per day on Mon Wed Fri  aspirin chewable tablet 81 mg, QAM  atorvastatin (LIPITOR) tablet 10 mg, Nightly  gabapentin (NEURONTIN) capsule 100 mg, Nightly  metoprolol succinate (TOPROL XL) extended release tablet 25 mg, Daily  glucose (GLUTOSE) 40 % oral gel 15 g, PRN  dextrose 50 % IV solution, PRN  glucagon (rDNA) injection 1 mg, PRN  dextrose 5 % solution, PRN  sodium chloride flush 0.9 % injection 5-40 mL, 2 times per day  sodium chloride flush 0.9 % injection 5-40 mL, PRN  0.9 % sodium chloride infusion, PRN  ondansetron (ZOFRAN-ODT) disintegrating tablet 4 mg, Q8H PRN   Or  ondansetron (ZOFRAN) injection 4 mg, Q6H PRN  polyethylene glycol (GLYCOLAX) packet 17 g, Daily PRN  acetaminophen (TYLENOL) tablet 650 mg, Q6H PRN   Or  acetaminophen (TYLENOL) suppository 650 mg, Q6H PRN  insulin lispro (HUMALOG) injection vial 0-6 Units, TID WC  insulin lispro (HUMALOG) injection vial 0-3 Units, Nightly        Review of Systems:   A comprehensive review of systems was negative.     Physical exam:   /77   Pulse 79   Temp 97.8 °F (36.6 °C) (Oral)   Resp 14   Ht 5' 5\" (1.651 m)   Wt 181 lb 10.5 oz (82.4 kg)   SpO2 94%   BMI 30.23 kg/m²   Pleasant age-appropriate elderly white gentleman in no apparent distress NC/AT EOMI sclera conjunctive are clear and anicteric mucous membranes are moist  Neck soft supple trachea midline no bruit no JVD  CTA bilateral  Regular rhythm normal S1 and S2  Abdomen soft nontender CLARITYU CLOUDY 07/10/2021    SPECGRAV 1.020 07/10/2021    LEUKOCYTESUR LARGE 07/10/2021    UROBILINOGEN 0.2 07/10/2021    BILIRUBINUR Negative 07/10/2021    BILIRUBINUR NEGATIVE 02/20/2012    BLOODU LARGE 07/10/2021    GLUCOSEU Negative 07/10/2021    GLUCOSEU 500 02/20/2012     Microalbumen/Creatinine ratio:  No components found for: RUCREAT  Iron Saturation:  No components found for: PERCENTFE  TIBC:    Lab Results   Component Value Date    TIBC 177 08/24/2020     FERRITIN:    Lab Results   Component Value Date    FERRITIN 98 08/24/2020        Imaging:  XR CHEST PORTABLE   Final Result   No pneumothorax is identified. XR CHEST PORTABLE   Final Result   Technically limited portable exam.  Allowing for this, interval worsening   with apparent right upper lobe infiltrate or edema. In addition, left   basilar atelectasis or infiltrate suggested. Recommend continued follow-up. FLUORO FOR SURGICAL PROCEDURES    (Results Pending)       Assessment  1. ESRD  2. Anemia due to ESRD  3. 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 Hypertension  5. Nonfunctioning right upper extremity AV graft. Now status post temporary dialysis catheter. Awaiting placement of tunneled dialysis catheter. Recommendations  1. Continue IHD support for solute and volume clearance 3 times per week currently through temporary dialysis catheter  2. Await placement of tunneled dialysis catheter  3. Continue SHANDRA (Retacrit while here)  4. Continue other aspects of renal management  5. Follow labs, BP  We will follow patient as outpatient, okay for discharge from renal status today. Comment  Mr. Roca is getting along quite nicely on dialysis. His main complaint as regards to dialysis is that his time at the dialysis center \"is boring. \"  He spends his time there typically either reading or playing games on his iPad.   Given his advanced age and multiple comorbidities we have discussed on a number of times in the past several months discontinuing dialysis if the quality of his life dropped below that which he could tolerate. His answer has been consistent, simply that he does not wish to die and feels that he has adapted adequately to his life on dialysis 3 days/week such that he does not feel that he is suffering unduly. He has discussed on a number of occasions with other members of his family, including his niece, with whom I spoke earlier today, and he has made them aware that he wishes to continue with dialysis as long as he is getting along so well. Thank you for the opportunity to participate in the care of your pleasant patient. We look forward to following along with you.         Electronically signed by Marilyn Mtz MD on 8/11/2021 at 1:25 PM

## 2021-08-11 NOTE — DISCHARGE INSTR - COC
Continuity of Care Form    Patient Name: Izzy Kraus   :  1931  MRN:  [id number]    Admit date:  2021  Discharge date:  2021    Code Status Order: Full Code   Advance Directives:   Advance Care Flowsheet Documentation     Date/Time Healthcare Directive Type of Healthcare Directive Copy in [address] Agent's Name Healthcare Agent's Phone Number    21 8633  Yes, patient has an advance directive for healthcare treatment  Durable power of  for health care  No, copy requested from family  Healthcare power of   [facility]  [phone], [phone]    21 1230  No, patient does not have an advance directive for healthcare treatment  Joya [address]          Admitting Physician:  Sadaf Vigil MD  PCP: Layla Bob DO    Discharging Nurse: [facility] - BATH Unit/Room#: 1073/6554-X  Discharging Unit Phone Number: [phone]    Emergency Contact:   Extended Emergency Contact Information  Primary Emergency Contact: Chicho Rodriguez [address] Phone: [phone]  Mobile Phone: [phone]  Relation: Brother/Sister  Secondary Emergency Contact: Sia Stahl  [place] Phone: [phone]  Mobile Phone: [phone]  Relation: Niece/Nephew   needed?  No    Past Surgical History:  Past Surgical History:   Procedure Laterality Date    ABDOMINAL HERNIA REPAIR      CATARACT REMOVAL      right    CHOLECYSTECTOMY  2011    DENTAL SURGERY      EYE SURGERY      [facility]. DIALYSIS CATHETER Right 2020    TESIO CATHETER INSERTION performed by [facility]chan Ferreira MD at [facility]      removal of sac in ear    SHUNT REVISION Right 2021    INSERTION AV GRAFT RIGHT ARM performed by Kanchan Ferreira MD at [address] [place] [address] Right 2021    THROMBECTOMY RIGHT ARM AV GRAFT performed by Kanchan Ferreira MD at [address] SURGERY Left 8/9/2021    INSERTION OF TEMPORARY HEMODIALYSIS CATHETER performed by Mary Pollard MD at [address]       Immunization History:   Immunization History   Administered Date(s) Administered    Td, unspecified formulation 09/28/2011       Active Problems:  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    Diabetes mellitus ([facility] E11.9    Diabetes mellitus ([facility] E11.9    Diabetic peripheral neuropathy ([facility] E11.42    Osteoarthritis M19.90    Gait abnormality R26.9    Physical deconditioning R53.81    Lumbar spondylitis ([facility]) M46.96    Anemia D64.9    Fx humer, med condyl-closed S42.463A    Cerebral infarction ([facility] I63.9    Renal failure N19    TIA (transient ischemic attack) G45.9    ASHD (arteriosclerotic heart disease) I25.10    Diabetes mellitus (HCC) E11.9    PVD (peripheral vascular disease) with claudication ([facility]) I73.9    History of CVA (cerebrovascular accident) Z86.73    Pain in lower limb M79.606    Cerebral infarction ([facility]) I63.9    CKD 3 N18.30    Diabetes mellitus-2 E11.9    Acute respiratory failure ([facility]) J96.00    Decubitus ulcer of sacral region, stage 1 L89.151    Right sided weakness R53.1    Hypoglycemia E16.2    Acute kidney injury (ZAHRAA) with acute tubular necrosis (ATN) ([facility]) N17.0    Acute kidney failure (HCC) N17.9    Encounter regarding vascular access for dialysis for end-stage renal disease ([facility] N18.6, Z99.2    ESRD (end stage renal disease) on dialysis ([facility] N18.6, Z99.2    Diabetic ulcer of toe of right foot associated with type 2 diabetes mellitus, with fat layer exposed ([address] E11.621, L97.512    UTI (urinary tract infection) N39.0    AV graft thrombosis, initial encounter ([facility] C04.064F       Isolation/Infection:   Isolation          No Isolation        Patient Infection Status     Infection Onset Added Last Indicated Last Indicated By Review Planned Expiration Resolved Resolved By    None active    Resolved    COVID-19 Rule Out 07/10/21 07/10/21 07/10/21 COVID-19, Rapid (Ordered)   07/10/21 Rule-Out Test Resulted    COVID-19 Rule Out 04/02/21 04/02/21 04/02/21 Covid-19 Ambulatory (Ordered)   04/03/21 Rule-Out Test Resulted    COVID-19 Rule Out 01/15/21 01/16/21 01/16/21 Covid-19 Ambulatory (Ordered)   01/17/21 Rule-Out Test Resulted    COVID-19 Rule Out 09/11/20 09/11/20 09/11/20 Covid-19 Ambulatory (Ordered)   09/12/20 Rule-Out Test Resulted    COVID-19 Rule Out 08/24/20 08/24/20 08/24/20 Covid-19 Ambulatory (Ordered)   08/25/20 Rule-Out Test Resulted    COVID-19 Rule Out 08/17/20 08/17/20 08/17/20 Covid-19 Ambulatory (Ordered)   08/18/20 Rule-Out Test Resulted          Nurse Assessment:  Last Vital Signs: /77   Pulse 79   Temp 97.8 °F (36.6 °C) (Oral)   Resp 14   Ht 5' 5\" (1.651 m)   Wt 181 lb 10.5 oz (82.4 kg)   SpO2 94%   BMI 30.23 kg/m²     Last documented pain score (0-10 scale): Pain Level: 0  Last Weight:   Wt Readings from Last 1 Encounters:   08/11/21 181 lb 10.5 oz (82.4 kg)     Mental Status:  oriented and alert    IV Access:  - Dialysis Catheter  - site  right and chest, insertion date: 8/11/2021    Nursing Mobility/ADLs:  Walking   Assisted  Transfer  Assisted  Bathing  Assisted  Dressing  Assisted  Toileting  Assisted  Feeding  Independent  Med Admin  Assisted  Med Delivery   whole    Wound Care Documentation and Therapy:  Wound 01/12/19 Coccyx Mid non-blanchable redness (Active)   Number of days: 942       Wound 07/06/21 Toe (Comment  which one) Right;Dorsal Wound # 1 (Active)   Dressing Status Clean;Dry; Intact 07/11/21 1945   Wound Cleansed Cleansed with saline 07/11/21 0805   Dressing/Treatment Open to air 08/09/21 2210   Wound Length (cm) 0.3 cm 08/09/21 1857   Wound Width (cm) 0.3 cm 08/09/21 1857   Wound Surface Area (cm^2) 0.09 cm^2 08/09/21 1857 Change in Wound Size % (l*w) 87.5 08/09/21 1857   Wound Assessment Dry 07/12/21 1637   Drainage Amount None 08/09/21 2215   Odor None 08/09/21 2215   Sharyn-wound Assessment Dry/flaky 07/12/21 1637   Number of days: 35       Wound 07/10/21 Sacrum (Active)   Wound Image   07/12/21 1637   Wound Etiology Deep tissue/Injury 08/10/21 1815   Dressing Status New dressing applied 08/10/21 2010   Dressing/Treatment Foam 08/10/21 2010   Wound Length (cm) 6 cm 08/09/21 1858   Wound Width (cm) 7 cm 08/09/21 1858   Wound Surface Area (cm^2) 42 cm^2 08/09/21 1858   Change in Wound Size % (l*w) -75 08/09/21 1858   Wound Assessment Purple/maroon;Non-blanchable erythema 08/10/21 2010   Drainage Amount None 08/10/21 2010   Odor None 08/10/21 2010   Sharyn-wound Assessment Dry/flaky 08/10/21 2010   Number of days: 31       Wound 08/09/21 Heel Left dti (Active)   Dressing/Treatment Open to air 08/10/21 2010   Wound Length (cm) 1 cm 08/10/21 2010   Wound Width (cm) 1 cm 08/10/21 2010   Wound Depth (cm) 0.1 cm 08/10/21 2010   Wound Surface Area (cm^2) 1 cm^2 08/10/21 2010   Change in Wound Size % (l*w) 83.33 08/10/21 2010   Wound Volume (cm^3) 0.1 cm^3 08/10/21 2010   Wound Assessment Dry;Pink/red 08/10/21 2010   Number of days: 1       Wound 08/09/21 Heel Right Non-blanchable redness (Active)   Dressing/Treatment Open to air 08/10/21 2010   Wound Assessment Dry;Pink/red 08/09/21 2215   Number of days: 1        Elimination:  Continence:   · Bowel: Yes  · Bladder: Yes  Urinary Catheter: None   Colostomy/Ileostomy/Ileal Conduit: No       Date of Last BM: NA    Intake/Output Summary (Last 24 hours) at 8/11/2021 1435  Last data filed at 8/11/2021 1119  Gross per 24 hour   Intake 960 ml   Output 1500 ml   Net -540 ml     I/O last 3 completed shifts: In: 600 [P.O.:300]  Out: 1500     Safety Concerns:      At Risk for Falls    Impairments/Disabilities:      Vision, Hearing and Paralysis - RUE    Nutrition Therapy:  Current Nutrition Therapy:   - Oral Diet:  Carb Control 3 carbs/meal (1500kcals/day)    Routes of Feeding: Oral  Liquids: No Restrictions  Daily Fluid Restriction: no  Last Modified Barium Swallow with Video (Video Swallowing Test): not done    Treatments at the Time of Hospital Discharge:   Respiratory Treatments: none  Oxygen Therapy:  is not on home oxygen therapy. Ventilator:    - No ventilator support    Rehab Therapies: Physical Therapy and Occupational Therapy  Weight Bearing Status/Restrictions: No weight bearing restirctions  Other Medical Equipment (for information only, NOT a DME order):  wheelchair and bedside commode  Other Treatments: NA    Patient's personal belongings (please select all that are sent with patient):  Glasses, Hearing Aides bilateral, Dentures dentures    RN SIGNATURE:  Electronically signed by Jono Adams RN on 8/11/21 at 2:45 PM EDT    CASE MANAGEMENT/SOCIAL WORK SECTION    Inpatient Status Date: ***    Readmission Risk Assessment Score:  Readmission Risk              Risk of Unplanned Readmission:  20           Discharging to Facility/ Agency   · Name:   · Address:  · Phone:  · Fax:    Dialysis Facility (if applicable)   · Name:  · Address:  · Dialysis Schedule:  · Phone:  · Fax:    / signature: {Esignature:[id number]}    PHYSICIAN SECTION    Prognosis: {Prognosis:4006406894}    Condition at Discharge: [facility] Patient Condition:[id number]}    Rehab Potential (if transferring to Rehab): {Prognosis:0710085192}    Recommended Labs or Other Treatments After Discharge: ***    Physician Certification: I certify the above information and transfer of Adryan Pozo  is necessary for the continuing treatment of the diagnosis listed and that he requires {Admit to Appropriate Level of Care:92640} for {GREATER/LESS:698953746} 30 days.      Update Admission H&P: {CHP DME Changes in MZFIZ:744404259}    PHYSICIAN SIGNATURE:  Dr. Serafin Perez MD

## 2021-08-11 NOTE — ANESTHESIA PRE PROCEDURE
Department of Anesthesiology  Preprocedure Note       Name:  Wang Ruiz   Age:  80 y.o.  :  1931                                          MRN:  [id number]         Date:  2021      Surgeon: Blanca Ling):  Violette Caruso MD    Procedure: Procedure(s):  INSERTION OF TESIO, REMOVAL OF TEMPORARY CATHETHER    Medications prior to admission:   Prior to Admission medications    Medication Sig Start Date End Date Taking? Authorizing Provider   lisinopril (PRINIVIL;ZESTRIL) 2.5 MG tablet Take 2.5 mg by mouth daily   Yes Historical Provider, MD   metoprolol succinate (TOPROL XL) 25 MG extended release tablet Take 1 tablet by mouth daily 21  Yes Britt Ugalde,    Magnesium Oxide (MAG-OXIDE PO) Take 40 mg by mouth    Yes Historical Provider, MD   atorvastatin (LIPITOR) 10 MG tablet Take 10 mg by mouth nightly   Yes Historical Provider, MD   gabapentin (NEURONTIN) 100 MG capsule Take 100 mg by mouth nightly.    Yes Historical Provider, MD   ascorbic acid (VITAMIN C) 500 MG tablet Take 1,000 mg by mouth daily   Yes Historical Provider, MD   zinc gluconate 50 MG tablet Take 50 mg by mouth daily   Yes Historical Provider, MD   Multiple Vitamins-Minerals (PRESERVISION AREDS 2 PO) Take 1 tablet by mouth daily    Yes Historical Provider, MD   insulin lispro (HUMALOG KWIKPEN) 200 UNIT/ML SOPN pen Inject into the skin 4 times daily as needed Before meals and nightly   Yes Historical Provider, MD   famotidine (PEPCID) 40 MG tablet Take 40 mg by mouth as needed   Yes Historical Provider, MD   vitamin D (CHOLECALCIFEROL) 25 MCG (1000 UT) TABS tablet Take 1,000 Units by mouth daily   Yes Historical Provider, MD   aspirin 81 MG tablet Take 81 mg by mouth every morning    Yes Historical Provider, MD   white petrolatum OINT ointment Apply 1 applicator topically 2 times daily 21   Britt Ugalde DO       Current medications:    Current Facility-Administered Medications   Medication Dose Route Frequency Provider vial 0-3 Units  0-3 Units Subcutaneous Nightly Rbui Marshall MD   1 Units at 08/10/21 2069       Allergies:     Allergies   Allergen Reactions    Sulfa Antibiotics      Affects renal function and blood pressure    Sulfa Antibiotics        Problem List:    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    Diabetes mellitus ([facility] E11.9    Diabetes mellitus ([facility] E11.9    Diabetic peripheral neuropathy (HCC) E11.42    Osteoarthritis M19.90    Gait abnormality R26.9    Physical deconditioning R53.81    Lumbar spondylitis ([facility]) M46.96    Anemia D64.9    Fx humer, med condyl-closed S42.463A    Cerebral infarction ([facility] I63.9    Renal failure N19    TIA (transient ischemic attack) G45.9    ASHD (arteriosclerotic heart disease) I25.10    Diabetes mellitus ([facility]) E11.9    PVD (peripheral vascular disease) with claudication ([facility]) I73.9    History of CVA (cerebrovascular accident) Z86.73    Pain in lower limb M79.606    Cerebral infarction ([facility]) I63.9    CKD 3 N18.30    Diabetes mellitus-2 E11.9    Acute respiratory failure ([facility]) J96.00    Decubitus ulcer of sacral region, stage 1 L89.151    Right sided weakness R53.1    Hypoglycemia E16.2    Acute kidney injury (ZAHRAA) with acute tubular necrosis (ATN) ([facility]) N17.0    Acute kidney failure (HCC) N17.9    Encounter regarding vascular access for dialysis for end-stage renal disease ([facility] N18.6, Z99.2    ESRD (end stage renal disease) on dialysis ([facility] N18.6, Z99.2    Diabetic ulcer of toe of right foot associated with type 2 diabetes mellitus, with fat layer exposed ([facility] E11.621, L97.512    UTI (urinary tract infection) N39.0    AV graft thrombosis, initial encounter ([facility] R02.896A       Past Medical History:        Diagnosis Date    Arthritis     CAD (coronary artery disease)     CHF (congestive heart failure) (HCC)     Chronic kidney disease     COVID-19     [facility]cere[facility] vascular accident) [facility]     Diabetes mellitus ([address]     Diabetic peripheral neuropathy ([address] 2012    Hemodialysis patient ([facility]     History of CVA (cerebrovascular accident) 2014    Hypertension     Other disorders of kidney and ureter     prostate infections in past    PVD (peripheral vascular disease) with claudication ([address] 2014    Right sided weakness 3/19/2018    TIA (transient ischemic attack)     possible several years ago    Unspecified cerebral artery occlusion with cerebral infarction     [facility] RIGHT LEG AFFECTED       Past Surgical History:        Procedure Laterality Date    ABDOMINAL HERNIA REPAIR      CATARACT REMOVAL      right    CHOLECYSTECTOMY  2011    DENTAL SURGERY      EYE SURGERY      [facility] DIALYSIS CATHETER Right 2020    TESIO CATHETER INSERTION performed by aMrtha Ash MD at [facility]      removal of sac in ear    SHUNT REVISION Right 2021    INSERTION AV GRAFT RIGHT ARM performed by Martha Ash MD at [address] [place] [address] Right 2021    THROMBECTOMY RIGHT ARM AV GRAFT performed by Martha Ash MD at [facility] VASCULAR SURGERY Left 2021    INSERTION OF TEMPORARY HEMODIALYSIS CATHETER performed by Martha Ash MD at [address] [place] [address] History:    Social History     Tobacco Use    Smoking status: Former Smoker     Packs/day: 0.50     Years: 20.00     Pack years: 10.00     Types: Cigarettes     Quit date:      Years since quittin.5    Smokeless tobacco: Never Used   Substance Use Topics    Alcohol use: Not Currently     Alcohol/week: 1.0 standard drinks     Types: 1 Glasses of wine per week                                Counseling given: Not Answered      Vital Signs (Current):   Vitals:    21 0830 21 0900 21 0930 21 1753 BP: 118/60 (!) 118/57 118/60 118/60   Pulse: 70 63 66 72   Resp:    18   Temp:    36.8 °C (98.2 °F)   TempSrc:       SpO2:       Weight:    181 lb 10.5 oz (82.4 kg)   Height:                                                  BP Readings from Last 3 Encounters:   08/11/21 118/60   08/09/21 103/64   07/12/21 (!) 117/58       NPO Status: Time of last liquid consumption: 0000                        Time of last solid consumption: 1900                        Date of last liquid consumption: 08/11/21                        Date of last solid food consumption: 08/10/21    BMI:   Wt Readings from Last 3 Encounters:   08/11/21 181 lb 10.5 oz (82.4 kg)   07/12/21 179 lb 10.8 oz (81.5 kg)   06/01/21 177 lb (80.3 kg)     Body mass index is 30.23 kg/m². CBC:   Lab Results   Component Value Date    WBC 4.3 08/10/2021    RBC 2.90 08/10/2021    HGB 8.8 08/10/2021    HCT 28.9 08/10/2021    MCV 99.7 08/10/2021    RDW 13.8 08/10/2021     08/10/2021       CMP:   Lab Results   Component Value Date     08/11/2021    K 3.7 08/11/2021    K 4.8 08/10/2021     08/11/2021    CO2 32 08/11/2021    BUN 21 08/11/2021    CREATININE 2.6 08/11/2021    GFRAA 28 08/11/2021    LABGLOM 23 08/11/2021    GLUCOSE 105 08/11/2021    GLUCOSE 163 02/22/2012    PROT 5.9 08/09/2021    CALCIUM 8.6 08/11/2021    BILITOT 0.3 08/09/2021    ALKPHOS 89 08/09/2021    AST 10 08/09/2021    ALT 9 08/09/2021       POC Tests: No results for input(s): POCGLU, POCNA, POCK, POCCL, POCBUN, POCHEMO, POCHCT in the last 72 hours.     Coags:   Lab Results   Component Value Date    PROTIME 16.6 05/15/2021    PROTIME 15.1 01/29/2012    INR 1.5 05/15/2021    APTT <20.0 11/29/2020       HCG (If Applicable): No results found for: PREGTESTUR, PREGSERUM, HCG, HCGQUANT     ABGs:   Lab Results   Component Value Date    PO2ART 21.8 08/09/2021    IXW1ZUS 56.5 08/09/2021    WBF1ZGN 29.9 08/09/2021        Type & Screen (If Applicable):  No results found for: LABABO, [facility]    Drug/Infectious Status (If Applicable):  No results found for: HIV, HEPCAB    COVID-19 Screening (If Applicable):   Lab Results   Component Value Date    COVID19 Not Detected 08/09/2021    COVID19 Not Detected 04/02/2021     EKG 7/10/2021    Ventricular Rate     Atrial Rate     QRS Duration ms 94    Q-T Interval ms 382    QTc Calculation (Bazett) ms 495    R Axis degrees -16    T Axis degrees 99    Resulting Agency  [facility]      Narrative & Impression    Atrial fibrillation  Nonspecific T wave abnormality  Abnormal ECG  When compared with ECG of 14-AUG-2020 16:48,  Atrial fibrillation present  Confirmed by Sandeep Domingo ([id number]) on 7/11/2021 5:42:34 AM       ECHO 6/10/2014    Findings      Left Ventricle   Left ventricular size is grossly normal.   Mild left ventricular concentric hypertrophy noted. Normal LV segmental wall motion. Stage I diastolic dysfunction      Right Ventricle   Normal right ventricular size and function. Left Atrium   The left atrium is mildly dilated. Right Atrium   Right Atrium is not clearly visualized. Mitral Valve   Normal mitral valve structure and function. Physiologic and/or trace mitral regurgitation is present. No evidence of mitral valve stenosis. Tricuspid Valve   Normal tricuspid valve structure and function. Mild tricuspid regurgitation. RVSP is normal.      Aortic Valve   The aortic valve is trileaflet. The aortic valve appears mildly sclerotic. No hemodynamically significant aortic stenosis is present. Pulmonic Valve   Pulmonic valve is structurally normal.   Mild pulmonic regurgitation present. Pericardial Effusion   There is a small pericardial effusion noted with no evidence of tamponade. Aorta   Moderately dilated aortic root. Miscellaneous   Normal Inferior Vena Cava diameter      Conclusions      Summary   Mild left ventricular concentric hypertrophy. Normal LV segmental wall motion.    Stage I diastolic dysfunction   The left atrium is mildly dilated. Moderately dilated aortic root. There is a small pericardial effusion noted with no evidence of tamponade. Anesthesia Evaluation  Patient summary reviewed and Nursing notes reviewed no history of anesthetic complications:   Airway: Mallampati: II  TM distance: >3 FB   Neck ROM: full  Mouth opening: > = 3 FB Dental:    (+) upper dentures, lower dentures and edentulous      Pulmonary:   (+) COPD:  decreased breath sounds,  wheezes,                             Cardiovascular:  Exercise tolerance: poor (<4 METS),   (+) hypertension:, CAD:, dysrhythmias: atrial fibrillation, CHF:,       ECG reviewed  Rhythm: irregular    Echocardiogram reviewed         Beta Blocker:  Dose within 24 Hrs         Neuro/Psych:   (+) CVA (limited movement on right side): residual symptoms, neuromuscular disease (Diabetic peripheral neuropathy ):, TIA,             GI/Hepatic/Renal:   (+) renal disease (had dialysis this morning.): dialysis and ESRD,           Endo/Other:    (+) DiabetesType II DM, , : arthritis: OA., .                 Abdominal:             Vascular:   + PVD, aortic or cerebral, . Other Findings:           Anesthesia Plan      MAC     ASA 4       Induction: intravenous. Anesthetic plan and risks discussed with patient. Use of blood products discussed with patient whom consented to blood products. Plan discussed with CRNA and attending.                   Annita Treviño RN   8/11/2021

## 2021-08-11 NOTE — OP NOTE
Operative Note      Patient: Wang Ruiz  YOB: 1931  MRN: [id number]    Date of Procedure: 8/11/2021    Pre-Op Diagnosis: ESRD on dialysis. Post-Op Diagnosis: Same       Procedure(s):  INSERTION OF TESIO, REMOVAL OF TEMPORARY CATHETHER    Surgeon(s):  Violette Caruso MD    Assistant:   Surgical Assistant: Tyesah Padron  Resident: Naun Jin MD    Anesthesia: Monitor Anesthesia Care    Estimated Blood Loss (mL): Minimal    Complications: None    Specimens:   * No specimens in log *    Implants:  Implant Name Type Inv. Item Serial No.  Lot No. LRB No. Used Action   CATHETER HD PRECRV 15.5 FRX28 CM LT DL BASIC SET TITAN HD  CATHETER HD PRECRV 15.5 FRX28 CM LT DL BASIC SET TITAN HD  [employer] [id number] Right 1 Implanted         Drains:   [REMOVED] Urethral Catheter Coude 16 fr (Removed)   Site Assessment Urethral drainage 07/12/21 0800   Urine Color Straw 07/12/21 0800   Urine Appearance Cloudy 07/12/21 0800   Output (mL) 200 mL 07/12/21 1330       Findings: somewhat mobile hyperdensity filling most of the lumen in distal right IJ starting right near clavicle but was able to pass wire through it. Successful placement of right IJ tunneled dialysis catheter. Detailed Description of Procedure: The patient was identified and the procedure was confirmed. The neck and chest and left groin were prepped and draped in the usual sterile fashion. Next, 1% lidocaine mixed with 0.25% Marcaine was used for local anesthesia. The right internal jugular vein was percutaneously entered and a guidewire was advanced into the superior vena cava under fluoroscopic guidance (first a small wire, then a sheath over the wire to allow replacement with a larger wire). A small incision was made around the wire. The catheter was pulled through a subcutaneous tunnel from an inferior chest stab incision to the neck incision. The dilators were passed over the wire followed by the introducer.  The catheter was passed through the introducer and the tip was positioned in the superior vena cava right atrial junction under fluoroscopic guidance. Both lumens were noted to withdrawal and flush blood easily and were flushed with heparinized saline solution. They catheter was secured to the skin with nylon sutures and the neck incision was approximated with a buried Vicryl suture. Sterile dressings were applied to the incisions in the operating room (glue on neck, medipore on chest with a 2cm area of initial strikethrough that stabilized with pressure). Needle, sponge, and instrument counts were reported as correct times two. The patient tolerated the procedure and was transferred to the recovery area in satisfactory condition. Dr Jefe Gilbert was present for entire procedure.     Electronically signed by Emerald Bingham MD on 8/11/2021 at 11:38 AM

## 2021-08-11 NOTE — PROGRESS NOTES
Patient is in dialysis this morning. Discussed today's surgery with him yesterday and he signed his consent. We will reattempt Tessio placement (and remove temporary line if successful).     Electronically signed by Krissy Maynard MD on 8/11/2021 at 6:27 AM

## 2021-08-11 NOTE — FLOWSHEET NOTE
08/11/21 0938   Vital Signs   /60   Temp 98.2 °F (36.8 °C)   Pulse 72   Resp 18   Weight 181 lb 10.5 oz (82.4 kg)   Weight Method Bed scale   Percent Weight Change 0.37   Pain Assessment   Pain Assessment 0-10   Pain Level 0   Post-Hemodialysis Assessment   Post-Treatment Procedures Blood returned;Catheter Capped, clamped with Saline x2 ports   Machine Disinfection Process Exterior Machine Disinfection   Rinseback Volume (ml) 300 ml   Total Liters Processed (l/min) 71.6 l/min   Dialyzer Clearance Lightly streaked   Duration of Treatment (minutes) 210 minutes   Heparin amount administered during treatment (units) 0 units   Hemodialysis Intake (ml) 500 ml   Hemodialysis Output (ml) 0 ml   NET Removed (ml)   (+500)   Tolerated Treatment Fair   Patient Response to Treatment c/o leg cramps throughout treatment, no fluid removal, +500cc with profile A   Bilateral Breath Sounds Diminished   Edema Right lower extremity; Left lower extremity   Physician Notified?  No

## 2021-08-11 NOTE — CARE COORDINATION
Discharge order noted. Patient had INSERTION OF TESIO, REMOVAL OF TEMPORARY CATHETHER today. Patient attends hemodialysis on Monday, Wednesday and Friday at [facility] phone# [phone]. I called [facility] to let them know that patient is discharging back to [facility] today. Transportation [place] via [place] [address] for 3:30pm. Charge nurse, RN, liaison, patient and harry Miner all notified. Last negative Covid-19 test was Monday August 9. Ambulance form in envelope in soft chart.     Kodi Buck RN CM

## 2021-08-11 NOTE — DISCHARGE SUMMARY
Physician Discharge Summary     Patient ID:  Liset Gutierrez  [id number]  5 y.o.  9/20/1931    Admit date: 8/9/2021    Discharge date and time:  8/11/2021     Admission Diagnoses:   No chief complaint on file. 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 *       Consults: vascular surgery, nephrology    Procedures: Tessio insertion    Hospital Course:   Patient presented with thrombosis right AV graft. He was initially brought to the hospital as an outpatient procedure to try and get a Tessio in, but due to technical reasons that was unable to be done initially so a temporary catheter was placed in the groin.   A subsequent attempt at getting the Tessio inserted was successful so he will be discharged with that in place     Discharge Exam:  Vitals:    08/11/21 1200 08/11/21 1215 08/11/21 1230 08/11/21 1302   BP: 125/67 133/66 138/76 133/77   Pulse: 78 77 80 79   Resp: 13 11 12 14   Temp:   98 °F (36.7 °C) 97.8 °F (36.6 °C)   TempSrc:    Oral   SpO2: 95% 96% 93% 94%   Weight:       Height:            General appearance: NAD, conversant  HEENT: AT/NC, MMM  Neck: FROM, supple  Lungs: Clear to auscultation, WOB normal  CV: RRR, no MRGs  Chest: Tessio in place, dressing c/d/i  Abdomen: Soft, non-tender; no masses or HSM, +BS  Extremities: No peripheral edema or digital cyanosis  Skin: no rash, lesions or ulcers  Psych: Calm and cooperative  Neuro: Alert and interactive, nonfocal     Condition:  Stable    Disposition: SNF    Patient Instructions:   Discharge Medication List as of 8/11/2021  2:52 PM      CONTINUE these medications which have NOT CHANGED    Details   lisinopril (PRINIVIL;ZESTRIL) 2.5 MG tablet Take 2.5 mg by mouth dailyHistorical Med      metoprolol succinate (TOPROL XL) 25 MG extended release tablet Take 1 tablet by mouth daily, Disp-30 tablet, R-3Normal      white petrolatum OINT ointment Apply 1 applicator topically 2 times daily, Topical, 2 TIMES DAILY Starting Mon 7/12/2021, Disp-90 g, R-0, Normal      Magnesium Oxide (MAG-OXIDE PO) Take 40 mg by mouth Historical Med      atorvastatin (LIPITOR) 10 MG tablet Take 10 mg by mouth nightlyHistorical Med      gabapentin (NEURONTIN) 100 MG capsule Take 100 mg by mouth nightly. Historical Med      ascorbic acid (VITAMIN C) 500 MG tablet Take 1,000 mg by mouth dailyHistorical Med      zinc gluconate 50 MG tablet Take 50 mg by mouth dailyHistorical Med      Multiple Vitamins-Minerals (PRESERVISION AREDS 2 PO) Take 1 tablet by mouth daily Historical Med      insulin lispro (HUMALOG KWIKPEN) 200 UNIT/ML SOPN pen Inject into the skin 4 times daily as needed Before meals and nightlyHistorical Med      famotidine (PEPCID) 40 MG tablet Take 40 mg by mouth as neededHistorical Med      vitamin D (CHOLECALCIFEROL) 25 MCG (1000 UT) TABS tablet Take 1,000 Units by mouth dailyHistorical Med      aspirin 81 MG tablet Take 81 mg by mouth every morning Historical Med           Activity: activity as tolerated  Diet: renal diet    Follow-up with PCP in 1 week.     Note that over 30 minutes was spent in preparing discharge papers, discussing discharge with patient, medication review, etc.    Signed:  Chi Hall MD    8/11/2021  5:00 PM

## 2021-08-11 NOTE — PROGRESS NOTES
Occupational Therapy  Received OT order, Reviewed Chart. Noted pt is currently at Dialysis and is tentatively scheduled for placement of tunneled dialysis catheter later this date. Will attempt OT eval next date.   Thank you for this referral. Justyna Amaral, MELANY, OTR/L  # [phone]

## 2021-08-12 NOTE — ANESTHESIA POSTPROCEDURE EVALUATION
Department of Anesthesiology  Postprocedure Note    Patient: Denise Ribera  MRN: [id number]  YOB: 1931  Date of evaluation: 8/12/2021  Time:  6:35 AM     Procedure Summary     Date: 08/11/21 Room / Location: [facility] OR 04 / [facility]    Anesthesia Start: 5303 Anesthesia Stop: 1177    Procedure: INSERTION OF TESIO, REMOVAL OF TEMPORARY CATHETHER (Right Chest) Diagnosis: (/)    Surgeons: José Miguel Rodriguez MD Responsible Provider: Suzie Baca DO    Anesthesia Type: MAC ASA Status: 4          Anesthesia Type: MAC    Joel Phase I: Joel Score: 10    Joel Phase II:      Last vitals: Reviewed and per EMR flowsheets.        Anesthesia Post Evaluation    Patient location during evaluation: bedside  Patient participation: complete - patient cannot participate  Level of consciousness: awake and alert  Airway patency: patent  Nausea & Vomiting: no nausea and no vomiting  Complications: no  Cardiovascular status: blood pressure returned to baseline  Respiratory status: acceptable  Hydration status: euvolemic

## 2021-08-16 NOTE — ED PROVIDER NOTES
HPI:  11/29/20,   Time: 3:24 PM AVTAR Church is a 80 y.o. male presenting to the ED for cp/epigastric pain, beginning 6 hrs ago. The complaint has been persistent, moderate in severity, and worsened by nothing. Feels like indigestion, but no hx same. No n/v/d/sob/cough/congestion. daly pedal edema. Bib ems from nh. No fevers/chill/sweats. Nothing makes better    Review of Systems:   Pertinent positives and negatives are stated within HPI, all other systems reviewed and are negative.          --------------------------------------------- PAST HISTORY ---------------------------------------------  Past Medical History:  has a past medical history of Arthritis, CAD (coronary artery disease), Chronic kidney disease, COPD (chronic obstructive pulmonary disease) ([facility] [address] Decubitus ulcer of sacral region, stage 1, Diabetes mellitus ([facility] [address] Diabetic peripheral neuropathy ([facility] [address] History of CVA (cerebrovascular accident), Hypertension, Other disorders of kidney and ureter, Pain in lower limb, PVD (peripheral vascular disease) with claudication ([facility] [address] Right sided weakness, TIA (transient ischemic attack), and Unspecified cerebral artery occlusion with cerebral infarction. Past Surgical History:  has a past surgical history that includes Abdominal hernia repair; Cataract removal; Dental surgery; Inguinal hernia repair; Cholecystectomy (11/2011); Inner ear surgery; eye surgery; and hc dialysis catheter (Right, 8/27/2020). Social History:  reports that he quit smoking about 53 years ago. His smoking use included cigarettes. He has a 10.00 pack-year smoking history. He has never used smokeless tobacco. He reports that he does not drink alcohol or use drugs. Family History: family history is not on file. The patients home medications have been reviewed.     Allergies: Sulfa antibiotics and Sulfa antibiotics        ---------------------------------------------------PHYSICAL by myself. BP (!) 143/90   Pulse 82   Temp 98 °F (36.7 °C) (Temporal)   Resp 18   Ht 5' 6\" (1.676 m)   Wt 190 lb (86.2 kg)   SpO2 98%   BMI 30.67 kg/m²   Oxygen Saturation Interpretation: Normal    The patients available past medical records and past encounters were reviewed. ------------------------------ ED COURSE/MEDICAL DECISION MAKING----------------------  Medications   nitroglycerin (NITRO-BID) 2 % ointment 0.5 inch (0.5 inches Topical Given 11/29/20 1529)   aluminum & magnesium hydroxide-simethicone (MAALOX) 30 mL, lidocaine viscous hcl (XYLOCAINE) 5 mL (GI COCKTAIL) ( Oral Given 11/29/20 1528)         ED COURSE:       Medical Decision Making:    Sx better with tx, 1 trop noted, pain resolved. Discussed with pt treatment options, risks/benefits of both, with consideration of pandemic and inc risk at age group if contracted covid in hospital while eval for cp. Pt declines inpt tx, wishes to have accelerated trop and fu withpcp outpt. Trop x 2 neg, not pe by vinnie toney       This patient's ED course included: a personal history and physicial examination    This patient has remained hemodynamically stable during their ED course. Re-Evaluations:             Re-evaluation. Patients symptoms are improving    Re-examination  11/29/20   5:24 PM EST          Vital Signs:   Vitals:    11/29/20 1506   BP: (!) 143/90   Pulse: 82   Resp: 18   Temp: 98 °F (36.7 °C)   TempSrc: Temporal   SpO2: 98%   Weight: 190 lb (86.2 kg)   Height: 5' 6\" (1.676 m)     Card/Pulm:  Rhythm: normal rate. Heart Sounds: no murmurs, gallops, or rubs. clear to auscultation, no wheezes or rales and unlabored breathing. Capillary Refill: normal.  Radial Pulse:  equal.          Consultations:                 Critical Care:         Counseling:    The emergency provider has spoken with the patient and discussed todays results, in addition to providing specific details for the plan of care and counseling regarding the diagnosis and prognosis. Questions are answered at this time and they are agreeable with the plan.       --------------------------------- IMPRESSION AND DISPOSITION ---------------------------------    IMPRESSION  1. Chest pain, unspecified type    2. Abdominal pain, epigastric        DISPOSITION  Disposition: Discharge to home  Patient condition is stable    NOTE: This report was transcribed using voice recognition software.  Every effort was made to ensure accuracy; however, inadvertent computerized transcription errors may be present        Moo Robertson MD  11/30/20 4521 denies

## 2021-11-29 ENCOUNTER — HOSPITAL ENCOUNTER (EMERGENCY)
Age: 86
Discharge: HOME | DRG: 638 | End: 2021-11-29
Attending: EMERGENCY MEDICINE
Payer: MEDICARE

## 2021-11-29 ENCOUNTER — APPOINTMENT (OUTPATIENT)
Dept: GENERAL RADIOLOGY | Age: 86
DRG: 638 | End: 2021-11-29
Payer: MEDICARE

## 2021-11-29 VITALS
DIASTOLIC BLOOD PRESSURE: 68 MMHG | HEIGHT: 66 IN | OXYGEN SATURATION: 96 % | HEART RATE: 76 BPM | WEIGHT: 180 LBS | TEMPERATURE: 98.4 F | RESPIRATION RATE: 20 BRPM | SYSTOLIC BLOOD PRESSURE: 114 MMHG | BODY MASS INDEX: 28.93 KG/M2

## 2021-11-29 DIAGNOSIS — R53.1 GENERAL WEAKNESS: Primary | ICD-10-CM

## 2021-11-29 LAB
ALBUMIN SERPL-MCNC: 3.6 G/DL (ref 3.5–5.2)
ALP BLD-CCNC: 95 U/L (ref 40–129)
ALT SERPL-CCNC: 19 U/L (ref 0–40)
ANION GAP SERPL CALCULATED.3IONS-SCNC: 14 MMOL/L (ref 7–16)
AST SERPL-CCNC: 11 U/L (ref 0–39)
BASOPHILS ABSOLUTE: 0.02 E9/L (ref 0–0.2)
BASOPHILS RELATIVE PERCENT: 0.2 % (ref 0–2)
BILIRUB SERPL-MCNC: 0.3 MG/DL (ref 0–1.2)
BUN BLDV-MCNC: 70 MG/DL (ref 6–23)
CALCIUM SERPL-MCNC: 8.7 MG/DL (ref 8.6–10.2)
CHLORIDE BLD-SCNC: 98 MMOL/L (ref 98–107)
CHP ED QC CHECK: NORMAL
CO2: 24 MMOL/L (ref 22–29)
CREAT SERPL-MCNC: 6.2 MG/DL (ref 0.7–1.2)
EOSINOPHILS ABSOLUTE: 0.07 E9/L (ref 0.05–0.5)
EOSINOPHILS RELATIVE PERCENT: 0.8 % (ref 0–6)
GFR AFRICAN AMERICAN: 10
GFR NON-AFRICAN AMERICAN: 9 ML/MIN/1.73
GLUCOSE BLD-MCNC: 248 MG/DL
GLUCOSE BLD-MCNC: 265 MG/DL (ref 74–99)
HCT VFR BLD CALC: 32.2 % (ref 37–54)
HEMOGLOBIN: 10 G/DL (ref 12.5–16.5)
IMMATURE GRANULOCYTES #: 0.04 E9/L
IMMATURE GRANULOCYTES %: 0.5 % (ref 0–5)
LYMPHOCYTES ABSOLUTE: 1.02 E9/L (ref 1.5–4)
LYMPHOCYTES RELATIVE PERCENT: 11.6 % (ref 20–42)
MAGNESIUM: 2.4 MG/DL (ref 1.6–2.6)
MCH RBC QN AUTO: 31.4 PG (ref 26–35)
MCHC RBC AUTO-ENTMCNC: 31.1 % (ref 32–34.5)
MCV RBC AUTO: 101.3 FL (ref 80–99.9)
METER GLUCOSE: 248 MG/DL (ref 74–99)
MONOCYTES ABSOLUTE: 0.61 E9/L (ref 0.1–0.95)
MONOCYTES RELATIVE PERCENT: 6.9 % (ref 2–12)
NEUTROPHILS ABSOLUTE: 7.03 E9/L (ref 1.8–7.3)
NEUTROPHILS RELATIVE PERCENT: 80 % (ref 43–80)
PDW BLD-RTO: 16.2 FL (ref 11.5–15)
PHOSPHORUS: 4.7 MG/DL (ref 2.5–4.5)
PLATELET # BLD: 157 E9/L (ref 130–450)
PMV BLD AUTO: 10 FL (ref 7–12)
POTASSIUM REFLEX MAGNESIUM: 5.4 MMOL/L (ref 3.5–5)
RBC # BLD: 3.18 E12/L (ref 3.8–5.8)
SARS-COV-2, NAAT: NOT DETECTED
SODIUM BLD-SCNC: 136 MMOL/L (ref 132–146)
TOTAL PROTEIN: 6.5 G/DL (ref 6.4–8.3)
TROPONIN, HIGH SENSITIVITY: 66 NG/L (ref 0–11)
WBC # BLD: 8.8 E9/L (ref 4.5–11.5)

## 2021-11-29 PROCEDURE — 84484 ASSAY OF TROPONIN QUANT: CPT

## 2021-11-29 PROCEDURE — 84100 ASSAY OF PHOSPHORUS: CPT

## 2021-11-29 PROCEDURE — 93005 ELECTROCARDIOGRAM TRACING: CPT | Performed by: STUDENT IN AN ORGANIZED HEALTH CARE EDUCATION/TRAINING PROGRAM

## 2021-11-29 PROCEDURE — 82962 GLUCOSE BLOOD TEST: CPT

## 2021-11-29 PROCEDURE — 71045 X-RAY EXAM CHEST 1 VIEW: CPT

## 2021-11-29 PROCEDURE — 99284 EMERGENCY DEPT VISIT MOD MDM: CPT

## 2021-11-29 PROCEDURE — 85025 COMPLETE CBC W/AUTO DIFF WBC: CPT

## 2021-11-29 PROCEDURE — 80053 COMPREHEN METABOLIC PANEL: CPT

## 2021-11-29 PROCEDURE — 83735 ASSAY OF MAGNESIUM: CPT

## 2021-11-29 PROCEDURE — 2580000003 HC RX 258: Performed by: STUDENT IN AN ORGANIZED HEALTH CARE EDUCATION/TRAINING PROGRAM

## 2021-11-29 PROCEDURE — 87635 SARS-COV-2 COVID-19 AMP PRB: CPT

## 2021-11-29 RX ORDER — 0.9 % SODIUM CHLORIDE 0.9 %
1000 INTRAVENOUS SOLUTION INTRAVENOUS ONCE
Status: COMPLETED | OUTPATIENT
Start: 2021-11-29 | End: 2021-11-29

## 2021-11-29 RX ADMIN — SODIUM CHLORIDE 1000 ML: 9 INJECTION, SOLUTION INTRAVENOUS at 13:06

## 2021-11-29 ASSESSMENT — ENCOUNTER SYMPTOMS
EYE PAIN: 0
SHORTNESS OF BREATH: 0
ABDOMINAL PAIN: 0
SINUS PAIN: 0
NAUSEA: 0
SORE THROAT: 0
BACK PAIN: 0
VOMITING: 0
DIARRHEA: 0
COUGH: 0
RHINORRHEA: 0

## 2021-11-29 NOTE — ED NOTES
Bed:  HALL-09  Expected date:   Expected time:   Means of arrival:   Comments:  EMS - weakness times 4 days     Pradip Tipton RN  11/29/21 1283

## 2021-11-29 NOTE — Clinical Note
Remington Goodman was seen and treated in our emergency department on 11/29/2021. He may return to work on 11/30/2021. Dipesh Bradley is in the Emergency Department today for care of her immediate family member. She may return to work tomorrow. Thank you      If you have any questions or concerns, please don't hesitate to call.       Solomon Manuel, DO

## 2021-11-29 NOTE — ED PROVIDER NOTES
41-year-old male with past medical history of stroke in 2012 and chronic kidney disease currently on dialysis Monday, Wednesday, Friday presents today to the emergency department via EMS from home with complaints of weakness and near syncopal episode onset today prior to arrival.  Patient states that he has chronic right-sided deficits secondary to his stroke. His niece states that was helping him go to the bathroom when he started falling to the ground. Patient denies dizziness or lightheadedness. He states that he lost his footing secondary to his chronic right lower extremity weakness. Denies any associated pain in his right lower extremity. His niece is concerned because he appeared to be more weak than usual.  She states that he required 2 people to help him get up. Review of Systems   Constitutional: Negative for diaphoresis and fever. HENT: Negative for ear pain, rhinorrhea, sinus pain and sore throat. Eyes: Negative for pain. Respiratory: Negative for cough and shortness of breath. Cardiovascular: Negative for chest pain and leg swelling. Gastrointestinal: Negative for abdominal pain, diarrhea, nausea and vomiting. Genitourinary: Negative for dysuria, flank pain and frequency. Musculoskeletal: Negative for back pain, neck pain and neck stiffness. Neurological: Positive for weakness. Negative for dizziness, light-headedness and headaches. Psychiatric/Behavioral: Negative for agitation and confusion. Physical Exam  Vitals reviewed. Constitutional:       General: He is not in acute distress. Appearance: Normal appearance. He is not toxic-appearing. HENT:      Head: Normocephalic and atraumatic. Nose: No congestion or rhinorrhea. Eyes:      General: No scleral icterus. Right eye: No discharge. Left eye: No discharge. Extraocular Movements: Extraocular movements intact. Pupils: Pupils are equal, round, and reactive to light. Cardiovascular:      Rate and Rhythm: Normal rate and regular rhythm. Heart sounds: Normal heart sounds. No murmur heard. No friction rub. No gallop. Pulmonary:      Effort: Pulmonary effort is normal. No respiratory distress. Breath sounds: No wheezing, rhonchi or rales. Abdominal:      General: Abdomen is flat. There is no distension. Tenderness: There is no abdominal tenderness. Musculoskeletal:         General: No deformity or signs of injury. Cervical back: Normal range of motion and neck supple. No rigidity or tenderness. Right lower leg: No edema. Left lower leg: No edema. Skin:     General: Skin is warm and dry. Coloration: Skin is not jaundiced or pale. Neurological:      Mental Status: He is alert. Mental status is at baseline. Psychiatric:         Mood and Affect: Mood normal.          Procedures     EKG: This EKG is signed and interpreted by me. Rate: 77  Rhythm: Sinus  Interpretation: 1st degree AV block  Comparison: stable as compared to patient's most recent EKG      MDM  Number of Diagnoses or Management Options  General weakness  Diagnosis management comments: This is a 80-year-old male who presents today to the emergency department via EMS from home with complaints of weakness. Patient states that he had a stroke in 2012 which left him with right-sided deficits. Patient states that weakness in his right lower extremity is chronic. He does have worsening neuropathy which he is currently seeing his PCP for. On arrival to the emergency department he is nontoxic, no acute distress. All vital signs are reviewed and within normal limits. He does have a right lower extremity wound that does not appear to be externally infected, no purulence, no fluctuance, no overlying erythema. His niece states that he is already on cephalexin for this wound. On exam his lungs are clear to auscultation.   Basic labs were ordered including CBC, CMP which showed a normal white count with no leukocyte. Patient did have hyperkalemia but he is due for dialysis today. Chest x-ray showed a left lower lobe pneumonia. This is unchanged from his previous chest x-ray. Given that the patient is afebrile, has no leukocytosis and these findings are chronic, there is no concern for pneumonia at this time. Discussed findings with the patient and his niece. Informed him that the most important thing is to get the patient to dialysis. Informed the niece that the patient may need to have outpatient EMG studies done to assess for neuropathy. Patient and his niece verbalized agreement to follow-up with their PCP. He will be going to dialysis once discharged from the emergency department. Return precautions were given. Amount and/or Complexity of Data Reviewed  Decide to obtain previous medical records or to obtain history from someone other than the patient: yes         ED Course as of 11/29/21 1539   Mon Nov 29, 2021   [address]:     I have personally performed and/or participated in the history, exam, medical decision making, and procedures and agree with all pertinent clinical information unless otherwise noted. I have also reviewed and agree with the past medical, family and social history unless otherwise noted. I have discussed this patient in detail with the resident and provided the instruction and education regarding the evidence-based evaluation and treatment of fatigue. History: This is a dialysis patient, Monday, Wednesday, Friday. Reports increased right leg weakness for the past week. He does have chronic effects of stroke on the right side. Additionally, he has a chronic wound to the second toe on the right foot. He is currently on antibiotics for that. My findings: Cristian Owens is a 80 y.o. male whom is in no distress. Physical exam reveals he is alert and oriented. Heart is regular, lungs are clear.   Abdomen soft nontender but extremities are intact without edema. There is [place] no edema to the lower extremities. No palpable venous cord or other findings consistent with deep vein thrombosis. Does have a chronic wound to the second toe of the right foot. My plan: Symptomatic and supportive care. EKG, x-ray, labs. Electronically signed by Florentino Jeffries DO on 11/29/21 at 2:40 PM EST       [TG]   1442 X-ray result reviewed. Patient has no physical findings consistent with pneumonia. [TG]   1516 This patient be discharged from the emergency department with arrangements to go directly to dialysis. [TG]      ED Course User Index  [TG] Florentino Jeffries DO       ED Course as of 11/29/21 1539   Mon Nov 29, 2021   1440 ATTENDING PROVIDER ATTESTATION:     I have personally performed and/or participated in the history, exam, medical decision making, and procedures and agree with all pertinent clinical information unless otherwise noted. I have also reviewed and agree with the past medical, family and social history unless otherwise noted. I have discussed this patient in detail with the resident and provided the instruction and education regarding the evidence-based evaluation and treatment of fatigue. History: This is a dialysis patient, Monday, Wednesday, Friday. Reports increased right leg weakness for the past week. He does have chronic effects of stroke on the right side. Additionally, he has a chronic wound to the second toe on the right foot. He is currently on antibiotics for that. My findings: Wendy Cruz is a 80 y.o. male whom is in no distress. Physical exam reveals he is alert and oriented. Heart is regular, lungs are clear. Abdomen soft nontender but extremities are intact without edema. There is [place] no edema to the lower extremities. No palpable venous cord or other findings consistent with deep vein thrombosis. Does have a chronic wound to the second toe of the right foot.     My plan: Symptomatic and supportive care. EKG, x-ray, labs. Electronically signed by Basilio Councilman, DO on 11/29/21 at 2:40 PM EST       [TG]   1442 X-ray result reviewed. Patient has no physical findings consistent with pneumonia. [TG]   1516 This patient be discharged from the emergency department with arrangements to go directly to dialysis. [TG]      ED Course User Index  [TG] Basilio Councilman, DO       --------------------------------------------- PAST HISTORY ---------------------------------------------  Past Medical History:  has a past medical history of Arthritis, CAD (coronary artery disease), CHF (congestive heart failure) ([address] Chronic kidney disease, COVID-19, CVA (cerebral vascular accident) ([address] Diabetes mellitus ([address] Diabetic peripheral neuropathy ([address] Hemodialysis patient ([facility] [address] History of CVA (cerebrovascular accident), Hypertension, Other disorders of kidney and ureter, PVD (peripheral vascular disease) with claudication ([address] Right sided weakness, TIA (transient ischemic attack), and Unspecified cerebral artery occlusion with cerebral infarction. Past Surgical History:  has a past surgical history that includes Abdominal hernia repair; Cataract removal; Dental surgery; Inguinal hernia repair; Cholecystectomy (11/2011); Inner ear surgery; hc dialysis catheter (Right, 8/27/2020); eye surgery; shunt revision (Right, 1/21/2021); vascular surgery (Right, 4/8/2021); vascular surgery (Left, 8/9/2021); and vascular surgery (Right, 8/11/2021). Social History:  reports that he quit smoking about 54 years ago. His smoking use included cigarettes. He has a 10.00 pack-year smoking history. He has never used smokeless tobacco. He reports previous alcohol use of about 1.0 standard drink of alcohol per week. He reports that he does not use drugs. Family History: family history is not on file. The patients home medications have been reviewed.     Allergies: Sulfa antibiotics and Sulfa antibiotics    -------------------------------------------------- RESULTS -------------------------------------------------  Labs:  Results for orders placed or performed during the hospital encounter of 11/29/21   COVID-19, Rapid    Specimen: Nasopharyngeal Swab   Result Value Ref Range    SARS-CoV-2, NAAT Not Detected Not Detected   CBC Auto Differential   Result Value Ref Range    WBC 8.8 4.5 - 11.5 E9/L    RBC 3.18 (L) 3.80 - 5.80 E12/L    Hemoglobin 10.0 (L) 12.5 - 16.5 g/dL    Hematocrit 32.2 (L) 37.0 - 54.0 %    .3 (H) 80.0 - 99.9 fL    MCH 31.4 26.0 - 35.0 pg    MCHC 31.1 (L) 32.0 - 34.5 %    RDW 16.2 (H) 11.5 - 15.0 fL    Platelets 004 154 - 045 E9/L    MPV 10.0 7.0 - 12.0 fL    Neutrophils % 80.0 43.0 - 80.0 %    Immature Granulocytes % 0.5 0.0 - 5.0 %    Lymphocytes % 11.6 (L) 20.0 - 42.0 %    Monocytes % 6.9 2.0 - 12.0 %    Eosinophils % 0.8 0.0 - 6.0 %    Basophils % 0.2 0.0 - 2.0 %    Neutrophils Absolute 7.03 1.80 - 7.30 E9/L    Immature Granulocytes # 0.04 E9/L    Lymphocytes Absolute 1.02 (L) 1.50 - 4.00 E9/L    Monocytes Absolute 0.61 0.10 - 0.95 E9/L    Eosinophils Absolute 0.07 0.05 - 0.50 E9/L    Basophils Absolute 0.02 0.00 - 0.20 E9/L   Comprehensive Metabolic Panel w/ Reflex to MG   Result Value Ref Range    Sodium 136 132 - 146 mmol/L    Potassium reflex Magnesium 5.4 (H) 3.5 - 5.0 mmol/L    Chloride 98 98 - 107 mmol/L    CO2 24 22 - 29 mmol/L    Anion Gap 14 7 - 16 mmol/L    Glucose 265 (H) 74 - 99 mg/dL    BUN 70 (H) 6 - 23 mg/dL    CREATININE 6.2 (H) 0.7 - 1.2 mg/dL    GFR Non-African American 9 >=60 mL/min/1.73    GFR African American 10     Calcium 8.7 8.6 - 10.2 mg/dL    Total Protein 6.5 6.4 - 8.3 g/dL    Albumin 3.6 3.5 - 5.2 g/dL    Total Bilirubin 0.3 0.0 - 1.2 mg/dL    Alkaline Phosphatase 95 40 - 129 U/L    ALT 19 0 - 40 U/L    AST 11 0 - 39 U/L   Magnesium   Result Value Ref Range    Magnesium 2.4 1.6 - 2.6 mg/dL   PHOSPHORUS   Result Value Ref Range    Phosphorus 4.7 (H) 2.5 - 4.5 mg/dL   Troponin   Result Value Ref Range    Troponin, High Sensitivity 66 (H) 0 - 11 ng/L   POCT glucose   Result Value Ref Range    Glucose 248 mg/dL    QC OK? ok    POCT Glucose   Result Value Ref Range    Meter Glucose 248 (H) 74 - 99 mg/dL   EKG 12 Lead   Result Value Ref Range    Ventricular Rate 77 BPM    Atrial Rate 77 BPM    P-R Interval 226 ms    QRS Duration 92 ms    Q-T Interval 390 ms    QTc Calculation (Bazett) 441 ms    P Axis 72 degrees    T Axis -26 degrees       Radiology:  XR CHEST 1 VIEW   Final Result   Likely pneumonia at the left base. See recommendations above.             ------------------------- NURSING NOTES AND VITALS REVIEWED ---------------------------  Date / Time Roomed:  11/29/2021 11:37 AM  ED Bed Assignment:  [id number]    The nursing notes within the ED encounter and vital signs as below have been reviewed. /68   Pulse 78   Temp 98.4 °F (36.9 °C) (Oral)   Resp 20   Ht 5' 5.5\" (1.664 m)   Wt 180 lb (81.6 kg)   SpO2 95%   BMI 29.50 kg/m²   Oxygen Saturation Interpretation: Normal      ------------------------------------------ PROGRESS NOTES ------------------------------------------  3:10 PM EST  I have spoken with the patient and discussed todays results, in addition to providing specific details for the plan of care and counseling regarding the diagnosis and prognosis. Their questions are answered at this time and they are agreeable with the plan. I discussed at length with them reasons for immediate return here for re evaluation. They will followup with their primary care physician by calling their office on Monday.      --------------------------------- ADDITIONAL PROVIDER NOTES ---------------------------------  At this time the patient is without objective evidence of an acute process requiring hospitalization or inpatient management.   They have remained hemodynamically stable throughout their entire ED visit and are stable for discharge with outpatient follow-up. The plan has been discussed in detail and they are aware of the specific conditions for emergent return, as well as the importance of follow-up. New Prescriptions    No medications on file       Diagnosis:  1. General weakness        Disposition:  Patient's disposition: Discharge to home  Patient's condition is stable.        Solomon Manuel, DO  Resident  11/29/21 Abdirahman Larson 1527, DO  Resident  11/29/21 1530

## 2021-11-29 NOTE — Clinical Note
Precious Taylor was seen and treated in our emergency department on 11/29/2021. He may return to work on 11/30/2021. Valentino Gutierrez is in the Emergency Department today for care of her immediate family member. She may return to work tomorrow. Thank you      If you have any questions or concerns, please don't hesitate to call.       Kimberly Hanna, DO

## 2021-11-30 ENCOUNTER — APPOINTMENT (OUTPATIENT)
Dept: GENERAL RADIOLOGY | Age: 86
DRG: 638 | End: 2021-11-30
Payer: MEDICARE

## 2021-11-30 LAB
EKG ATRIAL RATE: 77 BPM
EKG P AXIS: 72 DEGREES
EKG P-R INTERVAL: 226 MS
EKG Q-T INTERVAL: 390 MS
EKG QRS DURATION: 92 MS
EKG QTC CALCULATION (BAZETT): 441 MS
EKG T AXIS: -26 DEGREES
EKG VENTRICULAR RATE: 77 BPM

## 2021-11-30 PROCEDURE — 99284 EMERGENCY DEPT VISIT MOD MDM: CPT

## 2021-11-30 PROCEDURE — 96372 THER/PROPH/DIAG INJ SC/IM: CPT

## 2021-11-30 PROCEDURE — 93010 ELECTROCARDIOGRAM REPORT: CPT | Performed by: INTERNAL MEDICINE

## 2021-11-30 PROCEDURE — 73630 X-RAY EXAM OF FOOT: CPT

## 2021-11-30 NOTE — ED NOTES
FIRST PROVIDER CONTACT ASSESSMENT NOTE       Department of Emergency Medicine   11/30/21  6:09 PM EST    Chief Complaint: Toe Pain (right foot infection, sent in for admission)    History of Present Illness:   Dewey Alaniz is a 80 y.o. male who presents to the ED for right foot infection. Patient was sent in by PCP for admission    Focused Physical Exam:  VS:  BP (!) 156/80   Pulse 86   Temp 97.4 °F (36.3 °C) (Temporal)   Resp 14   Ht 5' 5.5\" (1.664 m)   Wt 180 lb (81.6 kg)   SpO2 96%   BMI 29.50 kg/m²      General: Alert and in no apparent distress   CVS: Regular rate for age, normal rhythm  Resp: Clear and equal bilaterally with good airflow, no respiratory distress    Medical History:  has a past medical history of Arthritis, CAD (coronary artery disease), CHF (congestive heart failure) ([address] Chronic kidney disease, COVID-19, CVA (cerebral vascular accident) ([address] Diabetes mellitus ([address] Diabetic peripheral neuropathy ([address] Hemodialysis patient ([address] History of CVA (cerebrovascular accident), Hypertension, Other disorders of kidney and ureter, PVD (peripheral vascular disease) with claudication ([address] Right sided weakness, TIA (transient ischemic attack), and Unspecified cerebral artery occlusion with cerebral infarction. Surgical History:  has a past surgical history that includes Abdominal hernia repair; Cataract removal; Dental surgery; Inguinal hernia repair; Cholecystectomy (11/2011); Inner ear surgery; hc dialysis catheter (Right, 8/27/2020); eye surgery; shunt revision (Right, 1/21/2021); vascular surgery (Right, 4/8/2021); vascular surgery (Left, 8/9/2021); and vascular surgery (Right, 8/11/2021). Social History:  reports that he quit smoking about 54 years ago. His smoking use included cigarettes. He has a 10.00 pack-year smoking history. He has never used smokeless tobacco. He reports previous alcohol use of about 1.0 standard drink of alcohol per week.  He reports that he does not use drugs. Family History: family history is not on file.     *ALLERGIES*     Sulfa antibiotics and Sulfa antibiotics     Initial Plan of Care:  Initiate Treatment-Testing, Proceed toTreatment Area When Bed Available for ED Attending/MLP to Continue Care    -----------------END OF FIRST PROVIDER CONTACT ASSESSMENT NOTE--------------  Electronically signed by Gilles Shin PA-C   DD: 11/30/21         Gilles Shin PA-C  11/30/21 1819

## 2021-12-01 ENCOUNTER — APPOINTMENT (OUTPATIENT)
Dept: ULTRASOUND IMAGING | Age: 86
DRG: 638 | End: 2021-12-01
Payer: MEDICARE

## 2021-12-01 ENCOUNTER — HOSPITAL ENCOUNTER (INPATIENT)
Age: 86
LOS: 3 days | Discharge: OTHER INSTITUTION | DRG: 638 | End: 2021-12-04
Attending: EMERGENCY MEDICINE | Admitting: INTERNAL MEDICINE
Payer: MEDICARE

## 2021-12-01 DIAGNOSIS — L08.9 TOE INFECTION: ICD-10-CM

## 2021-12-01 DIAGNOSIS — R62.7 FAILURE TO THRIVE IN ADULT: Primary | ICD-10-CM

## 2021-12-01 LAB
ALBUMIN SERPL-MCNC: 3.7 G/DL (ref 3.5–5.2)
ALP BLD-CCNC: 97 U/L (ref 40–129)
ALT SERPL-CCNC: 20 U/L (ref 0–40)
ANION GAP SERPL CALCULATED.3IONS-SCNC: 13 MMOL/L (ref 7–16)
AST SERPL-CCNC: 12 U/L (ref 0–39)
BASOPHILS ABSOLUTE: 0.02 E9/L (ref 0–0.2)
BASOPHILS RELATIVE PERCENT: 0.4 % (ref 0–2)
BILIRUB SERPL-MCNC: 0.4 MG/DL (ref 0–1.2)
BUN BLDV-MCNC: 57 MG/DL (ref 6–23)
C-REACTIVE PROTEIN: 4.2 MG/DL (ref 0–0.4)
CALCIUM SERPL-MCNC: 8.8 MG/DL (ref 8.6–10.2)
CHLORIDE BLD-SCNC: 94 MMOL/L (ref 98–107)
CHP ED QC CHECK: YES
CO2: 27 MMOL/L (ref 22–29)
CREAT SERPL-MCNC: 4.8 MG/DL (ref 0.7–1.2)
EKG ATRIAL RATE: 68 BPM
EKG Q-T INTERVAL: 400 MS
EKG QRS DURATION: 98 MS
EKG QTC CALCULATION (BAZETT): 444 MS
EKG R AXIS: -4 DEGREES
EKG T AXIS: -28 DEGREES
EKG VENTRICULAR RATE: 74 BPM
EOSINOPHILS ABSOLUTE: 0.2 E9/L (ref 0.05–0.5)
EOSINOPHILS RELATIVE PERCENT: 3.9 % (ref 0–6)
GFR AFRICAN AMERICAN: 14
GFR NON-AFRICAN AMERICAN: 11 ML/MIN/1.73
GLUCOSE BLD-MCNC: 189 MG/DL (ref 74–99)
GLUCOSE BLD-MCNC: 197 MG/DL
HCT VFR BLD CALC: 33.5 % (ref 37–54)
HEMOGLOBIN: 10.5 G/DL (ref 12.5–16.5)
IMMATURE GRANULOCYTES #: 0.02 E9/L
IMMATURE GRANULOCYTES %: 0.4 % (ref 0–5)
LACTIC ACID: 1.1 MMOL/L (ref 0.5–2.2)
LYMPHOCYTES ABSOLUTE: 1.02 E9/L (ref 1.5–4)
LYMPHOCYTES RELATIVE PERCENT: 20 % (ref 20–42)
MCH RBC QN AUTO: 31.4 PG (ref 26–35)
MCHC RBC AUTO-ENTMCNC: 31.3 % (ref 32–34.5)
MCV RBC AUTO: 100.3 FL (ref 80–99.9)
METER GLUCOSE: 113 MG/DL (ref 74–99)
METER GLUCOSE: 197 MG/DL (ref 74–99)
METER GLUCOSE: 281 MG/DL (ref 74–99)
METER GLUCOSE: 324 MG/DL (ref 74–99)
MONOCYTES ABSOLUTE: 0.6 E9/L (ref 0.1–0.95)
MONOCYTES RELATIVE PERCENT: 11.7 % (ref 2–12)
NEUTROPHILS ABSOLUTE: 3.25 E9/L (ref 1.8–7.3)
NEUTROPHILS RELATIVE PERCENT: 63.6 % (ref 43–80)
PDW BLD-RTO: 15.9 FL (ref 11.5–15)
PLATELET # BLD: 169 E9/L (ref 130–450)
PMV BLD AUTO: 10.3 FL (ref 7–12)
POTASSIUM REFLEX MAGNESIUM: 4.7 MMOL/L (ref 3.5–5)
RBC # BLD: 3.34 E12/L (ref 3.8–5.8)
SEDIMENTATION RATE, ERYTHROCYTE: 78 MM/HR (ref 0–15)
SODIUM BLD-SCNC: 134 MMOL/L (ref 132–146)
TOTAL PROTEIN: 6.8 G/DL (ref 6.4–8.3)
WBC # BLD: 5.1 E9/L (ref 4.5–11.5)

## 2021-12-01 PROCEDURE — 82962 GLUCOSE BLOOD TEST: CPT

## 2021-12-01 PROCEDURE — 93005 ELECTROCARDIOGRAM TRACING: CPT | Performed by: PHYSICIAN ASSISTANT

## 2021-12-01 PROCEDURE — 87040 BLOOD CULTURE FOR BACTERIA: CPT

## 2021-12-01 PROCEDURE — 85651 RBC SED RATE NONAUTOMATED: CPT

## 2021-12-01 PROCEDURE — 2580000003 HC RX 258: Performed by: EMERGENCY MEDICINE

## 2021-12-01 PROCEDURE — 2060000000 HC ICU INTERMEDIATE R&B

## 2021-12-01 PROCEDURE — 80053 COMPREHEN METABOLIC PANEL: CPT

## 2021-12-01 PROCEDURE — 6360000002 HC RX W HCPCS: Performed by: INTERNAL MEDICINE

## 2021-12-01 PROCEDURE — 86140 C-REACTIVE PROTEIN: CPT

## 2021-12-01 PROCEDURE — 90935 HEMODIALYSIS ONE EVALUATION: CPT | Performed by: INTERNAL MEDICINE

## 2021-12-01 PROCEDURE — 6370000000 HC RX 637 (ALT 250 FOR IP): Performed by: PHYSICIAN ASSISTANT

## 2021-12-01 PROCEDURE — 93010 ELECTROCARDIOGRAM REPORT: CPT | Performed by: INTERNAL MEDICINE

## 2021-12-01 PROCEDURE — 85025 COMPLETE CBC W/AUTO DIFF WBC: CPT

## 2021-12-01 PROCEDURE — 93926 LOWER EXTREMITY STUDY: CPT

## 2021-12-01 PROCEDURE — 83605 ASSAY OF LACTIC ACID: CPT

## 2021-12-01 PROCEDURE — 5A1D70Z PERFORMANCE OF URINARY FILTRATION, INTERMITTENT, LESS THAN 6 HOURS PER DAY: ICD-10-PCS | Performed by: INTERNAL MEDICINE

## 2021-12-01 PROCEDURE — 6360000002 HC RX W HCPCS: Performed by: EMERGENCY MEDICINE

## 2021-12-01 PROCEDURE — 6370000000 HC RX 637 (ALT 250 FOR IP): Performed by: INTERNAL MEDICINE

## 2021-12-01 PROCEDURE — 36415 COLL VENOUS BLD VENIPUNCTURE: CPT

## 2021-12-01 RX ORDER — METOPROLOL SUCCINATE 25 MG/1
25 TABLET, EXTENDED RELEASE ORAL DAILY
Status: DISCONTINUED | OUTPATIENT
Start: 2021-12-01 | End: 2021-12-04 | Stop reason: HOSPADM

## 2021-12-01 RX ORDER — HEPARIN SODIUM 10000 [USP'U]/ML
5000 INJECTION, SOLUTION INTRAVENOUS; SUBCUTANEOUS EVERY 8 HOURS SCHEDULED
Status: DISCONTINUED | OUTPATIENT
Start: 2021-12-01 | End: 2021-12-03

## 2021-12-01 RX ORDER — SODIUM CHLORIDE 0.9 % (FLUSH) 0.9 %
10 SYRINGE (ML) INJECTION PRN
Status: DISCONTINUED | OUTPATIENT
Start: 2021-12-01 | End: 2021-12-04 | Stop reason: HOSPADM

## 2021-12-01 RX ORDER — FAMOTIDINE 20 MG/1
10 TABLET, FILM COATED ORAL DAILY
Status: DISCONTINUED | OUTPATIENT
Start: 2021-12-01 | End: 2021-12-04 | Stop reason: HOSPADM

## 2021-12-01 RX ORDER — ACETAMINOPHEN 650 MG/1
650 SUPPOSITORY RECTAL EVERY 6 HOURS PRN
Status: DISCONTINUED | OUTPATIENT
Start: 2021-12-01 | End: 2021-12-04 | Stop reason: HOSPADM

## 2021-12-01 RX ORDER — ASPIRIN 81 MG/1
81 TABLET ORAL EVERY MORNING
Status: DISCONTINUED | OUTPATIENT
Start: 2021-12-01 | End: 2021-12-03

## 2021-12-01 RX ORDER — GABAPENTIN 100 MG/1
100 CAPSULE ORAL NIGHTLY
Status: DISCONTINUED | OUTPATIENT
Start: 2021-12-01 | End: 2021-12-04 | Stop reason: HOSPADM

## 2021-12-01 RX ORDER — SODIUM CHLORIDE 0.9 % (FLUSH) 0.9 %
10 SYRINGE (ML) INJECTION EVERY 12 HOURS SCHEDULED
Status: DISCONTINUED | OUTPATIENT
Start: 2021-12-01 | End: 2021-12-04 | Stop reason: HOSPADM

## 2021-12-01 RX ORDER — SODIUM CHLORIDE 9 MG/ML
25 INJECTION, SOLUTION INTRAVENOUS PRN
Status: DISCONTINUED | OUTPATIENT
Start: 2021-12-01 | End: 2021-12-04 | Stop reason: HOSPADM

## 2021-12-01 RX ORDER — ATORVASTATIN CALCIUM 10 MG/1
10 TABLET, FILM COATED ORAL NIGHTLY
Status: DISCONTINUED | OUTPATIENT
Start: 2021-12-01 | End: 2021-12-04 | Stop reason: HOSPADM

## 2021-12-01 RX ORDER — SENNA PLUS 8.6 MG/1
1 TABLET ORAL DAILY PRN
Status: DISCONTINUED | OUTPATIENT
Start: 2021-12-01 | End: 2021-12-04 | Stop reason: HOSPADM

## 2021-12-01 RX ORDER — ACETAMINOPHEN 325 MG/1
650 TABLET ORAL EVERY 6 HOURS PRN
Status: DISCONTINUED | OUTPATIENT
Start: 2021-12-01 | End: 2021-12-04 | Stop reason: HOSPADM

## 2021-12-01 RX ADMIN — GABAPENTIN 100 MG: 100 CAPSULE ORAL at 22:59

## 2021-12-01 RX ADMIN — HEPARIN SODIUM 5000 UNITS: 10000 INJECTION INTRAVENOUS; SUBCUTANEOUS at 22:59

## 2021-12-01 RX ADMIN — INSULIN LISPRO 2 UNITS: 100 INJECTION, SOLUTION INTRAVENOUS; SUBCUTANEOUS at 23:01

## 2021-12-01 RX ADMIN — VANCOMYCIN HYDROCHLORIDE 2000 MG: 10 INJECTION, POWDER, LYOPHILIZED, FOR SOLUTION INTRAVENOUS at 19:03

## 2021-12-01 RX ADMIN — INSULIN LISPRO 4 UNITS: 100 INJECTION, SOLUTION INTRAVENOUS; SUBCUTANEOUS at 18:52

## 2021-12-01 RX ADMIN — INSULIN LISPRO 2 UNITS: 100 INJECTION, SOLUTION INTRAVENOUS; SUBCUTANEOUS at 04:45

## 2021-12-01 ASSESSMENT — PAIN SCALES - GENERAL
PAINLEVEL_OUTOF10: 0
PAINLEVEL_OUTOF10: 0

## 2021-12-01 NOTE — ED NOTES
Bed: 11  Expected date:   Expected time:   Means of arrival:   Comments:  Emiliano Rogers RN  12/01/21 [id number]

## 2021-12-01 NOTE — ED PROVIDER NOTES
Chief Complaint   Patient presents with    Toe Pain     right foot infection, sent in for admission       HPI  Jung Hu is a 80 y.o. male who presents with concerns for right toe pain and underlying failure to thrive issues. The complaints are persistent, moderate in severity, worsened by nothing and improved by nothing. The patient states he was sent in by his PCP who had concerns for severe infection in this right second toe. The patient states he follows with podiatry as well. He has reportedly been taking abx at home. Further discussion with the patient's PCP revealed that he was sent in mostly out of need for placement. She states the patient's caregivers have states they are unable to appropriately care for him at home and that he has been unaccomodating to attempts to improve the situation. The patient denies recent trauma, fever, chills, fatigue, HA, dizziness, lightheadedness, paresthesias, generalized weakness, confusion, forgetfulness, vision changes, eye redness, congestion, rhinorrhea, sore throat, neck pain, chest pain, palpitations, SOB, cough, wheezing, abdominal pain, nausea, vomiting, diarrhea, constipation, hematochezia, melena, dysuria, hematuria, flank pain, decreased UOP, arthralgias, swelling, rashes and erythema. ROS is otherwise negative. The patient is currently taking no blood thinners. Tobacco Hx:   reports that he quit smoking about 54 years ago. His smoking use included cigarettes. He has a 10.00 pack-year smoking history. He has never used smokeless tobacco.    Alcohol Hx:   reports previous alcohol use of about 1.0 standard drink of alcohol per week. Illicit Drug Hx:   reports no history of drug use. The history is provided by the patient. Last Tetanus (if applicable): n/a    Review of Systems:   A complete review of systems was performed and pertinent positives and negatives are stated within the HPI, all other systems reviewed and are negative.     Physical judgement    --------------------------------------------- PAST HISTORY ---------------------------------------------  Past Medical History:  has a past medical history of Arthritis, CAD (coronary artery disease), CHF (congestive heart failure) ([facility] [address] Chronic kidney disease, COVID-19, CVA (cerebral vascular accident) ([facility] [address] Diabetes mellitus ([facility] Diabetic peripheral neuropathy ([facility] Hemodialysis patient ([facility] History of CVA (cerebrovascular accident), Hypertension, Other disorders of kidney and ureter, PVD (peripheral vascular disease) with claudication ([facility] Right sided weakness, TIA (transient ischemic attack), and Unspecified cerebral artery occlusion with cerebral infarction. Past Surgical History:  has a past surgical history that includes Abdominal hernia repair; Cataract removal; Dental surgery; Inguinal hernia repair; Cholecystectomy (11/2011); Inner ear surgery; hc dialysis catheter (Right, 8/27/2020); eye surgery; shunt revision (Right, 1/21/2021); vascular surgery (Right, 4/8/2021); vascular surgery (Left, 8/9/2021); and vascular surgery (Right, 8/11/2021). Social History:  reports that he quit smoking about 54 years ago. His smoking use included cigarettes. He has a 10.00 pack-year smoking history. He has never used smokeless tobacco. He reports previous alcohol use of about 1.0 standard drink of alcohol per week. He reports that he does not use drugs. Family History: family history is not on file. Home Meds: Not in a hospital admission. The patients home medications have been reviewed. Allergies: Sulfa antibiotics and Sulfa antibiotics    ------------------------- NURSING NOTES AND VITALS REVIEWED ---------------------------  Date / Time Roomed:  12/1/2021  7:43 AM  ED Bed Assignment:  11/11    The nursing notes within the ED encounter and vital signs as below have been reviewed.    /66   Pulse 78   Temp 97.5 °F (36.4 °C) (Oral)   Resp 16   Ht 5' 5.5\" (1.664 m)   Wt 179 lb 14.3 oz (81.6 kg)   SpO2 98%   BMI 29.48 kg/m²   -------------------------------------------------- RESULTS / INTERVENTIONS -------------------------------------------------  All laboratory and radiology tests have been reviewed by this physician.     LABS:  Results for orders placed or performed during the hospital encounter of 12/01/21   CBC Auto Differential   Result Value Ref Range    WBC 5.1 4.5 - 11.5 E9/L    RBC 3.34 (L) 3.80 - 5.80 E12/L    Hemoglobin 10.5 (L) 12.5 - 16.5 g/dL    Hematocrit 33.5 (L) 37.0 - 54.0 %    .3 (H) 80.0 - 99.9 fL    MCH 31.4 26.0 - 35.0 pg    MCHC 31.3 (L) 32.0 - 34.5 %    RDW 15.9 (H) 11.5 - 15.0 fL    Platelets 359 437 - 563 E9/L    MPV 10.3 7.0 - 12.0 fL    Neutrophils % 63.6 43.0 - 80.0 %    Immature Granulocytes % 0.4 0.0 - 5.0 %    Lymphocytes % 20.0 20.0 - 42.0 %    Monocytes % 11.7 2.0 - 12.0 %    Eosinophils % 3.9 0.0 - 6.0 %    Basophils % 0.4 0.0 - 2.0 %    Neutrophils Absolute 3.25 1.80 - 7.30 E9/L    Immature Granulocytes # 0.02 E9/L    Lymphocytes Absolute 1.02 (L) 1.50 - 4.00 E9/L    Monocytes Absolute 0.60 0.10 - 0.95 E9/L    Eosinophils Absolute 0.20 0.05 - 0.50 E9/L    Basophils Absolute 0.02 0.00 - 0.20 E9/L   Comprehensive Metabolic Panel w/ Reflex to MG   Result Value Ref Range    Sodium 134 132 - 146 mmol/L    Potassium reflex Magnesium 4.7 3.5 - 5.0 mmol/L    Chloride 94 (L) 98 - 107 mmol/L    CO2 27 22 - 29 mmol/L    Anion Gap 13 7 - 16 mmol/L    Glucose 189 (H) 74 - 99 mg/dL    BUN 57 (H) 6 - 23 mg/dL    CREATININE 4.8 (H) 0.7 - 1.2 mg/dL    GFR Non-African American 11 >=60 mL/min/1.73    GFR African American 14     Calcium 8.8 8.6 - 10.2 mg/dL    Total Protein 6.8 6.4 - 8.3 g/dL    Albumin 3.7 3.5 - 5.2 g/dL    Total Bilirubin 0.4 0.0 - 1.2 mg/dL    Alkaline Phosphatase 97 40 - 129 U/L    ALT 20 0 - 40 U/L    AST 12 0 - 39 U/L   Lactic Acid, Plasma   Result Value Ref Range    Lactic Acid 1.1 0.5 - 2.2 mmol/L   Sedimentation Rate   Result Value Ref Range    Sed Rate 78 (H) 0 - 15 mm/Hr   C-reactive protein   Result Value Ref Range    CRP 4.2 (H) 0.0 - 0.4 mg/dL   POCT Glucose   Result Value Ref Range    Glucose 197 mg/dL    QC OK? yes    POCT Glucose   Result Value Ref Range    Meter Glucose 197 (H) 74 - 99 mg/dL   POCT Glucose   Result Value Ref Range    Meter Glucose 113 (H) 74 - 99 mg/dL   POCT Glucose   Result Value Ref Range    Meter Glucose 324 (H) 74 - 99 mg/dL   EKG 12 Lead   Result Value Ref Range    Ventricular Rate 74 BPM    Atrial Rate 68 BPM    QRS Duration 98 ms    Q-T Interval 400 ms    QTc Calculation (Bazett) 444 ms    R Axis -4 degrees    T Axis -28 degrees       RADIOLOGY: Interpreted by Radiologist unless otherwise noted. US DUP LOWER EXTREMITY RIGHT ARTERIES   Final Result   Findings are suggestive of occlusion involving right superficial femoral   artery and popliteal arteries. Diminutive blood flow demonstrated in right   posterior tibial and peroneal arteries as well as scant blood flow at level   of right dorsalis pedis artery. CTA abdomen and pelvis with runoff may be   helpful for further evaluation. XR FOOT RIGHT (MIN 3 VIEWS)   Final Result   No acute osseous abnormality. Degenerative changes MTP joint great toe with adjacent dorsal soft tissue   edema. EKG: As interpreted by this ER physician. Rate: 74 bpm  Rhythm: A. Fib  Axis: normal  ST Segments: no acute changes  T-waves: no acute changes  Interpretation: A.  Fib with controlled ventricular response  Comparison: changes compared to previous EKG    Oxygen Saturation Interpretation: normal    Meds Given:  Medications   sodium chloride flush 0.9 % injection 10 mL (has no administration in time range)   sodium chloride flush 0.9 % injection 10 mL (has no administration in time range)   0.9 % sodium chloride infusion (has no administration in time range)   heparin (porcine) injection 5,000 Units (5,000 Units SubCUTAneous Not Given 12/1/21 1400)   senna (SENOKOT) tablet 8.6 mg (has no administration in time range)   acetaminophen (TYLENOL) tablet 650 mg (has no administration in time range)     Or   acetaminophen (TYLENOL) suppository 650 mg (has no administration in time range)   aspirin EC tablet 81 mg (has no administration in time range)   atorvastatin (LIPITOR) tablet 10 mg (has no administration in time range)   famotidine (PEPCID) tablet 10 mg (has no administration in time range)   gabapentin (NEURONTIN) capsule 100 mg (has no administration in time range)   insulin lispro (HUMALOG) injection vial 0-6 Units (4 Units SubCUTAneous Given 12/1/21 1852)   insulin lispro (HUMALOG) injection vial 0-3 Units (has no administration in time range)   metoprolol succinate (TOPROL XL) extended release tablet 25 mg (has no administration in time range)   cefTRIAXone (ROCEPHIN) 1,000 mg in sterile water 10 mL IV syringe (has no administration in time range)   insulin lispro (HUMALOG) injection vial 2 Units (2 Units SubCUTAneous Given 12/1/21 0445)   vancomycin 2000 mg in dextrose 5% 500 ml IVPB (0 mg IntraVENous Stopped 12/1/21 2103)       Procedures:  No procedures performed. --------------------------------- PROGRESS NOTES / ADDITIONAL PROVIDER NOTES ---------------------------------  Consultations:  As outlined below. ED Course:  ED Course as of 12/01/21 2301   Wed Dec 01, 2021   1153 Case was discussed with internal medicine who agrees to admit the patient for further evaluation and management. [ML]      ED Course User Index  [ML] Amber Bowser DO       5:00 PM: All results were discussed with the patient and/or their surrogate and I have provided specific details regarding the plan to admit the patient. The patient was seen in the emergency department by myself and the assigned attending physician, Isaac Chavira DO, who agreed with the assessment, plan and decision to admit as laid out herein.  The patient and/or family verbalized an understanding and agreement with the plan for admission and all questions were answered to the highest degree of accuracy possible based on the current information available. The patient was without objective evidence of hemodynamic instability and was therefore deemed to be in stable condition at the time of transport. This patient's ED course included: a personal history and physicial examination, re-evaluation prior to disposition, multiple bedside re-evaluations, IV medications, cardiac monitoring and continuous pulse oximetry    MDM:  Patient presented with concerns for infection of the right second toe and familial concerns for failure to thrive. On arrival, the patient was hypertensive with remaining VS wnl. EKG and physical exam were  as documented above. Labs were remarkable for elevated SED rate, CRP. Cr at baseline in setting of ESRD on HD. No leukocytosis, normal lactate. Xray of the right foot revealed chronic changes with some associated soft tissue edema. Given the patient's presentation, findings on work up and familial concerns, the decision was made to admit the patient for further evaluation and management. He was started on abx for the toe infection. The case was discussed with IM who agreed to admit the patient. The patient was stable at the time of their disposition. Diagnosis:  1. Failure to thrive in adult    2. Toe infection        Disposition:  Patient's disposition: Admit to telemetry  Patient's condition is stable. This patient was seen, examined and treated with Brendan Marquis DO. All pertinent aspects of the patient's care were discussed with the attending physician.        Mary Adorno DO  Resident  12/01/21 3461

## 2021-12-01 NOTE — ED NOTES
Pt back from radiology. Call placed to Elvira Arenas in dialysis, she has pt ready for transport.      Mike Foster RN  12/01/21 0528

## 2021-12-01 NOTE — FLOWSHEET NOTE
12/01/21 1745   Vital Signs   /88   Temp 97.9 °F (36.6 °C)   Pulse 79   Resp 18   Pain Assessment   Pain Assessment 0-10   Pain Level 0   Post-Hemodialysis Assessment   Post-Treatment Procedures Blood returned; Catheter capped, clamped with Citrate x 2 ports   Machine Disinfection Process Acid/Vinegar Clean; Heat Disinfect;  Exterior Machine Disinfection   Rinseback Volume (ml) 300 ml   Total Liters Processed (l/min) 80.6 l/min   Dialyzer Clearance Lightly streaked   Duration of Treatment (minutes) 3.5 minutes   Hemodialysis Output (ml) 3000 ml   NET Removed (ml) 2700 ml   Tolerated Treatment Good   Patient Response to Treatment tolerated well; Dr. Mariposa Hawk rounded; post report to [facility]

## 2021-12-01 NOTE — ED NOTES
Bed:  [place] [address]  Expected date:   Expected time:   Means of arrival:   Comments:  Omar Engel RN  12/01/21 7903

## 2021-12-01 NOTE — ED NOTES
Patient states he ran out of needles for his insulin pen. His blood sugar is 197. Order initiated for 2 units Humalog.      Lobo Thompson PA-C  12/01/21 0602

## 2021-12-02 LAB
ALBUMIN SERPL-MCNC: 3.5 G/DL (ref 3.5–5.2)
ALP BLD-CCNC: 90 U/L (ref 40–129)
ALT SERPL-CCNC: 19 U/L (ref 0–40)
ANION GAP SERPL CALCULATED.3IONS-SCNC: 10 MMOL/L (ref 7–16)
AST SERPL-CCNC: 12 U/L (ref 0–39)
BASOPHILS ABSOLUTE: 0.03 E9/L (ref 0–0.2)
BASOPHILS RELATIVE PERCENT: 0.7 % (ref 0–2)
BILIRUB SERPL-MCNC: 0.5 MG/DL (ref 0–1.2)
BUN BLDV-MCNC: 39 MG/DL (ref 6–23)
CALCIUM SERPL-MCNC: 8.5 MG/DL (ref 8.6–10.2)
CHLORIDE BLD-SCNC: 96 MMOL/L (ref 98–107)
CO2: 28 MMOL/L (ref 22–29)
CREAT SERPL-MCNC: 3.8 MG/DL (ref 0.7–1.2)
EOSINOPHILS ABSOLUTE: 0.18 E9/L (ref 0.05–0.5)
EOSINOPHILS RELATIVE PERCENT: 4 % (ref 0–6)
GFR AFRICAN AMERICAN: 18
GFR NON-AFRICAN AMERICAN: 15 ML/MIN/1.73
GLUCOSE BLD-MCNC: 187 MG/DL (ref 74–99)
HCT VFR BLD CALC: 30.6 % (ref 37–54)
HEMOGLOBIN: 9.7 G/DL (ref 12.5–16.5)
IMMATURE GRANULOCYTES #: 0.01 E9/L
IMMATURE GRANULOCYTES %: 0.2 % (ref 0–5)
LYMPHOCYTES ABSOLUTE: 1.18 E9/L (ref 1.5–4)
LYMPHOCYTES RELATIVE PERCENT: 25.9 % (ref 20–42)
MAGNESIUM: 2.2 MG/DL (ref 1.6–2.6)
MCH RBC QN AUTO: 30.9 PG (ref 26–35)
MCHC RBC AUTO-ENTMCNC: 31.7 % (ref 32–34.5)
MCV RBC AUTO: 97.5 FL (ref 80–99.9)
METER GLUCOSE: 211 MG/DL (ref 74–99)
METER GLUCOSE: 238 MG/DL (ref 74–99)
METER GLUCOSE: 251 MG/DL (ref 74–99)
METER GLUCOSE: 252 MG/DL (ref 74–99)
METER GLUCOSE: 264 MG/DL (ref 74–99)
MONOCYTES ABSOLUTE: 0.53 E9/L (ref 0.1–0.95)
MONOCYTES RELATIVE PERCENT: 11.6 % (ref 2–12)
NEUTROPHILS ABSOLUTE: 2.62 E9/L (ref 1.8–7.3)
NEUTROPHILS RELATIVE PERCENT: 57.6 % (ref 43–80)
PDW BLD-RTO: 16.1 FL (ref 11.5–15)
PHOSPHORUS: 4.4 MG/DL (ref 2.5–4.5)
PLATELET # BLD: 116 E9/L (ref 130–450)
PMV BLD AUTO: 12.5 FL (ref 7–12)
POTASSIUM REFLEX MAGNESIUM: 4.8 MMOL/L (ref 3.5–5)
RBC # BLD: 3.14 E12/L (ref 3.8–5.8)
SODIUM BLD-SCNC: 134 MMOL/L (ref 132–146)
TOTAL PROTEIN: 6.5 G/DL (ref 6.4–8.3)
WBC # BLD: 4.6 E9/L (ref 4.5–11.5)

## 2021-12-02 PROCEDURE — 2580000003 HC RX 258: Performed by: INTERNAL MEDICINE

## 2021-12-02 PROCEDURE — 84100 ASSAY OF PHOSPHORUS: CPT

## 2021-12-02 PROCEDURE — 85025 COMPLETE CBC W/AUTO DIFF WBC: CPT

## 2021-12-02 PROCEDURE — 97165 OT EVAL LOW COMPLEX 30 MIN: CPT

## 2021-12-02 PROCEDURE — 6370000000 HC RX 637 (ALT 250 FOR IP): Performed by: INTERNAL MEDICINE

## 2021-12-02 PROCEDURE — 2060000000 HC ICU INTERMEDIATE R&B

## 2021-12-02 PROCEDURE — 82962 GLUCOSE BLOOD TEST: CPT

## 2021-12-02 PROCEDURE — 80053 COMPREHEN METABOLIC PANEL: CPT

## 2021-12-02 PROCEDURE — 6360000002 HC RX W HCPCS: Performed by: INTERNAL MEDICINE

## 2021-12-02 PROCEDURE — 97161 PT EVAL LOW COMPLEX 20 MIN: CPT

## 2021-12-02 PROCEDURE — 83735 ASSAY OF MAGNESIUM: CPT

## 2021-12-02 RX ADMIN — METOPROLOL SUCCINATE 25 MG: 25 TABLET, EXTENDED RELEASE ORAL at 08:13

## 2021-12-02 RX ADMIN — HEPARIN SODIUM 5000 UNITS: 10000 INJECTION INTRAVENOUS; SUBCUTANEOUS at 08:12

## 2021-12-02 RX ADMIN — FAMOTIDINE 10 MG: 20 TABLET, FILM COATED ORAL at 08:13

## 2021-12-02 RX ADMIN — GABAPENTIN 100 MG: 100 CAPSULE ORAL at 21:55

## 2021-12-02 RX ADMIN — Medication 10 ML: at 21:57

## 2021-12-02 RX ADMIN — ATORVASTATIN CALCIUM 10 MG: 10 TABLET, FILM COATED ORAL at 21:56

## 2021-12-02 RX ADMIN — INSULIN LISPRO 3 UNITS: 100 INJECTION, SOLUTION INTRAVENOUS; SUBCUTANEOUS at 08:12

## 2021-12-02 RX ADMIN — HEPARIN SODIUM 5000 UNITS: 10000 INJECTION INTRAVENOUS; SUBCUTANEOUS at 21:56

## 2021-12-02 RX ADMIN — Medication 10 ML: at 01:09

## 2021-12-02 RX ADMIN — INSULIN LISPRO 2 UNITS: 100 INJECTION, SOLUTION INTRAVENOUS; SUBCUTANEOUS at 19:08

## 2021-12-02 RX ADMIN — INSULIN LISPRO 1 UNITS: 100 INJECTION, SOLUTION INTRAVENOUS; SUBCUTANEOUS at 21:56

## 2021-12-02 RX ADMIN — Medication 10 ML: at 10:46

## 2021-12-02 RX ADMIN — INSULIN LISPRO 3 UNITS: 100 INJECTION, SOLUTION INTRAVENOUS; SUBCUTANEOUS at 13:59

## 2021-12-02 RX ADMIN — ASPIRIN 81 MG: 81 TABLET, COATED ORAL at 08:14

## 2021-12-02 RX ADMIN — HEPARIN SODIUM 5000 UNITS: 10000 INJECTION INTRAVENOUS; SUBCUTANEOUS at 16:47

## 2021-12-02 ASSESSMENT — ENCOUNTER SYMPTOMS
VOMITING: 0
DIARRHEA: 0
RHINORRHEA: 0
ABDOMINAL PAIN: 0
NAUSEA: 0
SHORTNESS OF BREATH: 0
BACK PAIN: 0
COUGH: 0
ABDOMINAL DISTENTION: 0
PHOTOPHOBIA: 0
CONSTIPATION: 0
WHEEZING: 0

## 2021-12-02 ASSESSMENT — PAIN SCALES - GENERAL
PAINLEVEL_OUTOF10: 0
PAINLEVEL_OUTOF10: 0

## 2021-12-02 NOTE — PROGRESS NOTES
ADLs/functional transfers  * Therapeutic activities to facilitate/challenge dynamic balance, stand tolerance for increased safety and independence with ADLs  * Neuro-muscular re-education: facilitation of righting/equilibrium reactions, midline orientation, scapular stability/mobility, normalization of muscle tone, and facilitation of volitional active controled movement  * Positioning to improve skin integrity, interaction with environment and functional independence    Recommended Adaptive Equipment: TBD     Home Living: Patient reported that he lives with his niece and her daughter in a one-floor home. Equipment Owned: quad cane    Prior Level of Function (PLOF): Patient reported experiencing increased difficulty with ADLs recently; family assists with IADLs. Patient was needing increased assistance with functional mobility (with quad cane) immediately prior to this hospitalization. Pain Level: Patient reported experiencing pain in B LEs, but did not rate his pain. Cognition: Patient alert and oriented grossly; patient is a questionable historian. WFL command follow demonstrated. Patient pleasant and cooperative. Memory: Fair  Sequencing: Fair  Problem Solving: Fair  Judgement/Safety: WFL grossly    Functional Assessment:  AM-PAC Daily Activity Raw Score: 12/24   Initial Eval Status  Date: 12/2/2021 Treatment Status  Date:  Short Term Goals = Long Term Goals   Feeding SBA  Setup   Grooming Mod A  SBA  (seated)   UB Dressing Min A  SBA   LB Dressing Dependent to don socks. Mod A - with use of AE, as needed/appropriate   Bathing Max A  Mod A - with use of AE/DME, as needed/appropriate   Toileting Dependent  Max A   Bed Mobility  Supine-to-Sit: Max A  Sit-to-Supine: Max A      Functional Transfers Sit-to-Stand:  Max A   from edge of cart  (elevated surface)  Min A   Functional Mobility Max A   (with handheld assistance through L hand) for small side step   Min A with functional mobility (with device, as needed/appropriate) in order to maximize independence with ADLs/IADLs and other functional tasks. Balance Sitting: Fair-  (at edge of ED cart)  Standing: Poor  (with handheld assistance through L hand)  Fair dynamic standing balance during completion of ADLs/IADLs and other functional tasks. Activity Tolerance Limited  Patient will demonstrate Good understanding and consistent implementation of energy conservation techniques and work simplification techniques into ADL/IADL routines. Visual/  Perceptual WFL grossly     N/A   B UE Strength R UE: 2-/5 to 3-/5 grossly  L UE: 3+/5 grossly  Patient will demonstrate 4-/5 distal L UE strength in order to maximize independence with ADLs, bed mobility, and functional transfers. Additional Long-Term Goal: Patient will increase functional independence to PLOF in order to allow patient to live in least restrictive environment. ROM: Additional Information:    R UE  0 - 75 degrees of active-assisted shoulder flexion; distal AROM/AAROM WFL grossly 2-/5 to 3-/5  strength   L UE AROM WFL    Hand Dominance: Right, but patient has had to use his L UE primarily for completion of ADLs and other functional tasks    Hearing: Impaired  Sensation: No complaints of numbness/tingling in B UEs. Tone: WFL  Edema: No    Comments: STAT OT evaluation in ED requested by social work for discharge planning purposes. Upon arrival, patient supine on ED cart. At end of session, patient supine on ED cart with all lines and tubes intact. Patient would benefit from continued skilled OT to increase safety and independence with completion of ADL/IADL tasks for functional independence and quality of life. Rehab Potential: Good for established goals. Patient / Family Goal: Patient did not state a goal.  Patient and/or family were instructed on functional diagnosis, prognosis/goals, and OT plan of care. Demonstrated Fair understanding.     Eval Complexity: Low    Time In: 1335  Time Out: 1350  Total Treatment Time: 0 minutes      Minutes Units   OT Eval Low 41125 15 1   OT Eval Medium 50811     OT Eval High 18623     OT Re-Eval [id number]     Therapeutic Ex 02599     Therapeutic Activities 32455     ADL/Self Care 14442     Orthotic Management 00420     Neuro Re-Ed 74798     Non-Billable Time N/A ---     Evaluation time includes thorough review of current medical information, gathering information on past medical history/social history and prior level of function, completion of standardized testing/informal observation of tasks, assessment of data, and education on plan of care and goals. Ai Trejo, OTR/L  License Number: [id number]

## 2021-12-02 NOTE — PROGRESS NOTES
Physical Therapy    Facility/Department: [address] [facility] EMERGENCY DEPARTMENT  Initial Assessment    NAME: Jaynie Cogan  : 1931  MRN: [id number]    Date of Service: 2021       REQUIRES PT FOLLOW UP: Yes       Patient Diagnosis(es): The primary encounter diagnosis was Failure to thrive in adult. A diagnosis of Toe infection was also pertinent to this visit. has a past medical history of Arthritis, CAD (coronary artery disease), CHF (congestive heart failure) ([facility] [address] Chronic kidney disease, COVID-19, CVA (cerebral vascular accident) ([facility] [address] Diabetes mellitus ([facility] Diabetic peripheral neuropathy ([facility] [facility] Hemodialysis patient ([facility] [facility] History of CVA (cerebrovascular accident), Hypertension, Other disorders of kidney and ureter, PVD (peripheral vascular disease) with claudication ([facility] Right sided weakness, TIA (transient ischemic attack), and Unspecified cerebral artery occlusion with cerebral infarction. has a past surgical history that includes Abdominal hernia repair; Cataract removal; Dental surgery; Inguinal hernia repair; Cholecystectomy (2011); Inner ear surgery; hc dialysis catheter (Right, 2020); eye surgery; shunt revision (Right, 2021); vascular surgery (Right, 2021); vascular surgery (Left, 2021); and vascular surgery (Right, 2021). Evaluating Therapist: Jelani Saunders PT     Referring Provider:  Renay Galarza DO    PT order :  Pt eval and treat     Room #:  ER 11 [facility]   DIAGNOSIS:  Failure to thrive   Additional Pertinent History:ESRD on HD, CVA   PRECAUTIONS:  Falls     Social:  Pt lives with  Niece  in a  1  floor plan 3  steps and  B  rails to enter. Prior to admission pt walked with  QC short distances with assist      Initial Evaluation  Date:  2021  Treatment      Short Term/ Long Term   Goals   Was pt agreeable to Eval/treatment? Yes      Does pt have pain?   B LEs      Bed Mobility  Rolling:  NT   Supine to sit: Max assist   Sit to supine:  Max assist   Scooting: max assist    min assist    Transfers Sit to stand: max assist   Stand to sit:  Max assist   Stand pivot:  NT    min assist    Ambulation     2 steps forward and backward   with HHA max assist    10-20  feet with QC  with  Min assist        Stair negotiation: ascended and descended NT   TBA    LE ROM  Decreased R LE AROM, L LE WFL      LE strength  3-/ 5 R LE   4-/ 5 L LE      AM- PAC RAW score  10/ 24            Pt is alert and Oriented x  3, grossly      Balance:  Max assist in stand; high fall risk due to weakness    Endurance: decreased        ASSESSMENT  Pt displays functional ability as noted in the objective portion of this evaluation. Conditions Requiring Skilled Therapeutic Intervention:    [x]Decreased strength     []Decreased ROM  [x]Decreased functional mobility  [x]Decreased balance   [x]Decreased endurance   [x]Decreased posture  []Decreased sensation  []Decreased coordination   []Decreased vision  []Decreased safety awareness   [x]Increased pain     Treatment/Education:    Pt on ER cart in hallway. Mobility as above. Very limited due to LE weakness and very poor balance. High fall risk     Pt educated on fall risk, safety with mobility        Patient response to education:   Pt verbalized understanding Pt demonstrated skill Pt requires further education in this area   x  with assist   x       Comments:  Pt left  On cart after session      Rehab potential is Good for reaching above PT goals. Pts/ family goals   1. None stated     Patient and or family understand(s) diagnosis, prognosis, and plan of care. -  Yes     PLAN  PT care will be provided in accordance with the objectives noted above. Whenever appropriate, clear delegation orders will be provided for nursing staff. Exercises and functional mobility practice will be used as well as appropriate assistive devices or modalities to obtain goals.  Patient and family education will also be administered as needed. PLAN OF CARE:    Current Treatment Recommendations     [x] Strengthening to improve independence with functional mobility   [] ROM to improve independence with functional mobility   [x] Balance Training to improve static/dynamic balance and to reduce fall risk  [x] Endurance Training to improve activity tolerance during functional mobility   [x] Transfer Training to improve safety and independence with all functional transfers   [x] Gait Training to improve gait mechanics, endurance and assess need for appropriate assistive device  [] Stair Training in preparation for safe discharge home and/or into the community   [x] Positioning to prevent skin breakdown and contractures  [x] Safety and Education Training   [x] Patient/Caregiver Education   [] HEP  [] Other     Frequency of treatments will be 2-5x/week x 7-10 days. Time in: 1335   Time out:  1350       Evaluation Time includes thorough review of current medical information, gathering information on past medical history/social history and prior level of function, completion of standardized testing/informal observation of tasks, assessment of data and education on plan of care and goals.     CPT codes:  [x] Low Complexity PT evaluation 67354  [] Moderate Complexity PT evaluation 03908  [] High Complexity PT evaluation 73174  [] PT Re-evaluation 44059  [] Gait training 70496  minutes  [] Therapeutic activities 06469  minutes  [] Therapeutic exercises 11490  minutes  [] Neuromuscular reeducation 58682  minutes       Yue 18 number:  PT 4777

## 2021-12-02 NOTE — CARE COORDINATION
Social Work/Transition of Care:    Pt in need of PT/OT evaluation for placement, SW spoke with pt Niece Melony Walker who stated pt was previously at [place] and would like to return, SW contacted PT/OT for eval.    Electronically signed by SCOTT Gan on 29/1/9773 at 1:35 PM     [facility] [address] notified of pt requesting return, facility will review.     Electronically signed by Dwayne Hernandez on 38/6/2361 at 2:21 PM

## 2021-12-02 NOTE — H&P
History & Physicial  12/02/21  Primary Care:  Fallon Don, DO  [address] / [place] [address]        Chief Complaint   Patient presents with    Toe Pain     right foot infection, sent in for admission       HPI:  Patient is a 80year old male who was sent to the ER from my office due to fall, increased right sided weakness and wound on right second toe. According to his niece patient went from 1 person assist to a 2 person assist. The aids they hired to take him to dialysis refused to take him as they could not get him out of the house and into the car any longer. Patient has become increasingly week over the last 1 week. Patient actually suffered a fall on family member and had come to the ER on 11/30/2021 but was told he needed to follow up with his PCP and discharged. Niece states she had difficulty getting him back into the house from the ER. Patient's right foot wound started approximately 1 week ago. He was seen by Dr. Latha Rowan and started on keflex. Dr. Latha Rowan was concerned about decreased pulses in his foot and wound healing ability. Patient this am was sleeping on entering the room. He is hard of hearing. His only complaint this am was that he needs more clothes from home. Prior to Visit Medications    Medication Sig Taking? Authorizing Provider   lisinopril (PRINIVIL;ZESTRIL) 2.5 MG tablet Take 2.5 mg by mouth daily  Historical Provider, MD   metoprolol succinate (TOPROL XL) 25 MG extended release tablet Take 1 tablet by mouth daily  Britt Ugalde DO   white petrolatum OINT ointment Apply 1 applicator topically 2 times daily  Britt Ugalde,    Magnesium Oxide (MAG-OXIDE PO) Take 40 mg by mouth   Historical Provider, MD   atorvastatin (LIPITOR) 10 MG tablet Take 10 mg by mouth nightly  Historical Provider, MD   gabapentin (NEURONTIN) 100 MG capsule Take 100 mg by mouth nightly.   Historical Provider, MD   ascorbic acid (VITAMIN C) 500 MG tablet Take 1,000 mg by mouth daily Historical Provider, MD   zinc gluconate 50 MG tablet Take 50 mg by mouth daily  Historical Provider, MD   Multiple Vitamins-Minerals (PRESERVISION AREDS 2 PO) Take 1 tablet by mouth daily   Historical Provider, MD   insulin lispro (HUMALOG KWIKPEN) 200 UNIT/ML SOPN pen Inject into the skin 4 times daily as needed Before meals and nightly  Historical Provider, MD   famotidine (PEPCID) 40 MG tablet Take 40 mg by mouth as needed  Historical Provider, MD   vitamin D (CHOLECALCIFEROL) 25 MCG (1000 UT) TABS tablet Take 1,000 Units by mouth daily  Historical Provider, MD   aspirin 81 MG tablet Take 81 mg by mouth every morning   Historical Provider, MD     Social History     Tobacco Use    Smoking status: Former Smoker     Packs/day: 0.50     Years: 20.00     Pack years: 10.00     Types: Cigarettes     Quit date: 5     Years since quittin.5    Smokeless tobacco: Never Used   Vaping Use    Vaping Use: Never used   Substance Use Topics    Alcohol use: Not Currently     Alcohol/week: 1.0 standard drink     Types: 1 Glasses of wine per week    Drug use: No     No family history on file.   Past Surgical History:   Procedure Laterality Date    ABDOMINAL HERNIA REPAIR      CATARACT REMOVAL      right    CHOLECYSTECTOMY  2011    DENTAL SURGERY      EYE SURGERY      [facility] DIAL[facility]HETER Right 2020    TESIO CATHETER INSERTION performed by Jose Blandon MD at [facility]      removal of sac in ear    SHUNT REVISION Right 2021    INSERTION AV GRAFT RIGHT ARM performed by Jose Blandon MD at [address] [place] [address] Right 2021    THROMBECTOMY RIGHT ARM AV GRAFT performed by Jose Blandon MD at [place] [address] VASCULAR SURGERY Left 2021    INSERTION OF TEMPORARY HEMODIALYSIS CATHETER performed by Jose Blandon MD at [address] [place] [address] Right 2021    INSERTION OF TESIO, REMOVAL OF TEMPORARY CATHETHER performed by Jared Brown Jimy Sampson MD at [address]     Past Medical History:   Diagnosis Date    Arthritis     CAD (coronary artery disease)     CHF (congestive heart failure) (HCC)     Chronic kidney disease     COVID-19     [facility]re[facility]     Diabetes mellitus ([facility]     Diabetic peripheral neuropathy ([facility] 11/9/2012    Hemodialysis patient ([facility]     History of CVA (cerebrovascular accident) 6/9/2014    Hypertension     Other disorders of kidney and ureter     prostate infections in past    PVD (peripheral vascular disease) with claudication ([facility] 6/9/2014    Right sided weakness 3/19/2018    TIA (transient ischemic attack)     possible several years ago    Unspecified cerebral artery occlusion with cerebral infarction 2013    WHEELCHAIR RIGHT LEG AFFECTED     Review of Systems   Constitutional: Positive for activity change and fatigue. Negative for chills and fever. HENT: Positive for hearing loss. Negative for congestion, postnasal drip and rhinorrhea. Eyes: Negative for photophobia and visual disturbance. Respiratory: Negative for cough, shortness of breath and wheezing. Cardiovascular: Negative for chest pain, palpitations and leg swelling. Gastrointestinal: Negative for abdominal distention, abdominal pain, constipation, diarrhea, nausea and vomiting. Endocrine: Negative for polydipsia, polyphagia and polyuria. Genitourinary: Negative for dysuria and flank pain. Musculoskeletal: Positive for gait problem. Negative for arthralgias and back pain. Skin: Positive for wound. Allergic/Immunologic: Negative for environmental allergies and food allergies. Neurological: Positive for weakness (Right sided. ). Negative for dizziness and light-headedness. Hematological: Negative for adenopathy. Does not bruise/bleed easily. Psychiatric/Behavioral: Negative for agitation and confusion.       Vitals:    12/01/21 1700 12/01/21 1745 12/01/21 1809 12/02/21 0624   BP: (!) 165/96 117/88 115/66 (!) 98/53   Pulse: 88 79 78 108   Resp:  18 16 20   Temp:  97.9 °F (36.6 °C) 97.5 °F (36.4 °C) 97.8 °F (36.6 °C)   TempSrc:   Oral    SpO2:   98% 95%   Weight:       Height:           Physical Exam  Constitutional:       Appearance: Normal appearance. HENT:      Head: Normocephalic and atraumatic. Right Ear: External ear normal.      Left Ear: External ear normal.      Nose: Nose normal. No congestion. Mouth/Throat:      Mouth: Mucous membranes are moist.      Pharynx: Oropharynx is clear. No oropharyngeal exudate. Eyes:      General:         Right eye: No discharge. Left eye: No discharge. Extraocular Movements: Extraocular movements intact. Conjunctiva/sclera: Conjunctivae normal.      Pupils: Pupils are equal, round, and reactive to light. Cardiovascular:      Rate and Rhythm: Normal rate and regular rhythm. Pulses: Normal pulses. Heart sounds: No gallop. Pulmonary:      Effort: Pulmonary effort is normal. No respiratory distress. Breath sounds: Normal breath sounds. No wheezing, rhonchi or rales. Abdominal:      General: Bowel sounds are normal. There is no distension. Palpations: Abdomen is soft. Tenderness: There is no abdominal tenderness. There is no guarding or rebound. Musculoskeletal:      Cervical back: Normal range of motion and neck supple. Right lower leg: Edema present. Left lower leg: Edema present. Skin:     General: Skin is warm and dry. Capillary Refill: Capillary refill takes less than 2 seconds. Coloration: Skin is not jaundiced. Neurological:      Mental Status: He is alert and oriented to person, place, and time. Motor: Weakness (Right sided weakness. ) present. Psychiatric:         Mood and Affect: Mood normal.         Behavior: Behavior normal.             Active Problems:    Failure to thrive in adult  Resolved Problems:    * No resolved hospital problems. *  1. Inability to ambulate  2. Generalized weakness   3. Right foot wound  4. Type II diabetes mellitus   5. ESRD on HD     Plan:   Consult PT/OT/  for SNF placement. Consult ID and podiatry for his foot wound. Received ceftriaxone and vancomycin per ER. Appreciate nephrology input. Continue on dialysis per their recommendation.      DVT Prophylaxis: Heparin  Code Status: Full     Elin Hammond DO      Electronically signed by Elin Hammond DO on 12/2/2021 at 6:25 AM

## 2021-12-02 NOTE — CONSULTS
[address] [place] [address] [facility]  Consultation Note     Admit Date: 12/1/2021  7:43 AM    Reason for Consult:   Right diabetic foot wound    Attending Physician:  No att. providers found    HISTORY OF PRESENT ILLNESS:             The history is obtained from extensive review of available past medical records. The patient is a 80 y.o. male who is known to the ID service. He has a history of ESRD and is currently on hemodialysis on Mondays, Wednesdays and Fridays. The patient presents to the ED at [facility] on 11/29/2021 with weakness. He was discharged home. The patient was being followed by his podiatrist for a chronic ulcer on the dorsum of the right second toe. He was sent to the ED by his PCP on 11/30/2021 for an admission. There were concerns of him not been able to care for himself and failure to thrive. He was afebrile and vital signs were unremarkable. White count was normal.  In spite of this he was given Vancomycin and Ceftriaxone by the ED physician. Past Medical History:        Diagnosis Date    Arthritis     CAD (coronary artery disease)     CHF (congestive heart failure) (HCC)     Chronic kidney disease     COVID-19     [address]re[facility] [address]) ([address]     Diabetes mellitus ([address]     Diabetic peripheral neuropathy ([address] 11/9/2012    Hemodialysis patient ([address]     History of CVA (cerebrovascular accident) 6/9/2014    Hypertension     Other disorders of kidney and ureter     prostate infections in past    PVD (peripheral vascular disease) with claudication ([address] 6/9/2014    Right sided weakness 3/19/2018    TIA (transient ischemic attack)     possible several years ago    Unspecified cerebral artery occlusion with cerebral infarction 2013    [facility] RIGHT LEG AFFECTED     July 2021. Admitted to [facility] with burning and frequency on urination. Treated with Ceftriaxone. Culture grew Klebsiella pneumoniae. Seen by ID.   Ceftriaxone was changed to Levofloxacin x7 days and he was discharged. November 2012. Admitted to [facility] with right-sided weakness and slurred speech. 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 Seen by Dr. Peyton Medina for a low-grade fever. Observed off antibiotics.     Past Surgical History:        Procedure Laterality Date    ABDOMINAL HERNIA REPAIR      CATARACT REMOVAL      right    CHOLECYSTECTOMY  11/2011    DENTAL SURGERY      EYE SURGERY      [facility] DIALYS[facility]HETER Right 8/27/2020    TESIO CATHETER INSERTION performed by Debbie Jain MD at [facility]      removal of sac in ear    SHUNT REVISION Right 1/21/2021    INSERTION AV GRAFT RIGHT ARM performed by Debbie Jain MD at [address] [place] [address] Right 4/8/2021    THROMBECTOMY RIGHT ARM AV GRAFT performed by Debbie Jain MD at [address] [place] [address] Left 8/9/2021    INSERTION OF TEMPORARY HEMODIALYSIS CATHETER performed by Debbie Jain MD at [address] [place] [address] Right 8/11/2021    INSERTION OF TESIO, REMOVAL OF TEMPORARY CATHETHER performed by Debbie Jain MD at [address] [place] [address]     Current Medications:   Scheduled Meds:   sodium chloride flush  10 mL IntraVENous 2 times per day    heparin (porcine)  5,000 Units SubCUTAneous 3 times per day    aspirin  81 mg Oral QAM    atorvastatin  10 mg Oral Nightly    famotidine  10 mg Oral Daily    gabapentin  100 mg Oral Nightly    insulin lispro  0-6 Units SubCUTAneous TID WC    insulin lispro  0-3 Units SubCUTAneous Nightly    metoprolol succinate  25 mg Oral Daily    cefTRIAXone (ROCEPHIN) IV  1,000 mg IntraVENous Once     Continuous Infusions:   sodium chloride       PRN Meds:sodium chloride flush, sodium chloride, senna, acetaminophen **OR** acetaminophen    Allergies:  Sulfa antibiotics and Sulfa antibiotics    Social History:   Social History     Socioeconomic History    Marital status: Single     Spouse name: Not on file    Number of children: Not on file    Years of education: Not on file    Highest education level: Not on file   Occupational History    Not on file   Tobacco Use    Smoking status: Former Smoker     Packs/day: 0.50     Years: 20.00     Pack years: 10.00     Types: Cigarettes     Quit date: 5     Years since quittin.5    Smokeless tobacco: Never Used   Vaping Use    Vaping Use: Never used   Substance and Sexual Activity    Alcohol use: Not Currently     Alcohol/week: 1.0 standard drink     Types: 1 Glasses of wine per week    Drug use: No    Sexual activity: Not Currently   Other Topics Concern    Not on file   Social History Narrative    ** Merged History Encounter **          Social Determinants of Health     Financial Resource Strain:     Difficulty of Paying Living Expenses: Not on file   Food Insecurity:     Worried About Running Out of Food in the Last Year: Not on file    Arnaldo of Food in the Last Year: Not on file   Transportation Needs:     Lack of Transportation (Medical): Not on file    Lack of Transportation (Non-Medical):  Not on file   Physical Activity:     Days of Exercise per Week: Not on file    Minutes of Exercise per Session: Not on file   Stress:     Feeling of Stress : Not on file   Social Connections:     Frequency of Communication with Friends and Family: Not on file    Frequency of Social Gatherings with Friends and Family: Not on file    Attends Taoist Services: Not on file    Active Member of Clubs or Organizations: Not on file    Attends Club or Organization Meetings: Not on file    Marital Status: Not on file   Intimate Partner Violence:     Fear of Current or Ex-Partner: Not on file    Emotionally Abused: Not on file    Physically Abused: Not on file    Sexually Abused: Not on file   Housing Stability:     Unable to Pay for Housing in the Last Year: Not on file    Number of [place] in the Last Year: Not on file    Unstable Housing in the Last Year: Not on file      Pets: None   The patient lives at home with his wife. He is retired  Travel: No    Family History:   No family history on file. . Otherwise non-pertinent to the chief complaint. REVIEW OF SYSTEMS:    Constitutional: Negative for fevers, chills, diaphoresis  Neurologic: Negative   Psychiatric: Negative  Rheumatologic: Negative   Endocrine: Negative  Hematologic: Negative  Immunologic: Negative  ENT: Negative  Respiratory: Negative   Cardiovascular: Negative  GI: Negative  : Negative  Musculoskeletal: As in the HPI  Skin: No rashes. PHYSICAL EXAM:    Vitals:   /66   Pulse 90   Temp 97.8 °F (36.6 °C)   Resp 20   Ht 5' 5.5\" (1.664 m)   Wt 179 lb 14.3 oz (81.6 kg)   SpO2 95%   BMI 29.48 kg/m²   Constitutional: The patient is awake, alert, and oriented. No distress. Skin: Warm and dry. No rashes were noted. HEENT: Eyes show round, and reactive pupils. No jaundice. Moist mucous membranes, no ulcerations, no thrush. Neck: Supple to movements. No lymphadenopathy. Chest: No use of accessory muscles to breathe. Symmetrical expansion. Auscultation reveals no wheezing, crackles, or rhonchi. Cardiovascular: S1 and S2 are rhythmic and regular. No murmurs appreciated. Abdomen: Positive bowel sounds to auscultation. Benign to palpation. No masses felt. No hepatosplenomegaly. Extremities: Chronic ulcer on the dorsum of the right second toe. There is some surrounding discoloration but no erythema. No purulence. Lines: peripheral      CBC+dif:  Recent Labs     11/29/21  1253 11/29/21  1253 12/01/21  0931 12/01/21  0931 12/02/21  0618   WBC 8.8  --  5.1  --  4.6   HGB 10.0*   < > 10.5*   < > 9.7*   HCT 32.2*   < > 33.5*   < > 30.6*   .3*   < > 100.3*   < > 97.5      < > 169   < > 116*   NEUTROABS 7.03   < > 3.25   < > 2.62    < > = values in this interval not displayed.      Lab Results   Component Value Date    CRP 4.2 (H) 12/01/2021 CRP 0.1 09/07/2020      No results found for: CRPHS  Lab Results   Component Value Date    SEDRATE 78 (H) 12/01/2021    SEDRATE 31 (H) 09/07/2020    SEDRATE 41 (H) 06/11/2014     Lab Results   Component Value Date    ALT 19 12/02/2021    AST 12 12/02/2021    ALKPHOS 90 12/02/2021    BILITOT 0.5 12/02/2021     Lab Results   Component Value Date     12/02/2021    K 4.8 12/02/2021    CL 96 12/02/2021    CO2 28 12/02/2021    BUN 39 12/02/2021    CREATININE 3.8 12/02/2021    GFRAA 18 12/02/2021    LABGLOM 15 12/02/2021    GLUCOSE 187 12/02/2021    GLUCOSE 163 02/22/2012    PROT 6.5 12/02/2021    LABALBU 3.5 12/02/2021    LABALBU 3.8 02/20/2012    CALCIUM 8.5 12/02/2021    BILITOT 0.5 12/02/2021    ALKPHOS 90 12/02/2021    AST 12 12/02/2021    ALT 19 12/02/2021       Lab Results   Component Value Date    PROTIME 16.6 05/15/2021    PROTIME 15.1 01/29/2012    INR 1.5 05/15/2021       Lab Results   Component Value Date    TSH 1.940 09/17/2014       Lab Results   Component Value Date    COLORU Yellow 07/10/2021    PHUR 6.0 07/10/2021    WBCUA PACKED 07/10/2021    WBCUA NONE 02/20/2012    RBCUA >20 07/10/2021    RBCUA 1-3 05/04/2013    BACTERIA MANY 07/10/2021    CLARITYU CLOUDY 07/10/2021    SPECGRAV 1.020 07/10/2021    LEUKOCYTESUR LARGE 07/10/2021    UROBILINOGEN 0.2 07/10/2021    BILIRUBINUR Negative 07/10/2021    BILIRUBINUR NEGATIVE 02/20/2012    BLOODU LARGE 07/10/2021    GLUCOSEU Negative 07/10/2021    GLUCOSEU 500 02/20/2012       Lab Results   Component Value Date    BE 3.1 08/09/2021    O2SAT 31.7 08/09/2021    PH 7.332 08/09/2021     Radiology:  Noted    Microbiology:  Pending  No results for input(s): BC in the last 72 hours. No results for input(s): ORG in the last 72 hours. No results for input(s): [place]una[place] in the last 72 hours. No results for input(s): STREPNEUMAGU in the last 72 hours. No results for input(s): LP1UAG in the last 72 hours. No results for input(s): ASO in the last 72 hours.   No

## 2021-12-02 NOTE — CARE COORDINATION
Social Work/Transition of Care:    Pt has been accepted to [facility], facility unable to accept until after pt is dialyzed on 12/3/21. SW updated pt niece Lance Cid and patient. [place] Completed  Assigned SW/CM to arrange Transportation in the AM after dialysis.     Electronically signed by Reino Rubinstein on 96/2/5274 at 5:24 PM

## 2021-12-02 NOTE — CONSULTS
[facility] MD Belinda Nichols MD Natha Daft, MD Bertrand Furbish, MD Annis Acron, KARAN Ram, DAISY  Consultation  Patient's Name: Janeth Wilde  8:47 PM seen this early evening on dialysis  12/1/2021    Nephrologist: Belinda Kevin MD    Reason for Consult: ESRD management  Requesting Physician:  Millie Balbuena DO    Chief Complaint: Toe infection    History Obtained From: Patient, chart    History of Present Ilness:    Mr. Marlena Tran is a pleasant 41-year-old gentleman known to me from the outpatient practice I follow him for ESRD. He undergoes chronic intermittent hemodialysis on Mondays Wednesdays and Fridays at the [address] [place] [address] dialysis center through left upper extremity AV fistula. Recent treatments have been without complication, though as he tells me again, he finds the time spent there rather \"boring. \"  He has an infection on his right foot, surgically his toe, he was sent into [place] [address] for further evaluation and management per his podiatrist.    At the time of my initial evaluation he was undergoing hemodialysis. Treatment is without complication. BP stable throughout treatment. BFR as prescribed. Besides the pain, he denies all other complaint, and ROS is unremarkable.     Past Medical History:   Diagnosis Date    Arthritis     CAD (coronary artery disease)     CHF (congestive heart failure) (HCC)     Chronic kidney disease     COVID-19     CVA (cerebral vascular accident) ([address]     Diabetes mellitus ([address]     Diabetic peripheral neuropathy ([address] 11/9/2012    Hemodialysis patient ([address]     History of CVA (cerebrovascular accident) 6/9/2014    Hypertension     Other disorders of kidney and ureter     prostate infections in past    PVD (peripheral vascular disease) with claudication ([address] 6/9/2014    Right sided weakness 3/19/2018    TIA (transient ischemic attack)     possible several years ago    Unspecified cerebral artery occlusion with cerebral infarction 2013    [facility] RIGHT LEG AFFECTED       Past Surgical History:   Procedure Laterality Date    ABDOMINAL HERNIA REPAIR      CATARACT REMOVAL      right    CHOLECYSTECTOMY  11/2011    DENTAL SURGERY      EYE SURGERY      [facility] DIALYSIS CATHETER Right 8/27/2020    TESIO CATHETER INSERTION performed by Stanley Zhu MD at [facility]      removal of sac in ear    SHUNT REVISION Right 1/21/2021    INSERTION AV GRAFT RIGHT ARM performed by Stanley Zhu MD at [address] [facility] Right 4/8/2021    THROMBECTOMY RIGHT ARM AV GRAFT performed by Stanley Zhu MD at [place] [address] VASCULAR SURGERY Left 8/9/2021    INSERTION OF TEMPORARY HEMODIALYSIS CATHETER performed by Stanley Zhu MD at [address] [facility] Right 8/11/2021    INSERTION OF TESIO, REMOVAL OF TEMPORARY CATHETHER performed by Stanley Zhu MD at [address] [place] [address]       No family history on file. reports that he quit smoking about 54 years ago. His smoking use included cigarettes. He has a 10.00 pack-year smoking history. He has never used smokeless tobacco. He reports previous alcohol use of about 1.0 standard drink of alcohol per week. He reports that he does not use drugs.     Allergies:  Sulfa antibiotics and Sulfa antibiotics    Current Medications:    sodium chloride flush 0.9 % injection 10 mL, 2 times per day  sodium chloride flush 0.9 % injection 10 mL, PRN  0.9 % sodium chloride infusion, PRN  heparin (porcine) injection 5,000 Units, 3 times per day  senna (SENOKOT) tablet 8.6 mg, Daily PRN  acetaminophen (TYLENOL) tablet 650 mg, Q6H PRN   Or  acetaminophen (TYLENOL) suppository 650 mg, Q6H PRN  aspirin EC tablet 81 mg, QAM  atorvastatin (LIPITOR) tablet 10 mg, Nightly  famotidine (PEPCID) tablet 10 mg, Daily  gabapentin (NEURONTIN) capsule 100 mg, Nightly  insulin lispro (HUMALOG) injection vial 0-6 Units, TID WC  insulin lispro (HUMALOG) injection vial 0-3 Units, Nightly  metoprolol succinate (TOPROL XL) extended release tablet 25 mg, Daily  vancomycin 2000 mg in dextrose 5% 500 ml IVPB, Once  cefTRIAXone (ROCEPHIN) 1,000 mg in sterile water 10 mL IV syringe, Once        Review of Systems:   All systems reviewed, and are unremarkable    Physical exam:   /66   Pulse 78   Temp 97.5 °F (36.4 °C) (Oral)   Resp 16   Ht 5' 5.5\" (1.664 m)   Wt 179 lb 14.3 oz (81.6 kg)   SpO2 98%   BMI 29.48 kg/m²   Pleasant age-appropriate elderly white gentleman in no apparent distress  NC/AT EOMI sclera conjunctiva are clear and anicteric mucous membranes are moist  Neck soft supple trachea midline no bruit  CTA bilaterally  Regular rhythm normal S1 and S2 no murmur  Abdomen soft nontender nondistended normal active bowel sounds  No rash or lesion on visible portions of integument  Pulses 1-2+ x4  Moves all 4 extremities  Cranial nerves II through XII grossly intact  Awake, alert, interactive and appropriate      Data:   Labs:  CBC with Differential:    Lab Results   Component Value Date    WBC 5.1 12/01/2021    RBC 3.34 12/01/2021    HGB 10.5 12/01/2021    HCT 33.5 12/01/2021     12/01/2021    .3 12/01/2021    MCH 31.4 12/01/2021    MCHC 31.3 12/01/2021    RDW 15.9 12/01/2021    SEGSPCT 46 01/04/2014    BANDSPCT 2 05/02/2016    LYMPHOPCT 20.0 12/01/2021    MONOPCT 11.7 12/01/2021    BASOPCT 0.4 12/01/2021    MONOSABS 0.60 12/01/2021    LYMPHSABS 1.02 12/01/2021    EOSABS 0.20 12/01/2021    BASOSABS 0.02 12/01/2021     CMP:    Lab Results   Component Value Date     12/01/2021    K 4.7 12/01/2021    CL 94 12/01/2021    CO2 27 12/01/2021    BUN 57 12/01/2021    CREATININE 4.8 12/01/2021    GFRAA 14 12/01/2021    LABGLOM 11 12/01/2021    GLUCOSE 189 12/01/2021    GLUCOSE 163 02/22/2012    PROT 6.8 12/01/2021    LABALBU 3.7 12/01/2021    LABALBU 3.8 02/20/2012    CALCIUM 8.8 12/01/2021    BILITOT 0.4 12/01/2021 ALKPHOS 97 12/01/2021    AST 12 12/01/2021    ALT 20 12/01/2021     Ionized Calcium:  No results found for: IONCA  Magnesium:    Lab Results   Component Value Date    MG 2.4 11/29/2021     Phosphorus:    Lab Results   Component Value Date    PHOS 4.7 11/29/2021     U/A:    Lab Results   Component Value Date    COLORU Yellow 07/10/2021    PHUR 6.0 07/10/2021    WBCUA PACKED 07/10/2021    WBCUA NONE 02/20/2012    RBCUA >20 07/10/2021    RBCUA 1-3 05/04/2013    BACTERIA MANY 07/10/2021    CLARITYU CLOUDY 07/10/2021    SPECGRAV 1.020 07/10/2021    LEUKOCYTESUR LARGE 07/10/2021    UROBILINOGEN 0.2 07/10/2021    BILIRUBINUR Negative 07/10/2021    BILIRUBINUR NEGATIVE 02/20/2012    BLOODU LARGE 07/10/2021    GLUCOSEU Negative 07/10/2021    GLUCOSEU 500 02/20/2012     Microalbumen/Creatinine ratio:  No components found for: RUCREAT  Iron Saturation:  No components found for: PERCENTFE  TIBC:    Lab Results   Component Value Date    TIBC 177 08/24/2020     FERRITIN:    Lab Results   Component Value Date    FERRITIN 98 08/24/2020        Imaging:  US DUP LOWER EXTREMITY RIGHT ARTERIES   Final Result   Findings are suggestive of occlusion involving right superficial femoral   artery and popliteal arteries. Diminutive blood flow demonstrated in right   posterior tibial and peroneal arteries as well as scant blood flow at level   of right dorsalis pedis artery. CTA abdomen and pelvis with runoff may be   helpful for further evaluation. XR FOOT RIGHT (MIN 3 VIEWS)   Final Result   No acute osseous abnormality. Degenerative changes MTP joint great toe with adjacent dorsal soft tissue   edema. Assessment  1. ESRD  2. Anemia due to ESRD  3. 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 Hypertension  5. Right foot infection    Recommendations  1. Continue IHD support for solute and volume clearance on Mondays Wednesdays and Fridays as per outpatient orders and dry weight  2.  Continue current antihypertensive medication regimen  3. Continue SHANDRA: Retacrit while here as warranted, Mircera as outpatient  4. Continue other aspects of renal management  5. Continue supportive care  6. Follow labs, BP      Thank you for the opportunity to participate in the care of your pleasant patient. We look forward to following along with you.         Electronically signed by Herminia Stinson MD on 12/1/2021

## 2021-12-03 ENCOUNTER — APPOINTMENT (OUTPATIENT)
Dept: ULTRASOUND IMAGING | Age: 86
DRG: 638 | End: 2021-12-03
Payer: MEDICARE

## 2021-12-03 ENCOUNTER — APPOINTMENT (OUTPATIENT)
Dept: INTERVENTIONAL RADIOLOGY/VASCULAR | Age: 86
DRG: 638 | End: 2021-12-03
Payer: MEDICARE

## 2021-12-03 PROBLEM — L97.511: Status: ACTIVE | Noted: 2021-12-03

## 2021-12-03 PROBLEM — N17.9 ACUTE KIDNEY FAILURE (HCC): Status: RESOLVED | Noted: 2020-08-14 | Resolved: 2021-12-03

## 2021-12-03 PROBLEM — N17.0 ACUTE KIDNEY INJURY (AKI) WITH ACUTE TUBULAR NECROSIS (ATN) (HCC): Status: RESOLVED | Noted: 2020-08-14 | Resolved: 2021-12-03

## 2021-12-03 PROBLEM — T82.868A AV GRAFT THROMBOSIS, INITIAL ENCOUNTER (HCC): Status: RESOLVED | Noted: 2021-08-09 | Resolved: 2021-12-03

## 2021-12-03 PROBLEM — I70.239 ATHEROSCLEROSIS OF NATIVE ARTERY OF RIGHT LOWER EXTREMITY WITH ULCERATION (HCC): Status: ACTIVE | Noted: 2021-12-03

## 2021-12-03 LAB
ALBUMIN SERPL-MCNC: 3.6 G/DL (ref 3.5–5.2)
ALP BLD-CCNC: 91 U/L (ref 40–129)
ALT SERPL-CCNC: 20 U/L (ref 0–40)
ANION GAP SERPL CALCULATED.3IONS-SCNC: 14 MMOL/L (ref 7–16)
AST SERPL-CCNC: 17 U/L (ref 0–39)
BASOPHILS ABSOLUTE: 0.03 E9/L (ref 0–0.2)
BASOPHILS RELATIVE PERCENT: 0.6 % (ref 0–2)
BILIRUB SERPL-MCNC: 0.3 MG/DL (ref 0–1.2)
BUN BLDV-MCNC: 57 MG/DL (ref 6–23)
CALCIUM SERPL-MCNC: 8.5 MG/DL (ref 8.6–10.2)
CHLORIDE BLD-SCNC: 98 MMOL/L (ref 98–107)
CO2: 27 MMOL/L (ref 22–29)
CREAT SERPL-MCNC: 5.6 MG/DL (ref 0.7–1.2)
EOSINOPHILS ABSOLUTE: 0.2 E9/L (ref 0.05–0.5)
EOSINOPHILS RELATIVE PERCENT: 3.7 % (ref 0–6)
GFR AFRICAN AMERICAN: 12
GFR NON-AFRICAN AMERICAN: 10 ML/MIN/1.73
GLUCOSE BLD-MCNC: 203 MG/DL (ref 74–99)
HBA1C MFR BLD: 7.3 % (ref 4–5.6)
HCT VFR BLD CALC: 29.7 % (ref 37–54)
HEMOGLOBIN: 9.2 G/DL (ref 12.5–16.5)
IMMATURE GRANULOCYTES #: 0.02 E9/L
IMMATURE GRANULOCYTES %: 0.4 % (ref 0–5)
LYMPHOCYTES ABSOLUTE: 1.78 E9/L (ref 1.5–4)
LYMPHOCYTES RELATIVE PERCENT: 33.2 % (ref 20–42)
MAGNESIUM: 2.3 MG/DL (ref 1.6–2.6)
MCH RBC QN AUTO: 30.8 PG (ref 26–35)
MCHC RBC AUTO-ENTMCNC: 31 % (ref 32–34.5)
MCV RBC AUTO: 99.3 FL (ref 80–99.9)
METER GLUCOSE: 163 MG/DL (ref 74–99)
METER GLUCOSE: 171 MG/DL (ref 74–99)
METER GLUCOSE: 227 MG/DL (ref 74–99)
METER GLUCOSE: 275 MG/DL (ref 74–99)
MONOCYTES ABSOLUTE: 0.69 E9/L (ref 0.1–0.95)
MONOCYTES RELATIVE PERCENT: 12.9 % (ref 2–12)
NEUTROPHILS ABSOLUTE: 2.64 E9/L (ref 1.8–7.3)
NEUTROPHILS RELATIVE PERCENT: 49.2 % (ref 43–80)
PDW BLD-RTO: 15.9 FL (ref 11.5–15)
PLATELET # BLD: 131 E9/L (ref 130–450)
PMV BLD AUTO: 12.2 FL (ref 7–12)
POTASSIUM REFLEX MAGNESIUM: 5.2 MMOL/L (ref 3.5–5)
RBC # BLD: 2.99 E12/L (ref 3.8–5.8)
SODIUM BLD-SCNC: 139 MMOL/L (ref 132–146)
T4 FREE: 1.2 NG/DL (ref 0.93–1.7)
T4 FREE: 1.32 NG/DL (ref 0.93–1.7)
TOTAL PROTEIN: 6.2 G/DL (ref 6.4–8.3)
TSH SERPL DL<=0.05 MIU/L-ACNC: 0.97 UIU/ML (ref 0.27–4.2)
WBC # BLD: 5.4 E9/L (ref 4.5–11.5)

## 2021-12-03 PROCEDURE — 90935 HEMODIALYSIS ONE EVALUATION: CPT

## 2021-12-03 PROCEDURE — 6370000000 HC RX 637 (ALT 250 FOR IP): Performed by: INTERNAL MEDICINE

## 2021-12-03 PROCEDURE — 36415 COLL VENOUS BLD VENIPUNCTURE: CPT

## 2021-12-03 PROCEDURE — 99222 1ST HOSP IP/OBS MODERATE 55: CPT | Performed by: SURGERY

## 2021-12-03 PROCEDURE — 6360000002 HC RX W HCPCS: Performed by: INTERNAL MEDICINE

## 2021-12-03 PROCEDURE — 85025 COMPLETE CBC W/AUTO DIFF WBC: CPT

## 2021-12-03 PROCEDURE — 84439 ASSAY OF FREE THYROXINE: CPT

## 2021-12-03 PROCEDURE — 2060000000 HC ICU INTERMEDIATE R&B

## 2021-12-03 PROCEDURE — 86706 HEP B SURFACE ANTIBODY: CPT

## 2021-12-03 PROCEDURE — 84443 ASSAY THYROID STIM HORMONE: CPT

## 2021-12-03 PROCEDURE — 83735 ASSAY OF MAGNESIUM: CPT

## 2021-12-03 PROCEDURE — 80053 COMPREHEN METABOLIC PANEL: CPT

## 2021-12-03 PROCEDURE — 83036 HEMOGLOBIN GLYCOSYLATED A1C: CPT

## 2021-12-03 PROCEDURE — 93923 UPR/LXTR ART STDY 3+ LVLS: CPT

## 2021-12-03 PROCEDURE — 99222 1ST HOSP IP/OBS MODERATE 55: CPT | Performed by: INTERNAL MEDICINE

## 2021-12-03 PROCEDURE — 2580000003 HC RX 258: Performed by: INTERNAL MEDICINE

## 2021-12-03 PROCEDURE — 82962 GLUCOSE BLOOD TEST: CPT

## 2021-12-03 PROCEDURE — 93970 EXTREMITY STUDY: CPT

## 2021-12-03 PROCEDURE — 87340 HEPATITIS B SURFACE AG IA: CPT

## 2021-12-03 RX ORDER — DEXTROSE MONOHYDRATE 50 MG/ML
100 INJECTION, SOLUTION INTRAVENOUS PRN
Status: DISCONTINUED | OUTPATIENT
Start: 2021-12-03 | End: 2021-12-04 | Stop reason: HOSPADM

## 2021-12-03 RX ORDER — INSULIN GLARGINE 100 [IU]/ML
5 INJECTION, SOLUTION SUBCUTANEOUS NIGHTLY
Status: DISCONTINUED | OUTPATIENT
Start: 2021-12-03 | End: 2021-12-04 | Stop reason: HOSPADM

## 2021-12-03 RX ORDER — HEPARIN SODIUM 1000 [USP'U]/ML
INJECTION, SOLUTION INTRAVENOUS; SUBCUTANEOUS
Status: DISPENSED
Start: 2021-12-03 | End: 2021-12-03

## 2021-12-03 RX ORDER — NICOTINE POLACRILEX 4 MG
15 LOZENGE BUCCAL PRN
Status: DISCONTINUED | OUTPATIENT
Start: 2021-12-03 | End: 2021-12-04 | Stop reason: HOSPADM

## 2021-12-03 RX ORDER — DEXTROSE MONOHYDRATE 25 G/50ML
12.5 INJECTION, SOLUTION INTRAVENOUS PRN
Status: DISCONTINUED | OUTPATIENT
Start: 2021-12-03 | End: 2021-12-04 | Stop reason: HOSPADM

## 2021-12-03 RX ADMIN — INSULIN LISPRO 1 UNITS: 100 INJECTION, SOLUTION INTRAVENOUS; SUBCUTANEOUS at 13:51

## 2021-12-03 RX ADMIN — INSULIN LISPRO 1 UNITS: 100 INJECTION, SOLUTION INTRAVENOUS; SUBCUTANEOUS at 22:15

## 2021-12-03 RX ADMIN — FAMOTIDINE 10 MG: 20 TABLET, FILM COATED ORAL at 13:49

## 2021-12-03 RX ADMIN — INSULIN GLARGINE 5 UNITS: 100 INJECTION, SOLUTION SUBCUTANEOUS at 22:16

## 2021-12-03 RX ADMIN — APIXABAN 2.5 MG: 2.5 TABLET, FILM COATED ORAL at 13:54

## 2021-12-03 RX ADMIN — METOPROLOL SUCCINATE 25 MG: 25 TABLET, EXTENDED RELEASE ORAL at 13:49

## 2021-12-03 RX ADMIN — Medication 10 ML: at 22:12

## 2021-12-03 RX ADMIN — INSULIN LISPRO 1 UNITS: 100 INJECTION, SOLUTION INTRAVENOUS; SUBCUTANEOUS at 06:45

## 2021-12-03 RX ADMIN — GABAPENTIN 100 MG: 100 CAPSULE ORAL at 22:11

## 2021-12-03 RX ADMIN — ATORVASTATIN CALCIUM 10 MG: 10 TABLET, FILM COATED ORAL at 22:11

## 2021-12-03 RX ADMIN — INSULIN LISPRO 3 UNITS: 100 INJECTION, SOLUTION INTRAVENOUS; SUBCUTANEOUS at 16:54

## 2021-12-03 RX ADMIN — HEPARIN SODIUM 5000 UNITS: 10000 INJECTION INTRAVENOUS; SUBCUTANEOUS at 06:00

## 2021-12-03 RX ADMIN — Medication 10 ML: at 13:49

## 2021-12-03 RX ADMIN — APIXABAN 2.5 MG: 2.5 TABLET, FILM COATED ORAL at 22:11

## 2021-12-03 ASSESSMENT — PAIN SCALES - GENERAL
PAINLEVEL_OUTOF10: 0

## 2021-12-03 ASSESSMENT — ENCOUNTER SYMPTOMS
NAUSEA: 0
DIARRHEA: 0
SHORTNESS OF BREATH: 0
COUGH: 0
VOMITING: 0

## 2021-12-03 NOTE — CARE COORDINATION
Social work / Discharge Planning:       Patient accepted by [facility]. No precert needed. Negative covid result will be needed within 24 hours of discharge. N-17, PASRR and ambulette form completed. FABIOLA  [phone] needs to be informed when discharged.   Electronically signed by SCOTT Braun on 12/3/2021 at 11:12 AM

## 2021-12-03 NOTE — PROGRESS NOTES
Comprehensive Nutrition Assessment    Type and Reason for Visit:  Initial, Positive Nutrition Screen, Wound    Nutrition Recommendations/Plan: Continue current diet and added ONS for wound healing. Nutrition Assessment:  Pt presents to the ED for right foot infection from PCP. Pt adm with food infection. Pt also has ESRD on HD. Pt at risk d/t wound and will start ONS for wound healing. Malnutrition Assessment:  Malnutrition Status:  No malnutrition    Context:  Chronic Illness     Findings of the 6 clinical characteristics of malnutrition:    Estimated Daily Nutrient Needs:  Energy (kcal):  13-1500kcal (15-18kcal/kg CBW); Weight Used for Energy Requirements:  Current     Protein (g):  75-90g (1.2-1.4g/kg IBW); Weight Used for Protein Requirements:  Ideal        Fluid (ml/day):  ; Method Used for Fluid Requirements:  1 ml/kcal      Nutrition Related Findings:  A&Ox4, Abd WDL, -I/Os, Edema generalized +1      Wounds:  Multiple, Open Wounds, Pressure Injury       Current Nutrition Therapies:    ADULT DIET; Regular; 4 carb choices (60 gm/meal); Low Fat/Low Chol/High Fiber/JIMY; Low Potassium (Less than 3000 mg/day)    Anthropometric Measures:  · Height: 5' 5.5\" (166.4 cm)  · Current Body Weight: 187 lb 6.3 oz (85 kg) (12/3)       · Usual Body Weight: 179 lb 10.8 oz (81.5 kg) (7/1/21 bed)     · Ideal Body Weight: 139 lbs; % Ideal Body Weight 134.8 %   · BMI: 30.7  · Adjusted Body Weight:  ; No Adjustment       · BMI Categories: Obese Class 1 (BMI 30.0-34. 9)       Nutrition Diagnosis:   · Increased nutrient needs related to increase demand for energy/nutrients as evidenced by wounds   ·   Nutrition Interventions:   Food and/or Nutrient Delivery:  Continue Current Diet, Start Oral Nutrition Supplement (Triston BID)  Nutrition Education/Counseling:  No recommendation at this time   Coordination of Nutrition Care:  Continue to monitor while inpatient    Goals:  >75% of meals and ONS consumed       Nutrition Monitoring and Evaluation:   Behavioral-Environmental Outcomes:  None Identified   Food/Nutrient Intake Outcomes:  Food and Nutrient Intake, Supplement Intake  Physical Signs/Symptoms Outcomes:  Biochemical Data, Fluid Status or Edema, Nutrition Focused Physical Findings, Skin, Weight     Discharge Planning:     Too soon to determine     Electronically signed by Mary Lovelace RD, LD on 12/3/21 at 12:26 PM EST    Contact: [phone]

## 2021-12-03 NOTE — FLOWSHEET NOTE
12/03/21 1114   Vital Signs   /77   Temp 97.7 °F (36.5 °C)   Pulse 96   Resp 20   Weight 187 lb 6.3 oz (85 kg)   Weight Method Bed scale   Percent Weight Change 3.36   Pain Assessment   Pain Assessment 0-10   Pain Level 0   Post-Hemodialysis Assessment   Post-Treatment Procedures Blood returned; Catheter capped, clamped and heparinized x 2 ports   Machine Disinfection Process Exterior Machine Disinfection   Rinseback Volume (ml) 300 ml   Total Liters Processed (l/min) 77.7 l/min   Dialyzer Clearance Clotted   Duration of Treatment (minutes) 210 minutes   Heparin amount administered during treatment (units) 0 units   Hemodialysis Intake (ml) 300 ml   Hemodialysis Output (ml) 2121 ml   NET Removed (ml) 1621 ml   Tolerated Treatment Fair   Bilateral Breath Sounds Diminished   Edema Generalized   Edema Generalized +1

## 2021-12-03 NOTE — DISCHARGE INSTR - COC
Continuity of Care Form    Patient Name: Erin Herrera   :  1931  MRN:  [id number]    Admit date:  2021  Discharge date:  21    Code Status Order: Full Code   Advance Directives:      Admitting Physician:  Aquilino Juarez DO  PCP: Aquilino Juarez DO    Discharging Nurse: Geetha Rios RN   [address] Unit/Room#: 8446/0655-W  Discharging Unit Phone Number: [phone]    Emergency Contact:   Extended Emergency Contact Information  Primary Emergency Contact: Jefferson Salazar  [place] Phone: [phone]  Mobile Phone: [phone]  Relation: Niece/Nephew   needed?  No  Secondary Emergency Contact: [address] Phone: [phone]  Mobile Phone: [phone]  Relation: Brother/Sister    Past Surgical History:  Past Surgical History:   Procedure Laterality Date    ABDOMINAL HERNIA REPAIR      CATARACT REMOVAL      right    CHOLECYSTECTOMY  2011    DENTAL SURGERY      EYE SURGERY      HC DIALYSIS CATHETER Right 2020    TESIO CATHETER INSERTION performed by Debbie Jain MD at [address]      removal of sac in ear    SHUNT REVISION Right 2021    INSERTION AV GRAFT RIGHT ARM performed by Debbie Jain MD at [address] [place] [address] Right 2021    THROMBECTOMY RIGHT ARM AV GRAFT performed by Debbie Jain MD at [address] [place] [address] Left 2021    INSERTION OF TEMPORARY HEMODIALYSIS CATHETER performed by Debbie Jain MD at [address] [place] [address] Right 2021    INSERTION OF TESIO, REMOVAL OF TEMPORARY CATHETHER performed by Debbie Jain MD at [address] [place] [address]       Immunization History:   Immunization History   Administered Date(s) Administered    Td, unspecified formulation 2011       Active Problems:  Patient Active Problem List   Diagnosis Code    Partial small bowel obstruction (HCC) K56.600    IDDM-2 E11.9    CAD (coronary artery disease) I25.10    HTN I10    COPD J44.9    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    Diabetes mellitus ([facility] E11.9    Diabetes mellitus ([facility] E11.9    Diabetic peripheral neuropathy (HCC) E11.42    Osteoarthritis M19.90    Gait abnormality R26.9    Physical deconditioning R53.81    Lumbar spondylitis (HCC) M46.96    Anemia D64.9    Fx humer, med condyl-closed S42.463A    Cerebral infarction ([facility] I63.9    Renal failure N19    TIA (transient ischemic attack) G45.9    ASHD (arteriosclerotic heart disease) I25.10    Diabetes mellitus ([facility]) E11.9    PVD (peripheral vascular disease) with claudication ([facility]) I73.9    History of CVA (cerebrovascular accident) Z86.73    Pain in lower limb M79.606    Cerebral infarction ([facility] I63.9    CKD 3 N18.30    Diabetes mellitus-2 E11.9    Acute respiratory failure ([facility]) J96.00    Decubitus ulcer of sacral region, stage 1 L89.151    Right sided weakness R53.1    Hypoglycemia E16.2    Acute kidney injury (ZAHRAA) with acute tubular necrosis (ATN) ([facility]) N17.0    Acute kidney failure ([facility]) N17.9    Encounter regarding vascular access for dialysis for end-stage renal disease ([facility] N18.6, Z99.2    ESRD (end stage renal disease) on dialysis ([facility] N18.6, Z99.2    Diabetic ulcer of toe of right foot associated with type 2 diabetes mellitus, with fat layer exposed ([facility] E11.621, L97.512    AV graft thrombosis, initial encounter ([facility] T82.868A    Failure to thrive in adult R62.7       Isolation/Infection:   Isolation            No Isolation          Patient Infection Status       Infection Onset Added Last Indicated Last Indicated By Review Planned Expiration Resolved Resolved By    None active    Resolved    COVID-19 (Rule Out) 11/29/21 11/29/21 11/29/21 COVID-19, Rapid (Ordered)   11/29/21 Rule-Out Test Resulted    COVID-19 (Rule Out) 07/10/21 07/10/21 07/10/21 COVID-19, Rapid (Ordered)   07/10/21 Rule-Out Test Resulted    COVID-19 (Rule Out) 04/02/21 04/02/21 04/02/21 Covid-19 Ambulatory (Ordered)   04/03/21 Rule-Out Test Resulted    COVID-19 (Rule Out) 01/15/21 01/16/21 01/16/21 Covid-19 Ambulatory (Ordered)   01/17/21 Rule-Out Test Resulted    COVID-19 (Rule Out) 09/11/20 09/11/20 09/11/20 Covid-19 Ambulatory (Ordered)   09/12/20 Rule-Out Test Resulted    COVID-19 (Rule Out) 08/24/20 08/24/20 08/24/20 Covid-19 Ambulatory (Ordered)   08/25/20 Rule-Out Test Resulted    COVID-19 (Rule Out) 08/17/20 08/17/20 08/17/20 Covid-19 Ambulatory (Ordered)   08/18/20 Rule-Out Test Resulted            Nurse Assessment:  Last Vital Signs: /83   Pulse 86   Temp 97.9 °F (36.6 °C) (Oral)   Resp 18   Ht 5' 5.5\" (1.664 m)   Wt 181 lb 4.8 oz (82.2 kg)   SpO2 97%   BMI 29.71 kg/m²     Last documented pain score (0-10 scale): Pain Level: 0  Last Weight:   Wt Readings from Last 1 Encounters:   12/03/21 181 lb 4.8 oz (82.2 kg)     Mental Status:  oriented and alert    IV Access:  - None    Nursing Mobility/ADLs:  Walking   Assisted  Transfer  Assisted  Bathing  Assisted  Dressing  Assisted  Toileting  Assisted  Feeding  Independent  Med Admin  Independent  Med Delivery   whole    Wound Care Documentation and Therapy:  Wound 07/10/21 Sacrum (Active)   Wound Etiology Pressure Stage  1 12/02/21 2316   Dressing Status Dry; Intact 12/02/21 2316   Dressing/Treatment Foam 12/02/21 2316   Drainage Amount None 12/02/21 2316   Odor None 12/02/21 2316   Margins Attached edges 12/02/21 2316   Number of days: 145       Wound 12/02/21 Toe (Comment  which one) (Active)   Wound Etiology Pressure Unstageable 12/02/21 2319   Dressing Status Dry; Clean 12/02/21 2319   Dressing/Treatment Open to air 12/02/21 2319   Wound Length (cm) 0.7 cm 12/02/21 2319   Wound Width (cm) 1.2 cm 12/02/21 2319   Wound Surface Area (cm^2) 0.84 cm^2 12/02/21 2319   Wound Assessment Dry; [facility] 12/02/21 2319   Drainage Amount None 12/02/21 2319   Odor None 12/02/21 2319   Sharyn-wound Assessment Ecchymosis 12/02/21 2319   Number of days: 0        Elimination:  Continence:    Bowel: Yes  Bladder: Yes  Urinary Catheter: None   Colostomy/Ileostomy/Ileal Conduit: No       Date of Last BM: 12/4/21  No intake or output data in the 24 hours ending 12/03/21 0824  I/O last 3 completed shifts:  In: -   Out: 150 [Urine:150]    Safety Concerns: At Risk for Falls    Impairments/Disabilities:      Hearing and right sided weakness d/t CVA    Nutrition Therapy:  Current Nutrition Therapy:   - Oral Diet:  Carb Control 4 carbs/meal (1800kcals/day), Low Fat, and low potassium (less than 3000mg/day)    Routes of Feeding: Oral  Liquids: Thin Liquids  Daily Fluid Restriction: no  Last Modified Barium Swallow with Video (Video Swallowing Test): not done    Treatments at the Time of Hospital Discharge:   Respiratory Treatments:   Oxygen Therapy:  is not on home oxygen therapy.   Ventilator:    - No ventilator support    Rehab Therapies: Physical Therapy and Occupational Therapy  Weight Bearing Status/Restrictions: No weight bearing restirctions  Other Medical Equipment (for information only, NOT a DME order):  walker  Other Treatments:     Patient's personal belongings (please select all that are sent with patient):  Glasses, Hearing Aides bilateral, Dentures upper and lower    RN SIGNATURE:  Electronically signed by Judge Manuelito RN on 12/4/21 at 10:56 AM EST    CASE MANAGEMENT/SOCIAL WORK SECTION    Inpatient Status Date:     Readmission Risk Assessment Score:  Readmission Risk              Risk of Unplanned Readmission:  26           Discharging to Facility/ Agency   Name: [facility]  Address:[address] [place] [address]  VIAZS:[phone]  [address]    Dialysis Facility (if applicable)   [address] [place] [address]   [facility]  Dialysis Schedule:MWF AT 2PM  Phone:[phone]  Fax:[phone]    / signature: Electronically signed by SCOTT Hernandez on 12/3/21 at 11:08 AM EST    PHYSICIAN SECTION    Prognosis: Good    Condition at Discharge: Stable    Rehab Potential (if transferring to Rehab): Good    Recommended Labs or Other Treatments After Discharge:     Physician Certification: I certify the above information and transfer of Juan Antonio Quintana  is necessary for the continuing treatment of the diagnosis listed and that he requires [facility] for less 30 days. Update Admission H&P: No change in H&P    PHYSICIAN SIGNATURE:  Electronically signed by Kika Williamson DO on 12/4/21 at 8:37 AM EST.

## 2021-12-03 NOTE — PROGRESS NOTES
[facility] MD Bertha Pacheco, MD Linn Reyes, MD Abimbola Lange, CNP   Anu Lux, DAISY  Progress note       Sub :     12/3 : seen today , on HD tolerating treamtent , comfortable, vitals stable, verbalize no issues     History of Present Ilness:    Mr. Berhane Nguyen is a pleasant 15-year-old gentleman known to me from the outpatient practice I follow him for ESRD. He undergoes chronic intermittent hemodialysis on Mondays Wednesdays and Fridays at the [address] [place] [address] dialysis Angelica through left upper extremity AV fistula. Recent treatments have been without complication, though as he tells me again, he finds the time spent there rather \"boring. \"  He has an infection on his right foot, surgically his toe, he was sent into [place] [address] for further evaluation and management per his podiatrist.    At the time of my initial evaluation he was undergoing hemodialysis. Treatment is without complication. BP stable throughout treatment. BFR as prescribed. Besides the pain, he denies all other complaint, and ROS is unremarkable.     Past Medical History:   Diagnosis Date    Arthritis     CAD (coronary artery disease)     CHF (congestive heart failure) (HCC)     Chronic kidney disease     COVID-19     CVA (cerebral vascular accident) ([address]     Diabetes mellitus ([address]     Diabetic peripheral neuropathy ([address] 11/9/2012    Hemodialysis patient ([address]     History of CVA (cerebrovascular accident) 6/9/2014    Hypertension     Other disorders of kidney and ureter     prostate infections in past    PVD (peripheral vascular disease) with claudication ([address] 6/9/2014    Right sided weakness 3/19/2018    TIA (transient ischemic attack)     possible several years ago    Unspecified cerebral artery occlusion with cerebral infarction 2013    [facility] RIGHT LEG [facility]FFECTED       Past Surgical History:   Procedure Laterality Date    ABDOMINAL HERNIA REPAIR      CATARACT REMOVAL      right    CHOLECYSTECTOMY  11/2011    DENTAL SURGERY      EYE SURGERY      [facility] DIALYSIS CATHETER Right 8/27/2020    TESIO CATHETER INSERTION performed by Stanley Zhu MD at [facility]      removal of sac in ear    SHUNT REVISION Right 1/21/2021    INSERTION AV GRAFT RIGHT ARM performed by Stanley Zhu MD at [facility] Right 4/8/2021    THROMBECTOMY RIGHT ARM AV GRAFT performed by Stanley Zhu MD at [facility] VASCULAR SURGERY Left 8/9/2021    INSERTION OF TEMPORARY HEMODIALYSIS CATHETER performed by Stanley Zhu MD at [address] [place] [address] Right 8/11/2021    INSERTION OF TESIO, REMOVAL OF TEMPORARY CATHETHER performed by Stanley Zhu MD at [address] [place] [address]       No family history on file. reports that he quit smoking about 54 years ago. His smoking use included cigarettes. He has a 10.00 pack-year smoking history. He has never used smokeless tobacco. He reports previous alcohol use of about 1.0 standard drink of alcohol per week. He reports that he does not use drugs.     Allergies:  Sulfa antibiotics and Sulfa antibiotics    Current Medications:    insulin glargine (LANTUS) injection vial 5 Units, Nightly  glucose (GLUTOSE) 40 % oral gel 15 g, PRN  dextrose 50 % IV solution, PRN  glucagon (rDNA) injection 1 mg, PRN  dextrose 5 % solution, PRN  perflutren lipid microspheres (DEFINITY) injection 1.65 mg, ONCE PRN  apixaban (ELIQUIS) tablet 2.5 mg, BID  heparin (porcine) 1000 UNIT/ML injection,   sodium chloride flush 0.9 % injection 10 mL, 2 times per day  sodium chloride flush 0.9 % injection 10 mL, PRN  0.9 % sodium chloride infusion, PRN  senna (SENOKOT) tablet 8.6 mg, Daily PRN  acetaminophen (TYLENOL) tablet 650 mg, Q6H PRN   Or  acetaminophen (TYLENOL) suppository 650 mg, Q6H PRN  atorvastatin (LIPITOR) tablet 10 mg, Nightly  famotidine (PEPCID) tablet 10 mg, Daily  gabapentin (NEURONTIN) capsule 100 mg, Nightly  insulin lispro (HUMALOG) injection vial 0-6 Units, TID WC  insulin lispro (HUMALOG) injection vial 0-3 Units, Nightly  metoprolol succinate (TOPROL XL) extended release tablet 25 mg, Daily        Review of Systems:   All systems reviewed, and are unremarkable    Physical exam:   /77   Pulse 96   Temp 97.7 °F (36.5 °C)   Resp 20   Ht 5' 5.5\" (1.664 m)   Wt 187 lb 6.3 oz (85 kg)   SpO2 97%   BMI 30.71 kg/m²   Pleasant age-appropriate elderly white gentleman in no apparent distress  NC/AT EOMI sclera conjunctiva are clear and anicteric mucous membranes are moist  Neck soft supple trachea midline no bruit  CTA bilaterally  Regular rhythm normal S1 and S2 no murmur  Abdomen soft nontender nondistended normal active bowel sounds  No rash or lesion on visible portions of integument  Pulses 1-2+ x4  Moves all 4 extremities  Cranial nerves II through XII grossly intact  Awake, alert, interactive and appropriate      Data:   Labs:  CBC with Differential:    Lab Results   Component Value Date    WBC 5.4 12/03/2021    RBC 2.99 12/03/2021    HGB 9.2 12/03/2021    HCT 29.7 12/03/2021     12/03/2021    MCV 99.3 12/03/2021    MCH 30.8 12/03/2021    MCHC 31.0 12/03/2021    RDW 15.9 12/03/2021    SEGSPCT 46 01/04/2014    BANDSPCT 2 05/02/2016    LYMPHOPCT 33.2 12/03/2021    MONOPCT 12.9 12/03/2021    BASOPCT 0.6 12/03/2021    MONOSABS 0.69 12/03/2021    LYMPHSABS 1.78 12/03/2021    EOSABS 0.20 12/03/2021    BASOSABS 0.03 12/03/2021     CMP:    Lab Results   Component Value Date     12/03/2021    K 5.2 12/03/2021    CL 98 12/03/2021    CO2 27 12/03/2021    BUN 57 12/03/2021    CREATININE 5.6 12/03/2021    GFRAA 12 12/03/2021    LABGLOM 10 12/03/2021    GLUCOSE 203 12/03/2021    GLUCOSE 163 02/22/2012    PROT 6.2 12/03/2021    LABALBU 3.6 12/03/2021    LABALBU 3.8 02/20/2012    CALCIUM 8.5 12/03/2021    BILITOT 0.3 12/03/2021    ALKPHOS 91 12/03/2021    AST 17 12/03/2021 ALT 20 12/03/2021     Ionized Calcium:  No results found for: IONCA  Magnesium:    Lab Results   Component Value Date    MG 2.3 12/03/2021     Phosphorus:    Lab Results   Component Value Date    PHOS 4.4 12/02/2021     U/A:    Lab Results   Component Value Date    COLORU Yellow 07/10/2021    PHUR 6.0 07/10/2021    WBCUA PACKED 07/10/2021    WBCUA NONE 02/20/2012    RBCUA >20 07/10/2021    RBCUA 1-3 05/04/2013    BACTERIA MANY 07/10/2021    CLARITYU CLOUDY 07/10/2021    SPECGRAV 1.020 07/10/2021    LEUKOCYTESUR LARGE 07/10/2021    UROBILINOGEN 0.2 07/10/2021    BILIRUBINUR Negative 07/10/2021    BILIRUBINUR NEGATIVE 02/20/2012    BLOODU LARGE 07/10/2021    GLUCOSEU Negative 07/10/2021    GLUCOSEU 500 02/20/2012     Microalbumen/Creatinine ratio:  No components found for: RUCREAT  Iron Saturation:  No components found for: PERCENTFE  TIBC:    Lab Results   Component Value Date    TIBC 177 08/24/2020     FERRITIN:    Lab Results   Component Value Date    FERRITIN 98 08/24/2020        Imaging:  US DUP LOWER EXTREMITY RIGHT ARTERIES   Final Result   Findings are suggestive of occlusion involving right superficial femoral   artery and popliteal arteries. Diminutive blood flow demonstrated in right   posterior tibial and peroneal arteries as well as scant blood flow at level   of right dorsalis pedis artery. CTA abdomen and pelvis with runoff may be   helpful for further evaluation. XR FOOT RIGHT (MIN 3 VIEWS)   Final Result   No acute osseous abnormality. Degenerative changes MTP joint great toe with adjacent dorsal soft tissue   edema. VL LOWER EXTREMITY ARTERIAL SEGMENTAL PRESSURES W PPG    (Results Pending)       Assessment  1. ESRD  2. Anemia due to ESRD  3. 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 Hypertension  5. Right foot infection    Recommendations  1.  Continue IHD support for solute and volume clearance on Mondays Wednesdays and Fridays as per outpatient orders and dry weight  2. Continue current antihypertensive medication regimen  3. Continue SHANDRA: Retacrit while here as warranted, Mircera as outpatient  4. Continue other aspects of renal management  5. Continue supportive care  6.  Follow labs, BP              Electronically signed by Marilee Johnson MD on 12/3/2021

## 2021-12-03 NOTE — CONSULTS
Department of Podiatry   Consult Note        Reason for Consult:  RLE 2nd digit wound     CHIEF COMPLAINT:  RLE 2nd digit pain     HISTORY OF PRESENT ILLNESS:                Mr. Barrera Barton is a 80 y.o. male who was evaluated at bedside this evening for concerns of a RLE wound. Patient states that since his stroke he has had significantly decreased sensation to the right side of his body. He can not provide a history as to when or how the wound started. Patient has a significant past medical history of ESRD, DM, and OA. Patient denies any N/V/D/F/C/SOB/CP and has no other pedal complaints at this time.      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 ([address] 12/3/2021    CAD (coronary artery disease)     CHF (congestive heart failure) (HCC)     Chronic kidney disease     COVID-19     CVA (cerebral vascular accident) ([address]     Diabetes mellitus ([address]     Diabetic peripheral neuropathy ([address] 11/9/2012    Hemodialysis patient ([address]     History of CVA (cerebrovascular accident) 6/9/2014    Hypertension     Ischemic ulcer of toe, limited to breakdown of skin, right ([address] 12/3/2021    Other disorders of kidney and ureter     prostate infections in past    PVD (peripheral vascular disease) with claudication ([address] 6/9/2014    Right sided weakness 3/19/2018    TIA (transient ischemic attack)     possible several years ago    Unspecified cerebral artery occlusion with cerebral infarction 2013    [facility] RIGHT LEG AFFECTED   ·     Past Surgical History:        Procedure Laterality Date    ABDOMINAL HERNIA REPAIR      CATARACT REMOVAL      right    CHOLECYSTECTOMY  11/2011    DENTAL SURGERY      EYE SURGERY      [facility] DIALYSIS CATHETER Right 8/27/2020    TESIO CATHETER INSERTION performed by Dilan Tapia MD at [facility]      removal of sac in ear    SHUNT REVISION Right 1/21/2021    INSERTION AV GRAFT RIGHT ARM performed by Maryam Diaz MD at [address] [place] [address] Right 4/8/2021    THROMBECTOMY RIGHT ARM AV GRAFT performed by Maryam Diaz MD at [address] [place] [address] Left 8/9/2021    INSERTION OF TEMPORARY HEMODIALYSIS CATHETER performed by Maryam Diaz MD at [address] [place] [address] Right 8/11/2021    INSERTION OF TESIO, REMOVAL OF TEMPORARY CATHETHER performed by Maryam Diaz MD at [address] [place] [address]   ·     Medications Prior to Admission:    · Medications Prior to Admission: lisinopril (PRINIVIL;ZESTRIL) 2.5 MG tablet, Take 2.5 mg by mouth daily  · metoprolol succinate (TOPROL XL) 25 MG extended release tablet, Take 1 tablet by mouth daily  · white petrolatum OINT ointment, Apply 1 applicator topically 2 times daily  · Magnesium Oxide (MAG-OXIDE PO), Take 40 mg by mouth   · atorvastatin (LIPITOR) 10 MG tablet, Take 10 mg by mouth nightly  · gabapentin (NEURONTIN) 100 MG capsule, Take 100 mg by mouth nightly. · ascorbic acid (VITAMIN C) 500 MG tablet, Take 1,000 mg by mouth daily  · zinc gluconate 50 MG tablet, Take 50 mg by mouth daily  · Multiple Vitamins-Minerals (PRESERVISION AREDS 2 PO), Take 1 tablet by mouth daily   · insulin lispro (HUMALOG KWIKPEN) 200 UNIT/ML SOPN pen, Inject into the skin 4 times daily as needed Before meals and nightly  · famotidine (PEPCID) 40 MG tablet, Take 40 mg by mouth as needed  · vitamin D (CHOLECALCIFEROL) 25 MCG (1000 UT) TABS tablet, Take 1,000 Units by mouth daily  · aspirin 81 MG tablet, Take 81 mg by mouth every morning     Allergies:  Sulfa antibiotics and Sulfa antibiotics    Social History:   · TOBACCO:   reports that he quit smoking about 54 years ago. His smoking use included cigarettes. He has a 10.00 pack-year smoking history. He has never used smokeless tobacco.  · ETOH:   reports previous alcohol use of about 1.0 standard drink of alcohol per week.   DRUGS:   Social History     Substance and Sexual Activity   Drug Use No   ·     Family History:   · No family history on file. REVIEW OF SYSTEMS:    All pertinent positives or negatives as noted in HPI       LOWER EXTREMITY EXAMINATION     VASCULAR:  DP and PT pulses are faint. CFT delayed B/L. Warm to warm from the tibial tuberosity to the distal aspect of the digits dorsally. No hair growth noted to the distal aspects dorsally. NEUROLOGIC:  Protective sensation is diminished b/l    DERM:  Ulcer noted to the RLE second digit PIPJ measuring about 0.3x0.3x0.2cm with scabbed wound bed. No noted pus or drainage at the time of evaluation. No active bleeding. Scab is well adhered to wound bed. Periwound erythema. Hyperpigmentation to the second digit. No mal odor  As pictured below:    MUSCULOSKELETAL: MMT Deferred. Equinus B/L.  Hammered lesser digits b/l     Wound Care:   Wound #1: RLE 2nd digit   Size ~0.3x0.3x0.2cm   Appearance -Stable    Drainage -None   Odor -None            CONSULTS:  IP CONSULT TO INTERNAL MEDICINE  IP CONSULT TO SOCIAL WORK  IP CONSULT TO NEPHROLOGY  IP CONSULT TO INFECTIOUS DISEASES  IP CONSULT TO VASCULAR SURGERY  IP CONSULT TO CARDIOLOGY  IP CONSULT TO PODIATRY    MEDICATION:  Scheduled Meds:   insulin glargine  5 Units SubCUTAneous Nightly    apixaban  2.5 mg Oral BID    heparin (porcine)        sodium chloride flush  10 mL IntraVENous 2 times per day    atorvastatin  10 mg Oral Nightly    famotidine  10 mg Oral Daily    gabapentin  100 mg Oral Nightly    insulin lispro  0-6 Units SubCUTAneous TID WC    insulin lispro  0-3 Units SubCUTAneous Nightly    metoprolol succinate  25 mg Oral Daily     Continuous Infusions:   dextrose      sodium chloride       PRN Meds:.glucose, dextrose, glucagon (rDNA), dextrose, perflutren lipid microspheres, sodium chloride flush, sodium chloride, senna, acetaminophen **OR** acetaminophen    RADIOLOGY:  VL LOWER EXTREMITY ARTERIAL SEGMENTAL PRESSURES W PPG   Final Result   Evidence of moderate arterial stenosis in the right lower extremity with mild   arterial stenosis in the left lower extremity. Given the diffusely abnormal   waveforms, findings suggest bilateral arterial inflow disease. Consider   correlation with CTA of the abdomen/pelvis with lower extremity runoff for   further assessment. US DUP LOWER EXTREMITY RIGHT ARTERIES   Final Result   Findings are suggestive of occlusion involving right superficial femoral   artery and popliteal arteries. Diminutive blood flow demonstrated in right   posterior tibial and peroneal arteries as well as scant blood flow at level   of right dorsalis pedis artery. CTA abdomen and pelvis with runoff may be   helpful for further evaluation. XR FOOT RIGHT (MIN 3 VIEWS)   Final Result   No acute osseous abnormality. Degenerative changes MTP joint great toe with adjacent dorsal soft tissue   edema. Vitals:    /74   Pulse 91   Temp 98.1 °F (36.7 °C) (Oral)   Resp 18   Ht 5' 5.5\" (1.664 m)   Wt 187 lb 6.3 oz (85 kg)   SpO2 97%   BMI 30.71 kg/m²     LABS:   Recent Labs     12/02/21  0618 12/03/21  0310   WBC 4.6 5.4   HGB 9.7* 9.2*   HCT 30.6* 29.7*   * 131     Recent Labs     12/02/21  0618 12/02/21  0618 12/03/21  0310      < > 139   K 4.8   < > 5.2*   CL 96*   < > 98   CO2 28   < > 27   PHOS 4.4  --   --    BUN 39*   < > 57*   CREATININE 3.8*   < > 5.6*    < > = values in this interval not displayed. Recent Labs     12/02/21  0618 12/03/21  0310   PROT 6.5 6.2*       ASSESSMENTS:   1.RLE Second digit pressure ulcer- POA  2. B/L LE hammered digits  3. B/L LE Equinus  4. DM with neuropathic changes  5. 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 Reviewed patient's recent lab results and pertinent diagnostic imaging. Reviewed ancillary service notes. - Antibiotics as per ED/IM. ID signed off. - Will continue with local wound care for now. - Vascular: 1. Mild to moderate arterial stenosis b/l   2. B/L arterial Inflow disease  - US:   1. Occlusion to RLE SFA and popliteal arteries   2. Decreased flow to: right PT, Peroneal, and DP arteries  - X-rays: 1. No osseous abnormality   2. Degenerative changes   - Venous US: Pending  - Discussed patient with Dr. Mohsen Quezada  - Will continue to follow patient while they are in-house. Thank you for the opportunity to take part in the patient's care. Please do not hesitate to call for any questions or concerns.

## 2021-12-03 NOTE — CONSULTS
Inpatient Cardiology Consultation      Reason for Consult:  NSVT. AF    Consulting Physician: Dr Alona Field    Requesting Physician:  Dr Nico Zarco    Date of Consultation: 12/3/2021    HISTORY OF PRESENT ILLNESS: 79 yo  male only seen as inpatient consult by Dr Deon Welsh in 2014 for possible AF which was not found on EKG or telemetry. PMH: HTN, HLD, ex smoker, [place], T2DM insulin requiring with neuropathy/nephropathy, PAD with claudication, CVA/ICH with residual R sided weakness, ESRD on HD, RUE AVF, hiatal hernia, GERD, and recent ATB for R second toe infection. Ambulates minimal distance with quad cane mostly in wheelchair    [facility] 11/29/2021 weakness and near syncope. No reported dizziness/lightheadedness, reports loosing his footing, and it took two people to get him off the floor, witnessed by niece. RLE wound on cephalexin as outpatient. Na 136, K+ 5.4, Bun/Cr 70/6.2, troponin 66, LFT's WNL, WBC 8.8, Hgb 10.0, COVID NEGATIVE. CXR no CHF or infiltrate. EKG SR with 1st degree AVB. /68 HR 70's afebrile, O2 saturation 95% on RA--> discharged directly to dialysis. 11/30/2021 [facility] for R foot infection and PCP sent patient in for placement, caregivers are unable to care for him at home. EKG AF CVR 74. BP upon arrival 156/80 HR 80-90's AF CVR, afebrile, O2 saturation 96% on RA. Na 134, K+ 4.7, Bun/Cr 57/4.8-->57/5.6, [place] 2.2--> 2.3, CRP 4.2, LFT's WNL, WBC 5.1, Hgb 10.5-->9.2, Sed rate 78    12/1/2021 Rocephin and vancomycin IV given in ED    RLE Arterial doppler read as occulusion R SFA and popliteal arteries and diminutive flow in R posterior tibial and peroneal arteries as well. 12/2/2021 ID Consult-->Chronic ulcer right second toe. No evidence of acute infection.     12/3/2021 10 beats NSVT and \"AF\" on the monitor while sleeping --> Cardiology consulted      Currently /68 HR 70's, T Max 99, O2 saturation 97%    Patient currently in dialysis ( R chest wall tunneled catheter), reports RUE AVF not working. Denies CP, SOB, dizziness, orthopnea, or palpitations. Does sleep in chair due to comfort but denies being SOB when laying flat. Please note: past medical records were reviewed per electronic medical record (EMR) - see detailed reports under Past Medical/ Surgical History. Past Medical History:    1. 10 pack years quit in 1967  2. HTN  3. HLD  4. T2DM insulin requiring with neuropathy/nephropathy  5. 10/2012 CVA/L hemispheric ICH with R sided weakness  6. PAD with claudication diagnosed in 2014 seen by Dr Cleveland Alaniz at that time  7. Anemia  8. BPH  9. OA  10. 3/2021 Colonoscopy s/p polypectomy x 6 and prolapsed internal hemorrhoids  11. 8/2/2012 EGD--> GERD, Schatzki's ring. Hiatal hernia. Antral gastritis  12. 6/2014 evaluated by Dr Kodak Cobb as inpatient for ? AF--> not found on telemetry or EKG  13. 6/10/2014 TTE Dr Rojas Labs : Stage I DD. Mildly dilated LA. Mild TR. RVSP normal. Mild PI. Moderately dilated aortic root. Small pericardial effusion. Normal LVSF.   14. 1/2019 p-BNP 5196  15. Chronic CHF  16. 8/14/2020 p-BNP 5984  17. [place]  18. 8/27/2020 Insertion of RIJ tunneled catheter  19. 1/2021 Dialysis initiated (MWF)  20. 1/21/2021 Insertion RUE AV graft  21. Vaccinated with Moderna x 2 no booster yet  22. 4/8/2021 Thrombectomy R arm AV graft  23. 11/2021 Second R toe infection placed on cephalexin      Past Surgical History:  11/2010 R inguinal  hernia repair, cholecystectomy in 50/4761, umbilical hernia repair, colonoscopy in 2012, lipoma excision (L axilla) in 2012, EGD in 2012, 1/2021 I&D of benign cutaneous lesion of external auditory canal, cataract surgery      Medications Prior to admit:  Prior to Admission medications    Medication Sig Start Date End Date Taking?  Authorizing Provider   lisinopril (PRINIVIL;ZESTRIL) 2.5 MG tablet Take 2.5 mg by mouth daily    Historical Provider, MD   metoprolol succinate (TOPROL XL) 25 MG extended release tablet Take 1 tablet by mouth daily 7/13/21   Britt Ugalde DO   white petrolatum OINT ointment Apply 1 applicator topically 2 times daily 7/12/21   Britt Ugalde DO   Magnesium Oxide (MAG-OXIDE PO) Take 40 mg by mouth     Historical Provider, MD   atorvastatin (LIPITOR) 10 MG tablet Take 10 mg by mouth nightly    Historical Provider, MD   gabapentin (NEURONTIN) 100 MG capsule Take 100 mg by mouth nightly.     Historical Provider, MD   ascorbic acid (VITAMIN C) 500 MG tablet Take 1,000 mg by mouth daily    Historical Provider, MD   zinc gluconate 50 MG tablet Take 50 mg by mouth daily    Historical Provider, MD   Multiple Vitamins-Minerals (PRESERVISION AREDS 2 PO) Take 1 tablet by mouth daily     Historical Provider, MD   insulin lispro (HUMALOG KWIKPEN) 200 UNIT/ML SOPN pen Inject into the skin 4 times daily as needed Before meals and nightly    Historical Provider, MD   famotidine (PEPCID) 40 MG tablet Take 40 mg by mouth as needed    Historical Provider, MD   vitamin D (CHOLECALCIFEROL) 25 MCG (1000 UT) TABS tablet Take 1,000 Units by mouth daily    Historical Provider, MD   aspirin 81 MG tablet Take 81 mg by mouth every morning     Historical Provider, MD       Current Medications:    Current Facility-Administered Medications: insulin glargine (LANTUS) injection vial 5 Units, 5 Units, SubCUTAneous, Nightly  glucose (GLUTOSE) 40 % oral gel 15 g, 15 g, Oral, PRN  dextrose 50 % IV solution, 12.5 g, IntraVENous, PRN  glucagon (rDNA) injection 1 mg, 1 mg, IntraMUSCular, PRN  dextrose 5 % solution, 100 mL/hr, IntraVENous, PRN  perflutren lipid microspheres (DEFINITY) injection 1.65 mg, 1.5 mL, IntraVENous, ONCE PRN  sodium chloride flush 0.9 % injection 10 mL, 10 mL, IntraVENous, 2 times per day  sodium chloride flush 0.9 % injection 10 mL, 10 mL, IntraVENous, PRN  0.9 % sodium chloride infusion, 25 mL, IntraVENous, PRN  heparin (porcine) injection 5,000 Units, 5,000 Units, SubCUTAneous, 3 times per day  senna (SENOKOT) abnormal bruising or bleeding. No swollen lymph nodes    PHYSICAL EXAM:   /68   Pulse 74   Temp 99 °F (37.2 °C) (Oral)   Resp 17   Ht 5' 5.5\" (1.664 m)   Wt 181 lb 4.8 oz (82.2 kg)   SpO2 97%   BMI 29.71 kg/m²   CONST:  Well developed, elderly  male who appears of stated age. Awake, alert and cooperative. No apparent distress. HEENT:   Head- Normocephalic, atraumatic   Eyes- Conjunctivae pink, anicteric  Throat- Oral mucosa pink and moist  Neck-  No stridor, trachea midline, no jugular venous distention. No carotid bruit. CHEST: Chest symmetrical and non-tender to palpation. No accessory muscle use or intercostal retractions. RIJ tunneled catheter. RESPIRATORY: Lung sounds - diminished in the bases, on RA. CARDIOVASCULAR:     Heart Ausculation- IRRR no murmur heard. PV: No lower extremity edema. No varicosities. Pedal pulses palpable, no clubbing or cyanosis   ABDOMEN: Soft, non-tender to light palpation. Bowel sounds present. No palpable masses; no abdominal bruit  MS: Good muscle strength and tone. No atrophy or abnormal movements. : Deferred  SKIN: Pale, warm and dry. Ulcer  R second toe. NEURO / PSYCH: Oriented to person, place and time. Speech clear and appropriate. Follows all commands. Pleasant affect     DATA:    ECG as per Dr Arce Constant interpretation  Tele strips: AF CVR with 10 beats NSVT x 2 (0145)while sleeping. Diagnostic:    R Foot Xray 11/30/2021: No acute osseous abnormality. Degenerative changes MTP joint great toe with adjacent dorsal soft tissue edema. RLE Arterial Doppler 11/30/2021: Findings are suggestive of occlusion involving right superficial femoral artery and popliteal arteries.  Diminutive blood flow demonstrated in right posterior tibial and peroneal arteries as well as scant blood flow at level of right dorsalis pedis artery.     Intake/Output Summary (Last 24 hours) at 12/3/2021 0742  Last data filed at 12/2/2021 0824  Gross per 24 hour Intake    Output 150 ml   Net -150 ml       Labs:   CBC:   Recent Labs     12/02/21  0618 12/03/21  0310   WBC 4.6 5.4   HGB 9.7* 9.2*   HCT 30.6* 29.7*   * 131     BMP:   Recent Labs     12/02/21  0618 12/03/21  0310    139   K 4.8 5.2*   CO2 28 27   BUN 39* 57*   CREATININE 3.8* 5.6*   LABGLOM 15 10   CALCIUM 8.5* 8.5*     Mag:   Recent Labs     12/02/21  0618 12/03/21  0310   MG 2.2 2.3     Phos:   Recent Labs     12/02/21 0618   PHOS 4.4     TFT:   Lab Results   Component Value Date    TSH 1.940 09/17/2014    M1OWNCX 8.2 06/09/2014    T4FREE 1.38 06/11/2014      HgA1c:   Lab Results   Component Value Date    LABA1C 6.4 (H) 08/09/2021     FASTING LIPID PANEL:  Lab Results   Component Value Date    CHOL 163 08/19/2015    HDL 32 08/19/2015    LDLCALC 101 08/19/2015    TRIG 151 08/19/2015     LIVER PROFILE:  Recent Labs     12/02/21  0618 12/03/21  0310   AST 12 17   ALT 19 20   LABALBU 3.5 3.6   A&P per Dr James Parekh  Electronically signed by Davon Swain. DOMINIQUE Munoz on 12/3/2021 at 7:42 AM     I independently performed an evaluation on the patient. I have reviewed the above documentation completed by the advance practitioner. Please see my additional contributions to the HPI, physical exam, assessment and medical decision making. Physical Exam   BP (!) 146/53   Pulse 77   Temp 97.9 °F (36.6 °C) (Oral)   Resp 18   Ht 5' 5.5\" (1.664 m)   Wt 181 lb 4.8 oz (82.2 kg)   SpO2 97%   BMI 29.71 kg/m²   Constitutional: . Well-developed and well-nourished. No distress. Head: Normocephalic and atraumatic. Eyes: EOM are normal. Pupils are equal, round, and reactive to light. Neck: Normal range of motion. Neck supple. No hepatojugular reflux and no JVD present. Carotid bruit is not present. No tracheal deviation present. No thyromegaly present. Cardiovascular: Normal rate, regular rhythm, normal heart sounds and intact distal pulses. Exam reveals no gallop and no friction rub.   No murmur heard.  Pulmonary/Chest: Effort normal and breath sounds normal. No respiratory distress. No wheezes. No rales. No tenderness. Abdominal: Soft. Bowel sounds are normal. No distension and no mass. No tenderness. No rebound and no guarding. Musculoskeletal: Normal range of motion. No edema and no tenderness. Lymphadenopathy:   No cervical adenopathy. Skin: Skin is warm and dry. No rash noted. Not diaphoretic. No erythema. See accompanying documentation for full consult. ASSESSMENT AND PLAN:  1. NSVT:    Monitor. Labs reviewed. Echo ordered. Continue BB. 2. Afib:    Rate controlled. Monitor. Echo ordered. Start dose adjusted eliquis. Stop ASA. 3. Chronic diastolic CHF: Follow volume. 4. Weakness/Lightheadedness/Dizziness: Per primary service. 5. ID issues: Per podiatry    6. HTN: Observe. 7. Lipids: Statin. 8. DM: Per primary service. 9. Hx of CVA/ICH: Eliquis/statin. 10. ESRD with HD: Per renal.     11. PAD: ASA/statin. Follows with vascular. 12. Anemia: Follow labs. 13. GERD, Schatzki's ring. Hiatal hernia. Antral gastritis    14. Michelle Hi D.O.   Cardiologist  Cardiology, [facility]

## 2021-12-03 NOTE — PROGRESS NOTES
Vascular signed off.      Instructions per vasc - When discharged, please instruct patient and family to call Dr. Tyler Tomlinson office [phone] to see me in 2 months     Please also instruct the patient and family to call Dr. Tyler Tomlinson office sooner if any interim, there is any worsening of the second toe of the right foot

## 2021-12-03 NOTE — PLAN OF CARE
Problem: Increased nutrient needs (NI-5.1)  Goal: Food and/or Nutrient Delivery  Description: Individualized approach for food/nutrient provision.   12/3/2021 1225 by James García RD, ANGELITA  Outcome: Met This Shift  12/3/2021 1225 by James García RD, ANGELITA  Outcome: Met This Shift

## 2021-12-03 NOTE — PROGRESS NOTES
Progress Note  Date:12/3/2021       LETS:2737/3856-A  Patient Name:Rudi Roca     YOB: 1931     Age:89 y.o. Patient seen for follow up of inability to ambulate. Patient this am sleeping comfortably when entering the room. Patient states that he feels well. He currently has no pain. He denies any fevers, chills, cough, shortness of breath, chest pains, nausea, vomiting or diarrhea. Called by nursing this am due to 10 beats of vtach on monitor. Subjective    Subjective:  Symptoms:  No shortness of breath, cough, chest pain, headache, chest pressure or diarrhea. Diet:  No nausea or vomiting. Review of Systems   Respiratory: Negative for cough and shortness of breath. Cardiovascular: Negative for chest pain. Gastrointestinal: Negative for diarrhea, nausea and vomiting. Objective         Vitals Last 24 Hours:  TEMPERATURE:  Temp  Av.4 °F (36.9 °C)  Min: 97.8 °F (36.6 °C)  Max: 99 °F (37.2 °C)  RESPIRATIONS RANGE: Resp  Av  Min: 16  Max: 20  PULSE OXIMETRY RANGE: SpO2  Av.2 %  Min: 94 %  Max: 97 %  PULSE RANGE: Pulse  Av.5  Min: 71  Max: 108  BLOOD PRESSURE RANGE: Systolic (15RJR), IFS:264 , Min:98 , YVQ:151   ; Diastolic (98YEJ), PVK:81, Min:52, Max:88    I/O (24Hr): Intake/Output Summary (Last 24 hours) at 12/3/2021 9474  Last data filed at 2021 9585  Gross per 24 hour   Intake    Output 150 ml   Net -150 ml     Objective:  General Appearance:  Comfortable, well-appearing and in no acute distress. Vital signs: (most recent): Blood pressure 125/68, pulse 74, temperature 99 °F (37.2 °C), temperature source Oral, resp. rate 17, height 5' 5.5\" (1.664 m), weight 181 lb 4.8 oz (82.2 kg), SpO2 97 %. Lungs:  Normal effort and normal respiratory rate. Breath sounds clear to auscultation. No rales or rhonchi. Heart: Normal rate. Regular rhythm. S1 normal and S2 normal.  No friction rub. Abdomen: Abdomen is soft and non-distended.   Bowel sounds are normal.   There is no abdominal tenderness. There is no rebound tenderness. There is no guarding. Extremities: There is no dependent edema. Skin:  Warm and dry. Labs/Imaging/Diagnostics    Labs:  CBC:  Recent Labs     12/01/21  0931 12/02/21 0618 12/03/21  0310   WBC 5.1 4.6 5.4   RBC 3.34* 3.14* 2.99*   HGB 10.5* 9.7* 9.2*   HCT 33.5* 30.6* 29.7*   .3* 97.5 99.3   RDW 15.9* 16.1* 15.9*    116* 131     CHEMISTRIES:  Recent Labs     12/01/21  0931 12/02/21 0618 12/03/21 0310    134 139   K 4.7 4.8 5.2*   CL 94* 96* 98   CO2 27 28 27   BUN 57* 39* 57*   CREATININE 4.8* 3.8* 5.6*   GLUCOSE 189* 187* 203*   PHOS  --  4.4  --    MG  --  2.2 2.3     PT/INR:No results for input(s): PROTIME, INR in the last 72 hours. APTT:No results for input(s): APTT in the last 72 hours. LIVER PROFILE:  Recent Labs     12/01/21  0931 12/02/21 0618 12/03/21  0310   AST 12 12 17   ALT 20 19 20   BILITOT 0.4 0.5 0.3   ALKPHOS 97 90 91       Imaging Last 24 Hours:  US DUP LOWER EXTREMITY RIGHT ARTERIES    Result Date: 12/1/2021  EXAMINATION: ARTERIAL DUPLEX ULTRASOUND OF THE RIGHT LOWER EXTREMITY  12/1/2021 12:34 pm TECHNIQUE: Duplex ultrasound using B-mode/gray scaled imaging, Doppler spectral analysis and color flow Doppler was obtained of the lower right extremity. COMPARISON: None. HISTORY: ORDERING SYSTEM PROVIDED HISTORY: decreased dorsalis pedis pulse. TECHNOLOGIST PROVIDED HISTORY: Reason for exam:->decreased dorsalis pedis pulse. What reading provider will be dictating this exam?->CRC FINDINGS: Doppler blood flow demonstrated in right common femoral artery with velocity measurement of 40.9 centimeters/second. Right profundal artery appears to be patent. No Doppler blood flow demonstrated in right superficial femoral artery or right popliteal artery. Anterior tibial artery is not visualized. Diminutive blood flow demonstrated in posterior tibial and peroneal arteries.  Minimal blood flow at level of right dorsalis pedis artery. There is extensive calcific atherosclerotic plaque. Findings are suggestive of occlusion involving right superficial femoral artery and popliteal arteries. Diminutive blood flow demonstrated in right posterior tibial and peroneal arteries as well as scant blood flow at level of right dorsalis pedis artery. CTA abdomen and pelvis with runoff may be helpful for further evaluation. Assessment//Plan           Hospital Problems           Last Modified POA    Failure to thrive in adult 12/1/2021 Yes        Assessment & Plan  1. Inability to ambulate  2. Generalized weakness   3. Right foot wound  4. Type II diabetes mellitus   5. ESRD on HD   6. Afib   7. Nonsustained v-tach  8. Possible Right superficial femoral artery occlusion    Appreciate ID and Nephrology input. Check Magnesium with am. Consult cardiology with non-sustained v-tach episode and afib. Will consult vascular surgery with Arterial US findings. Add lantus for better blood sugar control. PT/OT input appreciated 10/24 score. Will need SNF on discharge. [facility] approved. For dialysis today. Not ready for discharge.      Nile Bhakta DO    Electronically signed by Nile Bhakta DO on 12/3/21 at 6:21 AM EST

## 2021-12-03 NOTE — CONSULTS
I independently performed an evaluation on the patient. I have reviewed the above documentation completed by the advance practitioner. Please see my additional contributions to the HPI, physical exam, assessment and medical decision making. Physical Exam   BP (!) 146/53   Pulse 77   Temp 97.9 °F (36.6 °C) (Oral)   Resp 18   Ht 5' 5.5\" (1.664 m)   Wt 181 lb 4.8 oz (82.2 kg)   SpO2 97%   BMI 29.71 kg/m²   Constitutional: . Well-developed and well-nourished. No distress. Head: Normocephalic and atraumatic. Eyes: EOM are normal. Pupils are equal, round, and reactive to light. Neck: Normal range of motion. Neck supple. No hepatojugular reflux and no JVD present. Carotid bruit is not present. No tracheal deviation present. No thyromegaly present. Cardiovascular: Normal rate, regular rhythm, normal heart sounds and intact distal pulses. Exam reveals no gallop and no friction rub. No murmur heard. Pulmonary/Chest: Effort normal and breath sounds normal. No respiratory distress. No wheezes. No rales. No tenderness. Abdominal: Soft. Bowel sounds are normal. No distension and no mass. No tenderness. No rebound and no guarding. Musculoskeletal: Normal range of motion. No edema and no tenderness. Lymphadenopathy:   No cervical adenopathy. Skin: Skin is warm and dry. No rash noted. Not diaphoretic. No erythema. See accompanying documentation for full consult. ASSESSMENT AND PLAN:  1. NSVT:    Monitor. Labs reviewed. Echo ordered. Continue BB. 2. Afib:    Rate controlled. Monitor. Echo ordered. Start dose adjusted eliquis. Stop ASA. 3. Chronic diastolic CHF: Follow volume. 4. Weakness/Lightheadedness/Dizziness: Per primary service. 5. ID issues: Per podiatry    6. HTN: Observe. 7. Lipids: Statin. 8. DM: Per primary service. 9. Hx of CVA/ICH: Eliquis/statin. 10. ESRD with HD: Per renal.     11. PAD: ASA/statin. Follows with vascular.     12. Anemia: Follow labs. 13. GERD, Schatzki's ring. Hiatal hernia. Antral gastritis    14. Ruben Lee D.O.   Cardiologist  Cardiology, [facility]

## 2021-12-03 NOTE — CONSULTS
Chief Complaint: Patient seen for evaluation of vascular status of the right leg      HPI: This patient, elderly, 80years old, somewhat frail, lives at home, with this niece Ruthann Kent, with a past history of stroke affecting the right side weakness, arm greater than right leg, walks with the help of a quad cane for short distances around the room already, also has end-stage renal disease currently undergoing hemodialysis through a tunneled dialysis catheter inserted in August of this year by Dr. Jimmie Rhodes, after a failed dialysis graft of the right forearm that was inserted in April, was admitted because of right foot ulcer on the dorsal aspect second toe mainly involving the skin, he was seen by his orthopedic surgeon, Dr. Magalys Gutierres will start on Keflex and because of concern about decreased perfusion patient recommend hospitalization particular the patient had some pain also    Patient also seen by cardiology service today because of atrial fibrillation and was started on Eliquis 2.5 mg p.o. twice daily, according the patient's niece who was in the room, patient had previous history of congestive heart failure also    Patient denies any history of fever or chills    Patient denies any new focal lateralizing neurological symptoms like loss of speech, vision or loss of function of extremity, had old stroke with right-sided weakness right arm greater than the right leg    When I was examining the patient, his niece Anastacio Lewis also came, with whom patient lives at home    Allergies   Allergen Reactions    Sulfa Antibiotics      Affects renal function and blood pressure    Sulfa Antibiotics        Current Facility-Administered Medications   Medication Dose Route Frequency Provider Last Rate Last Admin    insulin glargine (LANTUS) injection vial 5 Units  5 Units SubCUTAneous Nightly Britt Ugalde, DO        glucose (GLUTOSE) 40 % oral gel 15 g  15 g Oral PRN Britt Ugalde, DO        dextrose 50 % IV solution 12.5 g IntraVENous PRN Britt E Tram, DO        glucagon (rDNA) injection 1 mg  1 mg IntraMUSCular PRN Britt E Tram, DO        dextrose 5 % solution  100 mL/hr IntraVENous PRN Britt E Tram, DO        perflutren lipid microspheres (DEFINITY) injection 1.65 mg  1.5 mL IntraVENous ONCE PRN Britt E Tram, DO        apixaban (ELIQUIS) tablet 2.5 mg  2.5 mg Oral BID Rothman Pong, DO   2.5 mg at 12/03/21 1354    heparin (porcine) 1000 UNIT/ML injection             sodium chloride flush 0.9 % injection 10 mL  10 mL IntraVENous 2 times per day Britt E Tram, DO   10 mL at 12/03/21 1349    sodium chloride flush 0.9 % injection 10 mL  10 mL IntraVENous PRN Britt E Tram, DO        0.9 % sodium chloride infusion  25 mL IntraVENous PRN Britt E Tram, DO        senna (SENOKOT) tablet 8.6 mg  1 tablet Oral Daily PRN Britt E Tram, DO        acetaminophen (TYLENOL) tablet 650 mg  650 mg Oral Q6H PRN Britt E Tram, DO        Or    acetaminophen (TYLENOL) suppository 650 mg  650 mg Rectal Q6H PRN Britt E Tram, DO        atorvastatin (LIPITOR) tablet 10 mg  10 mg Oral Nightly Britt E Tram, DO   10 mg at 12/02/21 2156    famotidine (PEPCID) tablet 10 mg  10 mg Oral Daily Britt E Tram, DO   10 mg at 12/03/21 1349    gabapentin (NEURONTIN) capsule 100 mg  100 mg Oral Nightly Britt E Tram, DO   100 mg at 12/02/21 2155    insulin lispro (HUMALOG) injection vial 0-6 Units  0-6 Units SubCUTAneous TID WC Britt E Tram, DO   1 Units at 12/03/21 1351    insulin lispro (HUMALOG) injection vial 0-3 Units  0-3 Units SubCUTAneous Nightly Britt E Tram, DO   1 Units at 12/02/21 2156    metoprolol succinate (TOPROL XL) extended release tablet 25 mg  25 mg Oral Daily Britt E Tram, DO   25 mg at 12/03/21 1349       Past Medical History:   Diagnosis Date    Arthritis     CAD (coronary artery disease)     CHF (congestive heart failure) (HCC)     Chronic kidney disease     COVID-19     CVA (cerebral vascular accident) ([address]     Diabetes mellitus ([address]     Diabetic peripheral neuropathy ([address] 2012    [facility]     History of CVA (cerebrovascular accident) 2014    Hypertension     Other disorders of kidney and ureter     prostate infections in past    PVD (peripheral vascular disease) with claudication ([address] 2014    Right sided weakness 3/19/2018    TIA (transient ischemic attack)     possible several years ago    Unspecified cerebral artery occlusion with cerebral infarction     [facility] RIGHT LEG AFFECTED       Past Surgical History:   Procedure Laterality Date    ABDOMINAL HERNIA REPAIR      CATARACT REMOVAL      right    CHOLECYSTECTOMY  2011    DENTAL SURGERY      EYE SURGERY      [facility] Right 2020    TESIO CATHETER INSERTION [facility] at [facility]      removal of sac in ear    SHUNT REVISION Right 2021    INSERTION AV GRAFT RIGHT ARM performed by Debbie Jain MD at [address] [place] [address] Right 2021    THROMBECTOMY RIGHT ARM AV GRAFT performed by Debbie Jain MD at [address] [place] [address] Left 2021    INSERTION OF TEMPORARY HEMODIALYSIS CATHETER performed by Debbie Jain MD at [address] [place] [address] Right 2021    INSERTION OF TESIO, REMOVAL OF TEMPORARY CATHETHER performed by Debbie Jain MD at [address] [place] [address]       No family history on file.     Social History     Socioeconomic History    Marital status: Single     Spouse name: Not on file    Number of children: Not on file    Years of education: Not on file    Highest education level: Not on file   Occupational History    Not on file   Tobacco Use    Smoking status: Former Smoker     Packs/day: 0.50     Years: 20.00     Pack years: 10.00     Types: Cigarettes     Quit date: 5     Years since quittin.5    Smokeless tobacco: Never Used   Vaping Use    Vaping Use: Never used   Substance and Sexual Activity    Alcohol use: Not Currently     Alcohol/week: 1.0 standard drink     Types: 1 Glasses of wine per week    Drug use: No    Sexual activity: Not Currently   Other Topics Concern    Not on file   Social History Narrative    ** Merged History Encounter **          Social Determinants of Health     Financial Resource Strain:     Difficulty of Paying Living Expenses: Not on file   Food Insecurity:     Worried About Running Out of Food in the Last Year: Not on file    Arnaldo of Food in the Last Year: Not on file   Transportation Needs:     Lack of Transportation (Medical): Not on file    Lack of Transportation (Non-Medical): Not on file   Physical Activity:     Days of Exercise per Week: Not on file    Minutes of Exercise per Session: Not on file   Stress:     Feeling of Stress : Not on file   Social Connections:     Frequency of Communication with Friends and Family: Not on file    Frequency of Social Gatherings with Friends and Family: Not on file    Attends Oriental orthodox Services: Not on file    Active Member of [address] [place] [address] or Organizations: Not on file    Attends Club or Organization Meetings: Not on file    Marital Status: Not on file   Intimate Partner Violence:     Fear of Current or Ex-Partner: Not on file    Emotionally Abused: Not on file    Physically Abused: Not on file    Sexually Abused: Not on file   Housing Stability:     Unable to Pay for Housing in the Last Year: Not on file    Number of [place] in the Last Year: Not on file    Unstable Housing in the Last Year: Not on file       Review of Systems:  Skin:  No abnormal pigmentation or rash. Eyes:  No blurring, diplopia or vision loss. Ears/Nose/Throat:  No hearing loss or vertigo. Respiratory:  No cough, pleuritic chest pain, dyspnea, or wheezing. Cardiovascular: No angina, palpitations .   Coronary artery disease with history of atrial fibrillation currently on anticoagulation with Eliquis 2.5 g p.o. twice daily, hypertension, hyperlipidemia, history of congestive heart failure    Gastrointestinal:  No nausea or vomiting; no abdominal pain or rectal bleeding. History of partial small bowel obstruction    Musculoskeletal:  No arthritis or weakness. Neurologic:  No paralysis, paresis, seizures or headaches. Old stroke affecting right side with right sided weakness right arm more than the right leg with some flexion contracture of the right hand    Hematologic/Lymphatic/Immunologic:  No anemia, abnormal bleeding/bruising. Endocrine:  No heat or cold intolerance. No polyphagia, polydipsia or polyuria. Diabetes mellitus with diabetic neuropathy nephropathy    Nephrology: End-stage renal disease currently on hemodialysis through tunneled catheter inserted right jugular vein    Physical Exam:  /74   Pulse 91   Temp 98.1 °F (36.7 °C) (Oral)   Resp 18   Ht 5' 5.5\" (1.664 m)   Wt 187 lb 6.3 oz (85 kg)   SpO2 97%   BMI 30.71 kg/m²   General appearance:  Alert, awake, oriented x 3. No distress. Skin:  Warm and dry. Head:  Normocephalic. No masses, lesions or tenderness. Eyes:  Conjunctivae appear normal; PERRL. Ears:  External ears normal.  Nose/Sinuses:  Septum midline, mucosa normal; no drainage. Oropharynx:  Clear, no exudate noted. Neck:  No jugular venous distention, lymphadenopathy or thyromegaly. No evidence of carotid bruit    Chest: Tunneled dialysis cath noted over the right subclavian fossa      Lungs:  Clear to ausculation bilaterally. No rhonchi, crackles, wheezes. Heart:  Regular rate and rhythm. No rub or murmur. .    Abdomen:  Soft, non-tender. No masses, organomegaly. Musculoskeletal: No joint effusions, tenderness swelling or warmth. Neuro: Speech is intact. Moving all extremities. No focal motor or sensory deficits. Extremities:  Both feet are warm to touch.  The color of both feet is femoral   artery and popliteal arteries. Diminutive blood flow demonstrated in right   posterior tibial and peroneal arteries as well as scant blood flow at level   of right dorsalis pedis artery. CTA abdomen and pelvis with runoff may be   helpful for further evaluation. XR FOOT RIGHT (MIN 3 VIEWS)   Final Result   No acute osseous abnormality. Degenerative changes MTP joint great toe with adjacent dorsal soft tissue   edema. 4.  The history physical, cardiology, nephrology, ID consultation notes were briefly reviewed    5. The lower extremity arterial Doppler study was personally reviewed by me revealed evidence of bilateral iliac and femoral-popliteal arterial occlusive disease, with an ankle-brachial index of 0.71 on the right and 0.86 on the left, partially falsely elevated due to calcification tibial arteries, patient is strong monophasic to almost biphasic ankle Doppler tracings with diminished adequate flow to the foot based upon the pulse volume recordings over the metatarsals and diminished pulse volume recordings of the toes but below the second toe with the patient has a heel ulcer with a scab formation on the right side    Assessment:    1. Ulcer, dorsal aspect second toe, involving skin only with scab formation is also irregular femoral-popliteal arterial occlusive disease, with borderline collateral flow for potential tissue healing, most likely caused by pressure wearing shoes because of hammertoe deformity    2. Diabetes mellitus with diabetic neuropathy nephropathy    3. Coronary artery disease history of atrial fibrillation, currently on Eliquis, hypertension, hyperlipidemia    4. History CVA with right-sided weakness, right arm more than the right leg    5.   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    [facility]    Diabetes mellitus [facility]    Diabetic peripheral neuropathy (HCC)    Osteoarthritis    Gait abnormality    Physical deconditioning    Lumbar spondylitis (HCC)    Anemia    Fx humer, med condyl-closed    Cerebral infarction ([address]    Renal failure    TIA (transient ischemic attack)    ASHD (arteriosclerotic heart disease)    Diabetes mellitus ([facility]    PVD (peripheral vascular disease) with claudication (HCC)    History of CVA (cerebrovascular accident)    Pain in lower limb    Cerebral infarction ([address]    CKD 3    Diabetes mellitus-2    Acute respiratory failure (HCC)    Decubitus ulcer of sacral region, stage 1    Right sided weakness    Hypoglycemia    Acute kidney injury (ZAHRAA) with acute tubular necrosis (ATN) [facility]    Acute kidney failure ([facility]    Encounter regarding vascular access for dialysis for end-stage renal disease ([facility]    ESRD (end stage renal disease) on dialysis ([facility]    Diabetic ulcer of toe of right foot associated with type 2 diabetes mellitus, with fat layer exposed ([facility]    [facility] graft thrombosis[facility] initial [facility]    Failure to thrive in adult            Plan:     Discussed the patient in the room along with his niece Octavia Najera, telephone #[phone], regarding all options, risks benefits and alternatives    At the present time, the ulcer involving the skin over the dorsal aspect of the second toe of the right foot is almost healed with a small scab without cellulitis or drainage, recommend conservative therapy,, would benefit from a low-dose 81 mg aspirin daily along with a trial of low-dose Pletal 50 mg p.o. twice daily in view of his age, however the Pletal was deferred because of history of congestive heart failure, and the aspirin was discontinued per the cardiology service after initiation of Eliquis therapy for his atrial fibrillation    As such, I recommended them

## 2021-12-04 VITALS
BODY MASS INDEX: 30.12 KG/M2 | WEIGHT: 187.39 LBS | DIASTOLIC BLOOD PRESSURE: 75 MMHG | OXYGEN SATURATION: 98 % | SYSTOLIC BLOOD PRESSURE: 114 MMHG | HEIGHT: 66 IN | TEMPERATURE: 97.9 F | RESPIRATION RATE: 18 BRPM | HEART RATE: 56 BPM

## 2021-12-04 LAB
ALBUMIN SERPL-MCNC: 3.7 G/DL (ref 3.5–5.2)
ALP BLD-CCNC: 94 U/L (ref 40–129)
ALT SERPL-CCNC: 21 U/L (ref 0–40)
ANION GAP SERPL CALCULATED.3IONS-SCNC: 12 MMOL/L (ref 7–16)
AST SERPL-CCNC: 16 U/L (ref 0–39)
BASOPHILS ABSOLUTE: 0.04 E9/L (ref 0–0.2)
BASOPHILS RELATIVE PERCENT: 0.7 % (ref 0–2)
BILIRUB SERPL-MCNC: 0.5 MG/DL (ref 0–1.2)
BUN BLDV-MCNC: 33 MG/DL (ref 6–23)
CALCIUM SERPL-MCNC: 8.4 MG/DL (ref 8.6–10.2)
CHLORIDE BLD-SCNC: 95 MMOL/L (ref 98–107)
CO2: 29 MMOL/L (ref 22–29)
CREAT SERPL-MCNC: 3.9 MG/DL (ref 0.7–1.2)
EOSINOPHILS ABSOLUTE: 0.23 E9/L (ref 0.05–0.5)
EOSINOPHILS RELATIVE PERCENT: 4.3 % (ref 0–6)
GFR AFRICAN AMERICAN: 18
GFR NON-AFRICAN AMERICAN: 15 ML/MIN/1.73
GLUCOSE BLD-MCNC: 136 MG/DL (ref 74–99)
HBA1C MFR BLD: 7.2 % (ref 4–5.6)
HCT VFR BLD CALC: 31.6 % (ref 37–54)
HEMOGLOBIN: 10.1 G/DL (ref 12.5–16.5)
IMMATURE GRANULOCYTES #: 0.03 E9/L
IMMATURE GRANULOCYTES %: 0.6 % (ref 0–5)
LV EF: 53 %
LVEF MODALITY: NORMAL
LYMPHOCYTES ABSOLUTE: 1.53 E9/L (ref 1.5–4)
LYMPHOCYTES RELATIVE PERCENT: 28.6 % (ref 20–42)
MCH RBC QN AUTO: 31.2 PG (ref 26–35)
MCHC RBC AUTO-ENTMCNC: 32 % (ref 32–34.5)
MCV RBC AUTO: 97.5 FL (ref 80–99.9)
METER GLUCOSE: 143 MG/DL (ref 74–99)
METER GLUCOSE: 171 MG/DL (ref 74–99)
MONOCYTES ABSOLUTE: 0.62 E9/L (ref 0.1–0.95)
MONOCYTES RELATIVE PERCENT: 11.6 % (ref 2–12)
NEUTROPHILS ABSOLUTE: 2.9 E9/L (ref 1.8–7.3)
NEUTROPHILS RELATIVE PERCENT: 54.2 % (ref 43–80)
PDW BLD-RTO: 15.8 FL (ref 11.5–15)
PLATELET # BLD: 146 E9/L (ref 130–450)
PMV BLD AUTO: 11.5 FL (ref 7–12)
POTASSIUM REFLEX MAGNESIUM: 4.6 MMOL/L (ref 3.5–5)
RBC # BLD: 3.24 E12/L (ref 3.8–5.8)
SARS-COV-2, NAAT: NOT DETECTED
SODIUM BLD-SCNC: 136 MMOL/L (ref 132–146)
TOTAL PROTEIN: 6.1 G/DL (ref 6.4–8.3)
WBC # BLD: 5.4 E9/L (ref 4.5–11.5)

## 2021-12-04 PROCEDURE — 2580000003 HC RX 258: Performed by: INTERNAL MEDICINE

## 2021-12-04 PROCEDURE — 6360000004 HC RX CONTRAST MEDICATION: Performed by: INTERNAL MEDICINE

## 2021-12-04 PROCEDURE — 85025 COMPLETE CBC W/AUTO DIFF WBC: CPT

## 2021-12-04 PROCEDURE — 6370000000 HC RX 637 (ALT 250 FOR IP): Performed by: INTERNAL MEDICINE

## 2021-12-04 PROCEDURE — 80053 COMPREHEN METABOLIC PANEL: CPT

## 2021-12-04 PROCEDURE — 87635 SARS-COV-2 COVID-19 AMP PRB: CPT

## 2021-12-04 PROCEDURE — 99221 1ST HOSP IP/OBS SF/LOW 40: CPT | Performed by: PODIATRIST

## 2021-12-04 PROCEDURE — 36415 COLL VENOUS BLD VENIPUNCTURE: CPT

## 2021-12-04 PROCEDURE — 83036 HEMOGLOBIN GLYCOSYLATED A1C: CPT

## 2021-12-04 PROCEDURE — 82962 GLUCOSE BLOOD TEST: CPT

## 2021-12-04 PROCEDURE — 99233 SBSQ HOSP IP/OBS HIGH 50: CPT | Performed by: INTERNAL MEDICINE

## 2021-12-04 PROCEDURE — 93306 TTE W/DOPPLER COMPLETE: CPT

## 2021-12-04 RX ADMIN — Medication 10 ML: at 08:28

## 2021-12-04 RX ADMIN — FAMOTIDINE 10 MG: 20 TABLET, FILM COATED ORAL at 08:28

## 2021-12-04 RX ADMIN — INSULIN LISPRO 1 UNITS: 100 INJECTION, SOLUTION INTRAVENOUS; SUBCUTANEOUS at 11:15

## 2021-12-04 RX ADMIN — INSULIN LISPRO 1 UNITS: 100 INJECTION, SOLUTION INTRAVENOUS; SUBCUTANEOUS at 06:36

## 2021-12-04 RX ADMIN — APIXABAN 2.5 MG: 2.5 TABLET, FILM COATED ORAL at 08:28

## 2021-12-04 RX ADMIN — PERFLUTREN 1.65 MG: 6.52 INJECTION, SUSPENSION INTRAVENOUS at 10:46

## 2021-12-04 RX ADMIN — METOPROLOL SUCCINATE 25 MG: 25 TABLET, EXTENDED RELEASE ORAL at 08:28

## 2021-12-04 ASSESSMENT — PAIN SCALES - GENERAL: PAINLEVEL_OUTOF10: 0

## 2021-12-04 NOTE — PROGRESS NOTES
DO    glucagon (rDNA) injection 1 mg, 1 mg, IntraMUSCular, PRN, Britt JONES Tram, DO    dextrose 5 % solution, 100 mL/hr, IntraVENous, PRN, Britt JONES Tram, DO    perflutren lipid microspheres (DEFINITY) injection 1.65 mg, 1.5 mL, IntraVENous, ONCE PRN, Britt Andersoniter, DO    apixaban (ELIQUIS) tablet 2.5 mg, 2.5 mg, Oral, BID, Pasqual Barthel, DO, 2.5 mg at 12/03/21 2211    sodium chloride flush 0.9 % injection 10 mL, 10 mL, IntraVENous, 2 times per day, Britt JONES Tram, DO, 10 mL at 12/03/21 2212    sodium chloride flush 0.9 % injection 10 mL, 10 mL, IntraVENous, PRN, Britt Andersoniter, DO    0.9 % sodium chloride infusion, 25 mL, IntraVENous, PRN, Britt JONES Tram, DO    senna (SENOKOT) tablet 8.6 mg, 1 tablet, Oral, Daily PRN, Britt JONES Tram, DO    acetaminophen (TYLENOL) tablet 650 mg, 650 mg, Oral, Q6H PRN **OR** acetaminophen (TYLENOL) suppository 650 mg, 650 mg, Rectal, Q6H PRN, Britt JONES Tram, DO    atorvastatin (LIPITOR) tablet 10 mg, 10 mg, Oral, Nightly, Britt JONES Tram, DO, 10 mg at 12/03/21 2211    famotidine (PEPCID) tablet 10 mg, 10 mg, Oral, Daily, Britt JONES Tram, DO, 10 mg at 12/03/21 1349    gabapentin (NEURONTIN) capsule 100 mg, 100 mg, Oral, Nightly, Britt E Tram, DO, 100 mg at 12/03/21 2211    insulin lispro (HUMALOG) injection vial 0-6 Units, 0-6 Units, SubCUTAneous, TID WC, Britt JONES Tram, DO, 1 Units at 12/04/21 0636    insulin lispro (HUMALOG) injection vial 0-3 Units, 0-3 Units, SubCUTAneous, Nightly, Britt Andersoniter, DO, 1 Units at 12/02/21 2156    metoprolol succinate (TOPROL XL) extended release tablet 25 mg, 25 mg, Oral, Daily, Britt Ugalde DO, 25 mg at 12/03/21 2488    Assessment:    Patient Active Problem List   Diagnosis    Partial small bowel obstruction (HCC)    IDDM-2    CAD (coronary artery disease)    HTN    COPD    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    [facility]    Diabetes mellitus [facility]    Diabetic peripheral neuropathy ([facility]  Osteoarthritis    Gait abnormality    Physical deconditioning    Lumbar spondylitis (HCC)    Anemia    Fx humer, med condyl-closed    Cerebral infarction ([address]    Renal failure    TIA (transient ischemic attack)    ASHD (arteriosclerotic heart disease)    Diabetes mellitus ([address]    PVD (peripheral vascular disease) with claudication (HCC)    History of CVA (cerebrovascular accident)    Pain in lower limb    Cerebral infarction ([address]    Diabetes mellitus-2    Decubitus ulcer of sacral region, stage 1    Right sided weakness    Hypoglycemia    Encounter regarding vascular access for dialysis for end-stage renal disease ([address]    ESRD (end stage renal disease) on dialysis ([address]    Diabetic ulcer of toe of right foot associated with type 2 diabetes mellitus, with fat layer exposed ([address]    Failure to thrive in adult    Ischemic ulcer of toe, limited to breakdown of skin, right ([address]    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 Toe ulcer right second toe in pt with diabetes and atherosclerotic arterial disease- no need for antibiotics. Vascular surgery did not recommend any vascular intervention. 2. DM- controlled  3. ESRD- on HD  4. AF- HR ok, on low dose eliquis  5. NSVT- on metoprolol  6.  OK for d/c to ECF        Dexter Douglas MD  8:07 AM  12/4/2021

## 2021-12-04 NOTE — CONSULTS
Department of Podiatry  Attending Consult Note      Reason for Consult: Ulcer second toe right foot  Requesting Physician:  pcp    CHIEF COMPLAINT:  2nd digit right foot     HISTORY OF PRESENT ILLNESS:                The patient is a 80 y.o. male with significant past medical history of ulcer who presents ulcer 2nd right     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 ([address] 12/3/2021    CAD (coronary artery disease)     CHF (congestive heart failure) ([address]     Chronic kidney disease     COVID-19     CVA (cerebral vascular accident) ([address]     Diabetes mellitus ([address]     Diabetic peripheral neuropathy ([address] 11/9/2012    Hemodialysis patient ([address]     History of CVA (cerebrovascular accident) 6/9/2014    Hypertension     Ischemic ulcer of toe, limited to breakdown of skin, right ([address] 12/3/2021    Other disorders of kidney and ureter     prostate infections in past    PVD (peripheral vascular disease) with claudication ([address] 6/9/2014    Right sided weakness 3/19/2018    TIA (transient ischemic attack)     possible several years ago    Unspecified cerebral artery occlusion with cerebral infarction 2013    [facility] RIGHT LEG AFFECTED     Past Surgical History:        Procedure Laterality Date    ABDOMINAL HERNIA REPAIR      CATARACT REMOVAL      right    CHOLECYSTECTOMY  11/2011    DENTAL SURGERY      EYE SURGERY      [facility] DIALYSIS CATHETER Right 8/27/2020    TESIO CATHETER INSERTION [facility] at [facility]      removal of sac in ear    SHUNT REVISION Right 1/21/2021    INSERTION AV GRAFT RIGHT ARM performed by Latrice Solares MD at [address] [place] [address] Right 4/8/2021    THROMBECTOMY RIGHT ARM AV GRAFT performed by Latrice Solares MD at [place] [address] VASCULAR SURGERY Left 8/9/2021    INSERTION OF TEMPORARY HEMODIALYSIS CATHETER performed by Adelfo Singh MD at [address] [place] [address] Right 8/11/2021    INSERTION OF TESIO, REMOVAL OF TEMPORARY CATHETHER performed by [facility]o [facility] MD at [facility] OR     Current Medications:    Current Facility-Administered Medications: insulin glargine (LANTUS) injection vial 5 Units, 5 Units, SubCUTAneous, Nightly  glucose (GLUTOSE) 40 % oral gel 15 g, 15 g, Oral, PRN  dextrose 50 % IV solution, 12.5 g, IntraVENous, PRN  glucagon (rDNA) injection 1 mg, 1 mg, IntraMUSCular, PRN  dextrose 5 % solution, 100 mL/hr, IntraVENous, PRN  apixaban (ELIQUIS) tablet 2.5 mg, 2.5 mg, Oral, BID  sodium chloride flush 0.9 % injection 10 mL, 10 mL, IntraVENous, 2 times per day  sodium chloride flush 0.9 % injection 10 mL, 10 mL, IntraVENous, PRN  0.9 % sodium chloride infusion, 25 mL, IntraVENous, PRN  senna (SENOKOT) tablet 8.6 mg, 1 tablet, Oral, Daily PRN  acetaminophen (TYLENOL) tablet 650 mg, 650 mg, Oral, Q6H PRN **OR** acetaminophen (TYLENOL) suppository 650 mg, 650 mg, Rectal, Q6H PRN  atorvastatin (LIPITOR) tablet 10 mg, 10 mg, Oral, Nightly  famotidine (PEPCID) tablet 10 mg, 10 mg, Oral, Daily  gabapentin (NEURONTIN) capsule 100 mg, 100 mg, Oral, Nightly  insulin lispro (HUMALOG) injection vial 0-6 Units, 0-6 Units, SubCUTAneous, TID WC  insulin lispro (HUMALOG) injection vial 0-3 Units, 0-3 Units, SubCUTAneous, Nightly  metoprolol succinate (TOPROL XL) extended release tablet 25 mg, 25 mg, Oral, Daily  Allergies:  Sulfa antibiotics and Sulfa antibiotics      PHYSICAL EXAM:      Vitals:    /75   Pulse 56   Temp 97.9 °F (36.6 °C) (Oral)   Resp 18   Ht 5' 5.5\" (1.664 m)   Wt 187 lb 6.3 oz (85 kg)   SpO2 98%   BMI 30.71 kg/m²     LOWER EXTREMITY EXAMINATION      VASCULAR:  DP and PT pulses are faint. CFT delayed B/L. Warm to warm from the tibial tuberosity to the distal aspect of the digits dorsally.  No hair growth noted to the distal aspects dorsally.     NEUROLOGIC:  Protective sensation is diminished b/l     DERM:  Ulcer noted to the RLE second digit PIPJ measuring about 0.3x0.3x0.2cm with scabbed wound bed. No noted pus or drainage at the time of evaluation. No active bleeding. Scab is well adhered to wound bed. Periwound erythema. Hyperpigmentation to the second digit. No mal odor  As pictured below:     MUSCULOSKELETAL: MMT Deferred. Equinus B/L. Hammered lesser digits b/l      Wound Care:              Wound #1: RLE 2nd digit              Size ~0.3x0.3x0.2cm              Appearance -Stable               Drainage -None              Odor -None      IMPRESSION/RECOMMENDATIONS:    1.RLE Second digit pressure ulcer- POA  2. B/L LE hammered digits  3. B/L LE Equinus  4. DM with neuropathic changes  5. Stroke    ESRD (end stage renal disease) on dialysis ([address]    Failure to thrive in adult    Ischemic ulcer of toe, limited to breakdown of skin, right ([address]    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 ([address]  PLAN:     - Patient was examined and evaluated. Reviewed patient's recent lab results and pertinent diagnostic imaging. Reviewed ancillary service notes. - Antibiotics as per ED/IM. ID signed off. - Will continue with local wound care for now. - Vascular: 1.Mild to moderate arterial stenosis b/l              2.B/L arterial Inflow disease  - US:              1.Occlusion to RLE SFA and popliteal arteries              2. Decreased flow to: right PT, Peroneal, and DP arteries  - X-rays: 1.No osseous abnormality              2.Degenerative changes   - Venous US: Pending  Patient to follow-up with Dr. Ruslan Cordero his podiatrist patient may be discharged per podiatry. Postop shoe was ordered.

## 2021-12-04 NOTE — CARE COORDINATION
Social work / Discharge Planning:       AWAITING [address] Discharge order noted. Social work contacted staff at [address] and provided update that patient is being discharged to [place] [address] today. [facility] service will transport at 2pm.    Social work updated RN and patient's niece Bibi Sears.   Electronically signed by SCOTT Real on 12/4/2021 at 10:50 AM

## 2021-12-04 NOTE — PROGRESS NOTES
[facility] MD Ruben Vazquez, MD Hector Ramos, MD Jas Betancourt, KARAN Lux, DAISY  Progress note       Sub :     12/3 : seen today , on HD tolerating treamtent , comfortable, vitals stable, verbalize no issues   12/4: Patient was seen and interviewed today, no new complaints nurse informed me that they might be discharged today from the hospital hemodialysis completed as scheduled earlier this week. History of Present Ilness:    Mr. Melony Abdul is a pleasant 49-year-old gentleman known to me from the outpatient practice I follow him for ESRD. He undergoes chronic intermittent hemodialysis on Mondays Wednesdays and Fridays at the [address] [place] [address] dialysis center through left upper extremity AV fistula. Recent treatments have been without complication, though as he tells me again, he finds the time spent there rather \"boring. \"  He has an infection on his right foot, surgically his toe, he was sent into [place] [address] for further evaluation and management per his podiatrist.    At the time of my initial evaluation he was undergoing hemodialysis. Treatment is without complication. BP stable throughout treatment. BFR as prescribed. Besides the pain, he denies all other complaint, and ROS is unremarkable.     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 ([address] 12/3/2021    CAD (coronary artery disease)     CHF (congestive heart failure) (HCC)     Chronic kidney disease     COVID-19     CVA (cerebral vascular accident) ([address]     Diabetes mellitus ([address]     Diabetic peripheral neuropathy ([address] 11/9/2012    Hemodialysis patient ([address]     History of CVA (cerebrovascular accident) 6/9/2014    Hypertension     Ischemic ulcer of toe, limited to breakdown of skin, right ([address] 12/3/2021    Other disorders of kidney and ureter     prostate infections in past    PVD (peripheral vascular disease) with claudication ([address] 6/9/2014    Right sided weakness 3/19/2018    TIA (transient ischemic attack)     possible several years ago    Unspecified cerebral artery occlusion with cerebral infarction 2013    [facility] RIGHT LEG AFFECTED       Past Surgical History:   Procedure Laterality Date    ABDOMINAL HERNIA REPAIR      CATARACT REMOVAL      right    CHOLECYSTECTOMY  11/2011    DENTAL SURGERY      EYE SURGERY      [facility]. DIALYSIS CATHETER Right 8/27/2020    TESIO CATHETER INSERTION performed by Jose Blandon MD at [facility]      removal of sac in ear    SHUNT REVISION Right 1/21/2021    INSERTION AV GRAFT RIGHT ARM performed by Jose Blandon MD at [address] [place] [address] Right 4/8/2021    THROMBECTOMY RIGHT ARM AV GRAFT performed by Jose Blandon MD at [address] [place] [address] Left 8/9/2021    INSERTION OF TEMPORARY HEMODIALYSIS CATHETER performed by Jose Blandon MD at [address] [place] [address] Right 8/11/2021    INSERTION OF TESIO, REMOVAL OF TEMPORARY CATHETHER performed by Jose Blandon MD at [address] [place] [address]       No family history on file. reports that he quit smoking about 54 years ago. His smoking use included cigarettes. He has a 10.00 pack-year smoking history. He has never used smokeless tobacco. He reports previous alcohol use of about 1.0 standard drink of alcohol per week. He reports that he does not use drugs.     Allergies:  Sulfa antibiotics and Sulfa antibiotics    Current Medications:    insulin glargine (LANTUS) injection vial 5 Units, Nightly  glucose (GLUTOSE) 40 % oral gel 15 g, PRN  dextrose 50 % IV solution, PRN  glucagon (rDNA) injection 1 mg, PRN  dextrose 5 % solution, PRN  apixaban (ELIQUIS) tablet 2.5 mg, BID  sodium chloride flush 0.9 % injection 10 mL, 2 times per day  sodium chloride flush 0.9 % injection 10 mL, PRN  0.9 % sodium chloride infusion, 12/04/2021    LABGLOM 15 12/04/2021    GLUCOSE 136 12/04/2021    GLUCOSE 163 02/22/2012    PROT 6.1 12/04/2021    LABALBU 3.7 12/04/2021    LABALBU 3.8 02/20/2012    CALCIUM 8.4 12/04/2021    BILITOT 0.5 12/04/2021    ALKPHOS 94 12/04/2021    AST 16 12/04/2021    ALT 21 12/04/2021     Ionized Calcium:  No results found for: IONCA  Magnesium:    Lab Results   Component Value Date    MG 2.3 12/03/2021     Phosphorus:    Lab Results   Component Value Date    PHOS 4.4 12/02/2021     U/A:    Lab Results   Component Value Date    COLORU Yellow 07/10/2021    PHUR 6.0 07/10/2021    WBCUA PACKED 07/10/2021    WBCUA NONE 02/20/2012    RBCUA >20 07/10/2021    RBCUA 1-3 05/04/2013    BACTERIA MANY 07/10/2021    CLARITYU CLOUDY 07/10/2021    SPECGRAV 1.020 07/10/2021    LEUKOCYTESUR LARGE 07/10/2021    UROBILINOGEN 0.2 07/10/2021    BILIRUBINUR Negative 07/10/2021    BILIRUBINUR NEGATIVE 02/20/2012    BLOODU LARGE 07/10/2021    GLUCOSEU Negative 07/10/2021    GLUCOSEU 500 02/20/2012     Microalbumen/Creatinine ratio:  No components found for: RUCREAT  Iron Saturation:  No components found for: PERCENTFE  TIBC:    Lab Results   Component Value Date    TIBC 177 08/24/2020     FERRITIN:    Lab Results   Component Value Date    FERRITIN 98 08/24/2020        Imaging:  US DUP LOWER EXTREMITIES BILATERAL VENOUS   Final Result   No evidence of DVT in either lower extremity. VL LOWER EXTREMITY ARTERIAL SEGMENTAL PRESSURES W PPG   Final Result   Evidence of moderate arterial stenosis in the right lower extremity with mild   arterial stenosis in the left lower extremity. Given the diffusely abnormal   waveforms, findings suggest bilateral arterial inflow disease. Consider   correlation with CTA of the abdomen/pelvis with lower extremity runoff for   further assessment.          US DUP LOWER EXTREMITY RIGHT ARTERIES   Final Result   Findings are suggestive of occlusion involving right superficial femoral   artery and popliteal arteries. Diminutive blood flow demonstrated in right   posterior tibial and peroneal arteries as well as scant blood flow at level   of right dorsalis pedis artery. CTA abdomen and pelvis with runoff may be   helpful for further evaluation. XR FOOT RIGHT (MIN 3 VIEWS)   Final Result   No acute osseous abnormality. Degenerative changes MTP joint great toe with adjacent dorsal soft tissue   edema. Assessment  1. ESRD  2. Anemia due to ESRD  3. 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 Hypertension  5. Right foot infection    Recommendations  1. Continue IHD support for solute and volume clearance on Mondays Wednesdays and Fridays as per outpatient orders and dry weight  2. Continue current antihypertensive medication regimen  3. Continue SHANDRA: Retacrit while here as warranted, Mircera as outpatient  4. Continue other aspects of renal management  5. Continue supportive care  6.  Follow labs, BP              Electronically signed by Tomasa Patricio MD on 12/4/2021

## 2021-12-04 NOTE — PROGRESS NOTES
[facility] Cardiology Inpatient Progress Note    Patient is a 80 y.o. male of Hawk Ugalde DO seen in hospital follow up. Chief complaint: NSVT/Afib    HPI: Some SOB. No CP.      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    Diabetes mellitus ([address]    Diabetes mellitus ([address]    Diabetic peripheral neuropathy (HCC)    Osteoarthritis    Gait abnormality    Physical deconditioning    Lumbar spondylitis (HCC)    Anemia    Fx humer, med condyl-closed    Cerebral infarction [facility])    Renal failure    TIA (transient ischemic attack)    ASHD (arteriosclerotic heart disease)    Diabetes mellitus ([address]    PVD (peripheral vascular disease) with claudication ([address]    History of CVA (cerebrovascular accident)    Pain in lower limb    Cerebral infarction ([address]    Diabetes mellitus-2    Decubitus ulcer of sacral region, stage 1    Right sided weakness    Hypoglycemia    Encounter regarding vascular access for dialysis for end-stage renal disease ([address]    ESRD (end stage renal disease) on dialysis ([address]    Diabetic ulcer of toe of right foot associated with type 2 diabetes mellitus, with fat layer exposed ([address]    Failure to thrive in adult    Ischemic ulcer of toe, limited to breakdown of skin, right ([address]    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 (HCC)       Allergies   Allergen Reactions    Sulfa Antibiotics      Affects renal function and blood pressure    Sulfa Antibiotics        Current Facility-Administered Medications   Medication Dose Route Frequency Provider Last Rate Last Admin    insulin glargine (LANTUS) injection vial 5 Units  5 Units SubCUTAneous Nightly Britt Ugalde DO   5 Units at 12/03/21 9487    glucose (GLUTOSE) 40 % oral gel 15 g  15 g Oral PRN Britt Ugalde DO        dextrose 50 % IV solution  12.5 g IntraVENous PRN Isis Ugalde, DO        glucagon (rDNA) injection 1 mg  1 mg IntraMUSCular PRN Britt E Tram, DO        dextrose 5 % solution  100 mL/hr IntraVENous PRN Britt E Tram, DO        perflutren lipid microspheres (DEFINITY) injection 1.65 mg  1.5 mL IntraVENous ONCE PRN Britt E Tram, DO        apixaban (ELIQUIS) tablet 2.5 mg  2.5 mg Oral BID [place], DO   2.5 mg at 12/03/21 2211    sodium chloride flush 0.9 % injection 10 mL  10 mL IntraVENous 2 times per day Britt E Tram, DO   10 mL at 12/03/21 2212    sodium chloride flush 0.9 % injection 10 mL  10 mL IntraVENous PRN Britt E Tram, DO        0.9 % sodium chloride infusion  25 mL IntraVENous PRN Britt E Tram, DO        senna (SENOKOT) tablet 8.6 mg  1 tablet Oral Daily PRN Britt E Tram, DO        acetaminophen (TYLENOL) tablet 650 mg  650 mg Oral Q6H PRN Brtit E Tram, DO        Or    acetaminophen (TYLENOL) suppository 650 mg  650 mg Rectal Q6H PRN Britt E Tram, DO        atorvastatin (LIPITOR) tablet 10 mg  10 mg Oral Nightly Britt E Tram, DO   10 mg at 12/03/21 2211    famotidine (PEPCID) tablet 10 mg  10 mg Oral Daily Britt E Tram, DO   10 mg at 12/03/21 1349    gabapentin (NEURONTIN) capsule 100 mg  100 mg Oral Nightly Britt E Tram, DO   100 mg at 12/03/21 2211    insulin lispro (HUMALOG) injection vial 0-6 Units  0-6 Units SubCUTAneous TID WC Britt E Tram, DO   1 Units at 12/04/21 0636    insulin lispro (HUMALOG) injection vial 0-3 Units  0-3 Units SubCUTAneous Nightly Britt E Tram, DO   1 Units at 12/02/21 2156    metoprolol succinate (TOPROL XL) extended release tablet 25 mg  25 mg Oral Daily Britt E Tram, DO   25 mg at 12/03/21 1349       Review of systems:   Heart: as above   Lungs: as above   Eyes: denies changes in vision or discharge. Ears: denies changes in hearing or pain. Nose: denies epistaxis or masses   Throat: denies sore throat or trouble swallowing. Neuro: denies numbness, tingling, tremors. Skin: denies rashes or itching. : denies hematuria, dysuria   GI: denies vomiting, diarrhea   Psych: denies mood changed, anxiety, depression. Physical Exam   /72   Pulse 76   Temp 97.9 °F (36.6 °C) (Oral)   Resp 18   Ht 5' 5.5\" (1.664 m)   Wt 187 lb 6.3 oz (85 kg)   SpO2 97%   BMI 30.71 kg/m²   Constitutional: Oriented to person, place, and time. No distress. Well developed. Head: Normocephalic and atraumatic. Neck: Neck supple. No hepatojugular reflux. No JVD present. Carotid bruit is not present. No tracheal deviation present. No thyromegaly present. Cardiovascular: Normal rate, regular rhythm, normal heart sounds. intact distal pulses. No gallop and no friction rub. No murmur heard. Pulmonary: Breath sounds normal. No respiratory distress. No wheezes. No rales. Chest: Effort normal. No tenderness. Abdominal: Soft. Bowel sounds are normal. No distension or mass. No tenderness, rebound or guarding. Musculoskeletal: . No tenderness. No clubbing or cyanosis. Extremitites: Intact distal pulses. No edema  Neurological: Alert and oriented to person, place, and time. Skin: Skin is warm and dry. No rash noted. Not diaphoretic. No erythema. Psychiatric: Normal mood and affect. Behavior is normal.   Lymphadenopathy: No cervical adenopathy. No groin adenopathy.     CBC:   Lab Results   Component Value Date    WBC 5.4 12/04/2021    RBC 3.24 12/04/2021    HGB 10.1 12/04/2021    HCT 31.6 12/04/2021    MCV 97.5 12/04/2021    MCH 31.2 12/04/2021    MCHC 32.0 12/04/2021    RDW 15.8 12/04/2021     12/04/2021    MPV 11.5 12/04/2021     BMP:   Lab Results   Component Value Date     12/04/2021    K 4.6 12/04/2021    CL 95 12/04/2021    CO2 29 12/04/2021    BUN 33 12/04/2021    LABALBU 3.7 12/04/2021    LABALBU 3.8 02/20/2012    CREATININE 3.9 12/04/2021    CALCIUM 8.4 12/04/2021    GFRAA 18 12/04/2021    LABGLOM 15 12/04/2021     Magnesium:    Lab Results   Component Value Date MG 2.3 12/03/2021     Cardiac Enzymes:   Lab Results   Component Value Date    CKTOTAL 74 08/14/2020    CKTOTAL 43 06/09/2014    CKTOTAL 59 05/04/2013    CKMB 4.4 08/14/2020    CKMB 0.8 05/04/2013    CKMB 3.9 03/13/2011    TROPHS 66 (H) 11/29/2021    TROPHS 83 (H) 07/10/2021    TROPHS 88 (H) 07/10/2021      PT/INR:    Lab Results   Component Value Date    PROTIME 16.6 05/15/2021    PROTIME 15.1 01/29/2012    INR 1.5 05/15/2021     TSH:    Lab Results   Component Value Date    TSH 0.974 12/03/2021       Rhythm Strip: atrial fibrillation. 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    Diabetes mellitus ([address]    Diabetes mellitus ([address]    Diabetic peripheral neuropathy (HCC)    Osteoarthritis    Gait abnormality    Physical deconditioning    Lumbar spondylitis (HCC)    Anemia    Fx humer, med condyl-closed    Cerebral infarction ([address]    Renal failure    TIA (transient ischemic attack)    ASHD (arteriosclerotic heart disease)    Diabetes mellitus ([address]    PVD (peripheral vascular disease) with claudication ([address]    History of CVA (cerebrovascular accident)    Pain in lower limb    Cerebral infarction ([address]    Diabetes mellitus-2    Decubitus ulcer of sacral region, stage 1    Right sided weakness    Hypoglycemia    Encounter regarding vascular access for dialysis for end-stage renal disease ([address]    ESRD (end stage renal disease) on dialysis ([address]    Diabetic ulcer of toe of right foot associated with type 2 diabetes mellitus, with fat layer exposed ([address]    Failure to thrive in adult    Ischemic ulcer of toe, limited to breakdown of skin, right (HCC)    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 ([address]     1. NSVT:    Monitor. Labs reviewed. Echo ordered. Continue BB. 2. Afib:    Rate controlled. Monitor. Echo ordered. Dose adjusted eliquis. Stop ASA. 3. Chronic diastolic CHF: Follow volume. 4. Weakness/Lightheadedness/Dizziness: Per primary service. 5. ID issues: Per podiatry    6. HTN: Observe. 7. Lipids: Statin. 8. DM: Per primary service. 9. Hx of CVA/ICH: Eliquis/statin. 10. ESRD with HD: Per renal.     11. PAD: ASA/statin. Follows with vascular. 12. Anemia: Follow labs. 13. GERD, Schatzki's ring. Hiatal hernia. Antral gastritis    14. Simba Muniz D.O.   Cardiologist  Cardiology, [facility]

## 2021-12-05 NOTE — DISCHARGE SUMMARY
[address] [address]                               DISCHARGE SUMMARY    PATIENT NAME: Temo Pardo                     :        1931  MED REC NO:   [id number]                            ROOM:       0602  ACCOUNT NO:   [de-identified]                           ADMIT DATE: 2021  PROVIDER:     Yony Whitten MD                  [facility] DATE: 2021    DATE OF ADMISSION:  2021    DATE OF DISCHARGE:  2021    COURSE OF ILLNESS:  This is a 66-year-old gentleman who presented with  an ulcer on his right second toe. He is diabetic and was found to have  underlying atherosclerotic arterial disease of his lower extremities. He was seen by Podiatry and Vascular Surgery. There was no secondary  cellulitis or indication for antibiotics. Podiatry did not feel that he  need any surgery and Vascular Surgery thought that his collateral  circulation was adequate for healing. Aspirin was added for his  atherosclerotic vascular disease. He does have underlying atrial  fibrillation, which was rapid at times during this admission. He is on  metoprolol for heart rate control. He has end-stage renal disease and  is on hemodialysis three times a week for that. His chronic anemia was  stable. At the time of discharge, the patient was medically stable. His heart rate was well controlled. His blood pressure was stable. His  toe ulcer was thought to be slowly healing and with conservative wound  care, would likely improve. He was not felt to need any antibiotics and  there were no vascular interventions that were felt to be needed. He  was felt to be weak and in need of rehab and he will be discharged to  extended care facility for this.     DISCHARGE MEDICATIONS:  As follows:  Aspirin 81 mg daily, Eliquis 2.5 mg  b.i.d., gabapentin 100 mg at bedtime, atorvastatin 10 mg daily,  lisinopril 2.5 mg daily, metoprolol ER 25 mg daily, magnesium oxide 400  mg daily, famotidine 40 mg daily, and Humalog low-dose sliding scale for  his diabetes, this is for mealtime coverage.         Gagan Pleitez MD    D: 12/04/2021 9:31:08       T: 12/04/2021 9:33:33     [id number]  Job#: [id number]     Doc#: [id number]    CC:  Magdiel Strong DO

## 2021-12-06 LAB
BLOOD CULTURE, ROUTINE: NORMAL
CULTURE, BLOOD 2: NORMAL
HBV SURFACE AB TITR SER: REACTIVE {TITER}
HEPATITIS B SURFACE ANTIGEN INTERPRETATION: NORMAL

## 2021-12-28 ENCOUNTER — OFFICE VISIT (OUTPATIENT)
Dept: VASCULAR SURGERY | Age: 86
End: 2021-12-28
Payer: MEDICARE

## 2021-12-28 VITALS
WEIGHT: 180 LBS | DIASTOLIC BLOOD PRESSURE: 70 MMHG | SYSTOLIC BLOOD PRESSURE: 108 MMHG | HEIGHT: 66 IN | BODY MASS INDEX: 28.93 KG/M2

## 2021-12-28 DIAGNOSIS — I70.235 ATHEROSCLEROSIS OF NATIVE ARTERY OF RIGHT LOWER EXTREMITY WITH ULCERATION OF OTHER PART OF FOOT (HCC): Primary | ICD-10-CM

## 2021-12-28 PROCEDURE — G8417 CALC BMI ABV UP PARAM F/U: HCPCS | Performed by: SURGERY

## 2021-12-28 PROCEDURE — 4040F PNEUMOC VAC/ADMIN/RCVD: CPT | Performed by: SURGERY

## 2021-12-28 PROCEDURE — G8484 FLU IMMUNIZE NO ADMIN: HCPCS | Performed by: SURGERY

## 2021-12-28 PROCEDURE — 99213 OFFICE O/P EST LOW 20 MIN: CPT | Performed by: SURGERY

## 2021-12-28 PROCEDURE — G8427 DOCREV CUR MEDS BY ELIG CLIN: HCPCS | Performed by: SURGERY

## 2021-12-28 PROCEDURE — 1036F TOBACCO NON-USER: CPT | Performed by: SURGERY

## 2021-12-28 PROCEDURE — 1111F DSCHRG MED/CURRENT MED MERGE: CPT | Performed by: SURGERY

## 2021-12-28 PROCEDURE — 1123F ACP DISCUSS/DSCN MKR DOCD: CPT | Performed by: SURGERY

## 2021-12-28 NOTE — PROGRESS NOTES
Vascular Surgery Outpatient Progress Note      Chief Complaint   Patient presents with    Follow-Up from [address] [place] [address]:                The patient is a 80 y.o. male who returns for follow-up evaluation of peripheral vascular disease. He had a wound that began to the dorsum of his R 2nd toe about 6 months ago. He states his shoe had been rubbing against it since he has a hammer toe deformity. He notes it is improving slowly. Denies any pain. Denies fevers, chills. He ambulates very little with a walker. Denies pain with ambulation. He continues to follow with podiatry. He is a diabetic. He is not a smoker.     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 ([address] 12/3/2021    CAD (coronary artery disease)     CHF (congestive heart failure) (HCC)     Chronic kidney disease     COVID-19     CVA (cerebral vascular accident) ([address]     Diabetes mellitus ([address]     Diabetic peripheral neuropathy ([address] 11/9/2012    Hemodialysis patient ([address]     History of CVA (cerebrovascular accident) 6/9/2014    Hypertension     Ischemic ulcer of toe, limited to breakdown of skin, right ([address] 12/3/2021    Other disorders of kidney and ureter     prostate infections in past    PVD (peripheral vascular disease) with claudication ([address] 6/9/2014    Right sided weakness 3/19/2018    TIA (transient ischemic attack)     possible several years ago    Unspecified cerebral artery occlusion with cerebral infarction 2013    [facility] RIGHT LEG AFFECTED     Past Surgical History:        Procedure Laterality Date    ABDOMINAL HERNIA REPAIR      CATARACT REMOVAL      right    CHOLECYSTECTOMY  11/2011    DENTAL SURGERY      EYE SURGERY      [facility] DIALYSIS CATHETER Right 8/27/2020    TESIO CATHETER INSERTION performed by Jose Eduardo Thomason MD at [facility] in ear    SHUNT REVISION Right 1/21/2021    INSERTION AV GRAFT RIGHT ARM performed by Dilan Tapia MD at [address] Right 4/8/2021    THROMBECTOMY RIGHT ARM AV GRAFT performed by Dilan Tapia MD at [facility] VASCULAR SURGERY Left 8/9/2021    INSERTION OF TEMPORARY HEMODIALYSIS CATHETER performed by Dilan Tapia MD at [address] Right 8/11/2021    INSERTION OF TESIO, REMOVAL OF TEMPORARY CATHETHER performed by Dilan Tapia MD at [address] [place] [address]     Current Medications:   Prior to Admission medications    Medication Sig Start Date End Date Taking? Authorizing Provider   apixaban (ELIQUIS) 2.5 MG TABS tablet Take 1 tablet by mouth 2 times daily 12/4/21  Yes Windy Richter MD   insulin lispro (HUMALOG) 100 UNIT/ML injection vial Inject 0-6 Units into the skin 3 times daily (with meals) 12/4/21  Yes Windy Richter MD   lisinopril (PRINIVIL;ZESTRIL) 2.5 MG tablet Take 2.5 mg by mouth daily   Yes Historical Provider, MD   metoprolol succinate (TOPROL XL) 25 MG extended release tablet Take 1 tablet by mouth daily 7/13/21  Yes Britt Ugalde,    white petrolatum OINT ointment Apply 1 applicator topically 2 times daily 7/12/21  Yes Britt Ugalde, DO   Magnesium Oxide (MAG-OXIDE PO) Take 40 mg by mouth    Yes Historical Provider, MD   atorvastatin (LIPITOR) 10 MG tablet Take 10 mg by mouth nightly   Yes Historical Provider, MD   gabapentin (NEURONTIN) 100 MG capsule Take 100 mg by mouth nightly.    Yes Historical Provider, MD   ascorbic acid (VITAMIN C) 500 MG tablet Take 1,000 mg by mouth daily   Yes Historical Provider, MD   zinc gluconate 50 MG tablet Take 50 mg by mouth daily   Yes Historical Provider, MD   Multiple Vitamins-Minerals (PRESERVISION AREDS 2 PO) Take 1 tablet by mouth daily    Yes Historical Provider, MD   famotidine (PEPCID) 40 MG tablet Take 40 mg by mouth as needed   Yes Historical Provider, MD   vitamin D (CHOLECALCIFEROL) 25 MCG (1000 UT) TABS tablet Take 1,000 Units by mouth daily   Yes Historical Provider, MD   aspirin 81 MG tablet Take 81 mg by mouth every morning    Yes Historical Provider, MD     Allergies:  Sulfa antibiotics and Sulfa antibiotics    Social History     Socioeconomic History    Marital status: Single     Spouse name: Not on file    Number of children: Not on file    Years of education: Not on file    Highest education level: Not on file   Occupational History    Not on file   Tobacco Use    Smoking status: Former Smoker     Packs/day: 0.50     Years: 20.00     Pack years: 10.00     Types: Cigarettes     Quit date: 5     Years since quittin.0    Smokeless tobacco: Never Used   Vaping Use    Vaping Use: Never used   Substance and Sexual Activity    Alcohol use: Not Currently     Alcohol/week: 1.0 standard drink     Types: 1 Glasses of wine per week    Drug use: No    Sexual activity: Not Currently   Other Topics Concern    Not on file   Social History Narrative    ** Merged History Encounter **          Social Determinants of Health     Financial Resource Strain:     Difficulty of Paying Living Expenses: Not on file   Food Insecurity:     Worried About Running Out of Food in the Last Year: Not on file    Arnaldo of Food in the Last Year: Not on file   Transportation Needs:     Lack of Transportation (Medical): Not on file    Lack of Transportation (Non-Medical):  Not on file   Physical Activity:     Days of Exercise per Week: Not on file    Minutes of Exercise per Session: Not on file   Stress:     Feeling of Stress : Not on file   Social Connections:     Frequency of Communication with Friends and Family: Not on file    Frequency of Social Gatherings with Friends and Family: Not on file    Attends Anglican Services: Not on file    Active Member of Clubs or Organizations: Not on file    Attends Club or Organization Meetings: Not on file    Marital Status: Not on file   Intimate Partner Violence:     Fear of Current or Ex-Partner: Not on file    Emotionally Abused: Not on file    Physically Abused: Not on file    Sexually Abused: Not on file   Housing Stability:     Unable to Pay for Housing in the Last Year: Not on file    Number of Places Lived in the Last Year: Not on file    Unstable Housing in the Last Year: Not on file        History reviewed. No pertinent family history. PHYSICAL EXAM:  Vitals:    12/28/21 0851   BP: 108/70     General Appearance: alert and oriented to person, place and time, in no acute distress, frail appearing, sitting in wheelchair  Neurologic: no cranial nerve deficit, speech normal  Head: normocephalic and atraumatic  Eyes: extraocular eye movements intact, conjunctivae normal  ENT: external ear and ear canal normal bilaterally, nose without deformity  Pulmonary/Chest: normal air movement, no respiratory distress  Cardiovascular: normal rate, regular rhythm  Abdomen: non-distended, no masses  Musculoskeletal: no joint deformity or tenderness  Extremities: no leg edema bilaterally, dry gangrene present to the dorsum of the R 2nd toe, no signs of infection  Skin: warm and dry, no rash or erythema    PULSE EXAM      Right      Left   Brachial     Radial 2 2   Femoral     Popliteal     Dorsalis Pedis monophasic    Posterior Tibial No signal    (3=normal, 2=diminished, 1=barely palpable, 4=widened)    RADIOLOGY:    IMPRESSION/RECOMMENDATIONS:      Problem List Items Addressed This Visit        Vascular Problems    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 He was asked to not wear any shoes that would rub the area. Right now there is eschar present to the R 2nd toe. No signs of infection. Would not recommend any further testing or vascular intervention at this time. Continue with local wound care per podiatry.  I asked them to call with any new or worsening symptoms. Pt seen and plan reviewed with Dr. Slava Moncada. Leo Mahan PA-C    Return if symptoms worsen or fail to improve.

## 2022-01-11 ENCOUNTER — OFFICE VISIT (OUTPATIENT)
Dept: CARDIOLOGY CLINIC | Age: 87
End: 2022-01-11
Payer: MEDICARE

## 2022-01-11 VITALS
DIASTOLIC BLOOD PRESSURE: 76 MMHG | HEART RATE: 85 BPM | RESPIRATION RATE: 16 BRPM | WEIGHT: 180 LBS | BODY MASS INDEX: 28.93 KG/M2 | HEIGHT: 66 IN | OXYGEN SATURATION: 93 % | SYSTOLIC BLOOD PRESSURE: 132 MMHG

## 2022-01-11 DIAGNOSIS — I25.10 CORONARY ARTERY DISEASE INVOLVING NATIVE HEART, UNSPECIFIED VESSEL OR LESION TYPE, UNSPECIFIED WHETHER ANGINA PRESENT: Primary | ICD-10-CM

## 2022-01-11 PROCEDURE — 1123F ACP DISCUSS/DSCN MKR DOCD: CPT | Performed by: INTERNAL MEDICINE

## 2022-01-11 PROCEDURE — 1036F TOBACCO NON-USER: CPT | Performed by: INTERNAL MEDICINE

## 2022-01-11 PROCEDURE — 93000 ELECTROCARDIOGRAM COMPLETE: CPT | Performed by: INTERNAL MEDICINE

## 2022-01-11 PROCEDURE — G8427 DOCREV CUR MEDS BY ELIG CLIN: HCPCS | Performed by: INTERNAL MEDICINE

## 2022-01-11 PROCEDURE — G8417 CALC BMI ABV UP PARAM F/U: HCPCS | Performed by: INTERNAL MEDICINE

## 2022-01-11 PROCEDURE — 4040F PNEUMOC VAC/ADMIN/RCVD: CPT | Performed by: INTERNAL MEDICINE

## 2022-01-11 PROCEDURE — G8484 FLU IMMUNIZE NO ADMIN: HCPCS | Performed by: INTERNAL MEDICINE

## 2022-01-11 PROCEDURE — 99214 OFFICE O/P EST MOD 30 MIN: CPT | Performed by: INTERNAL MEDICINE

## 2022-01-11 RX ORDER — ALBUTEROL SULFATE 1.25 MG/3ML
1 SOLUTION RESPIRATORY (INHALATION) EVERY 6 HOURS PRN
COMMUNITY

## 2022-01-11 NOTE — PROGRESS NOTES
[facility] Cardiology Progress Note    Patient is a 80 y.o. male of Britt Ugalde DO seen in follow up. Chief complaint: NSVT/Afib    HPI: Some SOB. No CP.      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    Diabetes mellitus ([address]    Diabetes mellitus ([address]    Diabetic peripheral neuropathy ([address]    Osteoarthritis    Gait abnormality    Physical deconditioning    Lumbar spondylitis (HCC)    Anemia    Fx humer, med condyl-closed    Cerebral infarction ([address]    Renal failure    TIA (transient ischemic attack)    ASHD (arteriosclerotic heart disease)    Diabetes mellitus ([address]    PVD (peripheral vascular disease) with claudication (HCC)    History of CVA (cerebrovascular accident)    Pain in lower limb    Cerebral infarction ([address]    Diabetes mellitus-2    Decubitus ulcer of sacral region, stage 1    Right sided weakness    Hypoglycemia    Encounter regarding vascular access for dialysis for end-stage renal disease ([address]    ESRD (end stage renal disease) on dialysis ([address]    Diabetic ulcer of toe of right foot associated with type 2 diabetes mellitus, with fat layer exposed ([address]    Failure to thrive in adult    Ischemic ulcer of toe, limited to breakdown of skin, right ([address]    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 (HCC)    Toe infection       Allergies   Allergen Reactions    Sulfa Antibiotics      Affects renal function and blood pressure    Sulfa Antibiotics        Current Outpatient Medications   Medication Sig Dispense Refill    albuterol (ACCUNEB) 1.25 MG/3ML nebulizer solution Inhale 1 ampule into the lungs every 6 hours as needed for Wheezing      magnesium hydroxide (MILK OF MAGNESIA) 400 MG/5ML suspension Take by mouth daily as needed for Constipation      apixaban (ELIQUIS) 2.5 MG TABS tablet Take 1 tablet by mouth 2 times daily 60 tablet     insulin lispro (HUMALOG) 100 UNIT/ML injection vial Inject 0-6 Units into the skin 3 times daily (with meals) 10 mL 3    lisinopril (PRINIVIL;ZESTRIL) 2.5 MG tablet Take 2.5 mg by mouth daily      metoprolol succinate (TOPROL XL) 25 MG extended release tablet Take 1 tablet by mouth daily 30 tablet 3    white petrolatum OINT ointment Apply 1 applicator topically 2 times daily 90 g 0    atorvastatin (LIPITOR) 10 MG tablet Take 10 mg by mouth nightly      gabapentin (NEURONTIN) 100 MG capsule Take 100 mg by mouth nightly.  ascorbic acid (VITAMIN C) 500 MG tablet Take 1,000 mg by mouth daily      zinc gluconate 50 MG tablet Take 50 mg by mouth daily      Multiple Vitamins-Minerals (PRESERVISION AREDS 2 PO) Take 1 tablet by mouth daily       famotidine (PEPCID) 40 MG tablet Take 40 mg by mouth as needed      vitamin D (CHOLECALCIFEROL) 25 MCG (1000 UT) TABS tablet Take 1,000 Units by mouth daily      aspirin 81 MG tablet Take 81 mg by mouth every morning       Magnesium Oxide (MAG-OXIDE PO) Take 40 mg by mouth  (Patient not taking: Reported on 1/11/2022)       No current facility-administered medications for this visit. Review of systems:   Heart: as above   Lungs: as above   Eyes: denies changes in vision or discharge. Ears: denies changes in hearing or pain. Nose: denies epistaxis or masses   Throat: denies sore throat or trouble swallowing. Neuro: denies numbness, tingling, tremors. Skin: denies rashes or itching. : denies hematuria, dysuria   GI: denies vomiting, diarrhea   Psych: denies mood changed, anxiety, depression. Physical Exam   /76   Pulse 85   Resp 16   Ht 5' 6\" (1.676 m)   Wt 180 lb (81.6 kg)   SpO2 93%   BMI 29.05 kg/m²   Constitutional: Oriented to person, place, and time. No distress. Well developed. Head: Normocephalic and atraumatic. Neck: Neck supple. No hepatojugular reflux. No JVD present.  Carotid bruit is not present. No tracheal deviation present. No thyromegaly present. Cardiovascular: Normal rate, regular rhythm, normal heart sounds. intact distal pulses. No gallop and no friction rub. No murmur heard. Pulmonary: Breath sounds normal. No respiratory distress. No wheezes. No rales. Chest: Effort normal. No tenderness. Abdominal: Soft. Bowel sounds are normal. No distension or mass. No tenderness, rebound or guarding. Musculoskeletal: . No tenderness. No clubbing or cyanosis. Extremitites: Intact distal pulses. No edema  Neurological: Alert and oriented to person, place, and time. Skin: Skin is warm and dry. No rash noted. Not diaphoretic. No erythema. Psychiatric: Normal mood and affect. Behavior is normal.   Lymphadenopathy: No cervical adenopathy. No groin adenopathy. EKG: atrial fibrillation. Echo Summary 12/4/2021:   Ejection fraction is visually estimated at 50-55%. No regional wall motion abnormalities seen. Mild left ventricular concentric hypertrophy noted. Normal right ventricle structure and function. Left atrial volume index of 44 ml per meters squared BSA. Trace aortic valve regurgitation. Mild mitral regurgitation is present. Physiologic and/or trace tricuspid regurgitation.      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    Diabetes mellitus ([address]    Diabetes mellitus ([address]    Diabetic peripheral neuropathy ([address]    Osteoarthritis    Gait abnormality    Physical deconditioning    Lumbar spondylitis (HCC)    Anemia    Fx humer, med condyl-closed    Cerebral infarction ([address]    Renal failure    TIA (transient ischemic attack)    ASHD (arteriosclerotic heart disease)    Diabetes mellitus ([address]    PVD (peripheral vascular disease) with claudication (HCC)    History of CVA (cerebrovascular accident)    Pain in lower limb    Cerebral infarction ([address]    Diabetes mellitus-2    Decubitus ulcer of sacral region, stage 1    Right sided weakness    Hypoglycemia    Encounter regarding vascular access for dialysis for end-stage renal disease ([address]    ESRD (end stage renal disease) on dialysis ([address]    Diabetic ulcer of toe of right foot associated with type 2 diabetes mellitus, with fat layer exposed ([address]    Failure to thrive in adult    Ischemic ulcer of toe, limited to breakdown of skin, right ([address]    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 NSVT:    Monitor. Labs reviewed. Echo as above. Continue BB. 2. Afib:    Rate controlled. Monitor. Echo as above. Dose adjusted eliquis. 3. Chronic diastolic CHF: Follow volume. 4. HTN: Observe. 5. Lipids: Statin. 6. DM: Per primary service. 7. Hx of CVA/ICH: Eliquis/statin. 8. ESRD with HD: Per renal.     9. PAD: ASA/statin. Follows with vascular. 10. Anemia: Follow labs. 11. GERD, Schatzki's ring. Hiatal hernia. Antral gastritis    12. Gagan Wilder D.O.   Cardiologist  Cardiology, [facility]

## 2022-01-20 ENCOUNTER — APPOINTMENT (OUTPATIENT)
Dept: GENERAL RADIOLOGY | Age: 87
End: 2022-01-20
Payer: MEDICARE

## 2022-01-20 VITALS
HEART RATE: 82 BPM | DIASTOLIC BLOOD PRESSURE: 65 MMHG | RESPIRATION RATE: 16 BRPM | TEMPERATURE: 97.2 F | BODY MASS INDEX: 28.93 KG/M2 | WEIGHT: 180 LBS | HEIGHT: 66 IN | SYSTOLIC BLOOD PRESSURE: 132 MMHG | OXYGEN SATURATION: 97 %

## 2022-01-20 LAB
ANION GAP SERPL CALCULATED.3IONS-SCNC: 13 MMOL/L (ref 7–16)
BASOPHILS ABSOLUTE: 0.06 E9/L (ref 0–0.2)
BASOPHILS RELATIVE PERCENT: 1 % (ref 0–2)
BUN BLDV-MCNC: 36 MG/DL (ref 6–23)
CALCIUM SERPL-MCNC: 9 MG/DL (ref 8.6–10.2)
CHLORIDE BLD-SCNC: 92 MMOL/L (ref 98–107)
CO2: 26 MMOL/L (ref 22–29)
CREAT SERPL-MCNC: 4.8 MG/DL (ref 0.7–1.2)
EOSINOPHILS ABSOLUTE: 0.29 E9/L (ref 0.05–0.5)
EOSINOPHILS RELATIVE PERCENT: 5 % (ref 0–6)
GFR AFRICAN AMERICAN: 14
GFR NON-AFRICAN AMERICAN: 11 ML/MIN/1.73
GLUCOSE BLD-MCNC: 232 MG/DL (ref 74–99)
HCT VFR BLD CALC: 37.7 % (ref 37–54)
HEMOGLOBIN: 11.8 G/DL (ref 12.5–16.5)
IMMATURE GRANULOCYTES #: 0.02 E9/L
IMMATURE GRANULOCYTES %: 0.3 % (ref 0–5)
LYMPHOCYTES ABSOLUTE: 1.68 E9/L (ref 1.5–4)
LYMPHOCYTES RELATIVE PERCENT: 28.8 % (ref 20–42)
MCH RBC QN AUTO: 32.7 PG (ref 26–35)
MCHC RBC AUTO-ENTMCNC: 31.3 % (ref 32–34.5)
MCV RBC AUTO: 104.4 FL (ref 80–99.9)
MONOCYTES ABSOLUTE: 0.8 E9/L (ref 0.1–0.95)
MONOCYTES RELATIVE PERCENT: 13.7 % (ref 2–12)
NEUTROPHILS ABSOLUTE: 2.98 E9/L (ref 1.8–7.3)
NEUTROPHILS RELATIVE PERCENT: 51.2 % (ref 43–80)
PDW BLD-RTO: 14.6 FL (ref 11.5–15)
PLATELET # BLD: 159 E9/L (ref 130–450)
PMV BLD AUTO: 10.4 FL (ref 7–12)
POTASSIUM REFLEX MAGNESIUM: 4.5 MMOL/L (ref 3.5–5)
RBC # BLD: 3.61 E12/L (ref 3.8–5.8)
SARS-COV-2, NAAT: NOT DETECTED
SODIUM BLD-SCNC: 131 MMOL/L (ref 132–146)
TROPONIN, HIGH SENSITIVITY: 77 NG/L (ref 0–11)
WBC # BLD: 5.8 E9/L (ref 4.5–11.5)

## 2022-01-20 PROCEDURE — 93005 ELECTROCARDIOGRAM TRACING: CPT | Performed by: NURSE PRACTITIONER

## 2022-01-20 PROCEDURE — 84484 ASSAY OF TROPONIN QUANT: CPT

## 2022-01-20 PROCEDURE — 85025 COMPLETE CBC W/AUTO DIFF WBC: CPT

## 2022-01-20 PROCEDURE — 80048 BASIC METABOLIC PNL TOTAL CA: CPT

## 2022-01-20 PROCEDURE — 87635 SARS-COV-2 COVID-19 AMP PRB: CPT

## 2022-01-20 PROCEDURE — 71045 X-RAY EXAM CHEST 1 VIEW: CPT

## 2022-01-20 PROCEDURE — 99283 EMERGENCY DEPT VISIT LOW MDM: CPT

## 2022-01-21 ENCOUNTER — HOSPITAL ENCOUNTER (EMERGENCY)
Age: 87
Discharge: HOME | End: 2022-01-21
Attending: EMERGENCY MEDICINE
Payer: MEDICARE

## 2022-01-21 DIAGNOSIS — R06.02 SHORTNESS OF BREATH: ICD-10-CM

## 2022-01-21 DIAGNOSIS — S40.812A ABRASION OF LEFT UPPER EXTREMITY, INITIAL ENCOUNTER: ICD-10-CM

## 2022-01-21 DIAGNOSIS — J18.9 PNEUMONIA OF LEFT LUNG DUE TO INFECTIOUS ORGANISM, UNSPECIFIED PART OF LUNG: Primary | ICD-10-CM

## 2022-01-21 DIAGNOSIS — S91.309A WOUND OF FOOT: ICD-10-CM

## 2022-01-21 LAB
CHP ED QC CHECK: NORMAL
EKG ATRIAL RATE: 300 BPM
EKG Q-T INTERVAL: 402 MS
EKG QRS DURATION: 98 MS
EKG QTC CALCULATION (BAZETT): 483 MS
EKG R AXIS: -3 DEGREES
EKG T AXIS: -80 DEGREES
EKG VENTRICULAR RATE: 87 BPM
GLUCOSE BLD-MCNC: 246 MG/DL
METER GLUCOSE: 246 MG/DL (ref 74–99)
TROPONIN, HIGH SENSITIVITY: 74 NG/L (ref 0–11)

## 2022-01-21 PROCEDURE — 93010 ELECTROCARDIOGRAM REPORT: CPT | Performed by: INTERNAL MEDICINE

## 2022-01-21 PROCEDURE — 6370000000 HC RX 637 (ALT 250 FOR IP): Performed by: EMERGENCY MEDICINE

## 2022-01-21 PROCEDURE — 84484 ASSAY OF TROPONIN QUANT: CPT

## 2022-01-21 PROCEDURE — 82962 GLUCOSE BLOOD TEST: CPT

## 2022-01-21 RX ORDER — DOXYCYCLINE HYCLATE 100 MG/1
100 CAPSULE ORAL ONCE
Status: COMPLETED | OUTPATIENT
Start: 2022-01-21 | End: 2022-01-21

## 2022-01-21 RX ORDER — CEFDINIR 300 MG/1
300 CAPSULE ORAL 2 TIMES DAILY
Qty: 20 CAPSULE | Refills: 0 | Status: SHIPPED | OUTPATIENT
Start: 2022-01-21 | End: 2022-01-31

## 2022-01-21 RX ORDER — BACITRACIN ZINC AND POLYMYXIN B SULFATE 500; 1000 [USP'U]/G; [USP'U]/G
OINTMENT TOPICAL
Qty: 45 G | Refills: 1 | Status: SHIPPED | OUTPATIENT
Start: 2022-01-21 | End: 2022-01-28

## 2022-01-21 RX ORDER — CEFDINIR 300 MG/1
300 CAPSULE ORAL ONCE
Status: COMPLETED | OUTPATIENT
Start: 2022-01-21 | End: 2022-01-21

## 2022-01-21 RX ORDER — DOXYCYCLINE HYCLATE 100 MG
100 TABLET ORAL 2 TIMES DAILY
Qty: 20 TABLET | Refills: 0 | Status: SHIPPED | OUTPATIENT
Start: 2022-01-21 | End: 2022-01-31

## 2022-01-21 RX ADMIN — DOXYCYCLINE HYCLATE 100 MG: 100 CAPSULE ORAL at 03:06

## 2022-01-21 RX ADMIN — CEFDINIR 300 MG: 300 CAPSULE ORAL at 03:06

## 2022-01-28 ASSESSMENT — ENCOUNTER SYMPTOMS
SPUTUM PRODUCTION: 0
COUGH: 1
DIARRHEA: 0
SORE THROAT: 0
SHORTNESS OF BREATH: 1
EYE DISCHARGE: 0
BACK PAIN: 0
EYE PAIN: 0
SINUS PRESSURE: 0
VOMITING: 0
ABDOMINAL PAIN: 0
NAUSEA: 0
EYE REDNESS: 0
WHEEZING: 1

## 2022-01-28 NOTE — ED PROVIDER NOTES
66-year-old male history of end-stage renal disease diabetes diabetic neuropathy physical deconditioning presents from nursing home for concerns of shortness of breath and wheezing nursing home. Patient not able to write a great deal of history. Patient has concerns for pneumonia COPD and COVID19. Patient states no major concerns other than shortness of breath he denies headache vision changes fever abdominal pain urinary symptoms or other complaints    The history is provided by the patient. Shortness of Breath  Severity:  Mild  Onset quality:  Gradual  Duration:  1 day  Timing:  Intermittent  Progression:  Waxing and waning  Chronicity:  New  Relieved by:  Nothing  Worsened by:  Nothing  Ineffective treatments:  None tried  Associated symptoms: cough and wheezing    Associated symptoms: no abdominal pain, no chest pain, no claudication, no ear pain, no fever, no headaches, no PND, no rash, no sore throat, no sputum production, no syncope and no vomiting         Review of Systems   Constitutional: Negative for chills and fever. HENT: Negative for ear pain, sinus pressure and sore throat. Eyes: Negative for pain, discharge and redness. Respiratory: Positive for cough, shortness of breath and wheezing. Negative for sputum production. Cardiovascular: Negative for chest pain, claudication, syncope and PND. Gastrointestinal: Negative for abdominal pain, diarrhea, nausea and vomiting. Genitourinary: Negative for dysuria and frequency. Musculoskeletal: Negative for arthralgias and back pain. Skin: Negative for rash and wound. Neurological: Negative for weakness and headaches. Hematological: Negative for adenopathy. All other systems reviewed and are negative. Physical Exam  Constitutional:       Appearance: He is well-developed. HENT:      Head: Normocephalic and atraumatic. Cardiovascular:      Rate and Rhythm: Normal rate and regular rhythm.    Pulmonary:      Effort: Pulmonary effort is normal.      Breath sounds: Wheezing present. No decreased breath sounds or rales. Abdominal:      General: Bowel sounds are normal.      Palpations: Abdomen is soft. Musculoskeletal:         General: Normal range of motion. Cervical back: Normal range of motion and neck supple. Right lower leg: No tenderness. No edema. Left lower leg: No tenderness. No edema. Skin:     General: Skin is warm. Capillary Refill: Capillary refill takes less than 2 seconds. Neurological:      General: No focal deficit present. Mental Status: He is alert and oriented to person, place, and time. Procedures     MDM  Number of Diagnoses or Management Options  Abrasion of left upper extremity, initial encounter  Pneumonia of left lung due to infectious organism, unspecified part of lung  Shortness of breath  Wound of foot  Diagnosis management comments: Patient seen and examined. Labs and imaging were ordered. Patient complaining of shortness of breath but not hypoxic imaging reveals concern for left lower lobe infiltrate. Given reassuring vital signs felt patient does not warrant admission. He was given initial dose of antibiotics here the wound on his foot was treated with bacitracin. He was felt to be safe for discharge back to nursing home was treated for community-acquired pneumonia.   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 ([facility] [address] CAD (coronary artery disease), CHF (congestive heart failure) ([facility] [address] Chronic kidney disease, COVID-19, CVA (cerebral vascular accident) ([facility] [address] Diabetes mellitus ([facility] [address] Diabetic peripheral neuropathy ([facility] [address] [facility] History of CVA (cerebrovascular accident), Hypertension, Ischemic ulcer of toe, limited to breakdown of skin, right ([facility] [address] Other disorders of kidney and ureter, PVD (peripheral vascular disease) with claudication ([facility] [address] Right sided weakness, TIA (transient ischemic attack), and Unspecified cerebral artery occlusion with cerebral infarction. Past Surgical History:  has a past surgical history that includes Abdominal hernia repair; Cataract removal; Dental surgery; Inguinal hernia repair; Cholecystectomy (11/2011); Inner ear surgery; hc dialysis catheter (Right, 8/27/2020); eye surgery; shunt revision (Right, 1/21/2021); vascular surgery (Right, 4/8/2021); vascular surgery (Left, 8/9/2021); and vascular surgery (Right, 8/11/2021). Social History:  reports that he quit smoking about 55 years ago. His smoking use included cigarettes. He has a 10.00 pack-year smoking history. He has never used smokeless tobacco. He reports previous alcohol use of about 1.0 standard drink of alcohol per week. He reports that he does not use drugs. Family History: family history is not on file. The patients home medications have been reviewed.     Allergies: Sulfa antibiotics and Sulfa antibiotics    -------------------------------------------------- RESULTS -------------------------------------------------  Labs:  Results for orders placed or performed during the hospital encounter of 01/21/22   COVID-19, Rapid    Specimen: Nasopharyngeal Swab   Result Value Ref Range    SARS-CoV-2, NAAT Not Detected Not Detected   CBC Auto Differential   Result Value Ref Range    WBC 5.8 4.5 - 11.5 E9/L    RBC 3.61 (L) 3.80 - 5.80 E12/L    Hemoglobin 11.8 (L) 12.5 - 16.5 g/dL    Hematocrit 37.7 37.0 - 54.0 %    .4 (H) 80.0 - 99.9 fL    MCH 32.7 26.0 - 35.0 pg    MCHC 31.3 (L) 32.0 - 34.5 %    RDW 14.6 11.5 - 15.0 fL    Platelets 316 252 - 835 E9/L    MPV 10.4 7.0 - 12.0 fL    Neutrophils % 51.2 43.0 - 80.0 %    Immature Granulocytes % 0.3 0.0 - 5.0 %    Lymphocytes % 28.8 20.0 - 42.0 %    Monocytes % 13.7 (H) 2.0 - 12.0 %    Eosinophils % 5.0 0.0 - 6.0 %    Basophils % 1.0 0.0 - 2.0 %    Neutrophils Absolute 2.98 1.80 - 7.30 E9/L    Immature Granulocytes # 0.02 E9/L    Lymphocytes Absolute 1.68 1.50 - 4.00 E9/L    Monocytes Absolute 0.80 0.10 - 0.95 E9/L    Eosinophils Absolute 0.29 0.05 - 0.50 E9/L    Basophils Absolute 0.06 0.00 - 0.20 X1/A   Basic Metabolic Panel w/ Reflex to MG   Result Value Ref Range    Sodium 131 (L) 132 - 146 mmol/L    Potassium reflex Magnesium 4.5 3.5 - 5.0 mmol/L    Chloride 92 (L) 98 - 107 mmol/L    CO2 26 22 - 29 mmol/L    Anion Gap 13 7 - 16 mmol/L    Glucose 232 (H) 74 - 99 mg/dL    BUN 36 (H) 6 - 23 mg/dL    CREATININE 4.8 (H) 0.7 - 1.2 mg/dL    GFR Non-African American 11 >=60 mL/min/1.73    GFR African American 14     Calcium 9.0 8.6 - 10.2 mg/dL   Troponin   Result Value Ref Range    Troponin, High Sensitivity 77 (H) 0 - 11 ng/L   Troponin   Result Value Ref Range    Troponin, High Sensitivity 74 (H) 0 - 11 ng/L   POCT glucose   Result Value Ref Range    Glucose 246 mg/dL    QC OK? ok    POCT Glucose   Result Value Ref Range    Meter Glucose 246 (H) 74 - 99 mg/dL   EKG 12 Lead   Result Value Ref Range    Ventricular Rate 87 BPM    Atrial Rate 300 BPM    QRS Duration 98 ms    Q-T Interval 402 ms    QTc Calculation (Bazett) 483 ms    R Axis -3 degrees    T Axis -80 degrees       Radiology:  XR CHEST PORTABLE   Final Result   1. Small left lung base infiltrate             ------------------------- NURSING NOTES AND VITALS REVIEWED ---------------------------  Date / Time Roomed:  1/21/2022  3:45 AM  ED Bed Assignment:  FROYLAN/FROYLAN    The nursing notes within the ED encounter and vital signs as below have been reviewed.    /65   Pulse 82   Temp 97.2 °F (36.2 °C)   Resp 16   Ht 5' 6\" (1.676 m)   Wt 180 lb (81.6 kg)   SpO2 97%   BMI 29.05 kg/m²   Oxygen Saturation Interpretation: Normal      ------------------------------------------ PROGRESS NOTES ------------------------------------------  I have spoken with the patient and discussed todays results, in addition to providing specific details for the plan of care and counseling regarding the diagnosis and prognosis. Their questions are answered at this time and they are agreeable with the plan. I discussed at length with them reasons for immediate return here for re evaluation. They will followup with their primary care physician by calling their office on Monday.      --------------------------------- ADDITIONAL PROVIDER NOTES ---------------------------------  At this time the patient is without objective evidence of an acute process requiring hospitalization or inpatient management. They have remained hemodynamically stable throughout their entire ED visit and are stable for discharge with outpatient follow-up. The plan has been discussed in detail and they are aware of the specific conditions for emergent return, as well as the importance of follow-up. Discharge Medication List as of 1/21/2022  3:04 AM      START taking these medications    Details   doxycycline hyclate (VIBRA-TABS) 100 MG tablet Take 1 tablet by mouth 2 times daily for 10 days, Disp-20 tablet, R-0Normal      cefdinir (OMNICEF) 300 MG capsule Take 1 capsule by mouth 2 times daily for 10 days, Disp-20 capsule, R-0Normal      bacitracin-polymyxin b (POLYSPORIN) [id number] UNIT/GM ointment Apply topically 2 times daily. , Disp-45 g, R-1, Normal             Diagnosis:  1. Pneumonia of left lung due to infectious organism, unspecified part of lung    2. Shortness of breath    3. Wound of foot    4. Abrasion of left upper extremity, initial encounter        Disposition:  Patient's disposition: Discharge to home  Patient's condition is stable.          Rosa Villafuerte DO  01/28/22 5908

## 2022-02-11 ENCOUNTER — HOSPITAL ENCOUNTER (INPATIENT)
Age: 87
LOS: 5 days | Discharge: NURSING, REHAB OR LONG-TERM CARE | DRG: 193 | End: 2022-02-16
Attending: EMERGENCY MEDICINE | Admitting: INTERNAL MEDICINE
Payer: MEDICARE

## 2022-02-11 ENCOUNTER — APPOINTMENT (OUTPATIENT)
Dept: GENERAL RADIOLOGY | Age: 87
DRG: 193 | End: 2022-02-11
Payer: MEDICARE

## 2022-02-11 DIAGNOSIS — J18.9 PNEUMONIA DUE TO INFECTIOUS ORGANISM, UNSPECIFIED LATERALITY, UNSPECIFIED PART OF LUNG: ICD-10-CM

## 2022-02-11 DIAGNOSIS — J96.01 ACUTE RESPIRATORY FAILURE WITH HYPOXEMIA (HCC): Primary | ICD-10-CM

## 2022-02-11 LAB
ALBUMIN SERPL-MCNC: 3.6 G/DL (ref 3.5–5.2)
ALP BLD-CCNC: 104 U/L (ref 40–129)
ALT SERPL-CCNC: 14 U/L (ref 0–40)
ANION GAP SERPL CALCULATED.3IONS-SCNC: 17 MMOL/L (ref 7–16)
AST SERPL-CCNC: 12 U/L (ref 0–39)
BASOPHILS ABSOLUTE: 0.01 E9/L (ref 0–0.2)
BASOPHILS RELATIVE PERCENT: 0.2 % (ref 0–2)
BILIRUB SERPL-MCNC: 0.6 MG/DL (ref 0–1.2)
BUN BLDV-MCNC: 50 MG/DL (ref 6–23)
CALCIUM SERPL-MCNC: 9 MG/DL (ref 8.6–10.2)
CHLORIDE BLD-SCNC: 97 MMOL/L (ref 98–107)
CO2: 22 MMOL/L (ref 22–29)
CREAT SERPL-MCNC: 6.1 MG/DL (ref 0.7–1.2)
EOSINOPHILS ABSOLUTE: 0.03 E9/L (ref 0.05–0.5)
EOSINOPHILS RELATIVE PERCENT: 0.5 % (ref 0–6)
GFR AFRICAN AMERICAN: 11
GFR NON-AFRICAN AMERICAN: 9 ML/MIN/1.73
GLUCOSE BLD-MCNC: 210 MG/DL (ref 74–99)
HCT VFR BLD CALC: 40 % (ref 37–54)
HEMOGLOBIN: 12.4 G/DL (ref 12.5–16.5)
IMMATURE GRANULOCYTES #: 0.02 E9/L
IMMATURE GRANULOCYTES %: 0.3 % (ref 0–5)
LACTIC ACID, SEPSIS: 2.8 MMOL/L (ref 0.5–1.9)
LACTIC ACID, SEPSIS: 2.9 MMOL/L (ref 0.5–1.9)
LYMPHOCYTES ABSOLUTE: 0.31 E9/L (ref 1.5–4)
LYMPHOCYTES RELATIVE PERCENT: 5 % (ref 20–42)
MCH RBC QN AUTO: 31.9 PG (ref 26–35)
MCHC RBC AUTO-ENTMCNC: 31 % (ref 32–34.5)
MCV RBC AUTO: 102.8 FL (ref 80–99.9)
METER GLUCOSE: 243 MG/DL (ref 74–99)
MONOCYTES ABSOLUTE: 0.17 E9/L (ref 0.1–0.95)
MONOCYTES RELATIVE PERCENT: 2.7 % (ref 2–12)
NEUTROPHILS ABSOLUTE: 5.72 E9/L (ref 1.8–7.3)
NEUTROPHILS RELATIVE PERCENT: 91.3 % (ref 43–80)
PDW BLD-RTO: 14.6 FL (ref 11.5–15)
PLATELET # BLD: 151 E9/L (ref 130–450)
PMV BLD AUTO: 11 FL (ref 7–12)
POTASSIUM SERPL-SCNC: 4.6 MMOL/L (ref 3.5–5)
PRO-BNP: 7663 PG/ML (ref 0–450)
PROCALCITONIN: 0.65 NG/ML (ref 0–0.08)
RBC # BLD: 3.89 E12/L (ref 3.8–5.8)
RBC # BLD: NORMAL 10*6/UL
REASON FOR REJECTION: NORMAL
REASON FOR REJECTION: NORMAL
REJECTED TEST: NORMAL
REJECTED TEST: NORMAL
SARS-COV-2, NAAT: NOT DETECTED
SODIUM BLD-SCNC: 136 MMOL/L (ref 132–146)
TOTAL PROTEIN: 6.3 G/DL (ref 6.4–8.3)
TROPONIN, HIGH SENSITIVITY: 100 NG/L (ref 0–11)
WBC # BLD: 6.3 E9/L (ref 4.5–11.5)

## 2022-02-11 PROCEDURE — 84145 PROCALCITONIN (PCT): CPT

## 2022-02-11 PROCEDURE — 2580000003 HC RX 258: Performed by: GENERAL PRACTICE

## 2022-02-11 PROCEDURE — 2580000003 HC RX 258: Performed by: INTERNAL MEDICINE

## 2022-02-11 PROCEDURE — 87635 SARS-COV-2 COVID-19 AMP PRB: CPT

## 2022-02-11 PROCEDURE — 36415 COLL VENOUS BLD VENIPUNCTURE: CPT

## 2022-02-11 PROCEDURE — 6370000000 HC RX 637 (ALT 250 FOR IP): Performed by: INTERNAL MEDICINE

## 2022-02-11 PROCEDURE — 82962 GLUCOSE BLOOD TEST: CPT

## 2022-02-11 PROCEDURE — 85025 COMPLETE CBC W/AUTO DIFF WBC: CPT

## 2022-02-11 PROCEDURE — 93005 ELECTROCARDIOGRAM TRACING: CPT | Performed by: GENERAL PRACTICE

## 2022-02-11 PROCEDURE — 83605 ASSAY OF LACTIC ACID: CPT

## 2022-02-11 PROCEDURE — 83880 ASSAY OF NATRIURETIC PEPTIDE: CPT

## 2022-02-11 PROCEDURE — 80053 COMPREHEN METABOLIC PANEL: CPT

## 2022-02-11 PROCEDURE — 99284 EMERGENCY DEPT VISIT MOD MDM: CPT

## 2022-02-11 PROCEDURE — 96365 THER/PROPH/DIAG IV INF INIT: CPT

## 2022-02-11 PROCEDURE — 84484 ASSAY OF TROPONIN QUANT: CPT

## 2022-02-11 PROCEDURE — 71045 X-RAY EXAM CHEST 1 VIEW: CPT

## 2022-02-11 PROCEDURE — 6360000002 HC RX W HCPCS: Performed by: GENERAL PRACTICE

## 2022-02-11 PROCEDURE — 1200000000 HC SEMI PRIVATE

## 2022-02-11 RX ORDER — POLYETHYLENE GLYCOL 3350 17 G/17G
17 POWDER, FOR SOLUTION ORAL DAILY PRN
Status: DISCONTINUED | OUTPATIENT
Start: 2022-02-11 | End: 2022-02-16 | Stop reason: HOSPADM

## 2022-02-11 RX ORDER — ZINC SULFATE 50(220)MG
50 CAPSULE ORAL DAILY
Status: DISCONTINUED | OUTPATIENT
Start: 2022-02-12 | End: 2022-02-16 | Stop reason: HOSPADM

## 2022-02-11 RX ORDER — ACETAMINOPHEN 325 MG/1
650 TABLET ORAL EVERY 6 HOURS PRN
Status: DISCONTINUED | OUTPATIENT
Start: 2022-02-11 | End: 2022-02-16 | Stop reason: HOSPADM

## 2022-02-11 RX ORDER — GABAPENTIN 100 MG/1
100 CAPSULE ORAL NIGHTLY
Status: DISCONTINUED | OUTPATIENT
Start: 2022-02-11 | End: 2022-02-16 | Stop reason: HOSPADM

## 2022-02-11 RX ORDER — CALCIUM ACETATE 667 MG/1
CAPSULE ORAL
COMMUNITY
Start: 2022-01-31

## 2022-02-11 RX ORDER — ALBUTEROL SULFATE 1.25 MG/3ML
1 SOLUTION RESPIRATORY (INHALATION) EVERY 6 HOURS PRN
Status: DISCONTINUED | OUTPATIENT
Start: 2022-02-11 | End: 2022-02-16 | Stop reason: HOSPADM

## 2022-02-11 RX ORDER — ACETAMINOPHEN 650 MG/1
650 SUPPOSITORY RECTAL EVERY 6 HOURS PRN
Status: DISCONTINUED | OUTPATIENT
Start: 2022-02-11 | End: 2022-02-16 | Stop reason: HOSPADM

## 2022-02-11 RX ORDER — ANTIOX #8/OM3/DHA/EPA/LUT/ZEAX 250-2.5 MG
1 CAPSULE ORAL DAILY
Status: DISCONTINUED | OUTPATIENT
Start: 2022-02-12 | End: 2022-02-11 | Stop reason: CLARIF

## 2022-02-11 RX ORDER — DEXTROSE MONOHYDRATE 25 G/50ML
12.5 INJECTION, SOLUTION INTRAVENOUS PRN
Status: DISCONTINUED | OUTPATIENT
Start: 2022-02-11 | End: 2022-02-11 | Stop reason: CLARIF

## 2022-02-11 RX ORDER — HEPARIN SODIUM 10000 [USP'U]/ML
5000 INJECTION, SOLUTION INTRAVENOUS; SUBCUTANEOUS EVERY 8 HOURS SCHEDULED
Status: DISCONTINUED | OUTPATIENT
Start: 2022-02-11 | End: 2022-02-12

## 2022-02-11 RX ORDER — SODIUM CHLORIDE 0.9 % (FLUSH) 0.9 %
5-40 SYRINGE (ML) INJECTION EVERY 12 HOURS SCHEDULED
Status: DISCONTINUED | OUTPATIENT
Start: 2022-02-11 | End: 2022-02-16 | Stop reason: HOSPADM

## 2022-02-11 RX ORDER — SODIUM CHLORIDE 0.9 % (FLUSH) 0.9 %
5-40 SYRINGE (ML) INJECTION PRN
Status: DISCONTINUED | OUTPATIENT
Start: 2022-02-11 | End: 2022-02-16 | Stop reason: HOSPADM

## 2022-02-11 RX ORDER — ONDANSETRON 2 MG/ML
4 INJECTION INTRAMUSCULAR; INTRAVENOUS EVERY 6 HOURS PRN
Status: DISCONTINUED | OUTPATIENT
Start: 2022-02-11 | End: 2022-02-16 | Stop reason: HOSPADM

## 2022-02-11 RX ORDER — LISINOPRIL 2.5 MG/1
2.5 TABLET ORAL DAILY
Status: DISCONTINUED | OUTPATIENT
Start: 2022-02-12 | End: 2022-02-16 | Stop reason: HOSPADM

## 2022-02-11 RX ORDER — ONDANSETRON 4 MG/1
4 TABLET, ORALLY DISINTEGRATING ORAL EVERY 8 HOURS PRN
Status: DISCONTINUED | OUTPATIENT
Start: 2022-02-11 | End: 2022-02-16 | Stop reason: HOSPADM

## 2022-02-11 RX ORDER — VITAMIN B COMPLEX
1000 TABLET ORAL DAILY
Status: DISCONTINUED | OUTPATIENT
Start: 2022-02-12 | End: 2022-02-16 | Stop reason: HOSPADM

## 2022-02-11 RX ORDER — 0.9 % SODIUM CHLORIDE 0.9 %
500 INTRAVENOUS SOLUTION INTRAVENOUS ONCE
Status: COMPLETED | OUTPATIENT
Start: 2022-02-11 | End: 2022-02-11

## 2022-02-11 RX ORDER — DEXTROSE MONOHYDRATE 50 MG/ML
100 INJECTION, SOLUTION INTRAVENOUS PRN
Status: DISCONTINUED | OUTPATIENT
Start: 2022-02-11 | End: 2022-02-16 | Stop reason: HOSPADM

## 2022-02-11 RX ORDER — 0.9 % SODIUM CHLORIDE 0.9 %
500 INTRAVENOUS SOLUTION INTRAVENOUS ONCE
Status: DISCONTINUED | OUTPATIENT
Start: 2022-02-11 | End: 2022-02-11

## 2022-02-11 RX ORDER — LEVOFLOXACIN 5 MG/ML
500 INJECTION, SOLUTION INTRAVENOUS ONCE
Status: DISCONTINUED | OUTPATIENT
Start: 2022-02-12 | End: 2022-02-12

## 2022-02-11 RX ORDER — SODIUM CHLORIDE 9 MG/ML
25 INJECTION, SOLUTION INTRAVENOUS PRN
Status: DISCONTINUED | OUTPATIENT
Start: 2022-02-11 | End: 2022-02-16 | Stop reason: HOSPADM

## 2022-02-11 RX ORDER — VITS A,C,E/LUTEIN/MINERALS 300MCG-200
1 TABLET ORAL DAILY
Status: DISCONTINUED | OUTPATIENT
Start: 2022-02-12 | End: 2022-02-16 | Stop reason: HOSPADM

## 2022-02-11 RX ORDER — BUDESONIDE 0.25 MG/2ML
0.25 INHALANT ORAL 2 TIMES DAILY
Status: DISCONTINUED | OUTPATIENT
Start: 2022-02-11 | End: 2022-02-16 | Stop reason: HOSPADM

## 2022-02-11 RX ORDER — ATORVASTATIN CALCIUM 10 MG/1
10 TABLET, FILM COATED ORAL NIGHTLY
Status: DISCONTINUED | OUTPATIENT
Start: 2022-02-11 | End: 2022-02-16 | Stop reason: HOSPADM

## 2022-02-11 RX ORDER — NICOTINE POLACRILEX 4 MG
15 LOZENGE BUCCAL PRN
Status: DISCONTINUED | OUTPATIENT
Start: 2022-02-11 | End: 2022-02-16 | Stop reason: HOSPADM

## 2022-02-11 RX ORDER — METOPROLOL SUCCINATE 25 MG/1
25 TABLET, EXTENDED RELEASE ORAL DAILY
Status: DISCONTINUED | OUTPATIENT
Start: 2022-02-12 | End: 2022-02-16 | Stop reason: HOSPADM

## 2022-02-11 RX ADMIN — SODIUM CHLORIDE 500 ML: 9 INJECTION, SOLUTION INTRAVENOUS at 22:35

## 2022-02-11 RX ADMIN — Medication 5 ML: at 22:49

## 2022-02-11 RX ADMIN — SODIUM CHLORIDE 500 ML: 9 INJECTION, SOLUTION INTRAVENOUS at 12:09

## 2022-02-11 RX ADMIN — SODIUM CHLORIDE 500 ML: 9 INJECTION, SOLUTION INTRAVENOUS at 22:48

## 2022-02-11 RX ADMIN — INSULIN LISPRO 2 UNITS: 100 INJECTION, SOLUTION INTRAVENOUS; SUBCUTANEOUS at 23:05

## 2022-02-11 RX ADMIN — CEFEPIME HYDROCHLORIDE 2000 MG: 2 INJECTION, POWDER, FOR SOLUTION INTRAVENOUS at 14:06

## 2022-02-11 ASSESSMENT — ENCOUNTER SYMPTOMS
COUGH: 1
SORE THROAT: 0
EYE PAIN: 0
SHORTNESS OF BREATH: 1
ABDOMINAL PAIN: 0
BACK PAIN: 0
VOMITING: 0
SINUS PRESSURE: 0
EYE DISCHARGE: 0
EYE REDNESS: 0
WHEEZING: 0
DIARRHEA: 0
NAUSEA: 0

## 2022-02-11 ASSESSMENT — PAIN SCALES - GENERAL: PAINLEVEL_OUTOF10: 0

## 2022-02-11 NOTE — ED PROVIDER NOTES
ATTENDING PROVIDER ATTESTATION:     Miriam Juan presented to the emergency department for evaluation of Shortness of Breath (pt having sob beginning this morning. needs dialysis today. 82% on RA at home)   and was initially evaluated by the Medical Resident. See Original ED Note for H&P and ED course above. I have reviewed and discussed the case, including pertinent history (medical, surgical, family and social) and exam findings with the Medical Resident assigned to Miriam Juan. I have personally performed and/or participated in the history, exam, medical decision making, and procedures and agree with all pertinent clinical information and any additional changes or corrections are noted below in my assessment and plan. I have discussed this patient in detail with the resident, and provided the instruction and education,       I have reviewed my findings and recommendations with the assigned Medical Resident, Miriam Juan and members of family present at the time of disposition. I have performed a history and physical examination of this patient and directly supervised the resident caring for this patient      History of Present Illness:    Presents to the ED for shortness of breath, beginning prior to arrival.  The complaint has been constant, moderate in severity, and worsened by nothing. Family reports shortness of breath that started this morning. Nonproductive wet cough. Found to be 82% on room air. Brought here for further evaluation. Placed on nasal cannula with improvement in the oxygen saturations. Family reports he just got over pneumonia a couple weeks ago. They say he is now worse. He is due for dialysis today. Last dialysis on Wednesday. He follows with Dr. Terell Holloway. Patient denies chest pain. He reports fever and chills. He denies any other complaints.         Review of Systems:   A complete review of systems was performed and pertinent positives and negatives are stated within HPI, all other systems reviewed and are negative.    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 Other disorders of kidney and ureter, PVD (peripheral vascular disease) with claudication ([facility] [address] Right sided weakness, TIA (transient ischemic attack), and Unspecified cerebral artery occlusion with cerebral infarction. Past Surgical History:  has a past surgical history that includes Abdominal hernia repair; Cataract removal; Dental surgery; Inguinal hernia repair; Cholecystectomy (11/2011); Inner ear surgery; hc dialysis catheter (Right, 8/27/2020); eye surgery; shunt revision (Right, 1/21/2021); vascular surgery (Right, 4/8/2021); vascular surgery (Left, 8/9/2021); and vascular surgery (Right, 8/11/2021). Social History:  reports that he quit smoking about 55 years ago. His smoking use included cigarettes. He has a 10.00 pack-year smoking history. He has never used smokeless tobacco. He reports previous alcohol use of about 1.0 standard drink of alcohol per week. He reports that he does not use drugs. Family History: family history is not on file. Unless otherwise noted, family history is non contributory    The patients home medications have been reviewed.     Allergies: Sulfa antibiotics and Sulfa antibiotics    EKG Interpretation  Interpreted by emergency department physician, Dr. Gabriel Trammell     2/11/22  Time: 4621    Rhythm: sinus tachycardia  Rate: tachycardia  Axis: normal  Conduction: normal  ST Segments: no acute change  T Waves: no acute change    Clinical Impression: Sinus tachycardia, no acute ischemic changes  Comparison to Old EKG  Stable from prior      Physical Exam:  Constitutional/General: Alert and oriented x3  Head: Normocephalic and atraumatic  Eyes: PERRL, EOMI, sclera non icteric  ENT: Oropharynx clear, handling secretions  Neck: Supple, full ROM, no stridor, no meningeal signs  Respiratory: Lungs with rales and rhonchi bilaterally in both lungs . Not in respiratory distress  Cardiovascular: Tachycardic but regular rhythm . No murmurs, no gallops, no rubs. 2+ distal pulses. Equal extremity pulses. GI:  Abdomen Soft, Non tender, Non distended. No rebound, guarding, or rigidity. No pulsatile masses. Musculoskeletal: Moves all extremities x 4. Warm and well perfused,  no clubbing, no cyanosis. LE edema bilaterally. Palpable peripheral pulses  Integument: skin warm and dry. No rashes. Neurologic: GCS 15, no focal deficits  Psychiatric: Normal Affect      I directly supervised any procedures performed by the resident and was present for the procedure including all critical portions of the procedure      The cardiac monitor revealed sinus rhythm with a heart rate in the 100s as interpreted by me. The cardiac monitor was ordered secondary to the patient's shortness of breath and to monitor the patient for dysrhythmia. CPT [id number]      I, Dr. Patti Chau, am the primary provider of record    My Medical Decision Making:     Acute respiratory failure with hypoxemia  Fever, chills, cough, sob, concerning for pneumonia  02 applied  02 improved with supplemental 02  Also volume overloaded and needs dialysis  Already on Eliqus making PE less likely  Medicine consulted for admission  Not in respiratory distress        1. Acute respiratory failure with hypoxemia (HCC)    2.  Pneumonia due to infectious organism, unspecified laterality, unspecified part of silviano Drake MD  02/11/22 0965

## 2022-02-11 NOTE — ED NOTES
Bed: 24  Expected date:   Expected time:   Means of arrival:   Comments:  Jose Lieberman - 89yoM SOB     Monique Lee RN  02/11/22 0479

## 2022-02-11 NOTE — ED PROVIDER NOTES
ED  Provider Note  Admit Date/RoomTime: 2/11/2022 10:57 AM  ED Room: 02/02     HPI:   Gloria Rucker is a 80 y.o. male presenting to the ED for shortness of breath, beginning days ago. History comes primarily from the patient. 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     Diabetes mellitus ([address]     Diabetes mellitus ([address]     Diabetic peripheral neuropathy (HCC)     Osteoarthritis     Gait abnormality     Physical deconditioning     Lumbar spondylitis (HCC)     Anemia     Fx humer, med condyl-closed     Cerebral infarction [facility])     Renal failure     TIA (transient ischemic attack)     ASHD (arteriosclerotic heart disease)     Diabetes mellitus ([address]     PVD (peripheral vascular disease) with claudication ([address]     History of CVA (cerebrovascular accident)     Pain in lower limb     Cerebral infarction ([address]     Diabetes mellitus-2     Decubitus ulcer of sacral region, stage 1     Right sided weakness     Hypoglycemia     Encounter regarding vascular access for dialysis for end-stage renal disease ([address]     ESRD (end stage renal disease) on dialysis ([address]     Diabetic ulcer of toe of right foot associated with type 2 diabetes mellitus, with fat layer exposed ([address]     Failure to thrive in adult     Ischemic ulcer of toe, limited to breakdown of skin, right (HCC)     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 The complaint has been persistent, moderate in severity, improved by nothing and worsened by light exertion. Associated symptoms include cough. Elizabeth Barrera was seen recently for similar complaints and was found to have a pneumonia which was treated in the outpatient setting with TUTTLE QUOC and doxycycline. Patient completed the course of antibiotics, however he is now experiencing recurrent shortness of breath.   EMS was called secondary to these complaints and he was found to be hypoxic on room air, satting in the 80s. For this reason he was transported to [place] [address] emergency department for further evaluation and treatment. On arrival, the patient was assessed with history, physical exam, imaging studies, laboratory studies and ekg, vital signs. Vital signs were notable for temperature of 100.1, tachycardia 115, and an SPO2 of 88% on room air improved to 95% on 4 L nasal cannula           Review of Systems   Constitutional: Positive for activity change, chills and fatigue. Negative for fever. HENT: Negative for ear pain, sinus pressure and sore throat. Eyes: Negative for pain, discharge and redness. Respiratory: Positive for cough and shortness of breath. Negative for wheezing. Cardiovascular: Negative for chest pain. Gastrointestinal: Negative for abdominal pain, diarrhea, nausea and vomiting. Genitourinary: Negative for dysuria and frequency. Musculoskeletal: Negative for arthralgias and back pain. Skin: Negative for rash and wound. Neurological: Negative for weakness and headaches. Hematological: Negative for adenopathy. All other systems reviewed and are negative. Physical Exam  Vitals reviewed. Constitutional:       General: He is not in acute distress. Appearance: He is well-developed. He is ill-appearing. He is not diaphoretic. HENT:      Head: Normocephalic and atraumatic. Mouth/Throat:      Dentition: Abnormal dentition. Eyes:      Pupils: Pupils are equal, round, and reactive to light. Neck:      Vascular: No JVD. Trachea: No tracheal deviation. Cardiovascular:      Rate and Rhythm: Regular rhythm. Heart sounds: No murmur heard. No friction rub. No gallop. Pulmonary:      Effort: Pulmonary effort is normal. No respiratory distress. Breath sounds: No stridor. Examination of the right-lower field reveals rhonchi.  Examination of the left-lower field reveals rhonchi. Rhonchi present. No wheezing or rales. Chest:      Chest wall: No tenderness. Abdominal:      General: Bowel sounds are normal. There is no distension. Palpations: Abdomen is soft. Tenderness: There is no abdominal tenderness. There is no guarding. Musculoskeletal:         General: Normal range of motion. Cervical back: Normal range of motion. Skin:     General: Skin is warm and dry. Capillary Refill: Capillary refill takes less than 2 seconds. Neurological:      Mental Status: He is alert. Cranial Nerves: No cranial nerve deficit. Comments: Chronic left-sided hemiparesis secondary to old stroke   Psychiatric:         Behavior: Behavior normal.          Procedures     MDM  Number of Diagnoses or Management Options  Acute respiratory failure with hypoxemia (HCC)  Pneumonia due to infectious organism, unspecified laterality, unspecified part of lung  Diagnosis management comments: Emergency department evaluation was notable for acute respiratory failure with hypoxia in the setting of recurrent pneumonia that failed outpatient therapy. Patient will be admitted for inpatient IV antibiotics as well as continuation of his dialysis in the setting of this infectious process. This information was relayed to the patient who understood this plan of care and was amenable to the plan.   Patient was discussed with the admitting service (Dr. Anneliese Hubbard for Dr. Fabio Pena) who concurred with the decision for admission, and have agreed to admit the patient to telemetry       Amount and/or Complexity of Data Reviewed  Decide to obtain previous medical records or to obtain history from someone other than the patient: yes                 --------------------------------------------- PAST HISTORY ---------------------------------------------  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 ([facility] CAD (coronary artery disease), CHF (congestive heart failure) ([facility] [address] [facility] Chronic kidney disease, COVID-19, CVA (cerebral vascular accident) ([facility] [address] [facility] Diabetes mellitus ([facility] [address] [facility] Diabetic peripheral neuropathy ([facility] [address] [facility] Hemodialysis patient ([facility] [address] [facility] History of CVA (cerebrovascular accident), Hypertension, Ischemic ulcer of toe, limited to breakdown of skin, right ([facility] [address] [facility] Other disorders of kidney and ureter, PVD (peripheral vascular disease) with claudication ([facility] [address] [facility] Right sided weakness, TIA (transient ischemic attack), and Unspecified cerebral artery occlusion with cerebral infarction. Past Surgical History:  has a past surgical history that includes Abdominal hernia repair; Cataract removal; Dental surgery; Inguinal hernia repair; Cholecystectomy (11/2011); Inner ear surgery; hc dialysis catheter (Right, 8/27/2020); eye surgery; shunt revision (Right, 1/21/2021); vascular surgery (Right, 4/8/2021); vascular surgery (Left, 8/9/2021); and vascular surgery (Right, 8/11/2021). Social History:  reports that he quit smoking about 55 years ago. His smoking use included cigarettes. He has a 10.00 pack-year smoking history. He has never used smokeless tobacco. He reports previous alcohol use of about 1.0 standard drink of alcohol per week. He reports that he does not use drugs. Family History: family history is not on file. The patients home medications have been reviewed.     Allergies: Sulfa antibiotics and Sulfa antibiotics    -------------------------------------------------- RESULTS -------------------------------------------------    LABS:  Results for orders placed or performed during the hospital encounter of 02/11/22   COVID-19, Rapid    Specimen: Nasopharyngeal Swab   Result Value Ref Range    SARS-CoV-2, NAAT Not Detected Not Detected   CBC Auto Differential   Result Value Ref Range    WBC 6.3 4.5 - 11.5 E9/L    RBC 3.89 3.80 - 5.80 E12/L    Hemoglobin 12.4 (L) 12.5 - 16.5 g/dL    Hematocrit 40.0 37.0 - 54.0 %    .8 (H) 80.0 - 99.9 fL    MCH 31.9 26.0 - 35.0 pg    MCHC 31.0 (L) 32.0 - 34.5 %    RDW 14.6 11.5 - 15.0 fL    Platelets 401 738 - 373 E9/L    MPV 11.0 7.0 - 12.0 fL    Neutrophils % 91.3 (H) 43.0 - 80.0 %    Immature Granulocytes % 0.3 0.0 - 5.0 %    Lymphocytes % 5.0 (L) 20.0 - 42.0 %    Monocytes % 2.7 2.0 - 12.0 %    Eosinophils % 0.5 0.0 - 6.0 %    Basophils % 0.2 0.0 - 2.0 %    Neutrophils Absolute 5.72 1.80 - 7.30 E9/L    Immature Granulocytes # 0.02 E9/L    Lymphocytes Absolute 0.31 (L) 1.50 - 4.00 E9/L    Monocytes Absolute 0.17 0.10 - 0.95 E9/L    Eosinophils Absolute 0.03 (L) 0.05 - 0.50 E9/L    Basophils Absolute 0.01 0.00 - 0.20 E9/L    RBC Morphology Normal    Brain Natriuretic Peptide   Result Value Ref Range    Pro-BNP 7,663 (H) 0 - 450 pg/mL   Lactate, Sepsis   Result Value Ref Range    Lactic Acid, Sepsis 2.9 (H) 0.5 - 1.9 mmol/L   Lactate, Sepsis   Result Value Ref Range    Lactic Acid, Sepsis 2.8 (H) 0.5 - 1.9 mmol/L   Procalcitonin   Result Value Ref Range    Procalcitonin 0.65 (H) 0.00 - 0.08 ng/mL   SPECIMEN REJECTION   Result Value Ref Range    Rejected Test CMP.  TRP5     Reason for Rejection see below    SPECIMEN REJECTION   Result Value Ref Range    Rejected Test CMP, TRP5     Reason for Rejection see below    Comprehensive metabolic panel   Result Value Ref Range    Sodium 136 132 - 146 mmol/L    Potassium 4.6 3.5 - 5.0 mmol/L    Chloride 97 (L) 98 - 107 mmol/L    CO2 22 22 - 29 mmol/L    Anion Gap 17 (H) 7 - 16 mmol/L    Glucose 210 (H) 74 - 99 mg/dL    BUN 50 (H) 6 - 23 mg/dL    CREATININE 6.1 (H) 0.7 - 1.2 mg/dL    GFR Non-African American 9 >=60 mL/min/1.73    GFR African American 11     Calcium 9.0 8.6 - 10.2 mg/dL    Total Protein 6.3 (L) 6.4 - 8.3 g/dL    Albumin 3.6 3.5 - 5.2 g/dL    Total Bilirubin 0.6 0.0 - 1.2 mg/dL    Alkaline Phosphatase 104 40 - 129 U/L    ALT 14 0 - 40 U/L    AST 12 0 - 39 U/L   Troponin   Result Value Ref Range    Troponin, High Sensitivity 100 (H) 0 - 11 ng/L   EKG 12 Lead   Result Value Ref Range    Ventricular Rate 115 BPM    Atrial Rate 115 BPM    P-R Interval 184 ms    QRS Duration 88 ms    Q-T Interval 316 ms    QTc Calculation (Bazett) 437 ms    P Axis 48 degrees    R Axis 21 degrees    T Axis 103 degrees       RADIOLOGY:  XR CHEST PORTABLE   Final Result   Small persistent infiltrate left lung base. Clinical correlation and   follow-up to resolution recommended. ------------------------- NURSING NOTES AND VITALS REVIEWED ---------------------------  Date / Time Roomed:  2/11/2022 10:57 AM  ED Bed Assignment:  02/02    The nursing notes within the ED encounter and vital signs as below have been reviewed. Patient Vitals for the past 24 hrs:   BP Temp Temp src Pulse Resp SpO2 Height Weight   02/11/22 2036 (!) 186/74 99.2 °F (37.3 °C) -- 94 16 -- -- --   02/11/22 1111 -- -- -- -- -- 95 % -- --   02/11/22 1110 (!) 193/87 -- -- -- -- -- -- --   02/11/22 1106 -- 100.1 °F (37.8 °C) Oral 115 20 (!) 88 % 5' 6\" (1.676 m) 180 lb (81.6 kg)       Oxygen Saturation Interpretation: Abnormal and Improved after treatment    ------------------------------------------ PROGRESS NOTES ------------------------------------------  Re-evaluation(s):  Time: 8:48 PM EST  Patients symptoms show no change  Repeat physical examination is not changed    Counseling:  I have spoken with the patient and discussed todays results, in addition to providing specific details for the plan of care and counseling regarding the diagnosis and prognosis. Their questions are answered at this time and they are agreeable with the plan of admission.    --------------------------------- ADDITIONAL PROVIDER NOTES ---------------------------------  Consultations:  Time: 8:48 PM EST. Spoke with Dr. Ana Maria Ayers for Dr. Harper Harris. Discussed case. They will admit the patient.   This patient's ED course included: a personal history and physicial examination, re-evaluation prior to disposition, multiple bedside re-evaluations, IV medications and continuous pulse oximetry    This patient has remained hemodynamically stable during their ED course. Diagnosis:  1. Acute respiratory failure with hypoxemia (HCC)    2. Pneumonia due to infectious organism, unspecified laterality, unspecified part of lung        Disposition:  Patient's disposition: Admit to telemetry  Patient's condition is fair.          [address] DO  Resident  02/11/22 2049       [address] Dasha Do, DO  Resident  02/11/22 2052

## 2022-02-12 LAB
ANION GAP SERPL CALCULATED.3IONS-SCNC: 18 MMOL/L (ref 7–16)
BASOPHILS ABSOLUTE: 0.02 E9/L (ref 0–0.2)
BASOPHILS RELATIVE PERCENT: 0.2 % (ref 0–2)
BUN BLDV-MCNC: 57 MG/DL (ref 6–23)
CALCIUM SERPL-MCNC: 9 MG/DL (ref 8.6–10.2)
CHLORIDE BLD-SCNC: 95 MMOL/L (ref 98–107)
CO2: 21 MMOL/L (ref 22–29)
CREAT SERPL-MCNC: 7 MG/DL (ref 0.7–1.2)
DOHLE BODIES: ABNORMAL
EKG ATRIAL RATE: 115 BPM
EKG P AXIS: 48 DEGREES
EKG P-R INTERVAL: 184 MS
EKG Q-T INTERVAL: 316 MS
EKG QRS DURATION: 88 MS
EKG QTC CALCULATION (BAZETT): 437 MS
EKG R AXIS: 21 DEGREES
EKG T AXIS: 103 DEGREES
EKG VENTRICULAR RATE: 115 BPM
EOSINOPHILS ABSOLUTE: 0 E9/L (ref 0.05–0.5)
EOSINOPHILS RELATIVE PERCENT: 0 % (ref 0–6)
GFR AFRICAN AMERICAN: 9
GFR NON-AFRICAN AMERICAN: 7 ML/MIN/1.73
GLUCOSE BLD-MCNC: 193 MG/DL (ref 74–99)
HCT VFR BLD CALC: 33.6 % (ref 37–54)
HEMOGLOBIN: 10.4 G/DL (ref 12.5–16.5)
IMMATURE GRANULOCYTES #: 0.08 E9/L
IMMATURE GRANULOCYTES %: 0.6 % (ref 0–5)
LYMPHOCYTES ABSOLUTE: 1.37 E9/L (ref 1.5–4)
LYMPHOCYTES RELATIVE PERCENT: 10.9 % (ref 20–42)
MCH RBC QN AUTO: 32.2 PG (ref 26–35)
MCHC RBC AUTO-ENTMCNC: 31 % (ref 32–34.5)
MCV RBC AUTO: 104 FL (ref 80–99.9)
METER GLUCOSE: 148 MG/DL (ref 74–99)
METER GLUCOSE: 152 MG/DL (ref 74–99)
METER GLUCOSE: 196 MG/DL (ref 74–99)
METER GLUCOSE: 96 MG/DL (ref 74–99)
MONOCYTES ABSOLUTE: 0.82 E9/L (ref 0.1–0.95)
MONOCYTES RELATIVE PERCENT: 6.5 % (ref 2–12)
NEUTROPHILS ABSOLUTE: 10.3 E9/L (ref 1.8–7.3)
NEUTROPHILS RELATIVE PERCENT: 81.8 % (ref 43–80)
PDW BLD-RTO: 14.7 FL (ref 11.5–15)
PLATELET # BLD: 140 E9/L (ref 130–450)
PMV BLD AUTO: 11.2 FL (ref 7–12)
POTASSIUM REFLEX MAGNESIUM: 5 MMOL/L (ref 3.5–5)
RBC # BLD: 3.23 E12/L (ref 3.8–5.8)
RBC # BLD: NORMAL 10*6/UL
SODIUM BLD-SCNC: 134 MMOL/L (ref 132–146)
WBC # BLD: 12.6 E9/L (ref 4.5–11.5)

## 2022-02-12 PROCEDURE — 87040 BLOOD CULTURE FOR BACTERIA: CPT

## 2022-02-12 PROCEDURE — 87077 CULTURE AEROBIC IDENTIFY: CPT

## 2022-02-12 PROCEDURE — 87186 SC STD MICRODIL/AGAR DIL: CPT

## 2022-02-12 PROCEDURE — 1200000000 HC SEMI PRIVATE

## 2022-02-12 PROCEDURE — 2580000003 HC RX 258: Performed by: INTERNAL MEDICINE

## 2022-02-12 PROCEDURE — 80048 BASIC METABOLIC PNL TOTAL CA: CPT

## 2022-02-12 PROCEDURE — 6370000000 HC RX 637 (ALT 250 FOR IP): Performed by: INTERNAL MEDICINE

## 2022-02-12 PROCEDURE — 90935 HEMODIALYSIS ONE EVALUATION: CPT

## 2022-02-12 PROCEDURE — 36415 COLL VENOUS BLD VENIPUNCTURE: CPT

## 2022-02-12 PROCEDURE — 94640 AIRWAY INHALATION TREATMENT: CPT

## 2022-02-12 PROCEDURE — 85025 COMPLETE CBC W/AUTO DIFF WBC: CPT

## 2022-02-12 PROCEDURE — 2700000000 HC OXYGEN THERAPY PER DAY

## 2022-02-12 PROCEDURE — 6360000002 HC RX W HCPCS: Performed by: INTERNAL MEDICINE

## 2022-02-12 PROCEDURE — 5A1D70Z PERFORMANCE OF URINARY FILTRATION, INTERMITTENT, LESS THAN 6 HOURS PER DAY: ICD-10-PCS | Performed by: INTERNAL MEDICINE

## 2022-02-12 PROCEDURE — 82962 GLUCOSE BLOOD TEST: CPT

## 2022-02-12 PROCEDURE — 87150 DNA/RNA AMPLIFIED PROBE: CPT

## 2022-02-12 PROCEDURE — 87205 SMEAR GRAM STAIN: CPT

## 2022-02-12 RX ORDER — SODIUM CHLORIDE 9 MG/ML
INJECTION, SOLUTION INTRAVENOUS CONTINUOUS
Status: DISCONTINUED | OUTPATIENT
Start: 2022-02-12 | End: 2022-02-13

## 2022-02-12 RX ADMIN — APIXABAN 2.5 MG: 2.5 TABLET, FILM COATED ORAL at 20:05

## 2022-02-12 RX ADMIN — METOPROLOL SUCCINATE 25 MG: 25 TABLET, EXTENDED RELEASE ORAL at 12:48

## 2022-02-12 RX ADMIN — INSULIN LISPRO 1 UNITS: 100 INJECTION, SOLUTION INTRAVENOUS; SUBCUTANEOUS at 06:59

## 2022-02-12 RX ADMIN — BUDESONIDE 250 MCG: 0.25 SUSPENSION RESPIRATORY (INHALATION) at 21:09

## 2022-02-12 RX ADMIN — INSULIN LISPRO 1 UNITS: 100 INJECTION, SOLUTION INTRAVENOUS; SUBCUTANEOUS at 20:05

## 2022-02-12 RX ADMIN — GABAPENTIN 100 MG: 100 CAPSULE ORAL at 20:05

## 2022-02-12 RX ADMIN — BUDESONIDE 250 MCG: 0.25 SUSPENSION RESPIRATORY (INHALATION) at 02:07

## 2022-02-12 RX ADMIN — INSULIN LISPRO 1 UNITS: 100 INJECTION, SOLUTION INTRAVENOUS; SUBCUTANEOUS at 17:50

## 2022-02-12 RX ADMIN — SODIUM CHLORIDE: 9 INJECTION, SOLUTION INTRAVENOUS at 04:03

## 2022-02-12 RX ADMIN — ATORVASTATIN CALCIUM 10 MG: 10 TABLET, FILM COATED ORAL at 20:05

## 2022-02-12 RX ADMIN — Medication 1000 UNITS: at 12:48

## 2022-02-12 RX ADMIN — ZINC SULFATE 220 MG (50 MG) CAPSULE 50 MG: CAPSULE at 12:48

## 2022-02-12 RX ADMIN — APIXABAN 2.5 MG: 2.5 TABLET, FILM COATED ORAL at 12:48

## 2022-02-12 RX ADMIN — ALBUTEROL SULFATE 1.25 MG: 1.25 SOLUTION RESPIRATORY (INHALATION) at 21:11

## 2022-02-12 RX ADMIN — Medication 1 TABLET: at 12:48

## 2022-02-12 RX ADMIN — Medication 10 ML: at 20:12

## 2022-02-12 RX ADMIN — ALBUTEROL SULFATE 1.25 MG: 1.25 SOLUTION RESPIRATORY (INHALATION) at 02:08

## 2022-02-12 ASSESSMENT — PAIN SCALES - GENERAL
PAINLEVEL_OUTOF10: 0

## 2022-02-12 NOTE — H&P
History & Physical         Reason for admission:   Pneumonia, failed outpatient tx    History Obtained From:  ER, patient. HISTORY OF PRESENT ILLNESS:    The patient is a 80 y.o. male who presents to ER with cough, shortness of breath. Was recently in ER for diagnosis of pneumonia. Was prescribed omnicef/doxy and despite this, sx persisted/worsened. In ER, found hypoxic, placed on 4L 02. CXR suggested persistent L base infiltrate. Temp 101.0, tachcardic. Admitted to telemetry. Was given one dose cefepime in ER. I have ordered dose of levaquin to be given after dialysis today. He was hypotensive overnight , was given 2X 500 cc fluid bolus and currently on 100 cc/hr 0.9NS.   Pt has had recent diabetic foot infections and has a sacral decub.           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 ([address] 12/3/2021    CAD (coronary artery disease)     CHF (congestive heart failure) (HCC)     Chronic kidney disease     COVID-19     CVA (cerebral vascular accident) ([address]     Diabetes mellitus ([address]     Diabetic peripheral neuropathy ([address] 11/9/2012    Hemodialysis patient ([address]     History of CVA (cerebrovascular accident) 6/9/2014    Hypertension     Ischemic ulcer of toe, limited to breakdown of skin, right ([address] 12/3/2021    Other disorders of kidney and ureter     prostate infections in past    PVD (peripheral vascular disease) with claudication ([address] 6/9/2014    Right sided weakness 3/19/2018    TIA (transient ischemic attack)     possible several years ago    Unspecified cerebral artery occlusion with cerebral infarction 2013    [facility] RIGHT LEG AFFECTED       Past Surgical History:        Procedure Laterality Date    ABDOMINAL HERNIA REPAIR      CATARACT REMOVAL      right    CHOLECYSTECTOMY  11/2011    DENTAL SURGERY      EYE SURGERY      [facility]. DIALYSIS CATHETER Right 8/27/2020    TESIO CATHETER INSERTION performed by Gaye Sauceda MD at [facility]      removal of sac in ear    SHUNT REVISION Right 1/21/2021    INSERTION AV GRAFT RIGHT ARM performed by Gaye Sauceda MD at [address] [place] [address] Right 4/8/2021    THROMBECTOMY RIGHT ARM AV GRAFT performed by Gaye Sauceda MD at [address] [place] [address] Left 8/9/2021    INSERTION OF TEMPORARY HEMODIALYSIS CATHETER performed by Gaye Sauceda MD at [address] [place] [address] Right 8/11/2021    INSERTION OF TESIO, REMOVAL OF TEMPORARY CATHETHER performed by Gaye Sauceda MD at [address] [place] [address]       Medications Prior to Admission:    Medications Prior to Admission: calcium acetate (PHOSLO) 667 MG CAPS capsule, TAKE 1 CAPSULE BY MOUTH THREE TIMES DAILY  budesonide (PULMICORT FLEXHALER) 90 MCG/ACT AEPB inhaler, Inhale 2 puffs into the lungs 2 times daily  albuterol (ACCUNEB) 1.25 MG/3ML nebulizer solution, Inhale 1 ampule into the lungs every 6 hours as needed for Wheezing  magnesium hydroxide (MILK OF MAGNESIA) 400 MG/5ML suspension, Take by mouth daily as needed for Constipation  apixaban (ELIQUIS) 2.5 MG TABS tablet, Take 1 tablet by mouth 2 times daily  insulin lispro (HUMALOG) 100 UNIT/ML injection vial, Inject 0-6 Units into the skin 3 times daily (with meals)  lisinopril (PRINIVIL;ZESTRIL) 2.5 MG tablet, Take 2.5 mg by mouth daily  metoprolol succinate (TOPROL XL) 25 MG extended release tablet, Take 1 tablet by mouth daily  white petrolatum OINT ointment, Apply 1 applicator topically 2 times daily  atorvastatin (LIPITOR) 10 MG tablet, Take 10 mg by mouth nightly  gabapentin (NEURONTIN) 100 MG capsule, Take 100 mg by mouth nightly.   ascorbic acid (VITAMIN C) 500 MG tablet, Take 1,000 mg by mouth daily  zinc gluconate 50 MG tablet, Take 50 mg by mouth daily  famotidine (PEPCID) 40 MG tablet, Take 40 mg by mouth as needed  vitamin D (CHOLECALCIFEROL) 25 MCG (1000 UT) TABS tablet, Take 1,000 Units by mouth daily  Multiple Vitamins-Minerals (PRESERVISION AREDS 2 PO), Take 1 tablet by mouth daily     Allergies:  Sulfa antibiotics and Sulfa antibiotics    Social History:   TOBACCO:   reports that he quit smoking about 55 years ago. His smoking use included cigarettes. He has a 10.00 pack-year smoking history. He has never used smokeless tobacco.  ETOH:   reports previous alcohol use of about 1.0 standard drink of alcohol per week. Family History:   No family history on file. REVIEW OF SYSTEMS:  CONSTITUTIONAL:  Neg   Recent weight changes,fatigue,fever,chills or night sweats  EYES:  Neg  blurriness,tearing,itching or acute change in vision  NOSE:  Neg  rhinorrhea,sneezing,itching,allergy or epistaxis  MOUTH/THROAT:  Neg  bleeding gums,hoarseness or sore throat. RESPIRATORY:   Neg wheeze,sputum,hemoptysis or bronochitis. Pos:  Cough, SOB. CARDIOVASCULAR   Neg : chest pain,palpitations,dyspnea on exertion,orthopnea,paroxysmal nocturnal dyspnea or edema  GASTROINTESTINAL:  Neg   Appetite changes,nausea,vomiting,or diarrhea,indigestion,dysphagia,change in bowel movements, or abdominal pain. GENITOURINARY:  Neg  Urinary frequency,hesitancy,urgency,polyuria,dysuria,hematuria,or incontinence. HEMATOLOGIC/LYMPHATIC:  Neg  Anemia,bleeding tendency  MUSCULOSKELETAL:  Neg   New myalgias,bone pain,joint pain,swelling or stiffness and has had no change in gait. NEUROLOGICAL:  Neg  Loss of Consciousness,memeory loss,forgetfulness,periods of confusion,decline in intellect,nervousness,insomina,aphasia or dysarthria. SKIN :  Neg  skin or hair changes,and has no itching,rashes,sores.     PHYSICAL EXAM:  BP (!) 96/51   Pulse 84   Temp 98.7 °F (37.1 °C) (Axillary)   Resp 18   Ht 5' 6\" (1.676 m)   Wt 180 lb (81.6 kg)   SpO2 97%   BMI 29.05 kg/m²   General appearance: alert, appears stated age, cooperative and no distress  Head: Normocephalic, without obvious abnormality, atraumatic  Eyes: conjunctivae/corneas clear. PERRL, EOM's intact. Ears: normal external ear canals both ears  Neck: no adenopathy, no carotid bruit, no JVD, supple, symmetrical, trachea midline and thyroid not enlarged, no tenderness/mass/nodules  Lungs: Base fibrotic type crackles  Heart: regularly irregular, S1, S2 normal, no murmur, regular rate and rhythm ,no precordial heave  Abdomen:soft, non-tender; non-distended normal bowel sounds no masses, no organomegaly  Extremities:Trace edema. Diminished peripheral pulses. Small healed wounds toes. Skin: Skin color, texture, turgor normal. No rashes. Stage 2 sacral decubitus. Neurologic:Mental status: Alert, oriented, thought content appropriate  Cranial nerves:II-XII Grossly intact  Sensory: normal  Motor:grossly normal    DATA:  Recent Labs     02/11/22  1154 02/12/22  0320   WBC 6.3 12.6*   HGB 12.4* 10.4*   HCT 40.0 33.6*   .8* 104.0*    140     Recent Labs     02/11/22  1747 02/12/22  0320    134   K 4.6 5.0   CL 97* 95*   CO2 22 21*   BUN 50* 57*   CREATININE 6.1* 7.0*   GLUCOSE 210* 193*     Recent Labs     02/11/22  1747   AST 12   ALT 14   BILITOT 0.6   ALKPHOS 104     No results for input(s): CKTOTAL, CKMB, TROPONINI in the last 72 hours.   Lab Results   Component Value Date    INR 1.5 05/15/2021    INR 1.4 11/29/2020    INR 1.8 01/12/2019    PROTIME 16.6 (H) 05/15/2021    PROTIME 15.4 (H) 11/29/2020    PROTIME 20.1 (H) 01/12/2019        CBC with Differential:    Lab Results   Component Value Date    WBC 12.6 02/12/2022    RBC 3.23 02/12/2022    HGB 10.4 02/12/2022    HCT 33.6 02/12/2022     02/12/2022    .0 02/12/2022    MCH 32.2 02/12/2022    MCHC 31.0 02/12/2022    RDW 14.7 02/12/2022    SEGSPCT 46 01/04/2014    BANDSPCT 2 05/02/2016    LYMPHOPCT 5.0 02/11/2022    MONOPCT 2.7 02/11/2022    BASOPCT 0.2 02/11/2022    MONOSABS 0.17 02/11/2022    LYMPHSABS 0.31 02/11/2022    EOSABS 0.03 02/11/2022    BASOSABS 0.01 02/11/2022     CMP: Lab Results   Component Value Date     02/12/2022    K 5.0 02/12/2022    CL 95 02/12/2022    CO2 21 02/12/2022    BUN 57 02/12/2022    CREATININE 7.0 02/12/2022    GFRAA 9 02/12/2022    LABGLOM 7 02/12/2022    GLUCOSE 193 02/12/2022    GLUCOSE 163 02/22/2012    PROT 6.3 02/11/2022    LABALBU 3.6 02/11/2022    LABALBU 3.8 02/20/2012    CALCIUM 9.0 02/12/2022    BILITOT 0.6 02/11/2022    ALKPHOS 104 02/11/2022    AST 12 02/11/2022    ALT 14 02/11/2022     Magnesium:    Lab Results   Component Value Date    MG 2.3 12/03/2021     Phosphorus:    Lab Results   Component Value Date    PHOS 4.4 12/02/2021     Troponin:    Lab Results   Component Value Date    TROPONINI 0.04 11/29/2020     U/A:    Lab Results   Component Value Date    COLORU Yellow 07/10/2021    PHUR 6.0 07/10/2021    WBCUA PACKED 07/10/2021    WBCUA NONE 02/20/2012    RBCUA >20 07/10/2021    RBCUA 1-3 05/04/2013    BACTERIA MANY 07/10/2021    CLARITYU CLOUDY 07/10/2021    SPECGRAV 1.020 07/10/2021    LEUKOCYTESUR LARGE 07/10/2021    UROBILINOGEN 0.2 07/10/2021    BILIRUBINUR Negative 07/10/2021    BILIRUBINUR NEGATIVE 02/20/2012    BLOODU LARGE 07/10/2021    GLUCOSEU Negative 07/10/2021    GLUCOSEU 500 02/20/2012     ABG:    Lab Results   Component Value Date    LJX6GSC 56.5 08/09/2021    PO2ART 21.8 08/09/2021    GUB5EXX 29.9 08/09/2021          RADIOLOGY:   XR CHEST PORTABLE   Final Result   Small persistent infiltrate left lung base. Clinical correlation and   follow-up to resolution recommended. ASSESSMENT / PLAN:  -Acute hypoxic respiratory failure  -Pneumonia LLL-failed outpatient tx.  -Hypotension-improving with IVF. -ESRD on dialysis.   -COPD  -Gait dysfunction  -NIDDM with neuropathy  -Stage 2 sacral decubitus  -CAD/PAF  -Hx CVA R sided weakness  -Hx diabetic foot ulcers.       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    Diabetes mellitus ([facility] E11.9    Diabetes mellitus ([facility] E11.9    Diabetic peripheral neuropathy ([facility]) E11.42    Osteoarthritis M19.90    Gait abnormality R26.9    Physical deconditioning R53.81    Lumbar spondylitis ([facility]) M46.96    Anemia D64.9    Fx humer, med condyl-closed S42.463A    Cerebral infarction ([facility] I63.9    Renal failure N19    TIA (transient ischemic attack) G45.9    ASHD (arteriosclerotic heart disease) I25.10    Diabetes mellitus ([facility]) E11.9    PVD (peripheral vascular disease) with claudication ([facility]) I73.9    History of CVA (cerebrovascular accident) Z86.73    Pain in lower limb M79.606    Cerebral infarction ([facility]) I63.9    Diabetes mellitus-2 E11.9    Decubitus ulcer of sacral region, stage 1 L89.151    Right sided weakness R53.1    Hypoglycemia E16.2    Encounter regarding vascular access for dialysis for end-stage renal disease ([facility] N18.6, Z99.2    ESRD (end stage renal disease) on dialysis ([facility] N18.6, Z99.2    Diabetic ulcer of toe of right foot associated with type 2 diabetes mellitus, with fat layer exposed ([facility] E11.621, L97.512    Failure to thrive in adult R62.7    Ischemic ulcer of toe, limited to breakdown of skin, right ([facility]) L97.511    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 ([facility]) I70.239    Toe infection L08.9    Pneumonia J18.9     PLAN:  Cefepime in ER. I ordered one dose of Levaquin to be given post dialysis today. Will ask pulmonary input for further atb selection, failed outpatient tx, persistent infiltrate, hypoxia. IVF  Renal consult/dialysis planned today. Sliding scale BS coverage.   PT/OT/Wound care  Ambulate with assist.      Disposition:  Home vs. [facility] depending upon course      Electronically signed by Roma Talamantes MD on 2/12/2022 at 7:16 AM

## 2022-02-12 NOTE — CONSULTS
Pulmonary Consultation    Admit Date: 2/11/2022  Requesting Physician: Joanne Mansfield DO    Reason for consultation:  · Left lower lobe infiltrate  HPI:  · Renetta Gtz is a 26-year-old white male with a history of renal failure presented to the hospital with a 2 to 3-day history of increasing shortness of breath. Seen and evaluated emergency room, chest radiograph evidence a left lower lobe infiltrate. And shows obliteration of left hemidiaphragm which was not present previously. · Morning the patient, his symptoms had him relatively suddenly. He states he has a cough but is nonproductive and denies any difficulty in swallowing. He is noted to be a former smoker but smoked in the very remote past.    · The patient has dialysis dependent renal failure and was most recently hospitalized in December of last year for cardiac issues. He also had a foot ulcer at that time. He remains anticoagulated with Eliquis was last seen by cardiology just about a month ago. A recent echocardiogram is reviewed.     PMH:    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 ([address] 12/3/2021    CAD (coronary artery disease)     CHF (congestive heart failure) (HCC)     Chronic kidney disease     COVID-19     CVA (cerebral vascular accident) ([address]     Diabetes mellitus ([address]     Diabetic peripheral neuropathy ([address] 11/9/2012    Hemodialysis patient ([address]     History of CVA (cerebrovascular accident) 6/9/2014    Hypertension     Ischemic ulcer of toe, limited to breakdown of skin, right ([address] 12/3/2021    Other disorders of kidney and ureter     prostate infections in past    PVD (peripheral vascular disease) with claudication ([address] 6/9/2014    Right sided weakness 3/19/2018    TIA (transient ischemic attack)     possible several years ago    Unspecified cerebral artery occlusion with cerebral infarction 2013    [facility] RIGHT LEG AFFECTED     PSH:   Past Surgical History:   Procedure Laterality Date    ABDOMINAL HERNIA REPAIR      CATARACT REMOVAL      right    CHOLECYSTECTOMY  11/2011    DENTAL SURGERY      EYE SURGERY      [facility]. DIALYSIS CATHETER Right 8/27/2020    TESIO CATHETER INSERTION performed by Orestes Sutherland MD at [facility]      removal of sac in ear    SHUNT REVISION Right 1/21/2021    INSERTION AV GRAFT RIGHT ARM performed by Orestes Sutherland MD at [address] [place] [address] Right 4/8/2021    THROMBECTOMY RIGHT ARM AV GRAFT performed by Orestes Stuherland MD at [place] [address] VASCULAR SURGERY Left 8/9/2021    INSERTION OF TEMPORARY HEMODIALYSIS CATHETER performed by Orestes Sutherland MD at [address] [place] [address] Right 8/11/2021    INSERTION OF TESIO, REMOVAL OF TEMPORARY CATHETHER performed by Orestes Sutherland MD at [address] [place] [address] Road [address] of Systems:   · Constitutional: As noted in the HPI. · Eyes: No visual changes or diplopia. No scleral icterus. · ENT: No headaches, hearing loss or vertigo. No nasal congestion, or sore throat. · Cardiovascular: No chest pain, dyspnea on exertion, or palpitations. · Respiratory: See above  · Gastrointestinal: No abdominal pain, nausea or emesis. No diarrhea or rectal bleeding or melena. No change in bowel habits. · Genitourinary: No dysuria, urinary frequency, or incontinence. No hematuria. · Musculoskeletal: No gait disturbance, weakness or joint complaints. · Integumentary: No rash or pruritis. No abnormal pigmentation, hair or nail changes. · Neurological: No headache, diplopia, dizziness, tremor, change in muscle strength, numbness or tingling. No change in gait, balance, coordination, mood, affect, memory, mentation, behavior. · Psychiatric: No anxiety or depression. · Endocrine: No temperature intolerance, excessive thirst, fluid intake, urinary frequency, excessive appetite, or recent weight change. · Hematologic/Lymphatic: No abnormal bruising or bleeding, blood clots or swollen lymph nodes. No anemia, fever, chills, night sweats, or swollen glands. · Allergic/Immunologic: No seasonal or perenial allergies. No history of hives or atopic dermatitis. Social History:  · Alcohol: Social  · Tobacco:   Remote  · Employment:  no silica or asbestos exposure  · Family:  No family history of lung disease    Medications:   sodium chloride 100 mL/hr at 22 0403    sodium chloride      dextrose        apixaban  2.5 mg Oral BID    atorvastatin  10 mg Oral Nightly    budesonide  0.25 mg Nebulization BID    gabapentin  100 mg Oral Nightly    [Held by provider] lisinopril  2.5 mg Oral Daily    metoprolol succinate  25 mg Oral Daily    Vitamin D  1,000 Units Oral Daily    zinc sulfate  50 mg Oral Daily    sodium chloride flush  5-40 mL IntraVENous 2 times per day    insulin lispro  0-6 Units SubCUTAneous TID WC    insulin lispro  0-3 Units SubCUTAneous Nightly    levofloxacin  500 mg IntraVENous Once    ocuvite-lutein  1 tablet Oral Daily       Vitals:  Tmax:  VITALS:  /80   Pulse 82   Temp 99 °F (37.2 °C)   Resp 16   Ht 5' 6\" (1.676 m)   Wt 196 lb 3.4 oz (89 kg)   SpO2 97%   BMI 31.67 kg/m²   24HR INTAKE/OUTPUT:      Intake/Output Summary (Last 24 hours) at 2022 1113  Last data filed at 2022 0830  Gross per 24 hour   Intake 0 ml   Output --   Net 0 ml     CURRENT PULSE OXIMETRY:  SpO2: 97 %  24HR PULSE OXIMETRY RANGE:  SpO2  Av %  Min: 95 %  Max: 97 %    EXAM:  General: No distress. Alert. Eyes: PERRL. No sclera icterus. No conjunctival injection. ENT: No discharge. Pharynx clear. Neck: Trachea midline. Normal thyroid. No jvd, no hjr. Resp: No wheezing. No accessory muscle use. Few bibasilar rales. No rhonchi. SCO right upper chest  CV: Regular rate. Regular rhythm. No murmur No rub. Abd: Non-tender. Non-distended. No masses. No organmegaly.  Normal bowel sounds. Skin: Warm and dry. No nodule on exposed extremities. No rash on exposed extremities. Lymph: No cervical LAD. No supraclavicular LAD. Ext: No joint deformity. No clubbing. No cyanosis. No edema  Neuro: Awake. Follows commands. Positive pupils/gag/corneals. Normal pain response. Lab Results:  CBC:   Recent Labs     02/11/22  1154 02/12/22  0320   WBC 6.3 12.6*   HGB 12.4* 10.4*   HCT 40.0 33.6*   .8* 104.0*    140       BMP:  Recent Labs     02/11/22  1747 02/12/22  0320    134   K 4.6 5.0   CL 97* 95*   CO2 22 21*   BUN 50* 57*   CREATININE 6.1* 7.0*    ALB:3,BILIDIR:3,BILITOT:3,ALKPHOS:3)@    PT/INR: No results for input(s): PROTIME, INR in the last 72 hours. Cultures:  Sputum: not available  Blood: not available    ABG:   No results for input(s): PH, PO2, PCO2, HCO3, BE, O2SAT, METHB, O2HB, COHB, O2CON, HHB, THB in the last 72 hours. Films:     XR CHEST PORTABLE   Final Result   Small persistent infiltrate left lung base. Clinical correlation and   follow-up to resolution recommended. .        Assessment:  1. 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 There is no associated leukocytosis. Procalcitonin is elevated but can be unreliable in the presence of renal failure. Plan:  1. Because of the patient's dialysis dependent renal failure, this must be treated as a hospital-acquired pneumonia. The patient will be continued on cefepime pending cultures. Further recommendations will follow. 2. CT scan of the chest without contrast may be needed. Thanks for letting us see this patient in consultation. Total time in reviewing the previous admissions and records, reviewing the current x-rays, labs, and discussing with clinical staff including nursing and physicians, exceeded 50 minutes. Please contact us with any questions.  Office [phone] or after hours through [employer], x [phone].    Please note that voice recognition technology was used (while wearing a Covid mandated mask) in the preparation of this note and make therefore it may contain inadvertent transcription errors. If the patient is a COVID 19 isolation patient, the above physical exam reflects that of the examining physician for the day. Nora Vines MD,  M.D., F.C.C.P.     [facility] [address] [facility], [address] [place] [address], [address] [place] [address]

## 2022-02-12 NOTE — CONSULTS
[facility] MD David Lopez MD Bevely Lord, MD Myla Munster, MD Durward Kand, KARAN Wright, DAISY  Consultation  Patient's Name: Socorro Garg  1:54 PM  2/12/2022    Nephrologist: David Krueger MD    Reason for Consult: ESRD  Requesting Physician:  Mable Goodwin DO    Chief Complaint: Shortness of breath    History Obtained From: Patient, chart    History of Present Ilness: This is 80years old  male with history of acute on chronic congestive heart failure, COVID-19 pneumonia, CVA, and congestive heart failure, history of CVA and hypertension with diabetes mellitus. Patient presents emerged department with worsening shortness of breath was found to be hypoxic placed on 4 L oxygen with chest x-ray suggestive of persistent left base infiltrate with a temp of 101 with tachycardia and tachypnea.     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 ([address] 12/3/2021    CAD (coronary artery disease)     CHF (congestive heart failure) (HCC)     Chronic kidney disease     COVID-19     CVA (cerebral vascular accident) ([address]     Diabetes mellitus ([address]     Diabetic peripheral neuropathy ([address] 11/9/2012    Hemodialysis patient ([address]     History of CVA (cerebrovascular accident) 6/9/2014    Hypertension     Ischemic ulcer of toe, limited to breakdown of skin, right ([address] 12/3/2021    Other disorders of kidney and ureter     prostate infections in past    PVD (peripheral vascular disease) with claudication ([address] 6/9/2014    Right sided weakness 3/19/2018    TIA (transient ischemic attack)     possible several years ago    Unspecified cerebral artery occlusion with cerebral infarction 2013    [facility] RIGHT LEG AFFECTED       Past Surgical History:   Procedure Laterality Date    ABDOMINAL HERNIA REPAIR      CATARACT REMOVAL      right    CHOLECYSTECTOMY  11/2011    DENTAL SURGERY      EYE SURGERY      [facility]. DIALYSIS CATHETER Right 8/27/2020    TESIO CATHETER INSERTION performed by Jay Paula MD at [facility]      removal of sac in ear    SHUNT REVISION Right 1/21/2021    INSERTION AV GRAFT RIGHT ARM performed by Jay Paula MD at [address] [place] [address] Right 4/8/2021    THROMBECTOMY RIGHT ARM AV GRAFT performed by Jay Paula MD at [facility] VASCULAR SURGERY Left 8/9/2021    INSERTION OF TEMPORARY HEMODIALYSIS CATHETER performed by Jay Paula MD at [address] [place] [address] Right 8/11/2021    INSERTION OF TESIO, REMOVAL OF TEMPORARY CATHETHER performed by Jay Paula MD at [address] [place] [address]       No family history on file. reports that he quit smoking about 55 years ago. His smoking use included cigarettes. He has a 10.00 pack-year smoking history. He has never used smokeless tobacco. He reports previous alcohol use of about 1.0 standard drink of alcohol per week. He reports that he does not use drugs.     Allergies:  Sulfa antibiotics and Sulfa antibiotics    Current Medications:    apixaban (ELIQUIS) tablet 2.5 mg, BID  0.9 % sodium chloride infusion, Continuous  cefepime (MAXIPIME) 1000 mg IVPB minibag, Q24H  albuterol (ACCUNEB) nebulizer solution 1.25 mg, Q6H PRN  atorvastatin (LIPITOR) tablet 10 mg, Nightly  budesonide (PULMICORT) nebulizer suspension 250 mcg, BID  gabapentin (NEURONTIN) capsule 100 mg, Nightly  [Held by provider] lisinopril (PRINIVIL;ZESTRIL) tablet 2.5 mg, Daily  metoprolol succinate (TOPROL XL) extended release tablet 25 mg, Daily  vitamin D (CHOLECALCIFEROL) tablet 1,000 Units, Daily  zinc sulfate (ZINCATE) capsule 50 mg, Daily  sodium chloride flush 0.9 % injection 5-40 mL, 2 times per day  sodium chloride flush 0.9 % injection 5-40 mL, PRN  0.9 % sodium chloride infusion, PRN  ondansetron (ZOFRAN-ODT) disintegrating tablet 4 mg, Q8H PRN Or  ondansetron (ZOFRAN) injection 4 mg, Q6H PRN  polyethylene glycol (GLYCOLAX) packet 17 g, Daily PRN  acetaminophen (TYLENOL) tablet 650 mg, Q6H PRN   Or  acetaminophen (TYLENOL) suppository 650 mg, Q6H PRN  insulin lispro (HUMALOG) injection vial 0-6 Units, TID WC  insulin lispro (HUMALOG) injection vial 0-3 Units, Nightly  glucose (GLUTOSE) 40 % oral gel 15 g, PRN  glucagon (rDNA) injection 1 mg, PRN  dextrose 5 % solution, PRN  antioxidant multivitamin (OCUVITE) tablet, Daily  dextrose bolus (hypoglycemia) 10% 125 mL, PRN   Or  dextrose bolus (hypoglycemia) 10% 250 mL, PRN        Review of Systems:       Physical exam:   Vital signs stable, hypotension stabilized  General appearance; arousable with no worsening shortness of breath  Lungs: Bilateral rhonchi especially on the left  Heart RRR  Abdomen soft no tenderness  Extremities no edema  Neurologic physiologic  Data:   Labs:  CBC with Differential:    Lab Results   Component Value Date    WBC 12.6 02/12/2022    RBC 3.23 02/12/2022    HGB 10.4 02/12/2022    HCT 33.6 02/12/2022     02/12/2022    .0 02/12/2022    MCH 32.2 02/12/2022    MCHC 31.0 02/12/2022    RDW 14.7 02/12/2022    SEGSPCT 46 01/04/2014    BANDSPCT 2 05/02/2016    LYMPHOPCT 10.9 02/12/2022    MONOPCT 6.5 02/12/2022    BASOPCT 0.2 02/12/2022    MONOSABS 0.82 02/12/2022    LYMPHSABS 1.37 02/12/2022    EOSABS 0.00 02/12/2022    BASOSABS 0.02 02/12/2022     CMP:    Lab Results   Component Value Date     02/12/2022    K 5.0 02/12/2022    CL 95 02/12/2022    CO2 21 02/12/2022    BUN 57 02/12/2022    CREATININE 7.0 02/12/2022    GFRAA 9 02/12/2022    LABGLOM 7 02/12/2022    GLUCOSE 193 02/12/2022    GLUCOSE 163 02/22/2012    PROT 6.3 02/11/2022    LABALBU 3.6 02/11/2022    LABALBU 3.8 02/20/2012    CALCIUM 9.0 02/12/2022    BILITOT 0.6 02/11/2022    ALKPHOS 104 02/11/2022    AST 12 02/11/2022    ALT 14 02/11/2022     Ionized Calcium:  No results found for: IONCA  Magnesium:    Lab Results   Component Value Date    MG 2.3 12/03/2021     Phosphorus:    Lab Results   Component Value Date    PHOS 4.4 12/02/2021     U/A:    Lab Results   Component Value Date    COLORU Yellow 07/10/2021    PHUR 6.0 07/10/2021    WBCUA PACKED 07/10/2021    WBCUA NONE 02/20/2012    RBCUA >20 07/10/2021    RBCUA 1-3 05/04/2013    BACTERIA MANY 07/10/2021    CLARITYU CLOUDY 07/10/2021    SPECGRAV 1.020 07/10/2021    LEUKOCYTESUR LARGE 07/10/2021    UROBILINOGEN 0.2 07/10/2021    BILIRUBINUR Negative 07/10/2021    BILIRUBINUR NEGATIVE 02/20/2012    BLOODU LARGE 07/10/2021    GLUCOSEU Negative 07/10/2021    GLUCOSEU 500 02/20/2012     Microalbumen/Creatinine ratio:  No components found for: RUCREAT  Iron Saturation:  No components found for: PERCENTFE  TIBC:    Lab Results   Component Value Date    TIBC 177 08/24/2020     FERRITIN:    Lab Results   Component Value Date    FERRITIN 98 08/24/2020        Imaging:  XR CHEST PORTABLE   Final Result   Small persistent infiltrate left lung base. Clinical correlation and   follow-up to resolution recommended. Assessment  1. ESRD on hemodialysis  2. Acute hypoxic history failure with diagnosis of pneumonia placed on Omnicef/doxy  3. Anemia: Due to CKD  4. Second hyperparathyroidism; due to CKD  5. Metabolic bone disease: Due to CKD  6. Diabetes mellitus with foot infections and sacral decubiti  7. Severe deconditioning  8. COVID-19 infection    Plan  1. Continue supportive care as being done,  2. Hemodialysis today with no problems at this point time  3. Will follow clinical course with the pulmonary infectious disease and other consultants if needed. Thank you for the opportunity to participate in the care of your pleasant patient. We look forward to following along with you.         Electronically signed by Bruna Choi MD on 2/12/2022 at 1:54 PM

## 2022-02-12 NOTE — PROGRESS NOTES
Internal Medicine Progress Note    Admission date: 2/11/2022  Primary care physician: Velia Andre, DO    Subjective  Obdulia Alcantara was seen and examined at bedside today. Family present during my examination. Originally seen by Dr. Jeffrey Birch but is actually part of our service, will take over care. H&P reviewed. Obdulia Alcantara states that he feels a bit better than from admission last night. Had HD session earlier today. Review of Systems  There are no new complaints of chest pain, +shortness of breath, abdominal pain, nausea, vomiting, diarrhea, constipation, headaches, fevers, chills, lightheadedness, dizziness, calf pain.     Hospital Medications  Current Facility-Administered Medications   Medication Dose Route Frequency Provider Last Rate Last Admin    apixaban (ELIQUIS) tablet 2.5 mg  2.5 mg Oral BID Rafael Yun MD        0.9 % sodium chloride infusion   IntraVENous Continuous Rafael Yun  mL/hr at 02/12/22 0403 New Bag at 02/12/22 0403    albuterol (ACCUNEB) nebulizer solution 1.25 mg  1 ampule Nebulization Q6H PRN Rafael Yun MD   1.25 mg at 02/12/22 0208    atorvastatin (LIPITOR) tablet 10 mg  10 mg Oral Nightly Rafael Yun MD        budesonide (PULMICORT) nebulizer suspension 250 mcg  0.25 mg Nebulization BID Rafael Yun MD   250 mcg at 02/12/22 0207    gabapentin (NEURONTIN) capsule 100 mg  100 mg Oral Nightly Emmett Rater, MD [facility] by provider] lisinopril (PRINIVIL;ZESTRIL) tablet 2.5 mg  2.5 mg Oral Daily Rafael Yun MD        metoprolol succinate (TOPROL XL) extended release tablet 25 mg  25 mg Oral Daily Rafael Yun MD        vitamin D (CHOLECALCIFEROL) tablet 1,000 Units  1,000 Units Oral Daily Rafael Yun MD        zinc sulfate (ZINCATE) capsule 50 mg  50 mg Oral Daily Rafael Yun MD        sodium chloride flush 0.9 % injection 5-40 mL  5-40 mL IntraVENous 2 times per day Rafael Yun MD   5 mL at 02/11/22 1402    sodium chloride flush 0.9 % injection 5-40 mL  5-40 mL IntraVENous PRN Alexandra Huber MD        0.9 % sodium chloride infusion  25 mL IntraVENous PRN Alexandra Huber MD        ondansetron (ZOFRAN-ODT) disintegrating tablet 4 mg  4 mg Oral Q8H PRN Alexandra Huber MD        Or    ondansetron [facility]) injection 4 mg  4 mg IntraVENous Q6H PRN Alexandra Huber MD        polyethylene glycol [facility]) packet 17 g  17 g Oral Daily PRN Alexandra Huber MD        acetaminophen (TYLENOL) tablet 650 mg  650 mg Oral Q6H PRN Alexandra Huber MD        Or    acetaminophen (TYLENOL) suppository 650 mg  650 mg Rectal Q6H PRN Alexandra Huber MD        insulin lispro (HUMALOG) injection vial 0-6 Units  0-6 Units SubCUTAneous TID WC Alexandra Huber MD   1 Units at 02/12/22 0659    insulin lispro (HUMALOG) injection vial 0-3 Units  0-3 Units SubCUTAneous Nightly Alexandra Huber MD   2 Units at 02/11/22 2305    glucose (GLUTOSE) 40 % oral gel 15 g  15 g Oral PRN Alexandra Huber MD        glucagon (rDNA) injection 1 mg  1 mg IntraMUSCular PRN Alexandra Huber MD        dextrose 5 % solution  100 mL/hr IntraVENous PRN Alexandra Huber MD        levoFLOXacin (LEVAQUIN) 500 MG/100ML infusion 500 mg  500 mg IntraVENous Once Alexandra Huber MD        antioxidant multivitamin (OCUVITE) tablet  1 tablet Oral Daily Alexandra Huber MD        dextrose bolus (hypoglycemia) 10% 125 mL  125 mL IntraVENous PRN Alexandra Huber MD        Or    dextrose bolus (hypoglycemia) 10% 250 mL  250 mL IntraVENous PRN Alexandra Huber MD           PRN Medications  albuterol, sodium chloride flush, sodium chloride, ondansetron **OR** ondansetron, polyethylene glycol, acetaminophen **OR** acetaminophen, glucose, glucagon (rDNA), dextrose, dextrose bolus (hypoglycemia) **OR** dextrose bolus (hypoglycemia)    Objective  Most Recent Recorded Vitals  /70   Pulse 76   Temp 99 °F (37.2 °C)   Resp 16   Ht 5' 6\" (1.676 m)   Wt 196 lb 3.4 oz (89 kg)   SpO2 97%   BMI 31.67 kg/m²   No intake/output data recorded. No intake/output data recorded. Physical Exam:  General: AAO to person, place, time, and purpose, NAD, no labored breathing  Eyes: conjunctivae/corneas clear, sclera non icteric. Skin: color, texture, and turgor normal, no rashes or lesions. Lungs: clear to auscultation bilaterally, no retractions or use of accessory muscles, no vocal fremitus, no rhonchi, no crackle, no rales  Heart: regular rate and regular rhythm, no murmur  Abdomen: soft, non-tender; bowel sounds normal; no masses,  no organomegaly  Extremities: atraumatic, no cyanosis, no edema  Neurologic: cranial nerves 2-12 grossly intact, no slurred speech. Most Recent Labs  Lab Results   Component Value Date    WBC 12.6 (H) 02/12/2022    HGB 10.4 (L) 02/12/2022    HCT 33.6 (L) 02/12/2022     02/12/2022     02/12/2022    K 5.0 02/12/2022    CL 95 (L) 02/12/2022    CREATININE 7.0 (H) 02/12/2022    BUN 57 (H) 02/12/2022    CO2 21 (L) 02/12/2022    GLUCOSE 193 (H) 02/12/2022    ALT 14 02/11/2022    AST 12 02/11/2022    INR 1.5 05/15/2021    TSH 0.974 12/03/2021    LABA1C 7.2 (H) 12/04/2021    LABMICR 1509.9 (H) 08/14/2020       *All labs in electric medical record were reviewed. Please see electronic chart for a more comprehensive set of labs    XR CHEST PORTABLE    Result Date: 2/11/2022  EXAMINATION: ONE XRAY VIEW OF THE CHEST 2/11/2022 11:36 am COMPARISON: February 11, 2022 HISTORY: ORDERING SYSTEM PROVIDED HISTORY: concern for pneumonia TECHNOLOGIST PROVIDED HISTORY: Reason for exam:->concern for pneumonia FINDINGS: Cardiac silhouette is moderately prominent. There is vague infiltrate left lung base. Patient is rotated to the right. Right-sided split venous catheter unchanged. Small persistent infiltrate left lung base. Clinical correlation and follow-up to resolution recommended.      XR CHEST PORTABLE    Result Date: 1/20/2022  EXAMINATION: ONE XRAY VIEW OF THE CHEST 1/20/2022 3:14 pm COMPARISON: None. HISTORY: ORDERING SYSTEM PROVIDED HISTORY: shortness of breath TECHNOLOGIST PROVIDED HISTORY: Reason for exam:->shortness of breath FINDINGS: The cardiac silhouette is within normal limits. There is no mediastinal widening Note is made of a right dialysis catheter. There is a small infiltrate seen within the left lung base. There is no right or left pleural effusion. 1. Small left lung base infiltrate         MICROBIOLOGY:  BLOOD CX #1  No results for input(s): BC in the last 72 hours. BLOOD CX #2  No results for input(s): Tyler Crumble in the last 72 hours. CULTURE, RESPIRATORY   No results for input(s): CULTRESP in the last 72 hours. RESPIRATORY SMEAR  No results for input(s): RESPSMEAR in the last 72 hours. STREP PNEUMONIA AG URINE  No results for input(s): STREPNEUMAGU in the last 72 hours. LEGIONELLA AG URINE  No results for input(s): LEGUR in the last 72 hours. No results for input(s): [address] in the last 72 hours. URINE CULTURE  No results for input(s): LABURIN in the last 72 hours. WOUND ABSCESS  No results for input(s): WNDABS in the last 72 hours. TIP CULTURE  No results for input(s): CXCATHTIP in the last 72 hours. BODY FLUID CULTURE  No results for input(s): BFCS in the last 72 hours. Anaerobic cx  No results for input(s): LABANAE in the last 72 hours. CULTURE SURGICAL  No results for input(s): CXSURG in the last 72 hours. MISC. SENDOUT  No results for input(s): MREF in the last 72 hours. Assessment/Plan  Jason Church is a 80y.o. year old male who presented on 2/11/2022 and is being treated for Pneumonia .        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 11/01/2012    Partial small bowel obstruction ([facility] 02/20/2012    Diabetes mellitus-2 06/22/2014    Anemia 11/10/2012    Osteoarthritis 11/09/2012    Lumbar spondylitis ([address] 11/09/2012    IDDM-2 02/20/2012    Pneumonia 02/11/2022    Toe infection     Ischemic ulcer of toe, limited to breakdown of skin, right ([address] 12/03/2021    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 ([address] 12/03/2021    Failure to thrive in adult 12/01/2021    Diabetic ulcer of toe of right foot associated with type 2 diabetes mellitus, with fat layer exposed ([address] 07/06/2021    ESRD (end stage renal disease) on dialysis [facility]) 04/08/2021    Encounter regarding vascular access for dialysis for end-stage renal disease ([address] 01/21/2021    Hypoglycemia 01/12/2019    Decubitus ulcer of sacral region, stage 1 03/19/2018    Right sided weakness 03/19/2018    Cerebral infarction ([address] 06/19/2014    PVD (peripheral vascular disease) with claudication ([address] 06/09/2014    History of CVA (cerebrovascular accident) 06/09/2014    Pain in lower limb 06/09/2014    TIA (transient ischemic attack) 05/15/2014    ASHD (arteriosclerotic heart disease) 05/15/2014    Diabetes mellitus ([address] 05/15/2014    Fx humer, med condyl-closed 01/01/2014    Cerebral infarction ([address] 01/01/2014    Renal failure 01/01/2014    Diabetes mellitus ([address] 11/07/2012    Diabetes mellitus ([address] 11/01/2012    CAD (coronary artery disease) 02/20/2012    HTN 02/20/2012    COPD 02/20/2012       · Community Acquire Pneumonia  · Failed outpatient management  · Given dose of cefepime and levofloxacin  · Pulmonary Consult  · Blood Cultures pending  · Wean oxygen to off  · ESRD on HD  · Nephrology consulted  · Diabetes Mellitus 2, insulin dependent  · Continue QAC and QHS checked  · Continue insulin regimen  · Hypertension   · Currently hypotensive  · Hold lisinopril  · Hold parameter for bblocker  · Peripheral Vascular Disease  · stable  · Routine labs in am  · DVT prophylaxis  · Please see orders for further management and care.     Electronically signed by Alex Zhang MD on 2/12/2022 at 9:28 AM      NOTE:  This report was transcribed using voice recognition software. Every effort was made to ensure accuracy; however, inadvertent computerized transcription errors may be present.

## 2022-02-12 NOTE — FLOWSHEET NOTE
02/12/22 1112   Vital Signs   BP (!) 120/58   Temp 98.4 °F (36.9 °C)   Pulse 112   Resp 20   Weight 195 lb 8.8 oz (88.7 kg)   Weight Method Bed scale   Percent Weight Change -0.34   Pain Assessment   Pain Assessment 0-10   Pain Level 0   Post-Hemodialysis Assessment   Post-Treatment Procedures Blood returned;Catheter capped, clamped with Citrate x 2 ports   Machine Disinfection Process Exterior Machine Disinfection   Rinseback Volume (ml) 300 ml   Total Liters Processed (l/min) 79.4 l/min   Dialyzer Clearance Lightly streaked   Duration of Treatment (minutes) 210 minutes   NET Removed (ml) 300 ml   Tolerated Treatment Good   Patient Response to Treatment Report to Geraldine Acosta RN   Bilateral Breath Sounds Diminished   Edema Generalized   Edema Generalized Trace   RUE Edema Trace   LUE Edema Trace   RLE Edema Trace   LLE Edema Trace   Physician Notified?  Yes

## 2022-02-12 NOTE — CONSULTS
[facility] MD Sonya Lozano MD Nickola Hight, MD Mickiel Spar, MD Olin Plover, KARAN Sandoval, DAISY  Consultation  Patient's Name: Brian Chávez  1:48 PM  2/12/2022    Nephrologist: Sonya Shane MD    Reason for Consult: ESRD  Requesting Physician:  Georges Cuello DO    Chief Complaint: Shortness of breath    History Obtained From: Patient, chart    History of Present Ilness: This is 80years old  male with history of acute on chronic congestive heart failure, COVID-19 pneumonia, CVA, and congestive heart failure, history of CVA and hypertension with diabetes mellitus. Patient presents emerged department with worsening shortness of breath was found to be hypoxic placed on 4 L oxygen with chest x-ray suggestive of persistent left base infiltrate with a temp of 101 with tachycardia and tachypnea.     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 ([address] 12/3/2021    CAD (coronary artery disease)     CHF (congestive heart failure) (HCC)     Chronic kidney disease     COVID-19     CVA (cerebral vascular accident) ([address]     Diabetes mellitus ([address]     Diabetic peripheral neuropathy ([address] 11/9/2012    Hemodialysis patient ([address]     History of CVA (cerebrovascular accident) 6/9/2014    Hypertension     Ischemic ulcer of toe, limited to breakdown of skin, right ([address] 12/3/2021    Other disorders of kidney and ureter     prostate infections in past    PVD (peripheral vascular disease) with claudication ([address] 6/9/2014    Right sided weakness 3/19/2018    TIA (transient ischemic attack)     possible several years ago    Unspecified cerebral artery occlusion with cerebral infarction 2013    [facility] RIGHT LEG AFFECTED       Past Surgical History:   Procedure Laterality Date    ABDOMINAL HERNIA REPAIR      CATARACT REMOVAL      right    CHOLECYSTECTOMY  11/2011    DENTAL SURGERY      EYE SURGERY      [facility] [place]. DIALYSIS CATHETER Right 8/27/2020    TESIO CATHETER INSERTION performed by Noni Resendiz MD at [facility]      removal of sac in ear    SHUNT REVISION Right 1/21/2021    INSERTION AV GRAFT RIGHT ARM performed by Noni Resendiz MD at [address] [place] [address] Right 4/8/2021    THROMBECTOMY RIGHT ARM AV GRAFT performed by Noni Resendiz MD at [place] [address] VASCULAR SURGERY Left 8/9/2021    INSERTION OF TEMPORARY HEMODIALYSIS CATHETER performed by Noni Resendiz MD at [address] [place] [address] Right 8/11/2021    INSERTION OF TESIO, REMOVAL OF TEMPORARY CATHETHER performed by Noni Resendiz MD at [address] [place] [address]       No family history on file. reports that he quit smoking about 55 years ago. His smoking use included cigarettes. He has a 10.00 pack-year smoking history. He has never used smokeless tobacco. He reports previous alcohol use of about 1.0 standard drink of alcohol per week. He reports that he does not use drugs.     Allergies:  Sulfa antibiotics and Sulfa antibiotics    Current Medications:    apixaban (ELIQUIS) tablet 2.5 mg, BID  0.9 % sodium chloride infusion, Continuous  cefepime (MAXIPIME) 1000 mg IVPB minibag, Q24H  albuterol (ACCUNEB) nebulizer solution 1.25 mg, Q6H PRN  atorvastatin (LIPITOR) tablet 10 mg, Nightly  budesonide (PULMICORT) nebulizer suspension 250 mcg, BID  gabapentin (NEURONTIN) capsule 100 mg, Nightly  [Held by provider] lisinopril (PRINIVIL;ZESTRIL) tablet 2.5 mg, Daily  metoprolol succinate (TOPROL XL) extended release tablet 25 mg, Daily  vitamin D (CHOLECALCIFEROL) tablet 1,000 Units, Daily  zinc sulfate (ZINCATE) capsule 50 mg, Daily  sodium chloride flush 0.9 % injection 5-40 mL, 2 times per day  sodium chloride flush 0.9 % injection 5-40 mL, PRN  0.9 % sodium chloride infusion, PRN  ondansetron (ZOFRAN-ODT) disintegrating tablet 4 mg, Q8H PRN Or  ondansetron (ZOFRAN) injection 4 mg, Q6H PRN  polyethylene glycol (GLYCOLAX) packet 17 g, Daily PRN  acetaminophen (TYLENOL) tablet 650 mg, Q6H PRN   Or  acetaminophen (TYLENOL) suppository 650 mg, Q6H PRN  insulin lispro (HUMALOG) injection vial 0-6 Units, TID WC  insulin lispro (HUMALOG) injection vial 0-3 Units, Nightly  glucose (GLUTOSE) 40 % oral gel 15 g, PRN  glucagon (rDNA) injection 1 mg, PRN  dextrose 5 % solution, PRN  antioxidant multivitamin (OCUVITE) tablet, Daily  dextrose bolus (hypoglycemia) 10% 125 mL, PRN   Or  dextrose bolus (hypoglycemia) 10% 250 mL, PRN        Review of Systems:       Physical exam:   Vital signs stable, hypotension stabilized  General appearance; arousable with no worsening shortness of breath  Lungs: Bilateral rhonchi especially on the left  Heart RRR  Abdomen soft no tenderness  Extremities no edema  Neurologic physiologic  Data:   Labs:  CBC with Differential:    Lab Results   Component Value Date    WBC 12.6 02/12/2022    RBC 3.23 02/12/2022    HGB 10.4 02/12/2022    HCT 33.6 02/12/2022     02/12/2022    .0 02/12/2022    MCH 32.2 02/12/2022    MCHC 31.0 02/12/2022    RDW 14.7 02/12/2022    SEGSPCT 46 01/04/2014    BANDSPCT 2 05/02/2016    LYMPHOPCT 10.9 02/12/2022    MONOPCT 6.5 02/12/2022    BASOPCT 0.2 02/12/2022    MONOSABS 0.82 02/12/2022    LYMPHSABS 1.37 02/12/2022    EOSABS 0.00 02/12/2022    BASOSABS 0.02 02/12/2022     CMP:    Lab Results   Component Value Date     02/12/2022    K 5.0 02/12/2022    CL 95 02/12/2022    CO2 21 02/12/2022    BUN 57 02/12/2022    CREATININE 7.0 02/12/2022    GFRAA 9 02/12/2022    LABGLOM 7 02/12/2022    GLUCOSE 193 02/12/2022    GLUCOSE 163 02/22/2012    PROT 6.3 02/11/2022    LABALBU 3.6 02/11/2022    LABALBU 3.8 02/20/2012    CALCIUM 9.0 02/12/2022    BILITOT 0.6 02/11/2022    ALKPHOS 104 02/11/2022    AST 12 02/11/2022    ALT 14 02/11/2022     Ionized Calcium:  No results found for: IONCA  Magnesium:    Lab Results   Component Value Date    MG 2.3 12/03/2021     Phosphorus:    Lab Results   Component Value Date    PHOS 4.4 12/02/2021     U/A:    Lab Results   Component Value Date    COLORU Yellow 07/10/2021    PHUR 6.0 07/10/2021    WBCUA PACKED 07/10/2021    WBCUA NONE 02/20/2012    RBCUA >20 07/10/2021    RBCUA 1-3 05/04/2013    BACTERIA MANY 07/10/2021    CLARITYU CLOUDY 07/10/2021    SPECGRAV 1.020 07/10/2021    LEUKOCYTESUR LARGE 07/10/2021    UROBILINOGEN 0.2 07/10/2021    BILIRUBINUR Negative 07/10/2021    BILIRUBINUR NEGATIVE 02/20/2012    BLOODU LARGE 07/10/2021    GLUCOSEU Negative 07/10/2021    GLUCOSEU 500 02/20/2012     Microalbumen/Creatinine ratio:  No components found for: RUCREAT  Iron Saturation:  No components found for: PERCENTFE  TIBC:    Lab Results   Component Value Date    TIBC 177 08/24/2020     FERRITIN:    Lab Results   Component Value Date    FERRITIN 98 08/24/2020        Imaging:  XR CHEST PORTABLE   Final Result   Small persistent infiltrate left lung base. Clinical correlation and   follow-up to resolution recommended. Assessment  1. ESRD on hemodialysis  2. Acute hypoxic history failure with diagnosis of pneumonia placed on Omnicef/doxy  3. Anemia: Due to CKD  4. Second hyperparathyroidism; due to CKD  5. Metabolic bone disease: Due to CKD  6. Diabetes mellitus with foot infections and sacral decubiti  7. Severe deconditioning    Plan  1. Continue supportive care as being done,  2. Hemodialysis today with no problems at this point time  3. Will follow clinical course with the pulmonary infectious disease and other consultants if needed. Thank you for the opportunity to participate in the care of your pleasant patient. We look forward to following along with you.         Electronically signed by Teto Jaimes MD on 2/12/2022 at 1:48 PM

## 2022-02-13 LAB
ACINETOBACTER CALCOAC BAUMANNII COMPLEX BY PCR: NOT DETECTED
ANION GAP SERPL CALCULATED.3IONS-SCNC: 11 MMOL/L (ref 7–16)
BACTEROIDES FRAGILIS BY PCR: NOT DETECTED
BASOPHILS ABSOLUTE: 0.01 E9/L (ref 0–0.2)
BASOPHILS RELATIVE PERCENT: 0.1 % (ref 0–2)
BOTTLE TYPE: ABNORMAL
BUN BLDV-MCNC: 28 MG/DL (ref 6–23)
C-REACTIVE PROTEIN: 27.9 MG/DL (ref 0–0.4)
CALCIUM SERPL-MCNC: 8.5 MG/DL (ref 8.6–10.2)
CANDIDA ALBICANS BY PCR: NOT DETECTED
CANDIDA AURIS BY PCR: NOT DETECTED
CANDIDA GLABRATA BY PCR: NOT DETECTED
CANDIDA KRUSEI BY PCR: NOT DETECTED
CANDIDA PARAPSILOSIS BY PCR: NOT DETECTED
CANDIDA TROPICALIS BY PCR: NOT DETECTED
CHLORIDE BLD-SCNC: 97 MMOL/L (ref 98–107)
CO2: 26 MMOL/L (ref 22–29)
CREAT SERPL-MCNC: 4.3 MG/DL (ref 0.7–1.2)
CRYPTOCOCCUS NEOFORMANS/GATTII BY PCR: NOT DETECTED
ENTEROBACTER CLOACAE COMPLEX BY PCR: NOT DETECTED
ENTEROBACTERALES BY PCR: NOT DETECTED
ENTEROCOCCUS FAECALIS BY PCR: NOT DETECTED
ENTEROCOCCUS FAECIUM BY PCR: NOT DETECTED
EOSINOPHILS ABSOLUTE: 0.1 E9/L (ref 0.05–0.5)
EOSINOPHILS RELATIVE PERCENT: 1.1 % (ref 0–6)
ESCHERICHIA COLI BY PCR: NOT DETECTED
GFR AFRICAN AMERICAN: 16
GFR NON-AFRICAN AMERICAN: 13 ML/MIN/1.73
GLUCOSE BLD-MCNC: 131 MG/DL (ref 74–99)
GRAM STAIN ORDERABLE: NORMAL
HAEMOPHILUS INFLUENZAE BY PCR: NOT DETECTED
HCT VFR BLD CALC: 29.2 % (ref 37–54)
HEMOGLOBIN: 9.2 G/DL (ref 12.5–16.5)
IMMATURE GRANULOCYTES #: 0.17 E9/L
IMMATURE GRANULOCYTES %: 1.8 % (ref 0–5)
KLEBSIELLA AEROGENES BY PCR: NOT DETECTED
KLEBSIELLA OXYTOCA BY PCR: NOT DETECTED
KLEBSIELLA PNEUMONIAE GROUP BY PCR: NOT DETECTED
LISTERIA MONOCYTOGENES BY PCR: NOT DETECTED
LYMPHOCYTES ABSOLUTE: 1.06 E9/L (ref 1.5–4)
LYMPHOCYTES RELATIVE PERCENT: 11.4 % (ref 20–42)
MCH RBC QN AUTO: 32.9 PG (ref 26–35)
MCHC RBC AUTO-ENTMCNC: 31.5 % (ref 32–34.5)
MCV RBC AUTO: 104.3 FL (ref 80–99.9)
METER GLUCOSE: 119 MG/DL (ref 74–99)
METER GLUCOSE: 131 MG/DL (ref 74–99)
METER GLUCOSE: 183 MG/DL (ref 74–99)
METER GLUCOSE: 185 MG/DL (ref 74–99)
METHICILLIN RESISTANCE MECA/C  BY PCR: DETECTED
MONOCYTES ABSOLUTE: 0.8 E9/L (ref 0.1–0.95)
MONOCYTES RELATIVE PERCENT: 8.6 % (ref 2–12)
NEISSERIA MENINGITIDIS BY PCR: NOT DETECTED
NEUTROPHILS ABSOLUTE: 7.15 E9/L (ref 1.8–7.3)
NEUTROPHILS RELATIVE PERCENT: 77 % (ref 43–80)
ORDER NUMBER: ABNORMAL
PDW BLD-RTO: 14.8 FL (ref 11.5–15)
PHOSPHORUS: 3.1 MG/DL (ref 2.5–4.5)
PLATELET # BLD: 129 E9/L (ref 130–450)
PMV BLD AUTO: 10.6 FL (ref 7–12)
POTASSIUM REFLEX MAGNESIUM: 3.8 MMOL/L (ref 3.5–5)
PROTEUS SPECIES BY PCR: NOT DETECTED
PSEUDOMONAS AERUGINOSA BY PCR: NOT DETECTED
RBC # BLD: 2.8 E12/L (ref 3.8–5.8)
SALMONELLA SPECIES BY PCR: NOT DETECTED
SEDIMENTATION RATE, ERYTHROCYTE: 100 MM/HR (ref 0–15)
SERRATIA MARCESCENS BY PCR: NOT DETECTED
SODIUM BLD-SCNC: 134 MMOL/L (ref 132–146)
SOURCE OF BLOOD CULTURE: ABNORMAL
STAPHYLOCOCCUS AUREUS BY PCR: NOT DETECTED
STAPHYLOCOCCUS EPIDERMIDIS BY PCR: DETECTED
STAPHYLOCOCCUS LUGDUNENSIS BY PCR: NOT DETECTED
STAPHYLOCOCCUS SPECIES BY PCR: DETECTED
STENOTROPHOMONAS MALTOPHILIA BY PCR: NOT DETECTED
STREPTOCOCCUS AGALACTIAE BY PCR: NOT DETECTED
STREPTOCOCCUS PNEUMONIAE BY PCR: NOT DETECTED
STREPTOCOCCUS PYOGENES  BY PCR: NOT DETECTED
STREPTOCOCCUS SPECIES BY PCR: NOT DETECTED
WBC # BLD: 9.3 E9/L (ref 4.5–11.5)

## 2022-02-13 PROCEDURE — 87040 BLOOD CULTURE FOR BACTERIA: CPT

## 2022-02-13 PROCEDURE — 94640 AIRWAY INHALATION TREATMENT: CPT

## 2022-02-13 PROCEDURE — 1200000000 HC SEMI PRIVATE

## 2022-02-13 PROCEDURE — 36415 COLL VENOUS BLD VENIPUNCTURE: CPT

## 2022-02-13 PROCEDURE — 85025 COMPLETE CBC W/AUTO DIFF WBC: CPT

## 2022-02-13 PROCEDURE — 2700000000 HC OXYGEN THERAPY PER DAY

## 2022-02-13 PROCEDURE — 80048 BASIC METABOLIC PNL TOTAL CA: CPT

## 2022-02-13 PROCEDURE — 6370000000 HC RX 637 (ALT 250 FOR IP): Performed by: INTERNAL MEDICINE

## 2022-02-13 PROCEDURE — 6360000002 HC RX W HCPCS: Performed by: INTERNAL MEDICINE

## 2022-02-13 PROCEDURE — 6370000000 HC RX 637 (ALT 250 FOR IP): Performed by: SPECIALIST

## 2022-02-13 PROCEDURE — 85651 RBC SED RATE NONAUTOMATED: CPT

## 2022-02-13 PROCEDURE — 84100 ASSAY OF PHOSPHORUS: CPT

## 2022-02-13 PROCEDURE — 86140 C-REACTIVE PROTEIN: CPT

## 2022-02-13 PROCEDURE — 2580000003 HC RX 258: Performed by: INTERNAL MEDICINE

## 2022-02-13 PROCEDURE — 82962 GLUCOSE BLOOD TEST: CPT

## 2022-02-13 RX ORDER — DOXYCYCLINE HYCLATE 100 MG/1
100 CAPSULE ORAL EVERY 12 HOURS SCHEDULED
Status: DISCONTINUED | OUTPATIENT
Start: 2022-02-13 | End: 2022-02-16 | Stop reason: HOSPADM

## 2022-02-13 RX ADMIN — Medication 10 ML: at 20:14

## 2022-02-13 RX ADMIN — BUDESONIDE 250 MCG: 0.25 SUSPENSION RESPIRATORY (INHALATION) at 19:51

## 2022-02-13 RX ADMIN — INSULIN LISPRO 1 UNITS: 100 INJECTION, SOLUTION INTRAVENOUS; SUBCUTANEOUS at 12:04

## 2022-02-13 RX ADMIN — Medication 1000 UNITS: at 09:54

## 2022-02-13 RX ADMIN — DOXYCYCLINE HYCLATE 100 MG: 100 CAPSULE ORAL at 20:13

## 2022-02-13 RX ADMIN — ATORVASTATIN CALCIUM 10 MG: 10 TABLET, FILM COATED ORAL at 20:13

## 2022-02-13 RX ADMIN — INSULIN LISPRO 1 UNITS: 100 INJECTION, SOLUTION INTRAVENOUS; SUBCUTANEOUS at 20:13

## 2022-02-13 RX ADMIN — Medication 1 TABLET: at 09:54

## 2022-02-13 RX ADMIN — ALBUTEROL SULFATE 1.25 MG: 1.25 SOLUTION RESPIRATORY (INHALATION) at 19:51

## 2022-02-13 RX ADMIN — SODIUM CHLORIDE: 9 INJECTION, SOLUTION INTRAVENOUS at 02:35

## 2022-02-13 RX ADMIN — APIXABAN 2.5 MG: 2.5 TABLET, FILM COATED ORAL at 09:54

## 2022-02-13 RX ADMIN — ALBUTEROL SULFATE 1.25 MG: 1.25 SOLUTION RESPIRATORY (INHALATION) at 08:37

## 2022-02-13 RX ADMIN — GABAPENTIN 100 MG: 100 CAPSULE ORAL at 20:13

## 2022-02-13 RX ADMIN — APIXABAN 2.5 MG: 2.5 TABLET, FILM COATED ORAL at 20:13

## 2022-02-13 RX ADMIN — METOPROLOL SUCCINATE 25 MG: 25 TABLET, EXTENDED RELEASE ORAL at 09:54

## 2022-02-13 RX ADMIN — BUDESONIDE 250 MCG: 0.25 SUSPENSION RESPIRATORY (INHALATION) at 08:36

## 2022-02-13 RX ADMIN — ZINC SULFATE 220 MG (50 MG) CAPSULE 50 MG: CAPSULE at 09:54

## 2022-02-13 ASSESSMENT — PAIN SCALES - GENERAL
PAINLEVEL_OUTOF10: 0
PAINLEVEL_OUTOF10: 0

## 2022-02-13 NOTE — CONSULTS
[address] [place] [address] [facility]  Consultation Note     Admit Date: 2/11/2022 10:57 AM    Reason for Consult:   Positive blood cultures    Attending Physician:  Fahad Ugalde DO    HISTORY OF PRESENT ILLNESS:             The history is obtained from extensive review of available past medical records. The patient is a 80 y.o. male who is previously known to the ID service. Patient was sent to the ED at [facility] on 2/11/2022 with shortness of breath. He had a low-grade fever of 100.1 °F.  He was tachycardic. He was treated with Cefepime. Levofloxacin was ordered after admission. Seen by pulmonary for left lower lobe infiltrate. Seen by nephrology because of ESRD via a tunneled HD catheter. Blood cultures turned positive with Staph epidermidis in 2 of 4 bottles. 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 ([address] 12/3/2021    CAD (coronary artery disease)     CHF (congestive heart failure) (HCC)     Chronic kidney disease     COVID-19     CVA (cerebral vascular accident) ([address]     Diabetes mellitus ([address]     Diabetic peripheral neuropathy ([address] 11/9/2012    Hemodialysis patient ([address]     History of CVA (cerebrovascular accident) 6/9/2014    Hypertension     Ischemic ulcer of toe, limited to breakdown of skin, right ([address] 12/3/2021    Other disorders of kidney and ureter     prostate infections in past    PVD (peripheral vascular disease) with claudication ([address] 6/9/2014    Right sided weakness 3/19/2018    TIA (transient ischemic attack)     possible several years ago    Unspecified cerebral artery occlusion with cerebral infarction 2013    [facility] RIGHT LEG AFFECTED     November 2021. Sent to the ED by podiatry for concerns of inability to care for himself and failure to thrive. Treated with Vancomycin and Ceftriaxone in the ED. Seen by ID.   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 ID signed off. July 2021. Admitted to [facility] with burning and frequency on urination. Treated with Ceftriaxone. Culture grew Klebsiella pneumoniae. Seen by ID. Ceftriaxone was changed to Levofloxacin x7 days and he was discharged.     November 2012.  Admitted to [facility] with right-sided weakness and slurred speech.  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  Seen by Dr. Elizabeth Unger for a low-grade fever.  Observed off antibiotics.     Past Surgical History:        Procedure Laterality Date    ABDOMINAL HERNIA REPAIR      CATARACT REMOVAL      right    CHOLECYSTECTOMY  11/2011    DENTAL SURGERY      EYE SURGERY      [facility]. DIALYSIS CATHETER Right 8/27/2020    TESIO CATHETER INSERTION performed by Ronny Trejo MD at [facility]      removal of sac in ear    SHUNT REVISION Right 1/21/2021    INSERTION AV GRAFT RIGHT ARM performed by Ronny Trejo MD at [address] [place] [address] Right 4/8/2021    THROMBECTOMY RIGHT ARM AV GRAFT performed by Ronny Trejo MD at [address] [place] [address] Left 8/9/2021    INSERTION OF TEMPORARY HEMODIALYSIS CATHETER performed by Ronny Trejo MD at [address] [place] [address] Right 8/11/2021    INSERTION OF TESIO, REMOVAL OF TEMPORARY CATHETHER performed by Ronny Trejo MD at [facility] OR     Current Medications:   Scheduled Meds:   apixaban  2.5 mg Oral BID    [START ON 2/14/2022] cefepime  2,000 mg IntraVENous Q MWF    atorvastatin  10 mg Oral Nightly    budesonide  0.25 mg Nebulization BID    gabapentin  100 mg Oral Nightly    [Held by provider] lisinopril  2.5 mg Oral Daily    metoprolol succinate  25 mg Oral Daily    Vitamin D  1,000 Units Oral Daily    zinc sulfate  50 mg Oral Daily    sodium chloride flush  5-40 mL IntraVENous 2 times per day    insulin lispro  0-6 Units SubCUTAneous TID WC    insulin lispro  0-3 Units SubCUTAneous Nightly    ocuvite-lutein  1 tablet Oral Daily     Continuous Infusions:   sodium chloride      dextrose       PRN Meds:albuterol, sodium chloride flush, sodium chloride, ondansetron **OR** ondansetron, polyethylene glycol, acetaminophen **OR** acetaminophen, glucose, glucagon (rDNA), dextrose, dextrose bolus (hypoglycemia) **OR** dextrose bolus (hypoglycemia)    Allergies:  Sulfa antibiotics and Sulfa antibiotics    Social History:   Social History     Socioeconomic History    Marital status: Single     Spouse name: Not on file    Number of children: Not on file    Years of education: Not on file    Highest education level: Not on file   Occupational History    Not on file   Tobacco Use    Smoking status: Former Smoker     Packs/day: 0.50     Years: 20.00     Pack years: 10.00     Types: Cigarettes     Quit date: 5     Years since quittin.1    Smokeless tobacco: Never Used   Vaping Use    Vaping Use: Never used   Substance and Sexual Activity    Alcohol use: Not Currently     Alcohol/week: 1.0 standard drink     Types: 1 Glasses of wine per week    Drug use: No    Sexual activity: Not Currently   Other Topics Concern    Not on file   Social History Narrative    ** Merged History Encounter **          Social Determinants of Health     Financial Resource Strain:     Difficulty of Paying Living Expenses: Not on file   Food Insecurity:     Worried About Running Out of Food in the Last Year: Not on file    Arnaldo of Food in the Last Year: Not on file   Transportation Needs:     Lack of Transportation (Medical): Not on file    Lack of Transportation (Non-Medical):  Not on file   Physical Activity:     Days of Exercise per Week: Not on file    Minutes of Exercise per Session: Not on file   Stress:     Feeling of Stress : Not on file   Social Connections:     Frequency of Communication with Friends and Family: Not on file    Frequency of Social Gatherings with Abdomen: Positive bowel sounds to auscultation. Benign to palpation. No masses felt. No hepatosplenomegaly. Extremities: No clubbing, no cyanosis, no edema. Lines: Peripheral.      CBC+dif:  Recent Labs     02/11/22  1154 02/11/22  1154 02/12/22  0320 02/12/22  0320 02/13/22  0228   WBC 6.3  --  12.6*  --  9.3   HGB 12.4*   < > 10.4*   < > 9.2*   HCT 40.0   < > 33.6*   < > 29.2*   .8*   < > 104.0*   < > 104.3*      < > 140   < > 129*   NEUTROABS 5.72   < > 10.30*   < > 7.15    < > = values in this interval not displayed.      Lab Results   Component Value Date    CRP 4.2 (H) 12/01/2021    CRP 0.1 09/07/2020      No results found for: CRP  Lab Results   Component Value Date    SEDRATE 78 (H) 12/01/2021    SEDRATE 31 (H) 09/07/2020    SEDRATE 41 (H) 06/11/2014     Lab Results   Component Value Date    ALT 14 02/11/2022    AST 12 02/11/2022    ALKPHOS 104 02/11/2022    BILITOT 0.6 02/11/2022     Lab Results   Component Value Date     02/13/2022    K 3.8 02/13/2022    CL 97 02/13/2022    CO2 26 02/13/2022    BUN 28 02/13/2022    CREATININE 4.3 02/13/2022    GFRAA 16 02/13/2022    LABGLOM 13 02/13/2022    GLUCOSE 131 02/13/2022    GLUCOSE 163 02/22/2012    PROT 6.3 02/11/2022    LABALBU 3.6 02/11/2022    LABALBU 3.8 02/20/2012    CALCIUM 8.5 02/13/2022    BILITOT 0.6 02/11/2022    ALKPHOS 104 02/11/2022    AST 12 02/11/2022    ALT 14 02/11/2022       Lab Results   Component Value Date    PROTIME 16.6 05/15/2021    PROTIME 15.1 01/29/2012    INR 1.5 05/15/2021       Lab Results   Component Value Date    TSH 0.974 12/03/2021       Lab Results   Component Value Date    COLORU Yellow 07/10/2021    PHUR 6.0 07/10/2021    WBCUA PACKED 07/10/2021    WBCUA NONE 02/20/2012    RBCUA >20 07/10/2021    RBCUA 1-3 05/04/2013    BACTERIA MANY 07/10/2021    CLARITYU CLOUDY 07/10/2021    SPECGRAV 1.020 07/10/2021    LEUKOCYTESUR LARGE 07/10/2021    UROBILINOGEN 0.2 07/10/2021    BILIRUBINUR Negative 07/10/2021 BILIRUBINUR NEGATIVE 02/20/2012    BLOODU LARGE 07/10/2021    GLUCOSEU Negative 07/10/2021    GLUCOSEU 500 02/20/2012       Lab Results   Component Value Date    BE 3.1 08/09/2021    O2SAT 31.7 08/09/2021    PH 7.332 08/09/2021     Radiology:  Noted    Microbiology:  Pending  Recent Labs     02/12/22  0630   BC Gram stain performed from blood culture bottle media  Gram positive cocci in clusters  *     No results for input(s): ORG in the last 72 hours. Recent Labs     02/12/22  0630   BLOODCULT2 Gram stain performed from blood culture bottle media  Gram positive cocci in clusters  *     No results for input(s): STREPNEUMAGU in the last 72 hours. No results for input(s): LP1UAG in the last 72 hours. No results for input(s): ASO in the last 72 hours. No results for input(s): CULTRESP in the last 72 hours. Recent Labs     02/11/22  1154   PROCAL 0.65*       Assessment:  · Left lower lobe pneumonia  · Mild leukocytosis associated to the above, improved  · Staph epidermidis bacteremia  · Probable CLABSI secondary to infected tunneled HD catheter    Plan:    · Repeat blood cultures x2  · Continue Cefepime with hemodialysis  · Add oral Doxycycline  · Check cultures, baseline ESR, CRP  · Monitor labs  · Will follow with you    Thank you for having us see this patient in consultation. I will be discussing this case with the treating physicians.     Rodney Green MD  1:38 PM  2/13/2022

## 2022-02-13 NOTE — PROGRESS NOTES
Internal Medicine Progress Note    Admission date: 2/11/2022  Primary care physician: DO Arthur Javier  Sulaiman Chavez was seen and examined at bedside today. Family present during my examination. He states that he is starting to feel a bit better. Blood cultures returned positive today. Review of Systems  There are no new complaints of chest pain, +shortness of breath, abdominal pain, nausea, vomiting, diarrhea, constipation, headaches, fevers, chills, lightheadedness, dizziness, calf pain.     Hospital Medications  Current Facility-Administered Medications   Medication Dose Route Frequency Provider Last Rate Last Admin    doxycycline hyclate (VIBRAMYCIN) capsule 100 mg  100 mg Oral 2 times per day Feliz Merritt MD        apixaban Concepcion Relic) tablet 2.5 mg  2.5 mg Oral BID Carrie Long MD   2.5 mg at 02/13/22 0954    [START ON 2/14/2022] cefepime (MAXIPIME) 2000 mg IVPB minibag  2,000 mg IntraVENous Q MWF Leopold Lindsay, MD        albuterol (ACCUNEB) nebulizer solution 1.25 mg  1 ampule Nebulization Q6H PRN Carrie Long MD   1.25 mg at 02/13/22 3847    atorvastatin (LIPITOR) tablet 10 mg  10 mg Oral Nightly Carrie Long MD   10 mg at 02/12/22 2005    budesonide (PULMICORT) nebulizer suspension 250 mcg  0.25 mg Nebulization BID Carrie Long MD   250 mcg at 02/13/22 6804    gabapentin (NEURONTIN) capsule 100 mg  100 mg Oral Nightly Carrie Long MD   100 mg at 02/12/22 2005    [Held by provider] lisinopril (PRINIVIL;ZESTRIL) tablet 2.5 mg  2.5 mg Oral Daily Carrie Long MD        metoprolol succinate (TOPROL XL) extended release tablet 25 mg  25 mg Oral Daily Marisela Bradley MD   25 mg at 02/13/22 6624    vitamin D (CHOLECALCIFEROL) tablet 1,000 Units  1,000 Units Oral Daily Carrie Long MD   1,000 Units at 02/13/22 0954    zinc sulfate (ZINCATE) capsule 50 mg  50 mg Oral Daily Carrie Long MD   50 mg at 02/13/22 0954    sodium chloride flush 0.9 % injection 5-40 mL 5-40 mL IntraVENous 2 times per day Alo Mabry MD   10 mL at 02/12/22 2012    sodium chloride flush 0.9 % injection 5-40 mL  5-40 mL IntraVENous PRN Alo Mabry MD        0.9 % sodium chloride infusion  25 mL IntraVENous PRN Alo Mabry MD        ondansetron (ZOFRAN-ODT) disintegrating tablet 4 mg  4 mg Oral Q8H PRN Alo Mabry MD        Or    [facility]dansetron [facility]) injection 4 mg  4 mg IntraVENous Q6H PRN Alo Mabry MD        polyethylene glycol [facility]) packet 17 g  17 g Oral Daily PRN Alo Mabry MD        acetaminophen (TYLENOL) tablet 650 mg  650 mg Oral Q6H PRN Alo Mabry MD        Or    acetaminophen (TYLENOL) suppository 650 mg  650 mg Rectal Q6H PRN Alo Mabry MD        insulin lispro (HUMALOG) injection vial 0-6 Units  0-6 Units SubCUTAneous TID WC Alo Mabry MD   1 Units at 02/13/22 1204    insulin lispro (HUMALOG) injection vial 0-3 Units  0-3 Units SubCUTAneous Nightly Alo Mabry MD   1 Units at 02/12/22 2005    glucose (GLUTOSE) 40 % oral gel 15 g  15 g Oral PRN Alo Mabry MD        glucagon (rDNA) injection 1 mg  1 mg IntraMUSCular PRN Alo Mabry MD        dextrose 5 % solution  100 mL/hr IntraVENous PRN Alo Mabry MD        antioxidant multivitamin (OCUVITE) tablet  1 tablet Oral Daily Alo Mabry MD   1 tablet at 02/13/22 0954    dextrose bolus (hypoglycemia) 10% 125 mL  125 mL IntraVENous PRN Alo Mabry MD        Or    dextrose bolus (hypoglycemia) 10% 250 mL  250 mL IntraVENous PRISAAK Mabry MD           PRN Medications  albuterol, sodium chloride flush, sodium chloride, ondansetron **OR** ondansetron, polyethylene glycol, acetaminophen **OR** acetaminophen, glucose, glucagon (rDNA), dextrose, dextrose bolus (hypoglycemia) **OR** dextrose bolus (hypoglycemia)    Objective  Most Recent Recorded Vitals  /62   Pulse 102   Temp 99 °F (37.2 °C) (Oral)   Resp 18   Ht 5' 6\" (1.676 m)   Wt 201 lb 11.5 oz (91.5 kg) SpO2 96%   BMI 32.56 kg/m²   I/O last 3 completed shifts: In: 180 [P.O.:180]  Out: -   I/O this shift:  In: 300 [P.O.:300]  Out: -     Physical Exam:  General: AAO to person, place, time, and purpose, NAD, no labored breathing  Eyes: conjunctivae/corneas clear, sclera non icteric. Skin: color, texture, and turgor normal, no rashes or lesions. Lungs: clear to auscultation bilaterally, no retractions or use of accessory muscles, no vocal fremitus, no rhonchi, no crackle, no rales  Heart: regular rate and regular rhythm, no murmur  Abdomen: soft, non-tender; bowel sounds normal; no masses,  no organomegaly  Extremities: atraumatic, no cyanosis, no edema  Neurologic: cranial nerves 2-12 grossly intact, no slurred speech. Most Recent Labs  Lab Results   Component Value Date    WBC 9.3 02/13/2022    HGB 9.2 (L) 02/13/2022    HCT 29.2 (L) 02/13/2022     (L) 02/13/2022     02/13/2022    K 3.8 02/13/2022    CL 97 (L) 02/13/2022    CREATININE 4.3 (H) 02/13/2022    BUN 28 (H) 02/13/2022    CO2 26 02/13/2022    GLUCOSE 131 (H) 02/13/2022    ALT 14 02/11/2022    AST 12 02/11/2022    INR 1.5 05/15/2021    TSH 0.974 12/03/2021    LABA1C 7.2 (H) 12/04/2021    LABMICR 1509.9 (H) 08/14/2020       *All labs in electric medical record were reviewed. Please see electronic chart for a more comprehensive set of labs    XR CHEST PORTABLE    Result Date: 2/11/2022  EXAMINATION: ONE XRAY VIEW OF THE CHEST 2/11/2022 11:36 am COMPARISON: February 11, 2022 HISTORY: ORDERING SYSTEM PROVIDED HISTORY: concern for pneumonia TECHNOLOGIST PROVIDED HISTORY: Reason for exam:->concern for pneumonia FINDINGS: Cardiac silhouette is moderately prominent. There is vague infiltrate left lung base. Patient is rotated to the right. Right-sided split venous catheter unchanged. Small persistent infiltrate left lung base. Clinical correlation and follow-up to resolution recommended.      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 11/01/2012    Partial small bowel obstruction ([address] 02/20/2012    Diabetes mellitus-2 06/22/2014    Anemia 11/10/2012    Osteoarthritis 11/09/2012    Lumbar spondylitis ([address] 11/09/2012    IDDM-2 02/20/2012    Pneumonia 02/11/2022    Toe infection     Ischemic ulcer of toe, limited to breakdown of skin, right ([address] 12/03/2021    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 ([address] 12/03/2021    Failure to thrive in adult 12/01/2021    Diabetic ulcer of toe of right foot associated with type 2 diabetes mellitus, with fat layer exposed ([address] 07/06/2021    ESRD (end stage renal disease) on dialysis ([address] 04/08/2021    Encounter regarding vascular access for dialysis for end-stage renal disease ([address] 01/21/2021    Hypoglycemia 01/12/2019    Decubitus ulcer of sacral region, stage 1 03/19/2018    Right sided weakness 03/19/2018    Cerebral infarction ([address] 06/19/2014    PVD (peripheral vascular disease) with claudication ([address] 06/09/2014    History of CVA (cerebrovascular accident) 06/09/2014    Pain in lower limb 06/09/2014    TIA (transient ischemic attack) 05/15/2014    ASHD (arteriosclerotic heart disease) 05/15/2014    Diabetes mellitus ([address] 05/15/2014    Fx humer, med condyl-closed 01/01/2014    Cerebral infarction ([address] 01/01/2014    Renal failure 01/01/2014    Diabetes mellitus ([address] 11/07/2012    Diabetes mellitus ([address] 11/01/2012    CAD (coronary artery disease) 02/20/2012    HTN 02/20/2012    COPD 02/20/2012       · Healthcare Associated Pneumonia  · Failed outpatient management  · Given dose of cefepime and levofloxacin  · Pulmonary Consult  · Blood Cultures - positive  · Infectious Disease consult  · Wean oxygen to off  · ESRD on HD  · Nephrology consulted  · Diabetes Mellitus 2, insulin dependent  · Continue QAC and QHS checked  · Continue insulin regimen  · Hypertension   · Currently hypotensive  · Hold lisinopril  · Hold parameter for bblocker  · Peripheral Vascular Disease  · stable  · Routine labs in am  · DVT prophylaxis  · Please see orders for further management and care. Electronically signed by Mary Perkins MD on 2/13/2022 at 4:13 PM      NOTE:  This report was transcribed using voice recognition software. Every effort was made to ensure accuracy; however, inadvertent computerized transcription errors may be present.

## 2022-02-13 NOTE — PROGRESS NOTES
Physician Progress Note      PATIENT:               Melissa Plummer  CSN #:                  [id number]  :                       1931  ADMIT DATE:       2022 10:57 AM  DISCH DATE:  RESPONDING  PROVIDER #:        Amaury ETIENNE DO          QUERY TEXT:    Dr. Leesa Mckeon,    Patient admitted with pneumonia and noted to have leukocytosis, elevated temp,   hypotension and tachycardia. If possible, please document in the progress   notes and discharge summary if you are evaluating and /or treating any of the   following: The medical record reflects the following:  Risk Factors: Pneumonia  Clinical Indicators: max temp 100.1, , wbc 12.6, lactic 2.9,   procalcitonin 0.65, bp 72/39  Treatment: IVFs, IV cefepime, IV Levaquin    Thank you,  Lisset Powell RN  Clinical Documentation Improvement  [phone]  Options provided:  -- Sepsis, present on admission  -- Pneumonia without Sepsis  -- Other - I will add my own diagnosis  -- Disagree - Not applicable / Not valid  -- Disagree - Clinically unable to determine / Unknown  -- Refer to Clinical Documentation Reviewer    PROVIDER RESPONSE TEXT:    This patient has sepsis which was present on admission.     Query created by: Poonam Keating on 2022 12:15 PM      Electronically signed by:  Carlito Dumont DO 2022 7:51 PM

## 2022-02-13 NOTE — PROGRESS NOTES
%    Medications:  IV:   sodium chloride      dextrose         Scheduled Meds:   doxycycline hyclate  100 mg Oral 2 times per day    apixaban  2.5 mg Oral BID    [START ON 2/14/2022] cefepime  2,000 mg IntraVENous Q MWF    atorvastatin  10 mg Oral Nightly    budesonide  0.25 mg Nebulization BID    gabapentin  100 mg Oral Nightly    [Held by provider] lisinopril  2.5 mg Oral Daily    metoprolol succinate  25 mg Oral Daily    Vitamin D  1,000 Units Oral Daily    zinc sulfate  50 mg Oral Daily    sodium chloride flush  5-40 mL IntraVENous 2 times per day    insulin lispro  0-6 Units SubCUTAneous TID WC    insulin lispro  0-3 Units SubCUTAneous Nightly    ocuvite-lutein  1 tablet Oral Daily       Diet:   ADULT DIET; Regular; 4 carb choices (60 gm/meal); Low Sodium (2 gm)     EXAM:  General: No distress. Alert. Eyes: PERRL. No sclera icterus. No conjunctival injection. ENT: No discharge. Pharynx clear. Neck: Trachea midline. Normal thyroid. Resp: No accessory muscle use. Left lower rales. No wheezing. No rhonchi. CV: Regular rate. Regular rhythm. No murmur or rub. Abd: Non-tender. Non-distended. No masses. No organomegaly. Normal bowel sounds. Skin: Warm and dry. No nodule on exposed extremities. No rash on exposed extremities. Ext: No cyanosis, clubbing, edema  Lymph: No cervical LAD. No supraclavicular LAD. M/S: No cyanosis. No joint deformity. No clubbing. Neuro: Awake. Follows commands. Positive pupils/gag/corneals. Normal pain response.        Results:  CBC:   Recent Labs     02/11/22  1154 02/12/22 0320 02/13/22 0228   WBC 6.3 12.6* 9.3   HGB 12.4* 10.4* 9.2*   HCT 40.0 33.6* 29.2*   .8* 104.0* 104.3*    140 129*     BMP:   Recent Labs     02/11/22  1747 02/12/22 0320 02/13/22 0228    134 134   K 4.6 5.0 3.8   CL 97* 95* 97*   CO2 22 21* 26   PHOS  --   --  3.1   BUN 50* 57* 28*   CREATININE 6.1* 7.0* 4.3*     LIVER PROFILE:   Recent Labs     02/11/22  0477 AST 12   ALT 14   BILITOT 0.6   ALKPHOS 104     PT/INR: No results for input(s): PROTIME, INR in the last 72 hours. APTT: No results for input(s): APTT in the last 72 hours. Pathology:  1. N/A      Microbiology:  1. None    Recent ABG:   No results for input(s): PH, PO2, PCO2, HCO3, BE, O2SAT, METHB, O2HB, COHB, O2CON, HHB, THB in the last 72 hours. Recent Films:  XR CHEST PORTABLE   Final Result   Small persistent infiltrate left lung base. Clinical correlation and   follow-up to resolution recommended.                      Assessment:  1. 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 There is no associated leukocytosis. Procalcitonin is elevated but can be unreliable in the presence of renal failure.        Plan:  1. Continue antibiotics per infectious disease  2. Await repeat cultures       Time at the bedside, reviewing labs and radiographs, reviewing updated notes and consultations, discussing with staff and family was more than 35 minutes. Please note that voice recognition technology was used in the preparation of this note and make therefore it may contain inadvertent transcription errors. If the patient is a COVID 19 isolation patient, the above physical exam reflects that of the examining physician for the day. Severiano Smaller, MD,  M.D., F.C.C.P.     [facility], [address] [place] [address], [address] [place] [address]

## 2022-02-13 NOTE — PROGRESS NOTES
[facility] MD Cici Damon, MD Canelo Michael, MD Ana Maria Roberts, MD Gretchen Sheffield, CNP   Anu Lux, DAISY  Progress Note    2/13/2022    SUBJECTIVE:   (-) c/o's  (-) sob/grace/cp/palp Appetite poor  Overall improving clinical status  PROBLEM LIST:    Principal Problem:    Pneumonia  Resolved Problems:    * No resolved hospital problems. *         DIET:    ADULT DIET; Regular; 4 carb choices (60 gm/meal);  Low Sodium (2 gm)     MEDS (scheduled):    doxycycline hyclate  100 mg Oral 2 times per day    apixaban  2.5 mg Oral BID    [START ON 2/14/2022] cefepime  2,000 mg IntraVENous Q MWF    atorvastatin  10 mg Oral Nightly    budesonide  0.25 mg Nebulization BID    gabapentin  100 mg Oral Nightly    [Held by provider] lisinopril  2.5 mg Oral Daily    metoprolol succinate  25 mg Oral Daily    Vitamin D  1,000 Units Oral Daily    zinc sulfate  50 mg Oral Daily    sodium chloride flush  5-40 mL IntraVENous 2 times per day    insulin lispro  0-6 Units SubCUTAneous TID WC    insulin lispro  0-3 Units SubCUTAneous Nightly    ocuvite-lutein  1 tablet Oral Daily       MEDS (infusions):   sodium chloride      dextrose         MEDS (prn):  albuterol, sodium chloride flush, sodium chloride, ondansetron **OR** ondansetron, polyethylene glycol, acetaminophen **OR** acetaminophen, glucose, glucagon (rDNA), dextrose, dextrose bolus (hypoglycemia) **OR** dextrose bolus (hypoglycemia)    PHYSICAL EXAM:     Patient Vitals for the past 24 hrs:   BP Temp Temp src Pulse Resp SpO2 Weight   02/13/22 0824 102/62 99 °F (37.2 °C) Oral 102 18 96 % --   02/13/22 0500 -- -- -- -- -- -- 201 lb 11.5 oz (91.5 kg)   02/12/22 2000 (!) 108/55 99 °F (37.2 °C) Oral 86 16 96 % --   02/12/22 1700 99/61 -- -- 96 20 96 % --   @    No intake or output data in the 24 hours ending 02/13/22 1356      Wt Readings from Last 3 Encounters:   02/13/22 201 lb 11.5 oz (91.5 kg)   01/20/22 180 lb (81.6 kg)   01/11/22 180 lb (81.6 kg)       Constitutional:  in no acute distress  HEENT: NC/AT, EOMI, sclera and conjunctiva are clear and anicteric, mucus membranes moist  Neck: Trachea midline, no JVD  Cardiovascular: S1, S2 regular rhythm, no murmur,or rub  Respiratory:  No crackles, no wheeze  Gastrointestinal:  Soft, nontender, nondistended, NABS  Ext: no edema, feet warm  Skin: dry, no rash  Neuro: awake, alert, interactive      DATA:    Recent Labs     02/11/22  1154 02/12/22  0320 02/13/22 0228   WBC 6.3 12.6* 9.3   HGB 12.4* 10.4* 9.2*   HCT 40.0 33.6* 29.2*   .8* 104.0* 104.3*    140 129*     Recent Labs     02/11/22  1747 02/12/22 0320 02/13/22 0228    134 134   K 4.6 5.0 3.8   CL 97* 95* 97*   CO2 22 21* 26   PHOS  --   --  3.1   BUN 50* 57* 28*   CREATININE 6.1* 7.0* 4.3*   ALT 14  --   --    AST 12  --   --    BILITOT 0.6  --   --    ALKPHOS 104  --   --        Lab Results   Component Value Date    LABPROT 7.6 (H) 08/22/2020    LABPROT 7.6 08/22/2020       ASSESSMENT / RECOMMENDATIONS:    1. ESRD on hemodialysis, no need for hemodialysis today     2. Anemia: Due to CKD     3. Secondary Hyperparathyroidism: CKD     4. Bacteremia being followed by ID  5. Acute hypoxic respite failure due to pneumonia  6. Diabetes mellitus  7. Metabolic bone disease due to CKD  8.  COVID-19 infection  9.   Foot and sacral decubitus ulcers with   See orders      Electronically signed by Bruna Choi MD on 2/13/2022 at 1:56 PM

## 2022-02-14 LAB
ANION GAP SERPL CALCULATED.3IONS-SCNC: 14 MMOL/L (ref 7–16)
BASOPHILS ABSOLUTE: 0.02 E9/L (ref 0–0.2)
BASOPHILS RELATIVE PERCENT: 0.2 % (ref 0–2)
BUN BLDV-MCNC: 43 MG/DL (ref 6–23)
CALCIUM SERPL-MCNC: 8.7 MG/DL (ref 8.6–10.2)
CHLORIDE BLD-SCNC: 98 MMOL/L (ref 98–107)
CO2: 24 MMOL/L (ref 22–29)
CREAT SERPL-MCNC: 5.8 MG/DL (ref 0.7–1.2)
EOSINOPHILS ABSOLUTE: 0.18 E9/L (ref 0.05–0.5)
EOSINOPHILS RELATIVE PERCENT: 2.2 % (ref 0–6)
GFR AFRICAN AMERICAN: 11
GFR NON-AFRICAN AMERICAN: 9 ML/MIN/1.73
GLUCOSE BLD-MCNC: 138 MG/DL (ref 74–99)
HCT VFR BLD CALC: 29.9 % (ref 37–54)
HEMOGLOBIN: 9.2 G/DL (ref 12.5–16.5)
IMMATURE GRANULOCYTES #: 0.06 E9/L
IMMATURE GRANULOCYTES %: 0.7 % (ref 0–5)
LYMPHOCYTES ABSOLUTE: 1.05 E9/L (ref 1.5–4)
LYMPHOCYTES RELATIVE PERCENT: 12.9 % (ref 20–42)
MCH RBC QN AUTO: 31.8 PG (ref 26–35)
MCHC RBC AUTO-ENTMCNC: 30.8 % (ref 32–34.5)
MCV RBC AUTO: 103.5 FL (ref 80–99.9)
METER GLUCOSE: 115 MG/DL (ref 74–99)
METER GLUCOSE: 116 MG/DL (ref 74–99)
METER GLUCOSE: 137 MG/DL (ref 74–99)
METER GLUCOSE: 238 MG/DL (ref 74–99)
MONOCYTES ABSOLUTE: 0.67 E9/L (ref 0.1–0.95)
MONOCYTES RELATIVE PERCENT: 8.3 % (ref 2–12)
NEUTROPHILS ABSOLUTE: 6.13 E9/L (ref 1.8–7.3)
NEUTROPHILS RELATIVE PERCENT: 75.7 % (ref 43–80)
PDW BLD-RTO: 14.7 FL (ref 11.5–15)
PHOSPHORUS: 4 MG/DL (ref 2.5–4.5)
PLATELET # BLD: 147 E9/L (ref 130–450)
PMV BLD AUTO: 10.6 FL (ref 7–12)
POTASSIUM REFLEX MAGNESIUM: 4 MMOL/L (ref 3.5–5)
RBC # BLD: 2.89 E12/L (ref 3.8–5.8)
SODIUM BLD-SCNC: 136 MMOL/L (ref 132–146)
WBC # BLD: 8.1 E9/L (ref 4.5–11.5)

## 2022-02-14 PROCEDURE — 87040 BLOOD CULTURE FOR BACTERIA: CPT

## 2022-02-14 PROCEDURE — 1200000000 HC SEMI PRIVATE

## 2022-02-14 PROCEDURE — 80048 BASIC METABOLIC PNL TOTAL CA: CPT

## 2022-02-14 PROCEDURE — 36415 COLL VENOUS BLD VENIPUNCTURE: CPT

## 2022-02-14 PROCEDURE — 85025 COMPLETE CBC W/AUTO DIFF WBC: CPT

## 2022-02-14 PROCEDURE — 6370000000 HC RX 637 (ALT 250 FOR IP): Performed by: INTERNAL MEDICINE

## 2022-02-14 PROCEDURE — 2580000003 HC RX 258: Performed by: SPECIALIST

## 2022-02-14 PROCEDURE — 6360000002 HC RX W HCPCS: Performed by: SPECIALIST

## 2022-02-14 PROCEDURE — 6370000000 HC RX 637 (ALT 250 FOR IP): Performed by: SPECIALIST

## 2022-02-14 PROCEDURE — 94640 AIRWAY INHALATION TREATMENT: CPT

## 2022-02-14 PROCEDURE — 84100 ASSAY OF PHOSPHORUS: CPT

## 2022-02-14 PROCEDURE — 2580000003 HC RX 258: Performed by: INTERNAL MEDICINE

## 2022-02-14 PROCEDURE — 90935 HEMODIALYSIS ONE EVALUATION: CPT | Performed by: INTERNAL MEDICINE

## 2022-02-14 PROCEDURE — 2700000000 HC OXYGEN THERAPY PER DAY

## 2022-02-14 PROCEDURE — 92526 ORAL FUNCTION THERAPY: CPT | Performed by: SPEECH-LANGUAGE PATHOLOGIST

## 2022-02-14 PROCEDURE — 6360000002 HC RX W HCPCS: Performed by: INTERNAL MEDICINE

## 2022-02-14 PROCEDURE — 92610 EVALUATE SWALLOWING FUNCTION: CPT | Performed by: SPEECH-LANGUAGE PATHOLOGIST

## 2022-02-14 PROCEDURE — 82962 GLUCOSE BLOOD TEST: CPT

## 2022-02-14 RX ADMIN — Medication 1000 UNITS: at 12:48

## 2022-02-14 RX ADMIN — INSULIN LISPRO 2 UNITS: 100 INJECTION, SOLUTION INTRAVENOUS; SUBCUTANEOUS at 17:40

## 2022-02-14 RX ADMIN — Medication 10 ML: at 20:43

## 2022-02-14 RX ADMIN — DOXYCYCLINE HYCLATE 100 MG: 100 CAPSULE ORAL at 12:48

## 2022-02-14 RX ADMIN — CEFEPIME HYDROCHLORIDE 2000 MG: 2 INJECTION, POWDER, FOR SOLUTION INTRAVENOUS at 18:04

## 2022-02-14 RX ADMIN — APIXABAN 2.5 MG: 2.5 TABLET, FILM COATED ORAL at 12:48

## 2022-02-14 RX ADMIN — APIXABAN 2.5 MG: 2.5 TABLET, FILM COATED ORAL at 20:43

## 2022-02-14 RX ADMIN — Medication 1 TABLET: at 12:48

## 2022-02-14 RX ADMIN — Medication 10 ML: at 12:48

## 2022-02-14 RX ADMIN — ATORVASTATIN CALCIUM 10 MG: 10 TABLET, FILM COATED ORAL at 20:43

## 2022-02-14 RX ADMIN — BUDESONIDE 250 MCG: 0.25 SUSPENSION RESPIRATORY (INHALATION) at 21:00

## 2022-02-14 RX ADMIN — DOXYCYCLINE HYCLATE 100 MG: 100 CAPSULE ORAL at 20:43

## 2022-02-14 RX ADMIN — ZINC SULFATE 220 MG (50 MG) CAPSULE 50 MG: CAPSULE at 12:48

## 2022-02-14 RX ADMIN — METOPROLOL SUCCINATE 25 MG: 25 TABLET, EXTENDED RELEASE ORAL at 12:48

## 2022-02-14 RX ADMIN — BUDESONIDE 250 MCG: 0.25 SUSPENSION RESPIRATORY (INHALATION) at 11:53

## 2022-02-14 RX ADMIN — VANCOMYCIN HYDROCHLORIDE 1500 MG: 500 INJECTION, POWDER, LYOPHILIZED, FOR SOLUTION INTRAVENOUS at 15:43

## 2022-02-14 RX ADMIN — GABAPENTIN 100 MG: 100 CAPSULE ORAL at 20:43

## 2022-02-14 ASSESSMENT — PAIN SCALES - GENERAL
PAINLEVEL_OUTOF10: 0

## 2022-02-14 NOTE — PROGRESS NOTES
SPEECH/LANGUAGE PATHOLOGY  CLINICAL ASSESSMENT OF SWALLOWING FUNCTION   and PLAN OF CARE    PATIENT NAME:  Wang Ruiz  (male)     MRN:  [id number]    :  1931  (80 y.o.)  STATUS:  Inpatient: Room 053030-A    TODAY'S DATE:  2022  REFERRING PROVIDER: 22 Paige   SLP swallowing-dysphagia evaluation and treatment Start: 22 1300, End: 22 1300, ONE TIME, Standing Count: 1 Occurrences, R    Britt Ugalde DO  REASON FOR REFERRAL: rule out aspiration    EVALUATING THERAPIST: Vi Chadwick, TONO                 RESULTS:    DYSPHAGIA DIAGNOSIS:  Inconsistnet clinical indicators oropharyngeal phase dysphagia    Recommend a video swallow eval to further assess       DIET RECOMMENDATIONS:  Continue current diet until MBSS can be completed to further assess       FEEDING RECOMMENDATIONS:     Assistance level:  Stand by assistance is needed during all oral intake, Set-up is required for all oral intake      Compensatory strategies recommended: Small bites/sips      Discussed recommendations with nursing and/or faxed report to referring provider: Yes    SPEECH THERAPY  PLAN OF CARE   The dysphagia POC is established based on physician order, dysphagia diagnosis and results of clinical assessment     Will establish POC once MBSS is completed. Conditions Requiring Skilled Therapeutic Intervention for dysphagia:    Patient is performing below functional baseline d/t  current acute condition, Multiple diagnoses, multiple medications, and increased dependency upon caregivers. Specific dysphagia interventions to include:     not applicable    Specific instructions for next treatment:  MBSS to be completed  Patient Treatment Goals:    Short Term Goals:  Pt will participate in MBSS to fully assess oropharyngeal swallow function and to assist in determining the least restrictive PO diet to maintain adequate nutrition/hydration     Long Term Goals:    A Video Swallow Study (MBSS) is recommended and requires a physician order when medical status permits transport to xray department      Patient/family Goal:    Did not state. Will further assess during treatment. Plan of care discussed with Patient   The Patient understand(s) the diagnosis, prognosis and plan of care     Rehabilitation Potential/Prognosis: good                    ADMITTING DIAGNOSIS: Pneumonia [J18.9]  Acute respiratory failure with hypoxemia ([address] [J96.01]  Pneumonia due to infectious organism, unspecified laterality, unspecified part of lung [J18.9]    VISIT DIAGNOSIS:   Visit Diagnoses       Codes    Acute respiratory failure with hypoxemia ([address]    -  Primary J96.01    Pneumonia due to infectious organism, unspecified laterality, unspecified part of lung     J18.9           PATIENT REPORT/COMPLAINT: denies difficulty swallowing  RN cleared patient for participation in assessment     yes     PRIOR LEVEL OF SWALLOW FUNCTION:    PAST HISTORY OF DYSPHAGIA?: none reported    Home diet: Regular consistency solids (IDDSI level 7) with  thin liquids (IDDSI level 0)  Current Diet Order:  ADULT DIET; Regular; 4 carb choices (60 gm/meal); Low Sodium (2 gm)    PROCEDURE:  Consistencies Administered During the Evaluation   Liquids: thin liquid   Solids:  pureed foods and solid foods      Method of Intake:   cup, straw, spoon  Self fed      Position:   Seated, upright    CLINICAL ASSESSMENT:  Oral Stage:       Oral residuals were noted :  right buccal cavity    and on the superior tongue      Pharyngeal Stage:    Latent wet cough was noted after presentation of thin liquid X1    Inconsistent Wet/gurgly vocal quality was noted after presentation of thin liquid    Multiple swallows were noted after presentation of thin liquid    No other signs of aspiration were noted during this evaluation however, silent aspiration cannot be ruled out at bedside.   If silent aspiration is suspected, a Videofluoroscopic Study of Swallowing (MBS) is recommended and requires a physician order. Cognition:   Within functional limits for this exam    Oral Peripheral Examination   Right labiobuccal weakness    Current Respiratory Status    4 liters nasal cannula     Parameters of Speech Production  Respiration:  Adequate for speech production  Quality:   Within functional limits  Intensity: Within functional limits    Volitional Swallow: DNT     Volitional Cough:   DNT     Pain: No pain reported. EDUCATION:   The Speech Language Pathologist (SLP) completed education regarding results of evaluation and that intervention is warranted at this time. Learner: Patient  Education: Reviewed results and recommendations of this evaluation  Evaluation of Education:  [employer] understanding    This plan may be re-evaluated and revised as warranted. Evaluation Time includes thorough review of current medical information, gathering information on past medical history/social history and prior level of function, completion of standardized testing/informal observation of tasks, assessment of data and education on plan of care and goals. [x]The admitting diagnosis and active problem list, have been reviewed prior to initiation of this evaluation. INTERVENTION    Pt/family educated on above results and plan of care. Pt/family trained on compensatory strategies for safe swallow and signs and symptoms of aspiration (throat clearing, coughing/choking, wet vocal quality. , etc.) and encouraged to notify staff if observed. Handouts provided as warranted. Pt/family encouraged to engage in question/answer session. All questions answered and pt/family verbalized understanding of above.            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    Diabetes mellitus ([address]    Diabetes mellitus ([address]    Diabetic peripheral neuropathy ([address]    Osteoarthritis    Gait abnormality    Physical deconditioning    Lumbar spondylitis (HCC)    Anemia    Fx humer, med condyl-closed    Cerebral infarction ([address]    Renal failure    TIA (transient ischemic attack)    ASHD (arteriosclerotic heart disease)    Diabetes mellitus ([address]    PVD (peripheral vascular disease) with claudication (HCC)    History of CVA (cerebrovascular accident)    Pain in lower limb    Cerebral infarction ([address]    Diabetes mellitus-2    Decubitus ulcer of sacral region, stage 1    Right sided weakness    Hypoglycemia    Encounter regarding vascular access for dialysis for end-stage renal disease ([address]    ESRD (end stage renal disease) on dialysis ([address]    Diabetic ulcer of toe of right foot associated with type 2 diabetes mellitus, with fat layer exposed ([address]    Failure to thrive in adult    Ischemic ulcer of toe, limited to breakdown of skin, right ([address]    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

## 2022-02-14 NOTE — PROGRESS NOTES
Physician Progress Note      PATIENT:               Carter Castleman  CSN #:                  [id number]  :                       1931  ADMIT DATE:       2022 10:57 AM  DISCH DATE:  RESPONDING  PROVIDER #:        Mila Witt          QUERY TEXT:    Patient admitted with Pneumonia. Noted documentation of COVID-19 infection per   renal. In order to support the diagnosis of COVID-19 infection, please   include additional clinical indicators in your documentation. Or please   document if the diagnosis of COVID-19 infection has been ruled out after   further study. The medical record reflects the following:  Risk Factors: advanced age, ESRD  Clinical Indicators: per renal \". .. COVID-19 infection. Rawleigh Billing Rawleigh Billing \"  Treatment: no test done on admission, no isolation    Thank you,  Sheila Shultz RN, BSN, CDIS  Clinical Documentation Improvement  [email or web address]  Options provided:  -- COVID-19 infection present as evidenced by, Please document evidence. -- COVID-19 infection was ruled out  -- Other - I will add my own diagnosis  -- Disagree - Not applicable / Not valid  -- Disagree - Clinically unable to determine / Unknown  -- Refer to Clinical Documentation Reviewer    PROVIDER RESPONSE TEXT:    COVID-19 infection was ruled out after study.     Query created by: El Posey on 2022 8:00 AM      Electronically signed by:  Mila Witt 2022 8:35 AM

## 2022-02-14 NOTE — PROGRESS NOTES
Speech Language Pathology      NAME:  Reid Roca  :  1931  DATE: 2022  ROOM:  30/[facility]         Order received. Chart reviewed. Pt unavailable at this time due to:  [] HOLD per RN  [x] Off unit for testing/ procedure-dialysis    [] With medical staff   [] Declined intervention  [] Sleeping/ Lethargic   [] Other:       Will re-attempt as able. Thank you. Pneumonia [J18.9]  Acute respiratory failure with hypoxemia ([facility] [address] [J96.01]  Pneumonia due to infectious organism, unspecified laterality, unspecified part of lung [J18.9]            Kat HARKINS CCC/SLP [id number]  Speech-Language Pathologist

## 2022-02-14 NOTE — PROGRESS NOTES
Internal Medicine Progress Note    Admission date: 2/11/2022  Primary care physician: DO Arthur Pozo  Tee Moseley was seen and examined at bedside today.      Was hypoxic today prior to HD   Doing well otherwise   No new complaints   Discussed with nursing staff     Hospital Medications  Current Facility-Administered Medications   Medication Dose Route Frequency Provider Last Rate Last Admin    doxycycline hyclate (VIBRAMYCIN) capsule 100 mg  100 mg Oral 2 times per day Sisi Webber MD   100 mg at 02/13/22 2013    apixaban (ELIQUIS) tablet 2.5 mg  2.5 mg Oral BID Gerard Banda MD   2.5 mg at 02/13/22 2013    cefepime (MAXIPIME) 2000 mg IVPB minibag  2,000 mg IntraVENous Q MWF Omar German MD        albuterol (ACCUNEB) nebulizer solution 1.25 mg  1 ampule Nebulization Q6H PRN Gerard Banda MD   1.25 mg at 02/13/22 1951    atorvastatin (LIPITOR) tablet 10 mg  10 mg Oral Nightly Gerard Banda MD   10 mg at 02/13/22 2013    budesonide (PULMICORT) nebulizer suspension 250 mcg  0.25 mg Nebulization BID Gerard Banda MD   250 mcg at 02/13/22 1951    gabapentin (NEURONTIN) capsule 100 mg  100 mg Oral Nightly Gerard Banda MD   100 mg at 02/13/22 2013    [Held by provider] lisinopril (PRINIVIL;ZESTRIL) tablet 2.5 mg  2.5 mg Oral Daily Gerard Banda MD        metoprolol succinate (TOPROL XL) extended release tablet 25 mg  25 mg Oral Daily Sienna Guzman MD   25 mg at 02/13/22 1072    vitamin D (CHOLECALCIFEROL) tablet 1,000 Units  1,000 Units Oral Daily Gerard Banda MD   1,000 Units at 02/13/22 0954    zinc sulfate (ZINCATE) capsule 50 mg  50 mg Oral Daily Gerard Banda MD   50 mg at 02/13/22 0954    sodium chloride flush 0.9 % injection 5-40 mL  5-40 mL IntraVENous 2 times per day Gerard Banda MD   10 mL at 02/13/22 2014    sodium chloride flush 0.9 % injection 5-40 mL  5-40 mL IntraVENous PRN Gerard Banda MD        0.9 % sodium chloride infusion  25 mL IntraVENous PRN Rheba Flash Myranda Jorgensen MD        ondansetron (ZOFRAN-ODT) disintegrating tablet 4 mg  4 mg Oral Q8H PRN Linda Mendez MD        Or    [facility]dansetron [facility]) injection 4 mg  4 mg IntraVENous Q6H PRN Linda Mendez MD        polyethylene glycol [facility]) packet 17 g  17 g Oral Daily PRN Linda Mendez MD        acetaminophen (TYLENOL) tablet 650 mg  650 mg Oral Q6H PRN Linda Mendez MD        Or    acetaminophen (TYLENOL) suppository 650 mg  650 mg Rectal Q6H PRN Linda Mendez MD        insulin lispro (HUMALOG) injection vial 0-6 Units  0-6 Units SubCUTAneous TID WC Linda Mendez MD   1 Units at 02/13/22 1204    insulin lispro (HUMALOG) injection vial 0-3 Units  0-3 Units SubCUTAneous Nightly Linda Mendez MD   1 Units at 02/13/22 2013    glucose (GLUTOSE) 40 % oral gel 15 g  15 g Oral PRN Linda Mendez MD        glucagon (rDNA) injection 1 mg  1 mg IntraMUSCular PRN Linda Mendez MD        dextrose 5 % solution  100 mL/hr IntraVENous PRN Linda Mendez MD        antioxidant multivitamin (OCUVITE) tablet  1 tablet Oral Daily Linda Mendez MD   1 tablet at 02/13/22 0954    dextrose bolus (hypoglycemia) 10% 125 mL  125 mL IntraVENous PRN Linda Mendez MD        Or    dextrose bolus (hypoglycemia) 10% 250 mL  250 mL IntraVENous PRN Linda Mendez MD           PRN Medications  albuterol, sodium chloride flush, sodium chloride, ondansetron **OR** ondansetron, polyethylene glycol, acetaminophen **OR** acetaminophen, glucose, glucagon (rDNA), dextrose, dextrose bolus (hypoglycemia) **OR** dextrose bolus (hypoglycemia)    Objective  Most Recent Recorded Vitals  BP (!) 168/83   Pulse 82   Temp 98.9 °F (37.2 °C)   Resp 16   Ht 5' 6\" (1.676 m)   Wt 201 lb 11.5 oz (91.5 kg)   SpO2 96%   BMI 32.56 kg/m²   I/O last 3 completed shifts: In: 300 [P.O.:300]  Out: -   No intake/output data recorded.     Physical Exam:  General: AAO to person, place, time, and purpose, NAD, no labored breathing  Eyes: conjunctivae/corneas clear, sclera non icteric. Skin: color, texture, and turgor normal, no rashes or lesions. Lungs: clear to auscultation bilaterally, no retractions or use of accessory muscles, no vocal fremitus, no rhonchi, no crackle, no rales  Heart: regular rate and regular rhythm, no murmur  Abdomen: soft, non-tender; bowel sounds normal; no masses,  no organomegaly  Extremities: atraumatic, no cyanosis, no edema  Neurologic: cranial nerves 2-12 grossly intact, no slurred speech. Most Recent Labs  Lab Results   Component Value Date    WBC 8.1 02/14/2022    HGB 9.2 (L) 02/14/2022    HCT 29.9 (L) 02/14/2022     02/14/2022     02/14/2022    K 4.0 02/14/2022    CL 98 02/14/2022    CREATININE 5.8 (H) 02/14/2022    BUN 43 (H) 02/14/2022    CO2 24 02/14/2022    GLUCOSE 138 (H) 02/14/2022    ALT 14 02/11/2022    AST 12 02/11/2022    INR 1.5 05/15/2021    TSH 0.974 12/03/2021    LABA1C 7.2 (H) 12/04/2021    LABMICR 1509.9 (H) 08/14/2020       *All labs in electric medical record were reviewed. Please see electronic chart for a more comprehensive set of labs    XR CHEST PORTABLE    Result Date: 2/11/2022  EXAMINATION: ONE XRAY VIEW OF THE CHEST 2/11/2022 11:36 am COMPARISON: February 11, 2022 HISTORY: ORDERING SYSTEM PROVIDED HISTORY: concern for pneumonia TECHNOLOGIST PROVIDED HISTORY: Reason for exam:->concern for pneumonia FINDINGS: Cardiac silhouette is moderately prominent. There is vague infiltrate left lung base. Patient is rotated to the right. Right-sided split venous catheter unchanged. Small persistent infiltrate left lung base. Clinical correlation and follow-up to resolution recommended. XR CHEST PORTABLE    Result Date: 1/20/2022  EXAMINATION: ONE XRAY VIEW OF THE CHEST 1/20/2022 3:14 pm COMPARISON: None. HISTORY: ORDERING SYSTEM PROVIDED HISTORY: shortness of breath TECHNOLOGIST PROVIDED HISTORY: Reason for exam:->shortness of breath FINDINGS: The cardiac silhouette is within normal limits. There is no mediastinal widening Note is made of a right dialysis catheter. There is a small infiltrate seen within the left lung base. There is no right or left pleural effusion. 1. Small left lung base infiltrate         MICROBIOLOGY:  BLOOD CX #1  Recent Labs     02/12/22  0630   BC Gram stain performed from blood culture bottle media  Gram positive cocci in clusters  *     BLOOD CX #2  Recent Labs     02/12/22  0630   BLOODCULT2 Gram stain performed from blood culture bottle media  Gram positive cocci in clusters  *      CULTURE, RESPIRATORY   No results for input(s): CULTRESP in the last 72 hours. RESPIRATORY SMEAR  No results for input(s): RESPSMEAR in the last 72 hours. STREP PNEUMONIA AG URINE  No results for input(s): STREPNEUMAGU in the last 72 hours. LEGIONELLA AG URINE  No results for input(s): LEGUR in the last 72 hours. No results for input(s): [address] in the last 72 hours. URINE CULTURE  No results for input(s): LABURIN in the last 72 hours. WOUND ABSCESS  No results for input(s): WNDABS in the last 72 hours. TIP CULTURE  No results for input(s): CXCATHTIP in the last 72 hours. BODY FLUID CULTURE  No results for input(s): BFCS in the last 72 hours. Anaerobic cx  No results for input(s): LABANAE in the last 72 hours. CULTURE SURGICAL  No results for input(s): CXSURG in the last 72 hours. MISC. SENDOUT  No results for input(s): MREF in the last 72 hours. Assessment/Plan  Izzy Kraus is a 80y.o. year old male who presented on 2/11/2022 and is being treated for Pneumonia .        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 11/01/2012    Partial small bowel obstruction ([address] 02/20/2012    Diabetes mellitus-2 06/22/2014    Anemia 11/10/2012    Osteoarthritis 11/09/2012    Lumbar spondylitis ([address] 11/09/2012    IDDM-2 02/20/2012    Pneumonia 02/11/2022    Toe infection     Ischemic ulcer of toe, limited to breakdown of skin, right ([address] 12/03/2021    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 ([address] 12/03/2021    Failure to thrive in adult 12/01/2021    Diabetic ulcer of toe of right foot associated with type 2 diabetes mellitus, with fat layer exposed ([address] 07/06/2021    ESRD (end stage renal disease) on dialysis ([address] 04/08/2021    Encounter regarding vascular access for dialysis for end-stage renal disease ([address] 01/21/2021    Hypoglycemia 01/12/2019    Decubitus ulcer of sacral region, stage 1 03/19/2018    Right sided weakness 03/19/2018    Cerebral infarction ([address] 06/19/2014    PVD (peripheral vascular disease) with claudication ([address] 06/09/2014    History of CVA (cerebrovascular accident) 06/09/2014    Pain in lower limb 06/09/2014    TIA (transient ischemic attack) 05/15/2014    ASHD (arteriosclerotic heart disease) 05/15/2014    Diabetes mellitus ([address] 05/15/2014    Fx humer, med condyl-closed 01/01/2014    Cerebral infarction ([address] 01/01/2014    Renal failure 01/01/2014    Diabetes mellitus ([address] 11/07/2012    Diabetes mellitus ([address] 11/01/2012    CAD (coronary artery disease) 02/20/2012    HTN 02/20/2012    COPD 02/20/2012       · Healthcare Associated Pneumonia  · CXR noted L sided lesion new   · Failed outpatient management  · Abx per ID service now   · Acute hypoxic respiratory failure 2/2 above   · Pulmonary on board   · Wean O2 as able   · Staph epidermidis bacteremia/possible CLABSI   · Has tunnel HD cath   · This may need to be removed depending on repeat cx   · ID on board   · ESRD on HD  · Nephrology on board   · Diabetes Mellitus 2, insulin dependent  · POCT glucose checks  · ISS   · Hypoglycemia protocol    · Hypertension   · Hold lisinopril  · Continue beta blockade to prevent rebound tachy   · Peripheral Vascular Disease  · stable  · Routine labs in am  · DVT prophylaxis  · Please see orders for further management and care. Electronically signed by Amanda Mosley MD on 2/14/2022 at 8:36 AM      NOTE:  This report was transcribed using voice recognition software. Every effort was made to ensure accuracy; however, inadvertent computerized transcription errors may be present.

## 2022-02-14 NOTE — CARE COORDINATION
Social Work / Discharge Planning:    Per liaison Fariba from [place], facility can accept when medically discharged however they are not able to accept patient today. Will need PT/OT evals. Will also need negative COVID on day of discharge. HENS [id number] completed. JASKARAN in Epic and transportation envelope placed in chart. Social work will continue to follow.  Electronically signed by SCOTT Collins on 2/14/22 at 3:00 PM EST

## 2022-02-14 NOTE — PROGRESS NOTES
[address] [place] [address] [facility]  ESMEIDA  Progress Note    SUBJECTIVE:  Chief Complaint   Patient presents with    Shortness of Breath     pt having sob beginning this morning. needs dialysis today. 82% on RA at home     The patient has no new complaints. No dyspnea. No fever. Tolerating antibiotics. Review of systems:  As stated above in the chief complaint, otherwise negative. Medications:  Scheduled Meds:   doxycycline hyclate  100 mg Oral 2 times per day    apixaban  2.5 mg Oral BID    cefepime  2,000 mg IntraVENous Q MWF    atorvastatin  10 mg Oral Nightly    budesonide  0.25 mg Nebulization BID    gabapentin  100 mg Oral Nightly    [Held by provider] lisinopril  2.5 mg Oral Daily    metoprolol succinate  25 mg Oral Daily    Vitamin D  1,000 Units Oral Daily    zinc sulfate  50 mg Oral Daily    sodium chloride flush  5-40 mL IntraVENous 2 times per day    insulin lispro  0-6 Units SubCUTAneous TID WC    insulin lispro  0-3 Units SubCUTAneous Nightly    ocuvite-lutein  1 tablet Oral Daily     Continuous Infusions:   sodium chloride      dextrose       PRN Meds:albuterol, sodium chloride flush, sodium chloride, ondansetron **OR** ondansetron, polyethylene glycol, acetaminophen **OR** acetaminophen, glucose, glucagon (rDNA), dextrose, dextrose bolus (hypoglycemia) **OR** dextrose bolus (hypoglycemia)    OBJECTIVE:  BP (!) 143/88   Pulse 81   Temp 98.9 °F (37.2 °C)   Resp 16   Ht 5' 6\" (1.676 m)   Wt 201 lb 11.5 oz (91.5 kg)   SpO2 96%   BMI 32.56 kg/m²   Temp  Av.5 °F (36.9 °C)  Min: 98 °F (36.7 °C)  Max: 98.9 °F (37.2 °C)  Constitutional: The patient is awake, alert, and oriented. Hard of hearing. Seen in dialysis. Skin: Warm and dry. No rashes were noted. HEENT: Round and reactive pupils. Moist mucous membranes. No ulcerations or thrush. Neck: Supple to movements. Chest: No use of accessory muscles to breathe. Symmetrical expansion.   No wheezing, crackles or rhonchi. Right tunneled HD catheter. Cardiovascular: S1 and S2 are rhythmic and regular. No murmurs appreciated. Abdomen: Positive bowel sounds to auscultation. Benign to palpation. No masses felt. Extremities: No edema.   Lines: peripheral    Laboratory and Tests Review:  Lab Results   Component Value Date    WBC 8.1 02/14/2022    WBC 9.3 02/13/2022    WBC 12.6 (H) 02/12/2022    HGB 9.2 (L) 02/14/2022    HCT 29.9 (L) 02/14/2022    .5 (H) 02/14/2022     02/14/2022     Lab Results   Component Value Date    NEUTROABS 6.13 02/14/2022    NEUTROABS 7.15 02/13/2022    NEUTROABS 10.30 (H) 02/12/2022     No results found for: [facility]  Lab Results   Component Value Date    ALT 14 02/11/2022    AST 12 02/11/2022    ALKPHOS 104 02/11/2022    BILITOT 0.6 02/11/2022     Lab Results   Component Value Date     02/14/2022    K 4.0 02/14/2022    CL 98 02/14/2022    CO2 24 02/14/2022    BUN 43 02/14/2022    CREATININE 5.8 02/14/2022    CREATININE 4.3 02/13/2022    CREATININE 7.0 02/12/2022    GFRAA 11 02/14/2022    LABGLOM 9 02/14/2022    GLUCOSE 138 02/14/2022    GLUCOSE 163 02/22/2012    PROT 6.3 02/11/2022    LABALBU 3.6 02/11/2022    LABALBU 3.8 02/20/2012    CALCIUM 8.7 02/14/2022    BILITOT 0.6 02/11/2022    ALKPHOS 104 02/11/2022    AST 12 02/11/2022    ALT 14 02/11/2022     Lab Results   Component Value Date    CRP 27.9 (H) 02/13/2022    CRP 4.2 (H) 12/01/2021    CRP 0.1 09/07/2020     Lab Results   Component Value Date    SEDRATE 100 (H) 02/13/2022    SEDRATE 78 (H) 12/01/2021    SEDRATE 31 (H) 09/07/2020     Radiology:      Microbiology:   Blood culture 2/12/2022: Staphylococcus epidermidis in 2 of 4 bottles  Blood cultures 2/13/2022: Pending  Rapid SARS-CoV-2: Negative  Respiratory culture 2/12/2022: Pending    Recent Labs     02/11/22  1154   PROCAL 0.65*       ASSESSMENT:  · HCAP left lower lobe  · Staphylococcus epidermidis bacteremia  · Possible CLABSI secondary to HD catheter infection    PLAN:  · Continue Cefepime and oral Doxycycline  · Check final and repeat blood cultures.   We will try to salvage the tunneled HD catheter but if more blood culture bottles returned positive then it will have to be replaced  · Monitor labs    Discussed with Dr. Dania Villanueva MD  11:03 AM  2/14/2022

## 2022-02-14 NOTE — PROGRESS NOTES
[facility] MD Quinn Arellano, MD Ramona Radford, MD Kimmy Suggs, MD Damaris Berry, KARAN Lux, DAISY  Progress Note    2/14/2022    SUBJECTIVE:   2/13: Overall improving clinical status  2/14: Seen on dialysis this morning. Tolerating therapy. BP stable. BFR as prescribed. No fever or chill. No dyspnea at rest.  Intermittent cough. PROBLEM LIST:    Principal Problem:    Pneumonia  Resolved Problems:    * No resolved hospital problems. *         DIET:    ADULT DIET; Regular; 4 carb choices (60 gm/meal);  Low Sodium (2 gm)     MEDS (scheduled):    vancomycin  1,500 mg IntraVENous Once    doxycycline hyclate  100 mg Oral 2 times per day    apixaban  2.5 mg Oral BID    cefepime  2,000 mg IntraVENous Q MWF    atorvastatin  10 mg Oral Nightly    budesonide  0.25 mg Nebulization BID    gabapentin  100 mg Oral Nightly    [Held by provider] lisinopril  2.5 mg Oral Daily    metoprolol succinate  25 mg Oral Daily    Vitamin D  1,000 Units Oral Daily    zinc sulfate  50 mg Oral Daily    sodium chloride flush  5-40 mL IntraVENous 2 times per day    insulin lispro  0-6 Units SubCUTAneous TID WC    insulin lispro  0-3 Units SubCUTAneous Nightly    ocuvite-lutein  1 tablet Oral Daily       MEDS (infusions):   sodium chloride      dextrose         MEDS (prn):  albuterol, sodium chloride flush, sodium chloride, ondansetron **OR** ondansetron, polyethylene glycol, acetaminophen **OR** acetaminophen, glucose, glucagon (rDNA), dextrose, dextrose bolus (hypoglycemia) **OR** dextrose bolus (hypoglycemia)    PHYSICAL EXAM:     Patient Vitals for the past 24 hrs:   BP Temp Temp src Pulse Resp SpO2 Weight   02/14/22 1145 (!) 143/79 97.8 °F (36.6 °C) Oral 72 16 91 % --   02/14/22 1100 (!) 143/91 98.2 °F (36.8 °C) -- 82 16 -- 196 lb 13.9 oz (89.3 kg)   02/14/22 1030 (!) 143/88 -- -- 81 -- -- --   02/14/22 0930 (!) 137/47 -- -- 63 -- -- --   02/14/22 0900 (!) 139/97 -- -- 82 -- -- --   02/14/22 0830 (!) 168/83 -- -- 82 -- -- --   02/14/22 0800 (!) 161/86 -- -- 84 -- -- --   02/14/22 0730 138/72 -- -- 68 -- -- --   02/14/22 0717 (!) 162/79 -- -- 79 -- -- --   02/14/22 0700 (!) 150/71 98.9 °F (37.2 °C) -- 75 16 -- 201 lb 11.5 oz (91.5 kg)   02/13/22 2000 120/66 98 °F (36.7 °C) Oral 85 16 96 % --   @      Intake/Output Summary (Last 24 hours) at 2/14/2022 1733  Last data filed at 2/14/2022 1100  Gross per 24 hour   Intake --   Output 2500 ml   Net -2500 ml         Wt Readings from Last 3 Encounters:   02/14/22 196 lb 13.9 oz (89.3 kg)   01/20/22 180 lb (81.6 kg)   01/11/22 180 lb (81.6 kg)       Constitutional:  in no acute distress  HEENT: NC/AT, EOMI, sclera and conjunctiva are clear and anicteric, mucus membranes moist  Neck: Trachea midline, no JVD  Cardiovascular: S1, S2 regular rhythm, no murmur,or rub  Respiratory:  No crackles, no wheeze  Gastrointestinal:  Soft, nontender, nondistended, NABS  Ext: no edema, feet warm  Skin: dry, no rash  Neuro: awake, alert, interactive      DATA:    Recent Labs     02/12/22 0320 02/13/22 0228 02/14/22  0434   WBC 12.6* 9.3 8.1   HGB 10.4* 9.2* 9.2*   HCT 33.6* 29.2* 29.9*   .0* 104.3* 103.5*    129* 147     Recent Labs     02/11/22  1747 02/11/22  1747 02/12/22  0320 02/13/22  0228 02/14/22  0434      < > 134 134 136   K 4.6  --  5.0 3.8 4.0   CL 97*   < > 95* 97* 98   CO2 22   < > 21* 26 24   PHOS  --   --   --  3.1 4.0   BUN 50*   < > 57* 28* 43*   CREATININE 6.1*   < > 7.0* 4.3* 5.8*   ALT 14  --   --   --   --    AST 12  --   --   --   --    BILITOT 0.6  --   --   --   --    ALKPHOS 104  --   --   --   --     < > = values in this interval not displayed. Lab Results   Component Value Date    LABPROT 7.6 (H) 08/22/2020    LABPROT 7.6 08/22/2020       ASSESSMENT / RECOMMENDATIONS:    1. ESRD      2. Anemia due to ESRD     3. 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 Bacteremia, staph epidermidis and another staph species. A well represent CLABSI, though pneumonia may be the culprit. Being followed by ID    5. Acute hypoxic respite insufficiency due to bacterial pneumonia    6.   Diabetes mellitus    Continue IHD support for solute and volume clearance on Mondays Wednesdays and Fridays as per outpatient orders and dry weight  Cefepime and doxycycline per ID  Await repeat cultures  Pending those cultures, may need to have his Tesio removed  Follow closely      Electronically signed by Yaron Lucas MD on 2/14/2022

## 2022-02-14 NOTE — PROGRESS NOTES
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    Diabetes mellitus ([address]    Diabetes mellitus ([address]    Diabetic peripheral neuropathy ([address]    Osteoarthritis    Gait abnormality    Physical deconditioning    Lumbar spondylitis (HCC)    Anemia    Fx humer, med condyl-closed    Cerebral infarction ([address]    Renal failure    TIA (transient ischemic attack)    ASHD (arteriosclerotic heart disease)    Diabetes mellitus ([address]    PVD (peripheral vascular disease) with claudication (HCC)    History of CVA (cerebrovascular accident)    Pain in lower limb    Cerebral infarction ([address]    Diabetes mellitus-2    Decubitus ulcer of sacral region, stage 1    Right sided weakness    Hypoglycemia    Encounter regarding vascular access for dialysis for end-stage renal disease ([address]    ESRD (end stage renal disease) on dialysis ([address]    Diabetic ulcer of toe of right foot associated with type 2 diabetes mellitus, with fat layer exposed ([address]    Failure to thrive in adult    Ischemic ulcer of toe, limited to breakdown of skin, right ([address]    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 ([address]    Toe infection    Pneumonia       Subjective:  · Seen this p.m., the patient is breathing comfortably, resting.       Vitals:  VITALS:  BP (!) 143/79   Pulse 72   Temp 97.8 °F (36.6 °C) (Oral)   Resp 16   Ht 5' 6\" (1.676 m)   Wt 196 lb 13.9 oz (89.3 kg)   SpO2 91%   BMI 31.78 kg/m²     24HR INTAKE/OUTPUT:      Intake/Output Summary (Last 24 hours) at 2/14/2022 1709  Last data filed at 2/14/2022 1100  Gross per 24 hour   Intake --   Output 2500 ml   Net -2500 ml       24HR PULSE OXIMETRY RANGE: SpO2  Av.5 %  Min: 91 %  Max: 96 %    Medications:  IV:   sodium chloride      dextrose         Scheduled Meds:   vancomycin  1,500 mg IntraVENous Once    doxycycline hyclate  100 mg Oral 2 times per day    apixaban  2.5 mg Oral BID    cefepime  2,000 mg IntraVENous Q MWF    atorvastatin  10 mg Oral Nightly    budesonide  0.25 mg Nebulization BID    gabapentin  100 mg Oral Nightly    [Held by provider] lisinopril  2.5 mg Oral Daily    metoprolol succinate  25 mg Oral Daily    Vitamin D  1,000 Units Oral Daily    zinc sulfate  50 mg Oral Daily    sodium chloride flush  5-40 mL IntraVENous 2 times per day    insulin lispro  0-6 Units SubCUTAneous TID WC    insulin lispro  0-3 Units SubCUTAneous Nightly    ocuvite-lutein  1 tablet Oral Daily       Diet:   ADULT DIET; Regular; 4 carb choices (60 gm/meal); Low Sodium (2 gm)     EXAM:  General: No distress. Alert. Eyes: PERRL. No sclera icterus. No conjunctival injection. ENT: No discharge. Pharynx clear. Neck: Trachea midline. Normal thyroid. Resp: No accessory muscle use. Left lower rales. No wheezing. No rhonchi. CV: Regular rate. Regular rhythm. No murmur or rub. Abd: Non-tender. Non-distended. No masses. No organomegaly. Normal bowel sounds. Skin: Warm and dry. No nodule on exposed extremities. No rash on exposed extremities. Ext: No cyanosis, clubbing, edema  Lymph: No cervical LAD. No supraclavicular LAD. M/S: No cyanosis. No joint deformity. No clubbing. Neuro: Awake. Follows commands. Positive pupils/gag/corneals. Normal pain response.        Results:  CBC:   Recent Labs     22   WBC 12.6* 9.3 8.1   HGB 10.4* 9.2* 9.2*   HCT 33.6* 29.2* 29.9*   .0* 104.3* 103.5*    129* 147     BMP:   Recent Labs     22  043    134 136   K 5.0 3.8 4.0   CL 95* 97* 98   CO2 21* 26 24   PHOS  --  3.1 4.0   BUN 57* 28* 43*   CREATININE 7.0* 4.3* 5.8*     LIVER PROFILE:   Recent Labs     02/11/22  1747   AST 12   ALT 14   BILITOT 0.6   ALKPHOS 104     PT/INR: No results for input(s): PROTIME, INR in the last 72 hours. APTT: No results for input(s): APTT in the last 72 hours. Pathology:  1. N/A      Microbiology:  1. None    Recent ABG:   No results for input(s): PH, PO2, PCO2, HCO3, BE, O2SAT, METHB, O2HB, COHB, O2CON, HHB, THB in the last 72 hours. Recent Films:  XR CHEST PORTABLE   Final Result   Small persistent infiltrate left lung base. Clinical correlation and   follow-up to resolution recommended. FL MODIFIED BARIUM SWALLOW W VIDEO    (Results Pending)                Assessment:  1. 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 There is no associated leukocytosis. Procalcitonin is elevated but can be unreliable in the presence of renal failure.        Plan:  1. Continue antibiotics per infectious disease  2. Await repeat cultures  3. Repeat chest radiograph in 24 hours       Time at the bedside, reviewing labs and radiographs, reviewing updated notes and consultations, discussing with staff and family was more than 35 minutes. Please note that voice recognition technology was used in the preparation of this note and make therefore it may contain inadvertent transcription errors. If the patient is a COVID 19 isolation patient, the above physical exam reflects that of the examining physician for the day. Melia Ayala MD,  M.D., F.C.C.P.     [facility], [address] [place] [address], [address] [place] [address]

## 2022-02-14 NOTE — CARE COORDINATION
Social Work / Discharge Planning;    Patient off unit for dialysis. Patient admitted for pneumonia from home. Currently on 4 liters of oxygen. Spoke with patient's niece Darby Dillard. Patient lives with niece in a one story home with 3 steps and a railing to enter. He uses wheelchair at home and does walk minimally with cane and assistance to commode. Patient does not normally wear oxygen at home. Would like to speak with physician regarding code status. Updated Charge RN. Kash Pemberton would like to pursue SNF. Reports patient is weak and has not been ambulating since Friday. He has a history at [facility] and would like for him to return to facility. Referral made to [facility]. PT/OT ordered. Await confirmation if facility can accept. Patient attends dialysis on Monday, Wednesday and Fridays at 12:00PM. Called [facility] (ph: [phone]) and informed Kait Almanza patient is admitted and will update when discharged. Social work will continue to follow.  Electronically signed by SCOTT Welch on 2/14/22 at 10:18 AM EST

## 2022-02-14 NOTE — DISCHARGE INSTR - COC
Continuity of Care Form    Patient Name: Denise Ribera   :  1931  MRN:  [id number]    Admit date:  2022  Discharge date:  ***    Code Status Order: Full Code   Advance Directives:      Admitting Physician:  Rudi Riley DO  PCP: Rudi Riley DO    Discharging Nurse: [facility] Unit/Room#: 8438/4261-W  Discharging Unit Phone Number: ***    Emergency Contact:   Extended Emergency Contact Information  Primary Emergency Contact: Lennie Green  Home Phone: [phone]  Mobile Phone: [phone]  Relation: Niece/Nephew   needed?  No  Secondary Emergency Contact: Amarilys  [address] Phone: [phone]  Mobile Phone: [phone]  Relation: Brother/Sister    Past Surgical History:  Past Surgical History:   Procedure Laterality Date    ABDOMINAL HERNIA REPAIR      CATARACT REMOVAL      right    CHOLECYSTECTOMY  2011    DENTAL SURGERY      EYE SURGERY      HC DIALYSIS CATHETER Right 2020    TESIO CATHETER INSERTION performed by José Miguel Rodriguez MD at [address]      removal of sac in ear    SHUNT REVISION Right 2021    INSERTION AV GRAFT RIGHT ARM performed by José Miguel Rodriguez MD at [address] Right 2021    THROMBECTOMY RIGHT ARM AV GRAFT performed by José Miguel Rodriguez MD at [address] Left 2021    INSERTION OF TEMPORARY HEMODIALYSIS CATHETER performed by José Miguel Rodriguez MD at [address] Right 2021    INSERTION OF TESIO, REMOVAL OF TEMPORARY CATHETHER performed by José Miguel Rodriguez MD at [address] [place] [address]       Immunization History:   Immunization History   Administered Date(s) Administered    Td, unspecified formulation 2011       Active Problems:  Patient Active Problem List   Diagnosis Code    Partial small bowel obstruction (HCC) K56.600    IDDM-2 E11.9    CAD (coronary artery disease) I25.10    HTN I10    COPD J44.9 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    Diabetes mellitus ([facility]) E11.9    Diabetes mellitus ([facility] E11.9    Diabetic peripheral neuropathy ([facility]) E11.42    Osteoarthritis M19.90    Gait abnormality R26.9    Physical deconditioning R53.81    Lumbar spondylitis ([facility]) M46.96    Anemia D64.9    Fx humer, med condyl-closed S42.463A    Cerebral infarction ([facility]) I63.9    Renal failure N19    TIA (transient ischemic attack) G45.9    ASHD (arteriosclerotic heart disease) I25.10    Diabetes mellitus ([facility]) E11.9    PVD (peripheral vascular disease) with claudication ([facility]) I73.9    History of CVA (cerebrovascular accident) Z86.73    Pain in lower limb M79.606    Cerebral infarction ([address] I63.9    Diabetes mellitus-2 E11.9    Decubitus ulcer of sacral region, stage 1 L89.151    Right sided weakness R53.1    Hypoglycemia E16.2    Encounter regarding vascular access for dialysis for end-stage renal disease ([address] N18.6, Z99.2    ESRD (end stage renal disease) on dialysis ([address] N18.6, Z99.2    Diabetic ulcer of toe of right foot associated with type 2 diabetes mellitus, with fat layer exposed ([address] E11.621, L97.512    Failure to thrive in adult R62.7    Ischemic ulcer of toe, limited to breakdown of skin, right ([facility]) L97.511    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 ([address] I70.239    Toe infection L08.9    Pneumonia J18.9       Isolation/Infection:   Isolation            No Isolation          Patient Infection Status       Infection Onset Added Last Indicated Last Indicated By Review Planned Expiration Resolved Resolved By    None active    Resolved    COVID-19 (Rule Out) 02/11/22 02/11/22 02/11/22 COVID-19, Rapid (Ordered)   02/11/22 Rule-Out Test Resulted    COVID-19 (Rule Out) 01/20/22 01/20/22 01/20/22 COVID-19, Rapid (Ordered)   01/20/22 Rule-Out Test Resulted    COVID-19 (Rule Out) 11/29/21 11/29/21 11/29/21 COVID-19, Rapid (Ordered)   11/29/21 Rule-Out Test Resulted    COVID-19 (Rule Out) 07/10/21 07/10/21 07/10/21 COVID-19, Rapid (Ordered)   07/10/21 Rule-Out Test Resulted    COVID-19 (Rule Out) 04/02/21 04/02/21 04/02/21 Covid-19 Ambulatory (Ordered)   04/03/21 Rule-Out Test Resulted    COVID-19 (Rule Out) 01/15/21 01/16/21 01/16/21 Covid-19 Ambulatory (Ordered)   01/17/21 Rule-Out Test Resulted    COVID-19 (Rule Out) 09/11/20 09/11/20 09/11/20 Covid-19 Ambulatory (Ordered)   09/12/20 Rule-Out Test Resulted    COVID-19 (Rule Out) 08/24/20 08/24/20 08/24/20 Covid-19 Ambulatory (Ordered)   08/25/20 Rule-Out Test Resulted    COVID-19 (Rule Out) 08/17/20 08/17/20 08/17/20 Covid-19 Ambulatory (Ordered)   08/18/20 Rule-Out Test Resulted            Nurse Assessment:  Last Vital Signs: BP (!) 143/79   Pulse 72   Temp 97.8 °F (36.6 °C) (Oral)   Resp 16   Ht 5' 6\" (1.676 m)   Wt 196 lb 13.9 oz (89.3 kg)   SpO2 91%   BMI 31.78 kg/m²     Last documented pain score (0-10 scale): Pain Level: 0  Last Weight:   Wt Readings from Last 1 Encounters:   02/14/22 196 lb 13.9 oz (89.3 kg)     Mental Status:  oriented and alert    IV Access:  - Dialysis Catheter  - site  right and subclavian, insertion date: ***    Nursing Mobility/ADLs:  Walking   Dependent  Transfer  Dependent  Bathing  Dependent  Dressing  Dependent  Toileting  Dependent  Feeding  Independent  Med Admin  Assisted  Med Delivery   whole    Wound Care Documentation and Therapy:  Wound 07/10/21 Sacrum (Active)   Number of days: 218       Wound 12/02/21 Toe (Comment  which one) (Active)   Number of days: 73        Elimination:  Continence: Bowel: Yes  Bladder: No  Urinary Catheter: None   Colostomy/Ileostomy/Ileal Conduit: No       Date of Last BM: ***    Intake/Output Summary (Last 24 hours) at 2/14/2022 1440  Last data filed at 2/14/2022 1100  Gross per 24 hour   Intake --   Output 2500 ml   Net -2500 ml     I/O last 3 completed shifts: In: 300 [P.O.:300]  Out: -     Safety Concerns:      At Risk for Falls    Impairments/Disabilities:      Pt has right sided reason post CVA in 2012    Nutrition Therapy:  Current Nutrition Therapy:   - Oral Diet:  General    Routes of Feeding: Oral  Liquids: No Restrictions  Daily Fluid Restriction: no  Last Modified Barium Swallow with Video (Video Swallowing Test): not done    Treatments at the Time of Hospital Discharge:   Respiratory Treatments: ***  Oxygen Therapy:  is on oxygen at 3 L/min per nasal cannula. Ventilator:    - No ventilator support    Rehab Therapies: Physical Therapy and Occupational Therapy  Weight Bearing Status/Restrictions: No weight bearing restirctions  Other Medical Equipment (for information only, NOT a DME order):  hospital bed  Other Treatments: ***    Patient's personal belongings (please select all that are sent with patient):      RN SIGNATURE:  Electronically signed by Carrie Isaacs RN on 2/16/22 at 6:20 PM EST    CASE MANAGEMENT/SOCIAL WORK SECTION    Inpatient Status Date: ***    Readmission Risk Assessment Score:  Readmission Risk              Risk of Unplanned Readmission:  30           Discharging to Facility/ Agency   Name: [facility]  Address: [place] [address]  Phone: [phone]  Fax: [phone]    Dialysis Facility (if applicable)   Name: [facility]   Address:  Dialysis Schedule: M-W-F  Phone: ph: [phone]  Fax:    / signature: Electronically signed by YUE Adams, AHSANW on 2/14/22 at 2:43 PM EST    PHYSICIAN SECTION    Prognosis: {Prognosis:[id number]}    Condition at Discharge: Sanjuana Serrano Patient Condition:[id number]}    Rehab Potential (if transferring to Rehab): {Prognosis:9315636769}    Recommended Labs or Other Treatments After Discharge: ***    Physician Certification: I certify the above information and transfer of Kaila Ivory  is necessary for the continuing treatment of the diagnosis listed and that he requires [facility] for less 30 days.      Update Admission H&P: {CHP DME Changes in

## 2022-02-15 ENCOUNTER — APPOINTMENT (OUTPATIENT)
Dept: GENERAL RADIOLOGY | Age: 87
DRG: 193 | End: 2022-02-15
Payer: MEDICARE

## 2022-02-15 LAB
ANION GAP SERPL CALCULATED.3IONS-SCNC: 14 MMOL/L (ref 7–16)
BASOPHILS ABSOLUTE: 0.02 E9/L (ref 0–0.2)
BASOPHILS RELATIVE PERCENT: 0.4 % (ref 0–2)
BUN BLDV-MCNC: 29 MG/DL (ref 6–23)
CALCIUM SERPL-MCNC: 8.5 MG/DL (ref 8.6–10.2)
CHLORIDE BLD-SCNC: 95 MMOL/L (ref 98–107)
CO2: 26 MMOL/L (ref 22–29)
CREAT SERPL-MCNC: 4 MG/DL (ref 0.7–1.2)
EOSINOPHILS ABSOLUTE: 0.19 E9/L (ref 0.05–0.5)
EOSINOPHILS RELATIVE PERCENT: 3.3 % (ref 0–6)
GFR AFRICAN AMERICAN: 17
GFR NON-AFRICAN AMERICAN: 14 ML/MIN/1.73
GLUCOSE BLD-MCNC: 128 MG/DL (ref 74–99)
HCT VFR BLD CALC: 31.3 % (ref 37–54)
HEMOGLOBIN: 9.7 G/DL (ref 12.5–16.5)
IMMATURE GRANULOCYTES #: 0.03 E9/L
IMMATURE GRANULOCYTES %: 0.5 % (ref 0–5)
LYMPHOCYTES ABSOLUTE: 0.83 E9/L (ref 1.5–4)
LYMPHOCYTES RELATIVE PERCENT: 14.6 % (ref 20–42)
MCH RBC QN AUTO: 32.1 PG (ref 26–35)
MCHC RBC AUTO-ENTMCNC: 31 % (ref 32–34.5)
MCV RBC AUTO: 103.6 FL (ref 80–99.9)
METER GLUCOSE: 131 MG/DL (ref 74–99)
METER GLUCOSE: 150 MG/DL (ref 74–99)
METER GLUCOSE: 155 MG/DL (ref 74–99)
METER GLUCOSE: 232 MG/DL (ref 74–99)
MONOCYTES ABSOLUTE: 0.67 E9/L (ref 0.1–0.95)
MONOCYTES RELATIVE PERCENT: 11.8 % (ref 2–12)
NEUTROPHILS ABSOLUTE: 3.95 E9/L (ref 1.8–7.3)
NEUTROPHILS RELATIVE PERCENT: 69.4 % (ref 43–80)
ORGANISM: ABNORMAL
ORGANISM: ABNORMAL
PDW BLD-RTO: 14.4 FL (ref 11.5–15)
PHOSPHORUS: 3.6 MG/DL (ref 2.5–4.5)
PLATELET # BLD: 145 E9/L (ref 130–450)
PMV BLD AUTO: 11.7 FL (ref 7–12)
POTASSIUM REFLEX MAGNESIUM: 3.9 MMOL/L (ref 3.5–5)
RBC # BLD: 3.02 E12/L (ref 3.8–5.8)
SODIUM BLD-SCNC: 135 MMOL/L (ref 132–146)
WBC # BLD: 5.7 E9/L (ref 4.5–11.5)

## 2022-02-15 PROCEDURE — 82962 GLUCOSE BLOOD TEST: CPT

## 2022-02-15 PROCEDURE — 6370000000 HC RX 637 (ALT 250 FOR IP): Performed by: STUDENT IN AN ORGANIZED HEALTH CARE EDUCATION/TRAINING PROGRAM

## 2022-02-15 PROCEDURE — 2500000003 HC RX 250 WO HCPCS: Performed by: RADIOLOGY

## 2022-02-15 PROCEDURE — 97165 OT EVAL LOW COMPLEX 30 MIN: CPT

## 2022-02-15 PROCEDURE — 92611 MOTION FLUOROSCOPY/SWALLOW: CPT | Performed by: SPEECH-LANGUAGE PATHOLOGIST

## 2022-02-15 PROCEDURE — 84100 ASSAY OF PHOSPHORUS: CPT

## 2022-02-15 PROCEDURE — 1200000000 HC SEMI PRIVATE

## 2022-02-15 PROCEDURE — 80048 BASIC METABOLIC PNL TOTAL CA: CPT

## 2022-02-15 PROCEDURE — 92526 ORAL FUNCTION THERAPY: CPT | Performed by: SPEECH-LANGUAGE PATHOLOGIST

## 2022-02-15 PROCEDURE — 36415 COLL VENOUS BLD VENIPUNCTURE: CPT

## 2022-02-15 PROCEDURE — 87081 CULTURE SCREEN ONLY: CPT

## 2022-02-15 PROCEDURE — 2700000000 HC OXYGEN THERAPY PER DAY

## 2022-02-15 PROCEDURE — 6370000000 HC RX 637 (ALT 250 FOR IP): Performed by: SPECIALIST

## 2022-02-15 PROCEDURE — 74230 X-RAY XM SWLNG FUNCJ C+: CPT

## 2022-02-15 PROCEDURE — 6370000000 HC RX 637 (ALT 250 FOR IP): Performed by: INTERNAL MEDICINE

## 2022-02-15 PROCEDURE — 2580000003 HC RX 258: Performed by: INTERNAL MEDICINE

## 2022-02-15 PROCEDURE — 94640 AIRWAY INHALATION TREATMENT: CPT

## 2022-02-15 PROCEDURE — 6360000002 HC RX W HCPCS: Performed by: INTERNAL MEDICINE

## 2022-02-15 PROCEDURE — 97530 THERAPEUTIC ACTIVITIES: CPT

## 2022-02-15 PROCEDURE — 85025 COMPLETE CBC W/AUTO DIFF WBC: CPT

## 2022-02-15 PROCEDURE — 71045 X-RAY EXAM CHEST 1 VIEW: CPT

## 2022-02-15 PROCEDURE — 97161 PT EVAL LOW COMPLEX 20 MIN: CPT

## 2022-02-15 RX ORDER — CLOTRIMAZOLE 1 %
CREAM (GRAM) TOPICAL 2 TIMES DAILY
Status: DISCONTINUED | OUTPATIENT
Start: 2022-02-15 | End: 2022-02-16 | Stop reason: HOSPADM

## 2022-02-15 RX ADMIN — DOXYCYCLINE HYCLATE 100 MG: 100 CAPSULE ORAL at 20:09

## 2022-02-15 RX ADMIN — CLOTRIMAZOLE: 10 CREAM TOPICAL at 20:10

## 2022-02-15 RX ADMIN — INSULIN LISPRO 1 UNITS: 100 INJECTION, SOLUTION INTRAVENOUS; SUBCUTANEOUS at 17:26

## 2022-02-15 RX ADMIN — BUDESONIDE 250 MCG: 0.25 SUSPENSION RESPIRATORY (INHALATION) at 18:57

## 2022-02-15 RX ADMIN — INSULIN LISPRO 2 UNITS: 100 INJECTION, SOLUTION INTRAVENOUS; SUBCUTANEOUS at 12:46

## 2022-02-15 RX ADMIN — BARIUM SULFATE 10 ML: 400 PASTE ORAL at 11:16

## 2022-02-15 RX ADMIN — BARIUM SULFATE 70 G: 0.81 POWDER, FOR SUSPENSION ORAL at 11:17

## 2022-02-15 RX ADMIN — INSULIN LISPRO 1 UNITS: 100 INJECTION, SOLUTION INTRAVENOUS; SUBCUTANEOUS at 20:11

## 2022-02-15 RX ADMIN — APIXABAN 2.5 MG: 2.5 TABLET, FILM COATED ORAL at 09:31

## 2022-02-15 RX ADMIN — ATORVASTATIN CALCIUM 10 MG: 10 TABLET, FILM COATED ORAL at 20:10

## 2022-02-15 RX ADMIN — Medication 1000 UNITS: at 09:31

## 2022-02-15 RX ADMIN — GABAPENTIN 100 MG: 100 CAPSULE ORAL at 20:10

## 2022-02-15 RX ADMIN — PETROLATUM: 420 OINTMENT TOPICAL at 20:10

## 2022-02-15 RX ADMIN — Medication 10 ML: at 09:31

## 2022-02-15 RX ADMIN — METOPROLOL SUCCINATE 25 MG: 25 TABLET, EXTENDED RELEASE ORAL at 09:31

## 2022-02-15 RX ADMIN — BUDESONIDE 250 MCG: 0.25 SUSPENSION RESPIRATORY (INHALATION) at 09:23

## 2022-02-15 RX ADMIN — BARIUM SULFATE 120 ML: 400 SUSPENSION ORAL at 11:17

## 2022-02-15 RX ADMIN — APIXABAN 2.5 MG: 2.5 TABLET, FILM COATED ORAL at 20:09

## 2022-02-15 RX ADMIN — Medication 10 ML: at 20:10

## 2022-02-15 RX ADMIN — ZINC SULFATE 220 MG (50 MG) CAPSULE 50 MG: CAPSULE at 09:31

## 2022-02-15 RX ADMIN — Medication 1 TABLET: at 09:31

## 2022-02-15 RX ADMIN — DOXYCYCLINE HYCLATE 100 MG: 100 CAPSULE ORAL at 09:31

## 2022-02-15 ASSESSMENT — PAIN SCALES - GENERAL
PAINLEVEL_OUTOF10: 0

## 2022-02-15 NOTE — PROGRESS NOTES
SPEECH/LANGUAGE PATHOLOGY  VIDEOFLUOROSCOPIC STUDY OF SWALLOWING (MBS)   and PLAN OF CARE    PATIENT NAME:  Bev Puente  (male)     MRN:  [id number]    :  1931  (80 y.o.)  STATUS:  Inpatient: Room 0530/0530-A    TODAY'S DATE:  2/15/2022  REFERRING PROVIDER:   Razia Plunkett MD   SPECIFIC PROVIDER ORDER: FL modified barium swallow with video  Date of order:  22   REASON FOR REFERRAL: inconsistent s/s of aspiration at bedside   EVALUATING THERAPIST: Penelope Hart SLP      RESULTS:      DYSPHAGIA DIAGNOSIS:  mild oropharyngeal phase dysphagia-aspiration was not observed during this study    DIET RECOMMENDATIONS:  Regular consistency solids (IDDSI level 7) with thin liquids (IDDSI level 0) using compensatory strategies    FEEDING RECOMMENDATIONS:    Assistance level:  Stand by assistance is needed during all oral intake     Compensatory strategies recommended: Small bites/sips     Discussed recommendations with nursing and/or faxed report to referring provider: Yes    Laryngeal Penetration and Aspiration:  Penetration WITHOUT aspiration was observed in today's study with  mildly thick liquid (nectar)    SPEECH THERAPY  PLAN OF CARE   The dysphagia POC is established based on physician order and dysphagia diagnosis    Meal time assessment for 1-2 sessions to provide diet modification and compensatory strategy implementation due to need to ensure proper implementation of compensatory strategies during PO intake       Conditions Requiring Skilled Therapeutic Intervention for dysphagia:    Patient is performing below functional baseline d/t  current acute condition, Multiple diagnoses, multiple medications, and increased dependency upon caregivers.     SPECIFIC DYSPHAGIA INTERVENTIONS TO INCLUDE:     Meal time assessment for 1-2 sessions to provide diet modification and compensatory strategy implementation due to need to ensure proper implementation of compensatory strategies during PO intake     Specific instructions for next treatment:  initiate instruction of compensatory strategies  Treatment Goals:    Short Term Goals:  Pt will participate in meal time assessment for 1-2 sessions to provide diet modification and compensatory strategy implementation due to need to ensure proper implementation of compensatory strategies during PO intake     Long Term Goals:   Pt will maintain adequate nutrition/hydration via PO intake of the least restrictive oral diet with implementation of safe swallow/ compensatory strategies and decrease signs/symptoms of aspiration to less than 1 x/day. Patient/family Goal:    Did not state. Will further assess during treatment. Plan of care discussed with Patient   The Patient understand(s) the diagnosis, prognosis and plan of care     Rehabilitation Potential/Prognosis: good                      ADMITTING DIAGNOSIS: Pneumonia [J18.9]  Acute respiratory failure with hypoxemia ([address] [J96.01]  Pneumonia due to infectious organism, unspecified laterality, unspecified part of lung [J18.9]     VISIT DIAGNOSIS:   Visit Diagnoses       Codes    Acute respiratory failure with hypoxemia ([address]    -  Primary J96.01    Pneumonia due to infectious organism, unspecified laterality, unspecified part of lung     J18.9              PATIENT REPORT/COMPLAINT: denies difficulty swallowing    PRIOR LEVEL OF SWALLOW FUNCTION:    Past History of Dysphagia?:  none reported    Home diet: Regular consistency solids (IDDSI level 7) with  thin liquids (IDDSI level 0)  Current Diet Order:  ADULT DIET; Regular; 4 carb choices (60 gm/meal);  Low Sodium (2 gm)    PROCEDURE:  Consistencies Administered During the Evaluation   Liquids: thin liquid and nectar thick liquid   Solids:  pureed foods and solid foods      Method of Intake:   cup, straw, spoon  Self fed, Fed by clinician      Position:   Seated, upright, Lateral plane    INSTRUMENTAL ASSESSMENT:    ORAL PREP/ ORAL PHASE:    Oral residuals were noted :  right buccal cavity  and on the superior tongue     PHARYNGEAL PHASE:     ONSET TIME       Onset time of the pharyngeal swallow was adequate       PHARYNGEAL RESIDUALS        Vallecula/Pharyngeal Wall           No significant residuals were noted in the vallecula      Pyriform Sinuses      No significant residuals were noted in the pyriform sinuses     LARYNGEAL PENETRATION   Laryngeal penetration occurred in the absence of aspiration DURING the swallow for nectar consistency liquid due to  delayed laryngeal closure which cleared from the laryngeal vestibule spontaneously (transient). Laryngeal penetration was mild and occurred inconsistently   an absent cough/throat clear was noted    ASPIRATION  Aspiration was not present during this evaluation    PENETRATION-ASPIRATION SCALE (PAS):  THIN 1 = Material does not enter the airway  MILDLY THICK 2 = Material enters the airway, remains above vocal folds, and is ejected from the airway   MODERATELY THICK item not administered  PUREE 1 = Material does not enter the airway  HARD SOLID 1 = Material does not enter the airway       COMPENSATORY STRATEGIES    Compensatory strategies that were beneficial included Small bites/sips      STRUCTURAL/FUNCTIONAL ANOMALIES   No structural/functional anomalies were noted    CERVICAL ESOPHAGEAL STAGE :     Pt presents with significant kyphosis          ___________    Cognition:   Within functional limits for this exam    Oral Peripheral Examination   Right labiobuccal weakness    Current Respiratory Status   3 liters nasal cannula     Parameters of Speech Production  Respiration:  Adequate for speech production  Quality:   Within functional limits  Intensity: Within functional limits    Pain: No pain reported. EDUCATION:   The Speech Language Pathologist (SLP) completed education regarding results of evaluation and that intervention is warranted at this time.   Learner: Patient  Education: Reviewed results and recommendations of this evaluation  Evaluation of Education:  Verbalizes understanding    This plan may be re-evaluated and revised as warranted. Evaluation Time includes thorough review of current medical information, gathering information on past medical history/social history and prior level of function, completion of standardized testing/informal observation of tasks, assessment of data and education on plan of care and goals. INTERVENTION    Pt/family educated on above results and plan of care. Pt/family trained on compensatory strategies for safe swallow and signs and symptoms of aspiration (throat clearing, coughing/choking, wet vocal quality. , etc.) and encouraged to notify staff if observed. Handouts provided as warranted. Pt/family encouraged to engage in question/answer session. All questions answered and pt/family verbalized understanding of above. [x]The admitting diagnosis and active problem list, have been reviewed prior to initiation of this evaluation.     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    Diabetes mellitus ([address]    Diabetes mellitus ([address]    Diabetic peripheral neuropathy ([address]    Osteoarthritis    Gait abnormality    Physical deconditioning    Lumbar spondylitis (HCC)    Anemia    Fx humer, med condyl-closed    Cerebral infarction ([address]    Renal failure    TIA (transient ischemic attack)    ASHD (arteriosclerotic heart disease)    Diabetes mellitus ([address]    PVD (peripheral vascular disease) with claudication (HCC)    History of CVA (cerebrovascular accident)    Pain in lower limb    Cerebral infarction ([address]    Diabetes mellitus-2    Decubitus ulcer of sacral region, stage 1    Right sided weakness    Hypoglycemia    Encounter regarding vascular access for dialysis for end-stage renal disease ([address]    ESRD (end stage renal disease) on dialysis ([address]    Diabetic ulcer of toe of right foot associated with type 2 diabetes mellitus, with fat layer exposed ([address]    Failure to thrive in adult    Ischemic ulcer of toe, limited to breakdown of skin, right ([address]    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 ([address]    Toe infection    Pneumonia       Rubio Olga HARKINS CCC/SLP [id number]  Speech-Language Pathologist

## 2022-02-15 NOTE — CARE COORDINATION
[facility] can accept; will need updated PT/OT evals. Need covid day of d/c. Hens [id number] completed transport forms on chart. ID r/o CLABSI await final cx re; status of HD cath. IV atb. Will follow. [facility] Frame.

## 2022-02-15 NOTE — PROGRESS NOTES
Occupational Therapy  OCCUPATIONAL THERAPY INITIAL EVALUATION  [facility] [address]    Date: 2/15/2022     Patient Name: Liset Gutierrez  MRN: [id number]  : 1931  Room: [address] [place] [address]    Evaluating OT: Dany Ojeda, OTR/L - [id number]    Referring Provider: Earle Owens MD  Specific Provider Orders/Date: \"OT eval and treat\" - 2022    Diagnosis: Pneumonia [J18.9], Acute respiratory failure with hypoxemia ([facility] [address] [J96.01], Pneumonia due to infectious organism, unspecified laterality, unspecified part of lung [J18.9]     Pertinent Medical History: CVA with R-sided weakness, CAD, CKD, DM, peripheral neuropathy, TIA, PVD, HTN, CHF, arthritis    Precautions: fall risk, R-sided weakness, bed alarm, skin integrity, hearing impairment    Assessment of Current Deficits:    [x] Functional mobility   [x]ADLs  [x] Strength               [x]Cognition   [x] Functional transfers   [x] IADLs         [x] Safety Awareness   [x]Endurance   [] Fine Coordination              [x] Balance      [] Vision/perception   [x]Sensation    []Gross Motor Coordination  [] ROM  [] Delirium                   [] Motor Control     OT PLAN OF CARE   OT POC is based on physician orders, patient diagnosis, and results of clinical assessment.   Frequency/Duration 2-5 days/week for 2 weeks PRN   Specific OT Treatment Interventions to Include:   * Instruction/training on adapted ADL techniques and AE recommendations to increase functional independence within precautions       * Training on energy conservation strategies, correct breathing pattern and techniques to improve independence/tolerance for self-care routine  * Functional transfer/mobility training/DME recommendations for increased independence, safety, and fall prevention  * Patient/Family education to increase follow through with safety techniques and functional independence  * Recommendation of environmental modifications for increased safety with functional transfers/mobility and ADLs  * Therapeutic exercise to improve motor endurance, ROM, and functional strength for ADLs/functional transfers  * Therapeutic activities to facilitate/challenge dynamic balance, stand tolerance for increased safety and independence with ADLs  * Therapeutic activities to facilitate gross/fine motor skills for increased independence with ADLs  * Neuro-muscular re-education: facilitation of righting/equilibrium reactions, midline orientation, scapular stability/mobility, normalization of muscle tone, and facilitation of volitional active controled movement  * Positioning to improve skin integrity, interaction with environment and functional independence    Recommended Adaptive Equipment: TBD    Home Living: Patient lives with his niece (who works during the day) in a one-floor setup; patient sleeps in a recliner lift chair at home. Bathroom Setup: tub shower (with extended tub bench, handheld shower head, and grab bars) and elevated toilet seat (no rails) on the main living level  Equipment Owned: quad cane, transport wheelchair, wheelchair, extended tub bench    Prior Level of Function (PLOF): Patient had consistent assistance with most ADLs from an aide (7 days per week for a minimum of 2-3 hours each day) and neighbor. Patient's aide, family members, and neighbor assist with IADLs. Patient had been mostly indpeendent with functional mobility (with quad cane short distances - wheelchair used for functional mobility, as needed) prior to this hospitalization. Pain Level: No complaints of pain. Cognition: Patient alert and oriented x3. WFL command follow demonstrated. Patient pleasant, cooperative, and motivated to return to PLOF.   Memory: WFL  Sequencing: WFL  Problem Solving: WFL  Judgement/Safety: WFL grossly    Functional Assessment:  AM-PAC Daily Activity Raw Score: 13/24   Initial Eval Status  Date: 2/15/2022 Treatment Status  Date:  Short Term Goals = Long Term Goals   Feeding SBA  Setup   Grooming Mod A  Min A  (seated/standing at sink)   UB Dressing Mod A to doff/don hospital gown while seated at EOB. Min A   LB Dressing Max A to adjust socks. Mod A - with use of AE, as needed/appropriate   Bathing Max A  Mod A - with use of AE/DME, as needed/appropriate   Toileting Max A  Mod A   Bed Mobility  Supine-to-Sit: Mod A  Sit-to-Supine: Mod A   Min A   Functional Transfers Sit-to-Stand: Mod A to Max A  from EOB (performed twice). Min A   Functional Mobility Not assessed. Min A with functional mobility (with device, as needed/appropriate) in order to maximize independence with ADLs/IADLs and other functional tasks. Balance Sitting: Fair+  (at EOB)  Standing: Poor+  (with L hand on walker)  Fair dynamic standing balance during completion of ADLs/IADLs and other functional tasks. Activity Tolerance Limited  Patient tolerated <2 minutes of static standing twice during this session. Patient will demonstrate Good understanding and consistent implementation of energy conservation techniques and work simplification techniques into ADL/IADL routines. Visual/  Perceptual WFL   Patient wears glasses. N/A   B UE Strength R Shoulder: 2-/5  Distal R UE: 2-/5 to 3-/5 grossly  L UE: 4/5 grossly  Patient will demonstrate 5/5 L UE strength in order to maximize independence with ADLs/IADLs, bed mobility, and functional transfers. Additional Long-Term Goal: Patient will increase functional independence to PLOF in order to allow patient to live in least restrictive environment. ROM: Additional Information:    R UE  Limited passive shoulder flexion; distal PROM/AAROM WFL grossly Limited  strength. Limited active extension demonstrated in MCP joints of digits 4 and 5.   3/5  strength.    L UE AROM WFL WFL  strength   Hand Dominance: Right    Hearing: Impaired - wears hearing aids  Sensation: Patient reported experiencing decreased sensation in L UE and L LE. Tone: WFL  Edema: No    Comments: RN approved patient's participation in EOB/OOB activities. Upon arrival, patient supine in bed. At end of session, patient supine in bed (per patient preference) with call light and phone within reach, bed alarm activated, and all lines and tubes intact. Patient would benefit from continued skilled OT to increase safety and independence with completion of ADL/IADL tasks for functional independence and quality of life. Treatment: OT treatment provided this date included:    Instruction/training on safety and adapted techniques for completion of ADLs.   Instruction/training on safe functional mobility/transfer techniques. Patient education provided regardin) potential benefits of continued therapy services upon discharge. Patient verbalized understanding. Further skilled OT treatment indicated to increase patient's safety and independence with completion of ADL/IADL tasks in order to maximize patient's functional independence and quality of life. Rehab Potential: Good for established goals. Patient / Family Goal: Patient indicated that he hopes to return home. Patient and/or family were instructed on functional diagnosis, prognosis/goals, and OT plan of care. Demonstrated Good understanding.     Eval Complexity: Low    Time In: 1410  Time Out: 1445  Total Treatment Time: 15 minutes      Minutes Units   OT Eval Low 73617 15 1   OT Eval Medium 57612     OT Eval High 63040     OT Re-Eval [id number]     Therapeutic Ex 23903     Therapeutic Activities 87367 15 1   ADL/Self Care 31334     Orthotic Management 78758     Neuro Re-Ed 34764     Non-Billable Time 5 ---     Evaluation time includes thorough review of current medical information, gathering information on past medical history/social history and prior level of function, completion of standardized testing/informal observation of tasks, assessment of data, and education on plan of care and goals. Ai eD La O, OTR/L  License Number: [id number]

## 2022-02-15 NOTE — CONSULTS
Comprehensive Nutrition Assessment    Type and Reason for Visit:  Initial,Consult,Wound    Nutrition Recommendations/Plan: Continue Current Diet, Start Gelatein BID and Triston BID    Nutrition Assessment:  Pt w/ PNA, noted dysphagia w/ SLP following. Hx CHF/CVA/COPD/COVID/ESRD w/ HD. PO meal intake ~%, wound noted, will add ONS. Malnutrition Assessment:  Malnutrition Status:  No malnutrition    Context:  Acute Illness     Findings of the 6 clinical characteristics of malnutrition:  Energy Intake:  No significant decrease in energy intake  Weight Loss:  Unable to assess (d/t fluid shifts (HD))     Body Fat Loss:  No significant body fat loss     Muscle Mass Loss:  No significant muscle mass loss    Fluid Accumulation:  No significant fluid accumulation     Strength:  Not Performed    Nutrition Related Findings:  -I&Os, BLE +2/BUE +1/generalized +2 edema, +BS, HD, dysphagia      Wounds:  Deep Tissue Injury       Current Nutrition Therapies:    ADULT DIET; Regular; 4 carb choices (60 gm/meal); Low Sodium (2 gm)    Anthropometric Measures:  · Height: 5' 6\" (167.6 cm)  · Current Body Weight: 196 lb (88.9 kg) (2/14 bed s/p HD)   · Usual Body Weight: 179 lb (81.2 kg) (7/2021 bed per EMR)     · Ideal Body Weight: 142 lbs; % Ideal Body Weight 138 %   · BMI: 31.7  · BMI Categories: Obese Class 1 (BMI 30.0-34. 9)       Nutrition Diagnosis:   · Increased nutrient needs related to increase demand for energy/nutrients as evidenced by wounds,dialysis    Nutrition Interventions:   Nutrition Education/Counseling:  No recommendation at this time   Coordination of Nutrition Care:  Continue to monitor while inpatient    Goals:  Pt to consume >75% of meals/ONS       Nutrition Monitoring and Evaluation:   Food/Nutrient Intake Outcomes:  Food and Nutrient Intake,Supplement Intake  Physical Signs/Symptoms Outcomes:  Biochemical Data,Chewing or Swallowing,GI Status,Fluid Status or Edema,Nutrition Focused Physical Findings,Skin,Weight     Discharge Planning:     Too soon to determine     Electronically signed by Claudia Mirza RD, LD on 2/15/22 at 4:11 PM EST  Contact: [phone]

## 2022-02-15 NOTE — PROGRESS NOTES
Weight   02/15/22 1513 -- -- -- -- -- -- 5' 6\" (1.676 m) --   02/15/22 0800 138/66 98.8 °F (37.1 °C) Oral 84 16 97 % -- --   02/15/22 0245 -- -- -- -- -- -- -- 201 lb 1 oz (91.2 kg)   02/14/22 2045 (!) 121/55 97.9 °F (36.6 °C) Oral 80 14 97 % -- --   @      Intake/Output Summary (Last 24 hours) at 2/15/2022 1722  Last data filed at 2/14/2022 1906  Gross per 24 hour   Intake --   Output 25 ml   Net -25 ml         Wt Readings from Last 3 Encounters:   02/15/22 201 lb 1 oz (91.2 kg)   01/20/22 180 lb (81.6 kg)   01/11/22 180 lb (81.6 kg)       Constitutional:  in no acute distress  HEENT: NC/AT, EOMI, sclera and conjunctiva are clear and anicteric, mucus membranes moist  Neck: Trachea midline, no JVD  Cardiovascular: S1, S2 regular rhythm, no murmur,or rub  Chest:  tessio exit site dressed  Respiratory:  No crackles, no wheeze  Gastrointestinal:  Soft, nontender, nondistended, NABS  Ext: no edema, feet warm  Skin: dry, no rash  Neuro: awake, alert, interactive      DATA:    Recent Labs     02/13/22  0228 02/14/22  0434 02/15/22  0223   WBC 9.3 8.1 5.7   HGB 9.2* 9.2* 9.7*   HCT 29.2* 29.9* 31.3*   .3* 103.5* 103.6*   * 147 145     Recent Labs     02/13/22  0228 02/14/22  0434 02/15/22  0223    136 135   K 3.8 4.0 3.9   CL 97* 98 95*   CO2 26 24 26   PHOS 3.1 4.0 3.6   BUN 28* 43* 29*   CREATININE 4.3* 5.8* 4.0*       Lab Results   Component Value Date    LABPROT 7.6 (H) 08/22/2020    LABPROT 7.6 08/22/2020       ASSESSMENT / RECOMMENDATIONS:    1. ESRD      2. Anemia due to ESRD     3. 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 Bacteremia, staph epidermidis and another staph species. A well represent CLABSI, though pneumonia may be the culprit. Being followed by ID    5. Acute hypoxic respite insufficiency due to bacterial pneumonia    6.   Diabetes mellitus    Continue IHD support for solute and volume clearance on Mondays Wednesdays and Fridays as per outpatient orders and dry weight  Cefepime and doxycycline per ID  Await repeat cultures  Pending those cultures, may need to have his Tesio removed  Follow closely      Electronically signed by Marlo Black MD on 2/15/2022

## 2022-02-15 NOTE — PLAN OF CARE
Problem: Pain:  Goal: Pain level will decrease  Description: Pain level will decrease  Outcome: Met This Shift     Problem: Skin Integrity:  Goal: Absence of new skin breakdown  Description: Absence of new skin breakdown  Outcome: Met This Shift     Problem: Falls - Risk of:  Goal: Will remain free from falls  Description: Will remain free from falls  Outcome: Met This Shift  Goal: Absence of physical injury  Description: Absence of physical injury  Outcome: Met This Shift     Problem: Increased nutrient needs (NI-5.1)  Goal: Food and/or Nutrient Delivery  Description: Individualized approach for food/nutrient provision.   2/15/2022 1611 by Mich Johnson RD, LD  Outcome: Met This Shift

## 2022-02-15 NOTE — PROGRESS NOTES
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    Diabetes mellitus ([address]    Diabetes mellitus ([address]    Diabetic peripheral neuropathy ([address]    Osteoarthritis    Gait abnormality    Physical deconditioning    Lumbar spondylitis (HCC)    Anemia    Fx humer, med condyl-closed    Cerebral infarction ([address]    Renal failure    TIA (transient ischemic attack)    ASHD (arteriosclerotic heart disease)    Diabetes mellitus ([address]    PVD (peripheral vascular disease) with claudication (HCC)    History of CVA (cerebrovascular accident)    Pain in lower limb    Cerebral infarction ([address]    Diabetes mellitus-2    Decubitus ulcer of sacral region, stage 1    Right sided weakness    Hypoglycemia    Encounter regarding vascular access for dialysis for end-stage renal disease ([address]    ESRD (end stage renal disease) on dialysis ([address]    Diabetic ulcer of toe of right foot associated with type 2 diabetes mellitus, with fat layer exposed ([address]    Failure to thrive in adult    Ischemic ulcer of toe, limited to breakdown of skin, right ([address]    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 ([address]    Toe infection    Pneumonia       Subjective:  · Sitting up, breathing easy. No evidence of respiratory distress. Saturation is quite good.       Vitals:  VITALS:  /66   Pulse 84   Temp 98.8 °F (37.1 °C) (Oral)   Resp 16   Ht 5' 6\" (1.676 m)   Wt 201 lb 1 oz (91.2 kg)   SpO2 97%   BMI 32.45 kg/m²     24HR INTAKE/OUTPUT:      Intake/Output Summary (Last 24 hours) at 2/15/2022 1651  Last data filed at 2/14/2022 1906  Gross per 24 hour   Intake --   Output 25 ml   Net -25 ml       24HR PULSE OXIMETRY RANGE:    SpO2  Av %  Min: 97 %  Max: 97 %    Medications:  IV:   sodium chloride      dextrose         Scheduled Meds:   white petrolatum   Topical BID    clotrimazole   Topical BID    doxycycline hyclate  100 mg Oral 2 times per day    apixaban  2.5 mg Oral BID    cefepime  2,000 mg IntraVENous Q MWF    atorvastatin  10 mg Oral Nightly    budesonide  0.25 mg Nebulization BID    gabapentin  100 mg Oral Nightly    [Held by provider] lisinopril  2.5 mg Oral Daily    metoprolol succinate  25 mg Oral Daily    Vitamin D  1,000 Units Oral Daily    zinc sulfate  50 mg Oral Daily    sodium chloride flush  5-40 mL IntraVENous 2 times per day    insulin lispro  0-6 Units SubCUTAneous TID WC    insulin lispro  0-3 Units SubCUTAneous Nightly    ocuvite-lutein  1 tablet Oral Daily       Diet:   ADULT DIET; Regular; 4 carb choices (60 gm/meal); Low Sodium (2 gm)  ADULT ORAL NUTRITION SUPPLEMENT; Breakfast, Dinner; Wound Healing Oral Supplement  ADULT ORAL NUTRITION SUPPLEMENT; Lunch, Dinner; Other Oral Supplement; Gelatein 20     EXAM:  General: No distress. Alert. Eyes: PERRL. No sclera icterus. No conjunctival injection. ENT: No discharge. Pharynx clear. Neck: Trachea midline. Normal thyroid. Resp: No accessory muscle use. Left lower rales. No wheezing. No rhonchi. CV: Regular rate. Regular rhythm. No murmur or rub. Abd: Non-tender. Non-distended. No masses. No organomegaly. Normal bowel sounds. Skin: Warm and dry. No nodule on exposed extremities. No rash on exposed extremities. Ext: No cyanosis, clubbing, edema  Lymph: No cervical LAD. No supraclavicular LAD. M/S: No cyanosis. No joint deformity. No clubbing. Neuro: Awake. Follows commands. Positive pupils/gag/corneals. Normal pain response.        Results:  CBC:   Recent Labs     228 224 02/15/22  0223   WBC 9.3 8.1 5.7   HGB 9.2* 9.2* 9.7*   HCT 29.2* 29.9* 31.3*   .3* 103.5* 103.6*   * 147 the day. Emiliano Jasmine MD,  MCHANTEL., F.C.C.P.     [facility] [address] [facility], [address] [facility] Aminata Mckeon, [address], [facility] [place][note 2]

## 2022-02-15 NOTE — FLOWSHEET NOTE
Initial Inpatient Wound Care    Admit Date: 2/11/2022 10:57 AM    Reason for consult:  Sacral decub, stage 2. Hx diabetic foot ulcersl  Significant history:  Adm with pneumonia, failed outpatient tx  DMM, CVA, PVD. COVID 19        Findings:  Awake and verbally appropriate     02/15/22 1427   Wound 02/15/22 Sacrum   Date First Assessed/Time First Assessed: 02/15/22 1427   Present on Hospital Admission: No  Primary Wound Type: Pressure Injury  Location: Sacrum   Wound Image    Wound Etiology Deep tissue/Injury   Wound Length (cm) 7 cm   Wound Width (cm) 14 cm   Wound Depth (cm)   (utd)   Wound Surface Area (cm^2) 98 cm^2   Wound Assessment   (purple, open areas)   Drainage Amount Scant   Drainage Description Sanguinous; Serosanguinous   Odor None   Sharyn-wound Assessment Intact   bilateral heels, pink, rash with flaky, consistent with fungal infection  Impression: DTI Sacrum     Interventions in place:  Heel protectors,  Comfort glide, SOS  Plan: lo air loss  aquaphor  Dietician  Lotrimin to heels  [employer].  Demar Serrano, JESUS - CNS 2/15/2022 2:31 PM

## 2022-02-15 NOTE — PROGRESS NOTES
[address] [place] [address] [facility]  DANAY  Progress Note    SUBJECTIVE:  Chief Complaint   Patient presents with    Shortness of Breath     pt having sob beginning this morning. needs dialysis today. 82% on RA at home      no new complaints today. Appetite is good. Passed a swallow evaluation. Review of systems:  As stated above in the chief complaint, otherwise negative. Medications:  Scheduled Meds:   doxycycline hyclate  100 mg Oral 2 times per day    apixaban  2.5 mg Oral BID    cefepime  2,000 mg IntraVENous Q MWF    atorvastatin  10 mg Oral Nightly    budesonide  0.25 mg Nebulization BID    gabapentin  100 mg Oral Nightly    [Held by provider] lisinopril  2.5 mg Oral Daily    metoprolol succinate  25 mg Oral Daily    Vitamin D  1,000 Units Oral Daily    zinc sulfate  50 mg Oral Daily    sodium chloride flush  5-40 mL IntraVENous 2 times per day    insulin lispro  0-6 Units SubCUTAneous TID WC    insulin lispro  0-3 Units SubCUTAneous Nightly    ocuvite-lutein  1 tablet Oral Daily     Continuous Infusions:   sodium chloride      dextrose       PRN Meds:albuterol, sodium chloride flush, sodium chloride, ondansetron **OR** ondansetron, polyethylene glycol, acetaminophen **OR** acetaminophen, glucose, glucagon (rDNA), dextrose, dextrose bolus (hypoglycemia) **OR** dextrose bolus (hypoglycemia)    OBJECTIVE:  /66   Pulse 84   Temp 98.8 °F (37.1 °C) (Oral)   Resp 16   Ht 5' 6\" (1.676 m)   Wt 201 lb 1 oz (91.2 kg)   SpO2 97%   BMI 32.45 kg/m²   Temp  Av.4 °F (36.9 °C)  Min: 97.9 °F (36.6 °C)  Max: 98.8 °F (37.1 °C)  Constitutional: The patient is awake, alert, and oriented. Hard of hearing. Eating lunch. Skin: Warm and dry. No rashes were noted. HEENT: Round and reactive pupils. Moist mucous membranes. No ulcerations or thrush. Neck: Supple to movements. Chest: No use of accessory muscles to breathe. Symmetrical expansion.   No wheezing, crackles or rhonchi. Right tunneled HD catheter. Cardiovascular: S1 and S2 are rhythmic and regular. No murmurs appreciated. Abdomen: Positive bowel sounds to auscultation. Benign to palpation. No masses felt. Extremities: No edema. Lines: peripheral    Laboratory and Tests Review:  Lab Results   Component Value Date    WBC 5.7 02/15/2022    WBC 8.1 02/14/2022    WBC 9.3 02/13/2022    HGB 9.7 (L) 02/15/2022    HCT 31.3 (L) 02/15/2022    .6 (H) 02/15/2022     02/15/2022     Lab Results   Component Value Date    NEUTROABS 3.95 02/15/2022    NEUTROABS 6.13 02/14/2022    NEUTROABS 7.15 02/13/2022     No results found for: [facility]  Lab Results   Component Value Date    ALT 14 02/11/2022    AST 12 02/11/2022    ALKPHOS 104 02/11/2022    BILITOT 0.6 02/11/2022     Lab Results   Component Value Date     02/15/2022    K 3.9 02/15/2022    CL 95 02/15/2022    CO2 26 02/15/2022    BUN 29 02/15/2022    CREATININE 4.0 02/15/2022    CREATININE 5.8 02/14/2022    CREATININE 4.3 02/13/2022    GFRAA 17 02/15/2022    LABGLOM 14 02/15/2022    GLUCOSE 128 02/15/2022    GLUCOSE 163 02/22/2012    PROT 6.3 02/11/2022    LABALBU 3.6 02/11/2022    LABALBU 3.8 02/20/2012    CALCIUM 8.5 02/15/2022    BILITOT 0.6 02/11/2022    ALKPHOS 104 02/11/2022    AST 12 02/11/2022    ALT 14 02/11/2022     Lab Results   Component Value Date    CRP 27.9 (H) 02/13/2022    CRP 4.2 (H) 12/01/2021    CRP 0.1 09/07/2020     Lab Results   Component Value Date    SEDRATE 100 (H) 02/13/2022    SEDRATE 78 (H) 12/01/2021    SEDRATE 31 (H) 09/07/2020     Radiology:      Microbiology:   Blood culture 2/12/2022: Staphylococcus epidermidis in 2 of 4 bottles  Blood cultures 2/13/2022: Negative so far  Rapid SARS-CoV-2: Negative  Respiratory culture 2/12/2022: Pending    No results for input(s): PROCAL in the last 72 hours. ASSESSMENT:  · HCAP left lower lobe  · Staphylococcus epidermidis bacteremia.   There was low-grade and the repeat cultures were negative.   This was most likely a contaminant  · Possible CLABSI secondary to HD catheter infection    PLAN:  · Continue Cefepime and oral Doxycycline  · No need to remove tunneled HD catheter for now  · We will follow with you    Madhu Garibay MD  12:35 PM  2/15/2022

## 2022-02-15 NOTE — PROGRESS NOTES
Physical Therapy    Facility/Department: [address] MED SURG/TELE  Initial Assessment    NAME: Vicky Hernandez  : 1931  MRN: [id number]    Date of Service: 2/15/2022      Attending Provider:  Sonia Urbina MD    Evaluating PT:  Fiona Montilla. Tanika Magallon, P.T. Room #:  30/30-A  Diagnosis:  Pneumonia [J18.9]  Acute respiratory failure with hypoxemia ([address] [J96.01]  Pneumonia due to infectious organism, unspecified laterality, unspecified part of lung [J18.9]  Pertinent PMHx/PSHx:  CVA with R hemiparesis  Precautions:  Falls, bed/chair alarm    SUBJECTIVE:    Pt lives with his niece in a 1 story home with 3 stairs and 1 rail to enter. Pt ambulated with a WC for primary mobility and uses a quad cane with assist short distances to BR. He reports on a good day he can walk about 25 feet with quad cane and assist.  He uses R AFO when walking and performing stairs. OBJECTIVE:   Initial Evaluation  Date: 2/15/22 Treatment Short Term/ Long Term   Goals   Was pt agreeable to Eval/treatment? yes     Does pt have pain? No c/o pain     Bed Mobility  Rolling: MIN A to his R  Supine to sit: MOD A  Sit to supine: MOD A  Scooting: MOD A  supervision   Transfers Sit to stand: MOD A  Stand to sit: MOD A  Stand pivot: NA  MIN A   Ambulation   4 side steps with L hand on ww with MOD A/MAX A  25 feet with quad cane and R AFO MIN A   Stair negotiation: ascended and descended NA  3 steps with 1 rail MIN A   AM-PAC 6 Clicks        BLE ROM is WFL  LLE strength is grossly 4-/5 to 4/5 and R hip is 2-/5 to 2/5, knee is 3-/5, and ankle is 0-1/5. Sensation:  Pt c/o numbness to R side of his body  Edema:  None noted  Balance: sitting is SBA and standing with ww is MOD A/MAX A  Endurance: fair    Patient education  Pt educated on bed mobility and transfers.     Patient response to education:   Pt verbalized understanding Pt demonstrated skill Pt requires further education in this area   yes yes yes     ASSESSMENT:    Conditions Requiring Skilled Therapeutic Intervention:    [x]Decreased strength     []Decreased ROM  [x]Decreased functional mobility  [x]Decreased balance   [x]Decreased endurance   []Decreased posture  []Decreased sensation  [x]Decreased coordination   []Decreased vision  []Decreased safety awareness   []Increased pain     Comments:  Pt has decreased strength and endurance beyond what is his normal baseline. He had a CVA and has residual R side weakness which is exacerbated since his admission due to illness. He was in bed and agreeable to PT. He got to EOB and sat on EOB x 10 min working on core strength and posture. He stood using ww on L hand and I was on his R side and he was able to stand with MOD A/MAX A and side step toward HOB. Pt fatigued quickly and asked to sit back down on the bed. Once sitting he asked to lie back in bed due to fatigue. Pt is a high fall risk at this time. Treatment:  Patient practiced and was instructed in the following treatment:     Bed mobility, transfers, sitting EOB, and side stepping to improve functional strength, balance, and endurance. Pt was left supine in bed with B heels floating as pillow was under his calves and B heel protectors were in place and call light left by patient. Chair/bed alarm: bed alarm was re-activated. Pt's/ family goals   1. To get stronger and go home. Patient and or family understand(s) diagnosis, prognosis, and plan of care. PHYSICAL THERAPY PLAN OF CARE:    PT POC is established based on physician order and patient diagnosis     Referring provider/PT Order:  PT eval and treat  Diagnosis:  Pneumonia [J18.9]  Acute respiratory failure with hypoxemia ([facility] [J96.01]  Pneumonia due to infectious organism, unspecified laterality, unspecified part of lung [J18.9]  Specific instructions for next treatment:  Work on functional mobility activities.      Current Treatment Recommendations:     [x] Strengthening to improve independence with functional mobility   [] ROM to improve ROM and decrease spasm and pain which will help promote independence with functional mobility   [x] Balance Training to improve static/dynamic balance and to reduce fall risk  [x] Endurance Training to improve activity tolerance during functional mobility   [x] Transfer Training to improve safety and independence with all functional transfers   [x] Gait Training to improve gait mechanics, endurance and assess need for appropriate assistive device  [x] Stair Training in preparation for safe discharge home and/or into the community   [] Positioning to prevent skin breakdown and contractures  [] Safety and Education Training   [x] Patient/Caregiver Education   [] HEP  [] Other     PT long term treatment goals are located in above grid    Frequency of treatments: 2-5x/week x 1-2 weeks. Time in  08:50  Time out  09:15    Total Treatment Time  12 minutes     Evaluation Time includes thorough review of current medical information, gathering information on past medical history/social history and prior level of function, completion of standardized testing/informal observation of tasks, assessment of data and education on plan of care and goals. CPT codes:  [x] Low Complexity PT evaluation 32471  [] Moderate Complexity PT evaluation 19930  [] High Complexity PT evaluation 63473  [] PT Re-evaluation 00838  [] Gait training 00922 ** minutes  [] Manual therapy 78419 ** minutes  [x] Therapeutic activities 12485 12 minutes  [] Therapeutic exercises 42140 ** minutes  [] Neuromuscular reeducation 75328 ** minutes     Rojelio Meraz., P.T.   License Number: [id number]

## 2022-02-15 NOTE — PROGRESS NOTES
Internal Medicine Progress Note    Admission date: 2/11/2022  Primary care physician: Alcon Bhakta DO    Subjective  Rhona Reis was seen and examined at bedside today.      Doing well today   He has no new complaints   He wants to know what the plan is regarding his HD cath   I told him we will see what ID service says   All questions answered   DW CM and SW and nursing staff    Hospital Medications  Current Facility-Administered Medications   Medication Dose Route Frequency Provider Last Rate Last Admin    doxycycline hyclate (VIBRAMYCIN) capsule 100 mg  100 mg Oral 2 times per day Melani Coppola MD   100 mg at 02/15/22 0931    apixaban (ELIQUIS) tablet 2.5 mg  2.5 mg Oral BID Jagruti Kelly MD   2.5 mg at 02/15/22 0931    cefepime (MAXIPIME) 2000 mg IVPB minibag  2,000 mg IntraVENous Q MWF Santo Angelucci, MD   Stopped at 02/14/22 2212    albuterol (ACCUNEB) nebulizer solution 1.25 mg  1 ampule Nebulization Q6H PRN Jagruti Kelly MD   1.25 mg at 02/13/22 1951    atorvastatin (LIPITOR) tablet 10 mg  10 mg Oral Nightly Jagruti Kelly MD   10 mg at 02/14/22 2043    budesonide (PULMICORT) nebulizer suspension 250 mcg  0.25 mg Nebulization BID Jagruti Kelly MD   250 mcg at 02/15/22 8981    gabapentin (NEURONTIN) capsule 100 mg  100 mg Oral Nightly Jagruti Kelly MD   100 mg at 02/14/22 2043    [Held by provider] lisinopril (PRINIVIL;ZESTRIL) tablet 2.5 mg  2.5 mg Oral Daily Jagruti Kelly MD        metoprolol succinate (TOPROL XL) extended release tablet 25 mg  25 mg Oral Daily Luisa Banda MD   25 mg at 02/15/22 0931    vitamin D (CHOLECALCIFEROL) tablet 1,000 Units  1,000 Units Oral Daily Jagruti Kelly MD   1,000 Units at 02/15/22 0931    zinc sulfate (ZINCATE) capsule 50 mg  50 mg Oral Daily Jagruti Kelly MD   50 mg at 02/15/22 0931    sodium chloride flush 0.9 % injection 5-40 mL  5-40 mL IntraVENous 2 times per day Jagruti Kelly MD   10 mL at 02/15/22 0931    sodium chloride flush 0.9 % injection 5-40 mL  5-40 mL IntraVENous PRN Jordan Rudolph MD        0.9 % sodium chloride infusion  25 mL IntraVENous PRN Jordan Rudolph MD        ondansetron (ZOFRAN-ODT) disintegrating tablet 4 mg  4 mg Oral Q8H PRN Jordan Rudolph MD        Or    ondansetron [facility]) injection 4 mg  4 mg IntraVENous Q6H PRN Jordan Rudolph MD        polyethylene glycol [facility]) packet 17 g  17 g Oral Daily PRN Jordan Rudolph MD        acetaminophen (TYLENOL) tablet 650 mg  650 mg Oral Q6H PRN Jordan Rudolph MD        Or    acetaminophen (TYLENOL) suppository 650 mg  650 mg Rectal Q6H PRN Jordan Rudolph MD        insulin lispro (HUMALOG) injection vial 0-6 Units  0-6 Units SubCUTAneous TID WC Jordan Rudolph MD   2 Units at 02/14/22 1740    insulin lispro (HUMALOG) injection vial 0-3 Units  0-3 Units SubCUTAneous Nightly Jordan Rudolph MD   1 Units at 02/13/22 2013    glucose (GLUTOSE) 40 % oral gel 15 g  15 g Oral PRN Jordan Rudolph MD        glucagon (rDNA) injection 1 mg  1 mg IntraMUSCular PRN Jordan Rudolph MD        dextrose 5 % solution  100 mL/hr IntraVENous PRN Jordan Rudolph MD        antioxidant multivitamin (OCUVITE) tablet  1 tablet Oral Daily Jordan Rudolph MD   1 tablet at 02/15/22 0931    dextrose bolus (hypoglycemia) 10% 125 mL  125 mL IntraVENous PRN Jordan Rudolph MD        Or    dextrose bolus (hypoglycemia) 10% 250 mL  250 mL IntraVENous PRN Jordan Rudolph MD           PRN Medications  albuterol, sodium chloride flush, sodium chloride, ondansetron **OR** ondansetron, polyethylene glycol, acetaminophen **OR** acetaminophen, glucose, glucagon (rDNA), dextrose, dextrose bolus (hypoglycemia) **OR** dextrose bolus (hypoglycemia)    Objective  Most Recent Recorded Vitals  /66   Pulse 84   Temp 98.8 °F (37.1 °C) (Oral)   Resp 16   Ht 5' 6\" (1.676 m)   Wt 201 lb 1 oz (91.2 kg)   SpO2 97%   BMI 32.45 kg/m²   I/O last 3 completed shifts:  In: -   Out: 0791 [Urine:25]  No intake/output data recorded. Physical Exam:  General: AAO to person, place, time, and purpose, NAD, no labored breathing  Eyes: conjunctivae/corneas clear, sclera non icteric. Skin: color, texture, and turgor normal, no rashes or lesions. Lungs: clear to auscultation bilaterally, no retractions or use of accessory muscles, no vocal fremitus, no rhonchi, no crackle, no rales  Heart: regular rate and regular rhythm, no murmur  Abdomen: soft, non-tender; bowel sounds normal; no masses,  no organomegaly  Extremities: atraumatic, no cyanosis, no edema  Neurologic: cranial nerves 2-12 grossly intact, no slurred speech. Most Recent Labs  Lab Results   Component Value Date    WBC 5.7 02/15/2022    HGB 9.7 (L) 02/15/2022    HCT 31.3 (L) 02/15/2022     02/15/2022     02/15/2022    K 3.9 02/15/2022    CL 95 (L) 02/15/2022    CREATININE 4.0 (H) 02/15/2022    BUN 29 (H) 02/15/2022    CO2 26 02/15/2022    GLUCOSE 128 (H) 02/15/2022    ALT 14 02/11/2022    AST 12 02/11/2022    INR 1.5 05/15/2021    TSH 0.974 12/03/2021    LABA1C 7.2 (H) 12/04/2021    LABMICR 1509.9 (H) 08/14/2020       *All labs in electric medical record were reviewed. Please see electronic chart for a more comprehensive set of labs    XR CHEST PORTABLE    Result Date: 2/11/2022  EXAMINATION: ONE XRAY VIEW OF THE CHEST 2/11/2022 11:36 am COMPARISON: February 11, 2022 HISTORY: ORDERING SYSTEM PROVIDED HISTORY: concern for pneumonia TECHNOLOGIST PROVIDED HISTORY: Reason for exam:->concern for pneumonia FINDINGS: Cardiac silhouette is moderately prominent. There is vague infiltrate left lung base. Patient is rotated to the right. Right-sided split venous catheter unchanged. Small persistent infiltrate left lung base. Clinical correlation and follow-up to resolution recommended. XR CHEST PORTABLE    Result Date: 1/20/2022  EXAMINATION: ONE XRAY VIEW OF THE CHEST 1/20/2022 3:14 pm COMPARISON: None.  HISTORY: ORDERING SYSTEM PROVIDED HISTORY: shortness of breath TECHNOLOGIST PROVIDED HISTORY: Reason for exam:->shortness of breath FINDINGS: The cardiac silhouette is within normal limits. There is no mediastinal widening Note is made of a right dialysis catheter. There is a small infiltrate seen within the left lung base. There is no right or left pleural effusion. 1. Small left lung base infiltrate         MICROBIOLOGY:  BLOOD CX #1  Recent Labs     02/13/22  1815   BC 24 Hours no growth     BLOOD CX #2  No results for input(s): Emmalene Cumming in the last 72 hours. CULTURE, RESPIRATORY   No results for input(s): CULTRESP in the last 72 hours. RESPIRATORY SMEAR  No results for input(s): RESPSMEAR in the last 72 hours. STREP PNEUMONIA AG URINE  No results for input(s): STREPNEUMAGU in the last 72 hours. LEGIONELLA AG URINE  No results for input(s): LEGUR in the last 72 hours. No results for input(s): [address] in the last 72 hours. URINE CULTURE  No results for input(s): LABURIN in the last 72 hours. WOUND ABSCESS  No results for input(s): WNDABS in the last 72 hours. TIP CULTURE  No results for input(s): CXCATHTIP in the last 72 hours. BODY FLUID CULTURE  No results for input(s): BFCS in the last 72 hours. Anaerobic cx  No results for input(s): LABANAE in the last 72 hours. CULTURE SURGICAL  No results for input(s): CXSURG in the last 72 hours. MISC. SENDOUT  No results for input(s): MREF in the last 72 hours. Assessment/Plan  Mika Nettles is a 80y.o. year old male who presented on 2/11/2022 and is being treated for Pneumonia .        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 11/01/2012    Partial small bowel obstruction ([address] 02/20/2012    Diabetes mellitus-2 06/22/2014    Anemia 11/10/2012    Osteoarthritis 11/09/2012    Lumbar spondylitis ([address] 11/09/2012    IDDM-2 02/20/2012    Pneumonia 02/11/2022    Toe infection     Ischemic ulcer of toe, limited to breakdown of skin, right ([address] 12/03/2021    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 ([address] 12/03/2021    Failure to thrive in adult 12/01/2021    Diabetic ulcer of toe of right foot associated with type 2 diabetes mellitus, with fat layer exposed ([address] 07/06/2021    ESRD (end stage renal disease) on dialysis [facility]) 04/08/2021    Encounter regarding vascular access for dialysis for end-stage renal disease ([address] 01/21/2021    Hypoglycemia 01/12/2019    Decubitus ulcer of sacral region, stage 1 03/19/2018    Right sided weakness 03/19/2018    Cerebral infarction ([address] 06/19/2014    PVD (peripheral vascular disease) with claudication ([address] 06/09/2014    History of CVA (cerebrovascular accident) 06/09/2014    Pain in lower limb 06/09/2014    TIA (transient ischemic attack) 05/15/2014    ASHD (arteriosclerotic heart disease) 05/15/2014    Diabetes mellitus ([address] 05/15/2014    Fx humer, med condyl-closed 01/01/2014    Cerebral infarction ([address] 01/01/2014    Renal failure 01/01/2014    Diabetes mellitus ([address] 11/07/2012    Diabetes mellitus ([address] 11/01/2012    CAD (coronary artery disease) 02/20/2012    HTN 02/20/2012    COPD 02/20/2012       · Healthcare Associated Pneumonia  · CXR noted L sided lesion new   · Failed outpatient management  · Abx per ID service now   · Acute hypoxic respiratory failure 2/2 above   · Pulmonary on board   · Wean O2 as able   · Staph epidermidis bacteremia/possible CLABSI   · Has tunnel HD cath   · ID ok with keeping for now   · ID on board   · ESRD on HD  · Nephrology on board   · Diabetes Mellitus 2, insulin dependent  · POCT glucose checks  · ISS   · Hypoglycemia protocol    · Hypertension   · Hold lisinopril  · Continue beta blockade   · Peripheral Vascular Disease  · Stable    · Routine labs in am  · DVT prophylaxis  · Please see orders for further management and care. Look to DC tomorrow if able to [employer]     Electronically signed by Herman Hong MD on 2/15/2022 at 9:33 AM      NOTE:  This report was transcribed using voice recognition software. Every effort was made to ensure accuracy; however, inadvertent computerized transcription errors may be present.

## 2022-02-16 VITALS
SYSTOLIC BLOOD PRESSURE: 123 MMHG | BODY MASS INDEX: 30.65 KG/M2 | RESPIRATION RATE: 18 BRPM | WEIGHT: 190.7 LBS | HEIGHT: 66 IN | HEART RATE: 73 BPM | DIASTOLIC BLOOD PRESSURE: 58 MMHG | TEMPERATURE: 97.8 F | OXYGEN SATURATION: 98 %

## 2022-02-16 LAB
ANION GAP SERPL CALCULATED.3IONS-SCNC: 14 MMOL/L (ref 7–16)
BASOPHILS ABSOLUTE: 0.04 E9/L (ref 0–0.2)
BASOPHILS RELATIVE PERCENT: 0.6 % (ref 0–2)
BUN BLDV-MCNC: 46 MG/DL (ref 6–23)
CALCIUM SERPL-MCNC: 8.8 MG/DL (ref 8.6–10.2)
CHLORIDE BLD-SCNC: 92 MMOL/L (ref 98–107)
CO2: 26 MMOL/L (ref 22–29)
CREAT SERPL-MCNC: 5.6 MG/DL (ref 0.7–1.2)
EOSINOPHILS ABSOLUTE: 0.23 E9/L (ref 0.05–0.5)
EOSINOPHILS RELATIVE PERCENT: 3.6 % (ref 0–6)
GFR AFRICAN AMERICAN: 12
GFR NON-AFRICAN AMERICAN: 10 ML/MIN/1.73
GLUCOSE BLD-MCNC: 152 MG/DL (ref 74–99)
HCT VFR BLD CALC: 32.7 % (ref 37–54)
HEMOGLOBIN: 10.2 G/DL (ref 12.5–16.5)
IMMATURE GRANULOCYTES #: 0.05 E9/L
IMMATURE GRANULOCYTES %: 0.8 % (ref 0–5)
L. PNEUMOPHILA SEROGP 1 UR AG: NORMAL
LYMPHOCYTES ABSOLUTE: 1.04 E9/L (ref 1.5–4)
LYMPHOCYTES RELATIVE PERCENT: 16.1 % (ref 20–42)
MCH RBC QN AUTO: 32.1 PG (ref 26–35)
MCHC RBC AUTO-ENTMCNC: 31.2 % (ref 32–34.5)
MCV RBC AUTO: 102.8 FL (ref 80–99.9)
METER GLUCOSE: 118 MG/DL (ref 74–99)
METER GLUCOSE: 151 MG/DL (ref 74–99)
METER GLUCOSE: 201 MG/DL (ref 74–99)
MONOCYTES ABSOLUTE: 0.87 E9/L (ref 0.1–0.95)
MONOCYTES RELATIVE PERCENT: 13.5 % (ref 2–12)
MRSA CULTURE ONLY: NORMAL
NEUTROPHILS ABSOLUTE: 4.21 E9/L (ref 1.8–7.3)
NEUTROPHILS RELATIVE PERCENT: 65.4 % (ref 43–80)
PDW BLD-RTO: 14.4 FL (ref 11.5–15)
PHOSPHORUS: 4.4 MG/DL (ref 2.5–4.5)
PLATELET # BLD: 163 E9/L (ref 130–450)
PMV BLD AUTO: 10.9 FL (ref 7–12)
POTASSIUM REFLEX MAGNESIUM: 3.7 MMOL/L (ref 3.5–5)
RBC # BLD: 3.18 E12/L (ref 3.8–5.8)
SARS-COV-2, NAAT: NOT DETECTED
SODIUM BLD-SCNC: 132 MMOL/L (ref 132–146)
STREP PNEUMONIAE ANTIGEN, URINE: NORMAL
WBC # BLD: 6.4 E9/L (ref 4.5–11.5)

## 2022-02-16 PROCEDURE — 87449 NOS EACH ORGANISM AG IA: CPT

## 2022-02-16 PROCEDURE — 85025 COMPLETE CBC W/AUTO DIFF WBC: CPT

## 2022-02-16 PROCEDURE — 84100 ASSAY OF PHOSPHORUS: CPT

## 2022-02-16 PROCEDURE — 6370000000 HC RX 637 (ALT 250 FOR IP): Performed by: INTERNAL MEDICINE

## 2022-02-16 PROCEDURE — 36415 COLL VENOUS BLD VENIPUNCTURE: CPT

## 2022-02-16 PROCEDURE — 82962 GLUCOSE BLOOD TEST: CPT

## 2022-02-16 PROCEDURE — 2700000000 HC OXYGEN THERAPY PER DAY

## 2022-02-16 PROCEDURE — 80048 BASIC METABOLIC PNL TOTAL CA: CPT

## 2022-02-16 PROCEDURE — 6370000000 HC RX 637 (ALT 250 FOR IP): Performed by: SPECIALIST

## 2022-02-16 PROCEDURE — 87635 SARS-COV-2 COVID-19 AMP PRB: CPT

## 2022-02-16 PROCEDURE — 2580000003 HC RX 258: Performed by: INTERNAL MEDICINE

## 2022-02-16 PROCEDURE — 90935 HEMODIALYSIS ONE EVALUATION: CPT

## 2022-02-16 RX ORDER — POLYETHYLENE GLYCOL 3350 17 G/17G
17 POWDER, FOR SOLUTION ORAL DAILY PRN
Qty: 527 G | Refills: 1 | DISCHARGE
Start: 2022-02-16 | End: 2022-03-18

## 2022-02-16 RX ORDER — DOXYCYCLINE HYCLATE 100 MG/1
100 CAPSULE ORAL EVERY 12 HOURS SCHEDULED
Qty: 10 CAPSULE | Refills: 0 | DISCHARGE
Start: 2022-02-16 | End: 2022-02-21

## 2022-02-16 RX ORDER — CEFDINIR 300 MG/1
300 CAPSULE ORAL DAILY
Qty: 10 CAPSULE | Refills: 0 | DISCHARGE
Start: 2022-02-17 | End: 2022-02-22

## 2022-02-16 RX ORDER — CEFDINIR 300 MG/1
300 CAPSULE ORAL DAILY
Status: DISCONTINUED | OUTPATIENT
Start: 2022-02-17 | End: 2022-02-16 | Stop reason: HOSPADM

## 2022-02-16 RX ADMIN — INSULIN LISPRO 1 UNITS: 100 INJECTION, SOLUTION INTRAVENOUS; SUBCUTANEOUS at 06:27

## 2022-02-16 RX ADMIN — CLOTRIMAZOLE: 10 CREAM TOPICAL at 11:37

## 2022-02-16 RX ADMIN — Medication 1 TABLET: at 11:36

## 2022-02-16 RX ADMIN — APIXABAN 2.5 MG: 2.5 TABLET, FILM COATED ORAL at 11:36

## 2022-02-16 RX ADMIN — Medication 1000 UNITS: at 11:36

## 2022-02-16 RX ADMIN — METOPROLOL SUCCINATE 25 MG: 25 TABLET, EXTENDED RELEASE ORAL at 11:36

## 2022-02-16 RX ADMIN — INSULIN LISPRO 2 UNITS: 100 INJECTION, SOLUTION INTRAVENOUS; SUBCUTANEOUS at 17:01

## 2022-02-16 RX ADMIN — PETROLATUM: 420 OINTMENT TOPICAL at 11:37

## 2022-02-16 RX ADMIN — ZINC SULFATE 220 MG (50 MG) CAPSULE 50 MG: CAPSULE at 11:36

## 2022-02-16 RX ADMIN — Medication 10 ML: at 11:38

## 2022-02-16 RX ADMIN — DOXYCYCLINE HYCLATE 100 MG: 100 CAPSULE ORAL at 11:36

## 2022-02-16 NOTE — PROGRESS NOTES
of [employer] suggests 300 mg 3 times per week postdialysis on dialysis days, with an additional 300 mg dose 48 hours into each 72-hour interdialytic period (eg, if dosed Monday/Wednesday/Friday on dialysis days, administer an additional 300 mg dose on Sunday). Thank Gilberto Henson Pharm. D 2/16/2022 11:08 AM

## 2022-02-16 NOTE — CARE COORDINATION
D/c order noted ok to d/c per ID NP. To transport to [facility]  6pm to [facility] via [facility]. N-N [phone]. Pt/ staff/ harry nair/ [place] [facility] aware. aware. covid is negative Ella Morel,rn,cm.

## 2022-02-16 NOTE — DISCHARGE SUMMARY
Internal Medicine Discharge Summary    NAME: Linda Moore :  1931  MRN:  [id number] PCP:Britt Ugalde DO    ADMITTED: 2022   DISCHARGED: No discharge date for patient encounter. ADMITTING PHYSICIAN: García Linder MD    PCP: Pauline Ugalde DO    CONSULTANT(S):   IP CONSULT TO NEPHROLOGY  IP CONSULT TO HOSPITALIST  IP CONSULT TO PULMONOLOGY  IP CONSULT TO PULMONOLOGY  IP CONSULT TO INFECTIOUS DISEASES  IP CONSULT TO DIETITIAN     ADMITTING DIAGNOSIS:   Pneumonia [J18.9]  Acute respiratory failure with hypoxemia ([address] [J96.01]  Pneumonia due to infectious organism, unspecified laterality, unspecified part of lung [J18.9]     Please see H&P for further details    DISCHARGE DIAGNOSES:   Active Hospital Problems    Diagnosis     Pneumonia [J18.9]        BRIEF HISTORY OF PRESENT ILLNESS: Linda Moore is a 80 y.o. male patient of [facility] who  has a past medical history of Arthritis, 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 ([address] CAD (coronary artery disease), CHF (congestive heart failure) ([address] Chronic kidney disease, COVID-19, CVA (cerebral vascular accident) ([address] Diabetes mellitus ([address] Diabetic peripheral neuropathy ([address] Hemodialysis patient ([address] History of CVA (cerebrovascular accident), Hypertension, Ischemic ulcer of toe, limited to breakdown of skin, right ([address] Other disorders of kidney and ureter, PVD (peripheral vascular disease) with claudication ([address] Right sided weakness, TIA (transient ischemic attack), and Unspecified cerebral artery occlusion with cerebral infarction. who originally had concerns including Shortness of Breath (pt having sob beginning this morning. needs dialysis today. 82% on RA at home).  at presentation on 2022, and was found to have Pneumonia [J18.9]  Acute respiratory failure with hypoxemia ([address] [J96.01]  Pneumonia due to infectious organism, unspecified laterality, unspecified part of lung [J18.9] after workup. Please see H&P for further details. HOSPITAL COURSE:   The patient presented to the hospital with the chief complaint of Shortness of Breath (pt having sob beginning this morning. needs dialysis today. 82% on RA at home)  . The patient was admitted to the hospital.     · Healthcare Associated Pneumonia  · CXR noted L sided lesion new   · Failed outpatient management  · Abx per ID service now   · Acute hypoxic respiratory failure 2/2 above   · Pulmonary on board   · Wean O2 as able   · Staph epidermidis bacteremia/possible CLABSI   · Has tunnel HD cath   · ID ok with keeping for now, contaminant likely   · ID on board   · ESRD on HD  · Nephrology on board   · Diabetes Mellitus 2, insulin dependent  · POCT glucose checks  · ISS   · Hypoglycemia protocol    · Hypertension   · Hold lisinopril  · Continue beta blockade   · Peripheral Vascular Disease  · Stable    · Routine labs in am  · DVT prophylaxis  · Please see orders for further management and care. DC today to [facility] if cleared by consultants - discussed with Dr Rodney Single:  VITALS:  BP (!) 123/58   Pulse 73   Temp 97.8 °F (36.6 °C) (Oral)   Resp 18   Ht 5' 6\" (1.676 m)   Wt 190 lb 11.2 oz (86.5 kg)   SpO2 98%   BMI 30.78 kg/m²       Please see note from the same day. LABS[de-identified]  Recent Labs     02/14/22  0434 02/15/22  0223 02/16/22  0520    135 132   K 4.0 3.9 3.7   CL 98 95* 92*   CO2 24 26 26   BUN 43* 29* 46*   CREATININE 5.8* 4.0* 5.6*   GLUCOSE 138* 128* 152*   CALCIUM 8.7 8.5* 8.8     No results for input(s): ALKPHOS, ALT, AST, PROT, BILITOT, BILIDIR, LABALBU in the last 72 hours.   Recent Labs     02/14/22  0434 02/15/22  0223 02/16/22  0520   WBC 8.1 5.7 6.4   RBC 2.89* 3.02* 3.18*   HGB 9.2* 9.7* 10.2*   HCT 29.9* 31.3* 32.7*   .5* 103.6* 102.8*   MCH 31.8 32.1 32.1   MCHC 30.8* 31.0* 31.2*   RDW 14.7 14.4 14.4    145 163   MPV 10.6 11.7 10.9     Lab Results   Component Value Date    LABA1C 7.2 (H) 12/04/2021    LABA1C 7.3 (H) 12/03/2021    LABA1C 6.4 (H) 08/09/2021     Lab Results   Component Value Date    INR 1.5 05/15/2021    INR 1.4 11/29/2020    INR 1.8 01/12/2019    PROTIME 16.6 (H) 05/15/2021    PROTIME 15.4 (H) 11/29/2020    PROTIME 20.1 (H) 01/12/2019      Lab Results   Component Value Date    TSH 0.974 12/03/2021    TSH 1.940 09/17/2014    TSH 0.833 06/11/2014     Lab Results   Component Value Date    TRIG 151 (H) 08/19/2015    TRIG 135 06/09/2015    TRIG 134 04/23/2015    HDL 32 08/19/2015    HDL 41 06/09/2015    HDL 38 04/23/2015    LDLCALC 101 (H) 08/19/2015    LDLCALC 109 (H) 06/09/2015    LDLCALC 99 04/23/2015     No results for input(s): MG in the last 72 hours. No results for input(s): CKTOTAL, CKMB, TROPONINI in the last 72 hours. No results for input(s): LACTA in the last 72 hours. IMAGING:  XR CHEST PORTABLE    Result Date: 2/15/2022  EXAMINATION: ONE XRAY VIEW OF THE CHEST 2/15/2022 5:36 pm COMPARISON: Multiple priors, most recent from February 11, 2022. HISTORY: ORDERING SYSTEM PROVIDED HISTORY: lung infiltrate TECHNOLOGIST PROVIDED HISTORY: Reason for exam:->lung infiltrate FINDINGS: A right internal jugular vein catheter is unchanged in position, the tip overlying the high right atrium. Heart size is unable to be accurately assessed on this single portable view of the chest, but appears to be stable. Faint airspace opacities in the left lower lung zone are felt to be stable compared with the prior study. Mild right basilar atelectasis is suspected as well. No evidence of a sizable pleural effusion. No pneumothorax. Grossly unchanged airspace opacities in the left lower lobe compared with February 11, 2022. Findings are nonspecific and may be on the basis of atelectasis or pneumonia.      XR CHEST PORTABLE    Result Date: 2/11/2022  EXAMINATION: ONE XRAY VIEW OF THE CHEST 2/11/2022 11:36 am COMPARISON: February 11, 2022 HISTORY: ORDERING SYSTEM PROVIDED HISTORY: concern for pneumonia TECHNOLOGIST PROVIDED HISTORY: Reason for exam:->concern for pneumonia FINDINGS: Cardiac silhouette is moderately prominent. There is vague infiltrate left lung base. Patient is rotated to the right. Right-sided split venous catheter unchanged. Small persistent infiltrate left lung base. Clinical correlation and follow-up to resolution recommended. XR CHEST PORTABLE    Result Date: 1/20/2022  EXAMINATION: ONE XRAY VIEW OF THE CHEST 1/20/2022 3:14 pm COMPARISON: None. HISTORY: ORDERING SYSTEM PROVIDED HISTORY: shortness of breath TECHNOLOGIST PROVIDED HISTORY: Reason for exam:->shortness of breath FINDINGS: The cardiac silhouette is within normal limits. There is no mediastinal widening Note is made of a right dialysis catheter. There is a small infiltrate seen within the left lung base. There is no right or left pleural effusion. 1. Small left lung base infiltrate     FL MODIFIED BARIUM SWALLOW W VIDEO    Result Date: 2/15/2022  EXAMINATION: MODIFIED BARIUM SWALLOW WAS PERFORMED IN CONJUNCTION WITH SPEECH PATHOLOGY SERVICES TECHNIQUE: Fluoroscopic evaluation of the swallowing mechanism was performed using cineradiography with multiple consistency of barium product in conjunction with speech pathology services. FLUOROSCOPY DOSE AND TYPE OR TIME AND EXPOSURES: Images: Cine fluoroscopy Fluoroscopic time: 1.6 minutes Total dose: 28.6 mGy COMPARISON: None HISTORY: ORDERING SYSTEM PROVIDED HISTORY: further assessment of dysphagia TECHNOLOGIST PROVIDED HISTORY: Reason for exam:->further assessment of dysphagia FINDINGS: Satisfactory oral phase of the swallowing mechanism. No significant barium residuals. Satisfactory pharyngeal phase of the swallow with the exception of transient laryngeal penetration with nectar consistency barium. No barium aspiration.      Satisfactory swallowing mechanism with the exception of transient laryngeal penetration with nectar consistency barium. No barium aspiration. Please see separate speech pathology report for full discussion of findings and recommendations. MICROBIOLOGY:  BLOOD CX #1  Recent Labs     02/13/22  1815   BC 24 Hours no growth     BLOOD CX #2  Recent Labs     02/14/22  0745   BLOODCULT2 24 Hours no growth     TIP CULTURE  No results for input(s): CXCATHTIP in the last 72 hours. CULTURE, RESPIRATORY   No results for input(s): CULTRESP in the last 72 hours. RESPIRATORY SMEAR  No results for input(s): RESPSMEAR in the last 72 hours. ECHO:      DISPOSITION:  The patient's condition is poor.    The patient is being discharged to nursing home    DISCHARGE MEDICATIONS:      Medication List      START taking these medications    cefdinir 300 MG capsule  Commonly known as: OMNICEF  Take 1 capsule by mouth daily for 5 days  Start taking on: February 17, 2022     doxycycline hyclate 100 MG capsule  Commonly known as: VIBRAMYCIN  Take 1 capsule by mouth every 12 hours for 5 days     polyethylene glycol 17 g packet  Commonly known as: GLYCOLAX  Take 17 g by mouth daily as needed for Constipation        CONTINUE taking these medications    albuterol 1.25 MG/3ML nebulizer solution  Commonly known as: ACCUNEB     apixaban 2.5 MG Tabs tablet  Commonly known as: ELIQUIS  Take 1 tablet by mouth 2 times daily     ascorbic acid 500 MG tablet  Commonly known as: VITAMIN C     atorvastatin 10 MG tablet  Commonly known as: LIPITOR     budesonide 90 MCG/ACT Aepb inhaler  Commonly known as: Pulmicort Flexhaler  Inhale 2 puffs into the lungs 2 times daily     calcium acetate 667 MG Caps capsule  Commonly known as: PHOSLO     famotidine 40 MG tablet  Commonly known as: PEPCID     gabapentin 100 MG capsule  Commonly known as: NEURONTIN     insulin lispro 100 UNIT/ML injection vial  Commonly known as: HUMALOG  Inject 0-6 Units into the skin 3 times daily (with meals)     magnesium Regular; 4 carb choices (60 gm/meal); Low Sodium (2 gm)  · ADULT ORAL NUTRITION SUPPLEMENT; Breakfast, Dinner; Wound Healing Oral Supplement  · ADULT ORAL NUTRITION SUPPLEMENT; Lunch, Dinner; Other Oral Supplement; Gelatein 20    Preparing for this patient's discharge, including paperwork, orders, instructions, and meeting with patient did required >35 minutes.     Electronically signed by Jacquelyn Rollins MD on 2/16/2022 at 4:26 PM

## 2022-02-16 NOTE — PROGRESS NOTES
[address] [place] [address] [facility]  [facility]  Progress Note    SUBJECTIVE:  Chief Complaint   Patient presents with    Shortness of Breath     pt having sob beginning this morning. needs dialysis today. 82% on RA at home     Patient denies dyspnea. No pain. Feels cold during dialysis. Review of systems:  As stated above in the chief complaint, otherwise negative. Medications:  Scheduled Meds:   white petrolatum   Topical BID    clotrimazole   Topical BID    doxycycline hyclate  100 mg Oral 2 times per day    apixaban  2.5 mg Oral BID    cefepime  2,000 mg IntraVENous Q MWF    atorvastatin  10 mg Oral Nightly    budesonide  0.25 mg Nebulization BID    gabapentin  100 mg Oral Nightly    [Held by provider] lisinopril  2.5 mg Oral Daily    metoprolol succinate  25 mg Oral Daily    Vitamin D  1,000 Units Oral Daily    zinc sulfate  50 mg Oral Daily    sodium chloride flush  5-40 mL IntraVENous 2 times per day    insulin lispro  0-6 Units SubCUTAneous TID WC    insulin lispro  0-3 Units SubCUTAneous Nightly    ocuvite-lutein  1 tablet Oral Daily     Continuous Infusions:   sodium chloride      dextrose       PRN Meds:albuterol, sodium chloride flush, sodium chloride, ondansetron **OR** ondansetron, polyethylene glycol, acetaminophen **OR** acetaminophen, glucose, glucagon (rDNA), dextrose, dextrose bolus (hypoglycemia) **OR** dextrose bolus (hypoglycemia)    OBJECTIVE:  /84   Pulse 66   Temp 97.6 °F (36.4 °C)   Resp 17   Ht 5' 6\" (1.676 m)   Wt 201 lb 3.2 oz (91.3 kg)   SpO2 100%   BMI 32.47 kg/m²   Temp  Av.9 °F (36.6 °C)  Min: 97.6 °F (36.4 °C)  Max: 98.1 °F (36.7 °C)  Constitutional: The patient was seen in dialysis. He is awake and in no distress. He is bundled up. Skin: Warm and dry. No rashes were noted. HEENT: Round and reactive pupils. Moist mucous membranes. No ulcerations or thrush. Neck: Supple to movements.    Chest: No use of accessory muscles to breathe. Symmetrical expansion. No wheezing, crackles or rhonchi. Right tunneled HD catheter. Cardiovascular: S1 and S2 are rhythmic and regular. No murmurs appreciated. Abdomen: Positive bowel sounds to auscultation. Benign to palpation. No masses felt. Extremities: No edema. Lines: peripheral    Laboratory and Tests Review:  Lab Results   Component Value Date    WBC 6.4 02/16/2022    WBC 5.7 02/15/2022    WBC 8.1 02/14/2022    HGB 10.2 (L) 02/16/2022    HCT 32.7 (L) 02/16/2022    .8 (H) 02/16/2022     02/16/2022     Lab Results   Component Value Date    NEUTROABS 4.21 02/16/2022    NEUTROABS 3.95 02/15/2022    NEUTROABS 6.13 02/14/2022     No results found for: [facility]  Lab Results   Component Value Date    ALT 14 02/11/2022    AST 12 02/11/2022    ALKPHOS 104 02/11/2022    BILITOT 0.6 02/11/2022     Lab Results   Component Value Date     02/16/2022    K 3.7 02/16/2022    CL 92 02/16/2022    CO2 26 02/16/2022    BUN 46 02/16/2022    CREATININE 5.6 02/16/2022    CREATININE 4.0 02/15/2022    CREATININE 5.8 02/14/2022    GFRAA 12 02/16/2022    LABGLOM 10 02/16/2022    GLUCOSE 152 02/16/2022    GLUCOSE 163 02/22/2012    PROT 6.3 02/11/2022    LABALBU 3.6 02/11/2022    LABALBU 3.8 02/20/2012    CALCIUM 8.8 02/16/2022    BILITOT 0.6 02/11/2022    ALKPHOS 104 02/11/2022    AST 12 02/11/2022    ALT 14 02/11/2022     Lab Results   Component Value Date    CRP 27.9 (H) 02/13/2022    CRP 4.2 (H) 12/01/2021    CRP 0.1 09/07/2020     Lab Results   Component Value Date    SEDRATE 100 (H) 02/13/2022    SEDRATE 78 (H) 12/01/2021    SEDRATE 31 (H) 09/07/2020     Radiology:      Microbiology:   Blood culture 2/12/2022: Staphylococcus epidermidis in 2 of 4 bottles  Blood cultures 2/13/2022: Negative so far  Rapid SARS-CoV-2: Negative  Respiratory culture 2/12/2022: Pending    No results for input(s): PROCAL in the last 72 hours. ASSESSMENT:  · HCAP left lower lobe  · Staphylococcus epidermidis bacteremia. Contaminant  · CLABSI unlikely with only 2 bottles and repeat cultures negative without antibiotic treatment    PLAN:  · Change Cefepime to Cefdinir  · Continue oral Doxycycline  · No need to remove tunneled HD catheter for now  · We will follow with you    Brian Carrizales MD  10:18 AM  2/16/2022

## 2022-02-16 NOTE — PROGRESS NOTES
Internal Medicine Progress Note    Admission date: 2/11/2022  Primary care physician: DO Arthur Russo  Milady Medrano was seen and examined at bedside today. Avet doing well with no specific complaints. Denies SOB, fever or pain. Resting and appears comfortable. Dialysis catheter will not be pulled at this time. Patient's questions answered. Current plan explained.   Discussed with Case mgmt and social work   Discussed with [address] [place] [address] Medications  Current Facility-Administered Medications   Medication Dose Route Frequency Provider Last Rate Last Admin    [START ON 2/17/2022] cefdinir (OMNICEF) capsule 300 mg  300 mg Oral Daily Saman Mcdermott MD        white petrolatum ointment   Topical BID Lisa Carvajal MD   Given at 02/16/22 1137    clotrimazole (LOTRIMIN) 1 % cream   Topical BID Lisa Carvajal MD   Given at 02/16/22 1137    doxycycline hyclate (VIBRAMYCIN) capsule 100 mg  100 mg Oral 2 times per day Saman Mcdermott MD   100 mg at 02/16/22 1136    apixaban (ELIQUIS) tablet 2.5 mg  2.5 mg Oral BID Amy Rivera MD   2.5 mg at 02/16/22 1136    albuterol (ACCUNEB) nebulizer solution 1.25 mg  1 ampule Nebulization Q6H PRN Amy Rivera MD   1.25 mg at 02/13/22 1951    atorvastatin (LIPITOR) tablet 10 mg  10 mg Oral Nightly Amy Rivera MD   10 mg at 02/15/22 2010    budesonide (PULMICORT) nebulizer suspension 250 mcg  0.25 mg Nebulization BID Amy Rivera MD   250 mcg at 02/15/22 1857    gabapentin (NEURONTIN) capsule 100 mg  100 mg Oral Nightly Amy Rivera MD   100 mg at 02/15/22 2010    [Held by provider] lisinopril (PRINIVIL;ZESTRIL) tablet 2.5 mg  2.5 mg Oral Daily Amy Rivera MD        metoprolol succinate (TOPROL XL) extended release tablet 25 mg  25 mg Oral Daily Sienna Pardo MD   25 mg at 02/16/22 1136    vitamin D (CHOLECALCIFEROL) tablet 1,000 Units  1,000 Units Oral Daily Amy Rivera MD   1,000 Units at 02/16/22 1136    zinc sulfate (ZINCATE) capsule 50 mg  50 mg Oral Daily Annabelle Cedillo MD   50 mg at 02/16/22 1136    sodium chloride flush 0.9 % injection 5-40 mL  5-40 mL IntraVENous 2 times per day Annabelle Cedillo MD   10 mL at 02/16/22 1138    sodium chloride flush 0.9 % injection 5-40 mL  5-40 mL IntraVENous PRN Annabelle Cedillo MD        0.9 % sodium chloride infusion  25 mL IntraVENous PRN Annabelle Cedillo MD        ondansetron (ZOFRAN-ODT) disintegrating tablet 4 mg  4 mg Oral Q8H PRN Annabelle Cedillo MD        Or    [facility]ansetron [facility]) injection 4 mg  4 mg IntraVENous Q6H PRN Annabelle Cedillo MD        polyethylene glycol [facility]) packet 17 g  17 g Oral Daily PRN Annabelle Cedillo MD        acetaminophen (TYLENOL) tablet 650 mg  650 mg Oral Q6H PRN Annabelle Cedillo MD        Or    acetaminophen (TYLENOL) suppository 650 mg  650 mg Rectal Q6H PRN Annabelle Cedillo MD        insulin lispro (HUMALOG) injection vial 0-6 Units  0-6 Units SubCUTAneous TID WC Annabelle Cedillo MD   1 Units at 02/16/22 7149    insulin lispro (HUMALOG) injection vial 0-3 Units  0-3 Units SubCUTAneous Nightly Annabelle Cedillo MD   1 Units at 02/15/22 2011    glucose (GLUTOSE) 40 % oral gel 15 g  15 g Oral PRN Annabelle Cedillo MD        glucagon (rDNA) injection 1 mg  1 mg IntraMUSCular PRN Annabelle Cedillo MD        dextrose 5 % solution  100 mL/hr IntraVENous PRN Annabelle Cedillo MD        antioxidant multivitamin (OCUVITE) tablet  1 tablet Oral Daily Annabelle Cedillo MD   1 tablet at 02/16/22 1136    dextrose bolus (hypoglycemia) 10% 125 mL  125 mL IntraVENous PRN Annabelle Cedillo MD        Or    dextrose bolus (hypoglycemia) 10% 250 mL  250 mL IntraVENous PRISAAK Cedillo MD           PRN Medications  albuterol, sodium chloride flush, sodium chloride, ondansetron **OR** ondansetron, polyethylene glycol, acetaminophen **OR** acetaminophen, glucose, glucagon (rDNA), dextrose, dextrose bolus (hypoglycemia) **OR** dextrose bolus (hypoglycemia)    Objective  Most Recent Recorded Vitals  BP (!) 123/58   Pulse 73   Temp 97.8 °F (36.6 °C) (Oral)   Resp 18   Ht 5' 6\" (1.676 m)   Wt 190 lb 11.2 oz (86.5 kg)   SpO2 98%   BMI 30.78 kg/m²   I/O last 3 completed shifts: In: 360 [P.O.:360]  Out: 125 [Urine:125]  I/O this shift:  In: 300   Out: 2800     Physical Exam:  General: AAO to person, place, time, and purpose, NAD, no labored breathing  Eyes: conjunctivae/corneas clear, sclera non icteric. Skin: color, texture, and turgor normal, no rashes or lesions. Lungs: clear to auscultation bilaterally, no retractions or use of accessory muscles, no vocal fremitus, no rhonchi, no crackle, no rales  Heart: regular rate and regular rhythm, no murmur  Abdomen: soft, non-tender; bowel sounds normal; no masses,  no organomegaly  Extremities: atraumatic, no cyanosis, no edema  Neurologic: cranial nerves 2-12 grossly intact, no slurred speech. Most Recent Labs  Lab Results   Component Value Date    WBC 6.4 02/16/2022    HGB 10.2 (L) 02/16/2022    HCT 32.7 (L) 02/16/2022     02/16/2022     02/16/2022    K 3.7 02/16/2022    CL 92 (L) 02/16/2022    CREATININE 5.6 (H) 02/16/2022    BUN 46 (H) 02/16/2022    CO2 26 02/16/2022    GLUCOSE 152 (H) 02/16/2022    ALT 14 02/11/2022    AST 12 02/11/2022    INR 1.5 05/15/2021    TSH 0.974 12/03/2021    LABA1C 7.2 (H) 12/04/2021    LABMICR 1509.9 (H) 08/14/2020       *All labs in electric medical record were reviewed. Please see electronic chart for a more comprehensive set of labs    XR CHEST PORTABLE    Result Date: 2/11/2022  EXAMINATION: ONE XRAY VIEW OF THE CHEST 2/11/2022 11:36 am COMPARISON: February 11, 2022 HISTORY: ORDERING SYSTEM PROVIDED HISTORY: concern for pneumonia TECHNOLOGIST PROVIDED HISTORY: Reason for exam:->concern for pneumonia FINDINGS: Cardiac silhouette is moderately prominent. There is vague infiltrate left lung base. Patient is rotated to the right. Right-sided split venous catheter unchanged.      Small persistent infiltrate left lung base. Clinical correlation and follow-up to resolution recommended. XR CHEST PORTABLE    Result Date: 1/20/2022  EXAMINATION: ONE XRAY VIEW OF THE CHEST 1/20/2022 3:14 pm COMPARISON: None. HISTORY: ORDERING SYSTEM PROVIDED HISTORY: shortness of breath TECHNOLOGIST PROVIDED HISTORY: Reason for exam:->shortness of breath FINDINGS: The cardiac silhouette is within normal limits. There is no mediastinal widening Note is made of a right dialysis catheter. There is a small infiltrate seen within the left lung base. There is no right or left pleural effusion. 1. Small left lung base infiltrate         MICROBIOLOGY:  BLOOD CX #1  Recent Labs     02/13/22  1815   BC 24 Hours no growth     BLOOD CX #2  Recent Labs     02/14/22  0745   BLOODCULT2 24 Hours no growth      CULTURE, RESPIRATORY   No results for input(s): CULTRESP in the last 72 hours. RESPIRATORY SMEAR  No results for input(s): RESPSMEAR in the last 72 hours. STREP PNEUMONIA AG URINE  Recent Labs     02/16/22  0530   STREPNEUMAGU Presumptive negative- suggests no current or recent  pneumococcal infection. Infection due to Strep pneumoniae cannot be  ruled out since the antigen present in the sample  may be below the detection limit of the test.  Normal Range:Presumptive Negative       LEGIONELLA AG URINE  No results for input(s): LEGUR in the last 72 hours. Recent Labs     02/15/22  0021   MRSAC Methicillin resistant Staph aureus not isolated     URINE CULTURE  No results for input(s): LABURIN in the last 72 hours. WOUND ABSCESS  No results for input(s): WNDABS in the last 72 hours. TIP CULTURE  No results for input(s): CXCATHTIP in the last 72 hours. BODY FLUID CULTURE  No results for input(s): BFCS in the last 72 hours. Anaerobic cx  No results for input(s): LABANAE in the last 72 hours. CULTURE SURGICAL  No results for input(s): CXSURG in the last 72 hours. MISC.  SENDOUT  No results for input(s): MREF in the last 72 hours. Assessment/Plan  Claudia Morrison is a 80y.o. year old male who presented on 2/11/2022 and is being treated for Pneumonia .        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 11/01/2012    Partial small bowel obstruction ([address] 02/20/2012    Diabetes mellitus-2 06/22/2014    Anemia 11/10/2012    Osteoarthritis 11/09/2012    Lumbar spondylitis ([address] 11/09/2012    IDDM-2 02/20/2012    Pneumonia 02/11/2022    Toe infection     Ischemic ulcer of toe, limited to breakdown of skin, right ([address] 12/03/2021    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 ([address] 12/03/2021    Failure to thrive in adult 12/01/2021    Diabetic ulcer of toe of right foot associated with type 2 diabetes mellitus, with fat layer exposed ([address] 07/06/2021    ESRD (end stage renal disease) on dialysis ([address] 04/08/2021    Encounter regarding vascular access for dialysis for end-stage renal disease ([address] 01/21/2021    Hypoglycemia 01/12/2019    Decubitus ulcer of sacral region, stage 1 03/19/2018    Right sided weakness 03/19/2018    Cerebral infarction ([address] 06/19/2014    PVD (peripheral vascular disease) with claudication ([address] 06/09/2014    History of CVA (cerebrovascular accident) 06/09/2014    Pain in lower limb 06/09/2014    TIA (transient ischemic attack) 05/15/2014    ASHD (arteriosclerotic heart disease) 05/15/2014    Diabetes mellitus ([address] 05/15/2014    Fx humer, med condyl-closed 01/01/2014    Cerebral infarction ([address] 01/01/2014    Renal failure 01/01/2014    Diabetes mellitus ([address] 11/07/2012    Diabetes mellitus ([address] 11/01/2012    CAD (coronary artery disease) 02/20/2012    HTN 02/20/2012    COPD 02/20/2012       · Healthcare Associated Pneumonia  · CXR noted L sided lesion new · Failed outpatient management  · Abx per ID service now   · Acute hypoxic respiratory failure 2/2 above   · Pulmonary on board   · Wean O2 as able   · Staph epidermidis bacteremia/possible CLABSI   · Has tunnel HD cath   · ID ok with keeping for now, contaminant likely   · ID on board   · ESRD on HD  · Nephrology on board   · Diabetes Mellitus 2, insulin dependent  · POCT glucose checks  · ISS   · Hypoglycemia protocol    · Hypertension   · Hold lisinopril  · Continue beta blockade   · Peripheral Vascular Disease  · Stable    · Routine labs in am  · DVT prophylaxis  · Please see orders for further management and care. [facility] today to [facility] if cleared by consultants - discussed with Dr Nguyen Ards       Electronically signed by Nhung Gooden MD on 2/16/2022 at 3:17 PM    NOTE:  This report was transcribed using voice recognition software. Every effort was made to ensure accuracy; however, inadvertent computerized transcription errors may be present.

## 2022-02-16 NOTE — CARE COORDINATION
[facility] can accept pt; will need updated PT/OT shelby need covid day of d/c hens [phone]; completed transport forms on chart. Per ID , no need to remove tunnelled cath for now. Continue iv cefipime/ po doxy. Will continue to follow ID plan. need to notify [facility] ([phone] when pt discharges.  Rodriguez Linares

## 2022-02-16 NOTE — PROGRESS NOTES
[facility] MD Tammie Moon, MD Jayden Moraes, MD Marysol Barbour, MD Piper Perez, CNP   Anu Lux, DAISY  Progress Note    2/16/2022    SUBJECTIVE:   2/13: Overall improving clinical status  2/14: Seen on dialysis this morning. Tolerating therapy. BP stable. BFR as prescribed. No fever or chill. No dyspnea at rest.  Intermittent cough. 2/15:  Seen this afternoon. No new issues. Resting comfortably  2/16:  Seen on HD. Tolerating tx. bp stable bfr as prescribed. aniticpate 2.5 L UF. Cont to produce \"a lot\" of phlegm/mucus. (-) sob at rest.      PROBLEM LIST:    Principal Problem:    Pneumonia  Resolved Problems:    * No resolved hospital problems. *         DIET:    ADULT DIET; Regular; 4 carb choices (60 gm/meal); Low Sodium (2 gm)  ADULT ORAL NUTRITION SUPPLEMENT; Breakfast, Dinner; Wound Healing Oral Supplement  ADULT ORAL NUTRITION SUPPLEMENT; Lunch, Dinner;  Other Oral Supplement; Gelatein 20     MEDS (scheduled):    white petrolatum   Topical BID    clotrimazole   Topical BID    doxycycline hyclate  100 mg Oral 2 times per day    apixaban  2.5 mg Oral BID    cefepime  2,000 mg IntraVENous Q MWF    atorvastatin  10 mg Oral Nightly    budesonide  0.25 mg Nebulization BID    gabapentin  100 mg Oral Nightly    [Held by provider] lisinopril  2.5 mg Oral Daily    metoprolol succinate  25 mg Oral Daily    Vitamin D  1,000 Units Oral Daily    zinc sulfate  50 mg Oral Daily    sodium chloride flush  5-40 mL IntraVENous 2 times per day    insulin lispro  0-6 Units SubCUTAneous TID     insulin lispro  0-3 Units SubCUTAneous Nightly    ocuvite-lutein  1 tablet Oral Daily       MEDS (infusions):   sodium chloride      dextrose         MEDS (prn):  albuterol, sodium chloride flush, sodium chloride, ondansetron **OR** ondansetron, polyethylene glycol, acetaminophen **OR** acetaminophen, glucose, glucagon (rDNA), dextrose, dextrose bolus (hypoglycemia) **OR** dextrose bolus (hypoglycemia)    PHYSICAL EXAM:     Patient Vitals for the past 24 hrs:   BP Temp Temp src Pulse Resp SpO2 Height Weight   02/16/22 1000 126/84 -- -- 66 -- -- -- --   02/16/22 0930 121/81 -- -- 71 -- -- -- --   02/16/22 0900 127/72 -- -- 66 -- -- -- --   02/16/22 0830 136/83 -- -- 61 -- -- -- --   02/16/22 0800 118/70 -- -- 59 -- -- -- --   02/16/22 0730 116/75 -- -- 66 -- -- -- --   02/16/22 0650 124/72 97.6 °F (36.4 °C) -- 70 17 -- -- --   02/16/22 0615 -- -- -- -- -- -- -- 201 lb 3.2 oz (91.3 kg)   02/15/22 2000 (!) 140/67 98.1 °F (36.7 °C) Oral 76 16 100 % -- --   02/15/22 1513 -- -- -- -- -- -- 5' 6\" (1.676 m) --   @      Intake/Output Summary (Last 24 hours) at 2/16/2022 1027  Last data filed at 2/16/2022 4239  Gross per 24 hour   Intake 360 ml   Output 100 ml   Net 260 ml         Wt Readings from Last 3 Encounters:   02/16/22 201 lb 3.2 oz (91.3 kg)   01/20/22 180 lb (81.6 kg)   01/11/22 180 lb (81.6 kg)       Constitutional:  in no acute distress  HEENT: NC/AT, EOMI, sclera and conjunctiva are clear and anicteric, mucus membranes moist  Neck: Trachea midline, no JVD  Cardiovascular: S1, S2 regular rhythm, no murmur,or rub  Chest:  tessio exit site dressed  Respiratory:  Few coarse BS crackles, no wheeze  Gastrointestinal:  Soft, nontender, nondistended, NABS  Ext: no edema, feet warm  Skin: dry, no rash  Neuro: awake, alert, interactive      DATA:    Recent Labs     02/14/22  0434 02/15/22  0223 02/16/22  0520   WBC 8.1 5.7 6.4   HGB 9.2* 9.7* 10.2*   HCT 29.9* 31.3* 32.7*   .5* 103.6* 102.8*    145 163     Recent Labs     02/14/22 0434 02/15/22  0223 02/16/22  0520    135 132   K 4.0 3.9 3.7   CL 98 95* 92*   CO2 24 26 26   PHOS 4.0 3.6 4.4   BUN 43* 29* 46*   CREATININE 5.8* 4.0* 5.6*       Lab Results   Component Value Date    LABPROT 7.6 (H) 08/22/2020    LABPROT 7.6 08/22/2020       ASSESSMENT / RECOMMENDATIONS:    1. ESRD      2.  Anemia due to ESRD     3. 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 Bacteremia, staph epidermidis and another staph species. Not CLABSI;  pneumonia is the likely culprit. Being followed by ID.    5.  Acute hypoxic respite insufficiency due to bacterial pneumonia    6. Diabetes mellitus    7. Thrombosed RUE AVG.   For now dependent on [facility] -- will eval re:  Trying again more proximally on the R or new avg on the L      Continue IHD support for solute and volume clearance on Mondays Wednesdays and Fridays as per outpatient orders and dry weight  anitbiotic tx per ID -- no need to remove tesio  Follow closely      Electronically signed by Nelson Wang MD on 2/16/2022

## 2022-02-16 NOTE — PLAN OF CARE
Problem: Pain:  Goal: Pain level will decrease  Description: Pain level will decrease  2/15/2022 1802 by Gosia Mercado RN  Outcome: Met This Shift  Goal: Control of acute pain  Description: Control of acute pain  Outcome: Met This Shift     Problem: Skin Integrity:  Goal: Absence of new skin breakdown  Description: Absence of new skin breakdown  2/15/2022 1802 by Gosia Mercado RN  Outcome: Met This Shift     Problem: Falls - Risk of:  Goal: Will remain free from falls  Description: Will remain free from falls  2/15/2022 1802 by Gosia Mercado RN  Outcome: Met This Shift  Goal: Absence of physical injury  Description: Absence of physical injury  2/15/2022 1802 by Gosia Mercado RN  Outcome: Met This Shift     Problem: Increased nutrient needs (NI-5.1)  Goal: Food and/or Nutrient Delivery  Description: Individualized approach for food/nutrient provision.   2/15/2022 1611 by Isaac Panchal RD, LD  Outcome: Met This Shift

## 2022-02-16 NOTE — PROGRESS NOTES
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    Diabetes mellitus ([address]    Diabetes mellitus ([address]    Diabetic peripheral neuropathy ([address]    Osteoarthritis    Gait abnormality    Physical deconditioning    Lumbar spondylitis (HCC)    Anemia    Fx humer, med condyl-closed    Cerebral infarction ([address]    Renal failure    TIA (transient ischemic attack)    ASHD (arteriosclerotic heart disease)    Diabetes mellitus ([address]    PVD (peripheral vascular disease) with claudication (HCC)    History of CVA (cerebrovascular accident)    Pain in lower limb    Cerebral infarction ([address]    Diabetes mellitus-2    Decubitus ulcer of sacral region, stage 1    Right sided weakness    Hypoglycemia    Encounter regarding vascular access for dialysis for end-stage renal disease ([address]    ESRD (end stage renal disease) on dialysis ([address]    Diabetic ulcer of toe of right foot associated with type 2 diabetes mellitus, with fat layer exposed ([address]    Failure to thrive in adult    Ischemic ulcer of toe, limited to breakdown of skin, right ([address]    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 No complaints of dyspnea or cough.        Vitals:  VITALS:  BP (!) 183/73   Pulse 100   Temp 97.6 °F (36.4 °C)   Resp 17   Ht 5' 6\" (1.676 m)   Wt 190 lb 11.2 oz (86.5 kg)   SpO2 100%   BMI 30.78 kg/m²     24HR INTAKE/OUTPUT:      Intake/Output Summary (Last 24 hours) at 2/16/2022 1113  Last data filed at 2/16/2022 1041  Gross per 24 hour   Intake 660 ml   Output 2900 ml   Net -2240 ml       24HR PULSE OXIMETRY RANGE: SpO2  Av %  Min: 100 %  Max: 100 %    Medications:  IV:   sodium chloride      dextrose         Scheduled Meds:   [START ON 2022] cefdinir  300 mg Oral Daily    white petrolatum   Topical BID    clotrimazole   Topical BID    doxycycline hyclate  100 mg Oral 2 times per day    apixaban  2.5 mg Oral BID    cefepime  2,000 mg IntraVENous Q MWF    atorvastatin  10 mg Oral Nightly    budesonide  0.25 mg Nebulization BID    gabapentin  100 mg Oral Nightly    [Held by provider] lisinopril  2.5 mg Oral Daily    metoprolol succinate  25 mg Oral Daily    Vitamin D  1,000 Units Oral Daily    zinc sulfate  50 mg Oral Daily    sodium chloride flush  5-40 mL IntraVENous 2 times per day    insulin lispro  0-6 Units SubCUTAneous TID WC    insulin lispro  0-3 Units SubCUTAneous Nightly    ocuvite-lutein  1 tablet Oral Daily       Diet:   ADULT DIET; Regular; 4 carb choices (60 gm/meal); Low Sodium (2 gm)  ADULT ORAL NUTRITION SUPPLEMENT; Breakfast, Dinner; Wound Healing Oral Supplement  ADULT ORAL NUTRITION SUPPLEMENT; Lunch, Dinner; Other Oral Supplement; Gelatein 20     EXAM:  General: No distress. Alert. Eyes: PERRL. No sclera icterus. No conjunctival injection. ENT: No discharge. Pharynx clear. Neck: Trachea midline. Normal thyroid. Resp: No accessory muscle use. No rales. No wheezing. No rhonchi. CV: Regular rate. Regular rhythm. No murmur or rub. Abd: Non-tender. Non-distended. No masses. No organomegaly. Normal bowel sounds. Skin: Warm and dry. No nodule on exposed extremities. No rash on exposed extremities. Ext: No cyanosis, clubbing, edema  Lymph: No cervical LAD. No supraclavicular LAD. M/S: No cyanosis. No joint deformity. No clubbing. Neuro: Awake. Follows commands. Positive pupils/gag/corneals. Normal pain response.        Results:  CBC:   Recent Labs     22  0434 02/15/22  0223 22  0520   WBC 8.1 5.7 6.4   HGB 9.2* 9.7* 10.2*   HCT 29.9* 31.3* 32.7*   MCV 103.5* 103.6* 102.8*    145 163     BMP:   Recent Labs     02/14/22  0434 02/15/22  0223 02/16/22  0520    135 132   K 4.0 3.9 3.7   CL 98 95* 92*   CO2 24 26 26   PHOS 4.0 3.6 4.4   BUN 43* 29* 46*   CREATININE 5.8* 4.0* 5.6*     LIVER PROFILE:   No results for input(s): AST, ALT, LIPASE, BILIDIR, BILITOT, ALKPHOS in the last 72 hours. Invalid input(s): AMYLASE,  ALB  PT/INR: No results for input(s): PROTIME, INR in the last 72 hours. APTT: No results for input(s): APTT in the last 72 hours. Pathology:  1. N/A      Microbiology:  1. None    Recent ABG:   No results for input(s): PH, PO2, PCO2, HCO3, BE, O2SAT, METHB, O2HB, COHB, O2CON, HHB, THB in the last 72 hours. Recent Films:  XR CHEST PORTABLE   Final Result   Grossly unchanged airspace opacities in the left lower lobe compared with   February 11, 2022. Findings are nonspecific and may be on the basis of   atelectasis or pneumonia. FL MODIFIED BARIUM SWALLOW W VIDEO   Final Result   Satisfactory swallowing mechanism with the exception of transient laryngeal   penetration with nectar consistency barium. No barium aspiration. Please see separate speech pathology report for full discussion of findings   and recommendations. XR CHEST PORTABLE   Final Result   Small persistent infiltrate left lung base. Clinical correlation and   follow-up to resolution recommended.                      Assessment:  1. 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 There is no associated leukocytosis. Procalcitonin is elevated but can be unreliable in the presence of renal failure. 2. Strep and Legionella of urine. Negative preliminary. 3. CXR reviewed.         Plan:  1. Continue antibiotics per infectious disease  2. Add incentive spirometer.      Time at the bedside, reviewing labs and radiographs, reviewing updated notes and consultations, discussing with staff and family was more than 35 minutes. Please note that voice recognition technology was used in the preparation of this note and make therefore it may contain inadvertent transcription errors. If the patient is a COVID 19 isolation patient, the above physical exam reflects that of the examining physician for the day.         JESUS Pavon - NP    [facility], [address] [facility], [address] [place] [address]

## 2022-02-16 NOTE — FLOWSHEET NOTE
Initial Inpatient Wound Care    Admit Date: 2/11/2022 10:57 AM    Reason for consult:  Sacral decub state 2 , hx diabetic foot wounds    Significant history:  See H&P      Findings:     02/15/22 1427   Wound 02/15/22 Sacrum   Date First Assessed/Time First Assessed: 02/15/22 1427   Present on Hospital Admission: No  Primary Wound Type: Pressure Injury  Location: Sacrum   Wound Image    Wound Etiology Deep tissue/Injury   Wound Length (cm) 7 cm   Wound Width (cm) 14 cm   Wound Depth (cm)   (utd)   Wound Surface Area (cm^2) 98 cm^2   Wound Assessment   (purple, open areas)   Drainage Amount Scant   Drainage Description Sanguinous; Serosanguinous   Odor None   Sharyn-wound Assessment Intact   heels with red, flaky ? fungal  Impression:  DTI sacrum, buttocks  Plan:lo air loss, aquaphor  Diannepauline Corrales, JAZMINE, [address], JESUS - CNS 2/16/2022 8:22 AM

## 2022-02-16 NOTE — PROGRESS NOTES
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    Diabetes mellitus ([address]    Diabetes mellitus ([address]    Diabetic peripheral neuropathy ([address]    Osteoarthritis    Gait abnormality    Physical deconditioning    Lumbar spondylitis (HCC)    Anemia    Fx humer, med condyl-closed    Cerebral infarction ([address]    Renal failure    TIA (transient ischemic attack)    ASHD (arteriosclerotic heart disease)    Diabetes mellitus ([address]    PVD (peripheral vascular disease) with claudication (HCC)    History of CVA (cerebrovascular accident)    Pain in lower limb    Cerebral infarction ([address]    Diabetes mellitus-2    Decubitus ulcer of sacral region, stage 1    Right sided weakness    Hypoglycemia    Encounter regarding vascular access for dialysis for end-stage renal disease ([address]    ESRD (end stage renal disease) on dialysis ([address]    Diabetic ulcer of toe of right foot associated with type 2 diabetes mellitus, with fat layer exposed ([address]    Failure to thrive in adult    Ischemic ulcer of toe, limited to breakdown of skin, right ([address]    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 No complaints of dyspnea or cough. Breathing easy.       Vitals:  VITALS:  BP (!) 123/58   Pulse 73   Temp 97.8 °F (36.6 °C) (Oral)   Resp 18   Ht 5' 6\" (1.676 m)   Wt 190 lb 11.2 oz (86.5 kg)   SpO2 98%   BMI 30.78 kg/m²     24HR INTAKE/OUTPUT:      Intake/Output Summary (Last 24 hours) at 2/16/2022 1538  Last data filed at 2/16/2022 1041  Gross per 24 hour   Intake 660 ml   Output 2900 ml   Net -2240 ml       24HR PULSE OXIMETRY RANGE:    SpO2  Av %  Min: 98 %  Max: 100 %    Medications:  IV:   sodium chloride      dextrose         Scheduled Meds:   [START ON 2022] cefdinir  300 mg Oral Daily    white petrolatum   Topical BID    clotrimazole   Topical BID    doxycycline hyclate  100 mg Oral 2 times per day    apixaban  2.5 mg Oral BID    atorvastatin  10 mg Oral Nightly    budesonide  0.25 mg Nebulization BID    gabapentin  100 mg Oral Nightly    [Held by provider] lisinopril  2.5 mg Oral Daily    metoprolol succinate  25 mg Oral Daily    Vitamin D  1,000 Units Oral Daily    zinc sulfate  50 mg Oral Daily    sodium chloride flush  5-40 mL IntraVENous 2 times per day    insulin lispro  0-6 Units SubCUTAneous TID WC    insulin lispro  0-3 Units SubCUTAneous Nightly    ocuvite-lutein  1 tablet Oral Daily       Diet:   ADULT DIET; Regular; 4 carb choices (60 gm/meal); Low Sodium (2 gm)  ADULT ORAL NUTRITION SUPPLEMENT; Breakfast, Dinner; Wound Healing Oral Supplement  ADULT ORAL NUTRITION SUPPLEMENT; Lunch, Dinner; Other Oral Supplement; Gelatein 20     EXAM:  General: No distress. Alert. Eyes: PERRL. No sclera icterus. No conjunctival injection. ENT: No discharge. Pharynx clear. Neck: Trachea midline. Normal thyroid. Resp: No accessory muscle use. No rales. No wheezing. No rhonchi. CV: Regular rate. Regular rhythm. No murmur or rub. Abd: Non-tender. Non-distended. No masses. No organomegaly. Normal bowel sounds. Skin: Warm and dry. No nodule on exposed extremities. No rash on exposed extremities. Ext: No cyanosis, clubbing, edema  Lymph: No cervical LAD. No supraclavicular LAD. M/S: No cyanosis. No joint deformity. No clubbing. Neuro: Awake. Follows commands. Positive pupils/gag/corneals. Normal pain response.        Results:  CBC:   Recent Labs     22  0434 02/15/22  0223 22  0520   WBC 8.1 5.7 6.4   HGB 9.2* 9.7* 10.2*   HCT 29.9* 31.3* 32.7*   .5* 103.6* 102.8*  145 163     BMP:   Recent Labs     02/14/22  0434 02/15/22  0223 02/16/22  0520    135 132   K 4.0 3.9 3.7   CL 98 95* 92*   CO2 24 26 26   PHOS 4.0 3.6 4.4   BUN 43* 29* 46*   CREATININE 5.8* 4.0* 5.6*     LIVER PROFILE:   No results for input(s): AST, ALT, LIPASE, BILIDIR, BILITOT, ALKPHOS in the last 72 hours. Invalid input(s): AMYLASE,  ALB  PT/INR: No results for input(s): PROTIME, INR in the last 72 hours. APTT: No results for input(s): APTT in the last 72 hours. Pathology:  1. N/A      Microbiology:  1. None    Recent ABG:   No results for input(s): PH, PO2, PCO2, HCO3, BE, O2SAT, METHB, O2HB, COHB, O2CON, HHB, THB in the last 72 hours. Recent Films:  XR CHEST PORTABLE   Final Result   Grossly unchanged airspace opacities in the left lower lobe compared with   February 11, 2022. Findings are nonspecific and may be on the basis of   atelectasis or pneumonia. FL MODIFIED BARIUM SWALLOW W VIDEO   Final Result   Satisfactory swallowing mechanism with the exception of transient laryngeal   penetration with nectar consistency barium. No barium aspiration. Please see separate speech pathology report for full discussion of findings   and recommendations. XR CHEST PORTABLE   Final Result   Small persistent infiltrate left lung base. Clinical correlation and   follow-up to resolution recommended.                      Assessment:  1. 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 There is no associated leukocytosis. Procalcitonin is elevated but can be unreliable in the presence of renal failure. 2. Strep and Legionella of urine. Negative preliminary. 3. CXR reviewed.         Plan:  1. Continue antibiotics per infectious disease  2. Add incentive spirometer.    3. Okay to discharge from pulmonary perspective    Time at the bedside, reviewing labs and radiographs, reviewing updated notes and consultations, discussing with staff and family was more than 35 minutes. Please note that voice recognition technology was used in the preparation of this note and make therefore it may contain inadvertent transcription errors. If the patient is a COVID 19 isolation patient, the above physical exam reflects that of the examining physician for the day.         Huang Jaime MD    [facility], [address] [place] [address], [address], [facility]

## 2022-02-18 LAB — BLOOD CULTURE, ROUTINE: NORMAL

## 2022-02-19 LAB — CULTURE, BLOOD 2: NORMAL

## 2022-05-13 ENCOUNTER — APPOINTMENT (OUTPATIENT)
Dept: GENERAL RADIOLOGY | Age: 87
DRG: 056 | End: 2022-05-13
Payer: MEDICARE

## 2022-05-13 ENCOUNTER — APPOINTMENT (OUTPATIENT)
Dept: CT IMAGING | Age: 87
DRG: 056 | End: 2022-05-13
Payer: MEDICARE

## 2022-05-13 ENCOUNTER — HOSPITAL ENCOUNTER (INPATIENT)
Age: 87
LOS: 6 days | Discharge: NURSING, REHAB OR LONG-TERM CARE | DRG: 056 | End: 2022-05-19
Attending: EMERGENCY MEDICINE | Admitting: INTERNAL MEDICINE
Payer: MEDICARE

## 2022-05-13 DIAGNOSIS — R53.1 GENERAL WEAKNESS: ICD-10-CM

## 2022-05-13 DIAGNOSIS — Z99.2 DIALYSIS PATIENT (HCC): Primary | ICD-10-CM

## 2022-05-13 DIAGNOSIS — R29.6 FREQUENT FALLS: ICD-10-CM

## 2022-05-13 DIAGNOSIS — E87.20 LACTIC ACIDOSIS: ICD-10-CM

## 2022-05-13 PROBLEM — R50.9 FEVER OF UNKNOWN ORIGIN: Status: ACTIVE | Noted: 2022-05-13

## 2022-05-13 PROBLEM — R50.9 FEVER IN ADULT: Status: ACTIVE | Noted: 2022-05-13

## 2022-05-13 LAB
ALBUMIN SERPL-MCNC: 4.2 G/DL (ref 3.5–5.2)
ALP BLD-CCNC: 128 U/L (ref 40–129)
ALT SERPL-CCNC: 18 U/L (ref 0–40)
ANION GAP SERPL CALCULATED.3IONS-SCNC: 15 MMOL/L (ref 7–16)
AST SERPL-CCNC: 16 U/L (ref 0–39)
BASOPHILS ABSOLUTE: 0.02 E9/L (ref 0–0.2)
BASOPHILS RELATIVE PERCENT: 0.3 % (ref 0–2)
BILIRUB SERPL-MCNC: 0.7 MG/DL (ref 0–1.2)
BUN BLDV-MCNC: 29 MG/DL (ref 6–23)
CALCIUM SERPL-MCNC: 9.3 MG/DL (ref 8.6–10.2)
CHLORIDE BLD-SCNC: 90 MMOL/L (ref 98–107)
CO2: 28 MMOL/L (ref 22–29)
CREAT SERPL-MCNC: 3.8 MG/DL (ref 0.7–1.2)
EOSINOPHILS ABSOLUTE: 0.24 E9/L (ref 0.05–0.5)
EOSINOPHILS RELATIVE PERCENT: 3.8 % (ref 0–6)
GFR AFRICAN AMERICAN: 18
GFR NON-AFRICAN AMERICAN: 15 ML/MIN/1.73
GLUCOSE BLD-MCNC: 130 MG/DL (ref 74–99)
HCT VFR BLD CALC: 34.4 % (ref 37–54)
HEMOGLOBIN: 10.9 G/DL (ref 12.5–16.5)
IMMATURE GRANULOCYTES #: 0.03 E9/L
IMMATURE GRANULOCYTES %: 0.5 % (ref 0–5)
INFLUENZA A BY PCR: NOT DETECTED
INFLUENZA B BY PCR: NOT DETECTED
LACTIC ACID, SEPSIS: 3.1 MMOL/L (ref 0.5–1.9)
LYMPHOCYTES ABSOLUTE: 1.08 E9/L (ref 1.5–4)
LYMPHOCYTES RELATIVE PERCENT: 17.1 % (ref 20–42)
MAGNESIUM: 2.1 MG/DL (ref 1.6–2.6)
MCH RBC QN AUTO: 31.1 PG (ref 26–35)
MCHC RBC AUTO-ENTMCNC: 31.7 % (ref 32–34.5)
MCV RBC AUTO: 98 FL (ref 80–99.9)
MONOCYTES ABSOLUTE: 0.7 E9/L (ref 0.1–0.95)
MONOCYTES RELATIVE PERCENT: 11.1 % (ref 2–12)
NEUTROPHILS ABSOLUTE: 4.26 E9/L (ref 1.8–7.3)
NEUTROPHILS RELATIVE PERCENT: 67.2 % (ref 43–80)
PDW BLD-RTO: 14.9 FL (ref 11.5–15)
PHOSPHORUS: 2.5 MG/DL (ref 2.5–4.5)
PLATELET # BLD: 166 E9/L (ref 130–450)
PMV BLD AUTO: 10.3 FL (ref 7–12)
POTASSIUM SERPL-SCNC: 4 MMOL/L (ref 3.5–5)
PRO-BNP: ABNORMAL PG/ML (ref 0–450)
PROCALCITONIN: 0.56 NG/ML (ref 0–0.08)
RBC # BLD: 3.51 E12/L (ref 3.8–5.8)
SARS-COV-2, NAAT: NOT DETECTED
SODIUM BLD-SCNC: 133 MMOL/L (ref 132–146)
TOTAL PROTEIN: 8 G/DL (ref 6.4–8.3)
TROPONIN, HIGH SENSITIVITY: 186 NG/L (ref 0–11)
WBC # BLD: 6.3 E9/L (ref 4.5–11.5)

## 2022-05-13 PROCEDURE — 99285 EMERGENCY DEPT VISIT HI MDM: CPT

## 2022-05-13 PROCEDURE — 80053 COMPREHEN METABOLIC PANEL: CPT

## 2022-05-13 PROCEDURE — 2580000003 HC RX 258: Performed by: STUDENT IN AN ORGANIZED HEALTH CARE EDUCATION/TRAINING PROGRAM

## 2022-05-13 PROCEDURE — 87040 BLOOD CULTURE FOR BACTERIA: CPT

## 2022-05-13 PROCEDURE — 36415 COLL VENOUS BLD VENIPUNCTURE: CPT

## 2022-05-13 PROCEDURE — 83735 ASSAY OF MAGNESIUM: CPT

## 2022-05-13 PROCEDURE — 84484 ASSAY OF TROPONIN QUANT: CPT

## 2022-05-13 PROCEDURE — 84100 ASSAY OF PHOSPHORUS: CPT

## 2022-05-13 PROCEDURE — 83605 ASSAY OF LACTIC ACID: CPT

## 2022-05-13 PROCEDURE — 83880 ASSAY OF NATRIURETIC PEPTIDE: CPT

## 2022-05-13 PROCEDURE — 93005 ELECTROCARDIOGRAM TRACING: CPT | Performed by: EMERGENCY MEDICINE

## 2022-05-13 PROCEDURE — 70450 CT HEAD/BRAIN W/O DYE: CPT

## 2022-05-13 PROCEDURE — 85025 COMPLETE CBC W/AUTO DIFF WBC: CPT

## 2022-05-13 PROCEDURE — 87635 SARS-COV-2 COVID-19 AMP PRB: CPT

## 2022-05-13 PROCEDURE — 71045 X-RAY EXAM CHEST 1 VIEW: CPT

## 2022-05-13 PROCEDURE — 87502 INFLUENZA DNA AMP PROBE: CPT

## 2022-05-13 PROCEDURE — 84145 PROCALCITONIN (PCT): CPT

## 2022-05-13 RX ORDER — FAMOTIDINE 20 MG/1
10 TABLET, FILM COATED ORAL DAILY
Status: DISCONTINUED | OUTPATIENT
Start: 2022-05-14 | End: 2022-05-19 | Stop reason: HOSPADM

## 2022-05-13 RX ORDER — 0.9 % SODIUM CHLORIDE 0.9 %
500 INTRAVENOUS SOLUTION INTRAVENOUS ONCE
Status: COMPLETED | OUTPATIENT
Start: 2022-05-13 | End: 2022-05-13

## 2022-05-13 RX ORDER — ACETAMINOPHEN 325 MG/1
650 TABLET ORAL EVERY 6 HOURS PRN
Status: DISCONTINUED | OUTPATIENT
Start: 2022-05-13 | End: 2022-05-19 | Stop reason: HOSPADM

## 2022-05-13 RX ORDER — INSULIN LISPRO 100 [IU]/ML
0-3 INJECTION, SOLUTION INTRAVENOUS; SUBCUTANEOUS NIGHTLY
Status: DISCONTINUED | OUTPATIENT
Start: 2022-05-13 | End: 2022-05-19 | Stop reason: HOSPADM

## 2022-05-13 RX ORDER — LEVETIRACETAM 500 MG/1
500 TABLET ORAL 2 TIMES DAILY
Status: DISCONTINUED | OUTPATIENT
Start: 2022-05-13 | End: 2022-05-19 | Stop reason: HOSPADM

## 2022-05-13 RX ORDER — ACETAMINOPHEN 650 MG/1
650 SUPPOSITORY RECTAL EVERY 6 HOURS PRN
Status: DISCONTINUED | OUTPATIENT
Start: 2022-05-13 | End: 2022-05-19 | Stop reason: HOSPADM

## 2022-05-13 RX ORDER — INSULIN LISPRO 100 [IU]/ML
0-6 INJECTION, SOLUTION INTRAVENOUS; SUBCUTANEOUS
Status: DISCONTINUED | OUTPATIENT
Start: 2022-05-14 | End: 2022-05-19 | Stop reason: HOSPADM

## 2022-05-13 RX ORDER — ATORVASTATIN CALCIUM 20 MG/1
10 TABLET, FILM COATED ORAL NIGHTLY
Status: DISCONTINUED | OUTPATIENT
Start: 2022-05-13 | End: 2022-05-19 | Stop reason: HOSPADM

## 2022-05-13 RX ORDER — SENNA PLUS 8.6 MG/1
1 TABLET ORAL DAILY PRN
Status: DISCONTINUED | OUTPATIENT
Start: 2022-05-13 | End: 2022-05-19 | Stop reason: HOSPADM

## 2022-05-13 RX ORDER — SODIUM CHLORIDE 0.9 % (FLUSH) 0.9 %
10 SYRINGE (ML) INJECTION EVERY 12 HOURS SCHEDULED
Status: DISCONTINUED | OUTPATIENT
Start: 2022-05-13 | End: 2022-05-19 | Stop reason: HOSPADM

## 2022-05-13 RX ORDER — DEXTROSE MONOHYDRATE 50 MG/ML
100 INJECTION, SOLUTION INTRAVENOUS PRN
Status: DISCONTINUED | OUTPATIENT
Start: 2022-05-13 | End: 2022-05-19 | Stop reason: HOSPADM

## 2022-05-13 RX ORDER — CALCIUM ACETATE 667 MG/1
1 CAPSULE ORAL
Status: DISCONTINUED | OUTPATIENT
Start: 2022-05-14 | End: 2022-05-19 | Stop reason: HOSPADM

## 2022-05-13 RX ORDER — GABAPENTIN 100 MG/1
100 CAPSULE ORAL NIGHTLY
Status: DISCONTINUED | OUTPATIENT
Start: 2022-05-13 | End: 2022-05-19 | Stop reason: HOSPADM

## 2022-05-13 RX ORDER — METOPROLOL SUCCINATE 25 MG/1
25 TABLET, EXTENDED RELEASE ORAL DAILY
Status: DISCONTINUED | OUTPATIENT
Start: 2022-05-14 | End: 2022-05-19 | Stop reason: HOSPADM

## 2022-05-13 RX ORDER — SODIUM CHLORIDE 0.9 % (FLUSH) 0.9 %
10 SYRINGE (ML) INJECTION PRN
Status: DISCONTINUED | OUTPATIENT
Start: 2022-05-13 | End: 2022-05-19 | Stop reason: HOSPADM

## 2022-05-13 RX ORDER — SODIUM CHLORIDE 9 MG/ML
INJECTION, SOLUTION INTRAVENOUS PRN
Status: DISCONTINUED | OUTPATIENT
Start: 2022-05-13 | End: 2022-05-19 | Stop reason: HOSPADM

## 2022-05-13 RX ORDER — ALBUTEROL SULFATE 1.25 MG/3ML
2.5 SOLUTION RESPIRATORY (INHALATION) EVERY 6 HOURS PRN
Status: DISCONTINUED | OUTPATIENT
Start: 2022-05-13 | End: 2022-05-19 | Stop reason: HOSPADM

## 2022-05-13 RX ADMIN — SODIUM CHLORIDE 500 ML: 9 INJECTION, SOLUTION INTRAVENOUS at 20:32

## 2022-05-13 ASSESSMENT — ENCOUNTER SYMPTOMS
SHORTNESS OF BREATH: 0
SINUS PRESSURE: 0
COUGH: 0
SORE THROAT: 0
EYE PAIN: 0
EYE DISCHARGE: 0
VOMITING: 0
BACK PAIN: 0
DIARRHEA: 0
EYE REDNESS: 0
WHEEZING: 0
NAUSEA: 0
ABDOMINAL PAIN: 0

## 2022-05-13 ASSESSMENT — PAIN - FUNCTIONAL ASSESSMENT: PAIN_FUNCTIONAL_ASSESSMENT: NONE - DENIES PAIN

## 2022-05-13 NOTE — ED PROVIDER NOTES
HPI   This is a 43-year-old male patient presents to emergency department for evaluation of weakness. Patient reporting symptoms ongoing for the last couple of days. He gets dialysis Monday, Wednesday, Friday. He received his full dialysis session today. He is denying any complaints of pain. No chest pain, abdominal pain, shortness of breath, nausea, vomiting, diarrhea. He has a visiting nurse that comes to him and today they found that he was febrile. They sent him here for evaluation. Patient's complaint is moderate in severity, persistent not better with anything. Patient has past history of stroke in 2012, states that he has right-sided weakness as result. Review of Systems   Constitutional: Positive for fatigue and fever. Negative for chills. HENT: Negative for ear pain, sinus pressure and sore throat. Eyes: Negative for pain, discharge and redness. Respiratory: Negative for cough, shortness of breath and wheezing. Cardiovascular: Negative for chest pain. Gastrointestinal: Negative for abdominal pain, diarrhea, nausea and vomiting. Genitourinary: Negative for dysuria and frequency. Musculoskeletal: Negative for arthralgias and back pain. Skin: Negative for rash and wound. Neurological: Negative for weakness and headaches. Hematological: Negative for adenopathy. All other systems reviewed and are negative. Physical Exam  Vitals and nursing note reviewed. Constitutional:       Appearance: He is well-developed. Comments: fatigued   HENT:      Head: Normocephalic and atraumatic. Nose: Nose normal. No congestion. Mouth/Throat:      Pharynx: Oropharynx is clear. Eyes:      Conjunctiva/sclera: Conjunctivae normal.   Cardiovascular:      Rate and Rhythm: Normal rate and regular rhythm. Heart sounds: Normal heart sounds. No murmur heard. Comments: Port right chest wall  Pulmonary:      Effort: Pulmonary effort is normal. No respiratory distress. Breath sounds: No wheezing. Comments: Bibasilar crackles  Abdominal:      General: Bowel sounds are normal.      Palpations: Abdomen is soft. Tenderness: There is no abdominal tenderness. There is no guarding or rebound. Musculoskeletal:         General: No tenderness or deformity. Cervical back: Normal range of motion and neck supple. Skin:     General: Skin is warm and dry. Neurological:      Mental Status: He is alert and oriented to person, place, and time. Cranial Nerves: No cranial nerve deficit. Coordination: Coordination normal.          Procedures     MDM   80-year-old male patient presented to emergency department for evaluation of generalized weakness. Chronic dialysis patient, elevated creatinine. His initial labs showed lactic acid of 3.1 and procalcitonin 0.56, he arrived here with a temperature of 99F. Chest x-ray without any acute findings. No new shortness of breath however his BNP is elevated 27,000, has not been that high in the past.  Last EF was 50 to 55%. No leukocytosis, no signs of pneumonia, negative flu and COVID swab, blood cultures were drawn here he does not make any significant urine to send off. His CT scan did show chronic findings of chronic subdural with very small shift. I spoke with neurosurgery at this time and nothing acute to be done patient okay to stay at [facility]. At this time patient to be admitted for monitoring as patient does have elevated procalcitonin and lactic acid as well as risk for possible occult infection. EKG: This EKG is signed and interpreted by me. Rate: 101  Rhythm: Sinus  Interpretation: sinus tach, t wave inversions v3-v6  Comparison: ekg inversions are new    ED Course as of 05/14/22 0949   Fri May 13, 2022   2226 Makes very minimal urine [FG]   6014 Spoke with Dr. Victoriano Shanks neurosurgery, patient stable to remain at [facility] as his CT findings appear to be chronic.   He did recommend starting Keppra 500 mg twice daily and continuing that [FG]   [phone] Patient now is telling me that he had 2 falls on Wednesday. He has no complaints of neck or back pain at this time. No tenderness to palpation. [FG]      ED Course User Index  [FG] Gloria Cruz, DO        ED Course as of 05/14/22 0949   Fri May 13, 2022   2226 Makes very minimal urine [FG]   8614 Spoke with Dr. Jarrell Donald neurosurgery, [facility] findings appear to be chronic. He did recommend starting Keppra 500 mg twice daily and continuing that [FG]   0043 Patient now is telling me that he had 2 falls on Wednesday. He has no complaints of neck or back pain at this time. No tenderness to palpation. 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 ([address] CAD (coronary artery disease), CHF (congestive heart failure) ([address] Chronic kidney disease, COVID-19, CVA (cerebral vascular accident) ([address] Diabetes mellitus ([address] Diabetic peripheral neuropathy ([address] Hemodialysis patient ([address] History of CVA (cerebrovascular accident), Hypertension, Ischemic ulcer of toe, limited to breakdown of skin, right ([address] Other disorders of kidney and ureter, PVD (peripheral vascular disease) with claudication ([address] Right sided weakness, TIA (transient ischemic attack), and Unspecified cerebral artery occlusion with cerebral infarction. Past Surgical History:  has a past surgical history that includes Abdominal hernia repair; Cataract removal; Dental surgery; Inguinal hernia repair; Cholecystectomy (11/2011);  Inner ear surgery; hc dialysis catheter (Right, 8/27/2020); eye surgery; shunt revision (Right, 1/21/2021); vascular surgery (Right, 4/8/2021); vascular surgery (Left, 8/9/2021); and vascular surgery (Right, 8/11/2021). Social History:  reports that he quit smoking about 55 years ago. His smoking use included cigarettes. He has a 10.00 pack-year smoking history. He has never used smokeless tobacco. He reports previous alcohol use of about 1.0 standard drink of alcohol per week. He reports that he does not use drugs. Family History: family history is not on file. The patients home medications have been reviewed.     Allergies: Sulfa antibiotics and Sulfa antibiotics    -------------------------------------------------- RESULTS -------------------------------------------------    LABS:  Results for orders placed or performed during the hospital encounter of 05/13/22   COVID-19, Rapid    Specimen: Nares   Result Value Ref Range    SARS-CoV-2, NAAT Not Detected Not Detected   Rapid influenza A/B antigens    Specimen: Nares   Result Value Ref Range    Influenza A by PCR Not Detected Not Detected    Influenza B by PCR Not Detected Not Detected   Lactate, Sepsis   Result Value Ref Range    Lactic Acid, Sepsis 3.1 (H) 0.5 - 1.9 mmol/L   Procalcitonin   Result Value Ref Range    Procalcitonin 0.56 (H) 0.00 - 0.08 ng/mL   Troponin   Result Value Ref Range    Troponin, High Sensitivity 186 (H) 0 - 11 ng/L   CBC with Auto Differential   Result Value Ref Range    WBC 6.3 4.5 - 11.5 E9/L    RBC 3.51 (L) 3.80 - 5.80 E12/L    Hemoglobin 10.9 (L) 12.5 - 16.5 g/dL    Hematocrit 34.4 (L) 37.0 - 54.0 %    MCV 98.0 80.0 - 99.9 fL    MCH 31.1 26.0 - 35.0 pg    MCHC 31.7 (L) 32.0 - 34.5 %    RDW 14.9 11.5 - 15.0 fL    Platelets 790 953 - 879 E9/L    MPV 10.3 7.0 - 12.0 fL    Neutrophils % 67.2 43.0 - 80.0 %    Immature Granulocytes % 0.5 0.0 - 5.0 %    Lymphocytes % 17.1 (L) 20.0 - 42.0 %    Monocytes % 11.1 2.0 - 12.0 %    Eosinophils % 3.8 0.0 - 6.0 %    Basophils % 0.3 0.0 - 2.0 %    Neutrophils Absolute 4.26 1.80 - 7.30 E9/L    Immature Granulocytes # 0.03 E9/L    Lymphocytes Absolute 1.08 (L) 1.50 - 4.00 E9/L Monocytes Absolute 0.70 0.10 - 0.95 E9/L    Eosinophils Absolute 0.24 0.05 - 0.50 E9/L    Basophils Absolute 0.02 0.00 - 0.20 E9/L   Comprehensive Metabolic Panel   Result Value Ref Range    Sodium 133 132 - 146 mmol/L    Potassium 4.0 3.5 - 5.0 mmol/L    Chloride 90 (L) 98 - 107 mmol/L    CO2 28 22 - 29 mmol/L    Anion Gap 15 7 - 16 mmol/L    Glucose 130 (H) 74 - 99 mg/dL    BUN 29 (H) 6 - 23 mg/dL    CREATININE 3.8 (H) 0.7 - 1.2 mg/dL    GFR Non-African American 15 >=60 mL/min/1.73    GFR African American 18     Calcium 9.3 8.6 - 10.2 mg/dL    Total Protein 8.0 6.4 - 8.3 g/dL    Albumin 4.2 3.5 - 5.2 g/dL    Total Bilirubin 0.7 0.0 - 1.2 mg/dL    Alkaline Phosphatase 128 40 - 129 U/L    ALT 18 0 - 40 U/L    AST 16 0 - 39 U/L   Brain Natriuretic Peptide   Result Value Ref Range    Pro-BNP 27,673 (H) 0 - 450 pg/mL   Magnesium   Result Value Ref Range    Magnesium 2.1 1.6 - 2.6 mg/dL   Phosphorus   Result Value Ref Range    Phosphorus 2.5 2.5 - 4.5 mg/dL   CBC auto differential   Result Value Ref Range    WBC 5.5 4.5 - 11.5 E9/L    RBC 2.94 (L) 3.80 - 5.80 E12/L    Hemoglobin 9.2 (L) 12.5 - 16.5 g/dL    Hematocrit 28.3 (L) 37.0 - 54.0 %    MCV 96.3 80.0 - 99.9 fL    MCH 31.3 26.0 - 35.0 pg    MCHC 32.5 32.0 - 34.5 %    RDW 15.0 11.5 - 15.0 fL    Platelets 991 (L) 415 - 450 E9/L    MPV 11.4 7.0 - 12.0 fL    Neutrophils % 64.8 43.0 - 80.0 %    Immature Granulocytes % 0.2 0.0 - 5.0 %    Lymphocytes % 15.9 (L) 20.0 - 42.0 %    Monocytes % 13.4 (H) 2.0 - 12.0 %    Eosinophils % 5.2 0.0 - 6.0 %    Basophils % 0.5 0.0 - 2.0 %    Neutrophils Absolute 3.59 1.80 - 7.30 E9/L    Immature Granulocytes # 0.01 E9/L    Lymphocytes Absolute 0.88 (L) 1.50 - 4.00 E9/L    Monocytes Absolute 0.74 0.10 - 0.95 E9/L    Eosinophils Absolute 0.29 0.05 - 0.50 E9/L    Basophils Absolute 0.03 0.00 - 0.20 E9/L   POCT Glucose   Result Value Ref Range    Meter Glucose 148 (H) 74 - 99 mg/dL   EKG 12 Lead   Result Value Ref Range    Ventricular Rate 101 BPM    Atrial Rate 101 BPM    P-R Interval 182 ms    QRS Duration 92 ms    Q-T Interval 376 ms    QTc Calculation (Bazett) 487 ms    P Axis 52 degrees    T Axis -23 degrees       RADIOLOGY:  CT HEAD WO CONTRAST   Final Result   1. No acute intracranial abnormality. 2. Subdural hygroma along the left cerebral convexity, measuring 7 mm in   maximal width anteriorly. 3. 3 mm left-to-right midline shift. 4. 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 RECOMMENDATIONS:   Unavailable         XR CHEST PORTABLE   Final Result   No acute process. XR CHEST PORTABLE    (Results Pending)         ------------------------- NURSING NOTES AND VITALS REVIEWED ---------------------------  Date / Time Roomed:  5/13/2022  6:24 PM  ED Bed Assignment:  [id number]    The nursing notes within the ED encounter and vital signs as below have been reviewed. Patient Vitals for the past 24 hrs:   BP Temp Temp src Pulse Resp SpO2 Height Weight   05/14/22 0815 -- -- -- -- -- 94 % -- --   05/14/22 0814 (!) 123/57 98.3 °F (36.8 °C) Oral 99 21 (!) 86 % -- --   05/14/22 0355 -- -- -- -- -- -- -- 185 lb 8 oz (84.1 kg)   05/14/22 0045 131/60 98.7 °F (37.1 °C) Oral 98 18 94 % -- --   05/13/22 2043 -- -- -- 74 -- -- -- --   05/13/22 2024 130/70 98.8 °F (37.1 °C) Oral 74 18 94 % -- --   05/13/22 1849 108/60 99 °F (37.2 °C) Oral 104 18 97 % 5' 6\" (1.676 m) 185 lb (83.9 kg)       Oxygen Saturation Interpretation: Normal    ------------------------------------------ PROGRESS NOTES ------------------------------------------    Counseling:  I have spoken with the patient and discussed todays results, in addition to providing specific details for the plan of care and counseling regarding the diagnosis and prognosis.   Their questions are answered at this time and they are agreeable with the plan of admission.    --------------------------------- ADDITIONAL PROVIDER NOTES

## 2022-05-14 ENCOUNTER — APPOINTMENT (OUTPATIENT)
Dept: GENERAL RADIOLOGY | Age: 87
DRG: 056 | End: 2022-05-14
Payer: MEDICARE

## 2022-05-14 PROBLEM — R29.898 LEG WEAKNESS, BILATERAL: Status: ACTIVE | Noted: 2022-05-14

## 2022-05-14 LAB
ALBUMIN SERPL-MCNC: 3.4 G/DL (ref 3.5–5.2)
ALP BLD-CCNC: 102 U/L (ref 40–129)
ALT SERPL-CCNC: 15 U/L (ref 0–40)
ANION GAP SERPL CALCULATED.3IONS-SCNC: 14 MMOL/L (ref 7–16)
AST SERPL-CCNC: 14 U/L (ref 0–39)
BASOPHILS ABSOLUTE: 0.03 E9/L (ref 0–0.2)
BASOPHILS RELATIVE PERCENT: 0.5 % (ref 0–2)
BILIRUB SERPL-MCNC: 0.7 MG/DL (ref 0–1.2)
BUN BLDV-MCNC: 41 MG/DL (ref 6–23)
C-REACTIVE PROTEIN: 9.9 MG/DL (ref 0–0.4)
CALCIUM SERPL-MCNC: 8.6 MG/DL (ref 8.6–10.2)
CHLORIDE BLD-SCNC: 93 MMOL/L (ref 98–107)
CO2: 26 MMOL/L (ref 22–29)
CREAT SERPL-MCNC: 4.8 MG/DL (ref 0.7–1.2)
EKG ATRIAL RATE: 101 BPM
EKG P AXIS: 52 DEGREES
EKG P-R INTERVAL: 182 MS
EKG Q-T INTERVAL: 376 MS
EKG QRS DURATION: 92 MS
EKG QTC CALCULATION (BAZETT): 487 MS
EKG T AXIS: -23 DEGREES
EKG VENTRICULAR RATE: 101 BPM
EOSINOPHILS ABSOLUTE: 0.29 E9/L (ref 0.05–0.5)
EOSINOPHILS RELATIVE PERCENT: 5.2 % (ref 0–6)
GFR AFRICAN AMERICAN: 14
GFR NON-AFRICAN AMERICAN: 11 ML/MIN/1.73
GLUCOSE BLD-MCNC: 180 MG/DL (ref 74–99)
HCT VFR BLD CALC: 28.3 % (ref 37–54)
HEMOGLOBIN: 9.2 G/DL (ref 12.5–16.5)
IMMATURE GRANULOCYTES #: 0.01 E9/L
IMMATURE GRANULOCYTES %: 0.2 % (ref 0–5)
LACTIC ACID: 1.6 MMOL/L (ref 0.5–2.2)
LYMPHOCYTES ABSOLUTE: 0.88 E9/L (ref 1.5–4)
LYMPHOCYTES RELATIVE PERCENT: 15.9 % (ref 20–42)
MCH RBC QN AUTO: 31.3 PG (ref 26–35)
MCHC RBC AUTO-ENTMCNC: 32.5 % (ref 32–34.5)
MCV RBC AUTO: 96.3 FL (ref 80–99.9)
METER GLUCOSE: 142 MG/DL (ref 74–99)
METER GLUCOSE: 148 MG/DL (ref 74–99)
METER GLUCOSE: 151 MG/DL (ref 74–99)
METER GLUCOSE: 155 MG/DL (ref 74–99)
MONOCYTES ABSOLUTE: 0.74 E9/L (ref 0.1–0.95)
MONOCYTES RELATIVE PERCENT: 13.4 % (ref 2–12)
NEUTROPHILS ABSOLUTE: 3.59 E9/L (ref 1.8–7.3)
NEUTROPHILS RELATIVE PERCENT: 64.8 % (ref 43–80)
PDW BLD-RTO: 15 FL (ref 11.5–15)
PLATELET # BLD: 101 E9/L (ref 130–450)
PMV BLD AUTO: 11.4 FL (ref 7–12)
POTASSIUM REFLEX MAGNESIUM: 3.8 MMOL/L (ref 3.5–5)
RBC # BLD: 2.94 E12/L (ref 3.8–5.8)
SEDIMENTATION RATE, ERYTHROCYTE: 85 MM/HR (ref 0–15)
SODIUM BLD-SCNC: 133 MMOL/L (ref 132–146)
TOTAL PROTEIN: 6.4 G/DL (ref 6.4–8.3)
WBC # BLD: 5.5 E9/L (ref 4.5–11.5)

## 2022-05-14 PROCEDURE — 6370000000 HC RX 637 (ALT 250 FOR IP): Performed by: STUDENT IN AN ORGANIZED HEALTH CARE EDUCATION/TRAINING PROGRAM

## 2022-05-14 PROCEDURE — 36415 COLL VENOUS BLD VENIPUNCTURE: CPT

## 2022-05-14 PROCEDURE — 80053 COMPREHEN METABOLIC PANEL: CPT

## 2022-05-14 PROCEDURE — 85651 RBC SED RATE NONAUTOMATED: CPT

## 2022-05-14 PROCEDURE — 93010 ELECTROCARDIOGRAM REPORT: CPT | Performed by: INTERNAL MEDICINE

## 2022-05-14 PROCEDURE — 86140 C-REACTIVE PROTEIN: CPT

## 2022-05-14 PROCEDURE — 6370000000 HC RX 637 (ALT 250 FOR IP): Performed by: INTERNAL MEDICINE

## 2022-05-14 PROCEDURE — 83605 ASSAY OF LACTIC ACID: CPT

## 2022-05-14 PROCEDURE — 2500000003 HC RX 250 WO HCPCS: Performed by: INTERNAL MEDICINE

## 2022-05-14 PROCEDURE — 71045 X-RAY EXAM CHEST 1 VIEW: CPT

## 2022-05-14 PROCEDURE — 2060000000 HC ICU INTERMEDIATE R&B

## 2022-05-14 PROCEDURE — 85025 COMPLETE CBC W/AUTO DIFF WBC: CPT

## 2022-05-14 PROCEDURE — 99222 1ST HOSP IP/OBS MODERATE 55: CPT | Performed by: NURSE PRACTITIONER

## 2022-05-14 PROCEDURE — 2580000003 HC RX 258: Performed by: INTERNAL MEDICINE

## 2022-05-14 PROCEDURE — 82962 GLUCOSE BLOOD TEST: CPT

## 2022-05-14 RX ADMIN — LEVETIRACETAM 500 MG: 500 TABLET, FILM COATED ORAL at 22:36

## 2022-05-14 RX ADMIN — INSULIN LISPRO 1 UNITS: 100 INJECTION, SOLUTION INTRAVENOUS; SUBCUTANEOUS at 22:37

## 2022-05-14 RX ADMIN — ATORVASTATIN CALCIUM 10 MG: 20 TABLET, FILM COATED ORAL at 22:36

## 2022-05-14 RX ADMIN — INSULIN LISPRO 1 UNITS: 100 INJECTION, SOLUTION INTRAVENOUS; SUBCUTANEOUS at 08:19

## 2022-05-14 RX ADMIN — ACETAMINOPHEN 650 MG: 325 TABLET ORAL at 08:14

## 2022-05-14 RX ADMIN — APIXABAN 2.5 MG: 2.5 TABLET, FILM COATED ORAL at 22:36

## 2022-05-14 RX ADMIN — INSULIN LISPRO 1 UNITS: 100 INJECTION, SOLUTION INTRAVENOUS; SUBCUTANEOUS at 16:05

## 2022-05-14 RX ADMIN — Medication 10 ML: at 00:00

## 2022-05-14 RX ADMIN — LEVETIRACETAM 500 MG: 500 TABLET, FILM COATED ORAL at 08:14

## 2022-05-14 RX ADMIN — Medication 10 ML: at 08:17

## 2022-05-14 RX ADMIN — GABAPENTIN 100 MG: 100 CAPSULE ORAL at 22:36

## 2022-05-14 RX ADMIN — Medication 10 ML: at 22:36

## 2022-05-14 RX ADMIN — METOPROLOL SUCCINATE 25 MG: 25 TABLET, EXTENDED RELEASE ORAL at 08:14

## 2022-05-14 RX ADMIN — CALCIUM ACETATE 667 MG: 667 CAPSULE ORAL at 08:15

## 2022-05-14 RX ADMIN — ATORVASTATIN CALCIUM 10 MG: 20 TABLET, FILM COATED ORAL at 00:00

## 2022-05-14 RX ADMIN — CALCIUM ACETATE 667 MG: 667 CAPSULE ORAL at 16:04

## 2022-05-14 RX ADMIN — FAMOTIDINE 10 MG: 20 TABLET ORAL at 08:14

## 2022-05-14 RX ADMIN — GABAPENTIN 100 MG: 100 CAPSULE ORAL at 00:00

## 2022-05-14 RX ADMIN — APIXABAN 2.5 MG: 2.5 TABLET, FILM COATED ORAL at 12:03

## 2022-05-14 RX ADMIN — INSULIN LISPRO 1 UNITS: 100 INJECTION, SOLUTION INTRAVENOUS; SUBCUTANEOUS at 11:12

## 2022-05-14 RX ADMIN — CALCIUM ACETATE 667 MG: 667 CAPSULE ORAL at 11:12

## 2022-05-14 ASSESSMENT — ENCOUNTER SYMPTOMS
CONSTIPATION: 0
CHEST TIGHTNESS: 0
COUGH: 0
PHOTOPHOBIA: 0
ABDOMINAL DISTENTION: 0
ABDOMINAL PAIN: 0
SHORTNESS OF BREATH: 0
DIARRHEA: 0
BACK PAIN: 0
NAUSEA: 0
ANAL BLEEDING: 0
WHEEZING: 0

## 2022-05-14 ASSESSMENT — PAIN SCALES - GENERAL
PAINLEVEL_OUTOF10: 8
PAINLEVEL_OUTOF10: 5

## 2022-05-14 ASSESSMENT — PAIN DESCRIPTION - PAIN TYPE: TYPE: CHRONIC PAIN

## 2022-05-14 ASSESSMENT — PAIN DESCRIPTION - ORIENTATION: ORIENTATION: RIGHT

## 2022-05-14 ASSESSMENT — PAIN DESCRIPTION - LOCATION: LOCATION: LEG

## 2022-05-14 ASSESSMENT — PAIN - FUNCTIONAL ASSESSMENT: PAIN_FUNCTIONAL_ASSESSMENT: ACTIVITIES ARE NOT PREVENTED

## 2022-05-14 ASSESSMENT — PAIN DESCRIPTION - DESCRIPTORS: DESCRIPTORS: ACHING;DISCOMFORT;NAGGING

## 2022-05-14 ASSESSMENT — PAIN DESCRIPTION - ONSET: ONSET: ON-GOING

## 2022-05-14 ASSESSMENT — PAIN DESCRIPTION - FREQUENCY: FREQUENCY: CONTINUOUS

## 2022-05-14 NOTE — CONSULTS
Palliative Care Department  [phone]  Palliative Care Initial Consult  Provider JESUS Brantley CNP      PATIENT: Marco Hopkins  : 1931  MRN: [id number]  ADMISSION DATE: 2022  6:24 PM  Referring Provider: Regis Bullard DO    Palliative Medicine was consulted on hospital day 1 for assistance with Goals of care     HPI:     Erica Villeda is a 80 y.o. y/o male with a history of CAD, ESRD, CHF, DM, CVA, htn, PVD  who [facility] to [facility] on 2022 with increasing weakness. He was having difficulty ambulating, he was found to have a fever. CT of the head found a subdural hygroma with midline shift and old chronic thompson. ID and neuro-surgery were consulted. ASSESSMENT/PLAN:     Pertinent Hospital Diagnoses      Subdural hygroma   Weakness   Fatigue   ESRD    Palliative Care Encounter / Counseling Regarding Goals of Care  Please see detailed goals of care discussion as below   At this time, Marco Hopkins, Does Not have capacity for medical decision-making. Capacity is time limited and situation/question specific   During encounter Bao King was surrogate medical decision-maker   Outcome of goals of care meeting: Continue current medical management.  Code status DNR-CCA   Advanced Directives: no POA or living will in [facility]   Surrogate/Legal NOK:  jamin Annette Kamila (HPOA/ brother) [phone]  o Rivka Masterson (nephew) [phone]    Spiritual assessment: no spiritual distress identified  Bereavement and grief: to be determined  Referrals to: none today    Thank you for the opportunity to participate in the care of Marco Hopkins. JESUS Brantley CNP   Palliative Medicine     SUBJECTIVE:     Details of Conversation: Chart reviewed and met with the patient, no family was at bedside. Patient was confused and unable to express his wishes. Received an update from the patient's nurse. I called and spoke with the patient's HPOA and brother Bao King. He explained that the patient lives at home with caregivers. He states that at baseline he is very sharp and no confusion. He has right-sided weakness from a previous stroke. He explains that the goal for him is to return back home with his caregivers. He states that making sure he is comfortable is very important to him. At this point he states that he has been comfortable and happy with his quality of life. We discussed the option of hospice if quality of life or condition decreased from baseline. He states that he would not want more aggressive measures and that CODE STATUS is a DNR CCA. He also states that he would not want to be put on a ventilator or life support machine. At this time plan is to continue current medical management    Prognosis: Guarded    OBJECTIVE:     BP (!) 123/57   Pulse 99   Temp 98.3 °F (36.8 °C) (Oral)   Resp 21   Ht 5' 6\" (1.676 m)   Wt 185 lb 8 oz (84.1 kg)   SpO2 94%   BMI 29.94 kg/m²     Physical Examination:  Gen: elderly, thin, NAD  HEENT: normocephalic, atraumatic  Neck: trachea midline, no JVD  Lungs: respirations easy and not labored,   Heart: regular rate and rhythm, distant heart tones,   Abdomen: normoactive bowel sounds, soft, non-tender  Extremities: no clubbing, cyanosis or edema, moving all extremities, right-sided weakness  Skin: warm, dry without rashes, lesions, bruising  Neuro: Follows commands, right-sided weakness, confusion    Objective data reviewed: labs, images, records, medication use, vitals and chart    Time/Communication  Greater than 50% of time spent, total 50 minutes in counseling and coordination of care at the bedside regarding goals of care. Thank you for allowing Palliative Medicine to participate in the care of Rigoberto Ricks. Note: This report was completed using [employer] voiced recognition software. Every effort has been made to ensure accuracy; however, inadvertent computerized transcription errors may be present.

## 2022-05-14 NOTE — PROGRESS NOTES
Call placed to Dr. Dali Jorgensen answering service re: O2 demand. Dr. Cristina Pinon called back, see new order.

## 2022-05-14 NOTE — CONSULTS
[address] [place] [address] [facility]    Consultation Note     Admit Date: 5/13/2022  6:24 PM    Reason for Consult:   Evaluate for sepsis    Attending Physician:  Eufemia Emanuel DO     Chief Complaint: Fatigue and weakness    HISTORY OF PRESENT ILLNESS:   The patient is a 80 y.o. man known to the Infectious Diseases service. The patient is is admitted through the emergency room because of increased weakness and fatigue. Apparently his right lower extremity was significantly weak and he had trouble ambulating because of that. Apparently the family says he has been walking last week. Wednesday this week he try to get out of his wheelchair and the leg gave way a slipped but fortunately aid was there to all catch him. He had seen Dr. Beti Santamaria for orthopedics as well as Dr. Mark Floyd for vascular. His temperature was 99 denies any fevers and chills at home. He gets hemodialysis via a Tesio catheter. In the emergency room his white count was 6.3 hemoglobin 10.9. BUN and creatinine were 29 and 3.8. Rapid flu and COVID were negative. Since admission and the recorded temperatures have been 99 at T-max. His O2 sat 94% on 2 L. In the ED it was 97% on room air. Chest radiograph showed no acute process. CT of the head showed no acute intracranial problems subdural hygroma was noted on the left. Old infarcts are noted in the left basal ganglia thalamus left cerebral white matter. He is on no antibiotics. February 2022: Admitted to [facility] seen by Dr. Danita Olivares for shortness of breath and low-grade temperature at that time he was seen by pulmonary and nephrology because of end-stage renal disease and on hemodialysis via tunneled catheter at that time he had 2 out of 4 bottles of blood cultures with staph epi. Left lower lobe pneumonia was of concern and seen by pulmonary as well.   Patient was treated with cefepime converted to cefdinir and doxycycline for the left lower lobe pneumonia and the blood cultures were considered contaminant. November 2021. Sent to the ED by podiatry for concerns of inability to care for himself and failure to thrive. Treated with Vancomycin and Ceftriaxone in the ED. Seen by ID. 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   ID signed off.     July 2021.  Admitted to [facility] with burning and frequency on urination.  Treated with Ceftriaxone.  Culture grew Klebsiella pneumoniae. Hannah Woods by ID. Kusum Penaloza was changed to Levofloxacin x7 days and he was discharged.     November 2012.  Admitted to [facility] with right-sided weakness and slurred speech.  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  Seen by Dr. Brittani Bermudez for a low-grade fever. 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 ([address] 12/3/2021    CAD (coronary artery disease)     CHF (congestive heart failure) ([address]     Chronic kidney disease     COVID-19     CVA (cerebral vascular accident) ([address]     Diabetes mellitus ([address]     Diabetic peripheral neuropathy ([address] 11/9/2012    Hemodialysis patient ([address]     History of CVA (cerebrovascular accident) 6/9/2014    Hypertension     Ischemic ulcer of toe, limited to breakdown of skin, right ([address] 12/3/2021    Other disorders of kidney and ureter     prostate infections in past    PVD (peripheral vascular disease) with claudication ([address] 6/9/2014    Right sided weakness 3/19/2018    TIA (transient ischemic attack)     possible several years ago    Unspecified cerebral artery occlusion with cerebral infarction 2013    [facility] RIGHT LEG AFFECTED     Past Surgical History:        Procedure Laterality Date    ABDOMINAL HERNIA REPAIR      CATARACT REMOVAL      right    CHOLECYSTECTOMY  11/2011    DENTAL SURGERY      EYE SURGERY  HC DIALYSIS CATHETER Right 2020    TESIO CATHETER INSERTION performed by Delphine Barnhart MD at [facility] EAR SURGERY      removal of sac in ear    SHUNT REVISION Right 2021    INSERTION AV GRAFT RIGHT ARM performed by Delphine Barnhart MD at [address] [place] [address] Right 2021    THROMBECTOMY RIGHT ARM AV GRAFT performed by Delphine Barnhart MD at [address] [place] [address] Left 2021    INSERTION OF TEMPORARY HEMODIALYSIS CATHETER performed by Delphine Barnhart MD at [address] [place] [address] Right 2021    INSERTION OF TESIO, REMOVAL OF TEMPORARY CATHETHER performed by Delphine Barnhart MD at [address] [place] [address]     Current Medications:   Scheduled Meds:   apixaban  2.5 mg Oral BID    levETIRAcetam  500 mg Oral BID    sodium chloride flush  10 mL IntraVENous 2 times per day    atorvastatin  10 mg Oral Nightly    calcium acetate  1 capsule Oral TID WC    famotidine  10 mg Oral Daily    gabapentin  100 mg Oral Nightly    metoprolol succinate  25 mg Oral Daily    insulin lispro  0-6 Units SubCUTAneous TID WC    insulin lispro  0-3 Units SubCUTAneous Nightly     Continuous Infusions:   sodium chloride      dextrose       PRN Meds:sodium chloride flush, sodium chloride, senna, acetaminophen **OR** acetaminophen, albuterol, glucose, dextrose bolus **OR** dextrose bolus, glucagon (rDNA), dextrose    Allergies:  Sulfa antibiotics and Sulfa antibiotics    Social History:   Social History     Socioeconomic History    Marital status: Single     Spouse name: None    Number of children: None    Years of education: None    Highest education level: None   Occupational History    None   Tobacco Use    Smoking status: Former Smoker     Packs/day: 0.50     Years: 20.00     Pack years: 10.00     Types: Cigarettes     Quit date:      Years since quittin.4    Smokeless tobacco: Never Used   Vaping Use    Vaping Use: Never used   Substance and Sexual Activity    Alcohol use: Not Currently     Alcohol/week: 1.0 standard drink     Types: 1 Glasses of wine per week    Drug use: No    Sexual activity: Not Currently   Other Topics Concern    None   Social History Narrative    ** Merged History Encounter **          Social Determinants of Health     Financial Resource Strain:     Difficulty of Paying Living Expenses: Not on file   Food Insecurity:     Worried About Running Out of Food in the Last Year: Not on file    Arnaldo of Food in the Last Year: Not on file   Transportation Needs:     Lack of Transportation (Medical): Not on file    Lack of Transportation (Non-Medical): Not on file   Physical Activity:     Days of Exercise per Week: Not on file    Minutes of Exercise per Session: Not on file   Stress:     Feeling of Stress : Not on file   Social Connections:     Frequency of Communication with Friends and Family: Not on file    Frequency of Social Gatherings with Friends and Family: Not on file    Attends Jainism Services: Not on file    Active Member of [address] [place] [address] [employer] or Organizations: Not on file    Attends Club or Organization Meetings: Not on file    Marital Status: Not on file   Intimate Partner Violence:     Fear of Current or Ex-Partner: Not on file    Emotionally Abused: Not on file    Physically Abused: Not on file    Sexually Abused: Not on file   Housing Stability:     Unable to Pay for Housing in the Last Year: Not on file    Number of [place] in the Last Year: Not on file    Unstable Housing in the Last Year: Not on file         Family History:   History reviewed. No pertinent family history. . Otherwise non-pertinent to the chief complaint. REVIEW OF SYSTEMS:    CONSTITUTIONAL:  No chills, fevers or night sweats. No loss of weight. EYES:  No double vision or drainage from eyes, ears or throat. HEENT:  No neck stiffness. No dysphagia.  No drainage from eyes, ears or throat; auditory insufficiency  RESPIRATORY:  No cough, productive sputum or hemoptysis. CARDIOVASCULAR:  No chest pain, palpitations, orthopnea or dyspnea on exertion. GASTROINTESTINAL:  No nausea, vomiting, diarrhea or constipation or hematochezia   GENITOURINARY:  No frequency burning dysuria or hematuria. INTEGUMENT/BREAST:  No rash or breast masses. HEMATOLOGIC/LYMPHATIC:  No lymphadenopathy or blood dyscrasics. ALLERGIC/IMMUNOLOGIC:  No anaphylaxis. ENDOCRINE:  No polyuria or polydipsia or temperature intolerance. MUSCULOSKELETAL: See history of present illness. NEUROLOGICAL: Right upper and lower extremity weakness compared to the left and previous CVAs  BEHAVIOR/PSYCH:  No psychosis. PHYSICAL EXAM:    Vitals:    BP (!) 123/57   Pulse 99   Temp 98.3 °F (36.8 °C) (Oral)   Resp 21   Ht 5' 6\" (1.676 m)   Wt 185 lb 8 oz (84.1 kg)   SpO2 94%   BMI 29.94 kg/m²   Constitutional: The patient is awake, alert, follows commands  Skin: Warm and dry. No rashes were noted. No jaundice. HEENT: Eyes show round, and reactive pupils. Moist mucous membranes, no ulcerations, no thrush. Significant auditory insufficiency  Neck: Supple to movements. No lymphadenopathy. Chest: No use of accessory muscles to breathe. Symmetrical expansion. Auscultation reveals no wheezing, crackles, or rhonchi. Tesio catheter site looks fairly clean  Cardiovascular: S1 and S2 are rhythmic and regular. No murmurs appreciated. Abdomen: Positive bowel sounds to auscultation. Benign to palpation. No masses felt. No hepatosplenomegaly. Genitourinary: Male  Extremities: No clubbing, no cyanosis, no edema.   Musculoskeletal: Equal and symmetric but flexor contractures of the right arm and muscle wasting in the right leg  Neurological: 2 out of 5 strength on the right upper and lower extremity compared to 3 out of 5 on the left  Lines: peripheral      CBC+dif:  Recent Labs     05/13/22 1913 05/13/22 1913 05/14/22  0842   WBC 6.3  --  5.5   HGB 10.9*   < > 9.2*   HCT 34.4* < > 28.3*   MCV 98.0   < > 96.3      < > 101*   NEUTROABS 4.26   < > 3.59    < > = values in this interval not displayed. Lab Results   Component Value Date    CRP 27.9 (H) 02/13/2022    CRP 4.2 (H) 12/01/2021    CRP 0.1 09/07/2020     No results found for: CRPHS  Lab Results   Component Value Date    SEDRATE 100 (H) 02/13/2022    SEDRATE 78 (H) 12/01/2021    SEDRATE 31 (H) 09/07/2020     Lab Results   Component Value Date    ALT 18 05/13/2022    AST 16 05/13/2022    ALKPHOS 128 05/13/2022    BILITOT 0.7 05/13/2022     Lab Results   Component Value Date     05/13/2022    K 4.0 05/13/2022    K 3.7 02/16/2022    CL 90 05/13/2022    CO2 28 05/13/2022    BUN 29 05/13/2022    CREATININE 3.8 05/13/2022    GFRAA 18 05/13/2022    LABGLOM 15 05/13/2022    GLUCOSE 130 05/13/2022    GLUCOSE 163 02/22/2012    PROT 8.0 05/13/2022    LABALBU 4.2 05/13/2022    LABALBU 3.8 02/20/2012    CALCIUM 9.3 05/13/2022    BILITOT 0.7 05/13/2022    ALKPHOS 128 05/13/2022    AST 16 05/13/2022    ALT 18 05/13/2022       Lab Results   Component Value Date    PROTIME 16.6 05/15/2021    PROTIME 15.1 01/29/2012    INR 1.5 05/15/2021       Lab Results   Component Value Date    TSH 0.974 12/03/2021       Lab Results   Component Value Date    COLORU Yellow 07/10/2021    PHUR 6.0 07/10/2021    WBCUA PACKED 07/10/2021    WBCUA NONE 02/20/2012    RBCUA >20 07/10/2021    RBCUA 1-3 05/04/2013    BACTERIA MANY 07/10/2021    CLARITYU CLOUDY 07/10/2021    SPECGRAV 1.020 07/10/2021    LEUKOCYTESUR LARGE 07/10/2021    UROBILINOGEN 0.2 07/10/2021    BILIRUBINUR Negative 07/10/2021    BILIRUBINUR NEGATIVE 02/20/2012    BLOODU LARGE 07/10/2021    GLUCOSEU Negative 07/10/2021    GLUCOSEU 500 02/20/2012       Lab Results   Component Value Date    UCA0UUI 29.9 08/09/2021    AWR7MTE 56.5 08/09/2021    PO2ART 21.8 08/09/2021     Radiology:  CT HEAD WO CONTRAST   Final Result   1. No acute intracranial abnormality.    2. Subdural hygroma along the left cerebral convexity, measuring 7 mm in   maximal width anteriorly. 3. 3 mm left-to-right midline shift. 4. 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 RECOMMENDATIONS:   Unavailable         XR CHEST PORTABLE   Final Result   No acute process. XR CHEST PORTABLE    (Results Pending)       Microbiology:  Pending  No results for input(s): BC in the last 72 hours. No results for input(s): ORG in the last 72 hours. No results for input(s): [place] in the last 72 hours. No results for input(s): STREPNEUMAGU in the last 72 hours. No results for input(s): LP1UAG in the last 72 hours. No results for input(s): ASO in the last 72 hours. No results for input(s): CULTRESP in the last 72 hours. Assessment:  · No evidence of infection at this time however he did have blood cultures positive in the previous admission for staph epi and 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 · Falls and weakness are due to previous CVAs with obvious right hemiparesis    Plan:    · Cont off antibiotics  · Blood culture x1 on next hemodialysis  · Check cultures  · Baseline ESR, CRP  · Monitor labs  · Will follow with you    Thank you for having us see this patient in consultation. I will be discussing this case with the treating physicians.       Electronically signed by Renetta Hinojosa MD on 5/14/2022 at 10:02 AM

## 2022-05-14 NOTE — PROGRESS NOTES
Notified Dr. Mary Larson patient admitted, m-w-f patient at [facility] and last full treatment was at his chronic unit 5/13.  HD can hold off until Monday per nephrologist.

## 2022-05-14 NOTE — H&P
History & Physicial  05/14/22  Primary Care:  Deandrejulisa Paiz, DO  [address] [address]        Chief Complaint   Patient presents with    Fatigue       HPI:  Patient presented to ER due to increasing weakness. Patient states that his right leg gave out on him. Discussing with his brother he was walking on Sunday and Monday. On Wednesday this week he tried to get out of his wheel chair and his right leg gave out on him and he slipped. He had difficulty ambulating since that time and on Thursday fell forward on his caregiver. His brother states he was seen by Dr. Anay Duenas (Orthopedics). He had recommended seeing Dr. Ailin Banda. Patient was noted to have a low grade temperature of 99 degrees. He was not having fevers at home. Patient this am states he is feeling weak. He denies any pain. Prior to Visit Medications    Medication Sig Taking?  Authorizing Provider   calcium acetate (PHOSLO) 667 MG CAPS capsule TAKE 1 CAPSULE BY MOUTH THREE TIMES DAILY  Historical Provider, MD   budesonide (PULMICORT FLEXHALER) 90 MCG/ACT AEPB inhaler Inhale 2 puffs into the lungs 2 times daily  Luzmaria Samson PA-C   albuterol (ACCUNEB) 1.25 MG/3ML nebulizer solution Inhale 1 ampule into the lungs every 6 hours as needed for Wheezing  Historical Provider, MD   magnesium hydroxide (MILK OF MAGNESIA) 400 MG/5ML suspension Take by mouth daily as needed for Constipation  Historical Provider, MD   apixaban (ELIQUIS) 2.5 MG TABS tablet Take 1 tablet by mouth 2 times daily  Amy Giles MD   insulin lispro (HUMALOG) 100 UNIT/ML injection vial Inject 0-6 Units into the skin 3 times daily (with meals)  Amy Giles MD   metoprolol succinate (TOPROL XL) 25 MG extended release tablet Take 1 tablet by mouth daily  Britt Ugalde DO   white petrolatum OINT ointment Apply 1 applicator topically 2 times daily  Britt Ugalde DO   atorvastatin (LIPITOR) 10 MG tablet Take 10 mg by mouth nightly  Historical Provider, MD   gabapentin (NEURONTIN) 100 MG capsule Take 100 mg by mouth nightly. Historical Provider, MD   ascorbic acid (VITAMIN C) 500 MG tablet Take 1,000 mg by mouth daily  Historical Provider, MD   zinc gluconate 50 MG tablet Take 50 mg by mouth daily  Historical Provider, MD   Multiple Vitamins-Minerals (PRESERVISION AREDS 2 PO) Take 1 tablet by mouth daily   Historical Provider, MD   famotidine (PEPCID) 40 MG tablet Take 40 mg by mouth as needed  Historical Provider, MD   vitamin D (CHOLECALCIFEROL) 25 MCG (1000 UT) TABS tablet Take 1,000 Units by mouth daily  Historical Provider, MD     Social History     Tobacco Use    Smoking status: Former Smoker     Packs/day: 0.50     Years: 20.00     Pack years: 10.00     Types: Cigarettes     Quit date: 5     Years since quittin.4    Smokeless tobacco: Never Used   Vaping Use    Vaping Use: Never used   Substance Use Topics    Alcohol use: Not Currently     Alcohol/week: 1.0 standard drink     Types: 1 Glasses of wine per week    Drug use: No     History reviewed. No pertinent family history.   Past Surgical History:   Procedure Laterality Date    ABDOMINAL HERNIA REPAIR      CATARACT REMOVAL      right    CHOLECYSTECTOMY  2011    DENTAL SURGERY      EYE SURGERY      [facility]. DIALYSIS CATHETER Right 2020    TESIO CATHETER INSERTION performed by Ronny Trejo MD at [facility]      removal of sac in ear    SHUNT REVISION Right 2021    INSERTION AV GRAFT RIGHT ARM performed by Ronny Trejo MD at [address] [place] [address] Right 2021    THROMBECTOMY RIGHT ARM AV GRAFT performed by Ronny Trejo MD at [place] [address] VASCULAR SURGERY Left 2021    INSERTION OF TEMPORARY HEMODIALYSIS CATHETER performed by Ronny Trejo MD at [address] [place] [address] Right 2021    INSERTION OF TESIO, REMOVAL OF TEMPORARY CATHETHER performed by Ronny Trejo MD at [address] [place] [address] Medical History:   Diagnosis Date    Arthritis     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 ([facility] 12/3/2021    CAD (coronary artery disease)     CHF (congestive heart failure) (HCC)     Chronic kidney disease     COVID-19     CVA (cerebral vascular accident) ([facility]     Diabetes mellitus ([address]     Diabetic peripheral neuropathy ([facility] 11/9/2012    Hemodialysis patient ([facility]     History of CVA (cerebrovascular accident) 6/9/2014    Hypertension     Ischemic ulcer of toe, limited to breakdown of skin, right ([address] 12/3/2021    Other disorders of kidney and ureter     prostate infections in past    PVD (peripheral vascular disease) with claudication ([facility] 6/9/2014    Right sided weakness 3/19/2018    TIA (transient ischemic attack)     possible several years ago    Unspecified cerebral artery occlusion with cerebral infarction 2013    WHEELCHAIR RIGHT LEG AFFECTED     Review of Systems   Constitutional: Positive for activity change and fatigue. Negative for appetite change, chills and fever. HENT: Negative for congestion, ear pain and mouth sores. Eyes: Negative for photophobia and visual disturbance. Respiratory: Negative for cough, chest tightness, shortness of breath and wheezing. Cardiovascular: Negative for chest pain, palpitations and leg swelling. Gastrointestinal: Negative for abdominal distention, abdominal pain, anal bleeding, constipation, diarrhea and nausea. Endocrine: Negative for polydipsia, polyphagia and polyuria. Genitourinary: Negative for dysuria, flank pain, frequency and hematuria. Musculoskeletal: Positive for gait problem. Negative for back pain. Allergic/Immunologic: Negative for environmental allergies and food allergies. Neurological: Negative for dizziness, light-headedness and numbness. Hematological: Negative for adenopathy. Does not bruise/bleed easily. Psychiatric/Behavioral: Negative for agitation and confusion. Vitals:    05/13/22 2024 05/13/22 2043 05/14/22 0045 05/14/22 0355   BP: 130/70  131/60    Pulse: 74 74 98    Resp: 18  18    Temp: 98.8 °F (37.1 °C)  98.7 °F (37.1 °C)    TempSrc: Oral  Oral    SpO2: 94%  94%    Weight:    185 lb 8 oz (84.1 kg)   Height:           Physical Exam  Constitutional:       General: He is not in acute distress. Appearance: Normal appearance. HENT:      Head: Normocephalic and atraumatic. Right Ear: External ear normal.      Left Ear: External ear normal.      Nose: Nose normal. No congestion. Mouth/Throat:      Mouth: Mucous membranes are moist.      Pharynx: Oropharynx is clear. No oropharyngeal exudate. Eyes:      General:         Right eye: No discharge. Left eye: No discharge. Extraocular Movements: Extraocular movements intact. Pupils: Pupils are equal, round, and reactive to light. Cardiovascular:      Rate and Rhythm: Normal rate and regular rhythm. Pulses: Normal pulses. Pulmonary:      Effort: No respiratory distress. Breath sounds: Normal breath sounds. No wheezing, rhonchi or rales. Abdominal:      General: Bowel sounds are normal. There is no distension. Palpations: Abdomen is soft. Tenderness: There is no abdominal tenderness. There is no guarding or rebound. Musculoskeletal:      Cervical back: Neck supple. No tenderness. Right lower leg: No edema. Left lower leg: No edema. Skin:     General: Skin is warm and dry. Capillary Refill: Capillary refill takes 2 to 3 seconds. Neurological:      Mental Status: He is alert. Mental status is at baseline. Psychiatric:         Mood and Affect: Mood normal.         Behavior: Behavior normal.         Principal Problem:    Fever of unknown origin  Active Problems:    Fever in adult  Resolved Problems:    * No resolved hospital problems. *  1. Right lower extremity weakness  2. Subdural hygroma   3. Fatigue  4. ESRD on HD   5.  Type II diabetes mellitus    Plan:  CT scan reviewed. Per ER resident he had called Neurosurgery and discussed with Dr. Raysa Otoole no need to transfer [facility]. Recommended keppra initiation. Will consult palliative care, PT/OT and nephrology. ID was consulted due to a fever per ER that was found at home with home health but discussing with brother his temp was 99. Blood cultures were sent. ESR and CRP ordered.      DVT Prophylaxis: eliquis  Code Status: DNR-CCA    Jason Pope DO      Electronically signed by Jason Pope DO on 5/14/2022 at 7:42 AM

## 2022-05-14 NOTE — CONSULTS
CATHETER INSERTION performed by Cassy Perry MD at [facility]      removal of sac in ear    SHUNT REVISION Right 1/21/2021    INSERTION AV GRAFT RIGHT ARM performed by Cassy Perry MD at [address] [place] [address] Right 4/8/2021    THROMBECTOMY RIGHT ARM AV GRAFT performed by Cassy Perry MD at [place] [address] VASCULAR SURGERY Left 8/9/2021    INSERTION OF TEMPORARY HEMODIALYSIS CATHETER performed by Cassy Perry MD at [address] [place] [address] Right 8/11/2021    INSERTION OF TESIO, REMOVAL OF TEMPORARY CATHETHER performed by Cassy Perry MD at [address] [place] [address]       History reviewed. No pertinent family history. reports that he quit smoking about 55 years ago. His smoking use included cigarettes. He has a 10.00 pack-year smoking history. He has never used smokeless tobacco. He reports previous alcohol use of about 1.0 standard drink of alcohol per week. He reports that he does not use drugs.     Allergies:  Sulfa antibiotics and Sulfa antibiotics    Current Medications:    apixaban (ELIQUIS) tablet 2.5 mg, BID  levETIRAcetam (KEPPRA) tablet 500 mg, BID  sodium chloride flush 0.9 % injection 10 mL, 2 times per day  sodium chloride flush 0.9 % injection 10 mL, PRN  0.9 % sodium chloride infusion, PRN  senna (SENOKOT) tablet 8.6 mg, Daily PRN  acetaminophen (TYLENOL) tablet 650 mg, Q6H PRN   Or  acetaminophen (TYLENOL) suppository 650 mg, Q6H PRN  albuterol (ACCUNEB) nebulizer solution 2.5 mg, Q6H PRN  atorvastatin (LIPITOR) tablet 10 mg, Nightly  calcium acetate (PHOSLO) capsule 667 mg, TID WC  famotidine (PEPCID) tablet 10 mg, Daily  gabapentin (NEURONTIN) capsule 100 mg, Nightly  metoprolol succinate (TOPROL XL) extended release tablet 25 mg, Daily  insulin lispro (HUMALOG) injection vial 0-6 Units, TID WC  insulin lispro (HUMALOG) injection vial 0-3 Units, Nightly  glucose chewable tablet 16 g, PRN  dextrose bolus 10% 125 mL, PRN   Or  dextrose bolus 10% 250 mL, PRN  glucagon (rDNA) injection 1 mg, PRN  dextrose 5 % solution, PRN        Review of Systems:   Constitutional: no fevers , no chills , feels ok   Eyes: no eye pain , no itching , no drainage  Ears, nose, mouth, throat, and face: no ear ,nose pain , hearing is ok ,no nasal drainage   Respiratory: no sob ,no cough ,no wheezing . Cardiovascular: no chest pain , no palpitation ,no sob . Gastrointestinal: no nausea, vomiting , constipation , no abdominal pain . Genitourinary:no urinary retention , no burning , dysuria . No polyuria   Hematologic/lymphatic: no bleeding , no cougulation issues . Musculoskeletal:no joint pain , no swelling . Neurological: no headaches ,no weakness , no numbness . Endocrine: no thirst , no weight issues . Physical exam:   Vital signs /65   Pulse 81   Temp 98.4 °F (36.9 °C) (Oral)   Resp 20   Ht 5' 6\" (1.676 m)   Wt 185 lb 8 oz (84.1 kg)   SpO2 93%   BMI 29.94 kg/m²   Gen : NAD , appropriate for stated age . Head : at , nc   Neck : supple , no thyromegaly noted . Eyes : EOMI , PERRLA   CV : RRR , No M/R/G . Lungs: CTAB , no wheezing , good flow heard b/l   Abd : soft , NT , BS + , No Organomegaly appreciated . Skin : soft, dry . Neuro : CN  II-XII grossly intact , no focal neurologic deficit . Psych : cooperative .      Data:   Labs:  CBC with Differential:    Lab Results   Component Value Date    WBC 5.5 05/14/2022    RBC 2.94 05/14/2022    HGB 9.2 05/14/2022    HCT 28.3 05/14/2022     05/14/2022    MCV 96.3 05/14/2022    MCH 31.3 05/14/2022    MCHC 32.5 05/14/2022    RDW 15.0 05/14/2022    SEGSPCT 46 01/04/2014    BANDSPCT 2 05/02/2016    LYMPHOPCT 15.9 05/14/2022    MONOPCT 13.4 05/14/2022    BASOPCT 0.5 05/14/2022    MONOSABS 0.74 05/14/2022    LYMPHSABS 0.88 05/14/2022    EOSABS 0.29 05/14/2022    BASOSABS 0.03 05/14/2022     CMP:    Lab Results   Component Value Date     05/14/2022    K 3.8 05/14/2022    CL 93 05/14/2022    CO2 26 05/14/2022    BUN 41 05/14/2022    CREATININE 4.8 05/14/2022    GFRAA 14 05/14/2022    LABGLOM 11 05/14/2022    GLUCOSE 180 05/14/2022    GLUCOSE 163 02/22/2012    PROT 6.4 05/14/2022    LABALBU 3.4 05/14/2022    LABALBU 3.8 02/20/2012    CALCIUM 8.6 05/14/2022    BILITOT 0.7 05/14/2022    ALKPHOS 102 05/14/2022    AST 14 05/14/2022    ALT 15 05/14/2022     Ionized Calcium:  No results found for: IONCA  Magnesium:    Lab Results   Component Value Date    MG 2.1 05/13/2022     Phosphorus:    Lab Results   Component Value Date    PHOS 2.5 05/13/2022     U/A:    Lab Results   Component Value Date    COLORU Yellow 07/10/2021    PHUR 6.0 07/10/2021    WBCUA PACKED 07/10/2021    WBCUA NONE 02/20/2012    RBCUA >20 07/10/2021    RBCUA 1-3 05/04/2013    BACTERIA MANY 07/10/2021    CLARITYU CLOUDY 07/10/2021    SPECGRAV 1.020 07/10/2021    LEUKOCYTESUR LARGE 07/10/2021    UROBILINOGEN 0.2 07/10/2021    BILIRUBINUR Negative 07/10/2021    BILIRUBINUR NEGATIVE 02/20/2012    BLOODU LARGE 07/10/2021    GLUCOSEU Negative 07/10/2021    GLUCOSEU 500 02/20/2012     Microalbumen/Creatinine ratio:  No components found for: RUCREAT  Iron Saturation:  No components found for: PERCENTFE  TIBC:    Lab Results   Component Value Date    TIBC 177 08/24/2020     FERRITIN:    Lab Results   Component Value Date    FERRITIN 98 08/24/2020        Imaging:  XR CHEST PORTABLE   Final Result   Interval development of mild bibasilar subsegmental atelectatic changes. CT HEAD WO CONTRAST   Final Result   1. No acute intracranial abnormality. 2. Subdural hygroma along the left cerebral convexity, measuring 7 mm in   maximal width anteriorly. 3. 3 mm left-to-right midline shift. 4. 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 RECOMMENDATIONS:   Unavailable         XR CHEST PORTABLE   Final Result   No acute process.              Assessment    -End stage renal disease on HD MWF via [facility] TDC line :  Continue HD per schedule next HD on Monday     -Anaemia of chronic disease :  HG acceptable around 10     -Mineral bone disease /scondary hyperparathyrodism :  Continue phoslo tid with meals     -Hypertension :  Under reasonable control on these medications     -severe deconditioning     Thank you     Electronically signed by Mitchell Romeo MD on 5/14/2022 at 6:14 PM

## 2022-05-15 LAB
ALBUMIN SERPL-MCNC: 3.6 G/DL (ref 3.5–5.2)
ALP BLD-CCNC: 103 U/L (ref 40–129)
ALT SERPL-CCNC: 26 U/L (ref 0–40)
ANION GAP SERPL CALCULATED.3IONS-SCNC: 13 MMOL/L (ref 7–16)
AST SERPL-CCNC: 18 U/L (ref 0–39)
BASOPHILS ABSOLUTE: 0.02 E9/L (ref 0–0.2)
BASOPHILS RELATIVE PERCENT: 0.4 % (ref 0–2)
BILIRUB SERPL-MCNC: 0.6 MG/DL (ref 0–1.2)
BUN BLDV-MCNC: 57 MG/DL (ref 6–23)
CALCIUM SERPL-MCNC: 8.9 MG/DL (ref 8.6–10.2)
CHLORIDE BLD-SCNC: 94 MMOL/L (ref 98–107)
CO2: 28 MMOL/L (ref 22–29)
CREAT SERPL-MCNC: 6.3 MG/DL (ref 0.7–1.2)
EOSINOPHILS ABSOLUTE: 0.47 E9/L (ref 0.05–0.5)
EOSINOPHILS RELATIVE PERCENT: 8.9 % (ref 0–6)
GFR AFRICAN AMERICAN: 10
GFR NON-AFRICAN AMERICAN: 8 ML/MIN/1.73
GLUCOSE BLD-MCNC: 108 MG/DL (ref 74–99)
HCT VFR BLD CALC: 29 % (ref 37–54)
HEMOGLOBIN: 9.2 G/DL (ref 12.5–16.5)
IMMATURE GRANULOCYTES #: 0.02 E9/L
IMMATURE GRANULOCYTES %: 0.4 % (ref 0–5)
LYMPHOCYTES ABSOLUTE: 1.34 E9/L (ref 1.5–4)
LYMPHOCYTES RELATIVE PERCENT: 25.3 % (ref 20–42)
MCH RBC QN AUTO: 30.8 PG (ref 26–35)
MCHC RBC AUTO-ENTMCNC: 31.7 % (ref 32–34.5)
MCV RBC AUTO: 97 FL (ref 80–99.9)
METER GLUCOSE: 125 MG/DL (ref 74–99)
METER GLUCOSE: 150 MG/DL (ref 74–99)
METER GLUCOSE: 163 MG/DL (ref 74–99)
METER GLUCOSE: 165 MG/DL (ref 74–99)
MONOCYTES ABSOLUTE: 0.79 E9/L (ref 0.1–0.95)
MONOCYTES RELATIVE PERCENT: 14.9 % (ref 2–12)
NEUTROPHILS ABSOLUTE: 2.65 E9/L (ref 1.8–7.3)
NEUTROPHILS RELATIVE PERCENT: 50.1 % (ref 43–80)
PDW BLD-RTO: 14.9 FL (ref 11.5–15)
PLATELET # BLD: 148 E9/L (ref 130–450)
PMV BLD AUTO: 10.3 FL (ref 7–12)
POTASSIUM REFLEX MAGNESIUM: 4.2 MMOL/L (ref 3.5–5)
RBC # BLD: 2.99 E12/L (ref 3.8–5.8)
SODIUM BLD-SCNC: 135 MMOL/L (ref 132–146)
TOTAL PROTEIN: 6.4 G/DL (ref 6.4–8.3)
WBC # BLD: 5.3 E9/L (ref 4.5–11.5)

## 2022-05-15 PROCEDURE — 36415 COLL VENOUS BLD VENIPUNCTURE: CPT

## 2022-05-15 PROCEDURE — 2060000000 HC ICU INTERMEDIATE R&B

## 2022-05-15 PROCEDURE — 85025 COMPLETE CBC W/AUTO DIFF WBC: CPT

## 2022-05-15 PROCEDURE — 97161 PT EVAL LOW COMPLEX 20 MIN: CPT

## 2022-05-15 PROCEDURE — 6370000000 HC RX 637 (ALT 250 FOR IP): Performed by: STUDENT IN AN ORGANIZED HEALTH CARE EDUCATION/TRAINING PROGRAM

## 2022-05-15 PROCEDURE — 6370000000 HC RX 637 (ALT 250 FOR IP): Performed by: INTERNAL MEDICINE

## 2022-05-15 PROCEDURE — 80053 COMPREHEN METABOLIC PANEL: CPT

## 2022-05-15 PROCEDURE — 2500000003 HC RX 250 WO HCPCS: Performed by: INTERNAL MEDICINE

## 2022-05-15 PROCEDURE — 97530 THERAPEUTIC ACTIVITIES: CPT

## 2022-05-15 PROCEDURE — 82962 GLUCOSE BLOOD TEST: CPT

## 2022-05-15 PROCEDURE — 2580000003 HC RX 258: Performed by: INTERNAL MEDICINE

## 2022-05-15 RX ADMIN — LEVETIRACETAM 500 MG: 500 TABLET, FILM COATED ORAL at 09:31

## 2022-05-15 RX ADMIN — CALCIUM ACETATE 667 MG: 667 CAPSULE ORAL at 11:41

## 2022-05-15 RX ADMIN — ACETAMINOPHEN 650 MG: 325 TABLET ORAL at 14:20

## 2022-05-15 RX ADMIN — METOPROLOL SUCCINATE 25 MG: 25 TABLET, EXTENDED RELEASE ORAL at 09:30

## 2022-05-15 RX ADMIN — APIXABAN 2.5 MG: 2.5 TABLET, FILM COATED ORAL at 09:30

## 2022-05-15 RX ADMIN — APIXABAN 2.5 MG: 2.5 TABLET, FILM COATED ORAL at 20:12

## 2022-05-15 RX ADMIN — LEVETIRACETAM 500 MG: 500 TABLET, FILM COATED ORAL at 20:12

## 2022-05-15 RX ADMIN — Medication 10 ML: at 20:12

## 2022-05-15 RX ADMIN — CALCIUM ACETATE 667 MG: 667 CAPSULE ORAL at 09:31

## 2022-05-15 RX ADMIN — ATORVASTATIN CALCIUM 10 MG: 20 TABLET, FILM COATED ORAL at 20:11

## 2022-05-15 RX ADMIN — INSULIN LISPRO 1 UNITS: 100 INJECTION, SOLUTION INTRAVENOUS; SUBCUTANEOUS at 20:12

## 2022-05-15 RX ADMIN — GABAPENTIN 100 MG: 100 CAPSULE ORAL at 20:12

## 2022-05-15 RX ADMIN — CALCIUM ACETATE 667 MG: 667 CAPSULE ORAL at 17:28

## 2022-05-15 RX ADMIN — FAMOTIDINE 10 MG: 20 TABLET ORAL at 09:30

## 2022-05-15 RX ADMIN — Medication 10 ML: at 09:33

## 2022-05-15 ASSESSMENT — PAIN DESCRIPTION - LOCATION: LOCATION: LEG

## 2022-05-15 ASSESSMENT — ENCOUNTER SYMPTOMS
DIARRHEA: 0
COUGH: 0
VOMITING: 0
SHORTNESS OF BREATH: 0
NAUSEA: 0

## 2022-05-15 ASSESSMENT — PAIN SCALES - GENERAL
PAINLEVEL_OUTOF10: 0
PAINLEVEL_OUTOF10: 3

## 2022-05-15 ASSESSMENT — PAIN DESCRIPTION - ORIENTATION: ORIENTATION: RIGHT

## 2022-05-15 ASSESSMENT — PAIN DESCRIPTION - DESCRIPTORS: DESCRIPTORS: ACHING

## 2022-05-15 NOTE — PROGRESS NOTES
[address] [place] [address] [facility]  DANAY  Progress Note    SUBJECTIVE:  Chief Complaint   Patient presents with    Fatigue     Patient laying in bed awake and alert  America Estes he is feeling okay today with no issues overnight  Patient is tolerating medications. No reported adverse drug reactions. No nausea, vomiting, diarrhea. No fevers overnight    Review of systems:  As stated above in the chief complaint, otherwise negative. Medications:  Scheduled Meds:   apixaban  2.5 mg Oral BID    levETIRAcetam  500 mg Oral BID    sodium chloride flush  10 mL IntraVENous 2 times per day    atorvastatin  10 mg Oral Nightly    calcium acetate  1 capsule Oral TID WC    famotidine  10 mg Oral Daily    gabapentin  100 mg Oral Nightly    metoprolol succinate  25 mg Oral Daily    insulin lispro  0-6 Units SubCUTAneous TID WC    insulin lispro  0-3 Units SubCUTAneous Nightly     Continuous Infusions:   sodium chloride      dextrose       PRN Meds:sodium chloride flush, sodium chloride, senna, acetaminophen **OR** acetaminophen, albuterol, glucose, dextrose bolus **OR** dextrose bolus, glucagon (rDNA), dextrose    OBJECTIVE:  /60   Pulse 85   Temp 98.5 °F (36.9 °C) (Oral)   Resp 18   Ht 5' 6\" (1.676 m)   Wt 185 lb 8 oz (84.1 kg)   SpO2 93%   BMI 29.94 kg/m²   Temp  Av.5 °F (36.9 °C)  Min: 98.4 °F (36.9 °C)  Max: 98.5 °F (36.9 °C)  Constitutional: The patient is awake, alert, and oriented. Laying in bed  Skin: Warm and dry. No rashes were noted. HEENT: Round and reactive pupils. Moist mucous membranes. No ulcerations or thrush. Neck: Supple to movements. Chest: No use of accessory muscles to breathe. Symmetrical expansion. No wheezing, crackles or rhonchi. R chest tessio with dressing  Cardiovascular: S1 and S2 are rhythmic and regular. No murmurs appreciated. Abdomen: Positive bowel sounds to auscultation. Benign to palpation. No masses felt. No hepatosplenomegaly.   Extremities: No clubbing, no cyanosis, Trace edema RLE. R hemiparesis. Lines: peripheral   R Chest Tessio 8/11/2021    Laboratory and Tests Review:  Lab Results   Component Value Date    WBC 5.3 05/15/2022    WBC 5.5 05/14/2022    WBC 6.3 05/13/2022    HGB 9.2 (L) 05/15/2022    HCT 29.0 (L) 05/15/2022    MCV 97.0 05/15/2022     05/15/2022     Lab Results   Component Value Date    NEUTROABS 2.65 05/15/2022    NEUTROABS 3.59 05/14/2022    NEUTROABS 4.26 05/13/2022     No results found for: CRP  Lab Results   Component Value Date    ALT 26 05/15/2022    AST 18 05/15/2022    ALKPHOS 103 05/15/2022    BILITOT 0.6 05/15/2022     Lab Results   Component Value Date     05/15/2022    K 4.2 05/15/2022    CL 94 05/15/2022    CO2 28 05/15/2022    BUN 57 05/15/2022    CREATININE 6.3 05/15/2022    CREATININE 4.8 05/14/2022    CREATININE 3.8 05/13/2022    GFRAA 10 05/15/2022    LABGLOM 8 05/15/2022    GLUCOSE 108 05/15/2022    GLUCOSE 163 02/22/2012    PROT 6.4 05/15/2022    LABALBU 3.6 05/15/2022    LABALBU 3.8 02/20/2012    CALCIUM 8.9 05/15/2022    BILITOT 0.6 05/15/2022    ALKPHOS 103 05/15/2022    AST 18 05/15/2022    ALT 26 05/15/2022     Lab Results   Component Value Date    CRP 9.9 (H) 05/14/2022    CRP 27.9 (H) 02/13/2022    CRP 4.2 (H) 12/01/2021     Lab Results   Component Value Date    SEDRATE 85 (H) 05/14/2022    SEDRATE 100 (H) 02/13/2022    SEDRATE 78 (H) 12/01/2021     Radiology:  Reviewed    Microbiology:   Blood Cultures 5/13/2022: negative so far  Blood Culture 5/14/2022: pending    Recent Labs     05/13/22 1913   PROCAL 0.56*       ASSESSMENT:  No evidence of infection at this time however he did have blood cultures positive in the previous admission for staph epi and 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   Falls and weakness are due to previous CVAs with obvious right hemiparesis    PLAN:  Continue to monitor off of antibiotics  Obtain another blood culture on Monday during HD  CRP=9.9     ESR=85  Check final cultures  Monitor labs    JESUS Reina CNP  8:56 AM  5/15/2022    Pt seen and examined. Above discussed agree with advanced practice nurse. Labs, cultures, and radiographs reviewed. Face to Face encounter occurred. Changes made as necessary.      Estrellita Sauceda MD

## 2022-05-15 NOTE — PROGRESS NOTES
Physical Therapy  Facility/Department: [address] INTERMEDIATE  Physical Therapy Initial Assessment    Name: Brian Chávez  : 1931  MRN: [id number]  Date of Service: 5/15/2022      Patient Diagnosis(es): The primary encounter diagnosis was Dialysis patient [facility]). Diagnoses of General weakness, Frequent falls, and Lactic acidosis were also pertinent to this visit. 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 ([address] CAD (coronary artery disease), CHF (congestive heart failure) ([address] Chronic kidney disease, COVID-19, CVA (cerebral vascular accident) ([address] Diabetes mellitus ([address] Diabetic peripheral neuropathy ([address] Hemodialysis patient ([address] History of CVA (cerebrovascular accident), Hypertension, Ischemic ulcer of toe, limited to breakdown of skin, right ([address] Other disorders of kidney and ureter, PVD (peripheral vascular disease) with claudication ([address] Right sided weakness, TIA (transient ischemic attack), and Unspecified cerebral artery occlusion with cerebral infarction. Past Surgical History:  has a past surgical history that includes Abdominal hernia repair; Cataract removal; Dental surgery; Inguinal hernia repair; Cholecystectomy (2011); Inner ear surgery; hc dialysis catheter (Right, 2020); eye surgery; shunt revision (Right, 2021); vascular surgery (Right, 2021); vascular surgery (Left, 2021); and vascular surgery (Right, 2021). Evaluating Therapist: Kassandra Orellana PT    Room #:  1023/4750-Y  Diagnosis:  Fever of unknown origin [R50.9]  General weakness [R53.1]  Dialysis patient [facility]) [Z99.2]  Frequent falls [R29.6]  Fever in adult [R50.9]  Leg weakness, bilateral [R29.898]  PMHx/PSHx:  CVA with R side weakness, CHF  Precautions:  Falls, O2, alarm      Social:  Pt lives niece  in a 1 floor plan 3 steps to enter. Prior to admission Pt states he does not walk.  Transfers to wheelchair with assist of caregiver. Has caregivers daily     Initial Evaluation  Date: 5/15/22 Treatment      Short Term/ Long Term   Goals   Was pt agreeable to Eval/treatment? yes     Does pt have pain? No c/o pain     Bed Mobility  Rolling: max assist  Supine to sit: max assist  Sit to supine: NT  Scooting: max assist  Mod assist   Transfers Sit to stand: max assist  Stand to sit: max assist  Stand pivot: max assist  Mod assist   Ambulation    NT  N/A   Stair Negotiation  Ascended and descended  NT   N/A    LE strength     R ankle 0/5 all else R 2/5   L LE 3+/5        balance      Sitting balance fair, standing poor     AM-PAC Raw score               10/24         Pt is alert and Oriented   LE ROM: WFL  Edema: none  Endurance: fair  Chair alarm: yes     ASSESSMENT:    Pt displays functional ability as noted in the objective portion of this evaluation. Patient education  Pt educated on PT objectives    Patient response to education:   Pt verbalized understanding Pt demonstrated skill Pt requires further education in this area   yes           Comments/ treatment: Pt sat edge of bed working on core strength CGA. Pt assisted to stand to transfer to chair, unable to fully stand upright, difficulty moving LE's to pivot to chair. Seated LAQ once up in chair, active on L AA on R      Conditions Requiring Skilled Therapeutic Intervention:    [x]Decreased strength     []Decreased ROM  []Decreased functional mobility  [x]Decreased balance   [x]Decreased endurance   [x]Decreased posture  []Decreased sensation  []Decreased coordination   []Decreased vision  []Decreased safety awareness   []Increased pain       Patient and or family understand(s) diagnosis, prognosis, and plan of care.     Prognosis is fair for reaching above PT goals    PHYSICAL THERAPY PLAN OF CARE:    PT POC is established based on physician order and patient diagnosis     Referring provider/PT Order: Eufemia Emanuel, DO/ PT eval and treat      Current Treatment Recommendations:     [x] Strengthening to improve independence with functional mobility   [] ROM to improve independence with functional mobility   [x] Balance Training to improve static/dynamic balance and to reduce fall risk  [x] Endurance Training to improve activity tolerance during functional mobility   [x] Transfer Training to improve safety and independence with all functional transfers   [] Gait Training to improve gait mechanics, endurance and assess need for appropriate assistive device  [] Stair Training in preparation for safe discharge home and/or into the community   [] Positioning to prevent skin breakdown and contractures  [x] Safety and Education Training   [x] Patient/Caregiver Education   [] HEP  [] Other     PT long term treatment goals are located in above grid    Frequency of treatments: 2-5x/week x 5-7 days. Time in  0900  Time out  0923    Total Treatment Time  10 minutes     Evaluation Time includes thorough review of current medical information, gathering information on past medical history/social history and prior level of function, completion of standardized testing/informal observation of tasks, assessment of data and education on plan of care and goals.       CPT codes:  [x] Low Complexity PT evaluation 16318  [] Moderate Complexity PT evaluation 67024  [] High Complexity PT evaluation 47778  [] PT Re-evaluation 49495  [] Gait training 83020 minutes  [] Manual therapy 89866 minutes  [x] Therapeutic activities 04307 10 minutes  [] Therapeutic exercises 92557 minutes  [] Neuromuscular reeducation 97231 minutes     [facility] PSYCHIATRY PT [id number]

## 2022-05-15 NOTE — PROGRESS NOTES
Progress Note  Date:5/15/2022       Room:30/0630-A  Patient Name:Rudi Roca     YOB: 1931     Age:89 y.o. Patient seen for follow up of weakness and fatigue. Patient this am laying in bed. He is sleeping. He arouses to voice. He denies any complaints. He is oriented to person and place. Subjective    Subjective:  Symptoms:  No shortness of breath, cough, chest pain, headache, chest pressure or diarrhea. Diet:  No nausea or vomiting. Activity level: Impaired due to weakness. Review of Systems   Respiratory: Negative for cough and shortness of breath. Cardiovascular: Negative for chest pain. Gastrointestinal: Negative for diarrhea, nausea and vomiting. Objective         Vitals Last 24 Hours:  TEMPERATURE:  Temp  Av.4 °F (36.9 °C)  Min: 98.3 °F (36.8 °C)  Max: 98.5 °F (36.9 °C)  RESPIRATIONS RANGE: Resp  Av.8  Min: 18  Max: 21  PULSE OXIMETRY RANGE: SpO2  Av.2 %  Min: 86 %  Max: 94 %  PULSE RANGE: Pulse  Av  Min: 81  Max: 99  BLOOD PRESSURE RANGE: Systolic (28IUJ), ZUK:114 , Min:92 , WOJ:702   ; Diastolic (56XAI), KNJ:07, Min:52, Max:65    I/O (24Hr): No intake or output data in the 24 hours ending 05/15/22 0713  Objective:  General Appearance:  Comfortable, well-appearing and in no acute distress. Vital signs: (most recent): Blood pressure 108/60, pulse 85, temperature 98.5 °F (36.9 °C), temperature source Oral, resp. rate 18, height 5' 6\" (1.676 m), weight 185 lb 8 oz (84.1 kg), SpO2 93 %. Lungs:  Normal effort and normal respiratory rate. There are decreased breath sounds. No rales or rhonchi. Heart: Normal rate. Regular rhythm. S1 normal and S2 normal.  No friction rub. Abdomen: Abdomen is soft and non-distended. Bowel sounds are normal.   There is no abdominal tenderness. There is no epigastric area or suprapubic area tenderness. There is no rebound tenderness. There is no guarding. Extremities: There is no dependent edema. Skin:  Warm and dry. Labs/Imaging/Diagnostics    Labs:  CBC:Recent Labs     05/13/22 1913 05/14/22  0842 05/15/22  0236   WBC 6.3 5.5 5.3   RBC 3.51* 2.94* 2.99*   HGB 10.9* 9.2* 9.2*   HCT 34.4* 28.3* 29.0*   MCV 98.0 96.3 97.0   RDW 14.9 15.0 14.9    101* 148     CHEMISTRIES:  Recent Labs     05/13/22 1913 05/14/22  0842 05/15/22  0236    133 135   K 4.0 3.8 4.2   CL 90* 93* 94*   CO2 28 26 28   BUN 29* 41* 57*   CREATININE 3.8* 4.8* 6.3*   GLUCOSE 130* 180* 108*   PHOS 2.5  --   --    MG 2.1  --   --      PT/INR:No results for input(s): PROTIME, INR in the last 72 hours. APTT:No results for input(s): APTT in the last 72 hours. LIVER PROFILE:  Recent Labs     05/13/22 1913 05/14/22 0842 05/15/22  0236   AST 16 14 18   ALT 18 15 26   BILITOT 0.7 0.7 0.6   ALKPHOS 128 102 103       Imaging Last 24 Hours:  CT HEAD WO CONTRAST    Result Date: 5/13/2022  EXAMINATION: CT OF THE HEAD WITHOUT CONTRAST  5/13/2022 8:49 pm TECHNIQUE: CT of the head was performed without the administration of intravenous contrast. Automated exposure control, iterative reconstruction, and/or weight based adjustment of the mA/kV was utilized to reduce the radiation dose to as low as reasonably achievable. COMPARISON: MRI of the brain, 06/12/2014 HISTORY: ORDERING SYSTEM PROVIDED HISTORY: weakness TECHNOLOGIST PROVIDED HISTORY: Reason for exam:->weakness Has a \"code stroke\" or \"stroke alert\" been called? ->No Decision Support Exception - unselect if not a suspected or confirmed emergency medical condition->Emergency Medical Condition (MA) FINDINGS: BRAIN/VENTRICLES: No mass effect, edema or hemorrhage is seen. Seen basal ganglia thalamus external regions. There is asymmetrically prominent chronic microvascular ischemic changes in left cerebral white matter. A subdural hygroma measuring approximately 7 mm in width is seen along the left anterior cerebral convexity.   It results in left-to-right shift of approximately 3 mm.  No hemorrhage or edema is seen in the brain. Moderate to severe volume loss is seen in the brain. No hydrocephalus ORBITS: The visualized portion of the orbits demonstrate no acute abnormality. SINUSES: The visualized paranasal sinuses and mastoid air cells demonstrate no acute abnormality. SOFT TISSUES/SKULL:  No acute abnormality of the visualized skull or soft tissues. 1.  No acute intracranial abnormality. 2. Subdural hygroma along the left cerebral convexity, measuring 7 mm in maximal width anteriorly. 3. 3 mm left-to-right midline shift. 4. 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 RECOMMENDATIONS: Unavailable     XR CHEST PORTABLE    Result Date: 5/14/2022  EXAMINATION: ONE XRAY VIEW OF THE CHEST 5/14/2022 10:25 am COMPARISON: 13 May 2022 HISTORY: ORDERING SYSTEM PROVIDED HISTORY: increased O2 demand TECHNOLOGIST PROVIDED HISTORY: Reason for exam:->increased O2 demand FINDINGS: Single AP erect portable chest demonstrates a large bore right sided IJ catheter remaining in place. There is no evidence of a effusion or pneumothorax. There are mild bibasilar subsegmental atelectatic changes on today's study. Interval development of mild bibasilar subsegmental atelectatic changes. XR CHEST PORTABLE    Result Date: 5/13/2022  EXAMINATION: ONE XRAY VIEW OF THE CHEST 5/13/2022 7:08 pm COMPARISON: 02/15/2022 HISTORY: ORDERING SYSTEM PROVIDED HISTORY: fever TECHNOLOGIST PROVIDED HISTORY: Reason for exam:->fever FINDINGS: The lungs are without acute focal process. There is no effusion or pneumothorax. The cardiomediastinal silhouette is without acute process. The osseous structures are without acute process. Right-sided hemodialysis catheter tip in the right atrium. Stable elevation right hemidiaphragm. No acute process.      Assessment//Plan           Hospital Problems           Last Modified POA    * (Principal) Fever of unknown origin 5/13/2022 Yes    Fever in adult 5/13/2022 Yes    Leg weakness, bilateral 5/14/2022 Yes        Assessment & Plan  1. 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   2. Subdural hygroma   3. Fatigue  4. ESRD on HD   5. Type II diabetes mellitus     Continue on Keppra per neurosurgery but monitor fatigue. Blood cultures remain negative. Appreciate ID, Nephrology and palliative care input. Will need PT/OT as patient likely needs ROBERT on discharge. Chest xray done yesterday and showed atelectasis. Add incentive spirometry.      Devon Meredith DO    Electronically signed by Devon Meredith DO on 5/15/22 at 7:13 AM EDT

## 2022-05-15 NOTE — CARE COORDINATION
Met w/ patient and also had phone conversation w/ niece Katie Benton [phone] who pt lives with- states she is pt's secondary POA-HC. Lives in a 1 story house- ramp entrance. Uses quad cane, WC, shower chair. Requiring O2 2lNC- does not wear home O2. Hx [facility], [facility], Abim[facility] at [place] for respite care. Has aides from [address] Monday through Friday provided through [employer] and also has private pay caregivers through [employer] [facility]. Receives hemod[facility] @ [facility] [phone]- transportation provided by Agustin Andre- notified of admission via . PCP is Dr. Cipriano Walden and pharmacy is [facility]. PT/OT evals pending. Katie Benton is returning from [place] tomorrow and will visit her uncle to further discuss discharge planning.  Will follow Yogi Jones, RN case manager

## 2022-05-16 LAB
ALBUMIN SERPL-MCNC: 3.1 G/DL (ref 3.5–5.2)
ALP BLD-CCNC: 98 U/L (ref 40–129)
ALT SERPL-CCNC: 31 U/L (ref 0–40)
ANION GAP SERPL CALCULATED.3IONS-SCNC: 15 MMOL/L (ref 7–16)
AST SERPL-CCNC: 21 U/L (ref 0–39)
BASOPHILS ABSOLUTE: 0.02 E9/L (ref 0–0.2)
BASOPHILS ABSOLUTE: 0.02 E9/L (ref 0–0.2)
BASOPHILS RELATIVE PERCENT: 0.5 % (ref 0–2)
BASOPHILS RELATIVE PERCENT: 0.5 % (ref 0–2)
BILIRUB SERPL-MCNC: 0.5 MG/DL (ref 0–1.2)
BUN BLDV-MCNC: 70 MG/DL (ref 6–23)
CALCIUM SERPL-MCNC: 8.4 MG/DL (ref 8.6–10.2)
CHLORIDE BLD-SCNC: 90 MMOL/L (ref 98–107)
CO2: 25 MMOL/L (ref 22–29)
CREAT SERPL-MCNC: 7.3 MG/DL (ref 0.7–1.2)
EOSINOPHILS ABSOLUTE: 0.31 E9/L (ref 0.05–0.5)
EOSINOPHILS ABSOLUTE: 0.34 E9/L (ref 0.05–0.5)
EOSINOPHILS RELATIVE PERCENT: 7.2 % (ref 0–6)
EOSINOPHILS RELATIVE PERCENT: 7.9 % (ref 0–6)
GFR AFRICAN AMERICAN: 9
GFR NON-AFRICAN AMERICAN: 7 ML/MIN/1.73
GLUCOSE BLD-MCNC: 135 MG/DL (ref 74–99)
HCT VFR BLD CALC: 27.6 % (ref 37–54)
HCT VFR BLD CALC: 29.1 % (ref 37–54)
HEMOGLOBIN: 8.9 G/DL (ref 12.5–16.5)
HEMOGLOBIN: 9.4 G/DL (ref 12.5–16.5)
IMMATURE GRANULOCYTES #: 0.01 E9/L
IMMATURE GRANULOCYTES #: 0.01 E9/L
IMMATURE GRANULOCYTES %: 0.2 % (ref 0–5)
IMMATURE GRANULOCYTES %: 0.2 % (ref 0–5)
LACTIC ACID, SEPSIS: 1.3 MMOL/L (ref 0.5–1.9)
LYMPHOCYTES ABSOLUTE: 0.74 E9/L (ref 1.5–4)
LYMPHOCYTES ABSOLUTE: 1.14 E9/L (ref 1.5–4)
LYMPHOCYTES RELATIVE PERCENT: 17.1 % (ref 20–42)
LYMPHOCYTES RELATIVE PERCENT: 26.4 % (ref 20–42)
MCH RBC QN AUTO: 30.9 PG (ref 26–35)
MCH RBC QN AUTO: 31.1 PG (ref 26–35)
MCHC RBC AUTO-ENTMCNC: 32.2 % (ref 32–34.5)
MCHC RBC AUTO-ENTMCNC: 32.3 % (ref 32–34.5)
MCV RBC AUTO: 95.7 FL (ref 80–99.9)
MCV RBC AUTO: 96.5 FL (ref 80–99.9)
METER GLUCOSE: 128 MG/DL (ref 74–99)
METER GLUCOSE: 131 MG/DL (ref 74–99)
METER GLUCOSE: 138 MG/DL (ref 74–99)
METER GLUCOSE: 186 MG/DL (ref 74–99)
MONOCYTES ABSOLUTE: 0.51 E9/L (ref 0.1–0.95)
MONOCYTES ABSOLUTE: 0.64 E9/L (ref 0.1–0.95)
MONOCYTES RELATIVE PERCENT: 11.8 % (ref 2–12)
MONOCYTES RELATIVE PERCENT: 14.8 % (ref 2–12)
NEUTROPHILS ABSOLUTE: 2.17 E9/L (ref 1.8–7.3)
NEUTROPHILS ABSOLUTE: 2.73 E9/L (ref 1.8–7.3)
NEUTROPHILS RELATIVE PERCENT: 50.2 % (ref 43–80)
NEUTROPHILS RELATIVE PERCENT: 63.2 % (ref 43–80)
PDW BLD-RTO: 14.6 FL (ref 11.5–15)
PDW BLD-RTO: 14.6 FL (ref 11.5–15)
PLATELET # BLD: 155 E9/L (ref 130–450)
PLATELET # BLD: 165 E9/L (ref 130–450)
PMV BLD AUTO: 10.4 FL (ref 7–12)
PMV BLD AUTO: 10.5 FL (ref 7–12)
POTASSIUM REFLEX MAGNESIUM: 4.2 MMOL/L (ref 3.5–5)
RBC # BLD: 2.86 E12/L (ref 3.8–5.8)
RBC # BLD: 3.04 E12/L (ref 3.8–5.8)
SODIUM BLD-SCNC: 130 MMOL/L (ref 132–146)
TOTAL PROTEIN: 6 G/DL (ref 6.4–8.3)
WBC # BLD: 4.3 E9/L (ref 4.5–11.5)
WBC # BLD: 4.3 E9/L (ref 4.5–11.5)

## 2022-05-16 PROCEDURE — 82962 GLUCOSE BLOOD TEST: CPT

## 2022-05-16 PROCEDURE — 2580000003 HC RX 258: Performed by: INTERNAL MEDICINE

## 2022-05-16 PROCEDURE — 80053 COMPREHEN METABOLIC PANEL: CPT

## 2022-05-16 PROCEDURE — 83605 ASSAY OF LACTIC ACID: CPT

## 2022-05-16 PROCEDURE — 85025 COMPLETE CBC W/AUTO DIFF WBC: CPT

## 2022-05-16 PROCEDURE — 87040 BLOOD CULTURE FOR BACTERIA: CPT

## 2022-05-16 PROCEDURE — 36415 COLL VENOUS BLD VENIPUNCTURE: CPT

## 2022-05-16 PROCEDURE — 6370000000 HC RX 637 (ALT 250 FOR IP): Performed by: STUDENT IN AN ORGANIZED HEALTH CARE EDUCATION/TRAINING PROGRAM

## 2022-05-16 PROCEDURE — 5A1D70Z PERFORMANCE OF URINARY FILTRATION, INTERMITTENT, LESS THAN 6 HOURS PER DAY: ICD-10-PCS | Performed by: INTERNAL MEDICINE

## 2022-05-16 PROCEDURE — 97530 THERAPEUTIC ACTIVITIES: CPT

## 2022-05-16 PROCEDURE — 2060000000 HC ICU INTERMEDIATE R&B

## 2022-05-16 PROCEDURE — 6370000000 HC RX 637 (ALT 250 FOR IP): Performed by: INTERNAL MEDICINE

## 2022-05-16 PROCEDURE — 90935 HEMODIALYSIS ONE EVALUATION: CPT

## 2022-05-16 PROCEDURE — 2500000003 HC RX 250 WO HCPCS: Performed by: INTERNAL MEDICINE

## 2022-05-16 RX ORDER — HEPARIN SODIUM 1000 [USP'U]/ML
4300 INJECTION, SOLUTION INTRAVENOUS; SUBCUTANEOUS PRN
Status: DISCONTINUED | OUTPATIENT
Start: 2022-05-16 | End: 2022-05-19 | Stop reason: HOSPADM

## 2022-05-16 RX ORDER — HEPARIN SODIUM 1000 [USP'U]/ML
INJECTION, SOLUTION INTRAVENOUS; SUBCUTANEOUS
Status: DISPENSED
Start: 2022-05-16 | End: 2022-05-16

## 2022-05-16 RX ADMIN — GABAPENTIN 100 MG: 100 CAPSULE ORAL at 22:47

## 2022-05-16 RX ADMIN — ATORVASTATIN CALCIUM 10 MG: 20 TABLET, FILM COATED ORAL at 22:47

## 2022-05-16 RX ADMIN — Medication 10 ML: at 12:07

## 2022-05-16 RX ADMIN — LEVETIRACETAM 500 MG: 500 TABLET, FILM COATED ORAL at 16:50

## 2022-05-16 RX ADMIN — CALCIUM ACETATE 667 MG: 667 CAPSULE ORAL at 16:50

## 2022-05-16 RX ADMIN — Medication 10 ML: at 22:48

## 2022-05-16 RX ADMIN — APIXABAN 2.5 MG: 2.5 TABLET, FILM COATED ORAL at 11:56

## 2022-05-16 RX ADMIN — METOPROLOL SUCCINATE 25 MG: 25 TABLET, EXTENDED RELEASE ORAL at 11:56

## 2022-05-16 RX ADMIN — ACETAMINOPHEN 650 MG: 325 TABLET ORAL at 00:17

## 2022-05-16 RX ADMIN — FAMOTIDINE 10 MG: 20 TABLET ORAL at 11:56

## 2022-05-16 RX ADMIN — CALCIUM ACETATE 667 MG: 667 CAPSULE ORAL at 11:56

## 2022-05-16 RX ADMIN — INSULIN LISPRO 1 UNITS: 100 INJECTION, SOLUTION INTRAVENOUS; SUBCUTANEOUS at 16:51

## 2022-05-16 RX ADMIN — ACETAMINOPHEN 650 MG: 325 TABLET ORAL at 12:04

## 2022-05-16 RX ADMIN — LEVETIRACETAM 500 MG: 500 TABLET, FILM COATED ORAL at 22:48

## 2022-05-16 RX ADMIN — APIXABAN 2.5 MG: 2.5 TABLET, FILM COATED ORAL at 22:48

## 2022-05-16 ASSESSMENT — ENCOUNTER SYMPTOMS
SHORTNESS OF BREATH: 0
VOMITING: 0
COUGH: 0
NAUSEA: 0
DIARRHEA: 0

## 2022-05-16 ASSESSMENT — PAIN SCALES - GENERAL
PAINLEVEL_OUTOF10: 0
PAINLEVEL_OUTOF10: 7
PAINLEVEL_OUTOF10: 0

## 2022-05-16 ASSESSMENT — PAIN - FUNCTIONAL ASSESSMENT
PAIN_FUNCTIONAL_ASSESSMENT: ACTIVITIES ARE NOT PREVENTED
PAIN_FUNCTIONAL_ASSESSMENT: PREVENTS OR INTERFERES SOME ACTIVE ACTIVITIES AND ADLS

## 2022-05-16 ASSESSMENT — PAIN DESCRIPTION - LOCATION: LOCATION: KNEE

## 2022-05-16 ASSESSMENT — PAIN DESCRIPTION - ORIENTATION: ORIENTATION: RIGHT

## 2022-05-16 ASSESSMENT — PAIN DESCRIPTION - DESCRIPTORS: DESCRIPTORS: ACHING;DULL;DISCOMFORT

## 2022-05-16 NOTE — CARE COORDINATION
Social Work:    Social service met with Boyd iKm, patient's niece & POA and discussed discharge planning. Boyd Kim expressed concern about Mr. Roca's direct return home with [facility] & private duty due to weakness, however,  Boyd Kim also advise that Mr. Roca may not have skilled time available. Though Boyd Kim states that she is prepared to pay privately for skilled rehab, she explained that Mr. Roca prefers return home with [facility]/private duty. Boyd Kim advised social work that she would consider [facility] snf but requested time to discuss options further with family, etc.  Boyd Kim is agreeable to transfer to [facility] if Mr. Alma Sal meets criteria. Social work to follow.     Electronically signed by SCOTT Clements on 5/16/2022 at 11:31 AM

## 2022-05-16 NOTE — PROGRESS NOTES
P Quality Flow/Interdisciplinary Rounds Progress Note        Quality Flow Rounds held on May 16, 2022    Disciplines Attending:  Bedside Nurse, ,  and Nursing Unit Leadership    Aida Staley was admitted on 5/13/2022  6:24 PM    Anticipated Discharge Date:       Disposition:    Kristian Score:  Kristian Scale Score: 14    Readmission Risk              Risk of Unplanned Readmission:  38           Discussed patient goal for the day, patient clinical progression, and barriers to discharge. The following Goal(s) of the Day/Commitment(s) have been identified:  monitor temp.       Amado Rendon RN  May 16, 2022

## 2022-05-16 NOTE — PROGRESS NOTES
Progress Note  Date:2022       Room:30/0630-  Patient Name:Rudi Roca     YOB: 1931     Age:89 y.o. Patient seen for follow up of weakness and fatigue. Patient this am laying in bed. He is sleeping. He arouses to touch. He denies any complaints. He is oriented to person and place. Subjective    Subjective:  Symptoms:  No shortness of breath, cough, chest pain, headache, chest pressure or diarrhea. Diet:  No nausea or vomiting. Activity level: Impaired due to weakness. Review of Systems   Respiratory: Negative for cough and shortness of breath. Cardiovascular: Negative for chest pain. Gastrointestinal: Negative for diarrhea, nausea and vomiting. Objective         Vitals Last 24 Hours:  TEMPERATURE:  Temp  Av.7 °F (36.5 °C)  Min: 97.4 °F (36.3 °C)  Max: 98 °F (36.7 °C)  RESPIRATIONS RANGE: Resp  Av.7  Min: 18  Max: 20  PULSE OXIMETRY RANGE: SpO2  Av %  Min: 93 %  Max: 95 %  PULSE RANGE: Pulse  Av.7  Min: 74  Max: 76  BLOOD PRESSURE RANGE: Systolic (54PJR), DIF:264 , Min:114 , ZPI:568   ; Diastolic (36JYQ), YIX:67, Min:61, Max:64    I/O (24Hr): Intake/Output Summary (Last 24 hours) at 2022 0531  Last data filed at 5/15/2022 1419  Gross per 24 hour   Intake --   Output 225 ml   Net -225 ml     Objective:  General Appearance:  Comfortable, well-appearing and in no acute distress. Vital signs: (most recent): Blood pressure 114/64, pulse 76, temperature 98 °F (36.7 °C), temperature source Oral, resp. rate 20, height 5' 6\" (1.676 m), weight 185 lb 8 oz (84.1 kg), SpO2 95 %. Lungs:  Normal effort and normal respiratory rate. There are decreased breath sounds. No rales or rhonchi. Heart: Normal rate. Regular rhythm. S1 normal and S2 normal.  No friction rub. Abdomen: Abdomen is soft and non-distended. Bowel sounds are normal.   There is no abdominal tenderness. There is no epigastric area or suprapubic area tenderness.   There is no rebound tenderness. There is no guarding. Extremities: There is no dependent edema. Skin:  Warm and dry. Labs/Imaging/Diagnostics    Labs:  CBC:  Recent Labs     05/14/22  0842 05/15/22  0236 05/16/22  0452   WBC 5.5 5.3 4.3*   RBC 2.94* 2.99* 2.86*   HGB 9.2* 9.2* 8.9*   HCT 28.3* 29.0* 27.6*   MCV 96.3 97.0 96.5   RDW 15.0 14.9 14.6   * 148 155     CHEMISTRIES:  Recent Labs     05/13/22  1913 05/14/22  0842 05/15/22  0236    133 135   K 4.0 3.8 4.2   CL 90* 93* 94*   CO2 28 26 28   BUN 29* 41* 57*   CREATININE 3.8* 4.8* 6.3*   GLUCOSE 130* 180* 108*   PHOS 2.5  --   --    MG 2.1  --   --      PT/INR:No results for input(s): PROTIME, INR in the last 72 hours. APTT:No results for input(s): APTT in the last 72 hours. LIVER PROFILE:  Recent Labs     05/13/22  1913 05/14/22  0842 05/15/22  0236   AST 16 14 18   ALT 18 15 26   BILITOT 0.7 0.7 0.6   ALKPHOS 128 102 103       Imaging Last 24 Hours:  CT HEAD WO CONTRAST    Result Date: 5/13/2022  EXAMINATION: CT OF THE HEAD WITHOUT CONTRAST  5/13/2022 8:49 pm TECHNIQUE: CT of the head was performed without the administration of intravenous contrast. Automated exposure control, iterative reconstruction, and/or weight based adjustment of the mA/kV was utilized to reduce the radiation dose to as low as reasonably achievable. COMPARISON: MRI of the brain, 06/12/2014 HISTORY: ORDERING SYSTEM PROVIDED HISTORY: weakness TECHNOLOGIST PROVIDED HISTORY: Reason for exam:->weakness Has a \"code stroke\" or \"stroke alert\" been called? ->No Decision Support Exception - unselect if not a suspected or confirmed emergency medical condition->Emergency Medical Condition (MA) FINDINGS: BRAIN/VENTRICLES: No mass effect, edema or hemorrhage is seen. Seen basal ganglia thalamus external regions. There is asymmetrically prominent chronic microvascular ischemic changes in left cerebral white matter.  A subdural hygroma measuring approximately 7 mm in width is seen along the left anterior cerebral convexity. It results in left-to-right shift of approximately 3 mm. No hemorrhage or edema is seen in the brain. Moderate to severe volume loss is seen in the brain. No hydrocephalus ORBITS: The visualized portion of the orbits demonstrate no acute abnormality. SINUSES: The visualized paranasal sinuses and mastoid air cells demonstrate no acute abnormality. SOFT TISSUES/SKULL:  No acute abnormality of the visualized skull or soft tissues. 1.  No acute intracranial abnormality. 2. Subdural hygroma along the left cerebral convexity, measuring 7 mm in maximal width anteriorly. 3. 3 mm left-to-right midline shift. 4. 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 RECOMMENDATIONS: Unavailable     XR CHEST PORTABLE    Result Date: 5/14/2022  EXAMINATION: ONE XRAY VIEW OF THE CHEST 5/14/2022 10:25 am COMPARISON: 13 May 2022 HISTORY: ORDERING SYSTEM PROVIDED HISTORY: increased O2 demand TECHNOLOGIST PROVIDED HISTORY: Reason for exam:->increased O2 demand FINDINGS: Single AP erect portable chest demonstrates a large bore right sided IJ catheter remaining in place. There is no evidence of a effusion or pneumothorax. There are mild bibasilar subsegmental atelectatic changes on today's study. Interval development of mild bibasilar subsegmental atelectatic changes. XR CHEST PORTABLE    Result Date: 5/13/2022  EXAMINATION: ONE XRAY VIEW OF THE CHEST 5/13/2022 7:08 pm COMPARISON: 02/15/2022 HISTORY: ORDERING SYSTEM PROVIDED HISTORY: fever TECHNOLOGIST PROVIDED HISTORY: Reason for exam:->fever FINDINGS: The lungs are without acute focal process. There is no effusion or pneumothorax. The cardiomediastinal silhouette is without acute process. The osseous structures are without acute process. Right-sided hemodialysis catheter tip in the right atrium. Stable elevation right hemidiaphragm. No acute process. Assessment//Plan           Hospital Problems           Last Modified POA    * (Principal) Fever of unknown origin 5/13/2022 Yes    Fever in adult 5/13/2022 Yes    Leg weakness, bilateral 5/14/2022 Yes        Assessment & Plan  1. 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   2. Subdural hygroma   3. Fatigue  4. ESRD on HD   5. Type II diabetes mellitus     Blood cultures remain negative. Repeat blood culture today with dialysis. Appreciate ID, Nephrology and palliative care input. Am-PAC score 10- needs ROBERT on discharge. Chest xray done yesterday and showed atelectasis. Add incentive spirometry.      Jimmie Ugalde, DO

## 2022-05-16 NOTE — PROGRESS NOTES
Physical Therapy  Facility/Department: [address] INTERMEDIATE  Physical Therapy Treatment Note    Name: Miriam Juan  : 1931  MRN: [id number]  Date of Service: 2022      Patient Diagnosis(es): The primary encounter diagnosis was Dialysis patient [facility]). Diagnoses of General weakness, Frequent falls, and Lactic acidosis were also pertinent to this visit. 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 ([address] CAD (coronary artery disease), CHF (congestive heart failure) ([address] Chronic kidney disease, COVID-19, CVA (cerebral vascular accident) ([address] Diabetes mellitus ([address] Diabetic peripheral neuropathy ([address] Hemodialysis patient ([address] History of CVA (cerebrovascular accident), Hypertension, Ischemic ulcer of toe, limited to breakdown of skin, right ([address] Other disorders of kidney and ureter, PVD (peripheral vascular disease) with claudication ([address] Right sided weakness, TIA (transient ischemic attack), and Unspecified cerebral artery occlusion with cerebral infarction. Past Surgical History:  has a past surgical history that includes Abdominal hernia repair; Cataract removal; Dental surgery; Inguinal hernia repair; Cholecystectomy (2011); Inner ear surgery; hc dialysis catheter (Right, 2020); eye surgery; shunt revision (Right, 2021); vascular surgery (Right, 2021); vascular surgery (Left, 2021); and vascular surgery (Right, 2021). Evaluating Therapist: Michael Levy PT    Room #:  5493/5273-N  Diagnosis:  Fever of unknown origin [R50.9]  General weakness [R53.1]  Dialysis patient [facility]) [Z99.2]  Frequent falls [R29.6]  Fever in adult [R50.9]  Leg weakness, bilateral [R29.898]  PMHx/PSHx:  CVA with R side weakness, CHF  Precautions:  Falls, O2, alarm      Social:  Pt lives niece  in a 1 floor plan 3 steps to enter. Prior to admission Pt states he does not walk.  Transfers to wheelchair with assist of caregiver. Has caregivers daily     Initial Evaluation  Date: 5/15/22 Treatment  5/16/2022    Short Term/ Long Term   Goals   Was pt agreeable to Eval/treatment? yes yes    Does pt have pain? No c/o pain R LE pain during bed mobility    Bed Mobility  Rolling: max assist  Supine to sit: max assist  Sit to supine: NT  Scooting: max assist Rolling: Max A  Supine to sit: Max A  Scooting: Max A seated to EOB and side to side in the chair Mod assist   Transfers Sit to stand: max assist  Stand to sit: max assist  Stand pivot: max assist Sit to stand; Max A  Stand to sit: Max A  Stand pivot: Max A bed to chair without A/D Mod assist   Ambulation    NT NT N/A   Stair Negotiation  Ascended and descended  NT   N/A    LE strength     R ankle 0/5 all else R 2/5   L LE 3+/5        balance      Sitting balance fair, standing poor     AM-PAC Raw score               10/24 10/24          Pt is alert and able to follow instruction  Balance: poor sitting EOB, poor during pivot transfer without A/D    Pt performed therapeutic exercise of the following: NT    Patient education  Pt was educated on L UE usage to assist with sitting balance, LE participation as able during pivot transfer    Patient response to education:   Pt verbalized understanding Pt demonstrated skill Pt requires further education in this area   yes With instruction yes     ASSESSMENT:   Comments: Nurse ok with rx. Pt found in a bed, agreed to rx, states wants to get to a chair. Pt assisted to EOB, sat EOB Min/Mod A for balance. Prompt required while EOB to promote proper R LE positioning prior to pivot transfer bed to chair. Pivot performed. Pt unable to advance or WB R LE. Once to the chair, much assist required for Pt to scoot side to side in the chair for proper positioning.    Treatment: Pt practiced and was instructed in the following treatment: sitting balance, transfer participation    Pt was left in a bedside chair with call light in reach, Waffle cushion in place, R UE elevated for comfort    Chair/bed alarm: chair alarm active    Time in 1341   Time out 1358   Total Treatment Time 17 minutes   CPT codes:     Therapeutic activities 00761 17 minutes   Therapeutic exercises 72153 0 minutes       Pt is making fair progress toward established Physical Therapy goals. Continue with physical therapy current plan of care.     Maryan\[facility] [place] [facility]\"" Jaleel PTA   License Number: [id number]

## 2022-05-16 NOTE — PROGRESS NOTES
[facility] MD Cathie Hagen MD  [facility] PATIENT’S [facility] MD Aramis New MD  Progress note   Patient's Name: Melecio Harding  9:29 PM  5/15/2022      Seen in his room , stable BP on o2/nc , pleasant , weak . History of Present Ilness:    79 yo M known to our service he is presenting with cc of feeling weak, fatique and falling down   Reports of fever and chills on admission   We are asked to see for ESRD management   Pt has hx of ESRD and was placed on HD roughly one year back   Pt has TDC linea s access for dialysis   Last HD was on frirday   Pt seen in his room he is on RA he appear comforable euvolemic .        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 ([address] 12/3/2021    CAD (coronary artery disease)     CHF (congestive heart failure) (HCC)     Chronic kidney disease     COVID-19     CVA (cerebral vascular accident) ([address]     Diabetes mellitus ([address]     Diabetic peripheral neuropathy ([address] 11/9/2012    Hemodialysis patient ([address]     History of CVA (cerebrovascular accident) 6/9/2014    Hypertension     Ischemic ulcer of toe, limited to breakdown of skin, right ([address] 12/3/2021    Other disorders of kidney and ureter     prostate infections in past    PVD (peripheral vascular disease) with claudication ([address] 6/9/2014    Right sided weakness 3/19/2018    TIA (transient ischemic attack)     possible several years ago    Unspecified cerebral artery occlusion with cerebral infarction 2013    [facility] RIGHT LEG AFFECTED       Past Surgical History:   Procedure Laterality Date    ABDOMINAL HERNIA REPAIR      CATARACT REMOVAL      right    CHOLECYSTECTOMY  11/2011    DENTAL SURGERY      EYE SURGERY      [facility]. DIALYSIS CATHETER Right 8/27/2020    TESIO CATHETER INSERTION performed by Mary Tello MD at [facility] removal of sac in ear    SHUNT REVISION Right 1/21/2021    INSERTION AV GRAFT RIGHT ARM performed by Nima Roque MD at [facility] Right 4/8/2021    THROMBECTOMY RIGHT ARM AV GRAFT performed by Nima Roque MD at [facility] VASCULAR SURGERY Left 8/9/2021    INSERTION OF TEMPORARY HEMODIALYSIS CATHETER performed by Nima Roque MD at [facility] Right 8/11/2021    INSERTION OF TESIO, REMOVAL OF TEMPORARY CATHETHER performed by Nima Roque MD at [address] [place] [address]       History reviewed. No pertinent family history. reports that he quit smoking about 55 years ago. His smoking use included cigarettes. He has a 10.00 pack-year smoking history. He has never used smokeless tobacco. He reports previous alcohol use of about 1.0 standard drink of alcohol per week. He reports that he does not use drugs.     Allergies:  Sulfa antibiotics and Sulfa antibiotics    Current Medications:    apixaban (ELIQUIS) tablet 2.5 mg, BID  levETIRAcetam (KEPPRA) tablet 500 mg, BID  sodium chloride flush 0.9 % injection 10 mL, 2 times per day  sodium chloride flush 0.9 % injection 10 mL, PRN  0.9 % sodium chloride infusion, PRN  senna (SENOKOT) tablet 8.6 mg, Daily PRN  acetaminophen (TYLENOL) tablet 650 mg, Q6H PRN   Or  acetaminophen (TYLENOL) suppository 650 mg, Q6H PRN  albuterol (ACCUNEB) nebulizer solution 2.5 mg, Q6H PRN  atorvastatin (LIPITOR) tablet 10 mg, Nightly  calcium acetate (PHOSLO) capsule 667 mg, TID WC  famotidine (PEPCID) tablet 10 mg, Daily  gabapentin (NEURONTIN) capsule 100 mg, Nightly  metoprolol succinate (TOPROL XL) extended release tablet 25 mg, Daily  insulin lispro (HUMALOG) injection vial 0-6 Units, TID WC  insulin lispro (HUMALOG) injection vial 0-3 Units, Nightly  glucose chewable tablet 16 g, PRN  dextrose bolus 10% 125 mL, PRN   Or  dextrose bolus 10% 250 mL, PRN  glucagon (rDNA) injection 1 mg, PRN  dextrose 5 % solution, PRN        Review of Systems: Constitutional: no fevers , no chills , feels ok   Eyes: no eye pain , no itching , no drainage  Ears, nose, mouth, throat, and face: no ear ,nose pain , hearing is ok ,no nasal drainage   Respiratory: no sob ,no cough ,no wheezing . Cardiovascular: no chest pain , no palpitation ,no sob . Gastrointestinal: no nausea, vomiting , constipation , no abdominal pain . Genitourinary:no urinary retention , no burning , dysuria . No polyuria   Hematologic/lymphatic: no bleeding , no cougulation issues . Musculoskeletal:no joint pain , no swelling . Neurological: no headaches ,no weakness , no numbness . Endocrine: no thirst , no weight issues . Physical exam:   Vital signs /63   Pulse 74   Temp 97.4 °F (36.3 °C) (Oral)   Resp 18   Ht 5' 6\" (1.676 m)   Wt 185 lb 8 oz (84.1 kg)   SpO2 93%   BMI 29.94 kg/m²   Gen : NAD , appropriate for stated age . Head : at , nc   Neck : supple , no thyromegaly noted . Eyes : EOMI , PERRLA   CV : RRR , No M/R/G . Lungs: CTAB , no wheezing , good flow heard b/l   Abd : soft , NT , BS + , No Organomegaly appreciated . Skin : soft, dry . Neuro : CN  II-XII grossly intact , no focal neurologic deficit . Psych : cooperative .      Data:   Labs:  CBC with Differential:    Lab Results   Component Value Date    WBC 5.3 05/15/2022    RBC 2.99 05/15/2022    HGB 9.2 05/15/2022    HCT 29.0 05/15/2022     05/15/2022    MCV 97.0 05/15/2022    MCH 30.8 05/15/2022    MCHC 31.7 05/15/2022    RDW 14.9 05/15/2022    SEGSPCT 46 01/04/2014    BANDSPCT 2 05/02/2016    LYMPHOPCT 25.3 05/15/2022    MONOPCT 14.9 05/15/2022    BASOPCT 0.4 05/15/2022    MONOSABS 0.79 05/15/2022    LYMPHSABS 1.34 05/15/2022    EOSABS 0.47 05/15/2022    BASOSABS 0.02 05/15/2022     CMP:    Lab Results   Component Value Date     05/15/2022    K 4.2 05/15/2022    CL 94 05/15/2022    CO2 28 05/15/2022    BUN 57 05/15/2022    CREATININE 6.3 05/15/2022    GFRAA 10 05/15/2022    LABGLOM 8 05/15/2022    GLUCOSE 108 05/15/2022    GLUCOSE 163 02/22/2012    PROT 6.4 05/15/2022    LABALBU 3.6 05/15/2022    LABALBU 3.8 02/20/2012    CALCIUM 8.9 05/15/2022    BILITOT 0.6 05/15/2022    ALKPHOS 103 05/15/2022    AST 18 05/15/2022    ALT 26 05/15/2022     Ionized Calcium:  No results found for: IONCA  Magnesium:    Lab Results   Component Value Date    MG 2.1 05/13/2022     Phosphorus:    Lab Results   Component Value Date    PHOS 2.5 05/13/2022     U/A:    Lab Results   Component Value Date    COLORU Yellow 07/10/2021    PHUR 6.0 07/10/2021    WBCUA PACKED 07/10/2021    WBCUA NONE 02/20/2012    RBCUA >20 07/10/2021    RBCUA 1-3 05/04/2013    BACTERIA MANY 07/10/2021    CLARITYU CLOUDY 07/10/2021    SPECGRAV 1.020 07/10/2021    LEUKOCYTESUR LARGE 07/10/2021    UROBILINOGEN 0.2 07/10/2021    BILIRUBINUR Negative 07/10/2021    BILIRUBINUR NEGATIVE 02/20/2012    BLOODU LARGE 07/10/2021    GLUCOSEU Negative 07/10/2021    GLUCOSEU 500 02/20/2012     Microalbumen/Creatinine ratio:  No components found for: RUCREAT  Iron Saturation:  No components found for: PERCENTFE  TIBC:    Lab Results   Component Value Date    TIBC 177 08/24/2020     FERRITIN:    Lab Results   Component Value Date    FERRITIN 98 08/24/2020        Imaging:  XR CHEST PORTABLE   Final Result   Interval development of mild bibasilar subsegmental atelectatic changes. CT HEAD WO CONTRAST   Final Result   1. No acute intracranial abnormality. 2. Subdural hygroma along the left cerebral convexity, measuring 7 mm in   maximal width anteriorly. 3. 3 mm left-to-right midline shift. 4. 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 RECOMMENDATIONS:   Unavailable         XR CHEST PORTABLE   Final Result   No acute process.              Assessment    -End stage renal disease on HD MWF via [facility] TDC line :  Continue HD per schedule next HD on Monday     -Anaemia of chronic disease :  HG acceptable around 10     -Mineral bone disease /scondary hyperparathyrodism :  Continue phoslo tid with meals     -Hypertension :  Under reasonable control on these medications     -severe deconditioning         Electronically signed by Ramona Radford MD on 5/15/2022 at 9:29 PM

## 2022-05-16 NOTE — PROGRESS NOTES
Results   Component Value Date    WBC 4.3 (L) 05/16/2022    WBC 5.3 05/15/2022    WBC 5.5 05/14/2022    HGB 8.9 (L) 05/16/2022    HCT 27.6 (L) 05/16/2022    MCV 96.5 05/16/2022     05/16/2022     Lab Results   Component Value Date    NEUTROABS 2.17 05/16/2022    NEUTROABS 2.65 05/15/2022    NEUTROABS 3.59 05/14/2022     No results found for: [facility]  Lab Results   Component Value Date    ALT 31 05/16/2022    AST 21 05/16/2022    ALKPHOS 98 05/16/2022    BILITOT 0.5 05/16/2022     Lab Results   Component Value Date     05/16/2022    K 4.2 05/16/2022    CL 90 05/16/2022    CO2 25 05/16/2022    BUN 70 05/16/2022    CREATININE 7.3 05/16/2022    CREATININE 6.3 05/15/2022    CREATININE 4.8 05/14/2022    GFRAA 9 05/16/2022    LABGLOM 7 05/16/2022    GLUCOSE 135 05/16/2022    GLUCOSE 163 02/22/2012    PROT 6.0 05/16/2022    LABALBU 3.1 05/16/2022    LABALBU 3.8 02/20/2012    CALCIUM 8.4 05/16/2022    BILITOT 0.5 05/16/2022    ALKPHOS 98 05/16/2022    AST 21 05/16/2022    ALT 31 05/16/2022     Lab Results   Component Value Date    CRP 9.9 (H) 05/14/2022    CRP 27.9 (H) 02/13/2022    CRP 4.2 (H) 12/01/2021     Lab Results   Component Value Date    SEDRATE 85 (H) 05/14/2022    SEDRATE 100 (H) 02/13/2022    SEDRATE 78 (H) 12/01/2021     Radiology:  Reviewed    Microbiology:   Blood Cultures 5/13/2022: negative so far  Blood Culture 5/14/2022: pending  Blood cultures 5/16/2022: sent     Recent Labs     05/13/22  1913   PROCAL 0.56*       ASSESSMENT:  · No evidence of infection at this time however he did have blood cultures positive in the previous admission for staph epi and 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   · Falls and weakness are due to previous CVAs with obvious right hemiparesis    PLAN:  · Continue to monitor off of antibiotics  · Repeat blood cultures obtained today during HD  · Check final & repeat cultures  · Monitor labs    JESUS Martinez - CNP  12:29 PM  5/16/2022     Patient seen and examined. I had a face to face encounter with the patient. Agree with exam.  Assessment and plan as outlined above and directed by me. Addition and corrections were done as deemed appropriate. My exam and plan include: The patient is doing well. No fevers during this admission to suspect any infection. All cultures negative. Continue to observe off antibiotics.     Martina Castillo MD  5/16/2022  5:05 PM

## 2022-05-16 NOTE — PROGRESS NOTES
[facility] MD Ann Kimball MD [place], MD Alice Galla MD  Progress Note       Patient's Name: Claudia Morrison  1:27 PM  5/16/2022      5/15:  Seen in his room , stable BP on o2/nc , pleasant , weak    5/16:  Dialysis today without event. BP stable. BFR as prescribed. Feels \"wiped out. \"  \"I am congested,\" consisting of rhinorrhea, postnasal drip, nonproductive but wet sounding cough. Denies dyspnea or chest pain. No wheeze. History of Present Ilness:    79 yo M known to our service he is presenting with cc of feeling weak, fatique and falling down   Reports of fever and chills on admission   We are asked to see for ESRD management   Pt has hx of ESRD and was placed on HD roughly one year back   Pt has TDC linea s access for dialysis   Last HD was on frirday   Pt seen in his room he is on RA he appear comforable euvolemic .      Allergies:  Sulfa antibiotics and Sulfa antibiotics    Current Medications:    heparin (porcine) injection 4,300 Units, PRN  heparin (porcine) 1000 UNIT/ML injection,   apixaban (ELIQUIS) tablet 2.5 mg, BID  levETIRAcetam (KEPPRA) tablet 500 mg, BID  sodium chloride flush 0.9 % injection 10 mL, 2 times per day  sodium chloride flush 0.9 % injection 10 mL, PRN  0.9 % sodium chloride infusion, PRN  senna (SENOKOT) tablet 8.6 mg, Daily PRN  acetaminophen (TYLENOL) tablet 650 mg, Q6H PRN   Or  acetaminophen (TYLENOL) suppository 650 mg, Q6H PRN  albuterol (ACCUNEB) nebulizer solution 2.5 mg, Q6H PRN  atorvastatin (LIPITOR) tablet 10 mg, Nightly  calcium acetate (PHOSLO) capsule 667 mg, TID WC  famotidine (PEPCID) tablet 10 mg, Daily  gabapentin (NEURONTIN) capsule 100 mg, Nightly  metoprolol succinate (TOPROL XL) extended release tablet 25 mg, Daily  insulin lispro (HUMALOG) injection vial 0-6 Units, TID WC  insulin lispro (HUMALOG) injection vial 0-3 Units, Nightly  glucose chewable tablet 16 g, PRN  dextrose bolus 10% 125 mL, PRN Or  dextrose bolus 10% 250 mL, PRN  glucagon (rDNA) injection 1 mg, PRN  dextrose 5 % solution, PRN        Review of Systems:   Constitutional: no fevers , no chills , feels ok   Eyes: no eye pain , no itching , no drainage  Ears, nose, mouth, throat, and face: no ear ,nose pain , hearing is ok ,no nasal drainage   Respiratory: no sob ,no cough ,no wheezing . Cardiovascular: no chest pain , no palpitation ,no sob . Gastrointestinal: no nausea, vomiting , constipation , no abdominal pain . Genitourinary:no urinary retention , no burning , dysuria . No polyuria   Hematologic/lymphatic: no bleeding , no cougulation issues . Musculoskeletal:no joint pain , no swelling . Neurological: no headaches ,no weakness , no numbness . Endocrine: no thirst , no weight issues . Physical exam:   Vital signs /61   Pulse 66   Temp 97.6 °F (36.4 °C)   Resp 20   Ht 5' 6\" (1.676 m)   Wt 184 lb 4.9 oz (83.6 kg)   SpO2 99%   BMI 29.75 kg/m²   Gen : NAD , appropriate for stated age . Head : at , nc   Neck : supple , no thyromegaly noted . Eyes : EOMI , PERRLA   CV : RRR , No M/R/G . Lungs: CTAB , no wheezing , good flow heard b/l   Abd : soft , NT , BS + , No Organomegaly appreciated . Skin : soft, dry . Neuro : CN  II-XII grossly intact , no focal neurologic deficit . Psych : cooperative .      Data:   Labs:  CBC with Differential:    Lab Results   Component Value Date    WBC 4.3 05/16/2022    RBC 3.04 05/16/2022    HGB 9.4 05/16/2022    HCT 29.1 05/16/2022     05/16/2022    MCV 95.7 05/16/2022    MCH 30.9 05/16/2022    MCHC 32.3 05/16/2022    RDW 14.6 05/16/2022    SEGSPCT 46 01/04/2014    BANDSPCT 2 05/02/2016    LYMPHOPCT 17.1 05/16/2022    MONOPCT 11.8 05/16/2022    BASOPCT 0.5 05/16/2022    MONOSABS 0.51 05/16/2022    LYMPHSABS 0.74 05/16/2022    EOSABS 0.31 05/16/2022    BASOSABS 0.02 05/16/2022     CMP:    Lab Results   Component Value Date     05/16/2022    K 4.2 05/16/2022    CL 90 05/16/2022    CO2 25 05/16/2022    BUN 70 05/16/2022    CREATININE 7.3 05/16/2022    GFRAA 9 05/16/2022    LABGLOM 7 05/16/2022    GLUCOSE 135 05/16/2022    GLUCOSE 163 02/22/2012    PROT 6.0 05/16/2022    LABALBU 3.1 05/16/2022    LABALBU 3.8 02/20/2012    CALCIUM 8.4 05/16/2022    BILITOT 0.5 05/16/2022    ALKPHOS 98 05/16/2022    AST 21 05/16/2022    ALT 31 05/16/2022     Ionized Calcium:  No results found for: IONCA  Magnesium:    Lab Results   Component Value Date    MG 2.1 05/13/2022     Phosphorus:    Lab Results   Component Value Date    PHOS 2.5 05/13/2022     U/A:    Lab Results   Component Value Date    COLORU Yellow 07/10/2021    PHUR 6.0 07/10/2021    WBCUA PACKED 07/10/2021    WBCUA NONE 02/20/2012    RBCUA >20 07/10/2021    RBCUA 1-3 05/04/2013    BACTERIA MANY 07/10/2021    CLARITYU CLOUDY 07/10/2021    SPECGRAV 1.020 07/10/2021    LEUKOCYTESUR LARGE 07/10/2021    UROBILINOGEN 0.2 07/10/2021    BILIRUBINUR Negative 07/10/2021    BILIRUBINUR NEGATIVE 02/20/2012    BLOODU LARGE 07/10/2021    GLUCOSEU Negative 07/10/2021    GLUCOSEU 500 02/20/2012     Microalbumen/Creatinine ratio:  No components found for: RUCREAT  Iron Saturation:  No components found for: PERCENTFE  TIBC:    Lab Results   Component Value Date    TIBC 177 08/24/2020     FERRITIN:    Lab Results   Component Value Date    FERRITIN 98 08/24/2020        Imaging:  XR CHEST PORTABLE   Final Result   Interval development of mild bibasilar subsegmental atelectatic changes. CT HEAD WO CONTRAST   Final Result   1. No acute intracranial abnormality. 2. Subdural hygroma along the left cerebral convexity, measuring 7 mm in   maximal width anteriorly. 3. 3 mm left-to-right midline shift. 4. 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 RECOMMENDATIONS:   Unavailable         XR CHEST PORTABLE   Final Result   No acute process.              Assessment    -End stage renal disease on HD MWF via [facility] TDC line :  Continue HD per schedule next HD on Monday     -Anaemia of chronic disease :  HG acceptable around 10     -Mineral bone disease /scondary hyperparathyrodism :  Continue phoslo tid with meals     -Hypertension :  Under reasonable control on these medications     -severe deconditioning     Follow labs, BP  Continue supportive care    Electronically signed by Lopez Mane MD on 5/16/2022

## 2022-05-17 LAB
ALBUMIN SERPL-MCNC: 3.1 G/DL (ref 3.5–5.2)
ALP BLD-CCNC: 93 U/L (ref 40–129)
ALT SERPL-CCNC: 25 U/L (ref 0–40)
ANION GAP SERPL CALCULATED.3IONS-SCNC: 12 MMOL/L (ref 7–16)
AST SERPL-CCNC: 14 U/L (ref 0–39)
BASOPHILS ABSOLUTE: 0.03 E9/L (ref 0–0.2)
BASOPHILS RELATIVE PERCENT: 0.7 % (ref 0–2)
BILIRUB SERPL-MCNC: 0.5 MG/DL (ref 0–1.2)
BUN BLDV-MCNC: 36 MG/DL (ref 6–23)
CALCIUM SERPL-MCNC: 8.2 MG/DL (ref 8.6–10.2)
CHLORIDE BLD-SCNC: 93 MMOL/L (ref 98–107)
CO2: 28 MMOL/L (ref 22–29)
CREAT SERPL-MCNC: 4.6 MG/DL (ref 0.7–1.2)
EOSINOPHILS ABSOLUTE: 0.23 E9/L (ref 0.05–0.5)
EOSINOPHILS RELATIVE PERCENT: 5.7 % (ref 0–6)
GFR AFRICAN AMERICAN: 15
GFR NON-AFRICAN AMERICAN: 12 ML/MIN/1.73
GLUCOSE BLD-MCNC: 128 MG/DL (ref 74–99)
HCT VFR BLD CALC: 26.3 % (ref 37–54)
HEMOGLOBIN: 8.4 G/DL (ref 12.5–16.5)
IMMATURE GRANULOCYTES #: 0.01 E9/L
IMMATURE GRANULOCYTES %: 0.2 % (ref 0–5)
LYMPHOCYTES ABSOLUTE: 1.01 E9/L (ref 1.5–4)
LYMPHOCYTES RELATIVE PERCENT: 25 % (ref 20–42)
MCH RBC QN AUTO: 30.8 PG (ref 26–35)
MCHC RBC AUTO-ENTMCNC: 31.9 % (ref 32–34.5)
MCV RBC AUTO: 96.3 FL (ref 80–99.9)
METER GLUCOSE: 164 MG/DL (ref 74–99)
METER GLUCOSE: 177 MG/DL (ref 74–99)
METER GLUCOSE: 179 MG/DL (ref 74–99)
METER GLUCOSE: 262 MG/DL (ref 74–99)
MONOCYTES ABSOLUTE: 0.65 E9/L (ref 0.1–0.95)
MONOCYTES RELATIVE PERCENT: 16.1 % (ref 2–12)
NEUTROPHILS ABSOLUTE: 2.11 E9/L (ref 1.8–7.3)
NEUTROPHILS RELATIVE PERCENT: 52.3 % (ref 43–80)
PDW BLD-RTO: 14.6 FL (ref 11.5–15)
PLATELET # BLD: 131 E9/L (ref 130–450)
PMV BLD AUTO: 11.5 FL (ref 7–12)
POTASSIUM REFLEX MAGNESIUM: 3.8 MMOL/L (ref 3.5–5)
RBC # BLD: 2.73 E12/L (ref 3.8–5.8)
SODIUM BLD-SCNC: 133 MMOL/L (ref 132–146)
TOTAL PROTEIN: 5.9 G/DL (ref 6.4–8.3)
WBC # BLD: 4 E9/L (ref 4.5–11.5)

## 2022-05-17 PROCEDURE — 2580000003 HC RX 258: Performed by: INTERNAL MEDICINE

## 2022-05-17 PROCEDURE — 80053 COMPREHEN METABOLIC PANEL: CPT

## 2022-05-17 PROCEDURE — 6370000000 HC RX 637 (ALT 250 FOR IP): Performed by: INTERNAL MEDICINE

## 2022-05-17 PROCEDURE — 36415 COLL VENOUS BLD VENIPUNCTURE: CPT

## 2022-05-17 PROCEDURE — 6370000000 HC RX 637 (ALT 250 FOR IP): Performed by: STUDENT IN AN ORGANIZED HEALTH CARE EDUCATION/TRAINING PROGRAM

## 2022-05-17 PROCEDURE — 85025 COMPLETE CBC W/AUTO DIFF WBC: CPT

## 2022-05-17 PROCEDURE — 82962 GLUCOSE BLOOD TEST: CPT

## 2022-05-17 PROCEDURE — 2500000003 HC RX 250 WO HCPCS: Performed by: INTERNAL MEDICINE

## 2022-05-17 PROCEDURE — 97165 OT EVAL LOW COMPLEX 30 MIN: CPT

## 2022-05-17 PROCEDURE — 2060000000 HC ICU INTERMEDIATE R&B

## 2022-05-17 RX ORDER — HYDROCODONE BITARTRATE AND ACETAMINOPHEN 5; 325 MG/1; MG/1
1 TABLET ORAL EVERY 6 HOURS PRN
Status: DISCONTINUED | OUTPATIENT
Start: 2022-05-17 | End: 2022-05-19 | Stop reason: HOSPADM

## 2022-05-17 RX ORDER — ANALGESIC BALM 1.74; 4.06 G/29G; G/29G
OINTMENT TOPICAL PRN
Status: DISCONTINUED | OUTPATIENT
Start: 2022-05-17 | End: 2022-05-19 | Stop reason: HOSPADM

## 2022-05-17 RX ADMIN — Medication 10 ML: at 22:27

## 2022-05-17 RX ADMIN — FAMOTIDINE 10 MG: 20 TABLET ORAL at 08:32

## 2022-05-17 RX ADMIN — MENTHOL AND METHYL SALICYLATE: 7.6; 29 OINTMENT TOPICAL at 01:58

## 2022-05-17 RX ADMIN — LEVETIRACETAM 500 MG: 500 TABLET, FILM COATED ORAL at 08:32

## 2022-05-17 RX ADMIN — APIXABAN 2.5 MG: 2.5 TABLET, FILM COATED ORAL at 22:26

## 2022-05-17 RX ADMIN — APIXABAN 2.5 MG: 2.5 TABLET, FILM COATED ORAL at 08:32

## 2022-05-17 RX ADMIN — Medication 10 ML: at 09:42

## 2022-05-17 RX ADMIN — ATORVASTATIN CALCIUM 10 MG: 20 TABLET, FILM COATED ORAL at 22:26

## 2022-05-17 RX ADMIN — INSULIN LISPRO 3 UNITS: 100 INJECTION, SOLUTION INTRAVENOUS; SUBCUTANEOUS at 17:18

## 2022-05-17 RX ADMIN — HYDROCODONE BITARTRATE AND ACETAMINOPHEN 1 TABLET: 5; 325 TABLET ORAL at 22:26

## 2022-05-17 RX ADMIN — GABAPENTIN 100 MG: 100 CAPSULE ORAL at 22:26

## 2022-05-17 RX ADMIN — HYDROCODONE BITARTRATE AND ACETAMINOPHEN 1 TABLET: 5; 325 TABLET ORAL at 04:26

## 2022-05-17 RX ADMIN — METOPROLOL SUCCINATE 25 MG: 25 TABLET, EXTENDED RELEASE ORAL at 08:32

## 2022-05-17 RX ADMIN — MENTHOL AND METHYL SALICYLATE: 7.6; 29 OINTMENT TOPICAL at 22:26

## 2022-05-17 RX ADMIN — LEVETIRACETAM 500 MG: 500 TABLET, FILM COATED ORAL at 22:26

## 2022-05-17 RX ADMIN — INSULIN LISPRO 1 UNITS: 100 INJECTION, SOLUTION INTRAVENOUS; SUBCUTANEOUS at 20:29

## 2022-05-17 RX ADMIN — ACETAMINOPHEN 650 MG: 325 TABLET ORAL at 00:19

## 2022-05-17 RX ADMIN — CALCIUM ACETATE 667 MG: 667 CAPSULE ORAL at 12:50

## 2022-05-17 RX ADMIN — CALCIUM ACETATE 667 MG: 667 CAPSULE ORAL at 17:18

## 2022-05-17 RX ADMIN — CALCIUM ACETATE 667 MG: 667 CAPSULE ORAL at 08:32

## 2022-05-17 RX ADMIN — MENTHOL AND METHYL SALICYLATE: 7.6; 29 OINTMENT TOPICAL at 11:16

## 2022-05-17 ASSESSMENT — PAIN SCALES - GENERAL
PAINLEVEL_OUTOF10: 8
PAINLEVEL_OUTOF10: 0
PAINLEVEL_OUTOF10: 6
PAINLEVEL_OUTOF10: 3

## 2022-05-17 ASSESSMENT — ENCOUNTER SYMPTOMS
NAUSEA: 0
COUGH: 0
VOMITING: 0
SHORTNESS OF BREATH: 0
DIARRHEA: 0

## 2022-05-17 ASSESSMENT — PAIN DESCRIPTION - LOCATION: LOCATION: LEG

## 2022-05-17 NOTE — PROGRESS NOTES
Progress Note  Date:2022       Room:[address] [place], [place] [address]  Patient Name:Rudi Roca     YOB: 1931     Age:89 y.o. Patient seen for follow up of weakness and fatigue. Patient this am laying in bed. He is sleeping. He arouses to touch. He states he was having pain in his knees last evening. It is improved with icy hot this am. He denies any complaints. He is oriented to person and place. Subjective    Subjective:  Symptoms:  No shortness of breath, cough, chest pain, headache, chest pressure or diarrhea. Diet:  No nausea or vomiting. Activity level: Impaired due to weakness. Review of Systems   Respiratory: Negative for cough and shortness of breath. Cardiovascular: Negative for chest pain. Gastrointestinal: Negative for diarrhea, nausea and vomiting. Objective         Vitals Last 24 Hours:  TEMPERATURE:  Temp  Av.1 °F (36.2 °C)  Min: 94.8 °F (34.9 °C)  Max: 97.6 °F (36.4 °C)  RESPIRATIONS RANGE: Resp  Av.2  Min: 16  Max: 20  PULSE OXIMETRY RANGE: SpO2  Av.7 %  Min: 95 %  Max: 99 %  PULSE RANGE: Pulse  Av.2  Min: 58  Max: 91  BLOOD PRESSURE RANGE: Systolic (02GGA), JZE:625 , Min:83 , MOOK:453   ; Diastolic (05YJC), IJ, Min:50, Max:72    I/O (24Hr): Intake/Output Summary (Last 24 hours) at 2022 0545  Last data filed at 2022 2307  Gross per 24 hour   Intake --   Output 200 ml   Net -200 ml     Objective:  General Appearance:  Comfortable, well-appearing and in no acute distress. Vital signs: (most recent): Blood pressure 115/72, pulse 69, temperature 97.5 °F (36.4 °C), temperature source Oral, resp. rate 17, height 5' 6\" (1.676 m), weight 184 lb 4.9 oz (83.6 kg), SpO2 95 %. Lungs:  Normal effort and normal respiratory rate. There are decreased breath sounds. No rales or rhonchi. Heart: Normal rate. Regular rhythm. S1 normal and S2 normal.  No friction rub. Abdomen: Abdomen is soft and non-distended.   Bowel sounds are normal. There is no abdominal tenderness. There is no epigastric area or suprapubic area tenderness. There is no rebound tenderness. There is no guarding. Extremities: There is no dependent edema. Skin:  Warm and dry. Labs/Imaging/Diagnostics    Labs:  CBC:  Recent Labs     05/16/22  0452 05/16/22  1234 05/17/22  0230   WBC 4.3* 4.3* 4.0*   RBC 2.86* 3.04* 2.73*   HGB 8.9* 9.4* 8.4*   HCT 27.6* 29.1* 26.3*   MCV 96.5 95.7 96.3   RDW 14.6 14.6 14.6    165 131     CHEMISTRIES:  Recent Labs     05/15/22  0236 05/16/22  0452 05/17/22  0230    130* 133   K 4.2 4.2 3.8   CL 94* 90* 93*   CO2 28 25 28   BUN 57* 70* 36*   CREATININE 6.3* 7.3* 4.6*   GLUCOSE 108* 135* 128*     PT/INR:No results for input(s): PROTIME, INR in the last 72 hours. APTT:No results for input(s): APTT in the last 72 hours. LIVER PROFILE:  Recent Labs     05/15/22  0236 05/16/22  0452 05/17/22  0230   AST 18 21 14   ALT 26 31 25   BILITOT 0.6 0.5 0.5   ALKPHOS 103 98 93       Imaging Last 24 Hours:  CT HEAD WO CONTRAST    Result Date: 5/13/2022  EXAMINATION: CT OF THE HEAD WITHOUT CONTRAST  5/13/2022 8:49 pm TECHNIQUE: CT of the head was performed without the administration of intravenous contrast. Automated exposure control, iterative reconstruction, and/or weight based adjustment of the mA/kV was utilized to reduce the radiation dose to as low as reasonably achievable. COMPARISON: MRI of the brain, 06/12/2014 HISTORY: ORDERING SYSTEM PROVIDED HISTORY: weakness TECHNOLOGIST PROVIDED HISTORY: Reason for exam:->weakness Has a \"code stroke\" or \"stroke alert\" been called? ->No Decision Support Exception - unselect if not a suspected or confirmed emergency medical condition->Emergency Medical Condition (MA) FINDINGS: BRAIN/VENTRICLES: No mass effect, edema or hemorrhage is seen. Seen basal ganglia thalamus external regions. There is asymmetrically prominent chronic microvascular ischemic changes in left cerebral white matter.  A subdural hygroma measuring approximately 7 mm in width is seen along the left anterior cerebral convexity. It results in left-to-right shift of approximately 3 mm. No hemorrhage or edema is seen in the brain. Moderate to severe volume loss is seen in the brain. No hydrocephalus ORBITS: The visualized portion of the orbits demonstrate no acute abnormality. SINUSES: The visualized paranasal sinuses and mastoid air cells demonstrate no acute abnormality. SOFT TISSUES/SKULL:  No acute abnormality of the visualized skull or soft tissues. 1.  No acute intracranial abnormality. 2. Subdural hygroma along the left cerebral convexity, measuring 7 mm in maximal width anteriorly. 3. 3 mm left-to-right midline shift. 4. 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 RECOMMENDATIONS: Unavailable     XR CHEST PORTABLE    Result Date: 5/14/2022  EXAMINATION: ONE XRAY VIEW OF THE CHEST 5/14/2022 10:25 am COMPARISON: 13 May 2022 HISTORY: ORDERING SYSTEM PROVIDED HISTORY: increased O2 demand TECHNOLOGIST PROVIDED HISTORY: Reason for exam:->increased O2 demand FINDINGS: Single AP erect portable chest demonstrates a large bore right sided IJ catheter remaining in place. There is no evidence of a effusion or pneumothorax. There are mild bibasilar subsegmental atelectatic changes on today's study. Interval development of mild bibasilar subsegmental atelectatic changes. XR CHEST PORTABLE    Result Date: 5/13/2022  EXAMINATION: ONE XRAY VIEW OF THE CHEST 5/13/2022 7:08 pm COMPARISON: 02/15/2022 HISTORY: ORDERING SYSTEM PROVIDED HISTORY: fever TECHNOLOGIST PROVIDED HISTORY: Reason for exam:->fever FINDINGS: The lungs are without acute focal process. There is no effusion or pneumothorax. The cardiomediastinal silhouette is without acute process. The osseous structures are without acute process. Right-sided hemodialysis catheter tip in the right atrium.   Stable elevation right hemidiaphragm. No acute process. Assessment//Plan           Hospital Problems           Last Modified POA    * (Principal) Fever of unknown origin 5/13/2022 Yes    Fever in adult 5/13/2022 Yes    Leg weakness, bilateral 5/14/2022 Yes        Assessment & Plan  1. 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   2. Subdural hygroma   3. Fatigue  4. ESRD on HD   5. Type II diabetes mellitus     Blood cultures remain negative. Appreciate ID, Nephrology and palliative care input. Am-PAC score 10- needs ROBERT on discharge. Will need continued incentive spirometry. Wean oxygen. Out of bed to chair. PT/OT.      Mariah Ugalde, DO

## 2022-05-17 NOTE — PROGRESS NOTES
[facility] MD Lopez Perea MD Lendon Canning, MD Tammi Bigness MD  Progress Note       Patient's Name: Marco Hopkins  11:07 AM  5/17/2022      5/15:  Seen in his room , stable BP on o2/nc , pleasant , weak    5/16:  Dialysis today without event. BP stable. BFR as prescribed. Feels \"wiped out. \"  \"I am congested,\" consisting of rhinorrhea, postnasal drip, nonproductive but wet sounding cough. Denies dyspnea or chest pain. No wheeze. 5/17: Resting comfortably. No new complaint. No dyspnea at rest.  ROS unremarkable asks again when he can go home. History of Present Ilness:    81 yo M known to our service he is presenting with cc of feeling weak, fatique and falling down   Reports of fever and chills on admission   We are asked to see for ESRD management   Pt has hx of ESRD and was placed on HD roughly one year back   Pt has TDC linea s access for dialysis   Last HD was on frirday   Pt seen in his room he is on RA he appear comforable euvolemic .      Allergies:  Sulfa antibiotics and Sulfa antibiotics    Current Medications:    HYDROcodone-acetaminophen (NORCO) 5-325 MG per tablet 1 tablet, Q6H PRN  analgesic ointment ointment, PRN  [START ON 5/18/2022] epoetin mayra-epbx (RETACRIT) injection 2,520 Units, Once per day on Mon Wed Fri  heparin (porcine) injection 4,300 Units, PRN  apixaban (ELIQUIS) tablet 2.5 mg, BID  levETIRAcetam (KEPPRA) tablet 500 mg, BID  sodium chloride flush 0.9 % injection 10 mL, 2 times per day  sodium chloride flush 0.9 % injection 10 mL, PRN  0.9 % sodium chloride infusion, PRN  senna (SENOKOT) tablet 8.6 mg, Daily PRN  acetaminophen (TYLENOL) tablet 650 mg, Q6H PRN   Or  acetaminophen (TYLENOL) suppository 650 mg, Q6H PRN  albuterol (ACCUNEB) nebulizer solution 2.5 mg, Q6H PRN  atorvastatin (LIPITOR) tablet 10 mg, Nightly  calcium acetate (PHOSLO) capsule 667 mg, TID WC  famotidine (PEPCID) tablet 10 mg, Daily  gabapentin (NEURONTIN) capsule 100 mg, Nightly  metoprolol succinate (TOPROL XL) extended release tablet 25 mg, Daily  insulin lispro (HUMALOG) injection vial 0-6 Units, TID WC  insulin lispro (HUMALOG) injection vial 0-3 Units, Nightly  glucose chewable tablet 16 g, PRN  dextrose bolus 10% 125 mL, PRN   Or  dextrose bolus 10% 250 mL, PRN  glucagon (rDNA) injection 1 mg, PRN  dextrose 5 % solution, PRN        Review of Systems:   Constitutional: no fevers , no chills , feels ok   Eyes: no eye pain , no itching , no drainage  Ears, nose, mouth, throat, and face: no ear ,nose pain , hearing is ok ,no nasal drainage   Respiratory: no sob ,no cough ,no wheezing . Cardiovascular: no chest pain , no palpitation ,no sob . Gastrointestinal: no nausea, vomiting , constipation , no abdominal pain . Genitourinary:no urinary retention , no burning , dysuria . No polyuria   Hematologic/lymphatic: no bleeding , no cougulation issues . Musculoskeletal:no joint pain , no swelling . Neurological: no headaches ,no weakness , no numbness . Endocrine: no thirst , no weight issues . Physical exam:   Vital signs /70   Pulse 75   Temp 98 °F (36.7 °C) (Oral)   Resp 17   Ht 5' 6\" (1.676 m)   Wt 184 lb 4.9 oz (83.6 kg)   SpO2 95%   BMI 29.75 kg/m²   Gen : NAD , appropriate for stated age . Head : at , nc   Neck : supple , no thyromegaly noted . Eyes : EOMI , PERRLA   CV : RRR , No M/R/G . Lungs: CTAB , no wheezing , good flow heard b/l   Abd : soft , NT , BS + , No Organomegaly appreciated . Skin : soft, dry . Neuro : CN  II-XII grossly intact , no focal neurologic deficit . Psych : cooperative .      Data:   Labs:  CBC with Differential:    Lab Results   Component Value Date    WBC 4.0 05/17/2022    RBC 2.73 05/17/2022    HGB 8.4 05/17/2022    HCT 26.3 05/17/2022     05/17/2022    MCV 96.3 05/17/2022    MCH 30.8 05/17/2022    MCHC 31.9 05/17/2022    RDW 14.6 05/17/2022    [facility]SPCT 46 01/04/2014    [facility]    LYMPHOPCT 25.0 05/17/2022    MONOPCT 16.1 05/17/2022    BASOPCT 0.7 05/17/2022    MONOSABS 0.65 05/17/2022    LYMPHSABS 1.01 05/17/2022    EOSABS 0.23 05/17/2022    BASOSABS 0.03 05/17/2022     CMP:    Lab Results   Component Value Date     05/17/2022    K 3.8 05/17/2022    CL 93 05/17/2022    CO2 28 05/17/2022    BUN 36 05/17/2022    CREATININE 4.6 05/17/2022    GFRAA 15 05/17/2022    LABGLOM 12 05/17/2022    GLUCOSE 128 05/17/2022    GLUCOSE 163 02/22/2012    PROT 5.9 05/17/2022    LABALBU 3.1 05/17/2022    LABALBU 3.8 02/20/2012    CALCIUM 8.2 05/17/2022    BILITOT 0.5 05/17/2022    ALKPHOS 93 05/17/2022    AST 14 05/17/2022    ALT 25 05/17/2022     Ionized Calcium:  No results found for: IONCA  Magnesium:    Lab Results   Component Value Date    MG 2.1 05/13/2022     Phosphorus:    Lab Results   Component Value Date    PHOS 2.5 05/13/2022     U/A:    Lab Results   Component Value Date    COLORU Yellow 07/10/2021    PHUR 6.0 07/10/2021    WBCUA PACKED 07/10/2021    WBCUA NONE 02/20/2012    RBCUA >20 07/10/2021    RBCUA 1-3 05/04/2013    BACTERIA MANY 07/10/2021    CLARITYU CLOUDY 07/10/2021    SPECGRAV 1.020 07/10/2021    LEUKOCYTESUR LARGE 07/10/2021    UROBILINOGEN 0.2 07/10/2021    BILIRUBINUR Negative 07/10/2021    BILIRUBINUR NEGATIVE 02/20/2012    BLOODU LARGE 07/10/2021    GLUCOSEU Negative 07/10/2021    GLUCOSEU 500 02/20/2012     Microalbumen/Creatinine ratio:  No components found for: RUCREAT  Iron Saturation:  No components found for: PERCENTFE  TIBC:    Lab Results   Component Value Date    TIBC 177 08/24/2020     FERRITIN:    Lab Results   Component Value Date    FERRITIN 98 08/24/2020        Imaging:  XR CHEST PORTABLE   Final Result   Interval development of mild bibasilar subsegmental atelectatic changes. CT HEAD WO CONTRAST   Final Result   1. No acute intracranial abnormality.    2. Subdural hygroma along the left cerebral convexity, measuring 7 mm in   maximal width anteriorly. 3. 3 mm left-to-right midline shift. 4. 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 RECOMMENDATIONS:   Unavailable         XR CHEST PORTABLE   Final Result   No acute process.              Assessment    -End stage renal disease on HD MWF via [facility] TDC line :  Continue HD per schedule next HD on Monday     -Anaemia of chronic disease :  HG acceptable around 10     -Mineral bone disease /scondary hyperparathyrodism :  Continue phoslo tid with meals     -Hypertension :  Under reasonable control on these medications     -severe deconditioning     Follow labs, BP  Continue supportive care    Electronically signed by Bridget Auguste MD on 5/17/2022

## 2022-05-17 NOTE — CARE COORDINATION
Social Work:    Social service placed a follow-up call to Alberta, patient's niece & POA. We discussed discharge planning and [facility] is aware that Mr. Roca has used all his skilled time and will require private pay for skilled rehab vs [place] [id number] plans. Alberta prefers [facility] [email or web address] [facility] 1st, [facility] 2nd, [facility] house of [address]  3rd. [facility]. A call was placed to [facility] snf and they provided a approxiamate private pay price of $330.00 per day. Social work also placed a call to [employer] and an approximate price of $375.00 per day was provided. Social work called [facility] ambulance and was advised that an ambulance transfer to dialysis should be covered from snf if medically necessary. Social work updated Alberta and advised her about [facility] as they have in-house dialysis and she requested information from [facility]. Social work left a voice message with liaison at [facility]. Await call back.     Electronically signed by SCOTT Bruce on 5/17/2022 at 11:19 AM

## 2022-05-17 NOTE — PLAN OF CARE
Problem: Safety - Adult  Goal: Free from fall injury  Outcome: Progressing     Problem: ABCDS Injury Assessment  Goal: Absence of physical injury  Outcome: Progressing  Flowsheets (Taken 5/16/2022 1945)  Absence of Physical Injury: Implement safety measures based on patient assessment     Problem: Skin/Tissue Integrity  Goal: Absence of new skin breakdown  Description: 1. Monitor for areas of redness and/or skin breakdown  2. Assess vascular access sites hourly  3. Every 4-6 hours minimum:  Change oxygen saturation probe site  4. Every 4-6 hours:  If on nasal continuous positive airway pressure, respiratory therapy assess nares and determine need for appliance change or resting period.   Outcome: Progressing     Problem: Pain  Goal: Verbalizes/displays adequate comfort level or baseline comfort level  Outcome: Progressing     Problem: Chronic Conditions and Co-morbidities  Goal: Patient's chronic conditions and co-morbidity symptoms are monitored and maintained or improved  Outcome: Progressing  Flowsheets (Taken 5/16/2022 1945)  Care Plan - Patient's Chronic Conditions and Co-Morbidity Symptoms are Monitored and Maintained or Improved: Monitor and assess patient's chronic conditions and comorbid symptoms for stability, deterioration, or improvement

## 2022-05-17 NOTE — PROGRESS NOTES
Chart reviewed. Mentation has improved back to baseline. Plan is for ROBERT vs returning home with [facility]. Code status [id number]. There are no further PM needs at this time. PM will now sign off. If new PM needs arise, please re-consult. Thank you.

## 2022-05-17 NOTE — PROGRESS NOTES
OCCUPATIONAL THERAPY INITIAL EVALUATION     Hyun Lanza [place], [place]         Date:2022                                                  Patient Name: Mago Leiva    MRN: [id number]    : 1931    Room: [address] [place] [address]      Evaluating OT: Claudia Pink OTR/L   [id number]      Referring Provider:Britt Ugalde DO    Specific Provider Orders/Date:OT eval and treat 5/15/2022      Diagnosis:  Fever of unknown origin [R50.9]  General weakness [R53.1]  Dialysis patient ([facility] [facility] [Z99.2]  Frequent falls [R29.6]  Fever in adult [R50.9]  Leg weakness, bilateral [R29.898]     Pertinent Medical History: CVA, R lawrence, dialysis,   Precautions:  Fall Risk, alarm      Assessment of current deficits    [x] Functional mobility  [x]ADLs  [x] Strength               []Cognition    [x] Functional transfers   [x] IADLs         [x] Safety Awareness   [x]Endurance    [x] Fine Coordination              [x] Balance      [] Vision/perception   []Sensation     []Gross Motor Coordination  [] ROM  [] Delirium                   [] Motor Control     OT PLAN OF CARE   OT POC based on physician orders, patient diagnosis and results of clinical assessment    Frequency/Duration  2-4 days/wk for 2 weeks PRN   Specific OT Treatment Interventions to include:   ADL retraining/adapted techniques and AE recommendations to increase functional independence within precautions                    Energy conservation techniques to improve tolerance for selfcare routine   Functional transfer/mobility training/DME recommendations for increased independence, safety and fall prevention         Patient/family education to increase safety and functional independence             Environmental modifications for safe mobility and completion of ADLs                             Therapeutic activity to improve functional performance during ADLs.                                          Therapeutic exercise to improve tolerance and functional strength for ADLs    Balance retraining/tolerance tasks for facilitation of postural control with dynamic challenges during ADLs . Positioning to improve functional independence  []    Recommended Adaptive Equipment: TBD     Home Living: Pt lives alone, 1 floor with ramp to enter.    Patient sleeps in Lift chair  Family and aides assist daily/night  Bathroom setup: tub/shower, extended tub bench, hand held shower   Equipment owned: wheelchair, transport chair     Prior Level of Function: assist  with ADLs , assist with IADLs; uses wheelchair for mobility -propels with feet   Assist with xfers     Pain Level: no pain this session ;   Cognition: A&O pleasant, following commands, conversing    Memory:  Good    Sequencing:  Good    Problem solving:  Fair+   Judgement/safety:  Fair+     Functional Assessment:  AM-PAC Daily Activity Raw Score: 14/24   Initial Eval Status  Date: 5/17/22 Treatment Status  Date: STGs = LTGs  Time frame: 10-14 days   Feeding Set-up   Supervision    Grooming Min A  Able to use L UE to assist with grooming   Min A   UB Dressing Mod A   Min A   LB Dressing Max A/dependent   Total assist to don/doff shoes   Mod A    Bathing Max A   Mod A   Toileting Max A  Mod A    Bed Mobility  MaxA   Supine to sit   Mod A   Sit-supine   (HOB elevated)  Max A to reposition   Mod A    Functional Transfers Patient sat EOB this date with SBA   Returned to bed   Mod A    Functional Mobility PTA: w/c for mobility   Mod A at w/c level  with good tolerance    Balance Sitting:     Static:  SBA- EOB   Standing: NT   Supervision - sitting   Mod A -standing    Activity Tolerance No SOB during session  O2 on   Good  with ADL activity    Visual/  Perceptual Glasses: yes                 Hand Dominance Left    AROM (PROM) Strength Additional Info:    RUE   shoulder ROM impaired from past stroke   Elbow flex/ext ROM present    - poor       LUE WFL WFL good  and wfl FMC/dexterity noted during ADL tasks       Hearing: [place] - has hearing aide   Sensation:  No c/o numbness or tingling   Tone: WFL   Edema: none observed     Comments: Upon arrival patient lying in bed . At end of session, patient returned to bed  with call light and phone within reach, all lines and tubes intact. *ALARM ON     Overall patient demonstrated  decreased independence and safety during completion of ADL/functional transfer/mobility tasks. Pt would benefit from continued skilled OT to increase safety and independence with completion of ADL/IADL tasks for functional independence and quality of life. Rehab Potential: good for established goals     Patient / Family Goal: return home       Patient and/or family were instructed on functional diagnosis, prognosis/goals and OT plan of care. Demonstrated fair +  understanding. Eval Complexity: Low    Time In: 1444  Time Out: 1505      Min Units   OT Eval Low 97165 x  1   OT Eval Medium 04983      OT Eval High 32086      OT Re-Eval [id number]       Therapeutic Ex 32641       Therapeutic Activities 39498       ADL/Self Care 75354       Orthotic Management 89624       Manual 06103     Neuro Re-Ed 28564       Non-Billable Time         Evaluation Time additionally includes thorough review of current medical information, gathering information on past medical history/social history and prior level of function, interpretation of standardized testing/informal observation of tasks, assessment of data and development of plan of care and goals.             Kelsey Dove  OTR/L  OT [id number]

## 2022-05-17 NOTE — PROGRESS NOTES
P Quality Flow/Interdisciplinary Rounds Progress Note        Quality Flow Rounds held on May 17, 2022    Disciplines Attending:  Bedside Nurse,  and Nursing Unit Leadership    Jonatan Khan was admitted on 5/13/2022  6:24 PM    Anticipated Discharge Date:  Expected Discharge Date: 05/17/22    Disposition:    Kristian Score:  Kristian Scale Score: 14    Readmission Risk              Risk of Unplanned Readmission:  37           Discussed patient goal for the day, patient clinical progression, and barriers to discharge. The following Goal(s) of the Day/Commitment(s) have been identified:  wean O2.       Ayden James RN  May 17, 2022

## 2022-05-17 NOTE — CARE COORDINATION
Left message on Sylvia voice mail that I called [facility] lab to obtain copy of labs  And faxed the lab results to Dr. Fer Cox. For her to review. Social Work:    Received a call back from [facility] [place] [address] that they have no open dialysis beds and are having a difficult time obtaining transportation. Social work updated harry Miles and she mentioned possibly [facility], [facility], [address]  or  Bd. Pinky at [facility] declined the referral due to private pay and transport. Rebecca, liaison at [facility] advised that they have no open beds, however, Parvez De Paz has a bed open at [facility] and has agreed to review Mr. Roca's chart for possible acceptance. Social work updated [facility]. Await call back from [facility].     Electronically signed by SCOTT Clements on 5/17/2022 at 1:56 PM

## 2022-05-17 NOTE — PROGRESS NOTES
[address] [place] [address] [facility]  Progress Note    SUBJECTIVE:  Chief Complaint   Patient presents with    Fatigue     Patient is resting in bed, in no distress  No fevers or chills. Denies any complaints today     Review of systems:  As stated above in the chief complaint, otherwise negative. Medications:  Scheduled Meds:   [START ON 2022] epoetin mayra-epbx  30 Units/kg SubCUTAneous Once per day on     apixaban  2.5 mg Oral BID    levETIRAcetam  500 mg Oral BID    sodium chloride flush  10 mL IntraVENous 2 times per day    atorvastatin  10 mg Oral Nightly    calcium acetate  1 capsule Oral TID WC    famotidine  10 mg Oral Daily    gabapentin  100 mg Oral Nightly    metoprolol succinate  25 mg Oral Daily    insulin lispro  0-6 Units SubCUTAneous TID WC    insulin lispro  0-3 Units SubCUTAneous Nightly     Continuous Infusions:   sodium chloride      dextrose       PRN Meds:HYDROcodone 5 mg - acetaminophen, analgesic ointment, heparin (porcine), sodium chloride flush, sodium chloride, senna, acetaminophen **OR** acetaminophen, albuterol, glucose, dextrose bolus **OR** dextrose bolus, glucagon (rDNA), dextrose    OBJECTIVE:  /70   Pulse 75   Temp 98 °F (36.7 °C) (Oral)   Resp 17   Ht 5' 6\" (1.676 m)   Wt 184 lb 4.9 oz (83.6 kg)   SpO2 95%   BMI 29.75 kg/m²   Temp  Av.7 °F (36.5 °C)  Min: 97.3 °F (36.3 °C)  Max: 98 °F (36.7 °C)  Constitutional: The patient is awake, alert, and oriented. Sitting up in bed, in no distress. Skin: Warm and dry. No rashes were noted. HEENT: Round and reactive pupils. Moist mucous membranes. No ulcerations or thrush. Neck: Supple to movements. Chest: No respiratory distress. Symmetrical expansion. No wheezing, or crackles. R chest tessio with dressing  Cardiovascular: S1 and S2 are rhythmic and regular. No murmurs appreciated. Abdomen: Positive bowel sounds to auscultation. Benign to palpation.  No masses felt.  Extremities: Trace edema RLE. R hemiparesis. Lines: peripheral   R Chest Tessio 8/11/2021    Laboratory and Tests Review:  Lab Results   Component Value Date    WBC 4.0 (L) 05/17/2022    WBC 4.3 (L) 05/16/2022    WBC 4.3 (L) 05/16/2022    HGB 8.4 (L) 05/17/2022    HCT 26.3 (L) 05/17/2022    MCV 96.3 05/17/2022     05/17/2022     Lab Results   Component Value Date    NEUTROABS 2.11 05/17/2022    NEUTROABS 2.73 05/16/2022    NEUTROABS 2.17 05/16/2022     No results found for: [facility]  Lab Results   Component Value Date    ALT 25 05/17/2022    AST 14 05/17/2022    ALKPHOS 93 05/17/2022    BILITOT 0.5 05/17/2022     Lab Results   Component Value Date     05/17/2022    K 3.8 05/17/2022    CL 93 05/17/2022    CO2 28 05/17/2022    BUN 36 05/17/2022    CREATININE 4.6 05/17/2022    CREATININE 7.3 05/16/2022    CREATININE 6.3 05/15/2022    GFRAA 15 05/17/2022    LABGLOM 12 05/17/2022    GLUCOSE 128 05/17/2022    GLUCOSE 163 02/22/2012    PROT 5.9 05/17/2022    LABALBU 3.1 05/17/2022    LABALBU 3.8 02/20/2012    CALCIUM 8.2 05/17/2022    BILITOT 0.5 05/17/2022    ALKPHOS 93 05/17/2022    AST 14 05/17/2022    ALT 25 05/17/2022     Lab Results   Component Value Date    CRP 9.9 (H) 05/14/2022    CRP 27.9 (H) 02/13/2022    CRP 4.2 (H) 12/01/2021     Lab Results   Component Value Date    SEDRATE 85 (H) 05/14/2022    SEDRATE 100 (H) 02/13/2022    SEDRATE 78 (H) 12/01/2021     Radiology:  Reviewed    Microbiology:   Blood Cultures 5/13/2022: negative so far  Blood Culture 5/14/2022: negative so far  Blood cultures 5/16/2022: sent     No results for input(s): PROCAL in the last 72 hours. ASSESSMENT:  · No evidence of infection at this time however he did have blood cultures positive in the previous admission for staph epi and 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   · Falls and weakness are due to previous CVAs with obvious right hemiparesis    PLAN:  · Continue to monitor off of antibiotics  · Repeat blood cultures negative so far  · Labs reviewed     JESUS Perez CNP  10:03 AM  5/17/2022     Patient seen and examined. I had a face to face encounter with the patient. Agree with exam.  Assessment and plan as outlined above and directed by me. Addition and corrections were done as deemed appropriate. My exam and plan include: The patient is doing well. He has no new complaints. All cultures are negative so far. No ongoing infections. He is off antibiotics. He can be discharged from ID standpoint.     Sisi Webber MD  5/17/2022  1:34 PM

## 2022-05-18 LAB
ALBUMIN SERPL-MCNC: 3.2 G/DL (ref 3.5–5.2)
ALP BLD-CCNC: 93 U/L (ref 40–129)
ALT SERPL-CCNC: 20 U/L (ref 0–40)
ANION GAP SERPL CALCULATED.3IONS-SCNC: 13 MMOL/L (ref 7–16)
AST SERPL-CCNC: 12 U/L (ref 0–39)
BASOPHILS ABSOLUTE: 0.03 E9/L (ref 0–0.2)
BASOPHILS RELATIVE PERCENT: 0.7 % (ref 0–2)
BILIRUB SERPL-MCNC: 0.4 MG/DL (ref 0–1.2)
BLOOD CULTURE, ROUTINE: NORMAL
BUN BLDV-MCNC: 49 MG/DL (ref 6–23)
CALCIUM SERPL-MCNC: 8.1 MG/DL (ref 8.6–10.2)
CHLORIDE BLD-SCNC: 92 MMOL/L (ref 98–107)
CO2: 29 MMOL/L (ref 22–29)
CREAT SERPL-MCNC: 6.1 MG/DL (ref 0.7–1.2)
CULTURE, BLOOD 2: NORMAL
EOSINOPHILS ABSOLUTE: 0.26 E9/L (ref 0.05–0.5)
EOSINOPHILS RELATIVE PERCENT: 5.7 % (ref 0–6)
GFR AFRICAN AMERICAN: 11
GFR NON-AFRICAN AMERICAN: 9 ML/MIN/1.73
GLUCOSE BLD-MCNC: 148 MG/DL (ref 74–99)
HCT VFR BLD CALC: 25.9 % (ref 37–54)
HEMOGLOBIN: 8.3 G/DL (ref 12.5–16.5)
IMMATURE GRANULOCYTES #: 0.01 E9/L
IMMATURE GRANULOCYTES %: 0.2 % (ref 0–5)
LYMPHOCYTES ABSOLUTE: 1.2 E9/L (ref 1.5–4)
LYMPHOCYTES RELATIVE PERCENT: 26.2 % (ref 20–42)
MCH RBC QN AUTO: 31 PG (ref 26–35)
MCHC RBC AUTO-ENTMCNC: 32 % (ref 32–34.5)
MCV RBC AUTO: 96.6 FL (ref 80–99.9)
METER GLUCOSE: 120 MG/DL (ref 74–99)
METER GLUCOSE: 140 MG/DL (ref 74–99)
METER GLUCOSE: 162 MG/DL (ref 74–99)
MONOCYTES ABSOLUTE: 0.7 E9/L (ref 0.1–0.95)
MONOCYTES RELATIVE PERCENT: 15.3 % (ref 2–12)
NEUTROPHILS ABSOLUTE: 2.38 E9/L (ref 1.8–7.3)
NEUTROPHILS RELATIVE PERCENT: 51.9 % (ref 43–80)
PDW BLD-RTO: 14.6 FL (ref 11.5–15)
PLATELET # BLD: 161 E9/L (ref 130–450)
PMV BLD AUTO: 10.5 FL (ref 7–12)
POTASSIUM REFLEX MAGNESIUM: 4.3 MMOL/L (ref 3.5–5)
RBC # BLD: 2.68 E12/L (ref 3.8–5.8)
SODIUM BLD-SCNC: 134 MMOL/L (ref 132–146)
TOTAL PROTEIN: 6 G/DL (ref 6.4–8.3)
WBC # BLD: 4.6 E9/L (ref 4.5–11.5)

## 2022-05-18 PROCEDURE — 36415 COLL VENOUS BLD VENIPUNCTURE: CPT

## 2022-05-18 PROCEDURE — 2500000003 HC RX 250 WO HCPCS: Performed by: INTERNAL MEDICINE

## 2022-05-18 PROCEDURE — 2580000003 HC RX 258: Performed by: INTERNAL MEDICINE

## 2022-05-18 PROCEDURE — 80053 COMPREHEN METABOLIC PANEL: CPT

## 2022-05-18 PROCEDURE — 6370000000 HC RX 637 (ALT 250 FOR IP): Performed by: STUDENT IN AN ORGANIZED HEALTH CARE EDUCATION/TRAINING PROGRAM

## 2022-05-18 PROCEDURE — 90935 HEMODIALYSIS ONE EVALUATION: CPT

## 2022-05-18 PROCEDURE — 2060000000 HC ICU INTERMEDIATE R&B

## 2022-05-18 PROCEDURE — 97530 THERAPEUTIC ACTIVITIES: CPT

## 2022-05-18 PROCEDURE — 85025 COMPLETE CBC W/AUTO DIFF WBC: CPT

## 2022-05-18 PROCEDURE — 82962 GLUCOSE BLOOD TEST: CPT

## 2022-05-18 PROCEDURE — 6360000002 HC RX W HCPCS: Performed by: INTERNAL MEDICINE

## 2022-05-18 PROCEDURE — 6370000000 HC RX 637 (ALT 250 FOR IP): Performed by: INTERNAL MEDICINE

## 2022-05-18 RX ADMIN — FAMOTIDINE 10 MG: 20 TABLET ORAL at 08:09

## 2022-05-18 RX ADMIN — EPOETIN ALFA-EPBX 2520 UNITS: 3000 INJECTION, SOLUTION INTRAVENOUS; SUBCUTANEOUS at 08:09

## 2022-05-18 RX ADMIN — LEVETIRACETAM 500 MG: 500 TABLET, FILM COATED ORAL at 08:09

## 2022-05-18 RX ADMIN — CALCIUM ACETATE 667 MG: 667 CAPSULE ORAL at 08:09

## 2022-05-18 RX ADMIN — APIXABAN 2.5 MG: 2.5 TABLET, FILM COATED ORAL at 20:56

## 2022-05-18 RX ADMIN — METOPROLOL SUCCINATE 25 MG: 25 TABLET, EXTENDED RELEASE ORAL at 08:09

## 2022-05-18 RX ADMIN — INSULIN LISPRO 1 UNITS: 100 INJECTION, SOLUTION INTRAVENOUS; SUBCUTANEOUS at 16:50

## 2022-05-18 RX ADMIN — CALCIUM ACETATE 667 MG: 667 CAPSULE ORAL at 17:39

## 2022-05-18 RX ADMIN — APIXABAN 2.5 MG: 2.5 TABLET, FILM COATED ORAL at 08:09

## 2022-05-18 RX ADMIN — GABAPENTIN 100 MG: 100 CAPSULE ORAL at 20:56

## 2022-05-18 RX ADMIN — Medication 10 ML: at 08:10

## 2022-05-18 RX ADMIN — LEVETIRACETAM 500 MG: 500 TABLET, FILM COATED ORAL at 20:56

## 2022-05-18 RX ADMIN — ATORVASTATIN CALCIUM 10 MG: 20 TABLET, FILM COATED ORAL at 20:56

## 2022-05-18 RX ADMIN — MENTHOL AND METHYL SALICYLATE: 7.6; 29 OINTMENT TOPICAL at 16:52

## 2022-05-18 RX ADMIN — Medication 10 ML: at 20:56

## 2022-05-18 ASSESSMENT — ENCOUNTER SYMPTOMS
SHORTNESS OF BREATH: 0
VOMITING: 0
COUGH: 0
DIARRHEA: 0
NAUSEA: 0

## 2022-05-18 ASSESSMENT — PAIN SCALES - GENERAL
PAINLEVEL_OUTOF10: 0
PAINLEVEL_OUTOF10: 0

## 2022-05-18 NOTE — PLAN OF CARE
Problem: Safety - Adult  Goal: Free from fall injury  Outcome: Progressing  Flowsheets (Taken 5/17/2022 2030)  Free From Fall Injury: Instruct family/caregiver on patient safety     Problem: ABCDS Injury Assessment  Goal: Absence of physical injury  Outcome: Progressing  Flowsheets (Taken 5/17/2022 2030)  Absence of Physical Injury: Implement safety measures based on patient assessment     Problem: Skin/Tissue Integrity  Goal: Absence of new skin breakdown  Description: 1. Monitor for areas of redness and/or skin breakdown  2. Assess vascular access sites hourly  3. Every 4-6 hours minimum:  Change oxygen saturation probe site  4. Every 4-6 hours:  If on nasal continuous positive airway pressure, respiratory therapy assess nares and determine need for appliance change or resting period.   Outcome: Progressing     Problem: Pain  Goal: Verbalizes/displays adequate comfort level or baseline comfort level  Outcome: Progressing     Problem: Chronic Conditions and Co-morbidities  Goal: Patient's chronic conditions and co-morbidity symptoms are monitored and maintained or improved  Outcome: Progressing  Flowsheets (Taken 5/17/2022 2030)  Care Plan - Patient's Chronic Conditions and Co-Morbidity Symptoms are Monitored and Maintained or Improved: Monitor and assess patient's chronic conditions and comorbid symptoms for stability, deterioration, or improvement

## 2022-05-18 NOTE — PROGRESS NOTES
Progress Note  Date:2022       Room:[address] [place], [place] [id number]  Patient Name:Rudi Roca     YOB: 1931     Age:89 y.o. Patient seen for follow up of weakness and fatigue. Patient this am laying in bed. He is sleeping. He arouses to touch. Per nursing patient did well last night. She states that his knee pain is improved with the icy hot. Subjective    Subjective:  Symptoms:  No shortness of breath, cough, chest pain, headache, chest pressure or diarrhea. Diet:  No nausea or vomiting. Activity level: Impaired due to weakness. Review of Systems   Respiratory: Negative for cough and shortness of breath. Cardiovascular: Negative for chest pain. Gastrointestinal: Negative for diarrhea, nausea and vomiting. Objective         Vitals Last 24 Hours:  TEMPERATURE:  Temp  Av.9 °F (36.6 °C)  Min: 97.3 °F (36.3 °C)  Max: 98.5 °F (36.9 °C)  RESPIRATIONS RANGE: Resp  Av.6  Min: 16  Max: 17  PULSE OXIMETRY RANGE: SpO2  Av.3 %  Min: 94 %  Max: 97 %  PULSE RANGE: Pulse  Av  Min: 72  Max: 85  BLOOD PRESSURE RANGE: Systolic (36RYM), MZL:782 , Min:108 , TGE:337   ; Diastolic (61UHL), XJL:33, Min:56, Max:74    I/O (24Hr): Intake/Output Summary (Last 24 hours) at 2022 0554  Last data filed at 2022 2256  Gross per 24 hour   Intake --   Output 75 ml   Net -75 ml     Objective:  General Appearance:  Comfortable, well-appearing and in no acute distress. Vital signs: (most recent): Blood pressure (!) 108/56, pulse 72, temperature 98.5 °F (36.9 °C), temperature source Oral, resp. rate 17, height 5' 6\" (1.676 m), weight 184 lb 4.9 oz (83.6 kg), SpO2 97 %. Lungs:  Normal effort and normal respiratory rate. There are decreased breath sounds. No rales or rhonchi. Heart: Normal rate. Regular rhythm. S1 normal and S2 normal.  No friction rub. Abdomen: Abdomen is soft and non-distended. Bowel sounds are normal.   There is no abdominal tenderness.   There is no epigastric area or suprapubic area tenderness. There is no rebound tenderness. There is no guarding. Extremities: There is no dependent edema. Skin:  Warm and dry. Labs/Imaging/Diagnostics    Labs:  CBC:  Recent Labs     05/16/22  1234 05/17/22  0230 05/18/22  0256   WBC 4.3* 4.0* 4.6   RBC 3.04* 2.73* 2.68*   HGB 9.4* 8.4* 8.3*   HCT 29.1* 26.3* 25.9*   MCV 95.7 96.3 96.6   RDW 14.6 14.6 14.6    131 161     CHEMISTRIES:  Recent Labs     05/16/22  0452 05/17/22  0230 05/18/22  0256   * 133 134   K 4.2 3.8 4.3   CL 90* 93* 92*   CO2 25 28 29   BUN 70* 36* 49*   CREATININE 7.3* 4.6* 6.1*   GLUCOSE 135* 128* 148*     PT/INR:No results for input(s): PROTIME, INR in the last 72 hours. APTT:No results for input(s): APTT in the last 72 hours. LIVER PROFILE:  Recent Labs     05/16/22  0452 05/17/22  0230 05/18/22  0256   AST 21 14 12   ALT 31 25 20   BILITOT 0.5 0.5 0.4   ALKPHOS 98 93 93       Imaging Last 24 Hours:  CT HEAD WO CONTRAST    Result Date: 5/13/2022  EXAMINATION: CT OF THE HEAD WITHOUT CONTRAST  5/13/2022 8:49 pm TECHNIQUE: CT of the head was performed without the administration of intravenous contrast. Automated exposure control, iterative reconstruction, and/or weight based adjustment of the mA/kV was utilized to reduce the radiation dose to as low as reasonably achievable. COMPARISON: MRI of the brain, 06/12/2014 HISTORY: ORDERING SYSTEM PROVIDED HISTORY: weakness TECHNOLOGIST PROVIDED HISTORY: Reason for exam:->weakness Has a \"code stroke\" or \"stroke alert\" been called? ->No Decision Support Exception - unselect if not a suspected or confirmed emergency medical condition->Emergency Medical Condition (MA) FINDINGS: BRAIN/VENTRICLES: No mass effect, edema or hemorrhage is seen. Seen basal ganglia thalamus external regions. There is asymmetrically prominent chronic microvascular ischemic changes in left cerebral white matter.  A subdural hygroma measuring approximately 7 mm in width is seen along the left anterior cerebral convexity. It results in left-to-right shift of approximately 3 mm. No hemorrhage or edema is seen in the brain. Moderate to severe volume loss is seen in the brain. No hydrocephalus ORBITS: The visualized portion of the orbits demonstrate no acute abnormality. SINUSES: The visualized paranasal sinuses and mastoid air cells demonstrate no acute abnormality. SOFT TISSUES/SKULL:  No acute abnormality of the visualized skull or soft tissues. 1.  No acute intracranial abnormality. 2. Subdural hygroma along the left cerebral convexity, measuring 7 mm in maximal width anteriorly. 3. 3 mm left-to-right midline shift. 4. 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 RECOMMENDATIONS: Unavailable     XR CHEST PORTABLE    Result Date: 5/14/2022  EXAMINATION: ONE XRAY VIEW OF THE CHEST 5/14/2022 10:25 am COMPARISON: 13 May 2022 HISTORY: ORDERING SYSTEM PROVIDED HISTORY: increased O2 demand TECHNOLOGIST PROVIDED HISTORY: Reason for exam:->increased O2 demand FINDINGS: Single AP erect portable chest demonstrates a large bore right sided IJ catheter remaining in place. There is no evidence of a effusion or pneumothorax. There are mild bibasilar subsegmental atelectatic changes on today's study. Interval development of mild bibasilar subsegmental atelectatic changes. XR CHEST PORTABLE    Result Date: 5/13/2022  EXAMINATION: ONE XRAY VIEW OF THE CHEST 5/13/2022 7:08 pm COMPARISON: 02/15/2022 HISTORY: ORDERING SYSTEM PROVIDED HISTORY: fever TECHNOLOGIST PROVIDED HISTORY: Reason for exam:->fever FINDINGS: The lungs are without acute focal process. There is no effusion or pneumothorax. The cardiomediastinal silhouette is without acute process. The osseous structures are without acute process. Right-sided hemodialysis catheter tip in the right atrium. Stable elevation right hemidiaphragm. No acute process. Assessment//Plan           Hospital Problems           Last Modified POA    * (Principal) Fever of unknown origin 5/13/2022 Yes    Fever in adult 5/13/2022 Yes    Leg weakness, bilateral 5/14/2022 Yes        Assessment & Plan  1. 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   2. Subdural hygroma   3. Fatigue  4. ESRD on HD   5. Type II diabetes mellitus     Blood cultures remain negative. Appreciate ID, Nephrology and palliative care input. Am-PAC score 10- needs MINISTERIO on discharge. Continue incentive spirometry. Wean oxygen. Out of bed to chair. PT/OT.  working on ministerio as out of skilled days.      Elmer Dodge Tram, DO

## 2022-05-18 NOTE — PROGRESS NOTES
Physical Therapy  Facility/Department: [address] INTERMEDIATE  Physical Therapy Treatment Note    Name: Myles Kimball  : 1931  MRN: [id number]  Date of Service: 2022      Patient Diagnosis(es): The primary encounter diagnosis was Dialysis patient [facility]). Diagnoses of General weakness, Frequent falls, and Lactic acidosis were also pertinent to this visit. 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 ([address] CAD (coronary artery disease), CHF (congestive heart failure) ([address] Chronic kidney disease, COVID-19, CVA (cerebral vascular accident) ([address] Diabetes mellitus ([address] Diabetic peripheral neuropathy ([address] Hemodialysis patient ([address] History of CVA (cerebrovascular accident), Hypertension, Ischemic ulcer of toe, limited to breakdown of skin, right ([address] Other disorders of kidney and ureter, PVD (peripheral vascular disease) with claudication ([address] Right sided weakness, TIA (transient ischemic attack), and Unspecified cerebral artery occlusion with cerebral infarction. Past Surgical History:  has a past surgical history that includes Abdominal hernia repair; Cataract removal; Dental surgery; Inguinal hernia repair; Cholecystectomy (2011); Inner ear surgery; hc dialysis catheter (Right, 2020); eye surgery; shunt revision (Right, 2021); vascular surgery (Right, 2021); vascular surgery (Left, 2021); and vascular surgery (Right, 2021). Evaluating Therapist: Alicia Fernando PT    Room #:  2375/5843-J  Diagnosis:  Fever of unknown origin [R50.9]  General weakness [R53.1]  Dialysis patient [facility]) [Z99.2]  Frequent falls [R29.6]  Fever in adult [R50.9]  Leg weakness, bilateral [R29.898]  PMHx/PSHx:  CVA with R side weakness, CHF  Precautions:  Falls, O2, alarm      Social:  Pt lives niece  in a 1 floor plan 3 steps to enter. Prior to admission Pt states he does not walk.  Transfers to wheelchair with assist of caregiver. Has caregivers daily     Initial Evaluation  Date: 5/15/22 Treatment  5/18/2022    Short Term/ Long Term   Goals   Was pt agreeable to Eval/treatment? yes yes    Does pt have pain? No c/o pain R LE pain during bed mobility    Bed Mobility  Rolling: max assist  Supine to sit: max assist  Sit to supine: NT  Scooting: max assist Rolling: Max A  Supine to sit: Max A  Scooting: Max A seated to EOB and side to side in the chair Mod assist   Transfers Sit to stand: max assist  Stand to sit: max assist  Stand pivot: max assist Sit to stand; Max A  Stand to sit: Max A  Stand pivot: Max A bed to chair without A/D Mod assist   Ambulation    NT NT N/A   Stair Negotiation  Ascended and descended  NT   N/A    LE strength     R ankle 0/5 all else R 2/5   L LE 3+/5        balance      Sitting balance fair, standing poor     AM-PAC Raw score               10/24 10/24          Pt is alert and able to follow instruction  Balance: poor sitting EOB, poor during pivot transfer without A/D    Pt performed therapeutic exercise of the following: NT    Patient education  Pt was educated on L UE usage to assist with sitting balance, LE participation as able during pivot transfer    Patient response to education:   Pt verbalized understanding Pt demonstrated skill Pt requires further education in this area   yes With instruction yes     ASSESSMENT:   Comments: Nurse ok with rx. Pt found in a bed, agreed to get to a chair. Pt assisted to EOB, sat EOB Min A for balance using L UE for support. Prompt required while EOB to promote proper R LE positioning prior to pivot transfer bed to chair. Strong side pivot performed. Pt remains unable to advance or WB R LE. Once to the chair, much assist required for Pt to scoot side to side in the chair for proper positioning.    Treatment: Pt practiced and was instructed in the following treatment: sitting balance, transfer participation    Pt was left in a bedside chair with call light in reach, waffle cushion in place, R UE elevated for comfort    Chair/bed alarm: chair alarm active    Time in 0932   Time out 0952   Total Treatment Time 20 minutes   CPT codes:     Therapeutic activities 13281 20 minutes   Therapeutic exercises 55499 0 minutes       Pt is making fair progress toward established Physical Therapy goals. Continue with physical therapy current plan of care.     Kaylee Gonzalez PTA   License Number: [id number]

## 2022-05-18 NOTE — CARE COORDINATION
Social Work:    Social work placed a call to Billie Woods at [address] [place] [address] advising that Mr. Roca was accepted however, his niece Mai Ropes them that they are not a choice. Social work placed a call to niece Adonaylokesh Cardenas who explained that Avtar Montano at [address] [place] [address] could not guarantee transportation to/from [address] [place] [address]. Social work placed another call to Billie Leaks at [address] [place] [address] who advised that they contract with [address] [place] [address] and patient is responsible for ambulette rides to/from [facility], which is generally given at a discounted rate since they contract with [address] [place] [address]. Social work updated Abner Cardenas and offered to check other facilities for her, however, she is agreeable to transfer to [address] [place] [address]. Social work notified Rebecca at [address] [place] [address]. Await call back.     Electronically signed by SCOTT Chi on 5/18/2022 at 2:06 PM

## 2022-05-18 NOTE — PROGRESS NOTES
Occupational Therapy  Date:5/18/2022  Patient Name: Keenan Gupta  Room: [facility]3909-A     Occupational Therapy (OT) treatment attempted this afternoon; patient unavailable due to being off unit. Continue OT POC, as appropriate/able. Lauren L. Vann Brunner, OTR/L  License Number: [id number]

## 2022-05-18 NOTE — CARE COORDINATION
Social Work:    Social work received a call back from [facility] that Angelo at [facility] was not confident that he could confirm transport to/from dialysis, therefore, Katrin Saldivar requested social work send a referral to [facility]. Social work called a referral in to Chapis Graves at [facility], advised her about private pay rehab need, as well as transport to/from dialysis. Social work confirmed with Noble Manjarrez that Mr. Roca is able to sit in a wheelchair. Await evaluation from [facility]. Emerald Morgan RN is aware that social service is still working on a rehab bed.     Electronically signed by SCOTT Tracy on 5/18/2022 at 3:23 PM

## 2022-05-18 NOTE — FLOWSHEET NOTE
05/18/22 1542   Vital Signs   BP (!) 129/42   Temp 97.7 °F (36.5 °C)   Pulse 89   Resp 16   Weight 181 lb (82.1 kg)   Weight Method Bed scale   Percent Weight Change -2.38   Pain Assessment   Pain Assessment None - Denies Pain   Post-Hemodialysis Assessment   Post-Treatment Procedures Blood returned;Catheter capped, clamped and heparinized x 2 ports   Machine Disinfection Process Acid/Vinegar Clean;Exterior Machine Disinfection; Heat Disinfect   Rinseback Volume (ml) 300 ml   Total Liters Processed (l/min) 70 l/min   Dialyzer Clearance Lightly streaked   Duration of Treatment (minutes) 210 minutes   Heparin amount administered during treatment (units) 0 units   Hemodialysis Intake (ml) 300 ml   Hemodialysis Output (ml) 2000 ml   NET Removed (ml) 1700   Tolerated Treatment Good   Patient Response to Treatment Pt tolerated well.  Ate complete lunch, blood pressure stable post.   Bilateral Breath Sounds Diminished   Edema Generalized   Edema Generalized +1;Non-pitting   RLE Edema +1;Non-pitting   Physician Notified No   Time Off 3364   Patient Disposition Return to room

## 2022-05-18 NOTE — PROGRESS NOTES
[address] [place] [address] [facility]  Progress Note    SUBJECTIVE:  Chief Complaint   Patient presents with    Fatigue     Patient is up to the chair, working with PT  Denies any issues  Asking when he can go home   No fevers. Review of systems:  As stated above in the chief complaint, otherwise negative. Medications:  Scheduled Meds:   epoetin mayra-epbx  30 Units/kg SubCUTAneous Once per day on     apixaban  2.5 mg Oral BID    levETIRAcetam  500 mg Oral BID    sodium chloride flush  10 mL IntraVENous 2 times per day    atorvastatin  10 mg Oral Nightly    calcium acetate  1 capsule Oral TID WC    famotidine  10 mg Oral Daily    gabapentin  100 mg Oral Nightly    metoprolol succinate  25 mg Oral Daily    insulin lispro  0-6 Units SubCUTAneous TID WC    insulin lispro  0-3 Units SubCUTAneous Nightly     Continuous Infusions:   sodium chloride      dextrose       PRN Meds:HYDROcodone 5 mg - acetaminophen, analgesic ointment, heparin (porcine), sodium chloride flush, sodium chloride, senna, acetaminophen **OR** acetaminophen, albuterol, glucose, dextrose bolus **OR** dextrose bolus, glucagon (rDNA), dextrose    OBJECTIVE:  /60   Pulse 85   Temp 98.2 °F (36.8 °C) (Oral)   Resp 17   Ht 5' 6\" (1.676 m)   Wt 188 lb 7.9 oz (85.5 kg)   SpO2 94%   BMI 30.42 kg/m²   Temp  Av °F (36.7 °C)  Min: 97.3 °F (36.3 °C)  Max: 98.5 °F (36.9 °C)  Constitutional: The patient is awake, alert, and oriented. Up to the chair. Skin: Warm and dry. No rashes were noted. HEENT: Round and reactive pupils. Moist mucous membranes. No ulcerations or thrush. Hard of hearing - hearing aids. Neck: Supple to movements. Chest: No respiratory distress. No wheezing, or crackles. R chest tessio with dressing  Cardiovascular: S1 and S2 are rhythmic and regular. No murmurs appreciated. Abdomen: Positive bowel sounds to auscultation. Benign to palpation. No masses felt.   Extremities: Trace edema RLE. R hemiparesis. Lines: peripheral   R Chest Tessio 8/11/2021    Laboratory and Tests Review:  Lab Results   Component Value Date    WBC 4.6 05/18/2022    WBC 4.0 (L) 05/17/2022    WBC 4.3 (L) 05/16/2022    HGB 8.3 (L) 05/18/2022    HCT 25.9 (L) 05/18/2022    MCV 96.6 05/18/2022     05/18/2022     Lab Results   Component Value Date    NEUTROABS 2.38 05/18/2022    NEUTROABS 2.11 05/17/2022    NEUTROABS 2.73 05/16/2022     No results found for: CRP  Lab Results   Component Value Date    ALT 20 05/18/2022    AST 12 05/18/2022    ALKPHOS 93 05/18/2022    BILITOT 0.4 05/18/2022     Lab Results   Component Value Date     05/18/2022    K 4.3 05/18/2022    CL 92 05/18/2022    CO2 29 05/18/2022    BUN 49 05/18/2022    CREATININE 6.1 05/18/2022    CREATININE 4.6 05/17/2022    CREATININE 7.3 05/16/2022    GFRAA 11 05/18/2022    LABGLOM 9 05/18/2022    GLUCOSE 148 05/18/2022    GLUCOSE 163 02/22/2012    PROT 6.0 05/18/2022    LABALBU 3.2 05/18/2022    LABALBU 3.8 02/20/2012    CALCIUM 8.1 05/18/2022    BILITOT 0.4 05/18/2022    ALKPHOS 93 05/18/2022    AST 12 05/18/2022    ALT 20 05/18/2022     Lab Results   Component Value Date    CRP 9.9 (H) 05/14/2022    CRP 27.9 (H) 02/13/2022    CRP 4.2 (H) 12/01/2021     Lab Results   Component Value Date    SEDRATE 85 (H) 05/14/2022    SEDRATE 100 (H) 02/13/2022    SEDRATE 78 (H) 12/01/2021     Radiology:  Reviewed    Microbiology:   Blood Cultures 5/13/2022: negative so far  Blood Culture 5/14/2022: negative so far  Blood cultures 5/16/2022: negative so far      No results for input(s): PROCAL in the last 72 hours. ASSESSMENT:  · No evidence of infection at this time however he did have blood cultures positive in the previous admission for staph epi and 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 I had a face to face encounter with the patient. Agree with exam.  Assessment and plan as outlined above and directed by me. Addition and corrections were done as deemed appropriate. My exam and plan include: No new problems reported. No ongoing ID issues. We will sign off.     Saman Mcdermott MD  5/18/2022  2:42 PM

## 2022-05-18 NOTE — PROGRESS NOTES
P Quality Flow/Interdisciplinary Rounds Progress Note        Quality Flow Rounds held on May 18, 2022    Disciplines Attending:  Bedside Nurse, ,  and Nursing Unit Leadership    Travis Guerrero was admitted on 5/13/2022  6:24 PM    Anticipated Discharge Date:  Expected Discharge Date: 05/17/22    Disposition:    Kristian Score:  Kristian Scale Score: 15    Readmission Risk              Risk of Unplanned Readmission:  38           Discussed patient goal for the day, patient clinical progression, and barriers to discharge. The following Goal(s) of the Day/Commitment(s) have been identified:  dc planning.       Davi Edgar RN  May 18, 2022

## 2022-05-19 VITALS
OXYGEN SATURATION: 95 % | BODY MASS INDEX: 29.09 KG/M2 | WEIGHT: 181 LBS | TEMPERATURE: 97.8 F | DIASTOLIC BLOOD PRESSURE: 51 MMHG | RESPIRATION RATE: 16 BRPM | HEIGHT: 66 IN | HEART RATE: 80 BPM | SYSTOLIC BLOOD PRESSURE: 90 MMHG

## 2022-05-19 LAB
ALBUMIN SERPL-MCNC: 3.4 G/DL (ref 3.5–5.2)
ALP BLD-CCNC: 94 U/L (ref 40–129)
ALT SERPL-CCNC: 19 U/L (ref 0–40)
ANION GAP SERPL CALCULATED.3IONS-SCNC: 10 MMOL/L (ref 7–16)
AST SERPL-CCNC: 12 U/L (ref 0–39)
BASOPHILS ABSOLUTE: 0.02 E9/L (ref 0–0.2)
BASOPHILS RELATIVE PERCENT: 0.5 % (ref 0–2)
BILIRUB SERPL-MCNC: 0.5 MG/DL (ref 0–1.2)
BUN BLDV-MCNC: 24 MG/DL (ref 6–23)
CALCIUM SERPL-MCNC: 8.5 MG/DL (ref 8.6–10.2)
CHLORIDE BLD-SCNC: 97 MMOL/L (ref 98–107)
CO2: 29 MMOL/L (ref 22–29)
CREAT SERPL-MCNC: 4.3 MG/DL (ref 0.7–1.2)
EOSINOPHILS ABSOLUTE: 0.25 E9/L (ref 0.05–0.5)
EOSINOPHILS RELATIVE PERCENT: 6.2 % (ref 0–6)
GFR AFRICAN AMERICAN: 16
GFR NON-AFRICAN AMERICAN: 13 ML/MIN/1.73
GLUCOSE BLD-MCNC: 126 MG/DL (ref 74–99)
HCT VFR BLD CALC: 27.3 % (ref 37–54)
HEMOGLOBIN: 8.5 G/DL (ref 12.5–16.5)
IMMATURE GRANULOCYTES #: 0.01 E9/L
IMMATURE GRANULOCYTES %: 0.2 % (ref 0–5)
LYMPHOCYTES ABSOLUTE: 1.23 E9/L (ref 1.5–4)
LYMPHOCYTES RELATIVE PERCENT: 30.7 % (ref 20–42)
MCH RBC QN AUTO: 30.8 PG (ref 26–35)
MCHC RBC AUTO-ENTMCNC: 31.1 % (ref 32–34.5)
MCV RBC AUTO: 98.9 FL (ref 80–99.9)
METER GLUCOSE: 137 MG/DL (ref 74–99)
METER GLUCOSE: 152 MG/DL (ref 74–99)
METER GLUCOSE: 154 MG/DL (ref 74–99)
MONOCYTES ABSOLUTE: 0.59 E9/L (ref 0.1–0.95)
MONOCYTES RELATIVE PERCENT: 14.7 % (ref 2–12)
NEUTROPHILS ABSOLUTE: 1.91 E9/L (ref 1.8–7.3)
NEUTROPHILS RELATIVE PERCENT: 47.7 % (ref 43–80)
PDW BLD-RTO: 14.9 FL (ref 11.5–15)
PLATELET # BLD: 160 E9/L (ref 130–450)
PMV BLD AUTO: 10.6 FL (ref 7–12)
POTASSIUM REFLEX MAGNESIUM: 4.5 MMOL/L (ref 3.5–5)
RBC # BLD: 2.76 E12/L (ref 3.8–5.8)
SARS-COV-2, NAAT: NOT DETECTED
SODIUM BLD-SCNC: 136 MMOL/L (ref 132–146)
TOTAL PROTEIN: 6.3 G/DL (ref 6.4–8.3)
WBC # BLD: 4 E9/L (ref 4.5–11.5)

## 2022-05-19 PROCEDURE — 36415 COLL VENOUS BLD VENIPUNCTURE: CPT

## 2022-05-19 PROCEDURE — 6370000000 HC RX 637 (ALT 250 FOR IP): Performed by: STUDENT IN AN ORGANIZED HEALTH CARE EDUCATION/TRAINING PROGRAM

## 2022-05-19 PROCEDURE — 80053 COMPREHEN METABOLIC PANEL: CPT

## 2022-05-19 PROCEDURE — 85025 COMPLETE CBC W/AUTO DIFF WBC: CPT

## 2022-05-19 PROCEDURE — 87635 SARS-COV-2 COVID-19 AMP PRB: CPT

## 2022-05-19 PROCEDURE — 6370000000 HC RX 637 (ALT 250 FOR IP): Performed by: INTERNAL MEDICINE

## 2022-05-19 PROCEDURE — 2580000003 HC RX 258: Performed by: INTERNAL MEDICINE

## 2022-05-19 PROCEDURE — 2500000003 HC RX 250 WO HCPCS: Performed by: INTERNAL MEDICINE

## 2022-05-19 PROCEDURE — 2700000000 HC OXYGEN THERAPY PER DAY

## 2022-05-19 PROCEDURE — 82962 GLUCOSE BLOOD TEST: CPT

## 2022-05-19 RX ORDER — LEVETIRACETAM 500 MG/1
500 TABLET ORAL 2 TIMES DAILY
Qty: 60 TABLET | Refills: 3 | Status: SHIPPED | OUTPATIENT
Start: 2022-05-19

## 2022-05-19 RX ADMIN — CALCIUM ACETATE 667 MG: 667 CAPSULE ORAL at 08:34

## 2022-05-19 RX ADMIN — INSULIN LISPRO 1 UNITS: 100 INJECTION, SOLUTION INTRAVENOUS; SUBCUTANEOUS at 11:40

## 2022-05-19 RX ADMIN — CALCIUM ACETATE 667 MG: 667 CAPSULE ORAL at 17:11

## 2022-05-19 RX ADMIN — Medication 10 ML: at 08:37

## 2022-05-19 RX ADMIN — FAMOTIDINE 10 MG: 20 TABLET ORAL at 08:34

## 2022-05-19 RX ADMIN — LEVETIRACETAM 500 MG: 500 TABLET, FILM COATED ORAL at 08:35

## 2022-05-19 RX ADMIN — APIXABAN 2.5 MG: 2.5 TABLET, FILM COATED ORAL at 08:35

## 2022-05-19 RX ADMIN — CALCIUM ACETATE 667 MG: 667 CAPSULE ORAL at 11:33

## 2022-05-19 RX ADMIN — INSULIN LISPRO 1 UNITS: 100 INJECTION, SOLUTION INTRAVENOUS; SUBCUTANEOUS at 17:11

## 2022-05-19 ASSESSMENT — ENCOUNTER SYMPTOMS
SHORTNESS OF BREATH: 0
COUGH: 0
VOMITING: 0
DIARRHEA: 0
NAUSEA: 0

## 2022-05-19 NOTE — CARE COORDINATION
Social Work:    Social service left a voice message with  Marga Guillermo at [address] [place] [address] to see if they have accepted Mr. Roca for admission expected today. Await call back.     Electronically signed by SCOTT Pandey on 5/19/2022 at 10:41 AM

## 2022-05-19 NOTE — DISCHARGE INSTR - COC
Continuity of Care Form    Patient Name: Izzy Kraus   :  1931  MRN:  [id number]    Admit date:  2022  Discharge date:  ***    Code Status Order: DNR-CCA   Advance Directives:      Admitting Physician:  Layla Bob DO  PCP: Layla Bob DO    Discharging Nurse: [facility] Unit/Room#: 3760/5951-C  Discharging Unit Phone Number: ***    Emergency Contact:   Extended Emergency Contact Information  Primary Emergency Contact: Sia Stahl  Home Phone: [phone]  Mobile Phone: [phone]  Relation: Niece/Nephew   needed? No  Secondary Emergency Contact: Maricel Acosta [address] Phone: [phone]  Mobile Phone: [phone]  Relation: Brother/Sister   needed?  No    Past Surgical History:  Past Surgical History:   Procedure Laterality Date    ABDOMINAL HERNIA REPAIR      CATARACT REMOVAL      right    CHOLECYSTECTOMY  2011    DENTAL SURGERY      EYE SURGERY      HC DIALYSIS CATHETER Right 2020    TESIO CATHETER INSERTION performed by Kanchan Ferreira MD at [address]      removal of sac in ear    SHUNT REVISION Right 2021    INSERTION AV GRAFT RIGHT ARM performed by Kanchan Ferreira MD at [address] Right 2021    THROMBECTOMY RIGHT ARM AV GRAFT performed by Kanchan Ferreira MD at [address] Left 2021    INSERTION OF TEMPORARY HEMODIALYSIS CATHETER performed by Kanchan Ferreira MD at [address] Right 2021    INSERTION OF TESIO, REMOVAL OF TEMPORARY CATHETHER performed by Kanchan Ferreira MD at [address] [place] [address]       Immunization History:   Immunization History   Administered Date(s) Administered    Td, unspecified formulation 2011       Active Problems:  Patient Active Problem List   Diagnosis Code    Partial small bowel obstruction (HCC) K56.600    IDDM-2 E11.9    CAD (coronary artery disease) I25.10 HTN I10    COPD J44.9    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    Diabetes mellitus ([facility]) E11.9    Diabetes mellitus ([facility] E11.9    Diabetic peripheral neuropathy ([facility]) E11.42    Osteoarthritis M19.90    Gait abnormality R26.9    Physical deconditioning R53.81    Lumbar spondylitis ([facility]) M46.96    Anemia D64.9    Fx humer, med condyl-closed S42.463A    Cerebral infarction ([facility]) I63.9    Renal failure N19    TIA (transient ischemic attack) G45.9    ASHD (arteriosclerotic heart disease) I25.10    Diabetes mellitus ([facility]) E11.9    PVD (peripheral vascular disease) with claudication ([facility]) I73.9    History of CVA (cerebrovascular accident) Z86.73    Pain in lower limb M79.606    Cerebral infarction ([facility] I63.9    Diabetes mellitus-2 E11.9    Decubitus ulcer of sacral region, stage 1 L89.151    Right sided weakness R53.1    Hypoglycemia E16.2    Encounter regarding vascular access for dialysis for end-stage renal disease ([facility]) N18.6, Z99.2    ESRD (end stage renal disease) on dialysis ([facility] N18.6, Z99.2    Diabetic ulcer of toe of right foot associated with type 2 diabetes mellitus, with fat layer exposed ([facility] E11.621, L97.512    Failure to thrive in adult R62.7    Ischemic ulcer of toe, limited to breakdown of skin, right ([facility]) L97.511    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 ([facility]) I70.239    Toe infection L08.9    Pneumonia J18.9    Fever of unknown origin R50.9    Fever in adult R50.9    Leg weakness, bilateral R29.898       Isolation/Infection:   Isolation            No Isolation          Patient Infection Status       Infection Onset Added Last Indicated Last Indicated By Review Planned Expiration Resolved Resolved By    None active    Resolved    COVID-19 (Rule Out) 05/13/22 05/13/22 05/13/22 COVID-19, Rapid (Ordered)   05/13/22 Rule-Out Test Resulted    COVID-19 (Rule Out) 02/11/22 02/11/22 02/11/22 COVID-19, Rapid (Ordered)   02/11/22 Rule-Out Test Resulted    COVID-19 (Rule Out) 01/20/22 01/20/22 01/20/22 COVID-19, Rapid (Ordered)   01/20/22 Rule-Out Test Resulted    COVID-19 (Rule Out) 11/29/21 11/29/21 11/29/21 COVID-19, Rapid (Ordered)   11/29/21 Rule-Out Test Resulted    COVID-19 (Rule Out) 07/10/21 07/10/21 07/10/21 COVID-19, Rapid (Ordered)   07/10/21 Rule-Out Test Resulted    COVID-19 (Rule Out) 04/02/21 04/02/21 04/02/21 Covid-19 Ambulatory (Ordered)   04/03/21 Rule-Out Test Resulted    COVID-19 (Rule Out) 01/15/21 01/16/21 01/16/21 Covid-19 Ambulatory (Ordered)   01/17/21 Rule-Out Test Resulted    COVID-19 (Rule Out) 09/11/20 09/11/20 09/11/20 Covid-19 Ambulatory (Ordered)   09/12/20 Rule-Out Test Resulted    COVID-19 (Rule Out) 08/24/20 08/24/20 08/24/20 Covid-19 Ambulatory (Ordered)   08/25/20 Rule-Out Test Resulted    COVID-19 (Rule Out) 08/17/20 08/17/20 08/17/20 Covid-19 Ambulatory (Ordered)   08/18/20 Rule-Out Test Resulted            Nurse Assessment:  Last Vital Signs: BP (!) 90/51   Pulse 80   Temp 97.8 °F (36.6 °C) (Oral)   Resp 16   Ht 5' 6\" (1.676 m)   Wt 181 lb (82.1 kg)   SpO2 95%   BMI 29.21 kg/m²     Last documented pain score (0-10 scale): Pain Level: 0  Last Weight:   Wt Readings from Last 1 Encounters:   05/18/22 181 lb (82.1 kg)     Mental Status:  oriented, alert, coherent, logical, and able to concentrate and follow conversation    IV Access:  Dialysis Access    Nursing Mobility/ADLs:  Walking   Dependent  Transfer  Dependent  Bathing  Dependent  Dressing  Dependent  Toileting  Dependent  Feeding  Independent  Med Admin  Assisted  Med Delivery   whole    Wound Care Documentation and Therapy:  Wound 07/10/21 Sacrum (Active)   Number of days: 312       Wound 12/02/21 Toe (Comment  which one) (Active)   Number of days: 167       Wound 02/15/22 Sacrum (Active)   Number of days: 92       Wound 05/14/22 Toe (Comment  which one) Anterior;Right (Active)   Dressing Status Clean;Dry; Intact 05/19/22 0614   Wound Cleansed Not Cleansed 05/15/22 0814 Dressing/Treatment Open to air 05/18/22 0025   Wound Length (cm) 1 cm 05/14/22 0055   Wound Width (cm) 1 cm 05/14/22 0055   Wound Depth (cm) 0.1 cm 05/14/22 0055   Wound Surface Area (cm^2) 1 cm^2 05/14/22 0055   Wound Volume (cm^3) 0.1 cm^3 05/14/22 0055   Wound Assessment Dry; Other (Comment) 05/15/22 2213   Drainage Amount None 05/15/22 2213   Number of days: 5       Wound 05/14/22 Elbow Left;Posterior (Active)   Wound Cleansed Not Cleansed 05/15/22 2213   Dressing/Treatment Open to air 05/18/22 0025   Wound Length (cm) 1 cm 05/14/22 0055   Wound Width (cm) 0.1 cm 05/14/22 0055   Wound Depth (cm) 0.1 cm 05/14/22 0055   Wound Surface Area (cm^2) 0.1 cm^2 05/14/22 0055   Wound Volume (cm^3) 0.01 cm^3 05/14/22 0055   Wound Assessment Bleeding 05/15/22 2213   Drainage Amount Scant 05/15/22 2213   Drainage Description Sanguinous 05/15/22 2213   Sharyn-wound Assessment Ecchymosis 05/15/22 2213   Number of days: 5        Elimination:  Continence: Bowel: Yes  Bladder: Yes  Urinary Catheter: None   Colostomy/Ileostomy/Ileal Conduit: No       Date of Last BM: 05/19/2022      Intake/Output Summary (Last 24 hours) at 5/19/2022 1143  Last data filed at 5/19/2022 0500  Gross per 24 hour   Intake 430 ml   Output 2001 ml   Net -1571 ml     I/O last 3 completed shifts: In: 430 [P.O.:120; I.V.:10]  Out: 2076 [Urine:75; Stool:1]    Safety Concerns: At Risk for Falls    Impairments/Disabilities:      Hearing and weakness on R side    Nutrition Therapy:  Current Nutrition Therapy:   - Oral Diet:  General    Routes of Feeding: Oral  Liquids: No Restrictions  Daily Fluid Restriction: no  Last Modified Barium Swallow with Video (Video Swallowing Test): not done    Treatments at the Time of Hospital Discharge:   Respiratory Treatments:     Oxygen Therapy:  is on oxygen at 2 L/min per nasal cannula.   Ventilator:    { CC Vent PREX:[id number]}    Rehab Therapies: Physical Therapy and Occupational Therapy  Weight Bearing Status/Restrictions: No weight bearing restrictions  Other Medical Equipment (for information only, NOT a DME order):  wheelchair  Other Treatments: ***    Patient's personal belongings (please select all that are sent with patient):  Glasses    RN SIGNATURE:  Electronically signed by Sameera Bird RN on 5/19/22 at 3:38 PM EDT    CASE MANAGEMENT/SOCIAL WORK SECTION    Inpatient Status Date: ***    Readmission Risk Assessment Score:  Readmission Risk              Risk of Unplanned Readmission:  38           Discharging to Facility/ Agency   Name: Jimmy Person  Address:  [facility]:[phone]  Fax:[phone]    Dialysis Facility (if applicable)   Name:  Address:  Dialysis Schedule:  Phone:  Fax:    / signature: Electronically signed by SCOTT Chi on 5/19/2022 at 11:44 AM    PHYSICIAN SECTION    Prognosis: {Prognosis:3849900261}    Condition at Discharge: Sanjuana Serrano Patient Condition:[id number]}    Rehab Potential (if transferring to Rehab): {Prognosis:9891783151}    Recommended Labs or Other Treatments After Discharge: ***    Physician Certification: I certify the above information and transfer of Melecio Harding  is necessary for the continuing treatment of the diagnosis listed and that he requires [facility] for less 30 days.      Update Admission H&P: {CHP DME Changes in EDLBI:551380763}    PHYSICIAN SIGNATURE:  Electronically signed by Velia Andre DO on 5/19/22 at 3:24 PM EDT

## 2022-05-19 NOTE — PROGRESS NOTES
Call placed to Dr. Mac Villegas answering service re: discharge. Per Dr. Mac Villegas pt will discharge, she will work on order.

## 2022-05-19 NOTE — PROGRESS NOTES
[facility] Roberto A. Marilyne Liberty, MD Ruby Sago, MD Suellen Sandhoff, MD Aretta Duffel MD  Progress Note       Patient's Name: Liset Gutierrez  5/18/2022  \"Catch up\" note    5/15:  Seen in his room , stable BP on o2/nc , pleasant , weak    5/16:  Dialysis today without event. BP stable. BFR as prescribed. Feels \"wiped out. \"  \"I am congested,\" consisting of rhinorrhea, postnasal drip, nonproductive but wet sounding cough. Denies dyspnea or chest pain. No wheeze. 5/17: Resting comfortably. No new complaint. No dyspnea at rest.  ROS unremarkable asks again when he can go home. 5/18: Seen on dialysis. Tolerating therapy. BP stable. BFR as prescribed. Ongoing fatigue and generalized weakness. Asleep, resting comfortably. Easily awakened. History of Present Ilness:    79 yo M known to our service he is presenting with cc of feeling weak, fatique and falling down   Reports of fever and chills on admission   We are asked to see for ESRD management   Pt has hx of ESRD and was placed on HD roughly one year back   Pt has TDC linea s access for dialysis   Last HD was on frirday   Pt seen in his room he is on RA he appear comforable euvolemic .      Allergies:  Sulfa antibiotics and Sulfa antibiotics    Current Medications:    HYDROcodone-acetaminophen (NORCO) 5-325 MG per tablet 1 tablet, Q6H PRN  analgesic ointment ointment, PRN  epoetin mayra-epbx (RETACRIT) injection 2,520 Units, Once per day on Mon Wed Fri  heparin (porcine) injection 4,300 Units, PRN  apixaban (ELIQUIS) tablet 2.5 mg, BID  levETIRAcetam (KEPPRA) tablet 500 mg, BID  sodium chloride flush 0.9 % injection 10 mL, 2 times per day  sodium chloride flush 0.9 % injection 10 mL, PRN  0.9 % sodium chloride infusion, PRN  senna (SENOKOT) tablet 8.6 mg, Daily PRN  acetaminophen (TYLENOL) tablet 650 mg, Q6H PRN   Or  acetaminophen (TYLENOL) suppository 650 mg, Q6H PRN  albuterol (ACCUNEB) nebulizer solution 2.5 mg, Q6H PRN  atorvastatin (LIPITOR) tablet 10 mg, Nightly  calcium acetate (PHOSLO) capsule 667 mg, TID WC  famotidine (PEPCID) tablet 10 mg, Daily  gabapentin (NEURONTIN) capsule 100 mg, Nightly  metoprolol succinate (TOPROL XL) extended release tablet 25 mg, Daily  insulin lispro (HUMALOG) injection vial 0-6 Units, TID WC  insulin lispro (HUMALOG) injection vial 0-3 Units, Nightly  glucose chewable tablet 16 g, PRN  dextrose bolus 10% 125 mL, PRN   Or  dextrose bolus 10% 250 mL, PRN  glucagon (rDNA) injection 1 mg, PRN  dextrose 5 % solution, PRN        Review of Systems:   Constitutional: no fevers , no chills , feels ok   Eyes: no eye pain , no itching , no drainage  Ears, nose, mouth, throat, and face: no ear ,nose pain , hearing is ok ,no nasal drainage   Respiratory: no sob ,no cough ,no wheezing . Cardiovascular: no chest pain , no palpitation ,no sob . Gastrointestinal: no nausea, vomiting , constipation , no abdominal pain . Genitourinary:no urinary retention , no burning , dysuria . No polyuria   Hematologic/lymphatic: no bleeding , no cougulation issues . Musculoskeletal:no joint pain , no swelling . Neurological: no headaches ,no weakness , no numbness . Endocrine: no thirst , no weight issues . Physical exam:   Vital signs reviewed  Gen : NAD , appropriate for stated age . Head : at , nc   Neck : supple , no thyromegaly noted . Eyes : EOMI , PERRLA   CV : RRR , No M/R/G . Lungs: CTAB , no wheezing , good flow heard b/l   Abd : soft , NT , BS + , No Organomegaly appreciated . Skin : soft, dry . Neuro : CN  II-XII grossly intact , no focal neurologic deficit . Psych : cooperative .      Data:   Reviewed    Assessment    -End stage renal disease on HD MWF via [facility] TDC line :  Continue HD per schedule next HD on Monday     -Anemia of chronic disease :  HG acceptable around 10     -Mineral bone disease /scondary hyperparathyrodism :  Continue phoslo tid with meals     -Hypertension :  Under reasonable control on these medications     -severe deconditioning     Follow labs, BP  Continue supportive care    Electronically signed by Giovany Nogueira MD on 5/18/22

## 2022-05-19 NOTE — PROGRESS NOTES
Progress Note  Date:2022       Room:30/0630-  Patient Name:Rudi Roca     YOB: 1931     Age:89 y.o. Patient seen for follow up of weakness and fatigue. Patient this am laying in bed. He is sleeping. He arouses to touch. Nursing at bedside taking vitals. Subjective    Subjective:  Symptoms:  No shortness of breath, cough, chest pain, headache, chest pressure or diarrhea. Diet:  No nausea or vomiting. Activity level: Impaired due to weakness. Review of Systems   Respiratory: Negative for cough and shortness of breath. Cardiovascular: Negative for chest pain. Gastrointestinal: Negative for diarrhea, nausea and vomiting. Objective         Vitals Last 24 Hours:  TEMPERATURE:  Temp  Av.2 °F (36.8 °C)  Min: 97.7 °F (36.5 °C)  Max: 98.5 °F (36.9 °C)  RESPIRATIONS RANGE: Resp  Av.8  Min: 14  Max: 18  PULSE OXIMETRY RANGE: SpO2  Av %  Min: 94 %  Max: 98 %  PULSE RANGE: Pulse  Av.2  Min: 69  Max: 90  BLOOD PRESSURE RANGE: Systolic (56EBK), YNQ:407 , Min:108 , RDC:677   ; Diastolic (30CFK), ANABELA:15, Min:42, Max:77    I/O (24Hr): Intake/Output Summary (Last 24 hours) at 2022 0553  Last data filed at 2022 0500  Gross per 24 hour   Intake 430 ml   Output 2001 ml   Net -1571 ml     Objective:  General Appearance:  Comfortable, well-appearing and in no acute distress. Vital signs: (most recent): Blood pressure (!) 108/57, pulse 80, temperature 98.5 °F (36.9 °C), temperature source Axillary, resp. rate 18, height 5' 6\" (1.676 m), weight 181 lb (82.1 kg), SpO2 98 %. Lungs:  Normal effort and normal respiratory rate. There are decreased breath sounds. No rales or rhonchi. Heart: Normal rate. Regular rhythm. S1 normal and S2 normal.  No friction rub. Abdomen: Abdomen is soft and non-distended. Bowel sounds are normal.   There is no abdominal tenderness. There is no epigastric area or suprapubic area tenderness.   There is no rebound tenderness. There is no guarding. Extremities: There is no dependent edema. Skin:  Warm and dry. Labs/Imaging/Diagnostics    Labs:  CBC:  Recent Labs     05/16/22  1234 05/17/22  0230 05/18/22  0256   WBC 4.3* 4.0* 4.6   RBC 3.04* 2.73* 2.68*   HGB 9.4* 8.4* 8.3*   HCT 29.1* 26.3* 25.9*   MCV 95.7 96.3 96.6   RDW 14.6 14.6 14.6    131 161     CHEMISTRIES:  Recent Labs     05/17/22  0230 05/18/22  0256    134   K 3.8 4.3   CL 93* 92*   CO2 28 29   BUN 36* 49*   CREATININE 4.6* 6.1*   GLUCOSE 128* 148*     PT/INR:No results for input(s): PROTIME, INR in the last 72 hours. APTT:No results for input(s): APTT in the last 72 hours. LIVER PROFILE:  Recent Labs     05/17/22  0230 05/18/22  0256   AST 14 12   ALT 25 20   BILITOT 0.5 0.4   ALKPHOS 93 93       Imaging Last 24 Hours:  CT HEAD WO CONTRAST    Result Date: 5/13/2022  EXAMINATION: CT OF THE HEAD WITHOUT CONTRAST  5/13/2022 8:49 pm TECHNIQUE: CT of the head was performed without the administration of intravenous contrast. Automated exposure control, iterative reconstruction, and/or weight based adjustment of the mA/kV was utilized to reduce the radiation dose to as low as reasonably achievable. COMPARISON: MRI of the brain, 06/12/2014 HISTORY: ORDERING SYSTEM PROVIDED HISTORY: weakness TECHNOLOGIST PROVIDED HISTORY: Reason for exam:->weakness Has a \"code stroke\" or \"stroke alert\" been called? ->No Decision Support Exception - unselect if not a suspected or confirmed emergency medical condition->Emergency Medical Condition (MA) FINDINGS: BRAIN/VENTRICLES: No mass effect, edema or hemorrhage is seen. Seen basal ganglia thalamus external regions. There is asymmetrically prominent chronic microvascular ischemic changes in left cerebral white matter. A subdural hygroma measuring approximately 7 mm in width is seen along the left anterior cerebral convexity. It results in left-to-right shift of approximately 3 mm.   No hemorrhage or edema is seen in the brain. Moderate to severe volume loss is seen in the brain. No hydrocephalus ORBITS: The visualized portion of the orbits demonstrate no acute abnormality. SINUSES: The visualized paranasal sinuses and mastoid air cells demonstrate no acute abnormality. SOFT TISSUES/SKULL:  No acute abnormality of the visualized skull or soft tissues. 1.  No acute intracranial abnormality. 2. Subdural hygroma along the left cerebral convexity, measuring 7 mm in maximal width anteriorly. 3. 3 mm left-to-right midline shift. 4. 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 RECOMMENDATIONS: Unavailable     XR CHEST PORTABLE    Result Date: 5/14/2022  EXAMINATION: ONE XRAY VIEW OF THE CHEST 5/14/2022 10:25 am COMPARISON: 13 May 2022 HISTORY: ORDERING SYSTEM PROVIDED HISTORY: increased O2 demand TECHNOLOGIST PROVIDED HISTORY: Reason for exam:->increased O2 demand FINDINGS: Single AP erect portable chest demonstrates a large bore right sided IJ catheter remaining in place. There is no evidence of a effusion or pneumothorax. There are mild bibasilar subsegmental atelectatic changes on today's study. Interval development of mild bibasilar subsegmental atelectatic changes. XR CHEST PORTABLE    Result Date: 5/13/2022  EXAMINATION: ONE XRAY VIEW OF THE CHEST 5/13/2022 7:08 pm COMPARISON: 02/15/2022 HISTORY: ORDERING SYSTEM PROVIDED HISTORY: fever TECHNOLOGIST PROVIDED HISTORY: Reason for exam:->fever FINDINGS: The lungs are without acute focal process. There is no effusion or pneumothorax. The cardiomediastinal silhouette is without acute process. The osseous structures are without acute process. Right-sided hemodialysis catheter tip in the right atrium. Stable elevation right hemidiaphragm. No acute process.      Assessment//Plan           Hospital Problems           Last Modified POA    * (Principal) Fever of unknown origin 5/13/2022 Yes    Fever in adult 5/13/2022 Yes    Leg weakness, bilateral 5/14/2022 Yes        Assessment & Plan  1. 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   2. Subdural hygroma   3. Fatigue  4. ESRD on HD   5. Type II diabetes mellitus     Blood cultures remain negative. ID signed off. Am-PAC score 10- needs MINISTERIO on discharge. Continue incentive spirometry. Wean oxygen. Out of bed to chair. PT/OT.  working on ministerio awaiting evaluation from [facility].       Torrey Ugalde, DO

## 2022-05-19 NOTE — DISCHARGE SUMMARY
Discharge Summary     Patient ID:  Tee Jules  [id number]  80 y.o. 9/20/1931 male  Candida Mckeon DO        Admit date: 5/13/2022    Discharge date and time:  5/19/2022  7:21 PM      Activity level: As tolerated  Diet: Diabetic  Labs: Per nephrology  Disposition: Skilled Nursing home   Condition on Discharge: Stable   DME: None     Admit Diagnoses:   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    Diabetes mellitus ([address]    Diabetes mellitus ([address]    Diabetic peripheral neuropathy ([address]    Osteoarthritis    Gait abnormality    Physical deconditioning    Lumbar spondylitis ([address]    Anemia    Fx humer, med condyl-closed    Cerebral infarction ([address]    Renal failure    TIA (transient ischemic attack)    ASHD (arteriosclerotic heart disease)    Diabetes mellitus ([address]    PVD (peripheral vascular disease) with claudication ([address]    History of CVA (cerebrovascular accident)    Pain in lower limb    Cerebral infarction ([address]    Diabetes mellitus-2    Decubitus ulcer of sacral region, stage 1    Right sided weakness    Hypoglycemia    Encounter regarding vascular access for dialysis for end-stage renal disease ([address]    ESRD (end stage renal disease) on dialysis ([address]    Diabetic ulcer of toe of right foot associated with type 2 diabetes mellitus, with fat layer exposed ([address]    Failure to thrive in adult    Ischemic ulcer of toe, limited to breakdown of skin, right ([address]    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 ([address]    Toe infection    Pneumonia    Fever of unknown origin    Fever in adult    Leg weakness, bilateral       Discharge Diagnoses: Principal Problem:    Fever of unknown origin  Active Problems:    Fever in adult    Leg weakness, bilateral  Resolved Problems:    * No resolved hospital problems. *      Consults:  IP CONSULT TO INTERNAL MEDICINE  IP CONSULT TO INFECTIOUS DISEASES  IP CONSULT TO SOCIAL WORK  IP CONSULT TO NEPHROLOGY  IP CONSULT TO PALLIATIVE CARE    Procedures: None     Hospital Course: Patient presented to ER due to increasing weakness. Patient states that his right leg gave out on him. Discussing with his brother he was walking on Sunday and Monday. On Wednesday this week he tried to get out of his wheel chair and his right leg gave out on him and he slipped. He had difficulty ambulating since that time and on Thursday fell forward on his caregiver. His brother states he was seen by Dr. Clarisse Segura (Orthopedics). He had recommended seeing Dr. Ivelisse Griffin. Patient was noted to have a low grade temperature of 99 degrees. He was not having fevers at home. Patient this am states he is feeling weak. He denies any pain. He did not have a fever. He was seen by Infectious disease. He had blood cultures which were negative. He was seen by PT and found to need a SNF. He has run out of skilled days. Delay in discharge was due to finding a SNF. Discharge Exam:  General Appearance:  Comfortable, well-appearing and in no acute distress. Vital signs: (most recent): Blood pressure (!) 108/57, pulse 80, temperature 98.5 °F (36.9 °C), temperature source Axillary, resp. rate 18, height 5' 6\" (1.676 m), weight 181 lb (82.1 kg), SpO2 98 %. Lungs:  Normal effort and normal respiratory rate. There are decreased breath sounds. No rales or rhonchi. Heart: Normal rate. Regular rhythm. S1 normal and S2 normal.  No friction rub. Abdomen: Abdomen is soft and non-distended. Bowel sounds are normal.   There is no abdominal tenderness. There is no epigastric area or suprapubic area tenderness. There is no rebound tenderness. There is no guarding. Extremities: There is no dependent edema. Skin:  Warm and dry. I/O last 3 completed shifts: In: 430 [P.O.:120;  I.V.:10]  Out: 2001 [Stool:1]  No intake/output data recorded. LABS:  Recent Labs     05/17/22  0230 05/18/22  0256 05/19/22  0548    134 136   K 3.8 4.3 4.5   CL 93* 92* 97*   CO2 28 29 29   BUN 36* 49* 24*   CREATININE 4.6* 6.1* 4.3*   GLUCOSE 128* 148* 126*   CALCIUM 8.2* 8.1* 8.5*       Recent Labs     05/17/22  0230 05/18/22  0256 05/19/22  0548   WBC 4.0* 4.6 4.0*   RBC 2.73* 2.68* 2.76*   HGB 8.4* 8.3* 8.5*   HCT 26.3* 25.9* 27.3*   MCV 96.3 96.6 98.9   MCH 30.8 31.0 30.8   MCHC 31.9* 32.0 31.1*   RDW 14.6 14.6 14.9    161 160   MPV 11.5 10.5 10.6       Recent Labs     05/19/22  1552   GLUMET 152*       Imaging:  CT HEAD WO CONTRAST    Result Date: 5/13/2022  EXAMINATION: CT OF THE HEAD WITHOUT CONTRAST  5/13/2022 8:49 pm TECHNIQUE: CT of the head was performed without the administration of intravenous contrast. Automated exposure control, iterative reconstruction, and/or weight based adjustment of the mA/kV was utilized to reduce the radiation dose to as low as reasonably achievable. COMPARISON: MRI of the brain, 06/12/2014 HISTORY: ORDERING SYSTEM PROVIDED HISTORY: weakness TECHNOLOGIST PROVIDED HISTORY: Reason for exam:->weakness Has a \"code stroke\" or \"stroke alert\" been called? ->No Decision Support Exception - unselect if not a suspected or confirmed emergency medical condition->Emergency Medical Condition (MA) FINDINGS: BRAIN/VENTRICLES: No mass effect, edema or hemorrhage is seen. Seen basal ganglia thalamus external regions. There is asymmetrically prominent chronic microvascular ischemic changes in left cerebral white matter. A subdural hygroma measuring approximately 7 mm in width is seen along the left anterior cerebral convexity. It results in left-to-right shift of approximately 3 mm. No hemorrhage or edema is seen in the brain. Moderate to severe volume loss is seen in the brain. No hydrocephalus ORBITS: The visualized portion of the orbits demonstrate no acute abnormality.  SINUSES: The visualized paranasal sinuses and mastoid air cells demonstrate no acute abnormality. SOFT TISSUES/SKULL:  No acute abnormality of the visualized skull or soft tissues. 1.  No acute intracranial abnormality. 2. Subdural hygroma along the left cerebral convexity, measuring 7 mm in maximal width anteriorly. 3. 3 mm left-to-right midline shift. 4. 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 RECOMMENDATIONS: Unavailable     XR CHEST PORTABLE    Result Date: 5/14/2022  EXAMINATION: ONE XRAY VIEW OF THE CHEST 5/14/2022 10:25 am COMPARISON: 13 May 2022 HISTORY: ORDERING SYSTEM PROVIDED HISTORY: increased O2 demand TECHNOLOGIST PROVIDED HISTORY: Reason for exam:->increased O2 demand FINDINGS: Single AP erect portable chest demonstrates a large bore right sided IJ catheter remaining in place. There is no evidence of a effusion or pneumothorax. There are mild bibasilar subsegmental atelectatic changes on today's study. Interval development of mild bibasilar subsegmental atelectatic changes. XR CHEST PORTABLE    Result Date: 5/13/2022  EXAMINATION: ONE XRAY VIEW OF THE CHEST 5/13/2022 7:08 pm COMPARISON: 02/15/2022 HISTORY: ORDERING SYSTEM PROVIDED HISTORY: fever TECHNOLOGIST PROVIDED HISTORY: Reason for exam:->fever FINDINGS: The lungs are without acute focal process. There is no effusion or pneumothorax. The cardiomediastinal silhouette is without acute process. The osseous structures are without acute process. Right-sided hemodialysis catheter tip in the right atrium. Stable elevation right hemidiaphragm. No acute process.        Patient Instructions:   Discharge Medication List as of 5/19/2022  4:37 PM      START taking these medications    Details   levETIRAcetam (KEPPRA) 500 MG tablet Take 1 tablet by mouth 2 times daily, Disp-60 tablet, R-3Normal         CONTINUE these medications which have NOT CHANGED    Details   calcium acetate (PHOSLO) 667 MG CAPS capsule TAKE 1 CAPSULE BY MOUTH THREE TIMES DAILYHistorical Med      budesonide (PULMICORT FLEXHALER) 90 MCG/ACT AEPB inhaler Inhale 2 puffs into the lungs 2 times daily, Disp-1 each, R-0Normal      albuterol (ACCUNEB) 1.25 MG/3ML nebulizer solution Inhale 1 ampule into the lungs every 6 hours as needed for WheezingHistorical Med      magnesium hydroxide (MILK OF MAGNESIA) 400 MG/5ML suspension Take by mouth daily as needed for ConstipationHistorical Med      apixaban (ELIQUIS) 2.5 MG TABS tablet Take 1 tablet by mouth 2 times daily, Disp-60 tabletDC to SNF      insulin lispro (HUMALOG) 100 UNIT/ML injection vial Inject 0-6 Units into the skin 3 times daily (with meals), Disp-10 mL, R-3DC to SNF      metoprolol succinate (TOPROL XL) 25 MG extended release tablet Take 1 tablet by mouth daily, Disp-30 tablet, R-3Normal      white petrolatum OINT ointment Apply 1 applicator topically 2 times daily, Topical, 2 TIMES DAILY Starting Mon 7/12/2021, Disp-90 g, R-0, Normal      atorvastatin (LIPITOR) 10 MG tablet Take 10 mg by mouth nightlyHistorical Med      gabapentin (NEURONTIN) 100 MG capsule Take 100 mg by mouth nightly. Historical Med      ascorbic acid (VITAMIN C) 500 MG tablet Take 1,000 mg by mouth dailyHistorical Med      zinc gluconate 50 MG tablet Take 50 mg by mouth dailyHistorical Med      Multiple Vitamins-Minerals (PRESERVISION AREDS 2 PO) Take 1 tablet by mouth daily Historical Med      famotidine (PEPCID) 40 MG tablet Take 40 mg by mouth as neededHistorical Med      vitamin D (CHOLECALCIFEROL) 25 MCG (1000 UT) TABS tablet Take 1,000 Units by mouth dailyHistorical Med               Note that more than 30 minutes was spent in preparing discharge papers, discussing discharge with patient, medication review, etc.      Signed:    Roz Dhillon DO    Electronically signed by Roz Dhillon DO on 5/19/2022 at 7:21 PM

## 2022-05-19 NOTE — PROGRESS NOTES
P Quality Flow/Interdisciplinary Rounds Progress Note        Quality Flow Rounds held on May 19, 2022    Disciplines Attending:  Bedside Nurse, ,  and Nursing Unit Leadership    Kaila Ivory was admitted on 5/13/2022  6:24 PM    Anticipated Discharge Date:  Expected Discharge Date: 05/17/22    Disposition:    Kristian Score:  Kristian Scale Score: 15    Readmission Risk              Risk of Unplanned Readmission:  38           Discussed patient goal for the day, patient clinical progression, and barriers to discharge. The following Goal(s) of the Day/Commitment(s) have been identified:  discharge planning.        Elise Ewing RN  May 19, 2022

## 2022-05-19 NOTE — PROGRESS NOTES
[facility] MD Cathie Hagen MD Wynona Glory, MD Estill Sora MD  Progress Note       Patient's Name: Melecio Harding  5/19/2022      5/15:  Seen in his room , stable BP on o2/nc , pleasant , weak    5/16:  Dialysis today without event. BP stable. BFR as prescribed. Feels \"wiped out. \"  \"I am congested,\" consisting of rhinorrhea, postnasal drip, nonproductive but wet sounding cough. Denies dyspnea or chest pain. No wheeze. 5/17: Resting comfortably. No new complaint. No dyspnea at rest.  ROS unremarkable asks again when he can go home. 5/18: Seen on dialysis. Tolerating therapy. BP stable. BFR as prescribed. Ongoing fatigue and generalized weakness. Asleep, resting comfortably. Easily awakened. 5/19: Feeling well. Good appetite. Denies dyspnea at rest on 2 L nasal cannula. Ongoing fatigue malaise and generalized weakness. Anxious for discharge. For discharge to F this afternoon    History of Present Ilness:    81 yo M known to our service he is presenting with cc of feeling weak, fatique and falling down   Reports of fever and chills on admission   We are asked to see for ESRD management   Pt has hx of ESRD and was placed on HD roughly one year back   Pt has TDC linea s access for dialysis   Last HD was on frirday   Pt seen in his room he is on RA he appear comforable euvolemic .      Allergies:  Sulfa antibiotics and Sulfa antibiotics    Current Medications:    HYDROcodone-acetaminophen (NORCO) 5-325 MG per tablet 1 tablet, Q6H PRN  analgesic ointment ointment, PRN  epoetin mayra-epbx (RETACRIT) injection 2,520 Units, Once per day on Mon Wed Fri  heparin (porcine) injection 4,300 Units, PRN  apixaban (ELIQUIS) tablet 2.5 mg, BID  levETIRAcetam (KEPPRA) tablet 500 mg, BID  sodium chloride flush 0.9 % injection 10 mL, 2 times per day  sodium chloride flush 0.9 % injection 10 mL, PRN  0.9 % sodium chloride infusion, PRN  senna (SENOKOT) tablet 8.6 mg, Daily PRN  acetaminophen (TYLENOL) tablet 650 mg, Q6H PRN   Or  acetaminophen (TYLENOL) suppository 650 mg, Q6H PRN  albuterol (ACCUNEB) nebulizer solution 2.5 mg, Q6H PRN  atorvastatin (LIPITOR) tablet 10 mg, Nightly  calcium acetate (PHOSLO) capsule 667 mg, TID WC  famotidine (PEPCID) tablet 10 mg, Daily  gabapentin (NEURONTIN) capsule 100 mg, Nightly  metoprolol succinate (TOPROL XL) extended release tablet 25 mg, Daily  insulin lispro (HUMALOG) injection vial 0-6 Units, TID WC  insulin lispro (HUMALOG) injection vial 0-3 Units, Nightly  glucose chewable tablet 16 g, PRN  dextrose bolus 10% 125 mL, PRN   Or  dextrose bolus 10% 250 mL, PRN  glucagon (rDNA) injection 1 mg, PRN  dextrose 5 % solution, PRN        Review of Systems:   Constitutional: no fevers , no chills , feels ok   Eyes: no eye pain , no itching , no drainage  Ears, nose, mouth, throat, and face: no ear ,nose pain , hearing is ok ,no nasal drainage   Respiratory: no sob ,no cough ,no wheezing . Cardiovascular: no chest pain , no palpitation ,no sob . Gastrointestinal: no nausea, vomiting , constipation , no abdominal pain . Genitourinary:no urinary retention , no burning , dysuria . No polyuria   Hematologic/lymphatic: no bleeding , no cougulation issues . Musculoskeletal:no joint pain , no swelling . Neurological: no headaches ,no weakness , no numbness . Endocrine: no thirst , no weight issues . Physical exam:   Vital signs reviewed  Gen : NAD , appropriate for stated age . Head : at , nc   Neck : supple , no thyromegaly noted . Eyes : EOMI , PERRLA   CV : RRR , No M/R/G . Lungs: CTAB , no wheezing , good flow heard b/l   Abd : soft , NT , BS + , No Organomegaly appreciated . Skin : soft, dry . Neuro : CN  II-XII grossly intact , no focal neurologic deficit . Psych : cooperative .      Data:   Reviewed    Assessment    -End stage renal disease on HD MWF via [facility] TDC line :  Continue HD per schedule next HD on Monday     -Anemia of chronic disease :  HG acceptable around 10     -Mineral bone disease /scondary hyperparathyrodism :  Continue phoslo tid with meals     -Hypertension :  Under reasonable control on these medications     -severe deconditioning     Follow labs, BP  Continue supportive care  Okay for discharge from renal standpoint    Electronically signed by Giovany Nogueira MD

## 2022-05-19 NOTE — CARE COORDINATION
Social Work:    Willow at [facility] accepted Mr. Roca's referral and has spoken with Lakshmi Lopezrobyn, patient's niece/POA, via phone to confirm plans & pricing. Social work placed a call to [facility] and also confirmed plans for transfer to [facility]. Social work arranged [facility] ambulance tentatively to transfer today at 5:00 p.m. pending final discharge order. Social work also notified Darling Lopez at [facility] that Mr. Roca will be restarting dialysis tomorrow at 11:30-3:00 p.m. chair time. Social work updated charge DANNY Lau who is confirming the discharge plans expected today. Social work also updated DANNY Coy and advised her that [facility] is in need of all medications Mr. Roca will require in snf so that she can supply them to the facility as Mr. Roca is privately paying for his stay.   ([place], [address], ambulance form completed)    Electronically signed by SCOTT Pandey on 5/19/2022 at 11:40 AM

## 2022-05-21 LAB — CULTURE, BLOOD 3: NORMAL

## 2022-12-23 ENCOUNTER — TELEPHONE (OUTPATIENT)
Dept: VASCULAR SURGERY | Age: 87
End: 2022-12-23

## 2022-12-23 NOTE — TELEPHONE ENCOUNTER
Called and confirmed appointment for 12/27/22 at 845 AM with Sonia Bacon [facility] staff.

## 2022-12-27 ENCOUNTER — OFFICE VISIT (OUTPATIENT)
Dept: VASCULAR SURGERY | Age: 87
End: 2022-12-27
Payer: MEDICARE

## 2022-12-27 ENCOUNTER — TELEPHONE (OUTPATIENT)
Dept: VASCULAR SURGERY | Age: 87
End: 2022-12-27

## 2022-12-27 DIAGNOSIS — I70.235 ATHEROSCLEROSIS OF NATIVE ARTERY OF RIGHT LOWER EXTREMITY WITH ULCERATION OF OTHER PART OF FOOT (HCC): Primary | ICD-10-CM

## 2022-12-27 PROBLEM — I70.209 ATHEROSCLEROSIS OF NATIVE ARTERY OF EXTREMITY (HCC): Status: ACTIVE | Noted: 2022-12-27

## 2022-12-27 PROCEDURE — G8484 FLU IMMUNIZE NO ADMIN: HCPCS | Performed by: SURGERY

## 2022-12-27 PROCEDURE — 1123F ACP DISCUSS/DSCN MKR DOCD: CPT | Performed by: SURGERY

## 2022-12-27 PROCEDURE — 99214 OFFICE O/P EST MOD 30 MIN: CPT | Performed by: SURGERY

## 2022-12-27 PROCEDURE — 1036F TOBACCO NON-USER: CPT | Performed by: SURGERY

## 2022-12-27 PROCEDURE — G8417 CALC BMI ABV UP PARAM F/U: HCPCS | Performed by: SURGERY

## 2022-12-27 PROCEDURE — G8427 DOCREV CUR MEDS BY ELIG CLIN: HCPCS | Performed by: SURGERY

## 2022-12-27 RX ORDER — LANOLIN ALCOHOL/MO/W.PET/CERES
3 CREAM (GRAM) TOPICAL DAILY
COMMUNITY

## 2022-12-27 RX ORDER — IPRATROPIUM BROMIDE AND ALBUTEROL SULFATE 2.5; .5 MG/3ML; MG/3ML
SOLUTION RESPIRATORY (INHALATION)
COMMUNITY
Start: 2022-10-13

## 2022-12-27 RX ORDER — CYCLOBENZAPRINE HCL 10 MG
10 TABLET ORAL 3 TIMES DAILY PRN
COMMUNITY

## 2022-12-27 RX ORDER — ASPIRIN 81 MG/1
81 TABLET ORAL DAILY
COMMUNITY

## 2022-12-27 RX ORDER — GLIMEPIRIDE 1 MG/1
1 TABLET ORAL
COMMUNITY

## 2022-12-27 NOTE — PROGRESS NOTES
Vascular Surgery Outpatient Progress Note      Chief Complaint   Patient presents with    Circulatory Problem     Right great toe ulcer. HISTORY OF PRESENT ILLNESS:                The patient is a 80 y.o. male who returns for follow-up evaluation of right great toe ulcer. Patient is accompanied by his daughter. He is known to me from dialysis access procedures. He is staying in a nursing home facility. He does not ambulate at this point. He is followed by his podiatrist and referred for evaluation.     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 ([address] 12/3/2021    CAD (coronary artery disease)     CHF (congestive heart failure) (HCC)     Chronic kidney disease     COVID-19     CVA (cerebral vascular accident) ([address]     Diabetes mellitus ([address]     Diabetic peripheral neuropathy ([address] 11/9/2012    Hemodialysis patient ([address]     History of CVA (cerebrovascular accident) 6/9/2014    Hypertension     Ischemic ulcer of toe, limited to breakdown of skin, right ([address] 12/3/2021    Other disorders of kidney and ureter     prostate infections in past    PVD (peripheral vascular disease) with claudication ([address] 6/9/2014    Right sided weakness 3/19/2018    TIA (transient ischemic attack)     possible several years ago    Unspecified cerebral artery occlusion with cerebral infarction 2013    [facility] RIGHT LEG [facility]FFECTED     Past Surgical History:        Procedure Laterality Date    ABDOMINAL HERNIA REPAIR      CATARACT REMOVAL      right    CHOLECYSTECTOMY  11/2011    DENTAL SURGERY      EYE SURGERY      HC DIALYSIS CATHETER Right 8/27/2020    TESIO CATHETER INSERTION performed by Azar Khan MD at [address]      removal of sac in ear    SHUNT REVISION Right 1/21/2021    INSERTION AV GRAFT RIGHT ARM performed by Azar Khan MD at [address] Right 4/8/2021    THROMBECTOMY RIGHT ARM AV GRAFT performed by Jodi Machuca MD at [address] Left 8/9/2021    INSERTION OF TEMPORARY HEMODIALYSIS CATHETER performed by Jodi Machuca MD at [address] Right 8/11/2021    INSERTION OF TESIO, REMOVAL OF TEMPORARY CATHETHER performed by Jodi Machuca MD at [address] [place] [address]     Current Medications:   Prior to Admission medications    Medication Sig Start Date End Date Taking? Authorizing Provider   aspirin EC 81 MG EC tablet Take 81 mg by mouth daily   Yes Historical Provider, MD   cyclobenzaprine (FLEXERIL) 10 MG tablet Take 10 mg by mouth 3 times daily as needed for Muscle spasms   Yes Historical Provider, MD   glimepiride (AMARYL) 1 MG tablet Take 1 mg by mouth every morning (before breakfast)   Yes Historical Provider, MD   ipratropium-albuterol (DUONEB) 0.5-2.5 (3) MG/3ML SOLN nebulizer solution  10/13/22  Yes Historical Provider, MD   melatonin 3 MG TABS tablet Take 3 mg by mouth daily   Yes Historical Provider, MD   formoterol (PERFOROMIST) 20 MCG/2ML nebulizer solution Take 2 mLs by nebulization in the morning and 2 mLs in the evening.  9/6/22  Yes JESUS Alcala NP   albuterol (ACCUNEB) 1.25 MG/3ML nebulizer solution Inhale 1 ampule into the lungs every 6 hours as needed for Wheezing   Yes Historical Provider, MD   magnesium hydroxide (MILK OF MAGNESIA) 400 MG/5ML suspension Take by mouth daily as needed for Constipation   Yes Historical Provider, MD   apixaban (ELIQUIS) 2.5 MG TABS tablet Take 1 tablet by mouth 2 times daily 12/4/21  Yes Efraín Heart MD   insulin lispro (HUMALOG) 100 UNIT/ML injection vial Inject 0-6 Units into the skin 3 times daily (with meals) 12/4/21  Yes Efraín Heart MD   metoprolol succinate (TOPROL XL) 25 MG extended release tablet Take 1 tablet by mouth daily 7/13/21  Yes Britt Ugalde,    white petrolatum OINT ointment Apply 1 applicator topically 2 times daily 7/12/21  Yes Kenyatta JONES DO Tram   atorvastatin (LIPITOR) 10 MG tablet Take 10 mg by mouth nightly   Yes Historical Provider, MD   gabapentin (NEURONTIN) 100 MG capsule Take 100 mg by mouth nightly.    Yes Historical Provider, MD   ascorbic acid (VITAMIN C) 500 MG tablet Take 1,000 mg by mouth daily   Yes Historical Provider, MD   zinc gluconate 50 MG tablet Take 50 mg by mouth daily   Yes Historical Provider, MD   Multiple Vitamins-Minerals (PRESERVISION AREDS 2 PO) Take 1 tablet by mouth daily    Yes Historical Provider, MD   famotidine (PEPCID) 40 MG tablet Take 40 mg by mouth as needed   Yes Historical Provider, MD   vitamin D (CHOLECALCIFEROL) 25 MCG (1000 UT) TABS tablet Take 1,000 Units by mouth daily   Yes Historical Provider, MD   budesonide (PULMICORT) 0.5 MG/2ML nebulizer suspension Take 2 mLs by nebulization 2 times daily  Patient not taking: Reported on 2022   Gurdeep Headings, APRN - NP   levETIRAcetam (KEPPRA) 500 MG tablet Take 1 tablet by mouth 2 times daily  Patient not taking: Reported on 2022   Britt Ugalde DO   calcium acetate (PHOSLO) 667 MG CAPS capsule TAKE 1 CAPSULE BY MOUTH THREE TIMES DAILY  Patient not taking: Reported on 2022   Historical Provider, MD   budesonide (PULMICORT FLEXHALER) 90 MCG/ACT AEPB inhaler Inhale 2 puffs into the lungs 2 times daily 22  Luzmaria Samson PA-C     Allergies:  Sulfa antibiotics and Sulfa antibiotics    Social History     Socioeconomic History    Marital status: Single     Spouse name: Not on file    Number of children: Not on file    Years of education: Not on file    Highest education level: Not on file   Occupational History    Not on file   Tobacco Use    Smoking status: Former     Packs/day: 0.50     Years: 20.00     Pack years: 10.00     Types: Cigarettes     Quit date: 5     Years since quittin.0    Smokeless tobacco: Never   Vaping Use    Vaping Use: Never used   Substance and Sexual Activity    Alcohol use: Not Currently     Alcohol/week: 1.0 standard drink     Types: 1 Glasses of wine per week    Drug use: No    Sexual activity: Not Currently   Other Topics Concern    Not on file   Social History Narrative    ** Merged History Encounter **          Social Determinants of Health     Financial Resource Strain: Not on file   Food Insecurity: Not on file   Transportation Needs: Not on file   Physical Activity: Not on file   Stress: Not on file   Social Connections: Not on file   Intimate Partner Violence: Not on file   Housing Stability: Not on file        No family history on file.     REVIEW OF SYSTEMS (New symptoms):    Eyes:      Blurred vision:  No [x]/Yes []               Diplopia:   No [x]/Yes []               Vision loss:       No [x]/Yes []   Ears, nose, throat:             Hearing loss:    No [x]/Yes []      Vertigo:   No [x]/Yes []                       Swallowing problem:  No [x]/Yes []               Nose bleeds:   No [x]/Yes []      Voice hoarseness:  No [x]/Yes []  Respiratory:             Cough:   No [x]/Yes []      Pleuritic chest pain:  No [x]/Yes []                        Dyspnea:   No [x]/Yes []      Wheezing:   No [x]/Yes []  Cardiovascular:             Angina:   No [x]/Yes []      Palpitations:   No [x]/Yes []          Claudication:    No [x]/Yes []      Leg swelling:   No [x]/Yes []  Gastrointestinal:             Nausea or vomiting:  No [x]/Yes []               Abdominal pain:  No [x]/Yes []                     Intestinal bleeding: No [x]/Yes []  Musculoskeletal:             Leg pain:   No [x]/Yes []      Back pain:   No [x]/Yes []                    Weakness:   No []/Yes [x]  Neurologic:             Numbness:   No [x]/Yes []      Paralysis:   No [x]/Yes []                       Headaches:   No [x]/Yes []  Hematologic, lymphatic:   Anemia:   No [x]/Yes []              Bleeding or bruising:  No [x]/Yes []              Fevers or chills: No [x]/Yes []  Endocrine:             Temp intolerance:   No [x]/Yes []                       Polydipsia, polyuria:  No [x]/Yes []  Skin:              Rash:    No [x]/Yes []      Ulcers:   No [x]/Yes []              Abnorm pigment: No [x]/Yes []  :              Frequency/urgency:  No [x]/Yes []      Hematuria:    No [x]/Yes []                      Incontinence:    No [x]/Yes []    PHYSICAL EXAM:  There were no vitals filed for this visit. General Appearance: alert and oriented to person, place and time, in no acute distress, frail   neurologic: no cranial nerve deficit, speech normal  Head: normocephalic and atraumatic  Eyes: extraocular eye movements intact, conjunctivae normal  ENT: external ear and ear canal normal bilaterally, nose without deformity, no carotid bruits  Pulmonary/Chest: normal air movement, no respiratory distress  Cardiovascular: normal rate, regular rhythm, no murmur  Abdomen: non-distended, no masses  Musculoskeletal: no joint deformity or tenderness  Extremities: leg edema bilaterally, right foot wrapped. Skin: warm and dry, no rash or erythema    PULSE EXAM      Right      Left   Brachial     Radial     Femoral     Popliteal 0    Dorsalis Pedis     Posterior Tibial     (3=normal, 2=diminished, 1=barely palpable, 4=widened)    RADIOLOGY: SA today    Problem List Items Addressed This Visit       Atherosclerosis of native artery of extremity ([facility] - Primary       I reviewed with the patient and his daughter that I am happy to perform an arteriogram to better evaluate the circulation and to see if anything can be performed percutaneously to improve the circulation. I would not consider him a reasonable candidate for surgical bypass given that he is nonambulatory. Unfortunately, if there is poor healing or infection, the only procedure I would recommend given his nonambulatory state would be an above-knee amputation. The daughter states that she understands and we will plan for the arteriogram with possible invention.       No follow-ups on file.

## 2022-12-27 NOTE — TELEPHONE ENCOUNTER
Spoke with patient's niece, Jyotsna Fritz. Patient has dialysis on MWF. Rescheduled angiogram to 1-26-23 at 1:30 pm. Report to [facility] at 11:30 am.  She will notify nursing home.

## 2023-01-19 NOTE — PROGRESS NOTES
Called [facility] to give nurse to nurse, but no one is answering. Patient is set up to be transported there at 3:30pm. Will reattempt to call again. Will send paperwork with [facility]. unable to obtain information pt is cognitively impaired

## 2023-01-23 ENCOUNTER — TELEPHONE (OUTPATIENT)
Dept: VASCULAR SURGERY | Age: 88
End: 2023-01-23

## 2023-01-23 NOTE — TELEPHONE ENCOUNTER
Pt is scheduled for a lower extremity arteriogram on 1/26, is being treated for a tooth abscess; started on antibiotic 1/21 x 5 days. OK to proceed?

## 2023-01-26 ENCOUNTER — HOSPITAL ENCOUNTER (OUTPATIENT)
Dept: CARDIAC CATH/INVASIVE PROCEDURES | Age: 88
Discharge: HOME | End: 2023-01-26
Payer: MEDICARE

## 2023-01-26 VITALS
SYSTOLIC BLOOD PRESSURE: 113 MMHG | OXYGEN SATURATION: 94 % | HEIGHT: 66 IN | WEIGHT: 188 LBS | TEMPERATURE: 97.2 F | RESPIRATION RATE: 18 BRPM | DIASTOLIC BLOOD PRESSURE: 84 MMHG | HEART RATE: 90 BPM | BODY MASS INDEX: 30.22 KG/M2

## 2023-01-26 PROBLEM — I73.9 PVD (PERIPHERAL VASCULAR DISEASE) (HCC): Status: ACTIVE | Noted: 2023-01-26

## 2023-01-26 LAB
ANION GAP SERPL CALCULATED.3IONS-SCNC: 17 MMOL/L (ref 7–16)
BUN BLDV-MCNC: 36 MG/DL (ref 6–23)
CALCIUM SERPL-MCNC: 9.3 MG/DL (ref 8.6–10.2)
CHLORIDE BLD-SCNC: 87 MMOL/L (ref 98–107)
CO2: 28 MMOL/L (ref 22–29)
CREAT SERPL-MCNC: 5.7 MG/DL (ref 0.7–1.2)
GFR SERPL CREATININE-BSD FRML MDRD: 9 ML/MIN/1.73
GLUCOSE BLD-MCNC: 193 MG/DL (ref 74–99)
HCT VFR BLD CALC: 37.7 % (ref 37–54)
HEMOGLOBIN: 12 G/DL (ref 12.5–16.5)
MCH RBC QN AUTO: 31 PG (ref 26–35)
MCHC RBC AUTO-ENTMCNC: 31.8 % (ref 32–34.5)
MCV RBC AUTO: 97.4 FL (ref 80–99.9)
PDW BLD-RTO: 14.5 FL (ref 11.5–15)
PLATELET # BLD: 265 E9/L (ref 130–450)
PMV BLD AUTO: 10.4 FL (ref 7–12)
POTASSIUM REFLEX MAGNESIUM: 4.1 MMOL/L (ref 3.5–5)
RBC # BLD: 3.87 E12/L (ref 3.8–5.8)
SODIUM BLD-SCNC: 132 MMOL/L (ref 132–146)
WBC # BLD: 8.8 E9/L (ref 4.5–11.5)

## 2023-01-26 PROCEDURE — 2709999900 HC NON-CHARGEABLE SUPPLY

## 2023-01-26 PROCEDURE — C1760 CLOSURE DEV, VASC: HCPCS

## 2023-01-26 PROCEDURE — 75774 ARTERY X-RAY EACH VESSEL: CPT

## 2023-01-26 PROCEDURE — C1769 GUIDE WIRE: HCPCS

## 2023-01-26 PROCEDURE — 80048 BASIC METABOLIC PNL TOTAL CA: CPT

## 2023-01-26 PROCEDURE — C1725 CATH, TRANSLUMIN NON-LASER: HCPCS

## 2023-01-26 PROCEDURE — 85027 COMPLETE CBC AUTOMATED: CPT

## 2023-01-26 PROCEDURE — C2623 CATH, TRANSLUMIN, DRUG-COAT: HCPCS

## 2023-01-26 PROCEDURE — 37224 HC FEM POP TERRITORY PLASTY: CPT

## 2023-01-26 PROCEDURE — 6360000002 HC RX W HCPCS

## 2023-01-26 PROCEDURE — 37228 HC TIB PER TERRITORY PLASTY: CPT

## 2023-01-26 PROCEDURE — 36415 COLL VENOUS BLD VENIPUNCTURE: CPT

## 2023-01-26 PROCEDURE — C1894 INTRO/SHEATH, NON-LASER: HCPCS

## 2023-01-26 PROCEDURE — C1887 CATHETER, GUIDING: HCPCS

## 2023-01-26 PROCEDURE — 75716 ARTERY X-RAYS ARMS/LEGS: CPT

## 2023-01-26 PROCEDURE — 2500000003 HC RX 250 WO HCPCS

## 2023-01-26 RX ORDER — MONTELUKAST SODIUM 10 MG/1
10 TABLET ORAL NIGHTLY
COMMUNITY

## 2023-01-26 RX ORDER — SODIUM CHLORIDE 9 MG/ML
INJECTION, SOLUTION INTRAVENOUS CONTINUOUS
Status: DISCONTINUED | OUTPATIENT
Start: 2023-01-26 | End: 2023-01-26

## 2023-01-26 RX ORDER — ACETAMINOPHEN 325 MG/1
650 TABLET ORAL EVERY 4 HOURS PRN
Status: DISCONTINUED | OUTPATIENT
Start: 2023-01-26 | End: 2023-01-27 | Stop reason: HOSPADM

## 2023-01-26 RX ORDER — ONDANSETRON 2 MG/ML
4 INJECTION INTRAMUSCULAR; INTRAVENOUS EVERY 6 HOURS PRN
Status: DISCONTINUED | OUTPATIENT
Start: 2023-01-26 | End: 2023-01-27 | Stop reason: HOSPADM

## 2023-01-26 RX ORDER — SIMETHICONE 80 MG
80 TABLET,CHEWABLE ORAL EVERY 6 HOURS PRN
COMMUNITY

## 2023-01-26 RX ORDER — ZINC GLUCONATE 50 MG
50 TABLET ORAL DAILY
COMMUNITY

## 2023-01-26 RX ORDER — CRANBERRY FRUIT EXTRACT 200 MG
400 CAPSULE ORAL DAILY
COMMUNITY

## 2023-01-26 RX ORDER — SODIUM CHLORIDE 0.9 % (FLUSH) 0.9 %
5-40 SYRINGE (ML) INJECTION PRN
Status: DISCONTINUED | OUTPATIENT
Start: 2023-01-26 | End: 2023-01-27 | Stop reason: HOSPADM

## 2023-01-26 RX ORDER — SODIUM CHLORIDE 9 MG/ML
INJECTION, SOLUTION INTRAVENOUS PRN
Status: DISCONTINUED | OUTPATIENT
Start: 2023-01-26 | End: 2023-01-26

## 2023-01-26 RX ORDER — SODIUM CHLORIDE 0.9 % (FLUSH) 0.9 %
5-40 SYRINGE (ML) INJECTION EVERY 12 HOURS SCHEDULED
Status: DISCONTINUED | OUTPATIENT
Start: 2023-01-26 | End: 2023-01-26

## 2023-01-26 RX ORDER — CEFUROXIME AXETIL 500 MG/1
500 TABLET ORAL DAILY
COMMUNITY
End: 2023-01-28

## 2023-01-26 NOTE — OP NOTE
Cardiovascular Lab Procedure Report    Tunde Barahona  9/20/1931    Date : 1/26/2023  Surgeon: Amaryllis Prader, M.D. Pre-procedure Diagnosis: R LE tissue loss  Post-procedure Diagnosis: Same  Procedure:      Left  common femoral artery access   with US guidance    8 fr angioseal used for closure    Bilateral lower extremity angiogram    TF Right lower extremity angiogram   [id number]   Angiogram with catheter in Right   common femoral, popliteal artery   39787 Right SFA, Popliteal plasty with 5x250 (x2) DCB impact   10869 Right TP trunk and peroneal artery plasty with 3x220 ballon   Anesthesia: Local with IV sedation  Assistants: Cath Lab Staff  Estimated Blood Loss: Minimal  Complications: none  Findings: Abdominal aorta :   Right Left Right s/p Intervention   Common Iliac Art Tortous but patent Tortous but patent Tortous but patent   External Iliac Art Tortous but patent Tortous but patent Tortous but patent   Internal Iliac Art Tortous but patent Tortous but patent Tortous but patent   Common Femoral Art Patent patent patent   Superficial Femoral Art occluded occluded patent   Profunda Femoral Art patent patent patent   AK Popliteal Art Recon of flow but diseased Not visualized patent   BK Popliteal Art diseased Not visualized patent   Anterior Tibial Art Proximally occluded Not visualized Proximally occluded, distal recon in foot from peroneal   Tibioperoneal Trunk occluded Not visualized patent   Peroneal Art Proximally patent and occluded proximally Not visualized patent   Posterior Tibial Art occluded Not visualized occluded     Procedure Details : There was not previous CTA , or catheter based diagnostic imaging preformed prior to today's procedure. Timeout preformed identifying pt and procedure. Groins prepped and draped in sterile fashion. Patient given sedation as needed throughout the case.      Left  common femoral artery was noted to be patent and was accessed under ultrasound guidance after infiltrating with local.  Micropuncture placed, exchanged out for 5 fr sheath. Advantage glide wire and contra catheter advanced into distal aorta. Bilateral lower extremity angiogram preformed. Catheter and wire used to access Right  iliac system and catheter placed at common femoral and angiogram of theRight  lower extremity preformed. Significant occlusive disease was noted in the sfa, pop and tibials. Patient was given 9000 units of heparin and than a 7 fr destination sheath advanced to common femoral artery on the Right. Imaging with the catheter in the sfa to further evaluate the sfa, popliteal lesion. 0.35 [place] blazer catheter and advantage glide wire used to traverse stenosis. The catheter was placed in the below knee popliteal artery and angiogram was preformed to further evaluate the tibial arteries and runoff. The sfa pop lesion was treated with plasty predilation with 4x200 evercross. Post dilation with a 5x250 DCB x 2 balloon was completed. There was evidence of some residual stenosis and dissection flap around the knee but this was not flow limiting. The TP trunk and peroneal occlusion was able to be crossed and plasty with 3x220 balloon was completed over 0.18 advantage glide wire. Completion angiogram noted much improved filling distally and brisk flow though the stenotic area. Sheath and wire pulled back to Left iliac system.   After femoral angiogram a 8 fr angioseal device used to close the Left common femoral artery    Postop Exam  Right DP monophasic  PT no signal  Left DP no signal  PT no signal    Plan  Continue Medical Management with asa and statin  Encourage continued tobacco avoidance  No plans for further surgical intervention     Discussed with Dr. Gunnar Nielson  Discussed with family re the improvement in flow but still may not be adequate to heal toe amp - for now would keep clean and dry  Would allow toe to further declare it self    Audra Servant, MD    PCP : Houston Horn DO  Podiatry : Dr Osmin Carvalho

## 2023-01-26 NOTE — DISCHARGE INSTRUCTIONS
Paint R great toe with betadine daily. Dress with dry dressing around the toe and wrap with kerlix from toes to just above the ankle. Change daily. Discharge Instructions for Lower extremity angiogram    Keep dressing on groin until tomorrow. Remove tomorrow and replace with dry dressing daily until no longer draining. Call Dr. Moon Eric office [phone] for follow-up appointment. Groin Care  - keep clean and dry  - ok to shower or sponge bath  - ok to clean site with lukewarm water and mild soap  - use a soft wash cloth to gently wipe the incision area  - do not scrub the incision areas  - no swimming or baths    Home Care    Follow these guidelines after surgery:   Rest. Try to move as tolerated. A mix of rest and light activity improves healing. The incision area may be sore for a few days. To minimize pain and soreness: Take pain medicine as directed. Avoid strenuous activity and heavy lifting. Diet    You can return to your regular diet. You may work with a dietician who will help you follow a heart-healthy diet. Physical Activity    You will feel sore after the surgery. Try to walk steadily within two weeks. You may be able to return to normal activities within 1-3 weeks. While recovering, you will need to avoid strenuous activities, like heavy lifting. Ask your doctor when you will be able to return to work. Do not drive unless your doctor has given you permission to do so. Medications    Your doctor may recommend:   Over-the-counter or prescription pain medicine   Aspirin or a cholesterol-lowering drug to prevent complications   If you had to stop medicines before the procedure, ask your doctor when you can start again. Medicines that may have been stopped include:   Anti-inflammatory drugs (eg, aspirin, ibuprofen)   Blood thinners, like warfarin (Coumadin)   Clopidogrel (Plavix)   When taking medicines:   Take your medicine as directed.  Do not change the amount or the schedule. Do not stop taking them without talking to your doctor. Do not share them. Know what results and side effects to look for. Report them to your doctor. Some drugs can be dangerous when mixed. Talk to a doctor or pharmacist if you are taking more than one drug. This includes over-the-counter medicines and herb or dietary supplements. Plan ahead for refills so you do not run out. Lifestyle Changes    You and your doctor will plan lifestyle changes that will help you recover. Some things to keep in mind include: Atherosclerosis and high blood pressure should be carefully managed. This can be done with medicines and a healthy lifestyle. If you smoke, talk to your doctor about quitting. Follow-up   Call Your Doctor If Any of the Following Occurs   After you leave the hospital, call your doctor if any of the following occurs:   Redness, swelling, increasing pain, excessive bleeding, or discharge at the incision site   Signs of infection, including fever and chills   Any change of color or sensation in your legs or feet   Burning, pain, or other problems when urinating   Nausea or vomiting   Abdominal cramps or diarrhea   Unusual fatigue or depression   Disorientation or confusion   Numbness or tingling in the legs   New, unexplained symptoms   Cough   Call 911 or go to the emergency room right away if you have:   Shortness of breath   Chest pain   If you think you have an emergency,  CALL 911.

## 2023-01-26 NOTE — H&P
Vascular Surgery History & Physical Exam    Chief Complaint: Peripheral vascular disease, R great toe ulcer    HISTORY OF PRESENT ILLNESS:    The patient is a 89 y.o. male who presents to the hospital for elective arteriogram with possible intervention.  The patient has a history of peripheral vascular disease and R great toe ulcer.      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 ([facility]ly [facility]) 12/3/2021    CAD (coronary artery disease)     CHF (congestive heart failure) (Formerly [facility])     Chronic kidney disease     COVID-19     CVA (cerebral vascular accident) ([facility]ly [facility])     Diabetes mellitus ([facility]ly [facility])     Diabetic peripheral neuropathy ([facility]) 11/9/2012    Hemodialysis patient (Formerly [facility])     History of CVA (cerebrovascular accident) 6/9/2014    Hypertension     Ischemic ulcer of toe, limited to breakdown of skin, right ([facility]) 12/3/2021    Other disorders of kidney and ureter     prostate infections in past    PVD (peripheral vascular disease) with claudication ([facility]) 6/9/2014    Right sided weakness 3/19/2018    TIA (transient ischemic attack)     possible several years ago    Unspecified cerebral artery occlusion with cerebral infarction 2013    WHEELCHAIR RIGHT LEG AFFECTED     Past Surgical History:   Procedure Laterality Date    ABDOMINAL HERNIA REPAIR      CATARACT REMOVAL      right    CHOLECYSTECTOMY  11/2011    DENTAL SURGERY      EYE SURGERY      HC DIALYSIS CATHETER Right 8/27/2020    TESIO CATHETER INSERTION performed by Evens Shen MD at [facility] OR    INGUINAL HERNIA REPAIR      INNER EAR SURGERY      removal of sac  in ear    SHUNT REVISION Right 1/21/2021    INSERTION AV GRAFT RIGHT ARM performed by Dot Hayden MD at [address] Right 4/8/2021    THROMBECTOMY RIGHT ARM AV GRAFT performed by Dot Hayden MD at [address] Left 8/9/2021    INSERTION OF TEMPORARY HEMODIALYSIS CATHETER performed by Dot Hayden MD at [address] Right 8/11/2021    INSERTION OF TESIO, REMOVAL OF TEMPORARY CATHETHER performed by Dot Hayden MD at [facility] OR     Current Medications:     Current Outpatient Medications:     cefUROXime (CEFTIN) 500 MG tablet, Take 500 mg by mouth daily, Disp: , Rfl:     Cranberry 200 MG CAPS, Take 400 mg by mouth daily, Disp: , Rfl:     montelukast (SINGULAIR) 10 MG tablet, Take 10 mg by mouth nightly, Disp: , Rfl:     zinc 50 MG TABS tablet, Take 50 mg by mouth daily, Disp: , Rfl:     simethicone (MYLICON) 80 MG chewable tablet, Take 80 mg by mouth every 6 hours as needed for Flatulence, Disp: , Rfl:     aspirin EC 81 MG EC tablet, Take 81 mg by mouth daily, Disp: , Rfl:     cyclobenzaprine (FLEXERIL) 10 MG tablet, Take 10 mg by mouth 3 times daily as needed for Muscle spasms, Disp: , Rfl:     glimepiride (AMARYL) 1 MG tablet, Take 1 mg by mouth every morning (before breakfast), Disp: , Rfl:     ipratropium-albuterol (DUONEB) 0.5-2.5 (3) MG/3ML SOLN nebulizer solution, , Disp: , Rfl:     melatonin 3 MG TABS tablet, Take 3 mg by mouth daily, Disp: , Rfl:     budesonide (PULMICORT) 0.5 MG/2ML nebulizer suspension, Take 2 mLs by nebulization 2 times daily (Patient not taking: No sig reported), Disp: 120 mL, Rfl: 2    formoterol (PERFOROMIST) 20 MCG/2ML nebulizer solution, Take 2 mLs by nebulization in the morning and 2 mLs in the evening., Disp: 120 mL, Rfl: 2    budesonide (PULMICORT FLEXHALER) 90 MCG/ACT AEPB inhaler, Inhale 2 puffs into the lungs 2 times daily, Disp: 1 each, Rfl: 0    albuterol (ACCUNEB) 1.25 MG/3ML nebulizer solution, Inhale 1 ampule into the lungs every 6 hours as needed for Wheezing, Disp: , Rfl:     magnesium hydroxide (MILK OF MAGNESIA) 400 MG/5ML suspension, Take by mouth daily as needed for Constipation, Disp: , Rfl:     insulin lispro (HUMALOG) 100 UNIT/ML injection vial, Inject 0-6 Units into the skin 3 times daily (with meals), Disp: 10 mL, Rfl: 3    metoprolol succinate (TOPROL XL) 25 MG extended release tablet, Take 1 tablet by mouth daily, Disp: 30 tablet, Rfl: 3    white petrolatum OINT ointment, Apply 1 applicator topically 2 times daily, Disp: 90 g, Rfl: 0    atorvastatin (LIPITOR) 10 MG tablet, Take 10 mg by mouth nightly, Disp: , Rfl:     gabapentin (NEURONTIN) 100 MG capsule, Take 100 mg by mouth nightly., Disp: , Rfl:     ascorbic acid (VITAMIN C) 500 MG tablet, Take 1,000 mg by mouth daily, Disp: , Rfl:     zinc gluconate 50 MG tablet, Take 50 mg by mouth daily, Disp: , Rfl:     Multiple Vitamins-Minerals (PRESERVISION AREDS 2 PO), Take 1 tablet by mouth daily , Disp: , Rfl:     famotidine (PEPCID) 40 MG tablet, Take 40 mg by mouth as needed, Disp: , Rfl:     vitamin D (CHOLECALCIFEROL) 25 MCG (1000 UT) TABS tablet, Take 1,000 Units by mouth daily, Disp: , Rfl:     Current Facility-Administered Medications:     0.9 % sodium chloride infusion, , IntraVENous, Continuous, Vesta Come, APRN - CNP    sodium chloride flush 0.9 % injection 5-40 mL, 5-40 mL, IntraVENous, 2 times per day, Vesta Come, APRN - CNP    sodium chloride flush 0.9 % injection 5-40 mL, 5-40 mL, IntraVENous, PRN, Vesta Come, APRN - CNP    0.9 % sodium chloride infusion, , IntraVENous, PRN, Vesta Come, APRN - CNP  Allergies:  Sulfa antibiotics and Sulfa antibiotics  Social History     Socioeconomic History    Marital status: Single     Spouse name: Not on file    Number of children: Not on file    Years of education: Not on file    Highest education level: Not on file   Occupational History    Not on file   Tobacco Use    Smoking status: Former Packs/day: 0.50     Years: 20.00     Pack years: 10.00     Types: Cigarettes     Quit date: 5     Years since quittin.1    Smokeless tobacco: Never   Vaping Use    Vaping Use: Never used   Substance and Sexual Activity    Alcohol use: Not Currently     Alcohol/week: 1.0 standard drink     Types: 1 Glasses of wine per week    Drug use: No    Sexual activity: Not Currently   Other Topics Concern    Not on file   Social History Narrative    ** Merged History Encounter **          Social Determinants of Health     Financial Resource Strain: Not on file   Food Insecurity: Not on file   Transportation Needs: Not on file   Physical Activity: Not on file   Stress: Not on file   Social Connections: Not on file   Intimate Partner Violence: Not on file   Housing Stability: Not on file     History reviewed. No pertinent family history. PHYSICAL EXAM:    Vitals:    23 1200   BP: 113/84   Pulse: 90   Resp: 18   Temp: 97.2 °F (36.2 °C)   SpO2: 94%     CONSTITUTIONAL:  awake, alert, cooperative, no apparent distress, and appears stated age  NECK:  supple, symmetrical, trachea midline  LUNGS:  no increased work of breathing, good air exchange   CARDIOVASCULAR:  regular rate and rhythm  ABDOMEN:  soft, non-distended and non-tenderl   Pulse Exam   R femoral Biphasic L femoral Biphasic   R dorsalis pedis No signal L dorsalis pedis No signal   R posterior tibial No signal L posterior tibial No signal     LABS:    Lab Results   Component Value Date    WBC 8.8 2023    HGB 12.0 (L) 2023    HCT 37.7 2023     2023    PROTIME 16.6 (H) 05/15/2021    INR 1.5 05/15/2021    APTT <20.0 (L) 2020    K 4.1 2023    BUN 36 (H) 2023    CREATININE 5.7 (H) 2023     Assesment:  Peripheral vascular disease. R great toe ulcer  PLAN:    Aortogram,  Right lower extremity arteriogram, possible intervention. I reviewed the procedure with the patient and family as available.   I discussed the procedure, risks, benefits, complications, and alternatives of the procedure. They understand and consent.   All questions were answered         María Elena Shipley MD

## 2023-02-01 NOTE — FLOWSHEET NOTE
02/16/22 1041   Vital Signs   BP (!) 183/73   Temp 97.6 °F (36.4 °C)   Pulse 100   Resp 17   Weight 190 lb 11.2 oz (86.5 kg)   Percent Weight Change -5.22   Post-Hemodialysis Assessment   Post-Treatment Procedures Blood returned;Catheter capped, clamped with Citrate x 2 ports   Machine Disinfection Process Exterior Machine Disinfection   Rinseback Volume (ml) 300 ml   Total Liters Processed (l/min) 78.8 l/min   Dialyzer Clearance Lightly streaked   Duration of Treatment (minutes) 210 minutes   Heparin amount administered during treatment (units) 0 units   Hemodialysis Intake (ml) 300 ml   Hemodialysis Output (ml) 2800 ml   NET Removed (ml) 2500 ml   Tolerated Treatment Good   Patient Response to Treatment 2500ml removed   Tolerated  HD per orders,  2500ml removed with out difficulty. HD CVC flushed, citrate to dwell, clamped and capped  post tx per policy. Report to floor RN, remains in care of staff. done

## 2023-02-18 ENCOUNTER — APPOINTMENT (OUTPATIENT)
Dept: ULTRASOUND IMAGING | Age: 88
End: 2023-02-18
Payer: MEDICARE

## 2023-02-18 ENCOUNTER — APPOINTMENT (OUTPATIENT)
Dept: CT IMAGING | Age: 88
End: 2023-02-18
Payer: MEDICARE

## 2023-02-18 ENCOUNTER — HOSPITAL ENCOUNTER (EMERGENCY)
Age: 88
Discharge: HOME | End: 2023-02-19
Attending: STUDENT IN AN ORGANIZED HEALTH CARE EDUCATION/TRAINING PROGRAM
Payer: MEDICARE

## 2023-02-18 DIAGNOSIS — N30.01 ACUTE CYSTITIS WITH HEMATURIA: Primary | ICD-10-CM

## 2023-02-18 DIAGNOSIS — N32.89 BLADDER MASS: ICD-10-CM

## 2023-02-18 LAB
ALBUMIN SERPL-MCNC: 3 G/DL (ref 3.5–5.2)
ALP BLD-CCNC: 105 U/L (ref 40–129)
ALT SERPL-CCNC: 30 U/L (ref 0–40)
ANION GAP SERPL CALCULATED.3IONS-SCNC: 14 MMOL/L (ref 7–16)
AST SERPL-CCNC: 29 U/L (ref 0–39)
BACTERIA: ABNORMAL /HPF
BASOPHILS ABSOLUTE: 0.03 E9/L (ref 0–0.2)
BASOPHILS RELATIVE PERCENT: 0.3 % (ref 0–2)
BILIRUB SERPL-MCNC: 0.3 MG/DL (ref 0–1.2)
BILIRUBIN DIRECT: 0.2 MG/DL (ref 0–0.3)
BILIRUBIN URINE: ABNORMAL
BILIRUBIN, INDIRECT: 0.1 MG/DL (ref 0–1)
BLOOD, URINE: ABNORMAL
BUN BLDV-MCNC: 39 MG/DL (ref 6–23)
CALCIUM SERPL-MCNC: 8.7 MG/DL (ref 8.6–10.2)
CHLORIDE BLD-SCNC: 90 MMOL/L (ref 98–107)
CLARITY: ABNORMAL
CO2: 30 MMOL/L (ref 22–29)
COLOR: ABNORMAL
CREAT SERPL-MCNC: 5.7 MG/DL (ref 0.7–1.2)
EOSINOPHILS ABSOLUTE: 0.2 E9/L (ref 0.05–0.5)
EOSINOPHILS RELATIVE PERCENT: 2 % (ref 0–6)
GFR SERPL CREATININE-BSD FRML MDRD: 9 ML/MIN/1.73
GLUCOSE BLD-MCNC: 167 MG/DL (ref 74–99)
GLUCOSE URINE: 100 MG/DL
HCT VFR BLD CALC: 35.3 % (ref 37–54)
HEMOGLOBIN: 10.7 G/DL (ref 12.5–16.5)
IMMATURE GRANULOCYTES #: 0.06 E9/L
IMMATURE GRANULOCYTES %: 0.6 % (ref 0–5)
KETONES, URINE: 15 MG/DL
LACTIC ACID: 3.5 MMOL/L (ref 0.5–2.2)
LEUKOCYTE ESTERASE, URINE: ABNORMAL
LIPASE: 69 U/L (ref 13–60)
LYMPHOCYTES ABSOLUTE: 1.5 E9/L (ref 1.5–4)
LYMPHOCYTES RELATIVE PERCENT: 15.3 % (ref 20–42)
MCH RBC QN AUTO: 30.1 PG (ref 26–35)
MCHC RBC AUTO-ENTMCNC: 30.3 % (ref 32–34.5)
MCV RBC AUTO: 99.2 FL (ref 80–99.9)
MONOCYTES ABSOLUTE: 1.05 E9/L (ref 0.1–0.95)
MONOCYTES RELATIVE PERCENT: 10.7 % (ref 2–12)
NEUTROPHILS ABSOLUTE: 6.97 E9/L (ref 1.8–7.3)
NEUTROPHILS RELATIVE PERCENT: 71.1 % (ref 43–80)
NITRITE, URINE: POSITIVE
PDW BLD-RTO: 15 FL (ref 11.5–15)
PH UA: 7 (ref 5–9)
PLATELET # BLD: 257 E9/L (ref 130–450)
PMV BLD AUTO: 9.6 FL (ref 7–12)
POTASSIUM REFLEX MAGNESIUM: 4.2 MMOL/L (ref 3.5–5)
PROTEIN UA: >=300 MG/DL
RBC # BLD: 3.56 E12/L (ref 3.8–5.8)
RBC UA: ABNORMAL /HPF (ref 0–2)
SODIUM BLD-SCNC: 134 MMOL/L (ref 132–146)
SPECIFIC GRAVITY UA: 1.01 (ref 1–1.03)
TOTAL PROTEIN: 6.6 G/DL (ref 6.4–8.3)
UROBILINOGEN, URINE: 1 E.U./DL
WBC # BLD: 9.8 E9/L (ref 4.5–11.5)
WBC UA: ABNORMAL /HPF (ref 0–5)

## 2023-02-18 PROCEDURE — 2580000003 HC RX 258: Performed by: STUDENT IN AN ORGANIZED HEALTH CARE EDUCATION/TRAINING PROGRAM

## 2023-02-18 PROCEDURE — 51701 INSERT BLADDER CATHETER: CPT

## 2023-02-18 PROCEDURE — 80048 BASIC METABOLIC PNL TOTAL CA: CPT

## 2023-02-18 PROCEDURE — 83690 ASSAY OF LIPASE: CPT

## 2023-02-18 PROCEDURE — 74176 CT ABD & PELVIS W/O CONTRAST: CPT

## 2023-02-18 PROCEDURE — 87088 URINE BACTERIA CULTURE: CPT

## 2023-02-18 PROCEDURE — 93971 EXTREMITY STUDY: CPT

## 2023-02-18 PROCEDURE — 83605 ASSAY OF LACTIC ACID: CPT

## 2023-02-18 PROCEDURE — 81001 URINALYSIS AUTO W/SCOPE: CPT

## 2023-02-18 PROCEDURE — 80076 HEPATIC FUNCTION PANEL: CPT

## 2023-02-18 PROCEDURE — 99284 EMERGENCY DEPT VISIT MOD MDM: CPT

## 2023-02-18 PROCEDURE — 85025 COMPLETE CBC W/AUTO DIFF WBC: CPT

## 2023-02-18 RX ORDER — 0.9 % SODIUM CHLORIDE 0.9 %
500 INTRAVENOUS SOLUTION INTRAVENOUS ONCE
Status: COMPLETED | OUTPATIENT
Start: 2023-02-18 | End: 2023-02-19

## 2023-02-18 RX ADMIN — SODIUM CHLORIDE 500 ML: 9 INJECTION, SOLUTION INTRAVENOUS at 20:12

## 2023-02-18 ASSESSMENT — ENCOUNTER SYMPTOMS
WHEEZING: 0
EYE REDNESS: 0
EYE PAIN: 0
SINUS PRESSURE: 0
SORE THROAT: 0
ABDOMINAL PAIN: 0
BACK PAIN: 0
NAUSEA: 0
COUGH: 0
DIARRHEA: 0
VOMITING: 0
CONSTIPATION: 1
EYE DISCHARGE: 0
SHORTNESS OF BREATH: 0

## 2023-02-18 ASSESSMENT — LIFESTYLE VARIABLES
HOW OFTEN DO YOU HAVE A DRINK CONTAINING ALCOHOL: NEVER
HOW MANY STANDARD DRINKS CONTAINING ALCOHOL DO YOU HAVE ON A TYPICAL DAY: PATIENT DOES NOT DRINK

## 2023-02-18 ASSESSMENT — PAIN SCALES - GENERAL: PAINLEVEL_OUTOF10: 6

## 2023-02-18 NOTE — ED PROVIDER NOTES
The history is provided by the patient and medical records. 80-year male presents emergency room complaint of constipation. States he feels like a stool ball is in his rectum that he just needs to be taken out. Denies any other acute complaint denies any chest pain shortness of breath changes to urinary habits denies any fevers or chills no other acute plaints this time denies any abdominal pain at this time. Review of Systems   Constitutional:  Negative for chills and fever. HENT:  Negative for ear pain, sinus pressure and sore throat. Eyes:  Negative for pain, discharge and redness. Respiratory:  Negative for cough, shortness of breath and wheezing. Cardiovascular:  Negative for chest pain. Gastrointestinal:  Positive for constipation. Negative for abdominal pain, diarrhea, nausea and vomiting. Genitourinary:  Negative for dysuria and frequency. Musculoskeletal:  Negative for arthralgias and back pain. Skin:  Negative for rash and wound. Neurological:  Negative for weakness and headaches. Hematological:  Negative for adenopathy. All other systems reviewed and are negative. Physical Exam  Vitals and nursing note reviewed. Constitutional:       Appearance: He is well-developed. HENT:      Head: Normocephalic and atraumatic. Eyes:      Conjunctiva/sclera: Conjunctivae normal.   Cardiovascular:      Rate and Rhythm: Normal rate and regular rhythm. Heart sounds: Normal heart sounds. No murmur heard. Pulmonary:      Effort: Pulmonary effort is normal. No respiratory distress. Breath sounds: Normal breath sounds. No wheezing or rales. Abdominal:      General: Bowel sounds are normal.      Palpations: Abdomen is soft. Tenderness: There is no abdominal tenderness. There is no guarding or rebound. Negative signs include Waite's sign. Musculoskeletal:         General: No tenderness or deformity. Cervical back: Normal range of motion and neck supple. Skin:     General: Skin is warm and dry. Neurological:      Mental Status: He is alert and oriented to person, place, and time. Cranial Nerves: No cranial nerve deficit. Coordination: Coordination normal.        Procedures     MDM     Amount and/or Complexity of Data Reviewed  Clinical lab tests: reviewed  Tests in the radiology section of CPT®: reviewed  Decide to obtain previous medical records or to obtain history from someone other than the patient: yes           Diagnostic results    LABS:    Labs Reviewed   CBC WITH AUTO DIFFERENTIAL - Abnormal; Notable for the following components:       Result Value    RBC 3.56 (*)     Hemoglobin 10.7 (*)     Hematocrit 35.3 (*)     MCHC 30.3 (*)     Lymphocytes % 15.3 (*)     Monocytes Absolute 1.05 (*)     All other components within normal limits   BASIC METABOLIC PANEL W/ REFLEX TO MG FOR LOW K - Abnormal; Notable for the following components:    Chloride 90 (*)     CO2 30 (*)     Glucose 167 (*)     BUN 39 (*)     Creatinine 5.7 (*)     All other components within normal limits   HEPATIC FUNCTION PANEL - Abnormal; Notable for the following components:    Albumin 3.0 (*)     All other components within normal limits   LIPASE - Abnormal; Notable for the following components:    Lipase 69 (*)     All other components within normal limits   LACTIC ACID - Abnormal; Notable for the following components:    Lactic Acid 3.5 (*)     All other components within normal limits    Narrative:     Lorena Osborne tel. [phone],  Chemistry results called to and read back by Ruben Boyd RN, 02/18/2023  19:34, by Estela Woods       As interpreted by me, the above displayed labs are abnormal. All other labs obtained during this visit were within normal range or not returned as of this dictation.       EKG Interpretation  Interpreted by emergency department physician, Meghna Waddell MD      none        RADIOLOGY:   Non-plain film images such as CT, Ultrasound and MRI are read by the radiologist. Emily Killian radiographic images are visualized and preliminarily interpreted by the ED Provider with the below findings:    Interpretation per the Radiologist below, if available at the time of this note:    US DUP LOWER EXTREMITY RIGHT SULMA   Final Result   No evidence of DVT in the right lower extremity, though patient would not   allow assessment in the groin. Common femoral/saphenofemoral junction were   not able to be assessed. CT ABDOMEN PELVIS WO CONTRAST Additional Contrast? None    (Results Pending)     US DUP LOWER EXTREMITY RIGHT SULMA    Result Date: 2/18/2023  EXAMINATION: DUPLEX VENOUS ULTRASOUND OF THE RIGHT LOWER EXTREMITY 2/18/2023 4:00 pm TECHNIQUE: Duplex ultrasound using B-mode/gray scaled imaging and Doppler spectral analysis and color flow was obtained of the deep venous structures of the right lower extremity. COMPARISON: None. HISTORY: ORDERING SYSTEM PROVIDED HISTORY: rule out dvt TECHNOLOGIST PROVIDED HISTORY: Reason for exam:->rule out dvt What reading provider will be dictating this exam?->CRC FINDINGS: Please note that the patient declined imaging in the groin. As such, the common femoral/saphenofemoral junction were not able to be assessed. The visualized veins of the right lower extremity are patent and free of echogenic thrombus. The veins demonstrate good compressibility with normal color flow study and spectral analysis. No evidence of DVT in the right lower extremity, though patient would not allow assessment in the groin. Common femoral/saphenofemoral junction were not able to be assessed. No results found.     MDM    History From: th epatient    CONSULTS: (Who and What was discussed)  None      Social Determinants of Health : None    Chronic Conditions affecting care:    has a past medical history of Arthritis, Atherosclerosis of native arteries of right leg with ulceration of other part of foot ([address] (6/9/2014), 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 ([address] (12/3/2021), CAD (coronary artery disease), CHF (congestive heart failure) ([address] Chronic kidney disease, COVID-19, CVA (cerebral vascular accident) ([address] Diabetes mellitus ([address] Diabetic peripheral neuropathy ([address] (11/9/2012), Hemodialysis patient ([address] History of CVA (cerebrovascular accident) (6/9/2014), Hypertension, Ischemic ulcer of toe, limited to breakdown of skin, right ([address] (12/3/2021), Other disorders of kidney and ureter, PVD (peripheral vascular disease) with claudication ([address] (6/9/2014), Right sided weakness (3/19/2018), TIA (transient ischemic attack), and Unspecified cerebral artery occlusion with cerebral infarction (2013). Records Reviewed( Source)     CC/HPI Summary, DDx, ED Course, and Reassessment:   Differential diagnosis including but not limited to cystitis, rectal impaction  89-year male present emergency department complaint of feel like he has a stool ball with pressure in his rectum. He denies any other acute complaints at this time. His laboratory work-up is showing urinalysis consistent with urinary tract infection, CBC showing no elevation of his white count BMP showing chronic kidney disease he is on hemodialysis potassium within acceptable range. LFTs were stable, CT scan of the pelvis is showing concern of bladder mass versus possible infection, patient also noted to have a rectal impaction. Disimpaction of the rectum was performed by myself at bedside with nurse present. With significant amount of stool removed. Stool was also hard. Patient also had ultrasound of the lower extremity of the right side which showed no DVT. Patient was given Rocephin IV here in the emergency department for his urinary tract infection will be discharged back to his facility on [address] He is agreeable this plan. He is otherwise hemodynamically stable. He was advised on his bladder mass and follow-up with urology as well.     Disposition Considerations (Tests not ordered but considered, Shared Decision Making, Pt Expectation of Test or Tx.): Upon shared decision making patient safe to be discharged home follow with her family doctor . Did advise on return precautions to the emergency department. Did also advise follow-up with her primary care physician. Patient agreeable with the plan moving forward. Medications   0.9 % sodium chloride bolus (500 mLs IntraVENous New Bag 2/18/23 2012)         I am the primary provider of record        ED Course as of 02/19/23 0057   Sat Feb 18, 2023 2205 Patient presents to the emergency department for constipation. He states that he has not had a bowel movement in the last few days. He states there is pain in his rectal region and he feels like he needs to go but cannot. This is happened in the past but he states this pain is somewhat worse than it has been previously. He denies any pain in his abdomen states is all in his rectal region. He denies any nausea, vomiting, chest pain, shortness of breath. Patient does have history of renal failure and is on dialysis. On his evaluation he is well-appearing, nontoxic. He is resting comfortably in bed. Is regular rate and rhythm. Lungs are clear to auscultation bilaterally. His abdomen is soft and nontender. He does have a sacral decubitus wound at the proximal aspect of his gluteal cleft. There is some mild bleeding to the area but no palpable crepitus. Plans for labs, fluid hydration, CT imaging and reevaluation [KP]   2215 PROCEDURE  2/18/23       Time: 2200    Rectal Disimpaction  Risks, benefits and alternatives (for applicable procedures below) described. Performed By: Tiana Alonso MD.    Indication:  Fecal impaction  Informed consent: The patient provided verbal consent for this procedure. .  Procedure:  Patient was positioned appropriately. Digital disimpaction was performed at beside with hard  stool in rectal vault removed. Significant amount of hard stool removed  Nurse chaperone present at bedside  Complications:  no.           [CB]      ED Course User Index  [CB] Mame Bradley MD  [] Leopoldo Nani, DO         ED Course as of 02/19/23 0102   Sat Feb 18, 2023 2205 Patient presents to the emergency department for constipation. He states that he has not had a bowel movement in the last few days. He states there is pain in his rectal region and he feels like he needs to go but cannot. This is happened in the past but he states this pain is somewhat worse than it has been previously. He denies any pain in his abdomen states is all in his rectal region. He denies any nausea, vomiting, chest pain, shortness of breath. Patient does have history of renal failure and is on dialysis. On his evaluation he is well-appearing, nontoxic. He is resting comfortably in bed. Is regular rate and rhythm. Lungs are clear to auscultation bilaterally. His abdomen is soft and nontender. He does have a sacral decubitus wound at the proximal aspect of his gluteal cleft. There is some mild bleeding to the area but no palpable crepitus. Plans for labs, fluid hydration, CT imaging and reevaluation []   2215 PROCEDURE  2/18/23       Time: 2200    Rectal Disimpaction  Risks, benefits and alternatives (for applicable procedures below) described. Performed By: Mame Bradley MD.    Indication:  Fecal impaction  Informed consent: The patient provided verbal consent for this procedure. .  Procedure:  Patient was positioned appropriately. Digital disimpaction was performed at beside with hard  stool in rectal vault removed.   Significant amount of hard stool removed  Nurse chaperone present at bedside  Complications:  no.           [CB]      ED Course User Index  [CB] Mame Bradley MD  [KP] Schuyler Chávez DO       --------------------------------------------- PAST HISTORY ---------------------------------------------  Past Medical History:  has a past medical history of Arthritis, Atherosclerosis of native arteries of right leg with ulceration of other part of foot ([facility] 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 ([facility] CAD (coronary artery disease), CHF (congestive heart failure) ([facility] Chronic kidney disease, COVID-19, CVA (cerebral vascular accident) ([facility] Diabetes mellitus ([facility] Diabetic peripheral neuropathy ([facility] Hemodialysis patient ([facility] History of CVA (cerebrovascular accident), Hypertension, Ischemic ulcer of toe, 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 Past Surgical History:  has a past surgical history that includes Abdominal hernia repair; Cataract removal; Dental surgery; Inguinal hernia repair; Cholecystectomy (11/2011); Inner ear surgery; hc dialysis catheter (Right, 8/27/2020); eye surgery; shunt revision (Right, 1/21/2021); vascular surgery (Right, 4/8/2021); vascular surgery (Left, 8/9/2021); and vascular surgery (Right, 8/11/2021). Social History:  reports that he quit smoking about 56 years ago. His smoking use included cigarettes. He has a 10.00 pack-year smoking history. He has never used smokeless tobacco. He reports that he does not currently use alcohol after a past usage of about 1.0 standard drink per week. He reports that he does not use drugs. Family History: family history is not on file. The patients home medications have been reviewed.     Allergies: Sulfa antibiotics and Sulfa antibiotics    -------------------------------------------------- RESULTS -------------------------------------------------  Labs:  Results for orders placed or performed during the hospital encounter of 02/18/23   CBC with Auto Differential   Result Value Ref Range    WBC 9.8 4.5 - 11.5 E9/L    RBC 3.56 (L) 3.80 - 5.80 E12/L    Hemoglobin 10.7 (L) 12.5 - 16.5 g/dL    Hematocrit 35.3 (L) 37.0 - 54.0 %    MCV 99.2 80.0 - 99.9 fL    MCH 30.1 26.0 - 35.0 pg    MCHC 30.3 (L) 32.0 - 34.5 %    RDW 15.0 11.5 - 15.0 fL    Platelets 257 130 - 450 E9/L    MPV 9.6 7.0 - 12.0 fL    Neutrophils % 71.1 43.0 - 80.0 %    Immature Granulocytes % 0.6 0.0 - 5.0 %    Lymphocytes % 15.3 (L) 20.0 - 42.0 %    Monocytes % 10.7 2.0 - 12.0 %    Eosinophils % 2.0 0.0 - 6.0 %    Basophils % 0.3 0.0 - 2.0 %    Neutrophils Absolute 6.97 1.80 - 7.30 E9/L    Immature Granulocytes # 0.06 E9/L    Lymphocytes Absolute 1.50 1.50 - 4.00 E9/L    Monocytes Absolute 1.05 (H) 0.10 - 0.95 E9/L    Eosinophils Absolute 0.20 0.05 - 0.50 E9/L    Basophils Absolute 0.03 0.00 - 0.20 E9/L   Basic Metabolic Panel w/ Reflex to MG   Result Value Ref Range    Sodium 134 132 - 146 mmol/L    Potassium reflex Magnesium 4.2 3.5 - 5.0 mmol/L    Chloride 90 (L) 98 - 107 mmol/L    CO2 30 (H) 22 - 29 mmol/L    Anion Gap 14 7 - 16 mmol/L    Glucose 167 (H) 74 - 99 mg/dL    BUN 39 (H) 6 - 23 mg/dL    Creatinine 5.7 (H) 0.7 - 1.2 mg/dL    Est, Glom Filt Rate 9 >=60 mL/min/1.73    Calcium 8.7 8.6 - 10.2 mg/dL   Hepatic Function Panel   Result Value Ref Range    Total Protein 6.6 6.4 - 8.3 g/dL    Albumin 3.0 (L) 3.5 - 5.2 g/dL    Alkaline Phosphatase 105 40 - 129 U/L    ALT 30 0 - 40 U/L    AST 29 0 - 39 U/L    Total Bilirubin 0.3 0.0 - 1.2 mg/dL    Bilirubin, Direct 0.2 0.0 - 0.3 mg/dL    Bilirubin, Indirect 0.1 0.0 - 1.0 mg/dL   Lipase   Result Value Ref Range    Lipase 69 (H) 13 - 60 U/L   Lactic Acid   Result Value Ref Range    Lactic Acid 3.5 (HH) 0.5 - 2.2 mmol/L   Urinalysis with Microscopic   Result Value Ref Range    Color, UA RED (A) Straw/Yellow    Clarity, UA TURBID (A) Clear    Glucose, Ur 100 (A) Negative mg/dL    Bilirubin Urine MODERATE (A) Negative    Ketones,  Urine 15 (A) Negative mg/dL    Specific Gravity, UA 1.015 1.005 - 1.030    Blood, Urine LARGE (A) Negative    pH, UA 7.0 5.0 - 9.0    Protein, UA >=300 (A) Negative mg/dL    Urobilinogen, Urine 1.0 <2.0 E.U./dL    Nitrite, Urine POSITIVE (A) Negative    Leukocyte Esterase, Urine LARGE (A) Negative    WBC, UA PACKED (A) 0 - 5 /HPF    RBC, UA PACKED 0 - 2 /HPF    Bacteria, UA MANY (A) None Seen /HPF   Lactic Acid   Result Value Ref Range    Lactic Acid 1.5 0.5 - 2.2 mmol/L       Radiology:  CT ABDOMEN PELVIS WO CONTRAST Additional Contrast? None   Final Result   Diffuse irregular bladder wall thickening with multifocal mural based masses   and prominent perivesical fat stranding highly suggestive of bladder   urothelial carcinoma. Superimposed cystitis not excluded. There is mild   right hydronephrosis. Recommend cystoscopy. Mildly distended stool-filled rectum suggestive, but not definitive for fecal   impaction. No evidence of bowel obstruction. US DUP LOWER EXTREMITY RIGHT SULMA   Final Result   No evidence of DVT in the right lower extremity, though patient would not   allow assessment in the groin. Common femoral/saphenofemoral junction were   not able to be assessed. ------------------------- NURSING NOTES AND VITALS REVIEWED ---------------------------  Date / Time Roomed:  2/18/2023  6:11 PM  ED Bed Assignment:  21/21    The nursing notes within the ED encounter and vital signs as below have been reviewed. /78   Pulse 99   Temp 98.1 °F (36.7 °C) (Oral)   Resp 14   Ht 5' 6\" (1.676 m)   Wt 188 lb (85.3 kg)   SpO2 98%   BMI 30.34 kg/m²   Oxygen Saturation Interpretation: Normal      ------------------------------------------ PROGRESS NOTES ------------------------------------------  1:02 AM EST  I have spoken with the patient and discussed todays results, in addition to providing specific details for the plan of care and counseling regarding the diagnosis and prognosis. Their questions are answered at this time and they are agreeable with the plan. I discussed at length with them reasons for immediate return here for re evaluation. They will followup with their primary care physician by calling their office on Monday.      --------------------------------- ADDITIONAL PROVIDER NOTES ---------------------------------  At this time the patient is without objective evidence of an acute process requiring hospitalization or inpatient management. They have remained hemodynamically stable throughout their entire ED visit and are stable for discharge with outpatient follow-up. The plan has been discussed in detail and they are aware of the specific conditions for emergent return, as well as the importance of follow-up. New Prescriptions    CEFDINIR (OMNICEF) 300 MG CAPSULE    Take 1 capsule by mouth 2 times daily for 7 days       Diagnosis:  1. Acute cystitis with hematuria    2. Bladder mass        Disposition:  Patient's disposition: Discharge to home  Patient's condition is stable.        Marni Boogie MD  Resident  02/19/23 [id number]

## 2023-02-19 VITALS
HEART RATE: 84 BPM | RESPIRATION RATE: 14 BRPM | HEIGHT: 66 IN | DIASTOLIC BLOOD PRESSURE: 60 MMHG | TEMPERATURE: 98.1 F | WEIGHT: 188 LBS | BODY MASS INDEX: 30.22 KG/M2 | SYSTOLIC BLOOD PRESSURE: 95 MMHG | OXYGEN SATURATION: 98 %

## 2023-02-19 LAB — LACTIC ACID: 1.5 MMOL/L (ref 0.5–2.2)

## 2023-02-19 PROCEDURE — 96374 THER/PROPH/DIAG INJ IV PUSH: CPT

## 2023-02-19 PROCEDURE — 6360000002 HC RX W HCPCS: Performed by: EMERGENCY MEDICINE

## 2023-02-19 PROCEDURE — 83605 ASSAY OF LACTIC ACID: CPT

## 2023-02-19 PROCEDURE — 2580000003 HC RX 258: Performed by: EMERGENCY MEDICINE

## 2023-02-19 PROCEDURE — 99284 EMERGENCY DEPT VISIT MOD MDM: CPT

## 2023-02-19 RX ORDER — CEFDINIR 300 MG/1
300 CAPSULE ORAL 2 TIMES DAILY
Qty: 14 CAPSULE | Refills: 0 | Status: SHIPPED | OUTPATIENT
Start: 2023-02-19 | End: 2023-02-26

## 2023-02-19 RX ADMIN — WATER 1000 MG: 1 INJECTION INTRAMUSCULAR; INTRAVENOUS; SUBCUTANEOUS at 00:13

## 2023-02-19 NOTE — DISCHARGE INSTRUCTIONS
CT ABDOMEN PELVIS WO CONTRAST Additional Contrast? None   Final Result   Diffuse irregular bladder wall thickening with multifocal mural based masses   and prominent perivesical fat stranding highly suggestive of bladder   urothelial carcinoma. Superimposed cystitis not excluded. There is mild   right hydronephrosis. Recommend cystoscopy. Mildly distended stool-filled rectum suggestive, but not definitive for fecal   impaction. No evidence of bowel obstruction. US DUP LOWER EXTREMITY RIGHT SULMA   Final Result   No evidence of DVT in the right lower extremity, though patient would not   allow assessment in the groin. Common femoral/saphenofemoral junction were   not able to be assessed.

## 2023-02-20 ENCOUNTER — TELEPHONE (OUTPATIENT)
Dept: VASCULAR SURGERY | Age: 88
End: 2023-02-20

## 2023-02-21 ENCOUNTER — OFFICE VISIT (OUTPATIENT)
Dept: VASCULAR SURGERY | Age: 88
End: 2023-02-21
Payer: MEDICARE

## 2023-02-21 VITALS — HEIGHT: 67 IN | WEIGHT: 178 LBS | BODY MASS INDEX: 27.94 KG/M2

## 2023-02-21 DIAGNOSIS — I70.235 ATHEROSCLEROSIS OF NATIVE ARTERIES OF RIGHT LEG WITH ULCERATION OF OTHER PART OF FOOT (HCC): Primary | Chronic | ICD-10-CM

## 2023-02-21 LAB — URINE CULTURE, ROUTINE: NORMAL

## 2023-02-21 PROCEDURE — 1036F TOBACCO NON-USER: CPT | Performed by: SURGERY

## 2023-02-21 PROCEDURE — G8484 FLU IMMUNIZE NO ADMIN: HCPCS | Performed by: SURGERY

## 2023-02-21 PROCEDURE — 1123F ACP DISCUSS/DSCN MKR DOCD: CPT | Performed by: SURGERY

## 2023-02-21 PROCEDURE — 99212 OFFICE O/P EST SF 10 MIN: CPT | Performed by: SURGERY

## 2023-02-21 PROCEDURE — G8427 DOCREV CUR MEDS BY ELIG CLIN: HCPCS | Performed by: SURGERY

## 2023-02-21 PROCEDURE — G8417 CALC BMI ABV UP PARAM F/U: HCPCS | Performed by: SURGERY

## 2023-02-21 RX ORDER — PRIMIDONE 50 MG/1
50 TABLET ORAL DAILY
COMMUNITY
Start: 2023-02-11

## 2023-02-21 RX ORDER — DEXTROMETHORPHAN HBR, GUAIFENESIN 10; 100 MG/5ML; MG/5ML
LIQUID ORAL
COMMUNITY
Start: 2023-01-06

## 2023-02-21 NOTE — PROGRESS NOTES
Vascular Surgery Progress Note    Chief Complaint   Patient presents with    Post-Op Check     S/ P abdominal aortogram       Patient returns for post operative evaluation status post arteriogram and right lower extremity percutaneous intervention. The patient denies any unexpected problems since the procedure. He remains at the nursing facility. He is accompanied by his brother. They cannot remember the name of the podiatrist who is caring for his foot wounds at the facility. Procedure Laterality Date    ABDOMINAL HERNIA REPAIR      CATARACT REMOVAL      right    CHOLECYSTECTOMY  11/2011    DENTAL SURGERY      EYE SURGERY      HC DIALYSIS CATHETER Right 8/27/2020    TESIO CATHETER INSERTION performed by Clarnce Osler, MD at [address]      removal of sac in ear    SHUNT REVISION Right 1/21/2021    INSERTION AV GRAFT RIGHT ARM performed by Clarnce Osler, MD at [address] Right 4/8/2021    THROMBECTOMY RIGHT ARM AV GRAFT performed by Clarnce Osler, MD at [address] Left 8/9/2021    INSERTION OF TEMPORARY HEMODIALYSIS CATHETER performed by Clarnce Osler, MD at [address] Right 8/11/2021    INSERTION OF TESIO, REMOVAL OF TEMPORARY CATHETHER performed by Clarnce Osler, MD at [facility] OR       Physical Exam:  The femoral puncture site is soft without evidence of infection or hematoma. Heart rhythm is regular. Right      Left   Brachial     Radial     Femoral     Popliteal     Dorsalis Pedis doppler    Posterior Tibial     (3=normal, 2=diminished, 1=barely palpable, 4=widened)    Problem List Items Addressed This Visit       Atherosclerosis of native arteries of right leg with ulceration of other part of foot ([address] - Primary (Chronic)     I reviewed with the patient and his brother that the circulation to his right foot is improved since the procedure and is now as good as it can be.   Hopefully the wounds on his feet will begin to heal.  I would not recommend any additional vascular testing or intervention given his age, overall condition, and non-ambulatory state. I did not schedule him for a follow-up appointment but will be happy to see him in the future with any changes or new problems.

## 2024-07-08 NOTE — ANESTHESIA PRE PROCEDURE
MD Nataly       Current medications:    No current facility-administered medications for this visit. No current outpatient medications on file. Facility-Administered Medications Ordered in Other Visits   Medication Dose Route Frequency Provider Last Rate Last Admin    0.9 % sodium chloride infusion   Intravenous Continuous Toney Michelle MD        sodium chloride flush 0.9 % injection 10 mL  10 mL Intravenous 2 times per day Toney Michelle MD        sodium chloride flush 0.9 % injection 10 mL  10 mL Intravenous PRN Toney Michelle MD        ceFAZolin (ANCEF) 2000 mg in sterile water 20 mL IV syringe  2,000 mg Intravenous On Call to [address] [place] [address] [address], MD           Allergies:     Allergies   Allergen Reactions    Sulfa Antibiotics      Affects renal function and blood pressure    Sulfa Antibiotics        Problem List:    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    Diabetes mellitus ([facility] E11.9    Diabetes mellitus ([facility] E11.9    Diabetic peripheral neuropathy (HCC) E11.42    Osteoarthritis M19.90    Gait abnormality R26.9    Physical deconditioning R53.81    Lumbar spondylitis ([facility]) M46.96    Anemia D64.9    Fx humer, med condyl-closed S42.463A    Cerebral infarction ([facility]) I63.9    Renal failure N19    TIA (transient ischemic attack) G45.9    ASHD (arteriosclerotic heart disease) I25.10    Diabetes mellitus ([facility]) E11.9    PVD (peripheral vascular disease) with claudication ([facility]) I73.9    History of CVA (cerebrovascular accident) Z86.73    Pain in lower limb M79.606    Cerebral infarction (HCC) I63.9    CKD 3 N18.30    Diabetes mellitus-2 E11.9    Acute respiratory failure ([facility]) J96.00    Decubitus ulcer of sacral region, stage 1 L89.151    Right sided weakness R53.1    Hypoglycemia E16.2    Acute kidney injury ([facility]) BP Readings from Last 3 Encounters:   04/08/21 (!) 121/57   01/25/21 128/79   01/21/21 137/69       NPO Status:                                                                                 BMI:   Wt Readings from Last 3 Encounters:   04/08/21 180 lb (81.6 kg)   01/25/21 185 lb (83.9 kg)   01/21/21 185 lb (83.9 kg)     There is no height or weight on file to calculate BMI.    CBC:   Lab Results   Component Value Date    WBC 4.1 04/08/2021    RBC 3.41 04/08/2021    HGB 10.9 04/08/2021    HCT 34.0 04/08/2021    MCV 99.7 04/08/2021    RDW 13.2 04/08/2021     04/08/2021       CMP:   Lab Results   Component Value Date     01/25/2021    K 4.3 01/25/2021    K 3.8 01/21/2021    CL 98 01/25/2021    CO2 27 01/25/2021    BUN 39 01/25/2021    CREATININE 4.3 01/25/2021    GFRAA 16 01/25/2021    LABGLOM 13 01/25/2021    GLUCOSE 181 01/25/2021    GLUCOSE 163 02/22/2012    PROT 6.4 01/25/2021    CALCIUM 9.2 01/25/2021    BILITOT 0.4 01/25/2021    ALKPHOS 136 01/25/2021    AST 28 01/25/2021    ALT 12 01/25/2021       POC Tests: No results for input(s): POCGLU, POCNA, POCK, POCCL, POCBUN, POCHEMO, POCHCT in the last 72 hours.     Coags:   Lab Results   Component Value Date    PROTIME 15.4 11/29/2020    PROTIME 15.1 01/29/2012    INR 1.4 11/29/2020    APTT <20.0 11/29/2020       HCG (If Applicable): No results found for: PREGTESTUR, PREGSERUM, HCG, HCGQUANT     ABGs: No results found for: PHART, PO2ART, YQH7BEQ, KCW8JGL, BEART, Z5XBDCOX     Type & Screen (If Applicable):  No results found for: LABABO, LABRH    Drug/Infectious Status (If Applicable):  No results found for: HIV, HEPCAB    COVID-19 Screening (If Applicable):   Lab Results   Component Value Date    COVID19 Not Detected 04/02/2021         Anesthesia Evaluation  Patient summary reviewed no history of anesthetic complications:   Airway: Mallampati: III  TM distance: <3 FB     Mouth opening: > = 3 FB Dental:    (+) upper dentures and lower dentures Pulmonary: breath sounds clear to auscultation  (+) COPD:      (-) not a current smoker                          ROS comment: Former smoker    Cardiovascular:  Exercise tolerance: Uses wheelchair for ambulation  (+) hypertension:, CAD:, CHF:, hyperlipidemia        Rhythm: regular  Rate: normal                    Neuro/Psych:   (+) CVA (right hemiparesis; Oct 2012): residual symptoms, neuromuscular disease:, TIA, depression/anxiety             GI/Hepatic/Renal:   (+) GERD:, renal disease (HD on M/W/F for the past 3-4 months): ESRD and dialysis,           Endo/Other:    (+) DiabetesType II DM, , blood dyscrasia: anemia, arthritis:., .                  ROS comment: Bilateral hearing aids    Prior right shoulder injury (neck flexed anteriorly and laterally the right) Abdominal:           Vascular:           ROS comment: S/p Tesio in Aug 2020. Anesthesia Plan      regional and MAC     ASA 4     (Plan is for brachial plexus block with IV sedation as requested by surgeon. Backup GA if needed.)  Induction: intravenous. Anesthetic plan and risks discussed with patient. Plan discussed with CRNA.             Nasim Gaviria MD   4/8/2021 Him/He

## (undated) DEVICE — ADHESIVE SKIN CLSR 0.7ML TOP DERMBND ADV

## (undated) DEVICE — SLING ARM XL L20IN D75IN WHT POLY MESH ENVELOP MTL SIDE

## (undated) DEVICE — CHLORAPREP 26ML ORANGE

## (undated) DEVICE — DOUBLE BASIN SET: Brand: MEDLINE INDUSTRIES, INC.

## (undated) DEVICE — SCANLAN® VASCU-STATT® SINGLE-USE BULLDOG CLAMP - MIDI ANGLED 45° (WHITE), CLAMPING PRESSURE 25-30 G (2/STERILE PKG): Brand: SCANLAN® VASCU-STATT® SINGLE-USE BULLDOG CLAMP

## (undated) DEVICE — DECANTER: Brand: UNBRANDED

## (undated) DEVICE — SOLUTION IV 500ML 0.9% SOD CHL PH 5 INJ USP VIAFLX PLAS

## (undated) DEVICE — CLAMP INSERT: Brand: STEALTH® CLAMP INSERT

## (undated) DEVICE — DILATOR ART

## (undated) DEVICE — GOWN,SIRUS,FABRNF,XL,20/CS: Brand: MEDLINE

## (undated) DEVICE — INTENDED FOR TISSUE SEPARATION, AND OTHER PROCEDURES THAT REQUIRE A SHARP SURGICAL BLADE TO PUNCTURE OR CUT.: Brand: BARD-PARKER ® STAINLESS STEEL BLADES

## (undated) DEVICE — GLOVE SURG SZ 75 L12IN FNGR THK94MIL STD WHT LTX FREE

## (undated) DEVICE — SET SURG INSTR MINI VASC

## (undated) DEVICE — SYRINGE MED 10ML LUERLOCK TIP W/O SFTY DISP

## (undated) DEVICE — 4-PORT MANIFOLD: Brand: NEPTUNE 2

## (undated) DEVICE — TOWEL,OR,DSP,ST,BLUE,STD,6/PK,12PK/CS: Brand: MEDLINE

## (undated) DEVICE — COVER,LIGHT HANDLE,FLX,2/PK: Brand: MEDLINE INDUSTRIES, INC.

## (undated) DEVICE — SYRINGE MED 10ML POLYPR LUERSLIP TIP FLAT TOP W/O SFTY DISP

## (undated) DEVICE — NEEDLE HYPO 25GA L1.5IN BLU POLYPR HUB S STL REG BVL STR

## (undated) DEVICE — GLOVE ORANGE PI 8   MSG9080

## (undated) DEVICE — 14F X 20CM SLX MAX BARRIER TRAY: Brand: SLX HEMO-CATH®

## (undated) DEVICE — FOGARTY ARTERIAL EMBOLECTOMY CATHETER 4F 40CM: Brand: FOGARTY

## (undated) DEVICE — CANNULA INJ L2.5IN BLNT TIP 3MM CLR BODY W/ 1 W VLV DLP

## (undated) DEVICE — SURGICAL PROCEDURE PACK VASC MAJ CUST

## (undated) DEVICE — Device

## (undated) DEVICE — GAUZE,SPONGE,4"X4",16PLY,XRAY,STRL,LF: Brand: MEDLINE

## (undated) DEVICE — GOWN,AURORA,NONREINF,RAGLAN,L,STERILE: Brand: MEDLINE

## (undated) DEVICE — GLOVE SURG SZ 75 L12IN FNGR THK79MIL GRN LTX FREE

## (undated) DEVICE — DILATOR: Brand: COOK

## (undated) DEVICE — SUTURE MCRYL SZ 4-0 L18IN ABSRB UD L19MM PS-2 3/8 CIR PRIM Y496G

## (undated) DEVICE — SHEET, T, LAPAROTOMY, STERILE: Brand: MEDLINE

## (undated) DEVICE — BLADE CLIPPER GEN PURP NS

## (undated) DEVICE — GLOVE SURG SZ 65 THK91MIL LTX FREE SYN POLYISOPRENE

## (undated) DEVICE — COVER HNDL LT DISP

## (undated) DEVICE — CLOTH SURG PREP PREOPERATIVE CHLORHEXIDINE GLUC 2% READYPREP

## (undated) DEVICE — ELECTRODE PT RET AD L9FT HI MOIST COND ADH HYDRGEL CORDED

## (undated) DEVICE — GLOVE SURG SZ 75 CRM LTX FREE POLYISOPRENE POLYMER BEAD ANTI

## (undated) DEVICE — LOOP VES W25MM THK1MM MAXI RED SIL FLD REPELLENT 100 PER

## (undated) DEVICE — LOOP VES W13MM THK09MM MINI RED SIL FLD REPELLENT

## (undated) DEVICE — MARKER,SKIN,WI/RULER AND LABELS: Brand: MEDLINE

## (undated) DEVICE — MICROPUNCTURE INTRODUCER SET SILHOUETTE TRANSITIONLESS PUSH-PLUS DESIGN - STIFFENED CANNULA WITH STAINLESS STEEL WIRE GUIDE: Brand: MICROPUNCTURE

## (undated) DEVICE — CATHETER HAD ADMIN SET LNG TERM PRECRV W/ SIDE H

## (undated) DEVICE — MAGNETIC INSTR DRAPE 20X16: Brand: MEDLINE INDUSTRIES, INC.

## (undated) DEVICE — 1 ML TUBERCULIN SYRINGE LUER-LOCK TIP: Brand: MONOJECT

## (undated) DEVICE — TRAY VCF/TESIO TRAY  REUSABLE

## (undated) DEVICE — ADHESIVE SKIN CLOSURE TOP 36 CC HI VISC DERMBND MINI

## (undated) DEVICE — SUTURE VCRL SZ 3-0 L27IN ABSRB UD L26MM SH 1/2 CIR J416H

## (undated) DEVICE — DRESSING COMP W4XL4IN N ADH PD W2.5XL2.5IN GZ BORDERED ADH

## (undated) DEVICE — Z DISCONTINUED SYRINGE INSULIN 1ML CLR BRL PLAS LUERLOK TIP FLAT TOP STYL

## (undated) DEVICE — DRAPE,HAND,STERILE: Brand: MEDLINE

## (undated) DEVICE — DRAPE,REIN 53X77,STERILE: Brand: MEDLINE

## (undated) DEVICE — SOLUTION IV IRRIG POUR BRL 0.9% SODIUM CHL 2F7124

## (undated) DEVICE — SET CLAMP NEONATAL VASCUALR

## (undated) DEVICE — PROBE PV DOPPLER

## (undated) DEVICE — SUTURE ETHLN SZ 3-0 L18IN NONABSORBABLE BLK L24MM PS-1 3/8 1663G

## (undated) DEVICE — 3M™ MEDIPORE™ + PAD 3564: Brand: 3M™ MEDIPORE™

## (undated) DEVICE — 18 GA N.G. KIT, 10 PACK: Brand: SITE-RITE

## (undated) DEVICE — SUTURE GOR TX SZ 5-0 L30IN NONABSORBABLE L9MM TTC-9 3/8 CIR 6M02B

## (undated) DEVICE — APPLICATOR MEDICATED 26 CC SOLUTION HI LT ORNG CHLORAPREP

## (undated) DEVICE — PACK PROCEDURE SURG GEN CUST

## (undated) DEVICE — LABEL MED 4 IN SURG PANEL W/ PEN STRL

## (undated) DEVICE — 15.5F X 32CM PRE-CURVED15.5F X 3 TITAN HD CATHETERTITAN HD CATHET FULL SET W/SIDEHOLESFULL SET W/S (CUFF 27CM FROM TIP)(CUFF 27CM F
Type: IMPLANTABLE DEVICE | Site: CHEST | Status: NON-FUNCTIONAL
Brand: MEDCOMP HEMO-FLOW DOUBLE LUMEN CATHETERMEDCOMP HEMO-FLOW DOUBLE LUMEN CATHETER

## (undated) DEVICE — GOWN,SIRUS,FABRNF,L,20/CS: Brand: MEDLINE

## (undated) DEVICE — PENCIL,CAUTERY,ROCKER,PTFE,15'CORD: Brand: MEDLINE INDUSTRIES, INC.

## (undated) DEVICE — RETRACTOR WEITLANER SHARP

## (undated) DEVICE — FEEDING TUBE • RADIOPAQUE: Brand: ARGYLE